# Patient Record
Sex: MALE | Race: WHITE | NOT HISPANIC OR LATINO | Employment: OTHER | URBAN - NONMETROPOLITAN AREA
[De-identification: names, ages, dates, MRNs, and addresses within clinical notes are randomized per-mention and may not be internally consistent; named-entity substitution may affect disease eponyms.]

---

## 2017-04-06 ENCOUNTER — OFFICE VISIT (OUTPATIENT)
Dept: CARDIAC SURGERY | Facility: CLINIC | Age: 67
End: 2017-04-06

## 2017-04-06 DIAGNOSIS — I71.40 ABDOMINAL AORTIC ANEURYSM (AAA) WITHOUT RUPTURE (HCC): Primary | ICD-10-CM

## 2017-04-06 PROCEDURE — 99212 OFFICE O/P EST SF 10 MIN: CPT | Performed by: THORACIC SURGERY (CARDIOTHORACIC VASCULAR SURGERY)

## 2017-04-07 VITALS
WEIGHT: 230 LBS | SYSTOLIC BLOOD PRESSURE: 140 MMHG | DIASTOLIC BLOOD PRESSURE: 80 MMHG | BODY MASS INDEX: 28.6 KG/M2 | HEART RATE: 72 BPM | HEIGHT: 75 IN | OXYGEN SATURATION: 96 %

## 2017-04-07 PROBLEM — I71.40 ABDOMINAL AORTIC ANEURYSM (AAA) WITHOUT RUPTURE (HCC): Status: ACTIVE | Noted: 2017-04-07

## 2017-04-07 RX ORDER — METOPROLOL SUCCINATE 50 MG/1
50 TABLET, EXTENDED RELEASE ORAL DAILY
COMMUNITY
Start: 2017-02-22 | End: 2019-10-31 | Stop reason: HOSPADM

## 2017-04-07 RX ORDER — ACETAMINOPHEN AND CODEINE PHOSPHATE 300; 30 MG/1; MG/1
1 TABLET ORAL EVERY 4 HOURS PRN
COMMUNITY
End: 2018-11-03 | Stop reason: HOSPADM

## 2017-04-07 NOTE — PROGRESS NOTES
04/06/2017  Patient Information  Derek Kelsey                                                                                          [address] [facility] [address]   1950  'PCP/Referring Physician'  Raudel Zheng MD  [phone]  No ref. provider found    Chief Complaint   Patient presents with   • Follow-up     1 YR F/U PER AGR PT HAVING ABDOMINAL PAIN W/ CTA ABDOMEN AND PELVIS AT [facility]       History of Present Illness:  Mr. Kelsey returns today for follow up of his abdominal aortic aneurysm.  Since last visit he has overall been doing reasonably well.  He denies any new pain in his back.  He has chronic back pain for which he sees Dr. Bhatti and gets injections for.  He denies any abdominal pain.    Patient Active Problem List   Diagnosis   • Anxiety   • COPD (chronic obstructive pulmonary disease)   • Abdominal aortic aneurysm   • Depression   • Hypertension   • Cataract, bilateral   • CAD (coronary artery disease)     Past Medical History:   Diagnosis Date   • Abdominal aortic aneurysm 9/29/2016   • Anxiety 9/29/2016   • CAD (coronary artery disease) 9/29/2016   • Cataract, bilateral 9/29/2016   • COPD (chronic obstructive pulmonary disease) 9/29/2016   • Depression 9/29/2016   • Depression    • Hypertension 9/29/2016     Past Surgical History:   Procedure Laterality Date   • HAND SURGERY         Current Outpatient Prescriptions:   •  acetaminophen-codeine (TYLENOL #3) 300-30 MG per tablet, Take 1 tablet by mouth Every 4 (Four) Hours As Needed for Moderate Pain (4-6)., Disp: , Rfl:   •  aclidinium bromide (TUDORZA PRESSAIR) 400 MCG/ACT aerosol powder  powder for inhalation, Inhale 1 puff 2 (Two) Times a Day., Disp: , Rfl:   •  amLODIPine (NORVASC) 5 MG tablet, , Disp: , Rfl:   •  ASPIRIN LOW DOSE 81 MG tablet, , Disp: , Rfl:   •  atorvastatin (LIPITOR) 40 MG tablet, , Disp: , Rfl:   •  hydrALAZINE (APRESOLINE) 50 MG tablet, , Disp: , Rfl:   •  lisinopril (PRINIVIL,ZESTRIL) 40 MG tablet,  , Disp: , Rfl:   •  methocarbamol (ROBAXIN) 500 MG tablet, , Disp: , Rfl:   •  mometasone-formoterol (DULERA 200) 200-5 MCG/ACT inhaler, Inhale 2 puffs 2 (Two) Times a Day., Disp: , Rfl:   •  ondansetron (ZOFRAN) 4 MG tablet, , Disp: , Rfl:   •  oxyCODONE-acetaminophen (PERCOCET)  MG per tablet, , Disp: , Rfl:   •  pantoprazole (PROTONIX) 40 MG EC tablet, , Disp: , Rfl:   •  valsartan (DIOVAN) 160 MG tablet, , Disp: , Rfl:   •  metoprolol succinate XL (TOPROL-XL) 50 MG 24 hr tablet, , Disp: , Rfl:   Allergies   Allergen Reactions   • Penicillins      Social History     Social History   • Marital status:      Spouse name: N/A   • Number of children: 2   • Years of education: N/A     Occupational History   • DISABLED       Social History Main Topics   • Smoking status: Former Smoker     Packs/day: 1.00     Years: 30.00     Quit date: 2013   • Smokeless tobacco: Former User     Quit date: 4/7/2013   • Alcohol use No   • Drug use: No   • Sexual activity: Not on file     Other Topics Concern   • Not on file     Social History Narrative     Family History   Problem Relation Age of Onset   • Stroke Mother    • Heart attack Father      Review of Systems   Constitution: Positive for malaise/fatigue. Negative for chills, fever, night sweats and weight loss.   HENT: Positive for congestion and headaches. Negative for hearing loss, odynophagia and sore throat.    Cardiovascular: Positive for dyspnea on exertion and leg swelling. Negative for chest pain, orthopnea and palpitations.   Respiratory: Positive for shortness of breath. Negative for cough and hemoptysis.    Endocrine: Positive for cold intolerance. Negative for heat intolerance, polydipsia, polyphagia and polyuria.   Hematologic/Lymphatic: Does not bruise/bleed easily.   Skin: Positive for poor wound healing. Negative for itching and rash.   Musculoskeletal: Positive for back pain, joint pain, joint swelling, muscle cramps, muscle weakness and  "neck pain. Negative for myalgias.   Gastrointestinal: Positive for abdominal pain. Negative for constipation, diarrhea, hematemesis, hematochezia, melena, nausea and vomiting.   Genitourinary: Negative for dysuria, frequency and hematuria.   Neurological: Positive for dizziness, light-headedness, loss of balance and numbness. Negative for focal weakness and seizures.   Psychiatric/Behavioral: Positive for depression. Negative for suicidal ideas. The patient is nervous/anxious.    All other systems reviewed and are negative.    Vitals:    04/07/17 1013   BP: 140/80   BP Location: Left arm   Patient Position: Sitting   Pulse: 72   SpO2: 96%   Weight: 230 lb (104 kg)   Height: 75\" (190.5 cm)      Physical Exam  GI:   Abdomen is soft, no tenderness noted.  Bowel sounds normal.    Labs/Imaging:  CT scan of the abdomen revealed his aorta to be 4.3 cm.    Assessment/Plan:  We will see him back in nine months with an ultrasound.    Patient Active Problem List   Diagnosis   • Anxiety   • COPD (chronic obstructive pulmonary disease)   • Abdominal aortic aneurysm   • Depression   • Hypertension   • Cataract, bilateral   • CAD (coronary artery disease)     Signed by: Leopoldo Burleson M.D.    4/6/2017    Claudia Pat transcribing on behalf of Leopoldo Burleson MD dictating    CC:  Raudel Zheng MD   "

## 2018-01-02 ENCOUNTER — TELEPHONE (OUTPATIENT)
Dept: CARDIAC SURGERY | Facility: CLINIC | Age: 68
End: 2018-01-02

## 2018-01-02 DIAGNOSIS — I71.40 ABDOMINAL AORTIC ANEURYSM (AAA) WITHOUT RUPTURE (HCC): Primary | ICD-10-CM

## 2018-01-02 NOTE — TELEPHONE ENCOUNTER
PT CALLING REGARDING F/U LETTER. PT STATES THE OFFICE CALLED AND SAID A LETTER HAD COME BACK TO OFFICE AND THAT WE WOULD SEND ANOTHER LETTER OUT TO PT. AS OF TODAY, HE HASN'T RECEIVED IT. I VERIFIED PT'S ADDRESS. PT IS DUE FOR A YEARLY F/U IN [place].

## 2018-09-06 ENCOUNTER — OFFICE VISIT (OUTPATIENT)
Dept: CARDIAC SURGERY | Facility: CLINIC | Age: 68
End: 2018-09-06

## 2018-09-06 DIAGNOSIS — I71.40 ABDOMINAL AORTIC ANEURYSM (AAA) WITHOUT RUPTURE (HCC): Primary | ICD-10-CM

## 2018-09-06 PROCEDURE — 99212 OFFICE O/P EST SF 10 MIN: CPT | Performed by: THORACIC SURGERY (CARDIOTHORACIC VASCULAR SURGERY)

## 2018-09-07 VITALS
HEIGHT: 74 IN | OXYGEN SATURATION: 94 % | WEIGHT: 216 LBS | DIASTOLIC BLOOD PRESSURE: 73 MMHG | HEART RATE: 94 BPM | SYSTOLIC BLOOD PRESSURE: 131 MMHG | BODY MASS INDEX: 27.72 KG/M2

## 2018-09-07 RX ORDER — CITALOPRAM 20 MG/1
40 TABLET ORAL DAILY
COMMUNITY

## 2018-09-07 RX ORDER — DOXYCYCLINE HYCLATE 100 MG/1
100 CAPSULE ORAL DAILY
COMMUNITY
End: 2018-10-17

## 2018-09-07 RX ORDER — TRIAMCINOLONE ACETONIDE 1 MG/G
1 CREAM TOPICAL AS NEEDED
Status: ON HOLD | COMMUNITY
End: 2019-06-06

## 2018-09-07 NOTE — PROGRESS NOTES
09/06/2018  Patient Information  Derek Kelsey                                                                                          [address]  [place] [address]   1950  'PCP/Referring Physician'  Raudel Zheng MD  [phone]  No ref. provider found    Chief Complaint   Patient presents with   • Follow-up     1 year follow to discuss u/s aorta for an abdominal aortic aneurysm.   • Aortic Aneurysm       History of Present Illness:  Mr. Kelsey returns today for follow up of his abdominal aortic aneurysm.  Since last visit, he has continued to have severe back pain.  Dr. Bhatti has apparently recommended surgical repair of this.  He had a recent MRI done 8/7/18 which revealed a 4.6 cm aneurysm. I have obtained and reviewed those films.    Patient Active Problem List   Diagnosis   • Anxiety   • COPD (chronic obstructive pulmonary disease) (CMS/HCC)   • Abdominal aortic aneurysm (CMS/HCC)   • Depression   • Hypertension   • Cataract, bilateral   • CAD (coronary artery disease)   • Abdominal aortic aneurysm (AAA) without rupture (CMS/HCC)     Past Medical History:   Diagnosis Date   • Abdominal aortic aneurysm (CMS/HCC) 9/29/2016   • Anxiety 9/29/2016   • CAD (coronary artery disease) 9/29/2016   • Cataract, bilateral 9/29/2016   • COPD (chronic obstructive pulmonary disease) (CMS/HCC) 9/29/2016   • Depression 9/29/2016   • Depression    • Hypertension 9/29/2016     Past Surgical History:   Procedure Laterality Date   • HAND SURGERY         Current Outpatient Prescriptions:   •  aclidinium bromide (TUDORZA PRESSAIR) 400 MCG/ACT aerosol powder  powder for inhalation, Inhale 1 puff 2 (Two) Times a Day., Disp: , Rfl:   •  amLODIPine (NORVASC) 5 MG tablet, , Disp: , Rfl:   •  ASPIRIN LOW DOSE 81 MG tablet, , Disp: , Rfl:   •  citalopram (CeleXA) 20 MG tablet, Take 20 mg by mouth Daily., Disp: , Rfl:   •  doxycycline (VIBRAMYCIN) 100 MG capsule, Take 100 mg by mouth Daily., Disp: , Rfl:   •   hydrALAZINE (APRESOLINE) 50 MG tablet, , Disp: , Rfl:   •  lisinopril (PRINIVIL,ZESTRIL) 40 MG tablet, , Disp: , Rfl:   •  metoprolol succinate XL (TOPROL-XL) 50 MG 24 hr tablet, , Disp: , Rfl:   •  mometasone-formoterol (DULERA 200) 200-5 MCG/ACT inhaler, Inhale 2 puffs 2 (Two) Times a Day., Disp: , Rfl:   •  pantoprazole (PROTONIX) 40 MG EC tablet, , Disp: , Rfl:   •  triamcinolone (KENALOG) 0.1 % cream, Apply  topically to the appropriate area as directed 3 (Three) Times a Day., Disp: , Rfl:   •  valsartan (DIOVAN) 160 MG tablet, , Disp: , Rfl:   •  acetaminophen-codeine (TYLENOL #3) 300-30 MG per tablet, Take 1 tablet by mouth Every 4 (Four) Hours As Needed for Moderate Pain (4-6)., Disp: , Rfl:   •  atorvastatin (LIPITOR) 40 MG tablet, , Disp: , Rfl:   •  methocarbamol (ROBAXIN) 500 MG tablet, , Disp: , Rfl:   •  ondansetron (ZOFRAN) 4 MG tablet, , Disp: , Rfl:   •  oxyCODONE-acetaminophen (PERCOCET)  MG per tablet, , Disp: , Rfl:   Allergies   Allergen Reactions   • Penicillins Hives     Social History     Social History   • Marital status:      Spouse name: N/A   • Number of children: 2   • Years of education: N/A     Occupational History   • DISABLED       Back     Social History Main Topics   • Smoking status: Former Smoker     Packs/day: 1.00     Years: 30.00     Types: Cigarettes     Quit date: 2013   • Smokeless tobacco: Former User     Types: Chew     Quit date: 4/7/2013   • Alcohol use No   • Drug use: No   • Sexual activity: Not on file     Other Topics Concern   • Not on file     Social History Narrative    Lives in [place].      Family History   Problem Relation Age of Onset   • Stroke Mother    • Heart attack Father      Review of Systems   Constitution: Positive for diaphoresis and malaise/fatigue. Negative for chills, fever, night sweats and weight loss.   HENT: Positive for hearing loss. Negative for odynophagia and sore throat.    Cardiovascular: Positive for dyspnea on  "exertion. Negative for chest pain, leg swelling, orthopnea and palpitations.   Respiratory: Positive for cough, shortness of breath and wheezing. Negative for hemoptysis.    Endocrine: Negative for cold intolerance, heat intolerance, polydipsia, polyphagia and polyuria.   Hematologic/Lymphatic: Bruises/bleeds easily.   Skin: Negative for itching and rash.   Musculoskeletal: Positive for back pain and muscle cramps. Negative for joint pain, joint swelling and myalgias.   Gastrointestinal: Positive for abdominal pain. Negative for constipation, diarrhea, hematemesis, hematochezia, melena, nausea and vomiting.   Genitourinary: Negative for dysuria, frequency and hematuria.   Neurological: Negative for focal weakness, headaches, numbness and seizures.   Psychiatric/Behavioral: Positive for depression. Negative for suicidal ideas.   All other systems reviewed and are negative.    Vitals:    09/06/18 1023   BP: 131/73   BP Location: Right arm   Patient Position: Sitting   Pulse: 94   SpO2: 94%   Weight: 98 kg (216 lb)   Height: 188 cm (74\")      Physical Exam  LUNGS:  Clear.  CARDIOVASCULAR: Regular rhythm and rate.  GI:   Abdomen is soft.    Assessment/Plan:  Mr. Kelsey has had slight enlargement of his abdominal aneurysm to 4.6 cm.  At this point, I would continue to follow him very closely.  We will see him back in six months with an ultrasound.  He is considering having surgery done on his back by Dr. Bhatti.  I will see him back in six months with an ultrasound.       Patient Active Problem List   Diagnosis   • Anxiety   • COPD (chronic obstructive pulmonary disease) (CMS/HCC)   • Abdominal aortic aneurysm (CMS/HCC)   • Depression   • Hypertension   • Cataract, bilateral   • CAD (coronary artery disease)   • Abdominal aortic aneurysm (AAA) without rupture (CMS/HCC)     CC: MD Claudia Cross transcribing on behalf of Leopoldo Burleson MD dictating.   "

## 2018-09-24 ENCOUNTER — TRANSCRIBE ORDERS (OUTPATIENT)
Dept: ADMINISTRATIVE | Facility: HOSPITAL | Age: 68
End: 2018-09-24

## 2018-09-24 DIAGNOSIS — R94.39 ABNORMAL STRESS TEST: Primary | ICD-10-CM

## 2018-09-28 ENCOUNTER — HOSPITAL ENCOUNTER (OUTPATIENT)
Facility: HOSPITAL | Age: 68
Setting detail: HOSPITAL OUTPATIENT SURGERY
Discharge: HOME | End: 2018-09-28
Attending: INTERNAL MEDICINE | Admitting: INTERNAL MEDICINE

## 2018-09-28 VITALS
BODY MASS INDEX: 27.9 KG/M2 | TEMPERATURE: 97.9 F | RESPIRATION RATE: 18 BRPM | HEIGHT: 74 IN | HEART RATE: 66 BPM | SYSTOLIC BLOOD PRESSURE: 112 MMHG | OXYGEN SATURATION: 93 % | WEIGHT: 217.37 LBS | DIASTOLIC BLOOD PRESSURE: 82 MMHG

## 2018-09-28 DIAGNOSIS — R94.39 ABNORMAL STRESS TEST: ICD-10-CM

## 2018-09-28 LAB
ANION GAP SERPL CALCULATED.3IONS-SCNC: 9 MMOL/L (ref 3–11)
BUN BLD-MCNC: 16 MG/DL (ref 9–23)
BUN/CREAT SERPL: 19.8 (ref 7–25)
CALCIUM SPEC-SCNC: 9.4 MG/DL (ref 8.7–10.4)
CHLORIDE SERPL-SCNC: 102 MMOL/L (ref 99–109)
CO2 SERPL-SCNC: 25 MMOL/L (ref 20–31)
CREAT BLD-MCNC: 0.81 MG/DL (ref 0.6–1.3)
DEPRECATED RDW RBC AUTO: 51.2 FL (ref 37–54)
ERYTHROCYTE [DISTWIDTH] IN BLOOD BY AUTOMATED COUNT: 14.6 % (ref 11.3–14.5)
GFR SERPL CREATININE-BSD FRML MDRD: 96 ML/MIN/1.73
GLUCOSE BLD-MCNC: 113 MG/DL (ref 70–100)
HCT VFR BLD AUTO: 47.3 % (ref 38.9–50.9)
HGB BLD-MCNC: 15.3 G/DL (ref 13.1–17.5)
MCH RBC QN AUTO: 31 PG (ref 27–31)
MCHC RBC AUTO-ENTMCNC: 32.3 G/DL (ref 32–36)
MCV RBC AUTO: 95.7 FL (ref 80–99)
PLATELET # BLD AUTO: 182 10*3/MM3 (ref 150–450)
PMV BLD AUTO: 10.9 FL (ref 6–12)
POTASSIUM BLD-SCNC: 3.9 MMOL/L (ref 3.5–5.5)
RBC # BLD AUTO: 4.94 10*6/MM3 (ref 4.2–5.76)
SODIUM BLD-SCNC: 136 MMOL/L (ref 132–146)
WBC NRBC COR # BLD: 7.14 10*3/MM3 (ref 3.5–10.8)

## 2018-09-28 PROCEDURE — 25010000002 PHENYLEPHRINE PER 1 ML: Performed by: INTERNAL MEDICINE

## 2018-09-28 PROCEDURE — 25010000002 MIDAZOLAM PER 1 MG: Performed by: INTERNAL MEDICINE

## 2018-09-28 PROCEDURE — C1769 GUIDE WIRE: HCPCS | Performed by: INTERNAL MEDICINE

## 2018-09-28 PROCEDURE — 93458 L HRT ARTERY/VENTRICLE ANGIO: CPT | Performed by: INTERNAL MEDICINE

## 2018-09-28 PROCEDURE — 36415 COLL VENOUS BLD VENIPUNCTURE: CPT

## 2018-09-28 PROCEDURE — 25010000002 HEPARIN (PORCINE) PER 1000 UNITS: Performed by: INTERNAL MEDICINE

## 2018-09-28 PROCEDURE — 0 IOPAMIDOL PER 1 ML: Performed by: INTERNAL MEDICINE

## 2018-09-28 PROCEDURE — 80048 BASIC METABOLIC PNL TOTAL CA: CPT | Performed by: INTERNAL MEDICINE

## 2018-09-28 PROCEDURE — 25010000002 FENTANYL CITRATE (PF) 100 MCG/2ML SOLUTION: Performed by: INTERNAL MEDICINE

## 2018-09-28 PROCEDURE — 85027 COMPLETE CBC AUTOMATED: CPT | Performed by: INTERNAL MEDICINE

## 2018-09-28 PROCEDURE — C1894 INTRO/SHEATH, NON-LASER: HCPCS | Performed by: INTERNAL MEDICINE

## 2018-09-28 RX ORDER — ASPIRIN 325 MG
325 TABLET ORAL DAILY
Status: DISCONTINUED | OUTPATIENT
Start: 2018-09-28 | End: 2018-09-28 | Stop reason: HOSPADM

## 2018-09-28 RX ORDER — ALPRAZOLAM 0.25 MG/1
0.25 TABLET ORAL 3 TIMES DAILY PRN
Status: DISCONTINUED | OUTPATIENT
Start: 2018-09-28 | End: 2018-09-28 | Stop reason: HOSPADM

## 2018-09-28 RX ORDER — MORPHINE SULFATE 2 MG/ML
1 INJECTION, SOLUTION INTRAMUSCULAR; INTRAVENOUS EVERY 4 HOURS PRN
Status: DISCONTINUED | OUTPATIENT
Start: 2018-09-28 | End: 2018-09-28 | Stop reason: HOSPADM

## 2018-09-28 RX ORDER — MIDAZOLAM HYDROCHLORIDE 1 MG/ML
INJECTION INTRAMUSCULAR; INTRAVENOUS AS NEEDED
Status: DISCONTINUED | OUTPATIENT
Start: 2018-09-28 | End: 2018-09-28 | Stop reason: HOSPADM

## 2018-09-28 RX ORDER — ACETAMINOPHEN 325 MG/1
650 TABLET ORAL EVERY 4 HOURS PRN
Status: DISCONTINUED | OUTPATIENT
Start: 2018-09-28 | End: 2018-09-28 | Stop reason: HOSPADM

## 2018-09-28 RX ORDER — HYDROCODONE BITARTRATE AND ACETAMINOPHEN 5; 325 MG/1; MG/1
1 TABLET ORAL EVERY 4 HOURS PRN
Status: DISCONTINUED | OUTPATIENT
Start: 2018-09-28 | End: 2018-09-28 | Stop reason: HOSPADM

## 2018-09-28 RX ORDER — LIDOCAINE HYDROCHLORIDE 10 MG/ML
INJECTION, SOLUTION INFILTRATION; PERINEURAL AS NEEDED
Status: DISCONTINUED | OUTPATIENT
Start: 2018-09-28 | End: 2018-09-28 | Stop reason: HOSPADM

## 2018-09-28 RX ORDER — SODIUM CHLORIDE 9 MG/ML
250 INJECTION, SOLUTION INTRAVENOUS CONTINUOUS
Status: ACTIVE | OUTPATIENT
Start: 2018-09-28 | End: 2018-09-28

## 2018-09-28 RX ORDER — ASPIRIN 325 MG
325 TABLET ORAL DAILY
Status: DISCONTINUED | OUTPATIENT
Start: 2018-09-28 | End: 2018-09-28

## 2018-09-28 RX ORDER — NALOXONE HCL 0.4 MG/ML
0.4 VIAL (ML) INJECTION
Status: DISCONTINUED | OUTPATIENT
Start: 2018-09-28 | End: 2018-09-28 | Stop reason: HOSPADM

## 2018-09-28 RX ORDER — TEMAZEPAM 7.5 MG/1
7.5 CAPSULE ORAL NIGHTLY PRN
Status: DISCONTINUED | OUTPATIENT
Start: 2018-09-28 | End: 2018-09-28 | Stop reason: HOSPADM

## 2018-09-28 RX ORDER — SODIUM CHLORIDE 9 MG/ML
INJECTION, SOLUTION INTRAVENOUS CONTINUOUS PRN
Status: COMPLETED | OUTPATIENT
Start: 2018-09-28 | End: 2018-09-28

## 2018-09-28 RX ORDER — FENTANYL CITRATE 50 UG/ML
INJECTION, SOLUTION INTRAMUSCULAR; INTRAVENOUS AS NEEDED
Status: DISCONTINUED | OUTPATIENT
Start: 2018-09-28 | End: 2018-09-28 | Stop reason: HOSPADM

## 2018-09-28 RX ADMIN — ASPIRIN 325 MG ORAL TABLET 325 MG: 325 PILL ORAL at 07:01

## 2018-10-17 ENCOUNTER — OFFICE VISIT (OUTPATIENT)
Dept: PULMONOLOGY | Facility: CLINIC | Age: 68
End: 2018-10-17

## 2018-10-17 VITALS
DIASTOLIC BLOOD PRESSURE: 84 MMHG | RESPIRATION RATE: 18 BRPM | WEIGHT: 225 LBS | HEIGHT: 74 IN | SYSTOLIC BLOOD PRESSURE: 146 MMHG | TEMPERATURE: 97 F | OXYGEN SATURATION: 93 % | HEART RATE: 78 BPM | BODY MASS INDEX: 28.88 KG/M2

## 2018-10-17 DIAGNOSIS — J60 COAL WORKERS PNEUMOCONIOSIS (HCC): ICD-10-CM

## 2018-10-17 DIAGNOSIS — I71.40 ABDOMINAL AORTIC ANEURYSM (AAA) WITHOUT RUPTURE (HCC): ICD-10-CM

## 2018-10-17 DIAGNOSIS — J44.9 COPD MIXED TYPE (HCC): ICD-10-CM

## 2018-10-17 DIAGNOSIS — Z01.818 PREOPERATIVE CLEARANCE: Primary | ICD-10-CM

## 2018-10-17 DIAGNOSIS — Z87.891 FORMER SMOKER: ICD-10-CM

## 2018-10-17 PROCEDURE — 99204 OFFICE O/P NEW MOD 45 MIN: CPT | Performed by: INTERNAL MEDICINE

## 2018-10-17 PROCEDURE — 94726 PLETHYSMOGRAPHY LUNG VOLUMES: CPT | Performed by: INTERNAL MEDICINE

## 2018-10-17 PROCEDURE — 94729 DIFFUSING CAPACITY: CPT | Performed by: INTERNAL MEDICINE

## 2018-10-17 PROCEDURE — 94060 EVALUATION OF WHEEZING: CPT | Performed by: INTERNAL MEDICINE

## 2018-10-17 RX ORDER — LANOLIN ALCOHOL/MO/W.PET/CERES
1000 CREAM (GRAM) TOPICAL DAILY
COMMUNITY
End: 2019-06-05

## 2018-10-17 RX ORDER — ALBUTEROL SULFATE 90 UG/1
4 AEROSOL, METERED RESPIRATORY (INHALATION) ONCE
Status: COMPLETED | OUTPATIENT
Start: 2018-10-17 | End: 2018-10-17

## 2018-10-17 RX ADMIN — ALBUTEROL SULFATE 4 PUFF: 90 AEROSOL, METERED RESPIRATORY (INHALATION) at 09:06

## 2018-10-17 NOTE — PROGRESS NOTES
"  PULMONARY  NOTE    Chief Complaint     COPD, back pain, former smoker, \"black lung\"    History of Present Illness     67-year-old white male referred for preoperative evaluation.  He is being considered for back surgery by Dr. Franklin.    Has a long history tobacco abuse.  This consisted of one or more packs of cigarettes per day.  He smoked up until 2011.    He has a diagnosis of COPD.  He has been treated with Tudorza and Dulera  He feels that these help with his symptoms but despite them he still has significant exercise limitation.    He has dyspnea on exertion, not at rest.  He would get short of breath walking about 100 feet on level surface or up one flight of stairs.    He has a daily cough typically in the morning and produces a small amount of yellow sputum.  He has had no hemoptysis.    He typically has one or 2 exacerbations of COPD a year.  In the last year he's been on about 2 rounds of prednisone in one or 2 rounds of antibiotics.    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    Patient Active Problem List   Diagnosis   • Anxiety   • Abdominal aortic aneurysm (CMS/HCC)   • Depression   • Hypertension   • Cataract, bilateral   • CAD (coronary artery disease)   • Abdominal aortic aneurysm (AAA) without rupture (CMS/HCC)   • Abnormal stress test   • Moderate COPD   • Former smoker   • Coal workers pneumoconiosis, simple     Allergies   Allergen Reactions   • Penicillins Hives       Current Outpatient Prescriptions:   •  acetaminophen-codeine (TYLENOL #3) 300-30 MG per tablet, Take 1 tablet by mouth Every 4 (Four) Hours As Needed for Moderate Pain (4-6)., Disp: , Rfl:   •  aclidinium bromide (TUDORZA PRESSAIR) 400 MCG/ACT aerosol powder  powder for inhalation, Inhale 1 puff 2 (Two) Times a Day., Disp: , Rfl:   •  amLODIPine (NORVASC) 5 MG tablet, 5 mg Daily., Disp: , Rfl:   •  ASPIRIN LOW DOSE 81 MG tablet, 81 mg Daily., Disp: , Rfl:   •  " citalopram (CeleXA) 20 MG tablet, Take 20 mg by mouth Daily., Disp: , Rfl:   •  hydrALAZINE (APRESOLINE) 50 MG tablet, 50 mg 3 (Three) Times a Day., Disp: , Rfl:   •  lisinopril (PRINIVIL,ZESTRIL) 40 MG tablet, 40 mg 2 (Two) Times a Day., Disp: , Rfl:   •  metoprolol succinate XL (TOPROL-XL) 50 MG 24 hr tablet, 50 mg Daily., Disp: , Rfl:   •  mometasone-formoterol (DULERA 200) 200-5 MCG/ACT inhaler, Inhale 2 puffs 2 (Two) Times a Day., Disp: , Rfl:   •  ondansetron (ZOFRAN) 4 MG tablet, , Disp: , Rfl:   •  pantoprazole (PROTONIX) 40 MG EC tablet, Daily., Disp: , Rfl:   •  triamcinolone (KENALOG) 0.1 % cream, Apply  topically to the appropriate area as directed 3 (Three) Times a Day., Disp: , Rfl:   •  valsartan (DIOVAN) 160 MG tablet, Daily., Disp: , Rfl:   •  vitamin B-12 (CYANOCOBALAMIN) 1000 MCG tablet, Take 1,000 mcg by mouth Daily., Disp: , Rfl:   No current facility-administered medications for this visit.   MEDICATION LIST AND ALLERGIES REVIEWED.    Family History   Problem Relation Age of Onset   • Stroke Mother    • Hypertension Mother    • Heart disease Mother    • Heart disease Father    • Hypertension Father    • Diabetes Sister    • Hypertension Sister    • Diabetes Brother    • Hypertension Child      Social History   Substance Use Topics   • Smoking status: Former Smoker     Packs/day: 1.00     Years: 30.00     Types: Cigarettes     Quit date: 2013   • Smokeless tobacco: Former User     Types: Chew     Quit date: 4/7/2013   • Alcohol use No     Social History     Social History Narrative    Lives in [place].    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    Review of Systems  ALSO REFER TO SCANNED ROS SHEET FROM SAME DATE.    /84 (BP Location: Right arm, Patient  "Position: Sitting, Cuff Size: Adult)   Pulse 78   Temp 97 °F (36.1 °C)   Resp 18   Ht 188 cm (74\")   Wt 102 kg (225 lb)   SpO2 93% Comment: RA AT REST  BMI 28.89 kg/m²   Physical Exam   Constitutional: He is oriented to person, place, and time. He appears well-developed. No distress.   HENT:   Head: Normocephalic and atraumatic.   Neck: No thyromegaly present.   Cardiovascular: Normal rate, regular rhythm and normal heart sounds.    No murmur heard.  Pulmonary/Chest: Effort normal. No stridor.   Decreased breath sounds with a few basilar crackles, no wheezes   Abdominal: Soft. Bowel sounds are normal.   Musculoskeletal: Normal range of motion. He exhibits no edema.   Lymphadenopathy:     He has no cervical adenopathy.        Right: No supraclavicular and no epitrochlear adenopathy present.        Left: No supraclavicular and no epitrochlear adenopathy present.   Neurological: He is alert and oriented to person, place, and time.   Skin: Skin is warm and dry. He is not diaphoretic.   Psychiatric: He has a normal mood and affect. His behavior is normal.   Nursing note and vitals reviewed.      Results     PFTs reveal moderate airway obstruction.  Mild improvement in FEV1 after bronchodilator.  No restriction and air trapping is present.  Normal diffusion.    PA and lateral chest x-ray today reveals changes of obstructive airways disease with flattened diaphragms and increased anterior clear space.   Also calcified nodules bilaterally consistent with granulomatous disease.  No evidence of active disease    Problem List       ICD-10-CM ICD-9-CM   1. Preoperative clearance Z01.818 V72.84   2. Moderate COPD J44.9 496   3. Former smoker Z87.891 V15.82   4. Abdominal aortic aneurysm (AAA) without rupture (CMS/[facility]) I71.4 441.4   5. Coal workers pneumoconiosis, simple J60 500       Discussion     We discussed his test results.  He has moderate obstructive airways disease.  This is most likely COPD due to to his past " history tobacco abuse.  We will check an alpha-1 antitrypsin screen, however.    We talked about medical management for COPD.  We talked about various medications.  I'm going to put him on Trelegy in place of Tudorza and Dulera to see if we can reduce one of his co-pays    I've recommended a low-dose screening CT scan of the chest.    We discussed pulmonary rehabilitation.  That might be something he can pursue after his back surgery.    We will check 2 nights of nocturnal pulse oximetry.  I suspect that he probably has nocturnal desaturation.    From a pulmonary standpoint there is no contraindication to him undergoing orthopedic surgery.  He is at an increased risk for pulmonary complications related to surgery however these risks are not prohibitive.    I'll see him back in a couple of months  He would probably benefit from perioperative bronchodilator therapy.    Joo Guerrero MD  Note electronically signed    CC: Raudel Zheng MD

## 2018-10-18 DIAGNOSIS — J84.9 INTERSTITIAL LUNG DISEASE (HCC): ICD-10-CM

## 2018-10-18 DIAGNOSIS — Z87.891 PERSONAL HISTORY OF TOBACCO USE, PRESENTING HAZARDS TO HEALTH: Primary | ICD-10-CM

## 2018-10-31 ENCOUNTER — APPOINTMENT (OUTPATIENT)
Dept: PREADMISSION TESTING | Facility: HOSPITAL | Age: 68
End: 2018-10-31

## 2018-10-31 VITALS — BODY MASS INDEX: 28.92 KG/M2 | WEIGHT: 225.31 LBS | HEIGHT: 74 IN

## 2018-10-31 LAB
ABO GROUP BLD: NORMAL
BLD GP AB SCN SERPL QL: NEGATIVE
DEPRECATED RDW RBC AUTO: 48 FL (ref 37–54)
ERYTHROCYTE [DISTWIDTH] IN BLOOD BY AUTOMATED COUNT: 13.5 % (ref 11.3–14.5)
HBA1C MFR BLD: 6.3 % (ref 4.8–5.6)
HCT VFR BLD AUTO: 47 % (ref 38.9–50.9)
HGB BLD-MCNC: 15.1 G/DL (ref 13.1–17.5)
MCH RBC QN AUTO: 31.1 PG (ref 27–31)
MCHC RBC AUTO-ENTMCNC: 32.1 G/DL (ref 32–36)
MCV RBC AUTO: 96.7 FL (ref 80–99)
PLATELET # BLD AUTO: 209 10*3/MM3 (ref 150–450)
PMV BLD AUTO: 10.9 FL (ref 6–12)
POTASSIUM BLD-SCNC: 4.2 MMOL/L (ref 3.5–5.5)
RBC # BLD AUTO: 4.86 10*6/MM3 (ref 4.2–5.76)
RH BLD: POSITIVE
T&S EXPIRATION DATE: NORMAL
WBC NRBC COR # BLD: 8.73 10*3/MM3 (ref 3.5–10.8)

## 2018-10-31 PROCEDURE — 85027 COMPLETE CBC AUTOMATED: CPT | Performed by: ANESTHESIOLOGY

## 2018-10-31 PROCEDURE — 83036 HEMOGLOBIN GLYCOSYLATED A1C: CPT | Performed by: ORTHOPAEDIC SURGERY

## 2018-10-31 PROCEDURE — 86901 BLOOD TYPING SEROLOGIC RH(D): CPT | Performed by: ORTHOPAEDIC SURGERY

## 2018-10-31 PROCEDURE — 86900 BLOOD TYPING SEROLOGIC ABO: CPT | Performed by: ORTHOPAEDIC SURGERY

## 2018-10-31 PROCEDURE — 86850 RBC ANTIBODY SCREEN: CPT | Performed by: ORTHOPAEDIC SURGERY

## 2018-10-31 PROCEDURE — 84132 ASSAY OF SERUM POTASSIUM: CPT | Performed by: ANESTHESIOLOGY

## 2018-10-31 PROCEDURE — 36415 COLL VENOUS BLD VENIPUNCTURE: CPT

## 2018-11-01 ENCOUNTER — HOSPITAL ENCOUNTER (INPATIENT)
Facility: HOSPITAL | Age: 68
LOS: 2 days | Discharge: HOME HEALTH | End: 2018-11-03
Attending: ORTHOPAEDIC SURGERY | Admitting: ORTHOPAEDIC SURGERY

## 2018-11-01 ENCOUNTER — ANESTHESIA (OUTPATIENT)
Dept: PERIOP | Facility: HOSPITAL | Age: 68
End: 2018-11-01

## 2018-11-01 ENCOUNTER — APPOINTMENT (OUTPATIENT)
Dept: GENERAL RADIOLOGY | Facility: HOSPITAL | Age: 68
End: 2018-11-01

## 2018-11-01 ENCOUNTER — ANESTHESIA EVENT (OUTPATIENT)
Dept: PERIOP | Facility: HOSPITAL | Age: 68
End: 2018-11-01

## 2018-11-01 DIAGNOSIS — Z74.09 IMPAIRED MOBILITY AND ADLS: ICD-10-CM

## 2018-11-01 DIAGNOSIS — Z78.9 IMPAIRED MOBILITY AND ADLS: ICD-10-CM

## 2018-11-01 DIAGNOSIS — Z98.1 S/P LUMBAR FUSION: ICD-10-CM

## 2018-11-01 DIAGNOSIS — Z74.09 IMPAIRED FUNCTIONAL MOBILITY, BALANCE, GAIT, AND ENDURANCE: Primary | ICD-10-CM

## 2018-11-01 PROBLEM — M54.9 BACK PAIN: Status: ACTIVE | Noted: 2018-11-01

## 2018-11-01 PROBLEM — R73.03 PREDIABETES: Status: ACTIVE | Noted: 2018-11-01

## 2018-11-01 LAB
ABO GROUP BLD: NORMAL
GLUCOSE BLDC GLUCOMTR-MCNC: 144 MG/DL (ref 70–130)
GLUCOSE BLDC GLUCOMTR-MCNC: 163 MG/DL (ref 70–130)
RH BLD: POSITIVE

## 2018-11-01 PROCEDURE — 25010000002 FENTANYL CITRATE (PF) 100 MCG/2ML SOLUTION: Performed by: ANESTHESIOLOGY

## 2018-11-01 PROCEDURE — 25010000002 NEOSTIGMINE PER 0.5 MG: Performed by: NURSE ANESTHETIST, CERTIFIED REGISTERED

## 2018-11-01 PROCEDURE — 25010000002 ONDANSETRON PER 1 MG: Performed by: NURSE ANESTHETIST, CERTIFIED REGISTERED

## 2018-11-01 PROCEDURE — 82962 GLUCOSE BLOOD TEST: CPT

## 2018-11-01 PROCEDURE — 25010000002 VANCOMYCIN 10 G RECONSTITUTED SOLUTION: Performed by: ORTHOPAEDIC SURGERY

## 2018-11-01 PROCEDURE — 25010000002 PHENYLEPHRINE PER 1 ML: Performed by: NURSE ANESTHETIST, CERTIFIED REGISTERED

## 2018-11-01 PROCEDURE — C1713 ANCHOR/SCREW BN/BN,TIS/BN: HCPCS | Performed by: ORTHOPAEDIC SURGERY

## 2018-11-01 PROCEDURE — 25010000002 PROPOFOL 10 MG/ML EMULSION: Performed by: ANESTHESIOLOGY

## 2018-11-01 PROCEDURE — 97116 GAIT TRAINING THERAPY: CPT

## 2018-11-01 PROCEDURE — 25010000002 DEXAMETHASONE PER 1 MG: Performed by: ANESTHESIOLOGY

## 2018-11-01 PROCEDURE — 25010000002 HEPARIN (PORCINE) PER 1000 UNITS

## 2018-11-01 PROCEDURE — 76000 FLUOROSCOPY <1 HR PHYS/QHP: CPT

## 2018-11-01 PROCEDURE — 0SG30AJ FUSION OF LUMBOSACRAL JOINT WITH INTERBODY FUSION DEVICE, POSTERIOR APPROACH, ANTERIOR COLUMN, OPEN APPROACH: ICD-10-PCS | Performed by: ORTHOPAEDIC SURGERY

## 2018-11-01 PROCEDURE — 86901 BLOOD TYPING SEROLOGIC RH(D): CPT

## 2018-11-01 PROCEDURE — 0ST40ZZ RESECTION OF LUMBOSACRAL DISC, OPEN APPROACH: ICD-10-PCS | Performed by: ORTHOPAEDIC SURGERY

## 2018-11-01 PROCEDURE — 25810000003 SODIUM CHLORIDE 0.9 % WITH KCL 20 MEQ 20-0.9 MEQ/L-% SOLUTION: Performed by: ORTHOPAEDIC SURGERY

## 2018-11-01 PROCEDURE — 25010000002 NALOXONE PER 1 MG: Performed by: ORTHOPAEDIC SURGERY

## 2018-11-01 PROCEDURE — 76001 HC FLUORO GREATER THAN 1 HOUR: CPT

## 2018-11-01 PROCEDURE — 97161 PT EVAL LOW COMPLEX 20 MIN: CPT

## 2018-11-01 PROCEDURE — 86900 BLOOD TYPING SEROLOGIC ABO: CPT

## 2018-11-01 PROCEDURE — 0SG3071 FUSION OF LUMBOSACRAL JOINT WITH AUTOLOGOUS TISSUE SUBSTITUTE, POSTERIOR APPROACH, POSTERIOR COLUMN, OPEN APPROACH: ICD-10-PCS | Performed by: ORTHOPAEDIC SURGERY

## 2018-11-01 PROCEDURE — 63710000001 INSULIN LISPRO (HUMAN) PER 5 UNITS: Performed by: NURSE PRACTITIONER

## 2018-11-01 PROCEDURE — 0SG00AJ FUSION OF LUMBAR VERTEBRAL JOINT WITH INTERBODY FUSION DEVICE, POSTERIOR APPROACH, ANTERIOR COLUMN, OPEN APPROACH: ICD-10-PCS | Performed by: ORTHOPAEDIC SURGERY

## 2018-11-01 PROCEDURE — 0SG0071 FUSION OF LUMBAR VERTEBRAL JOINT WITH AUTOLOGOUS TISSUE SUBSTITUTE, POSTERIOR APPROACH, POSTERIOR COLUMN, OPEN APPROACH: ICD-10-PCS | Performed by: ORTHOPAEDIC SURGERY

## 2018-11-01 PROCEDURE — 0ST20ZZ RESECTION OF LUMBAR VERTEBRAL DISC, OPEN APPROACH: ICD-10-PCS | Performed by: ORTHOPAEDIC SURGERY

## 2018-11-01 DEVICE — SCRW CORT VIPER PLS5.5 TI FIX 6X45MM: Type: IMPLANTABLE DEVICE | Site: SPINE LUMBAR | Status: FUNCTIONAL

## 2018-11-01 DEVICE — IMPLANTABLE DEVICE: Type: IMPLANTABLE DEVICE | Site: SPINE LUMBAR | Status: FUNCTIONAL

## 2018-11-01 DEVICE — SCRW VIPER INNR ST: Type: IMPLANTABLE DEVICE | Site: SPINE LUMBAR | Status: FUNCTIONAL

## 2018-11-01 DEVICE — ALLOGRFT BONE VIVIGEN CELLUAR MATRX FORMABLE 5CC: Type: IMPLANTABLE DEVICE | Site: SPINE LUMBAR | Status: FUNCTIONAL

## 2018-11-01 DEVICE — SCRW CORT VIPER PLS5.5 TI FIX 7X45MM: Type: IMPLANTABLE DEVICE | Site: SPINE LUMBAR | Status: FUNCTIONAL

## 2018-11-01 DEVICE — ROD PREBNT SPINE EXPEDIUM TI 5.5X55MM: Type: IMPLANTABLE DEVICE | Site: SPINE LUMBAR | Status: FUNCTIONAL

## 2018-11-01 RX ORDER — FAMOTIDINE 20 MG/1
20 TABLET, FILM COATED ORAL ONCE
Status: DISCONTINUED | OUTPATIENT
Start: 2018-11-01 | End: 2018-11-01 | Stop reason: HOSPADM

## 2018-11-01 RX ORDER — ONDANSETRON 2 MG/ML
4 INJECTION INTRAMUSCULAR; INTRAVENOUS EVERY 6 HOURS PRN
Status: DISCONTINUED | OUTPATIENT
Start: 2018-11-01 | End: 2018-11-03 | Stop reason: HOSPADM

## 2018-11-01 RX ORDER — LABETALOL HYDROCHLORIDE 5 MG/ML
10 INJECTION, SOLUTION INTRAVENOUS EVERY 4 HOURS PRN
Status: DISCONTINUED | OUTPATIENT
Start: 2018-11-01 | End: 2018-11-03 | Stop reason: HOSPADM

## 2018-11-01 RX ORDER — ZOLPIDEM TARTRATE 5 MG/1
5 TABLET ORAL NIGHTLY PRN
Status: DISCONTINUED | OUTPATIENT
Start: 2018-11-01 | End: 2018-11-03 | Stop reason: HOSPADM

## 2018-11-01 RX ORDER — BISACODYL 10 MG
10 SUPPOSITORY, RECTAL RECTAL DAILY PRN
Status: DISCONTINUED | OUTPATIENT
Start: 2018-11-01 | End: 2018-11-03 | Stop reason: HOSPADM

## 2018-11-01 RX ORDER — PREGABALIN 75 MG/1
75 CAPSULE ORAL ONCE
Status: COMPLETED | OUTPATIENT
Start: 2018-11-01 | End: 2018-11-01

## 2018-11-01 RX ORDER — PANTOPRAZOLE SODIUM 40 MG/1
40 TABLET, DELAYED RELEASE ORAL
Status: DISCONTINUED | OUTPATIENT
Start: 2018-11-02 | End: 2018-11-03 | Stop reason: HOSPADM

## 2018-11-01 RX ORDER — SODIUM CHLORIDE 0.9 % (FLUSH) 0.9 %
3-10 SYRINGE (ML) INJECTION AS NEEDED
Status: DISCONTINUED | OUTPATIENT
Start: 2018-11-01 | End: 2018-11-01 | Stop reason: HOSPADM

## 2018-11-01 RX ORDER — CITALOPRAM 20 MG/1
20 TABLET ORAL DAILY
Status: DISCONTINUED | OUTPATIENT
Start: 2018-11-02 | End: 2018-11-03 | Stop reason: HOSPADM

## 2018-11-01 RX ORDER — VALSARTAN 160 MG/1
160 TABLET ORAL DAILY
Status: DISCONTINUED | OUTPATIENT
Start: 2018-11-02 | End: 2018-11-03 | Stop reason: HOSPADM

## 2018-11-01 RX ORDER — OXYCODONE AND ACETAMINOPHEN 7.5; 325 MG/1; MG/1
1 TABLET ORAL EVERY 4 HOURS PRN
Status: DISCONTINUED | OUTPATIENT
Start: 2018-11-01 | End: 2018-11-03 | Stop reason: HOSPADM

## 2018-11-01 RX ORDER — SODIUM CHLORIDE 0.9 % (FLUSH) 0.9 %
3 SYRINGE (ML) INJECTION EVERY 12 HOURS SCHEDULED
Status: DISCONTINUED | OUTPATIENT
Start: 2018-11-01 | End: 2018-11-01 | Stop reason: HOSPADM

## 2018-11-01 RX ORDER — HYDRALAZINE HYDROCHLORIDE 50 MG/1
50 TABLET, FILM COATED ORAL EVERY 8 HOURS SCHEDULED
Status: DISCONTINUED | OUTPATIENT
Start: 2018-11-01 | End: 2018-11-03 | Stop reason: HOSPADM

## 2018-11-01 RX ORDER — FAMOTIDINE 10 MG/ML
20 INJECTION, SOLUTION INTRAVENOUS EVERY 12 HOURS SCHEDULED
Status: DISCONTINUED | OUTPATIENT
Start: 2018-11-01 | End: 2018-11-03 | Stop reason: HOSPADM

## 2018-11-01 RX ORDER — NALOXONE HCL 0.4 MG/ML
0.4 VIAL (ML) INJECTION
Status: DISCONTINUED | OUTPATIENT
Start: 2018-11-01 | End: 2018-11-03 | Stop reason: HOSPADM

## 2018-11-01 RX ORDER — SODIUM CHLORIDE, SODIUM LACTATE, POTASSIUM CHLORIDE, CALCIUM CHLORIDE 600; 310; 30; 20 MG/100ML; MG/100ML; MG/100ML; MG/100ML
9 INJECTION, SOLUTION INTRAVENOUS CONTINUOUS PRN
Status: DISCONTINUED | OUTPATIENT
Start: 2018-11-01 | End: 2018-11-01 | Stop reason: HOSPADM

## 2018-11-01 RX ORDER — ACETAMINOPHEN 500 MG
1000 TABLET ORAL ONCE
Status: COMPLETED | OUTPATIENT
Start: 2018-11-01 | End: 2018-11-01

## 2018-11-01 RX ORDER — BUPIVACAINE HYDROCHLORIDE AND EPINEPHRINE 2.5; 5 MG/ML; UG/ML
INJECTION, SOLUTION INFILTRATION; PERINEURAL AS NEEDED
Status: DISCONTINUED | OUTPATIENT
Start: 2018-11-01 | End: 2018-11-01 | Stop reason: HOSPADM

## 2018-11-01 RX ORDER — LIDOCAINE HYDROCHLORIDE 10 MG/ML
INJECTION, SOLUTION EPIDURAL; INFILTRATION; INTRACAUDAL; PERINEURAL AS NEEDED
Status: DISCONTINUED | OUTPATIENT
Start: 2018-11-01 | End: 2018-11-01 | Stop reason: SURG

## 2018-11-01 RX ORDER — GLYCOPYRROLATE 0.2 MG/ML
INJECTION INTRAMUSCULAR; INTRAVENOUS AS NEEDED
Status: DISCONTINUED | OUTPATIENT
Start: 2018-11-01 | End: 2018-11-01 | Stop reason: SURG

## 2018-11-01 RX ORDER — EPHEDRINE SULFATE 50 MG/ML
INJECTION, SOLUTION INTRAVENOUS AS NEEDED
Status: DISCONTINUED | OUTPATIENT
Start: 2018-11-01 | End: 2018-11-01 | Stop reason: SURG

## 2018-11-01 RX ORDER — MORPHINE SULFATE 2 MG/ML
1 INJECTION, SOLUTION INTRAMUSCULAR; INTRAVENOUS EVERY 4 HOURS PRN
Status: DISCONTINUED | OUTPATIENT
Start: 2018-11-01 | End: 2018-11-03 | Stop reason: HOSPADM

## 2018-11-01 RX ORDER — FAMOTIDINE 10 MG/ML
20 INJECTION, SOLUTION INTRAVENOUS ONCE
Status: DISCONTINUED | OUTPATIENT
Start: 2018-11-01 | End: 2018-11-01 | Stop reason: HOSPADM

## 2018-11-01 RX ORDER — LIDOCAINE HYDROCHLORIDE 10 MG/ML
0.5 INJECTION, SOLUTION EPIDURAL; INFILTRATION; INTRACAUDAL; PERINEURAL ONCE AS NEEDED
Status: COMPLETED | OUTPATIENT
Start: 2018-11-01 | End: 2018-11-01

## 2018-11-01 RX ORDER — ONDANSETRON 2 MG/ML
INJECTION INTRAMUSCULAR; INTRAVENOUS AS NEEDED
Status: DISCONTINUED | OUTPATIENT
Start: 2018-11-01 | End: 2018-11-01 | Stop reason: SURG

## 2018-11-01 RX ORDER — VECURONIUM BROMIDE 1 MG/ML
INJECTION, POWDER, LYOPHILIZED, FOR SOLUTION INTRAVENOUS AS NEEDED
Status: DISCONTINUED | OUTPATIENT
Start: 2018-11-01 | End: 2018-11-01 | Stop reason: SURG

## 2018-11-01 RX ORDER — SODIUM CHLORIDE AND POTASSIUM CHLORIDE 150; 900 MG/100ML; MG/100ML
100 INJECTION, SOLUTION INTRAVENOUS CONTINUOUS
Status: DISCONTINUED | OUTPATIENT
Start: 2018-11-01 | End: 2018-11-03 | Stop reason: HOSPADM

## 2018-11-01 RX ORDER — PROPOFOL 10 MG/ML
VIAL (ML) INTRAVENOUS AS NEEDED
Status: DISCONTINUED | OUTPATIENT
Start: 2018-11-01 | End: 2018-11-01 | Stop reason: SURG

## 2018-11-01 RX ORDER — ACETAMINOPHEN 325 MG/1
650 TABLET ORAL EVERY 4 HOURS PRN
Status: DISCONTINUED | OUTPATIENT
Start: 2018-11-01 | End: 2018-11-03 | Stop reason: HOSPADM

## 2018-11-01 RX ORDER — SODIUM CHLORIDE, SODIUM LACTATE, POTASSIUM CHLORIDE, CALCIUM CHLORIDE 600; 310; 30; 20 MG/100ML; MG/100ML; MG/100ML; MG/100ML
9 INJECTION, SOLUTION INTRAVENOUS CONTINUOUS
Status: DISCONTINUED | OUTPATIENT
Start: 2018-11-01 | End: 2018-11-03 | Stop reason: HOSPADM

## 2018-11-01 RX ORDER — DEXTROSE MONOHYDRATE 25 G/50ML
25 INJECTION, SOLUTION INTRAVENOUS
Status: DISCONTINUED | OUTPATIENT
Start: 2018-11-01 | End: 2018-11-03 | Stop reason: HOSPADM

## 2018-11-01 RX ORDER — FAMOTIDINE 20 MG/1
20 TABLET, FILM COATED ORAL EVERY 12 HOURS SCHEDULED
Status: DISCONTINUED | OUTPATIENT
Start: 2018-11-01 | End: 2018-11-03 | Stop reason: HOSPADM

## 2018-11-01 RX ORDER — BUDESONIDE AND FORMOTEROL FUMARATE DIHYDRATE 160; 4.5 UG/1; UG/1
2 AEROSOL RESPIRATORY (INHALATION)
Status: DISCONTINUED | OUTPATIENT
Start: 2018-11-01 | End: 2018-11-01

## 2018-11-01 RX ORDER — FENTANYL CITRATE 50 UG/ML
INJECTION, SOLUTION INTRAMUSCULAR; INTRAVENOUS AS NEEDED
Status: DISCONTINUED | OUTPATIENT
Start: 2018-11-01 | End: 2018-11-01 | Stop reason: SURG

## 2018-11-01 RX ORDER — SODIUM CHLORIDE 0.9 % (FLUSH) 0.9 %
3-10 SYRINGE (ML) INJECTION AS NEEDED
Status: DISCONTINUED | OUTPATIENT
Start: 2018-11-01 | End: 2018-11-03 | Stop reason: HOSPADM

## 2018-11-01 RX ORDER — METOPROLOL SUCCINATE 50 MG/1
50 TABLET, EXTENDED RELEASE ORAL DAILY
Status: DISCONTINUED | OUTPATIENT
Start: 2018-11-02 | End: 2018-11-03 | Stop reason: HOSPADM

## 2018-11-01 RX ORDER — LISINOPRIL 40 MG/1
40 TABLET ORAL 2 TIMES DAILY
Status: DISCONTINUED | OUTPATIENT
Start: 2018-11-01 | End: 2018-11-03 | Stop reason: HOSPADM

## 2018-11-01 RX ORDER — HYDROMORPHONE HYDROCHLORIDE 1 MG/ML
0.5 INJECTION, SOLUTION INTRAMUSCULAR; INTRAVENOUS; SUBCUTANEOUS
Status: DISCONTINUED | OUTPATIENT
Start: 2018-11-01 | End: 2018-11-01 | Stop reason: HOSPADM

## 2018-11-01 RX ORDER — LIDOCAINE HYDROCHLORIDE 10 MG/ML
0.5 INJECTION, SOLUTION EPIDURAL; INFILTRATION; INTRACAUDAL; PERINEURAL ONCE AS NEEDED
Status: DISCONTINUED | OUTPATIENT
Start: 2018-11-01 | End: 2018-11-01 | Stop reason: HOSPADM

## 2018-11-01 RX ORDER — FENTANYL CITRATE 50 UG/ML
50 INJECTION, SOLUTION INTRAMUSCULAR; INTRAVENOUS
Status: DISCONTINUED | OUTPATIENT
Start: 2018-11-01 | End: 2018-11-01 | Stop reason: HOSPADM

## 2018-11-01 RX ORDER — NICOTINE POLACRILEX 4 MG
15 LOZENGE BUCCAL
Status: DISCONTINUED | OUTPATIENT
Start: 2018-11-01 | End: 2018-11-03 | Stop reason: HOSPADM

## 2018-11-01 RX ORDER — AMLODIPINE BESYLATE 5 MG/1
5 TABLET ORAL 2 TIMES DAILY
Status: DISCONTINUED | OUTPATIENT
Start: 2018-11-01 | End: 2018-11-03 | Stop reason: HOSPADM

## 2018-11-01 RX ORDER — BISACODYL 5 MG/1
5 TABLET, DELAYED RELEASE ORAL DAILY PRN
Status: DISCONTINUED | OUTPATIENT
Start: 2018-11-01 | End: 2018-11-03 | Stop reason: HOSPADM

## 2018-11-01 RX ORDER — PHENYLEPHRINE HCL IN 0.9% NACL 0.5 MG/5ML
SYRINGE (ML) INTRAVENOUS AS NEEDED
Status: DISCONTINUED | OUTPATIENT
Start: 2018-11-01 | End: 2018-11-01 | Stop reason: SURG

## 2018-11-01 RX ORDER — OXYCODONE HCL 10 MG/1
10 TABLET, FILM COATED, EXTENDED RELEASE ORAL ONCE
Status: COMPLETED | OUTPATIENT
Start: 2018-11-01 | End: 2018-11-01

## 2018-11-01 RX ORDER — FAMOTIDINE 20 MG/1
20 TABLET, FILM COATED ORAL
Status: DISCONTINUED | OUTPATIENT
Start: 2018-11-01 | End: 2018-11-01 | Stop reason: HOSPADM

## 2018-11-01 RX ORDER — MAGNESIUM HYDROXIDE 1200 MG/15ML
LIQUID ORAL AS NEEDED
Status: DISCONTINUED | OUTPATIENT
Start: 2018-11-01 | End: 2018-11-01 | Stop reason: HOSPADM

## 2018-11-01 RX ORDER — SODIUM CHLORIDE 0.9 % (FLUSH) 0.9 %
3 SYRINGE (ML) INJECTION EVERY 12 HOURS SCHEDULED
Status: DISCONTINUED | OUTPATIENT
Start: 2018-11-01 | End: 2018-11-03 | Stop reason: HOSPADM

## 2018-11-01 RX ORDER — DEXAMETHASONE SODIUM PHOSPHATE 10 MG/ML
INJECTION INTRAMUSCULAR; INTRAVENOUS AS NEEDED
Status: DISCONTINUED | OUTPATIENT
Start: 2018-11-01 | End: 2018-11-01 | Stop reason: SURG

## 2018-11-01 RX ORDER — ROCURONIUM BROMIDE 10 MG/ML
INJECTION, SOLUTION INTRAVENOUS AS NEEDED
Status: DISCONTINUED | OUTPATIENT
Start: 2018-11-01 | End: 2018-11-01 | Stop reason: SURG

## 2018-11-01 RX ADMIN — Medication 3 MG: at 11:57

## 2018-11-01 RX ADMIN — Medication 200 MCG: at 09:18

## 2018-11-01 RX ADMIN — NALOXONE HYDROCHLORIDE 80 MCG: 0.4 INJECTION, SOLUTION INTRAMUSCULAR; INTRAVENOUS; SUBCUTANEOUS at 12:45

## 2018-11-01 RX ADMIN — PHENYLEPHRINE HYDROCHLORIDE 0.1 MCG/KG/MIN: 10 INJECTION INTRAVENOUS at 09:26

## 2018-11-01 RX ADMIN — INSULIN LISPRO 2 UNITS: 100 INJECTION, SOLUTION INTRAVENOUS; SUBCUTANEOUS at 21:07

## 2018-11-01 RX ADMIN — FENTANYL CITRATE 50 MCG: 50 INJECTION, SOLUTION INTRAMUSCULAR; INTRAVENOUS at 11:59

## 2018-11-01 RX ADMIN — TRANEXAMIC ACID 1020 MG: 100 INJECTION, SOLUTION INTRAVENOUS at 08:26

## 2018-11-01 RX ADMIN — FAMOTIDINE 20 MG: 20 TABLET ORAL at 06:49

## 2018-11-01 RX ADMIN — Medication 100 MCG: at 08:54

## 2018-11-01 RX ADMIN — VANCOMYCIN HYDROCHLORIDE 1500 MG: 10 INJECTION, POWDER, LYOPHILIZED, FOR SOLUTION INTRAVENOUS at 17:48

## 2018-11-01 RX ADMIN — VECURONIUM BROMIDE 1 MG: 1 INJECTION, POWDER, LYOPHILIZED, FOR SOLUTION INTRAVENOUS at 11:08

## 2018-11-01 RX ADMIN — GLYCOPYRROLATE 0.4 MG: 0.2 INJECTION, SOLUTION INTRAMUSCULAR; INTRAVENOUS at 11:57

## 2018-11-01 RX ADMIN — LIDOCAINE HYDROCHLORIDE 50 MG: 10 INJECTION, SOLUTION EPIDURAL; INFILTRATION; INTRACAUDAL; PERINEURAL at 08:15

## 2018-11-01 RX ADMIN — SODIUM CHLORIDE, POTASSIUM CHLORIDE, SODIUM LACTATE AND CALCIUM CHLORIDE: 600; 310; 30; 20 INJECTION, SOLUTION INTRAVENOUS at 10:20

## 2018-11-01 RX ADMIN — ACETAMINOPHEN 1000 MG: 500 TABLET, FILM COATED ORAL at 06:49

## 2018-11-01 RX ADMIN — EPHEDRINE SULFATE 10 MG: 50 INJECTION INTRAVENOUS at 08:33

## 2018-11-01 RX ADMIN — PROPOFOL 150 MG: 10 INJECTION, EMULSION INTRAVENOUS at 08:15

## 2018-11-01 RX ADMIN — EPHEDRINE SULFATE 5 MG: 50 INJECTION INTRAVENOUS at 09:18

## 2018-11-01 RX ADMIN — VECURONIUM BROMIDE 2 MG: 1 INJECTION, POWDER, LYOPHILIZED, FOR SOLUTION INTRAVENOUS at 10:18

## 2018-11-01 RX ADMIN — TRANEXAMIC ACID 1 MG/KG/HR: 100 INJECTION, SOLUTION INTRAVENOUS at 08:38

## 2018-11-01 RX ADMIN — DEXAMETHASONE SODIUM PHOSPHATE 8 MG: 10 INJECTION INTRAMUSCULAR; INTRAVENOUS at 08:22

## 2018-11-01 RX ADMIN — OXYCODONE HYDROCHLORIDE 10 MG: 10 TABLET, FILM COATED, EXTENDED RELEASE ORAL at 06:49

## 2018-11-01 RX ADMIN — FENTANYL CITRATE 50 MCG: 50 INJECTION, SOLUTION INTRAMUSCULAR; INTRAVENOUS at 13:06

## 2018-11-01 RX ADMIN — VANCOMYCIN HYDROCHLORIDE 1500 MG: 10 INJECTION, POWDER, LYOPHILIZED, FOR SOLUTION INTRAVENOUS at 07:33

## 2018-11-01 RX ADMIN — FENTANYL CITRATE 100 MCG: 50 INJECTION, SOLUTION INTRAMUSCULAR; INTRAVENOUS at 08:09

## 2018-11-01 RX ADMIN — POTASSIUM CHLORIDE AND SODIUM CHLORIDE 100 ML/HR: 900; 150 INJECTION, SOLUTION INTRAVENOUS at 17:48

## 2018-11-01 RX ADMIN — VECURONIUM BROMIDE 2 MG: 1 INJECTION, POWDER, LYOPHILIZED, FOR SOLUTION INTRAVENOUS at 09:42

## 2018-11-01 RX ADMIN — SODIUM CHLORIDE, POTASSIUM CHLORIDE, SODIUM LACTATE AND CALCIUM CHLORIDE: 600; 310; 30; 20 INJECTION, SOLUTION INTRAVENOUS at 08:08

## 2018-11-01 RX ADMIN — Medication 200 MCG: at 08:33

## 2018-11-01 RX ADMIN — FAMOTIDINE 20 MG: 20 TABLET ORAL at 21:01

## 2018-11-01 RX ADMIN — ROCURONIUM BROMIDE 50 MG: 10 SOLUTION INTRAVENOUS at 08:15

## 2018-11-01 RX ADMIN — LIDOCAINE HYDROCHLORIDE 0.5 ML: 10 INJECTION, SOLUTION EPIDURAL; INFILTRATION; INTRACAUDAL; PERINEURAL at 06:48

## 2018-11-01 RX ADMIN — FENTANYL CITRATE 100 MCG: 50 INJECTION, SOLUTION INTRAMUSCULAR; INTRAVENOUS at 12:21

## 2018-11-01 RX ADMIN — ONDANSETRON 4 MG: 2 INJECTION INTRAMUSCULAR; INTRAVENOUS at 11:46

## 2018-11-01 RX ADMIN — PREGABALIN 75 MG: 75 CAPSULE ORAL at 06:49

## 2018-11-01 RX ADMIN — SODIUM CHLORIDE, POTASSIUM CHLORIDE, SODIUM LACTATE AND CALCIUM CHLORIDE 9 ML/HR: 600; 310; 30; 20 INJECTION, SOLUTION INTRAVENOUS at 06:48

## 2018-11-01 RX ADMIN — EPHEDRINE SULFATE 10 MG: 50 INJECTION INTRAVENOUS at 09:15

## 2018-11-01 RX ADMIN — VECURONIUM BROMIDE 3 MG: 1 INJECTION, POWDER, LYOPHILIZED, FOR SOLUTION INTRAVENOUS at 09:07

## 2018-11-01 RX ADMIN — MUPIROCIN 10 APPLICATION: 20 OINTMENT TOPICAL at 06:49

## 2018-11-01 RX ADMIN — OXYCODONE HYDROCHLORIDE AND ACETAMINOPHEN 1 TABLET: 7.5; 325 TABLET ORAL at 21:00

## 2018-11-01 NOTE — ANESTHESIA PREPROCEDURE EVALUATION
Anesthesia Evaluation     Patient summary reviewed and Nursing notes reviewed   NPO Solid Status: > 8 hours  NPO Liquid Status: > 8 hours           Airway   Mallampati: I  TM distance: >3 FB  Neck ROM: full  No difficulty expected  Dental    (+) edentulous    Pulmonary    (+) wheezes,   Cardiovascular   Exercise tolerance: good (4-7 METS)    Rhythm: regular  Rate: normal        Neuro/Psych  GI/Hepatic/Renal/Endo      Musculoskeletal     Abdominal    Substance History      OB/GYN          Other                      Anesthesia Plan    ASA 3     general     intravenous induction   Anesthetic plan, all risks, benefits, and alternatives have been provided, discussed and informed consent has been obtained with: patient.

## 2018-11-01 NOTE — ANESTHESIA POSTPROCEDURE EVALUATION
Patient: Derek Kelsey    Procedure Summary     Date:  11/01/18 Room / Location:   [facility] [address] [place] [address] [facility]    Anesthesia Start:  0808 Anesthesia Stop:      Procedure:  LUMBAR DISCECTOMY POSTERIOR WITH FUSION INSTRUMENTATION (N/A Spine Lumbar) Diagnosis:      Surgeon:  Joo Franklin MD Provider:  Colt Arreola MD    Anesthesia Type:  general ASA Status:  3          Anesthesia Type: general  Last vitals  BP   110/71 (11/01/18 0642)   Temp   97.7   Pulse   71   Resp   15   SpO2   94%     Post Anesthesia Care and Evaluation    Patient location during evaluation: PACU  Patient participation: complete - patient participated  Level of consciousness: awake and alert  Pain score: 0  Pain management: adequate  Airway patency: patent  Anesthetic complications: No anesthetic complications  PONV Status: none  Cardiovascular status: hemodynamically stable and acceptable  Respiratory status: nonlabored ventilation, acceptable and nasal cannula  Hydration status: acceptable

## 2018-11-01 NOTE — OP NOTE
PREOPERATIVE DIAGNOSES:   1. Lumbar spinal stenosis L4-L5 and L5-S1.  2. L5-S1 grade 2 spondylolisthesis.     POSTOPERATIVE DIAGNOSES:   1. Lumbar spinal stenosis L4-L5 and L5-S1.  2. L5-S1 grade 2 spondylolisthesis.     PROCEDURES PERFORMED:   1. Decompressive laminectomy, medial facetectomy and foraminotomies L4-L5 and L5-S1 to decompress neural elements.   2. Segmental instrumentation L4-L5-S1 with DePuy transpedicular fixation.   3. Transforaminal lumbar interbody fusion L4-L5 and L5-S1 with interbody fusion cage and bone graft.   4. Posterolateral fusion L4-L5 and L5-S1 with bone graft.   5. Bone marrow aspiration through a separate incision from right posterior iliac crest.     SURGEON: Joo Franklin MD    ASSISTANT: Madhavi Cochran PA-C    ANESTHESIA: General.     DESCRIPTION OF PROCEDURE: The patient was given a preoperative dose of intravenous vancomycin. He was given a general anesthetic. He was placed in the prone position on the José Luis table. The back was prepped and draped sterilely. A midline incision was made. The fascia was split. The paraspinal musculature was elevated. I identified L4, L5 and S1. Levels were identified by C-arm. Consistent with preoperative imaging, the posterior elements at L5 were loose and detached.     The 1st thing was placement of transpedicular fixation. Under C-arm guidance bone screws were placed at L4, L5 and S1 bilaterally. Two rods contoured in lordosis were placed on the screws and the appropriate set screws applied. All hardware appeared in good position under C-arm.     Next a decompressive procedure was performed. At L4-L5 a laminectomy was performed. A flavectomy was performed. There was a moderate central stenosis. Bilateral medial facetectomies were done to decompress the lateral recesses. There was a moderate lateral recess stenosis. Foraminotomies were performed at L4-L5. The neural elements at L4-L5 appeared completely decompressed.     Next a decompression  was performed at L5-S1. A laminectomy was performed there. The posterior elements were loose and detached. At the level of the isthmic defects there was hypertrophic bone and granulation tissue compressing the L5 nerve root. This was removed with a Kerrison rongeur. The nerve roots were decompressed. The neural elements appeared decompressed.     Next, interbody fusions were performed. I performed his 1st at L4-L5. An annular window was created on the right side between the traversing nerve root medially and the exiting nerve root above. A complete diskectomy was performed with endplate mahad, pituitaries and curets. The disc space was irrigated. The endplates were lightly decorticated. I used a DePuy-Synthes interbody fusion cage. This was packed with bone graft. This was impacted tangentially across the disc space. It locked into place well. It appeared in good position on C-arm.    Next the L5-S1 level interbody fusion was performed. This was performed to the right side. Again, this was performed to the traversing nerve root medially and the exiting nerve root above. An annular window was created. This disc was much narrower. A complete diskectomy was performed but the disc at this level was very degenerated. Some bone graft was packed in the anterior disc space. I selected a DePuy-Synthes cage. This was packed with bone graft. This was then impacted tangentially across the disc space and locked into place well. It appeared in good position on C-arm. At this point I tightened and compressed all 6 screws. This locked the cages in place. At this point the construct appeared solid.     Prior to final bone grafting I did a final copious irrigation. I then decorticated the transverse processes on the left and right sides at L4, L5 and S1. Bone graft was packed in the posterolateral gutters.     It should be noted that I harvested bone marrow aspirate from the right posterior iliac crest. This was done through a  separate incision.  I made an approximate 3 mm incision. A Jamshidi needle was introduced into the right posterior iliac crest and then bone marrow aspirate was obtained.     The fusion bone used was a combination of the bone marrow aspirate, some Vivigen allograft, and locally-harvested autograft that had been morselized. This was the bone graft used.     A final torque tightening was done of all the screws. Final C-arm images showed all hardware in good position. The exposed dura was covered with thrombin-soaked Gelfoam. I put a deep Hemovac drain in. The wound was closed in layers using Vicryl and skin staples. A sterile dressing was applied. The patient was awakened and transported to recovery room in stable condition.

## 2018-11-01 NOTE — PLAN OF CARE
Problem: Patient Care Overview  Goal: Plan of Care Review  Outcome: Ongoing (interventions implemented as appropriate)   11/01/18 4593   Coping/Psychosocial   Plan of Care Reviewed With patient;spouse;son   OTHER   Outcome Summary Pt ambulated 110 feet with CGAx2 and RW, limited by fatigue. PT will follow to increase strength and progress functional mobility with back precautions. Plan is home with HHPT at discharge       Problem: Laminectomy/Foraminotomy/Discectomy (Adult)  Goal: Signs and Symptoms of Listed Potential Problems Will be Absent, Minimized or Managed (Laminectomy/Foraminotomy/Discectomy)  Outcome: Ongoing (interventions implemented as appropriate)   11/01/18 0898   Goal/Outcome Evaluation   Problems Assessed (Laminectomy/Laminotomy/Discectomy) functional decline/self-care deficit;pain   Problems Present (Laminectomy/otomy) functional decline/self-care deficit;pain

## 2018-11-01 NOTE — H&P
"Patient Name: Derek Kelsey  MRN: [id number]  : 1950  DOS: 2018    Attending: Joo Franklin MD    Primary Care Provider: Raudel Zheng MD      Chief complaint:  Low back pain    Subjective   Patient is a 68 y.o. male presented for lumbar fusion by Dr. Franklin under GA. He tolerated surgery well and is admitted for further medical management. His back has been painful for several years. He denies use of assistive device for ambulation or recent falls. He denies saddle anesthesia. He reports when back pain is severe he had numbness, tingling and pain in BLE.     PROCEDURES PERFORMED:   1. Decompressive laminectomy, medial facetectomy and foraminotomies L4-L5 and L5-S1 to decompress neural elements.   2. Segmental instrumentation L4-L5-S1 with DePuy transpedicular fixation.   3. Transforaminal lumbar interbody fusion L4-L5 and L5-S1 with interbody fusion cage and bone graft.   4. Posterolateral fusion L4-L5 and L5-S1 with bone graft.   5. Bone marrow aspiration through a separate incision from right posterior iliac crest.     He has COPD and \"black lung\". He was seen by Dr. Guerrero, pulmonary, for preop clearance.  He had an abnomal stress test, history of HTN, CAD and AAA. He was seen by CT surgery prior to surgery to evaluate his AAA. He underwent negative heartcath by Dr. Galvez, cardiology, and was given preop cardiac clearance.    When seen postop he is doing well. He reports moderate low back pain. He denies numbness, tingling or pain in BLE. Reports numbness in this right hand. denies nausea, shortness of breath or chest pain. No hx of DVT or PE.    ( Above is noted/ agree . Seen in his room afterwards. Feels ok. Good pain control overall. Ambulated with  ft with CGAx2 and RW. Limited by fatigue. No f/c/n/vom/sob. Voided small amount so far.  )wy      Allergies:  Allergies   Allergen Reactions   • Penicillins Hives       Meds:  Prescriptions Prior to Admission   Medication Sig " Dispense Refill Last Dose   • amLODIPine (NORVASC) 5 MG tablet Take 5 mg by mouth 2 (Two) Times a Day.   11/1/2018 at 0500   • citalopram (CeleXA) 20 MG tablet Take 20 mg by mouth Daily.   11/1/2018 at 0500   • Fluticasone-Umeclidin-Vilant (TRELEGY ELLIPTA) 100-62.5-25 MCG/INH aerosol powder  Inhale 1 each Daily.   11/1/2018 at 0500   • hydrALAZINE (APRESOLINE) 50 MG tablet Take 50 mg by mouth 3 (Three) Times a Day.   11/1/2018 at 0500   • lisinopril (PRINIVIL,ZESTRIL) 40 MG tablet Take 40 mg by mouth 2 (Two) Times a Day.   11/1/2018 at 0500   • metoprolol succinate XL (TOPROL-XL) 50 MG 24 hr tablet Take 50 mg by mouth Daily.   11/1/2018 at 0500   • pantoprazole (PROTONIX) 40 MG EC tablet Take 40 mg by mouth Daily.   11/1/2018 at 0500   • valsartan (DIOVAN) 160 MG tablet Take 160 mg by mouth Daily.   11/1/2018 at 0500   • vitamin B-12 (CYANOCOBALAMIN) 1000 MCG tablet Take 1,000 mcg by mouth Daily.   11/1/2018 at 0500   • acetaminophen-codeine (TYLENOL #3) 300-30 MG per tablet Take 1 tablet by mouth Every 4 (Four) Hours As Needed for Moderate Pain (4-6).   Taking   • ASPIRIN LOW DOSE 81 MG tablet Take 81 mg by mouth Daily. Last dose 10-21-18   10/25/2018   • ondansetron (ZOFRAN) 4 MG tablet    More than a month   • triamcinolone (KENALOG) 0.1 % cream Apply 1 application topically to the appropriate area as directed 3 (Three) Times a Day.   More than a month at Unknown time       History:   Past Medical History:   Diagnosis Date   • Abdominal aortic aneurysm (CMS/HCC) 9/29/2016   • Anxiety 9/29/2016   • Arthritis    • CAD (coronary artery disease) 9/29/2016   • Cataract, bilateral 9/29/2016   • COPD (chronic obstructive pulmonary disease) (CMS/HCC) 9/29/2016   • Depression 9/29/2016   • Depression    • GERD (gastroesophageal reflux disease)    • Hypertension 9/29/2016   • Wears dentures      Past Surgical History:   Procedure Laterality Date   • CARDIAC CATHETERIZATION     • CARDIAC CATHETERIZATION N/A 9/28/2018     "Procedure: Left Heart Cath;  Surgeon: Douglas Galvez MD;  Location:  [facility];  Service: Cardiovascular   • COLONOSCOPY     • HAND SURGERY     • SHOULDER SURGERY Left      Family History   Problem Relation Age of Onset   • Stroke Mother    • Hypertension Mother    • Heart disease Mother    • Heart disease Father    • Hypertension Father    • Diabetes Sister    • Hypertension Sister    • Diabetes Brother    • Hypertension Child      Social History   Substance Use Topics   • Smoking status: Former Smoker     Packs/day: 1.00     Years: 30.00     Types: Cigarettes     Quit date: 1/1/2011   • Smokeless tobacco: Former User     Types: Chew     Quit date: 4/7/2011   • Alcohol use No   He is  with 2 children. He is retired .    Review of Systems  All systems were reviewed and negative except for:  Constitution:  positive for fatigue and malaise  Respiratory: positive for  shortness of air  Gastrointestinal: postitive for  constipation and diarrhea    Vital Signs  Visit Vitals  /58   Pulse 67   Temp 97.1 °F (36.2 °C) (Tympanic)   Resp 18   Ht 188 cm (74.02\")   Wt 102 kg (225 lb 5 oz)   SpO2 97%   BMI 28.92 kg/m²       Physical Exam:    General Appearance:    Alert, cooperative, in no acute distress   Head:    Normocephalic, without obvious abnormality, atraumatic   Eyes:            Lids and lashes normal, conjunctivae and sclerae normal, no   icterus, no pallor, corneas clear    Ears:    Ears appear intact with no abnormalities noted   Back:   Surgical dressing CDI. HV present   Lungs:     Clear to auscultation,respirations regular, even and                   unlabored    Heart:    Regular rhythm and normal rate, normal S1 and S2, no            murmur, no gallop, no rub    Abdomen:     Normal bowel sounds, no masses, no organomegaly, soft        non-tender, non-distended, no guarding, no rebound                 tenderness   Genitalia:    Deferred. Young- clear yellow urine "   Extremities:   Moves all extremities well, no edema, no cyanosis, no              redness   Pulses:   Pulses palpable and equal bilaterally   Skin:   No bleeding, bruising or rash   Neurologic:   Cranial nerves 2 - 12 grossly intact, sensation intact. Flexion and dorsiflexion intact bilateral feet.        I reviewed the patient's new clinical results.         Results from last 7 days  Lab Units 10/31/18  1314   WBC 10*3/mm3 8.73   HEMOGLOBIN g/dL 15.1   HEMATOCRIT % 47.0   PLATELETS 10*3/mm3 209       Results from last 7 days  Lab Units 10/31/18  1314   POTASSIUM mmol/L 4.2     Lab Results   Component Value Date    HGBA1C 6.30 (H) 10/31/2018       Assessment and Plan:     S/P lumbar fusion    Back pain    Hypertension    CAD (coronary artery disease)    Moderate COPD    Prediabetes      Plan  1. PT-ambulate  2. Pain control-prns   3. IS-encourage  4. DVT proph- [facility]  5. Bowel regimen  6. Resume home medications as appropriate  7. Monitor post-op labs  8. DC planning     HTN, CAD  - Continue home norvasc, hydralazine, lisinopril, toprol, and diovan  - Monitor BP   - Holding parameters for BP meds  - Labetalol PRN for SBP>170    PreDM  - hgb A1c on 10/31/18 6.3  - Accuchecks ACHS with low dose SSI    COPD  - Monitor O2 sats  - continue inhaler (may use inhaler from home)      LYNDA Quinones  11/01/18  4:11 PM     I have personally performed the evaluation on this patient. My history is consistent  with HPI obtained. My exam findings are listed above. I have personally reviewed and discussed the above formulated treatment plan with pt, family and .Farhana.

## 2018-11-01 NOTE — ANESTHESIA PROCEDURE NOTES
Airway  Urgency: elective    End Time:11/1/2018 8:16 AM  Airway not difficult    General Information and Staff    Patient location during procedure: OR  Anesthesiologist: ALBERTO CHAPA    Indications and Patient Condition  Indications for airway management: airway protection    Preoxygenated: yes  MILS not maintained throughout  Mask difficulty assessment: 1 - vent by mask    Final Airway Details  Final airway type: endotracheal airway      Successful airway: ETT  Cuffed: yes   Successful intubation technique: direct laryngoscopy  Endotracheal tube insertion site: oral  Blade: Barbara  Blade size: 3  ETT size: 7.5 mm  Cormack-Lehane Classification: grade I - full view of glottis  Placement verified by: chest auscultation and capnometry   Measured from: lips  ETT to lips (cm): 22  Number of attempts at approach: 1    Additional Comments  Negative epigastric sounds, Breath sound equal bilaterally with symmetric chest rise and fall

## 2018-11-01 NOTE — THERAPY EVALUATION
Acute Care - Physical Therapy Initial Evaluation  [facility]     Patient Name: Derek Kelsey  : 1950  MRN: [id number]  Today's Date: 2018   Onset of Illness/Injury or Date of Surgery: 18  Date of Referral to PT: 18  Referring Physician: MD Rigoberto      Admit Date: 2018    Visit Dx:     ICD-10-CM ICD-9-CM   1. Impaired functional mobility, balance, gait, and endurance Z74.09 V49.89     Patient Active Problem List   Diagnosis   • Anxiety   • Abdominal aortic aneurysm (CMS/HCC)   • Depression   • Hypertension   • Cataract, bilateral   • CAD (coronary artery disease)   • Abdominal aortic aneurysm (AAA) without rupture (CMS/HCC)   • Abnormal stress test   • Moderate COPD   • Former smoker   • Coal workers pneumoconiosis, simple   • Back pain   • S/P lumbar fusion   • Prediabetes     Past Medical History:   Diagnosis Date   • Abdominal aortic aneurysm (CMS/HCC) 2016   • Anxiety 2016   • Arthritis    • CAD (coronary artery disease) 2016   • Cataract, bilateral 2016   • COPD (chronic obstructive pulmonary disease) (CMS/HCC) 2016   • Depression 2016   • Depression    • GERD (gastroesophageal reflux disease)    • Hypertension 2016   • Wears dentures      Past Surgical History:   Procedure Laterality Date   • CARDIAC CATHETERIZATION     • CARDIAC CATHETERIZATION N/A 2018    Procedure: Left Heart Cath;  Surgeon: Douglas Galvez MD;  Location: [facility] INVASIVE LOCATION;  Service: Cardiovascular   • COLONOSCOPY     • HAND SURGERY     • SHOULDER SURGERY Left         PT ASSESSMENT (last 12 hours)      Physical Therapy Evaluation     Row Name 18 1556          PT Evaluation Time/Intention    Subjective Information complains of;pain  -MJ     Document Type evaluation  -MJ     Mode of Treatment physical therapy  -MJ     Patient Effort good  -MJ     Symptoms Noted During/After Treatment other (see comments)   pain improved  -MJ     Row Name  11/01/18 1556          General Information    Patient Profile Reviewed? yes  -MJ     Onset of Illness/Injury or Date of Surgery 11/01/18  -     Referring Physician MD Rigoberto  -MJ     Patient Observations alert;cooperative;agree to therapy  -     General Observations of Patient Pt supine in bed, yanci caal IV, SCDs. Family present  -MJ     Prior Level of Function min assist:;all household mobility;community mobility;gait;transfer;bed mobility;cooking;cleaning;driving   Pain increased with activity  -MJ     Equipment Currently Used at Home shower chair  -MJ     Pertinent History of Current Functional Problem Pt presents for surgical management of back pain and dysfunction with symptoms into B LEs that failed to improve with conservative management  -MJ     Existing Precautions/Restrictions fall;spinal;other (see comments)   yanci caal  -SIMRAN     Risks Reviewed patient and family:;LOB;increased discomfort  -MJ     Benefits Reviewed patient and family:;improve function;increase independence;increase strength;increase balance;decrease pain  -MJ     Barriers to Rehab none identified  -     Row Name 11/01/18 1550          Relationship/Environment    Lives With spouse  -MJ     Family Caregiver if Needed spouse  -MJ     Concerns About Impact on Relationships Wife available to assist at discharge  -     Row Name 11/01/18 1557          Resource/Environmental Concerns    Current Living Arrangements home/apartment/condo  -     Row Name 11/01/18 1556          Home Main Entrance    Number of Stairs, Main Entrance seven  -MJ     Stair Railings, Main Entrance none  -     Row Name 11/01/18 1553          Cognitive Assessment/Intervention- PT/OT    Orientation Status (Cognition) oriented x 4  -MJ     Follows Commands (Cognition) WFL  -     Row Name 11/01/18 1557          Safety Issues, Functional Mobility    Impairments Affecting Function (Mobility) pain;strength;balance  -     Row Name 11/01/18 1554           Bed Mobility Assessment/Treatment    Bed Mobility Assessment/Treatment supine-sit;sit-supine  -MJ     Supine-Sit [place] (Bed Mobility) minimum assist (75% patient effort);verbal cues  -MJ     Sit-Supine [place] (Bed Mobility) minimum assist (75% patient effort);verbal cues  -MJ     Bed Mobility, Safety Issues decreased use of legs for bridging/pushing  -     Assistive Device (Bed Mobility) bed rails;head of bed elevated  -     Comment (Bed Mobility) Pt performed log roll into/out of bed, verbal cues for sequencing. Assist with trunk for sidelying to sit and with legs for sit to sidelying  -     Row Name 11/01/18 1556          Transfer Assessment/Treatment    Transfer Assessment/Treatment sit-stand transfer;stand-sit transfer  -     Comment (Transfers) Verbal cues to push up from bed with UEs to stand and to reach back for bed to lower into sitting.   -     Sit-Stand [place] (Transfers) contact guard;2 person assist;verbal cues  -MJ     Stand-Sit [place] (Transfers) contact guard;verbal cues  -     Row Name 11/01/18 1556          Sit-Stand Transfer    Assistive Device (Sit-Stand Transfers) walker, front-wheeled;other (see comments)   elevated surface  -     Row Name 11/01/18 1556          Stand-Sit Transfer    Assistive Device (Stand-Sit Transfers) walker, front-wheeled  -     Row Name 11/01/18 1556          Gait/Stairs Assessment/Training    87302 - Gait Training Minutes  8  -MJ     [place] Level (Gait) contact guard;1 person to manage equipment;verbal cues  -     Assistive Device (Gait) walker, front-wheeled  -     Distance in Feet (Gait) 110  -MJ     Pattern (Gait) step-through  -     Deviations/Abnormal Patterns (Gait) bilateral deviations;antalgic;base of support, narrow;gait speed decreased;stride length decreased  -MJ     Bilateral Gait Deviations heel strike decreased;weight shift ability decreased  -     Comment (Gait/Stairs) Pt demo step through gait pattern  at slow pace. Verbal cues for upright posture and to separate feet to improve YAZ. Cues to  feet during turn, 1 instance of R knee buckling during turn. Gait limited by fatigue  -     Row Name 11/01/18 1556          General ROM    GENERAL ROM COMMENTS No ROM deficits B LE  -MJ     Row Name 11/01/18 1556          MMT (Manual Muscle Testing)    General MMT Comments B LE grossly 4/5  -     Row Name 11/01/18 1556          Sensory Assessment/Intervention    Sensory General Assessment light touch sensation deficits identified   can actively DF both ankles  -     Row Name 11/01/18 1556          Light Touch Sensation Assessment    Comment, Right Upper Extremity: Light Touch Sensation Assessment reports R hand numbness  -     Row Name 11/01/18 1556          Pain Assessment    Additional Documentation Pain Scale: Numbers Pre/Post-Treatment (Group)  -     Row Name 11/01/18 1556          Pain Scale: Numbers Pre/Post-Treatment    Pain Scale: Numbers, Pretreatment 10/10  -MJ     Pain Scale: Numbers, Post-Treatment 7/10  -MJ     Pain Location - Orientation lower  -     Pain Location back  -     Pain Intervention(s) Repositioned;Ambulation/increased activity  -     Row Name             Wound 11/01/18 1029 Other (See comments) back incision    Wound - Properties Group Date first assessed: 11/01/18  -TK Time first assessed: 1029  -TK Side: Other (See comments)  -TK Location: back  -TK Type: incision  -TK    Row Name 11/01/18 1556          Plan of Care Review    Plan of Care Reviewed With patient;spouse;son  -     Row Name 11/01/18 1556          Physical Therapy Clinical Impression    Date of Referral to PT 11/01/18  -     PT Diagnosis (PT Clinical Impression) s/p L4-5, L5-S1 laminectomy with fusion  -     Criteria for Skilled Interventions Met (PT Clinical Impression) yes;treatment indicated  -     Care Plan Review (PT) evaluation/treatment results reviewed;care plan/treatment goals  reviewed;risks/benefits reviewed;patient/other agree to care plan  -MJ     Care Plan Review, Other Participant (PT Clinical Impression) son;spouse  -     Row Name 11/01/18 1556          Physical Therapy Goals    Bed Mobility Goal Selection (PT) bed mobility, PT goal 1  -MJ     Transfer Goal Selection (PT) transfer, PT goal 1  -MJ     Gait Training Goal Selection (PT) gait training, PT goal 1  -MJ     Stairs Goal Selection (PT) stairs, PT goal 1  -MJ     Additional Documentation Stairs Goal Selection (PT) (Row)  -     Row Name 11/01/18 1556          Bed Mobility Goal 1 (PT)    Activity/Assistive Device (Bed Mobility Goal 1, PT) sit to supine/supine to sit   via log roll  -MJ     [place] Level/Cues Needed (Bed Mobility Goal 1, PT) conditional independence  -MJ     Time Frame (Bed Mobility Goal 1, PT) 5 days;long term goal (LTG)  -MJ     Progress/Outcomes (Bed Mobility Goal 1, PT) goal ongoing  -     Row Name 11/01/18 8756          Transfer Goal 1 (PT)    Activity/Assistive Device (Transfer Goal 1, PT) sit-to-stand/stand-to-sit;walker, rolling  -MJ     [place] Level/Cues Needed (Transfer Goal 1, PT) conditional independence  -MJ     Time Frame (Transfer Goal 1, PT) 5 days;long term goal (LTG)  -MJ     Progress/Outcome (Transfer Goal 1, PT) goal ongoing  -     Row Name 11/01/18 7196          Gait Training Goal 1 (PT)    Activity/Assistive Device (Gait Training Goal 1, PT) gait (walking locomotion);walker, rolling  -MJ     [place] Level (Gait Training Goal 1, PT) conditional independence  -MJ     Distance (Gait Goal 1, PT) 500 feet  -MJ     Time Frame (Gait Training Goal 1, PT) 5 days;long term goal (LTG)  -MJ     Progress/Outcome (Gait Training Goal 1, PT) goal ongoing  -     Row Name 11/01/18 1556          Stairs Goal 1 (PT)    Activity/Assistive Device (Stairs Goal 1, PT) ascending stairs;descending stairs;cane, straight  -MJ     [place] Level/Cues Needed (Stairs Goal 1, PT) contact guard  assist  -MJ     Number of Stairs (Stairs Goal 1, PT) 7  -MJ     Time Frame (Stairs Goal 1, PT) 5 days;long term goal (LTG)  -MJ     Progress/Outcome (Stairs Goal 1, PT) goal ongoing  -MJ     Row Name 11/01/18 1556          Positioning and Restraints    Pre-Treatment Position in bed  -MJ     Post Treatment Position bed  -MJ     In Bed notified nsg;supine;call light within reach;encouraged to call for assist;with family/caregiver  -     Row Name 11/01/18 1556          Living Environment    Home Accessibility stairs to enter home   walk-in shower  -       User Key  (r) = Recorded By, (t) = Taken By, (c) = Cosigned By    Initials Name Provider Type    Tish Izquierdo, RN Registered Nurse    Marino Castro, PT Physical Therapist          Physical Therapy Education     Title: PT OT SLP Therapies (Active)     Topic: Physical Therapy (Active)     Point: Mobility training (Done)    Learning Progress Summary     Learner Status Readiness Method Response Comment Documented by    Patient Done Acceptance E,JAREN VU,NR Edu re: spinal precautions, gait mechanics, benefits of mobility, log roll  11/01/18 1556    Family Done Acceptance E,JAREN VU,NR Edu re: spinal precautions, gait mechanics, benefits of mobility, log roll  11/01/18 1556    Significant Other Done Acceptance E,D VU,NR Edu re: spinal precautions, gait mechanics, benefits of mobility, log roll  11/01/18 1556          Point: Body mechanics (Done)    Learning Progress Summary     Learner Status Readiness Method Response Comment Documented by    Patient Done Acceptance E,D VU,NR Edu re: spinal precautions, gait mechanics, benefits of mobility, log roll  11/01/18 1556    Family Done Acceptance E,D VU,NR Edu re: spinal precautions, gait mechanics, benefits of mobility, log roll  11/01/18 1556    Significant Other Done Acceptance E,D VU,NR Edu re: spinal precautions, gait mechanics, benefits of mobility, log roll  11/01/18 1556          Point: Precautions (Done)     Learning Progress Summary     Learner Status Readiness Method Response Comment Documented by    Patient Done Acceptance JAREN COLE,NR Edu re: spinal precautions, gait mechanics, benefits of mobility, log roll  11/01/18 1556    Family Done Acceptance JAREN COLE,NR Edu re: spinal precautions, gait mechanics, benefits of mobility, log roll  11/01/18 1556    Significant Other Done Acceptance JAREN COLE,NR Edu re: spinal precautions, gait mechanics, benefits of mobility, log roll  11/01/18 1556                      User Key     Initials Effective Dates Name Provider Type Discipline     04/03/18 -  Marino Sorto, PT Physical Therapist PT                PT Recommendation and Plan  Anticipated Discharge Disposition (PT): home with assist, home with home health  Planned Therapy Interventions (PT Eval): balance training, bed mobility training, gait training, home exercise program, patient/family education, stair training, strengthening, transfer training  Therapy Frequency (PT Clinical Impression): daily  Outcome Summary/Treatment Plan (PT)  Anticipated Equipment Needs at Discharge (PT): front wheeled walker, bedside commode  Anticipated Discharge Disposition (PT): home with assist, home with home health  Plan of Care Reviewed With: patient, spouse, son  Outcome Summary: Pt ambulated 110 feet with CGAx2 and RW, limited by fatigue. PT will follow to increase strength and progress functional mobility with back precautions. Plan is home with HHPT at discharge          Outcome Measures     Row Name 11/01/18 1556             How much help from another person do you currently need...    Turning from your back to your side while in flat bed without using bedrails? 3  -MJ      Moving from lying on back to sitting on the side of a flat bed without bedrails? 3  -MJ      Moving to and from a bed to a chair (including a wheelchair)? 3  -MJ      Standing up from a chair using your arms (e.g., wheelchair, bedside chair)? 3  -MJ      Climbing  3-5 steps with a railing? 2  -MJ      To walk in hospital room? 3  -MJ      AM-PAC 6 Clicks Score 17  -MJ         Functional Assessment    Outcome Measure Options AM-PAC 6 Clicks Basic Mobility (PT)  -MJ        User Key  (r) = Recorded By, (t) = Taken By, (c) = Cosigned By    Initials Name Provider Type    Marino Castro, PT Physical Therapist           Time Calculation:         PT Charges     Row Name 11/01/18 1556             Time Calculation    Start Time 1556  -MJ      PT Received On 11/01/18  -MJ      PT Goal Re-Cert Due Date 11/11/18  -         Time Calculation- PT    Total Timed Code Minutes- PT 8 minute(s)  -MJ         Timed Charges    77814 - Gait Training Minutes  8  -MJ        User Key  (r) = Recorded By, (t) = Taken By, (c) = Cosigned By    Initials Name Provider Type    Marino Castro, PT Physical Therapist        Therapy Suggested Charges     Code   Minutes Charges    40405 (CPT®) Hc Pt Neuromusc Re Education Ea 15 Min      05502 (CPT®) Hc Pt Ther Proc Ea 15 Min      76163 (CPT®) Hc Gait Training Ea 15 Min 8 1    92159 (CPT®) Hc Pt Therapeutic Act Ea 15 Min      76304 (CPT®) Hc Pt Manual Therapy Ea 15 Min      38227 (CPT®) Hc Pt Iontophoresis Ea 15 Min      11900 (CPT®) Hc Pt Elec Stim Ea-Per 15 Min      06457 (CPT®) Hc Pt Ultrasound Ea 15 Min      63267 (CPT®) Hc Pt Self Care/Mgmt/Train Ea 15 Min      89182 (CPT®) Hc Pt Prosthetic (S) Train Initial Encounter, Each 15 Min      02797 (CPT®) Hc Pt Orthotic(S)/Prosthetic(S) Encounter, Each 15 Min      08764 (CPT®) Hc Orthotic(S) Mgmt/Train Initial Encounter, Each 15min      Total  8 1        Therapy Charges for Today     Code Description Service Date Service Provider Modifiers Qty    49956385656 HC PT EVAL LOW COMPLEXITY 2 11/1/2018 Marino Sorto, PT GP 1    98485655939 HC GAIT TRAINING EA 15 MIN 11/1/2018 Marino Sorto, PT GP 1    76662576587 HC PT THER SUPP EA 15 MIN 11/1/2018 Marino Sorto, PT GP 2          PT G-Codes  Outcome Measure Options: AM-PAC  6 Clicks Basic Mobility (PT)  -[facility] 6 Clicks Score: 17      Marino Sorto, PT  11/1/2018

## 2018-11-01 NOTE — PLAN OF CARE
Problem: Patient Care Overview  Goal: Plan of Care Review  Outcome: Ongoing (interventions implemented as appropriate)   11/01/18 0627   Coping/Psychosocial   Plan of Care Reviewed With patient   Plan of Care Review   Progress improving   OTHER   Outcome Summary pt ambulated with PT well; vss; alert and oriented; pete in place; hemovac drain in place; dressing cdi.      Goal: Individualization and Mutuality  Outcome: Ongoing (interventions implemented as appropriate)    Goal: Discharge Needs Assessment  Outcome: Ongoing (interventions implemented as appropriate)    Goal: Interprofessional Rounds/Family Conf  Outcome: Ongoing (interventions implemented as appropriate)      Problem: Laminectomy/Foraminotomy/Discectomy (Adult)  Goal: Signs and Symptoms of Listed Potential Problems Will be Absent, Minimized or Managed (Laminectomy/Foraminotomy/Discectomy)  Outcome: Ongoing (interventions implemented as appropriate)    Goal: Anesthesia/Sedation Recovery  Outcome: Ongoing (interventions implemented as appropriate)

## 2018-11-01 NOTE — H&P
Pre-Op H&P (See Recent Office Note Attached for Full H&P)    Chief complaint: Low back pain    HPI:    Office note dated 10/3/118.  Patient is a 68 y.o. male who presents with lumbar stenosis. He is a retired . He has complaints of low back pain that radiates to the right lower extremity with numbness x 3 years.  He presents today for lumbar discectomy posterior with fusion instrumentation.    Review of Systems:  General ROS:  no fever, chills, rashes, No change since last office visit  Cardiovascular ROS: no chest pain or dyspnea on exertion  Respiratory ROS: no cough, shortness of breath, or wheezing      Meds:    No current facility-administered medications on file prior to encounter.      Current Outpatient Prescriptions on File Prior to Encounter   Medication Sig Dispense Refill   • amLODIPine (NORVASC) 5 MG tablet Take 5 mg by mouth 2 (Two) Times a Day.     • citalopram (CeleXA) 20 MG tablet Take 20 mg by mouth Daily.     • hydrALAZINE (APRESOLINE) 50 MG tablet Take 50 mg by mouth 3 (Three) Times a Day.     • lisinopril (PRINIVIL,ZESTRIL) 40 MG tablet Take 40 mg by mouth 2 (Two) Times a Day.     • metoprolol succinate XL (TOPROL-XL) 50 MG 24 hr tablet Take 50 mg by mouth Daily.     • pantoprazole (PROTONIX) 40 MG EC tablet Take 40 mg by mouth Daily.     • valsartan (DIOVAN) 160 MG tablet Take 160 mg by mouth Daily.     • vitamin B-12 (CYANOCOBALAMIN) 1000 MCG tablet Take 1,000 mcg by mouth Daily.     • acetaminophen-codeine (TYLENOL #3) 300-30 MG per tablet Take 1 tablet by mouth Every 4 (Four) Hours As Needed for Moderate Pain (4-6).     • ASPIRIN LOW DOSE 81 MG tablet Take 81 mg by mouth Daily. Last dose 10-21-18     • ondansetron (ZOFRAN) 4 MG tablet      • triamcinolone (KENALOG) 0.1 % cream Apply 1 application topically to the appropriate area as directed 3 (Three) Times a Day.     • [DISCONTINUED] aclidinium bromide (TUDORZA PRESSAIR) 400 MCG/ACT aerosol powder  powder for inhalation Inhale 1  puff 2 (Two) Times a Day.     • [DISCONTINUED] mometasone-formoterol (DULERA 200) 200-5 MCG/ACT inhaler Inhale 2 puffs 2 (Two) Times a Day.         Vital Signs:  /71 (BP Location: Right arm, Patient Position: Lying)   Pulse 63   Temp 98.3 °F (36.8 °C) (Temporal Artery )   Resp 18   SpO2 92%     Physical Exam:    CV:  S1S2 regular rate and rhythm, no murmur               Resp:  Decreased breath sounds; respirations regular, even and unlabored    Results Review:    I reviewed the patient's new clinical results.      Assessment:  Grade 2 L5-S1 spondylolithesis    Plan:  Lumbar discectomy, posterior, with fusion instrumentation.    Madhavi Cochran PA-C  11/1/2018   6:58 AM

## 2018-11-02 PROBLEM — G89.18 ACUTE POSTOPERATIVE PAIN: Status: ACTIVE | Noted: 2018-11-02

## 2018-11-02 PROBLEM — E87.1 HYPONATREMIA: Status: ACTIVE | Noted: 2018-11-02

## 2018-11-02 PROBLEM — D62 ACUTE BLOOD LOSS ANEMIA: Status: ACTIVE | Noted: 2018-11-02

## 2018-11-02 LAB
ANION GAP SERPL CALCULATED.3IONS-SCNC: 8 MMOL/L (ref 3–11)
BUN BLD-MCNC: 22 MG/DL (ref 9–23)
BUN/CREAT SERPL: 20.2 (ref 7–25)
CALCIUM SPEC-SCNC: 8.5 MG/DL (ref 8.7–10.4)
CHLORIDE SERPL-SCNC: 99 MMOL/L (ref 99–109)
CO2 SERPL-SCNC: 24 MMOL/L (ref 20–31)
CREAT BLD-MCNC: 1.09 MG/DL (ref 0.6–1.3)
DEPRECATED RDW RBC AUTO: 49.6 FL (ref 37–54)
ERYTHROCYTE [DISTWIDTH] IN BLOOD BY AUTOMATED COUNT: 13.9 % (ref 11.3–14.5)
GFR SERPL CREATININE-BSD FRML MDRD: 68 ML/MIN/1.73
GLUCOSE BLD-MCNC: 114 MG/DL (ref 70–100)
GLUCOSE BLDC GLUCOMTR-MCNC: 132 MG/DL (ref 70–130)
GLUCOSE BLDC GLUCOMTR-MCNC: 136 MG/DL (ref 70–130)
GLUCOSE BLDC GLUCOMTR-MCNC: 139 MG/DL (ref 70–130)
GLUCOSE BLDC GLUCOMTR-MCNC: 143 MG/DL (ref 70–130)
HCT VFR BLD AUTO: 37.8 % (ref 38.9–50.9)
HGB BLD-MCNC: 11.9 G/DL (ref 13.1–17.5)
MCH RBC QN AUTO: 30.7 PG (ref 27–31)
MCHC RBC AUTO-ENTMCNC: 31.5 G/DL (ref 32–36)
MCV RBC AUTO: 97.7 FL (ref 80–99)
PLATELET # BLD AUTO: 151 10*3/MM3 (ref 150–450)
PMV BLD AUTO: 10.9 FL (ref 6–12)
POTASSIUM BLD-SCNC: 4.3 MMOL/L (ref 3.5–5.5)
RBC # BLD AUTO: 3.87 10*6/MM3 (ref 4.2–5.76)
SODIUM BLD-SCNC: 131 MMOL/L (ref 132–146)
WBC NRBC COR # BLD: 10.34 10*3/MM3 (ref 3.5–10.8)

## 2018-11-02 PROCEDURE — 82962 GLUCOSE BLOOD TEST: CPT

## 2018-11-02 PROCEDURE — 80048 BASIC METABOLIC PNL TOTAL CA: CPT | Performed by: NURSE PRACTITIONER

## 2018-11-02 PROCEDURE — 97535 SELF CARE MNGMENT TRAINING: CPT | Performed by: OCCUPATIONAL THERAPIST

## 2018-11-02 PROCEDURE — 97166 OT EVAL MOD COMPLEX 45 MIN: CPT | Performed by: OCCUPATIONAL THERAPIST

## 2018-11-02 PROCEDURE — 85027 COMPLETE CBC AUTOMATED: CPT | Performed by: NURSE PRACTITIONER

## 2018-11-02 PROCEDURE — 97116 GAIT TRAINING THERAPY: CPT

## 2018-11-02 RX ORDER — CALCIUM CARBONATE 200(500)MG
2 TABLET,CHEWABLE ORAL 3 TIMES DAILY PRN
Status: DISCONTINUED | OUTPATIENT
Start: 2018-11-02 | End: 2018-11-03 | Stop reason: HOSPADM

## 2018-11-02 RX ADMIN — CITALOPRAM HYDROBROMIDE 20 MG: 20 TABLET ORAL at 08:20

## 2018-11-02 RX ADMIN — OXYCODONE HYDROCHLORIDE AND ACETAMINOPHEN 1 TABLET: 7.5; 325 TABLET ORAL at 00:36

## 2018-11-02 RX ADMIN — LISINOPRIL 40 MG: 40 TABLET ORAL at 20:28

## 2018-11-02 RX ADMIN — OXYCODONE HYDROCHLORIDE AND ACETAMINOPHEN 1 TABLET: 7.5; 325 TABLET ORAL at 20:28

## 2018-11-02 RX ADMIN — HYDRALAZINE HYDROCHLORIDE 50 MG: 50 TABLET, FILM COATED ORAL at 05:00

## 2018-11-02 RX ADMIN — Medication 3 ML: at 08:22

## 2018-11-02 RX ADMIN — FAMOTIDINE 20 MG: 20 TABLET ORAL at 20:28

## 2018-11-02 RX ADMIN — AMLODIPINE BESYLATE 5 MG: 5 TABLET ORAL at 20:29

## 2018-11-02 RX ADMIN — OXYCODONE HYDROCHLORIDE AND ACETAMINOPHEN 1 TABLET: 7.5; 325 TABLET ORAL at 13:16

## 2018-11-02 RX ADMIN — OXYCODONE HYDROCHLORIDE AND ACETAMINOPHEN 1 TABLET: 7.5; 325 TABLET ORAL at 05:01

## 2018-11-02 RX ADMIN — FAMOTIDINE 20 MG: 20 TABLET ORAL at 08:20

## 2018-11-02 RX ADMIN — BISACODYL 5 MG: 5 TABLET, COATED ORAL at 08:20

## 2018-11-02 RX ADMIN — PANTOPRAZOLE SODIUM 40 MG: 40 TABLET, DELAYED RELEASE ORAL at 05:00

## 2018-11-02 NOTE — DISCHARGE PLACEMENT REQUEST
"Derek Tejeda (68 y.o. Male)   Home Health Referral.  From Zoe Culver RN BSN, Case Management,  [phone]    Date of Birth Social Security Number Address Home Phone MRN    1950  [address] [facility] [address] [phone] [id number]    Adventism Marital Status          None        Admission Date Admission Type Admitting Provider Attending Provider Department, Room/Bed    18 Elective Joo Franklin MD Vaughan, John J, MD [facility] 3G, S351/1    Discharge Date Discharge Disposition Discharge Destination                       Attending Provider:  Joo Franklin MD    Allergies:  Penicillins    Isolation:  None   Infection:  None   Code Status:  CPR    Ht:  188 cm (74.02\")   Wt:  102 kg (225 lb 5 oz)    Admission Cmt:  None   Principal Problem:  S/P lumbar fusion [Z98.1]                 Active Insurance as of 2018     Primary Coverage     Payor Plan Insurance Group Employer/Plan Group    HUMANA MEDICARE REPLACEMENT HUMANA MEDICARE REPL [id number]     Payor Plan Address Payor Plan Phone Number Effective From Effective To    [address] [address] [phone] 2018     [facility]ly [facility] [address]       Subscriber Name Subscriber Birth Date Member ID       DEREK TEJEDA 1950 [id number]                 Emergency Contacts      (Rel.) Home Phone Work Phone Mobile Phone    Mago Tejeda (Spouse) [phone] -- --        [address]  [address] [place] [id number]  Phone:  [phone]  Fax:          Patient:     Derek Tejeda MRN:  [id number]   [address] [facility] [address] :  1950  SSN:    Phone: [phone] Sex:  M      INSURANCE PAYOR PLAN GROUP # SUBSCRIBER ID   Primary:    HUMANA MEDICARE REPLACEMENT [id number]   Admitting Diagnosis: Back pain [M54.9]  Order Date:  2018         Consult to Case Management        (Order ID: [id number])   " "  Diagnosis:         Priority:  Routine Expected Date:   Expiration Date:        Interval:   Count:    Reason for Consult? Home PT, and staples out at home 2 weeks post op.     Specimen Type:   Specimen Source:   Specimen Taken Date:   Specimen Taken Time:                   Authorizing Provider:Joo Franklin MD  Authorizing Provider's NPI: [id number]  Order Entered By: Joo Franklin MD 2018 12:29 PM     Electronically signed by: Joo Franklin MD 2018 12:29 PM            History & Physical      Bert Chadwick MD at 2018  3:08 PM          Patient Name: Derek Kelsey  MRN: [id number]  : 1950  DOS: 2018    Attending: Joo Franklin MD    Primary Care Provider: Raudel Zheng MD      Chief complaint:  Low back pain    Subjective   Patient is a 68 y.o. male presented for lumbar fusion by Dr. Franklin under GA. He tolerated surgery well and is admitted for further medical management. His back has been painful for several years. He denies use of assistive device for ambulation or recent falls. He denies saddle anesthesia. He reports when back pain is severe he had numbness, tingling and pain in BLE.     PROCEDURES PERFORMED:   1. Decompressive laminectomy, medial facetectomy and foraminotomies L4-L5 and L5-S1 to decompress neural elements.   2. Segmental instrumentation L4-L5-S1 with DePuy transpedicular fixation.   3. Transforaminal lumbar interbody fusion L4-L5 and L5-S1 with interbody fusion cage and bone graft.   4. Posterolateral fusion L4-L5 and L5-S1 with bone graft.   5. Bone marrow aspiration through a separate incision from right posterior iliac crest.     He has COPD and \"black lung\". He was seen by Dr. Guerrero, pulmonary, for preop clearance.  He had an abnomal stress test, history of HTN, CAD and AAA. He was seen by CT surgery prior to surgery to evaluate his AAA. He underwent negative heartcath by Dr. Galvez, cardiology, and was given preop cardiac " clearance.    When seen postop he is doing well. He reports moderate low back pain. He denies numbness, tingling or pain in BLE. Reports numbness in this right hand. denies nausea, shortness of breath or chest pain. No hx of DVT or PE.    ( Above is noted/ agree . Seen in his room afterwards. Feels ok. Good pain control overall. Ambulated with  ft with CGAx2 and RW. Limited by fatigue. No f/c/n/vom/sob. Voided small amount so far.  )wy      Allergies:  Allergies   Allergen Reactions   • Penicillins Hives       Meds:  Prescriptions Prior to Admission   Medication Sig Dispense Refill Last Dose   • amLODIPine (NORVASC) 5 MG tablet Take 5 mg by mouth 2 (Two) Times a Day.   11/1/2018 at 0500   • citalopram (CeleXA) 20 MG tablet Take 20 mg by mouth Daily.   11/1/2018 at 0500   • Fluticasone-Umeclidin-Vilant (TRELEGY ELLIPTA) 100-62.5-25 MCG/INH aerosol powder  Inhale 1 each Daily.   11/1/2018 at 0500   • hydrALAZINE (APRESOLINE) 50 MG tablet Take 50 mg by mouth 3 (Three) Times a Day.   11/1/2018 at 0500   • lisinopril (PRINIVIL,ZESTRIL) 40 MG tablet Take 40 mg by mouth 2 (Two) Times a Day.   11/1/2018 at 0500   • metoprolol succinate XL (TOPROL-XL) 50 MG 24 hr tablet Take 50 mg by mouth Daily.   11/1/2018 at 0500   • pantoprazole (PROTONIX) 40 MG EC tablet Take 40 mg by mouth Daily.   11/1/2018 at 0500   • valsartan (DIOVAN) 160 MG tablet Take 160 mg by mouth Daily.   11/1/2018 at 0500   • vitamin B-12 (CYANOCOBALAMIN) 1000 MCG tablet Take 1,000 mcg by mouth Daily.   11/1/2018 at 0500   • acetaminophen-codeine (TYLENOL #3) 300-30 MG per tablet Take 1 tablet by mouth Every 4 (Four) Hours As Needed for Moderate Pain (4-6).   Taking   • ASPIRIN LOW DOSE 81 MG tablet Take 81 mg by mouth Daily. Last dose 10-21-18   10/25/2018   • ondansetron (ZOFRAN) 4 MG tablet    More than a month   • triamcinolone (KENALOG) 0.1 % cream Apply 1 application topically to the appropriate area as directed 3 (Three) Times a Day.   More than  "a month at Unknown time       History:   Past Medical History:   Diagnosis Date   • Abdominal aortic aneurysm (CMS/[facility]) 9/29/2016   • Anxiety 9/29/2016   • Arthritis    • CAD (coronary artery disease) 9/29/2016   • Cataract, bilateral 9/29/2016   • COPD (chronic obstructive pulmonary disease) (CMS/[facility]) 9/29/2016   • Depression 9/29/2016   • Depression    • GERD (gastroesophageal reflux disease)    • Hypertension 9/29/2016   • Wears dentures      Past Surgical History:   Procedure Laterality Date   • CARDIAC CATHETERIZATION     • CARDIAC CATHETERIZATION N/A 9/28/2018    Procedure: Left Heart Cath;  Surgeon: Douglas Galvez MD;  Location:  [facility];  Service: Cardiovascular   • COLONOSCOPY     • HAND SURGERY     • SHOULDER SURGERY Left      Family History   Problem Relation Age of Onset   • Stroke Mother    • Hypertension Mother    • Heart disease Mother    • Heart disease Father    • Hypertension Father    • Diabetes Sister    • Hypertension Sister    • Diabetes Brother    • Hypertension Child      Social History   Substance Use Topics   • Smoking status: Former Smoker     Packs/day: 1.00     Years: 30.00     Types: Cigarettes     Quit date: 1/1/2011   • Smokeless tobacco: Former User     Types: Chew     Quit date: 4/7/2011   • Alcohol use No   He is  with 2 children. He is retired .    Review of Systems  All systems were reviewed and negative except for:  Constitution:  positive for fatigue and malaise  Respiratory: positive for  shortness of air  Gastrointestinal: postitive for  constipation and diarrhea    Vital Signs  Visit Vitals  /58   Pulse 67   Temp 97.1 °F (36.2 °C) (Tympanic)   Resp 18   Ht 188 cm (74.02\")   Wt 102 kg (225 lb 5 oz)   SpO2 97%   BMI 28.92 kg/m²       Physical Exam:    General Appearance:    Alert, cooperative, in no acute distress   Head:    Normocephalic, without obvious abnormality, atraumatic   Eyes:            Lids and lashes normal, " conjunctivae and sclerae normal, no   icterus, no pallor, corneas clear    Ears:    Ears appear intact with no abnormalities noted   Back:   Surgical dressing CDI. HV present   Lungs:     Clear to auscultation,respirations regular, even and                   unlabored    Heart:    Regular rhythm and normal rate, normal S1 and S2, no            murmur, no gallop, no rub    Abdomen:     Normal bowel sounds, no masses, no organomegaly, soft        non-tender, non-distended, no guarding, no rebound                 tenderness   Genitalia:    Deferred. Young- clear yellow urine   Extremities:   Moves all extremities well, no edema, no cyanosis, no              redness   Pulses:   Pulses palpable and equal bilaterally   Skin:   No bleeding, bruising or rash   Neurologic:   Cranial nerves 2 - 12 grossly intact, sensation intact. Flexion and dorsiflexion intact bilateral feet.        I reviewed the patient's new clinical results.         Results from last 7 days  Lab Units 10/31/18  1314   WBC 10*3/mm3 8.73   HEMOGLOBIN g/dL 15.1   HEMATOCRIT % 47.0   PLATELETS 10*3/mm3 209       Results from last 7 days  Lab Units 10/31/18  1314   POTASSIUM mmol/L 4.2     Lab Results   Component Value Date    HGBA1C 6.30 (H) 10/31/2018       Assessment and Plan:     S/P lumbar fusion    Back pain    Hypertension    CAD (coronary artery disease)    Moderate COPD    Prediabetes      Plan  1. PT-ambulate  2. Pain control-prns   3. IS-encourage  [facility]  5. Bowel regimen  6. Resume home medications as appropriate  7. Monitor post-op labs  8. DC planning     HTN, CAD  - Continue home norvasc, hydralazine, lisinopril, toprol, and diovan  - Monitor BP   - Holding parameters for BP meds  - Labetalol PRN for SBP>170    PreDM  - hgb A1c on 10/31/18 6.3  - Accuchecks ACHS with low dose SSI    COPD  - Monitor O2 sats  - continue inhaler (may use inhaler from home)      LYNDA Quinones  11/01/18  4:11 PM     I have personally performed  the evaluation on this patient. My history is consistent  with HPI obtained. My exam findings are listed above. I have personally reviewed and discussed the above formulated treatment plan with pt, family and .Farhana.      Electronically signed by Bert Chadwick MD at 11/1/2018  8:55 PM     Madhavi Cochran PA-C at 11/1/2018  6:57 AM          Pre-Op H&P (See Recent Office Note Attached for Full H&P)    Chief complaint: Low back pain    HPI:    Office note dated 10/3/118.  Patient is a 68 y.o. male who presents with lumbar stenosis. He is a retired . He has complaints of low back pain that radiates to the right lower extremity with numbness x 3 years.  He presents today for lumbar discectomy posterior with fusion instrumentation.    Review of Systems:  General ROS:  no fever, chills, rashes, No change since last office visit  Cardiovascular ROS: no chest pain or dyspnea on exertion  Respiratory ROS: no cough, shortness of breath, or wheezing      Meds:    No current facility-administered medications on file prior to encounter.      Current Outpatient Prescriptions on File Prior to Encounter   Medication Sig Dispense Refill   • amLODIPine (NORVASC) 5 MG tablet Take 5 mg by mouth 2 (Two) Times a Day.     • citalopram (CeleXA) 20 MG tablet Take 20 mg by mouth Daily.     • hydrALAZINE (APRESOLINE) 50 MG tablet Take 50 mg by mouth 3 (Three) Times a Day.     • lisinopril (PRINIVIL,ZESTRIL) 40 MG tablet Take 40 mg by mouth 2 (Two) Times a Day.     • metoprolol succinate XL (TOPROL-XL) 50 MG 24 hr tablet Take 50 mg by mouth Daily.     • pantoprazole (PROTONIX) 40 MG EC tablet Take 40 mg by mouth Daily.     • valsartan (DIOVAN) 160 MG tablet Take 160 mg by mouth Daily.     • vitamin B-12 (CYANOCOBALAMIN) 1000 MCG tablet Take 1,000 mcg by mouth Daily.     • acetaminophen-codeine (TYLENOL #3) 300-30 MG per tablet Take 1 tablet by mouth Every 4 (Four) Hours As Needed for Moderate Pain (4-6).     • ASPIRIN  LOW DOSE 81 MG tablet Take 81 mg by mouth Daily. Last dose 10-21-18     • ondansetron (ZOFRAN) 4 MG tablet      • triamcinolone (KENALOG) 0.1 % cream Apply 1 application topically to the appropriate area as directed 3 (Three) Times a Day.     • [DISCONTINUED] aclidinium bromide (TUDORZA PRESSAIR) 400 MCG/ACT aerosol powder  powder for inhalation Inhale 1 puff 2 (Two) Times a Day.     • [DISCONTINUED] mometasone-formoterol (DULERA 200) 200-5 MCG/ACT inhaler Inhale 2 puffs 2 (Two) Times a Day.         Vital Signs:  /71 (BP Location: Right arm, Patient Position: Lying)   Pulse 63   Temp 98.3 °F (36.8 °C) (Temporal Artery )   Resp 18   SpO2 92%     Physical Exam:    CV:  S1S2 regular rate and rhythm, no murmur               Resp:  Decreased breath sounds; respirations regular, even and unlabored    Results Review:    I reviewed the patient's new clinical results.      Assessment:  Grade 2 L5-S1 spondylolithesis    Plan:  Lumbar discectomy, posterior, with fusion instrumentation.    Madhavi Cochran PA-C  11/1/2018   6:58 AM      Electronically signed by Madhavi Cochran PA-C at 11/1/2018  7:06 AM       Hospital Medications (active)       Dose Frequency Start End    acetaminophen (TYLENOL) tablet 650 mg 650 mg Every 4 Hours PRN 11/1/2018     Sig - Route: Take 2 tablets by mouth Every 4 (Four) Hours As Needed for Mild Pain . - Oral    amLODIPine (NORVASC) tablet 5 mg 5 mg 2 Times Daily 11/1/2018     Sig - Route: Take 1 tablet by mouth 2 (Two) Times a Day. - Oral    bisacodyl (DULCOLAX) EC tablet 5 mg 5 mg Daily PRN 11/1/2018     Sig - Route: Take 1 tablet by mouth Daily As Needed for Constipation. - Oral    bisacodyl (DULCOLAX) suppository 10 mg 10 mg Daily PRN 11/1/2018     Sig - Route: Insert 1 suppository into the rectum Daily As Needed for Constipation. - Rectal    citalopram (CeleXA) tablet 20 mg 20 mg Daily 11/2/2018     Sig - Route: Take 1 tablet by mouth Daily. - Oral    dextrose (D50W) 25 g/  "50mL Intravenous Solution 25 g 25 g Every 15 Minutes PRN 11/1/2018     Sig - Route: Infuse 50 mL into a venous catheter Every 15 (Fifteen) Minutes As Needed for Low Blood Sugar (Blood Sugar Less Than 70). - Intravenous    dextrose (GLUTOSE) oral gel 15 g 15 g Every 15 Minutes PRN 11/1/2018     Sig - Route: Take 15 g by mouth Every 15 (Fifteen) Minutes As Needed for Low Blood Sugar (Blood sugar less than 70). - Oral    famotidine (PEPCID) injection 20 mg 20 mg Every 12 Hours Scheduled 11/1/2018     Sig - Route: Infuse 2 mL into a venous catheter Every 12 (Twelve) Hours. - Intravenous    Linked Group 1:  \"Or\" Linked Group Details        famotidine (PEPCID) tablet 20 mg 20 mg Every 12 Hours Scheduled 11/1/2018     Sig - Route: Take 1 tablet by mouth Every 12 (Twelve) Hours. - Oral    Linked Group 1:  \"Or\" Linked Group Details        Fluticasone-Umeclidin-Vilant 100-62.5-25 MCG/INH aerosol powder   2 Times Daily - RT 11/1/2018     Sig - Route: Inhale 2 (Two) Times a Day. - Inhalation    glucagon (human recombinant) (GLUCAGEN DIAGNOSTIC) injection 1 mg 1 mg As Needed 11/1/2018     Sig - Route: Inject 1 mg under the skin into the appropriate area as directed As Needed (Blood Glucose Less Than 70). - Subcutaneous    hydrALAZINE (APRESOLINE) tablet 50 mg 50 mg Every 8 Hours Scheduled 11/1/2018     Sig - Route: Take 1 tablet by mouth Every 8 (Eight) Hours. - Oral    HYDROmorphone (DILAUDID) injection 2 mg 2 mg Every 4 Hours PRN 11/1/2018 11/11/2018    Sig - Route: Inject 2 mL into the appropriate muscle as directed by prescriber Every 4 (Four) Hours As Needed for Severe Pain . - Intramuscular    Linked Group 2:  \"And\" Linked Group Details        insulin lispro (humaLOG) injection 0-7 Units 0-7 Units 4 Times Daily With Meals & Nightly 11/1/2018     Sig - Route: Inject 0-7 Units under the skin into the appropriate area as directed 4 (Four) Times a Day With Meals & at Bedtime. - Subcutaneous    labetalol (NORMODYNE,TRANDATE) " "injection 10 mg 10 mg Every 4 Hours PRN 11/1/2018     Sig - Route: Infuse 2 mL into a venous catheter Every 4 (Four) Hours As Needed for High Blood Pressure (SBP>170). - Intravenous    lactated ringers infusion 9 mL/hr Continuous 11/1/2018     Sig - Route: Infuse 9 mL/hr into a venous catheter Continuous. - Intravenous    lisinopril (PRINIVIL,ZESTRIL) tablet 40 mg 40 mg 2 Times Daily 11/1/2018     Sig - Route: Take 1 tablet by mouth 2 (Two) Times a Day. - Oral    metoprolol succinate XL (TOPROL-XL) 24 hr tablet 50 mg 50 mg Daily 11/2/2018     Sig - Route: Take 1 tablet by mouth Daily. - Oral    Morphine sulfate (PF) injection 1 mg 1 mg Every 4 Hours PRN 11/1/2018 11/11/2018    Sig - Route: Infuse 0.5 mL into a venous catheter Every 4 (Four) Hours As Needed for Moderate Pain . - Intravenous    Linked Group 3:  \"And\" Linked Group Details        naloxone (NARCAN) injection 0.4 mg 0.4 mg Every 5 Minutes PRN 11/1/2018     Sig - Route: Infuse 1 mL into a venous catheter Every 5 (Five) Minutes As Needed for Respiratory Depression. - Intravenous    Linked Group 3:  \"And\" Linked Group Details        naloxone (NARCAN) injection 0.4 mg 0.4 mg Every 5 Minutes PRN 11/1/2018     Sig - Route: Infuse 1 mL into a venous catheter Every 5 (Five) Minutes As Needed for Respiratory Depression. - Intravenous    Linked Group 2:  \"And\" Linked Group Details        ondansetron (ZOFRAN) injection 4 mg 4 mg Every 6 Hours PRN 11/1/2018     Sig - Route: Infuse 2 mL into a venous catheter Every 6 (Six) Hours As Needed for Nausea or Vomiting. - Intravenous    ondansetron (ZOFRAN) injection 4 mg 4 mg Every 6 Hours PRN 11/1/2018     Sig - Route: Infuse 2 mL into a venous catheter Every 6 (Six) Hours As Needed for Nausea or Vomiting. - Intravenous    oxyCODONE-acetaminophen (PERCOCET) 7.5-325 MG per tablet 1 tablet 1 tablet Every 4 Hours PRN 11/1/2018 11/11/2018    Sig - Route: Take 1 tablet by mouth Every 4 (Four) Hours As Needed for Moderate Pain . " "- Oral    pantoprazole (PROTONIX) EC tablet 40 mg 40 mg Every Early Morning 11/2/2018     Sig - Route: Take 1 tablet by mouth Every Morning. - Oral    sodium chloride 0.9 % bolus 500 mL 500 mL 3 Times Daily PRN 11/1/2018     Sig - Route: Infuse 500 mL into a venous catheter 3 (Three) Times a Day As Needed (for SBP less than 90). - Intravenous    sodium chloride 0.9 % flush 3 mL 3 mL Every 12 Hours Scheduled 11/1/2018     Sig - Route: Infuse 3 mL into a venous catheter Every 12 (Twelve) Hours. - Intravenous    sodium chloride 0.9 % flush 3-10 mL 3-10 mL As Needed 11/1/2018     Sig - Route: Infuse 3-10 mL into a venous catheter As Needed for Line Care. - Intravenous    sodium chloride 0.9 % with KCl 20 mEq/L infusion 100 mL/hr Continuous 11/1/2018     Sig - Route: Infuse 100 mL/hr into a venous catheter Continuous. - Intravenous    tranexamic acid 1000 mg in 100 ml NS Mini-bag plus 1 mg/kg/hr × 102 kg Once 11/1/2018 11/1/2018    Sig - Route: Infuse 102 mg/hr into a venous catheter 1 (One) Time. - Intravenous    Linked Group 4:  \"And\" Linked Group Details        valsartan (DIOVAN) tablet 160 mg 160 mg Daily 11/2/2018     Sig - Route: Take 1 tablet by mouth Daily. - Oral    vancomycin 1500 mg/500 mL 0.9% NS IVPB ([facility]) 15 mg/kg × 102 kg Every 12 Hours 11/1/2018 11/1/2018    Sig - Route: Infuse 500 mL into a venous catheter Every 12 (Twelve) Hours. - Intravenous    zolpidem (AMBIEN) tablet 5 mg 5 mg Nightly PRN 11/1/2018 11/11/2018    Sig - Route: Take 1 tablet by mouth At Night As Needed for Sleep. - Oral    budesonide-formoterol (SYMBICORT) 160-4.5 MCG/ACT inhaler 2 puff (Discontinued) 2 puff 2 Times Daily - RT 11/1/2018 11/1/2018    Sig - Route: Inhale 2 puffs 2 (Two) Times a Day. - Inhalation    bupivacaine-EPINEPHrine (MARCAINE w/EPI) injection (Discontinued)  As Needed 11/1/2018 11/1/2018    Sig: As Needed.    Reason for Discontinue: Patient Discharge    chlorhexidine (IRRISEPT) 0.05 % in sterile water " (Discontinued)  As Needed 11/1/2018 11/1/2018    Sig: As Needed.    Reason for Discontinue: Patient Discharge    dexamethasone (DECADRON) injection (Discontinued)  As Needed 11/1/2018 11/1/2018    Sig: As Needed.    Reason for Discontinue: Anesthesia Stop    ePHEDrine injection (Discontinued)  As Needed 11/1/2018 11/1/2018    Sig: As Needed.    Reason for Discontinue: Anesthesia Stop    famotidine (PEPCID) injection 20 mg (Discontinued) 20 mg Once 11/1/2018 11/1/2018    Sig - Route: Infuse 2 mL into a venous catheter 1 (One) Time. - Intravenous    Reason for Discontinue: Patient Transfer    famotidine (PEPCID) tablet 20 mg (Discontinued) 20 mg 60 Minutes Pre-Op 11/1/2018 11/1/2018    Sig - Route: Take 1 tablet by mouth 60 Minutes Prior to Surgery. - Oral    Reason for Discontinue: Patient Transfer    famotidine (PEPCID) tablet 20 mg (Discontinued) 20 mg Once 11/1/2018 11/1/2018    Sig - Route: Take 1 tablet by mouth 1 (One) Time. - Oral    Reason for Discontinue: Patient Transfer    fentaNYL citrate (PF) (SUBLIMAZE) injection 50 mcg (Discontinued) 50 mcg Every 5 Minutes PRN 11/1/2018 11/1/2018    Sig - Route: Infuse 1 mL into a venous catheter Every 5 (Five) Minutes As Needed for Moderate Pain . - Intravenous    Reason for Discontinue: Patient Transfer    fentaNYL citrate (PF) (SUBLIMAZE) injection (Discontinued)  As Needed 11/1/2018 11/1/2018    Sig: As Needed for Severe Pain .    Reason for Discontinue: Anesthesia Stop    floseal injection (Discontinued)  As Needed 11/1/2018 11/1/2018    Sig: As Needed.    Reason for Discontinue: Patient Discharge    gelatin absorbable (GELFOAM) sponge (Discontinued)  As Needed 11/1/2018 11/1/2018    Sig: As Needed.    Reason for Discontinue: Patient Discharge    glycopyrrolate (ROBINUL) injection (Discontinued)  As Needed 11/1/2018 11/1/2018    Sig - Route: Infuse  into a venous catheter As Needed for excess secretions. - Intravenous    Reason for Discontinue: Anesthesia Stop     HYDROmorphone (DILAUDID) injection 0.5 mg (Discontinued) 0.5 mg Every 5 Minutes PRN 11/1/2018 11/1/2018    Sig - Route: Infuse 0.5 mL into a venous catheter Every 5 (Five) Minutes As Needed for Severe Pain . - Intravenous    Reason for Discontinue: Patient Transfer    lactated ringers bolus 500 mL (Discontinued) 500 mL Once As Needed 11/1/2018 11/1/2018    Sig - Route: Infuse 500 mL into a venous catheter 1 (One) Time As Needed (for hypovolemia, call anesthesiologist before initiating). - Intravenous    Reason for Discontinue: Patient Transfer    lactated ringers infusion (Discontinued) 9 mL/hr Continuous PRN 11/1/2018 11/1/2018    Sig - Route: Infuse 9 mL/hr into a venous catheter Continuous As Needed (Start Prior to Surgery). - Intravenous    Reason for Discontinue: Patient Transfer    lidocaine PF 1% (XYLOCAINE) injection 0.5 mL (Discontinued) 0.5 mL Once As Needed 11/1/2018 11/1/2018    Sig - Route: Inject 0.5 mL as directed 1 (One) Time As Needed (IV Start). - Injection    Reason for Discontinue: Patient Transfer    lidocaine PF 1% (XYLOCAINE) injection (Discontinued)  As Needed 11/1/2018 11/1/2018    Sig: As Needed.    Reason for Discontinue: Anesthesia Stop    neostigmine (PROSTIGMINE) injection (Discontinued)  As Needed 11/1/2018 11/1/2018    Sig - Route: Infuse  into a venous catheter As Needed. - Intravenous    Reason for Discontinue: Anesthesia Stop    ondansetron (ZOFRAN) injection (Discontinued)  As Needed 11/1/2018 11/1/2018    Sig - Route: Infuse  into a venous catheter As Needed for Nausea or Vomiting. - Intravenous    Reason for Discontinue: Anesthesia Stop    phenylephrine (ANGELICA-SYNEPHRINE) 20 mg in sodium chloride 0.9 % 250 mL infusion (Discontinued)  Continuous PRN 11/1/2018 11/1/2018    Sig: Continuous As Needed.    Reason for Discontinue: Anesthesia Stop    Phenylephrine HCl-NaCl syringe (Discontinued)  As Needed 11/1/2018 11/1/2018    Sig: As Needed.    Reason for Discontinue: Anesthesia Stop     Propofol (DIPRIVAN) injection (Discontinued)  As Needed 11/1/2018 11/1/2018    Sig: As Needed.    Reason for Discontinue: Anesthesia Stop    rocuronium (ZEMURON) injection (Discontinued)  As Needed 11/1/2018 11/1/2018    Sig: As Needed.    Reason for Discontinue: Anesthesia Stop    sodium chloride 0.9 % flush 3 mL (Discontinued) 3 mL Every 12 Hours Scheduled 11/1/2018 11/1/2018    Sig - Route: Infuse 3 mL into a venous catheter Every 12 (Twelve) Hours. - Intravenous    Reason for Discontinue: Patient Transfer    sodium chloride 0.9 % flush 3 mL (Discontinued) 3 mL Every 12 Hours Scheduled 11/1/2018 11/1/2018    Sig - Route: Infuse 3 mL into a venous catheter Every 12 (Twelve) Hours. - Intravenous    Reason for Discontinue: Patient Transfer    sodium chloride 0.9 % flush 3-10 mL (Discontinued) 3-10 mL As Needed 11/1/2018 11/1/2018    Sig - Route: Infuse 3-10 mL into a venous catheter As Needed for Line Care. - Intravenous    Reason for Discontinue: Patient Transfer    sodium chloride 0.9 % flush 3-10 mL (Discontinued) 3-10 mL As Needed 11/1/2018 11/1/2018    Sig - Route: Infuse 3-10 mL into a venous catheter As Needed for Line Care. - Intravenous    Reason for Discontinue: Patient Transfer    sodium chloride 1,000 mL with bacitracin 50,000 Units, polymyxin B 500,000 Units irrigation (Discontinued)  As Needed 11/1/2018 11/1/2018    Sig: As Needed.    Reason for Discontinue: Patient Discharge    sterile water irrigation solution (Discontinued)  As Needed 11/1/2018 11/1/2018    Sig: As Needed.    Reason for Discontinue: Patient Discharge    thrombin (THROMBIN-JMI) spray kit (Discontinued)  As Needed 11/1/2018 11/1/2018    Sig: As Needed.    Reason for Discontinue: Patient Discharge    vecuronium (NORCURON) injection (Discontinued)  As Needed 11/1/2018 11/1/2018    Sig: As Needed.    Reason for Discontinue: Anesthesia Stop          Physician Progress Notes (most recent note)     No notes of this type exist for this  encounter.        Consult Notes (most recent note)     No notes of this type exist for this encounter.           Physical Therapy Notes (most recent note)      Marino Sorto, PT at 2018  6:15 PM  Version 1 of 1         Acute Care - Physical Therapy Initial Evaluation  [facility]     Patient Name: Derek Kelsey  : 1950  MRN: [id number]  Today's Date: 2018   Onset of Illness/Injury or Date of Surgery: 18  Date of Referral to PT: 18  Referring Physician: MD Rigoberto      Admit Date: 2018    Visit Dx:     ICD-10-CM ICD-9-CM   1. Impaired functional mobility, balance, gait, and endurance Z74.09 V49.89     Patient Active Problem List   Diagnosis   • Anxiety   • Abdominal aortic aneurysm (CMS/HCC)   • Depression   • Hypertension   • Cataract, bilateral   • CAD (coronary artery disease)   • Abdominal aortic aneurysm (AAA) without rupture (CMS/HCC)   • Abnormal stress test   • Moderate COPD   • Former smoker   • Coal workers pneumoconiosis, simple   • Back pain   • S/P lumbar fusion   • Prediabetes     Past Medical History:   Diagnosis Date   • Abdominal aortic aneurysm (CMS/HCC) 2016   • Anxiety 2016   • Arthritis    • CAD (coronary artery disease) 2016   • Cataract, bilateral 2016   • COPD (chronic obstructive pulmonary disease) (CMS/HCC) 2016   • Depression 2016   • Depression    • GERD (gastroesophageal reflux disease)    • Hypertension 2016   • Wears dentures      Past Surgical History:   Procedure Laterality Date   • CARDIAC CATHETERIZATION     • CARDIAC CATHETERIZATION N/A 2018    Procedure: Left Heart Cath;  Surgeon: Douglas Galvez MD;  Location: [facility] INVASIVE LOCATION;  Service: Cardiovascular   • COLONOSCOPY     • HAND SURGERY     • SHOULDER SURGERY Left         PT ASSESSMENT (last 12 hours)      Physical Therapy Evaluation     Row Name 18 1556          PT Evaluation Time/Intention    Subjective Information complains  of;pain  -MJ     Document Type evaluation  -MJ     Mode of Treatment physical therapy  -MJ     Patient Effort good  -MJ     Symptoms Noted During/After Treatment other (see comments)   pain improved  -MJ     Row Name 11/01/18 1557          General Information    Patient Profile Reviewed? yes  -MJ     Onset of Illness/Injury or Date of Surgery 11/01/18  -MJ     Referring Physician MD Rigoberto  -MJ     Patient Observations alert;cooperative;agree to therapy  -MJ     General Observations of Patient Pt supine in bed, hembonniec, pete, IV, SCDs. Family present  -MJ     Prior Level of Function min assist:;all household mobility;community mobility;gait;transfer;bed mobility;cooking;cleaning;driving   Pain increased with activity  -MJ     Equipment Currently Used at Home shower chair  -MJ     Pertinent History of Current Functional Problem Pt presents for surgical management of back pain and dysfunction with symptoms into B LEs that failed to improve with conservative management  -MJ     Existing Precautions/Restrictions fall;spinal;other (see comments)   hemovac pete  -MJ     Risks Reviewed patient and family:;LOB;increased discomfort  -MJ     Benefits Reviewed patient and family:;improve function;increase independence;increase strength;increase balance;decrease pain  -MJ     Barriers to Rehab none identified  -MJ     Row Name 11/01/18 1554          Relationship/Environment    Lives With spouse  -MJ     Family Caregiver if Needed spouse  -MJ     Concerns About Impact on Relationships Wife available to assist at discharge  -MJ     Row Name 11/01/18 1555          Resource/Environmental Concerns    Current Living Arrangements home/apartment/condo  -MJ     Row Name 11/01/18 1556          Home Main Entrance    Number of Stairs, Main Entrance seven  -MJ     Stair Railings, Main Entrance none  -MJ     Row Name 11/01/18 1559          Cognitive Assessment/Intervention- PT/OT    Orientation Status (Cognition) oriented x 4  -MJ      Follows Commands (Cognition) WFL  -     Row Name 11/01/18 1556          Safety Issues, Functional Mobility    Impairments Affecting Function (Mobility) pain;strength;balance  -     Row Name 11/01/18 1556          Bed Mobility Assessment/Treatment    Bed Mobility Assessment/Treatment supine-sit;sit-supine  -MJ     Supine-Sit [place] (Bed Mobility) minimum assist (75% patient effort);verbal cues  -     Sit-Supine [place] (Bed Mobility) minimum assist (75% patient effort);verbal cues  -MJ     Bed Mobility, Safety Issues decreased use of legs for bridging/pushing  -     Assistive Device (Bed Mobility) bed rails;head of bed elevated  -     Comment (Bed Mobility) Pt performed log roll into/out of bed, verbal cues for sequencing. Assist with trunk for sidelying to sit and with legs for sit to sidelying  -     Row Name 11/01/18 1556          Transfer Assessment/Treatment    Transfer Assessment/Treatment sit-stand transfer;stand-sit transfer  -     Comment (Transfers) Verbal cues to push up from bed with UEs to stand and to reach back for bed to lower into sitting.   -MJ     Sit-Stand [place] (Transfers) contact guard;2 person assist;verbal cues  -MJ     Stand-Sit [place] (Transfers) contact guard;verbal cues  -     Row Name 11/01/18 1556          Sit-Stand Transfer    Assistive Device (Sit-Stand Transfers) walker, front-wheeled;other (see comments)   elevated surface  -     Row Name 11/01/18 1556          Stand-Sit Transfer    Assistive Device (Stand-Sit Transfers) walker, front-wheeled  -     Row Name 11/01/18 1556          Gait/Stairs Assessment/Training    66569 - Gait Training Minutes  8  -MJ     [place] Level (Gait) contact guard;1 person to manage equipment;verbal cues  -MJ     Assistive Device (Gait) walker, front-wheeled  -MJ     Distance in Feet (Gait) 110  -MJ     Pattern (Gait) step-through  -MJ     Deviations/Abnormal Patterns (Gait) bilateral deviations;antalgic;base  of support, narrow;gait speed decreased;stride length decreased  -     Bilateral Gait Deviations heel strike decreased;weight shift ability decreased  -     Comment (Gait/Stairs) Pt demo step through gait pattern at slow pace. Verbal cues for upright posture and to separate feet to improve YAZ. Cues to  feet during turn, 1 instance of R knee buckling during turn. Gait limited by fatigue  -     Row Name 11/01/18 1556          General ROM    GENERAL ROM COMMENTS No ROM deficits B LE  -     Row Name 11/01/18 1556          MMT (Manual Muscle Testing)    General MMT Comments B LE grossly 4/5  -     Row Name 11/01/18 1556          Sensory Assessment/Intervention    Sensory General Assessment light touch sensation deficits identified   can actively DF both ankles  -[facility] Name 11/01/18 1556          Light Touch Sensation Assessment    Comment, Right Upper Extremity: Light Touch Sensation Assessment reports R hand numbness  -[facility] Name 11/01/18 1556          Pain Assessment    Additional Documentation Pain Scale: Numbers Pre/Post-Treatment (Group)  -MJ     Row Name 11/01/18 1556          Pain Scale: Numbers Pre/Post-Treatment    Pain Scale: Numbers, Pretreatment 10/10  -MJ     Pain Scale: Numbers, Post-Treatment 7/10  -MJ     Pain Location - Orientation lower  -     Pain Location back  -     Pain Intervention(s) Repositioned;Ambulation/increased activity  -     Row Name             Wound 11/01/18 1029 Other (See comments) back incision    Wound - Properties Group Date first assessed: 11/01/18  -TK Time first assessed: 1029  -TK Side: Other (See comments)  -TK Location: back  -TK Type: incision  -TK    Row Name 11/01/18 1556          Plan of Care Review    Plan of Care Reviewed With patient;spouse;son  -[facility] Name 11/01/18 1556          Physical Therapy Clinical Impression    Date of Referral to PT 11/01/18  -     PT Diagnosis (PT Clinical Impression) s/p L4-5, L5-S1 laminectomy with  fusion  -     Criteria for Skilled Interventions Met (PT Clinical Impression) yes;treatment indicated  -     Care Plan Review (PT) evaluation/treatment results reviewed;care plan/treatment goals reviewed;risks/benefits reviewed;patient/other agree to care plan  -     Care Plan Review, Other Participant (PT Clinical Impression) son;spouse  -     Row Name 11/01/18 1554          Physical Therapy Goals    Bed Mobility Goal Selection (PT) bed mobility, PT goal 1  -MJ     Transfer Goal Selection (PT) transfer, PT goal 1  -MJ     Gait Training Goal Selection (PT) gait training, PT goal 1  -MJ     Stairs Goal Selection (PT) stairs, PT goal 1  -MJ     Additional Documentation Stairs Goal Selection (PT) (Row)  -     Row Name 11/01/18 9679          Bed Mobility Goal 1 (PT)    Activity/Assistive Device (Bed Mobility Goal 1, PT) sit to supine/supine to sit   via log roll  -MJ     [place] Level/Cues Needed (Bed Mobility Goal 1, PT) conditional independence  -MJ     Time Frame (Bed Mobility Goal 1, PT) 5 days;long term goal (LTG)  -MJ     Progress/Outcomes (Bed Mobility Goal 1, PT) goal ongoing  -     Row Name 11/01/18 1896          Transfer Goal 1 (PT)    Activity/Assistive Device (Transfer Goal 1, PT) sit-to-stand/stand-to-sit;walker, rolling  -MJ     [place] Level/Cues Needed (Transfer Goal 1, PT) conditional independence  -MJ     Time Frame (Transfer Goal 1, PT) 5 days;long term goal (LTG)  -MJ     Progress/Outcome (Transfer Goal 1, PT) goal ongoing  -     Row Name 11/01/18 9736          Gait Training Goal 1 (PT)    Activity/Assistive Device (Gait Training Goal 1, PT) gait (walking locomotion);walker, rolling  -MJ     [place] Level (Gait Training Goal 1, PT) conditional independence  -MJ     Distance (Gait Goal 1, PT) 500 feet  -MJ     Time Frame (Gait Training Goal 1, PT) 5 days;long term goal (LTG)  -MJ     Progress/Outcome (Gait Training Goal 1, PT) goal ongoing  -     Row Name 11/01/18 0404           Stairs Goal 1 (PT)    Activity/Assistive Device (Stairs Goal 1, PT) ascending stairs;descending stairs;cane, straight  -MJ     [place] Level/Cues Needed (Stairs Goal 1, PT) contact guard assist  -MJ     Number of Stairs (Stairs Goal 1, PT) 7  -MJ     Time Frame (Stairs Goal 1, PT) 5 days;long term goal (LTG)  -MJ     Progress/Outcome (Stairs Goal 1, PT) goal ongoing  -MJ     Row Name 11/01/18 1556          Positioning and Restraints    Pre-Treatment Position in bed  -MJ     Post Treatment Position bed  -MJ     In Bed notified nsg;supine;call light within reach;encouraged to call for assist;with family/caregiver  -     Row Name 11/01/18 1556          Living Environment    Home Accessibility stairs to enter home   walk-in shower  -       User Key  (r) = Recorded By, (t) = Taken By, (c) = Cosigned By    Initials Name Provider Type    Tish Izquierdo, RN Registered Nurse    Marino Castro, PT Physical Therapist          Physical Therapy Education     Title: PT OT SLP Therapies (Active)     Topic: Physical Therapy (Active)     Point: Mobility training (Done)    Learning Progress Summary     Learner Status Readiness Method Response Comment Documented by    Patient Done Acceptance E,JAREN VU,NR Edu re: spinal precautions, gait mechanics, benefits of mobility, log roll  11/01/18 1556    Family Done Acceptance EJAREN VU,NR Edu re: spinal precautions, gait mechanics, benefits of mobility, log roll  11/01/18 1556    Significant Other Done Acceptance JAREN COLE VU,NR Edu re: spinal precautions, gait mechanics, benefits of mobility, log roll  11/01/18 1556          Point: Body mechanics (Done)    Learning Progress Summary     Learner Status Readiness Method Response Comment Documented by    Patient Done Acceptance DOUGLAS,JAREN VU,NR Edu re: spinal precautions, gait mechanics, benefits of mobility, log roll  11/01/18 1556    Family Done Acceptance E,JAREN VU,NR Edu re: spinal precautions, gait mechanics, benefits of mobility, log  roll  11/01/18 1556    Significant Other Done Acceptance E,D VU,NR Edu re: spinal precautions, gait mechanics, benefits of mobility, log roll  11/01/18 1556          Point: Precautions (Done)    Learning Progress Summary     Learner Status Readiness Method Response Comment Documented by    Patient Done Acceptance E,JAREN VU,NR Edu re: spinal precautions, gait mechanics, benefits of mobility, log roll  11/01/18 1556    Family Done Acceptance E,D VU,NR Edu re: spinal precautions, gait mechanics, benefits of mobility, log roll  11/01/18 1556    Significant Other Done Acceptance E,D VU,NR Edu re: spinal precautions, gait mechanics, benefits of mobility, log roll  11/01/18 1556                      User Key     Initials Effective Dates Name Provider Type Discipline     04/03/18 -  Marino Sorto, PT Physical Therapist PT                PT Recommendation and Plan  Anticipated Discharge Disposition (PT): home with assist, home with home health  Planned Therapy Interventions (PT Eval): balance training, bed mobility training, gait training, home exercise program, patient/family education, stair training, strengthening, transfer training  Therapy Frequency (PT Clinical Impression): daily  Outcome Summary/Treatment Plan (PT)  Anticipated Equipment Needs at Discharge (PT): front wheeled walker, bedside commode  Anticipated Discharge Disposition (PT): home with assist, home with home health  Plan of Care Reviewed With: patient, spouse, son  Outcome Summary: Pt ambulated 110 feet with CGAx2 and RW, limited by fatigue. PT will follow to increase strength and progress functional mobility with back precautions. Plan is home with HHPT at discharge          Outcome Measures     Row Name 11/01/18 1556             How much help from another person do you currently need...    Turning from your back to your side while in flat bed without using bedrails? 3  -MJ      Moving from lying on back to sitting on the side of a flat bed  without bedrails? 3  -MJ      Moving to and from a bed to a chair (including a wheelchair)? 3  -MJ      Standing up from a chair using your arms (e.g., wheelchair, bedside chair)? 3  -MJ      Climbing 3-5 steps with a railing? 2  -MJ      To walk in hospital room? 3  -MJ      AM-PAC 6 Clicks Score 17  -MJ         Functional Assessment    Outcome Measure Options AM-PAC 6 Clicks Basic Mobility (PT)  -MJ        User Key  (r) = Recorded By, (t) = Taken By, (c) = Cosigned By    Initials Name Provider Type    Marino Castro, PT Physical Therapist           Time Calculation:         PT Charges     Row Name 11/01/18 1556             Time Calculation    Start Time 1556  -MJ      PT Received On 11/01/18  -      PT Goal Re-Cert Due Date 11/11/18  -MJ         Time Calculation- PT    Total Timed Code Minutes- PT 8 minute(s)  -MJ         Timed Charges    32240 - Gait Training Minutes  8  -MJ        User Key  (r) = Recorded By, (t) = Taken By, (c) = Cosigned By    Initials Name Provider Type    Marino Castro, PT Physical Therapist        Therapy Suggested Charges     Code   Minutes Charges    49603 (CPT®) Hc Pt Neuromusc Re Education Ea 15 Min      55369 (CPT®) Hc Pt Ther Proc Ea 15 Min      27925 (CPT®) Hc Gait Training Ea 15 Min 8 1    64101 (CPT®) Hc Pt Therapeutic Act Ea 15 Min      57047 (CPT®) Hc Pt Manual Therapy Ea 15 Min      72664 (CPT®) Hc Pt Iontophoresis Ea 15 Min      40599 (CPT®) Hc Pt Elec Stim Ea-Per 15 Min      81004 (CPT®) Hc Pt Ultrasound Ea 15 Min      13308 (CPT®) Hc Pt Self Care/Mgmt/Train Ea 15 Min      66646 (CPT®) Hc Pt Prosthetic (S) Train Initial Encounter, Each 15 Min      99411 (CPT®) Hc Pt Orthotic(S)/Prosthetic(S) Encounter, Each 15 Min      21989 (CPT®) Hc Orthotic(S) Mgmt/Train Initial Encounter, Each 15min      Total  8 1        Therapy Charges for Today     Code Description Service Date Service Provider Modifiers Qty    48512033186 HC PT EVAL LOW COMPLEXITY 2 11/1/2018 Marino Sorto, PT GP 1     50630945405 HC GAIT TRAINING EA 15 MIN 11/1/2018 Marion Sorto, PT GP 1    52363424605 HC PT THER SUPP EA 15 MIN 11/1/2018 Marino Sorto, PT GP 2          PT G-Codes  Outcome Measure Options: AM-PAC 6 Clicks Basic Mobility (PT)  AM-PAC 6 Clicks Score: 17      Marino Sorto PT  11/1/2018         Electronically signed by Marino Sorto PT at 11/1/2018  6:15 PM

## 2018-11-02 NOTE — PLAN OF CARE
Problem: Patient Care Overview  Goal: Plan of Care Review  Outcome: Ongoing (interventions implemented as appropriate)   11/02/18 1348   Coping/Psychosocial   Plan of Care Reviewed With patient;spouse   Plan of Care Review   Progress improving   OTHER   Outcome Summary Pt increased gait distance to 450 feet with CGA and RW. Pt progressed to stair training x5 w/ 2 HR. Pt anticipates discharge home tomorrow with HHPT, will need to practice stairs w/ cane and/or HHA to mimic home environment       Problem: Laminectomy/Foraminotomy/Discectomy (Adult)  Goal: Signs and Symptoms of Listed Potential Problems Will be Absent, Minimized or Managed (Laminectomy/Foraminotomy/Discectomy)  Outcome: Ongoing (interventions implemented as appropriate)   11/02/18 1348   Goal/Outcome Evaluation   Problems Assessed (Laminectomy/Laminotomy/Discectomy) functional decline/self-care deficit;pain   Problems Present (Laminectomy/otomy) functional decline/self-care deficit;pain

## 2018-11-02 NOTE — THERAPY TREATMENT NOTE
Acute Care - Physical Therapy Treatment Note  [facility]     Patient Name: Derek Kelsey  : 1950  MRN: [id number]  Today's Date: 2018  Onset of Illness/Injury or Date of Surgery: 18  Date of Referral to PT: 18  Referring Physician: Dr. Franklin    Admit Date: 2018    Visit Dx:    ICD-10-CM ICD-9-CM   1. Impaired functional mobility, balance, gait, and endurance Z74.09 V49.89   2. S/P lumbar fusion Z98.1 V45.4   3. Impaired mobility and ADLs Z74.09 799.89     Patient Active Problem List   Diagnosis   • Anxiety   • Abdominal aortic aneurysm (CMS/HCC)   • Depression   • Hypertension   • Cataract, bilateral   • CAD (coronary artery disease)   • Abdominal aortic aneurysm (AAA) without rupture (CMS/HCC)   • Abnormal stress test   • Moderate COPD   • Former smoker   • Coal workers pneumoconiosis, simple   • Back pain   • S/P lumbar fusion   • Prediabetes   • Acute blood loss anemia, mild, asymptomatic   • Acute postoperative pain   • Hyponatremia, mild       Therapy Treatment          Rehabilitation Treatment Summary     Row Name 18 3368             Treatment Time/Intention    Discipline physical therapist  -      Document Type therapy note (daily note)  -      Subjective Information complains of;pain  -      Mode of Treatment physical therapy  -MJ      Patient/Family Observations Pt sitting UIC, hemovac, IV heplocked. Wife present  -      Care Plan Review patient/other agree to care plan  -MJ      Patient Effort excellent  -MJ      Existing Precautions/Restrictions fall;spinal;other (see comments)   hemovac  -MJ      Recorded by [MJ] Marino Sorto, PT 18 7961      Row Name 18 2725             Cognitive Assessment/Intervention- PT/OT    Affect/Mental Status (Cognitive) WNL  -MJ      Orientation Status (Cognition) oriented x 4  -MJ      Follows Commands (Cognition) WNL  -MJ      Recorded by [MJ] Marino Sorto, PT 18 9381      Row Name 18 8089              Safety Issues, Functional Mobility    Impairments Affecting Function (Mobility) pain;strength  -MJ      Recorded by [MJ] Marino Sorto, PT 11/02/18 1451      Row Name 11/02/18 1348             Bed Mobility Assessment/Treatment    Comment (Bed Mobility) NT- pt UIC  -MJ      Recorded by [MJ] Marino Sorto, PT 11/02/18 1451      Row Name 11/02/18 1348             Transfer Assessment/Treatment    Transfer Assessment/Treatment sit-stand transfer;stand-sit transfer  -MJ      Comment (Transfers) Verbal cues for correct hand placement  -MJ      Recorded by [MJ] Marino Sorto, PT 11/02/18 1451      Row Name 11/02/18 1348             Sit-Stand Transfer    Sit-Stand [place] (Transfers) contact guard;verbal cues  -MJ      Assistive Device (Sit-Stand Transfers) walker, front-wheeled  -MJ      Recorded by [MJ] Marino Sorto, PT 11/02/18 1451      Row Name 11/02/18 1348             Stand-Sit Transfer    Stand-Sit [place] (Transfers) contact guard;verbal cues  -MJ      Assistive Device (Stand-Sit Transfers) walker, front-wheeled  -MJ      Recorded by [MJ] Marino Sorto, PT 11/02/18 1451      Row Name 11/02/18 1348             Gait/Stairs Assessment/Training    65945 - Gait Training Minutes  8  -MJ      [place] Level (Gait) contact guard;verbal cues  -MJ      Assistive Device (Gait) walker, front-wheeled  -MJ      Distance in Feet (Gait) 450  -MJ      Pattern (Gait) step-through  -MJ      Deviations/Abnormal Patterns (Gait) bilateral deviations;ana decreased;stride length decreased  -MJ      Bilateral Gait Deviations heel strike decreased;weight shift ability decreased  -MJ      [place] Level (Stairs) contact guard;verbal cues  -MJ      Handrail Location (Stairs) both sides  -MJ      Number of Steps (Stairs) 5  -MJ      Ascending Technique (Stairs) step-over-step  -MJ      Descending Technique (Stairs) step-over-step  -MJ      Stairs, Safety Issues sequencing ability decreased;weight-shifting ability decreased   -MJ      Stairs, Impairments strength decreased;pain  -MJ      Comment (Gait/Stairs) Pt demo step through gait pattern at slow pace. Cues for upright posture, to allow heel to strike at initial contact and to increase LE weight bearing, improvement noted. Pt performed stairs reciprocally. Cues to ascend with stronger leg and to descend with weaker leg  -MJ      Recorded by [MJ] Marino Sorto, PT 11/02/18 1451      Row Name 11/02/18 1348             Motor Skills Assessment/Interventions    Additional Documentation Therapeutic Exercise (Group);Therapeutic Exercise Interventions (Group)  -MJ      Recorded by [MJ] Marino Sorto, PT 11/02/18 1451      Row Name 11/02/18 1348             Therapeutic Exercise    80709 - PT Therapeutic Exercise Minutes 3  -MJ      Recorded by [MJ] Marino Sorto, PT 11/02/18 1451      Row Name 11/02/18 1345             Therapeutic Exercise    Lower Extremity (Therapeutic Exercise) gluteal sets;quad sets, bilateral  -MJ      Lower Extremity Range of Motion (Therapeutic Exercise) hip internal/external rotation, bilateral;ankle dorsiflexion/plantar flexion, bilateral  -MJ      Core Strength (Therapeutic Exercise) abdominal bracing  -MJ      Position (Therapeutic Exercise) supine  -MJ      Sets/Reps (Therapeutic Exercise) 10x each  -MJ      Comment (Therapeutic Exercise) Cues for technqiue  -MJ      Recorded by [MJ] Marino Sorto, PT 11/02/18 1451      Row Name 11/02/18 1349             Positioning and Restraints    Pre-Treatment Position sitting in chair/recliner  -MJ      Post Treatment Position chair  -MJ      In Chair notified nsg;reclined;call light within reach;encouraged to call for assist;exit alarm on;with family/caregiver  -MJ      Recorded by [MJ] Marino Sorto, PT 11/02/18 1451      Row Name 11/02/18 1340             Pain Scale: Numbers Pre/Post-Treatment    Pain Scale: Numbers, Pretreatment 7/10  -MJ      Pain Scale: Numbers, Post-Treatment 4/10  -MJ      Pain Location - Orientation lower   -MJ      Pain Location back  -MJ      Pain Intervention(s) Repositioned;Ambulation/increased activity  -MJ      Recorded by [MJ] Marino Sorto, PT 11/02/18 1451      Row Name                Wound 11/01/18 1029 Other (See comments) back incision    Wound - Properties Group Date first assessed: 11/01/18 [TK] Time first assessed: 1029 [TK] Side: Other (See comments) [TK] Location: back [TK] Type: incision [TK] Recorded by:  [TK] Tish Lincoln RN 11/01/18 1029    Row Name 11/02/18 1348             Plan of Care Review    Plan of Care Reviewed With patient;spouse  -MJ      Recorded by [MJ] Marino Sorto, PT 11/02/18 1451      Row Name 11/02/18 1348             Outcome Summary/Treatment Plan (PT)    Daily Summary of Progress (PT) progress toward functional goals is good  -MJ      Recorded by [SIMRAN] Marino Sorto, PT 11/02/18 1451        User Key  (r) = Recorded By, (t) = Taken By, (c) = Cosigned By    Initials Name Effective Dates Discipline    TK Tish Lincoln, RN 06/16/16 -  Nurse    Marino Castro, PT 04/03/18 -  PT          Wound 11/01/18 1029 Other (See comments) back incision (Active)   Dressing Appearance intact;dry 11/2/2018  8:20 AM   Drainage Amount none 11/2/2018  8:20 AM   Dressing Care, Wound dressing reinforced 11/1/2018  8:00 PM             Physical Therapy Education     Title: PT OT SLP Therapies (Done)     Topic: Physical Therapy (Done)     Point: Mobility training (Done)    Learning Progress Summary     Learner Status Readiness Method Response Comment Documented by    Patient Done Acceptance JAREN COLE,NR Reviewed back precautions, HEP, gait mechanics, stairs sequencing.  11/02/18 1348     Done Acceptance JAREN COLENR Edu re: spinal precautions, gait mechanics, benefits of mobility, log roll  11/01/18 1556    Family Done Acceptance JAREN COLE NR Edu re: spinal precautions, gait mechanics, benefits of mobility, log roll  11/01/18 1556    Significant Other Done Acceptance JAREN COLE NR Reviewed back precautions, HEP,  gait mechanics, stairs sequencing.  11/02/18 1348     Done Acceptance JAREN COLENR Edu re: spinal precautions, gait mechanics, benefits of mobility, log roll  11/01/18 1556          Point: Home exercise program (Done)    Learning Progress Summary     Learner Status Readiness Method Response Comment Documented by    Patient Done Acceptance JAREN COLENR Reviewed back precautions, HEP, gait mechanics, stairs sequencing.  11/02/18 1348    Significant Other Done Acceptance JAREN COLENR Reviewed back precautions, HEP, gait mechanics, stairs sequencing.  11/02/18 1348          Point: Body mechanics (Done)    Learning Progress Summary     Learner Status Readiness Method Response Comment Documented by    Patient Done Acceptance JAREN COLENR Reviewed back precautions, HEP, gait mechanics, stairs sequencing.  11/02/18 1348     Done Acceptance JAREN COLENR Edu re: spinal precautions, gait mechanics, benefits of mobility, log roll  11/01/18 1556    Family Done Acceptance JAREN COLENR Edu re: spinal precautions, gait mechanics, benefits of mobility, log roll  11/01/18 1556    Significant Other Done Acceptance JAREN COLENR Reviewed back precautions, HEP, gait mechanics, stairs sequencing.  11/02/18 1348     Done Acceptance JAREN COLENR Edu re: spinal precautions, gait mechanics, benefits of mobility, log roll  11/01/18 1556          Point: Precautions (Done)    Learning Progress Summary     Learner Status Readiness Method Response Comment Documented by    Patient Done Acceptance JAREN COLENR Reviewed back precautions, HEP, gait mechanics, stairs sequencing.  11/02/18 1348     Done Acceptance JAREN COLENR Edu re: spinal precautions, gait mechanics, benefits of mobility, log roll  11/01/18 1556    Family Done Acceptance EJARENNR Edu re: spinal precautions, gait mechanics, benefits of mobility, log roll  11/01/18 1556    Significant Other Done Acceptance JAREN COLENR Reviewed back precautions, HEP, gait mechanics, stairs sequencing.   11/02/18 1348     Done Acceptance E,D SKYE,NR Edu re: spinal precautions, gait mechanics, benefits of mobility, log roll  11/01/18 1556                      User Key     Initials Effective Dates Name Provider Type Discipline     04/03/18 -  Marino Sorto, PT Physical Therapist PT                    PT Recommendation and Plan  Anticipated Discharge Disposition (PT): home with assist, home with home health  Planned Therapy Interventions (PT Eval): balance training, bed mobility training, gait training, home exercise program, patient/family education, stair training, strengthening, transfer training  Therapy Frequency (PT Clinical Impression): daily  Outcome Summary/Treatment Plan (PT)  Daily Summary of Progress (PT): progress toward functional goals is good  Anticipated Equipment Needs at Discharge (PT): front wheeled walker, bedside commode  Anticipated Discharge Disposition (PT): home with assist, home with home health  Plan of Care Reviewed With: patient, spouse  Progress: improving  Outcome Summary: Pt increased gait distance to 450 feet with CGA and RW. Pt progressed to stair training x5 w/ 2 HR. Pt anticipates discharge home tomorrow with HHPT, will need to practice stairs w/ cane and/or HHA to mimic home environment          Outcome Measures     Row Name 11/02/18 1348 11/02/18 1300 11/01/18 1556       How much help from another person do you currently need...    Turning from your back to your side while in flat bed without using bedrails? 3  -MJ  -- 3  -MJ    Moving from lying on back to sitting on the side of a flat bed without bedrails? 3  -MJ  -- 3  -MJ    Moving to and from a bed to a chair (including a wheelchair)? 3  -MJ  -- 3  -MJ    Standing up from a chair using your arms (e.g., wheelchair, bedside chair)? 3  -MJ  -- 3  -MJ    Climbing 3-5 steps with a railing? 3  -MJ  -- 2  -MJ    To walk in hospital room? 3  -MJ  -- 3  -MJ    AM-PAC 6 Clicks Score 18  -MJ  -- 17  -MJ       How much help from another  is currently needed...    Putting on and taking off regular lower body clothing?  -- 3  -AR  --    Bathing (including washing, rinsing, and drying)  -- 3  -AR  --    Toileting (which includes using toilet bed pan or urinal)  -- 3  -AR  --    Putting on and taking off regular upper body clothing  -- 4  -AR  --    Taking care of personal grooming (such as brushing teeth)  -- 4  -AR  --    Eating meals  -- 4  -AR  --    Score  -- 21  -AR  --       Functional Assessment    Outcome Measure Options AM-PAC 6 Clicks Basic Mobility (PT)  -MJ AM-PAC 6 Clicks Daily Activity (OT)  -AR AM-PAC 6 Clicks Basic Mobility (PT)  -MJ      User Key  (r) = Recorded By, (t) = Taken By, (c) = Cosigned By    Initials Name Provider Type    Jumana Gallagher, OT Occupational Therapist    Marino Castro, PT Physical Therapist           Time Calculation:         PT Charges     Row Name 11/02/18 1348             Time Calculation    Start Time 1348  -MJ      PT Received On 11/02/18  -      PT Goal Re-Cert Due Date 11/11/18  -         Time Calculation- PT    Total Timed Code Minutes- PT 11 minute(s)  -MJ         Timed Charges    61037 - PT Therapeutic Exercise Minutes 3  -MJ      52221 - Gait Training Minutes  8  -MJ        User Key  (r) = Recorded By, (t) = Taken By, (c) = Cosigned By    Initials Name Provider Type    Marino Castro, PT Physical Therapist        Therapy Suggested Charges     Code   Minutes Charges    91834 (CPT®) Hc Pt Neuromusc Re Education Ea 15 Min      93343 (CPT®) Hc Pt Ther Proc Ea 15 Min 3     97223 (CPT®) Hc Gait Training Ea 15 Min 8 1    04963 (CPT®) Hc Pt Therapeutic Act Ea 15 Min      33870 (CPT®) Hc Pt Manual Therapy Ea 15 Min      12948 (CPT®) Hc Pt Iontophoresis Ea 15 Min      34924 (CPT®) Hc Pt Elec Stim Ea-Per 15 Min      59287 (CPT®) Hc Pt Ultrasound Ea 15 Min      33093 (CPT®) Hc Pt Self Care/Mgmt/Train Ea 15 Min      53411 (CPT®) Hc Pt Prosthetic (S) Train Initial Encounter, Each 15 Min      92936  (CPT®) Hc Pt Orthotic(S)/Prosthetic(S) Encounter, Each 15 Min      82012 (CPT®) Hc Orthotic(S) Mgmt/Train Initial Encounter, Each 15min      Total  11 1        Therapy Charges for Today     Code Description Service Date Service Provider Modifiers Qty    32279146144 HC PT EVAL LOW COMPLEXITY 2 11/1/2018 Marino Sorto, PT GP 1    76647040280 HC GAIT TRAINING EA 15 MIN 11/1/2018 Marino Sorto, PT GP 1    29512071735 HC PT THER SUPP EA 15 MIN 11/1/2018 Marino Sorto, PT GP 2    92969599131 HC GAIT TRAINING EA 15 MIN 11/2/2018 Marino Sorto, PT GP 1          PT G-Codes  Outcome Measure Options: AM-PAC 6 Clicks Basic Mobility (PT)  AM-PAC 6 Clicks Score: 18  Score: 21    Marino Sorto PT  11/2/2018

## 2018-11-02 NOTE — THERAPY EVALUATION
Acute Care - Occupational Therapy Initial Evaluation  [facility]     Patient Name: Derek Kelsey  : 1950  MRN: [id number]  Today's Date: 2018  Onset of Illness/Injury or Date of Surgery: 18  Date of Referral to OT: 18  Referring Physician: Dr. Franklin    Admit Date: 2018       ICD-10-CM ICD-9-CM   1. Impaired functional mobility, balance, gait, and endurance Z74.09 V49.89   2. S/P lumbar fusion Z98.1 V45.4   3. Impaired mobility and ADLs Z74.09 799.89     Patient Active Problem List   Diagnosis   • Anxiety   • Abdominal aortic aneurysm (CMS/[facility])   • Depression   • Hypertension   • Cataract, bilateral   • CAD (coronary artery disease)   • Abdominal aortic aneurysm (AAA) without rupture (CMS/[facility])   • Abnormal stress test   • Moderate COPD   • Former smoker   • Coal workers pneumoconiosis, simple   • Back pain   • S/P lumbar fusion   • Prediabetes   • Acute blood loss anemia, mild, asymptomatic   • Acute postoperative pain   • Hyponatremia, mild     Past Medical History:   Diagnosis Date   • Abdominal aortic aneurysm (CMS/HCC) 2016   • Anxiety 2016   • Arthritis    • CAD (coronary artery disease) 2016   • Cataract, bilateral 2016   • COPD (chronic obstructive pulmonary disease) (CMS/[facility]) 2016   • Depression 2016   • Depression    • GERD (gastroesophageal reflux disease)    • Hypertension 2016   • Wears dentures      Past Surgical History:   Procedure Laterality Date   • CARDIAC CATHETERIZATION     • CARDIAC CATHETERIZATION N/A 2018    Procedure: Left Heart Cath;  Surgeon: Douglas Galvez MD;  Location:  [facility] CATH INVASIVE LOCATION;  Service: Cardiovascular   • COLONOSCOPY     • HAND SURGERY     • LUMBAR DISCECTOMY FUSION INSTRUMENTATION N/A 2018    Procedure: LUMBAR DISCECTOMY POSTERIOR WITH FUSION INSTRUMENTATION;  Surgeon: Joo Franklin MD;  Location:  [facility];  Service: Orthopedic Spine   • SHOULDER SURGERY Left           OT  ASSESSMENT FLOWSHEET (last 72 hours)      Occupational Therapy Evaluation     Row Name 11/02/18 1300                   OT Evaluation Time/Intention    Subjective Information no complaints  -AR        Document Type evaluation  -AR        Mode of Treatment occupational therapy  -AR        Patient Effort excellent  -AR        Symptoms Noted During/After Treatment none  -AR           General Information    Patient Profile Reviewed? yes  -AR        Onset of Illness/Injury or Date of Surgery 11/01/18  -AR        Referring Physician Dr. Franklin  -AR        Patient Observations alert;cooperative;agree to therapy  -AR        General Observations of Patient pt up in chair, spouse at bedside  -AR        Prior Level of Function min assist:;all household mobility;community mobility;gait;transfer;ADL's   limited by pain and weakness  -AR        Equipment Currently Used at Home shower chair;raised toilet  -AR        Pertinent History of Current Functional Problem Pt is a 68 yom who presents for surgical management of progressive back pain that failed to improve with conservative measures. Prior to admission, pt reports limited mobility, difficulty with LB ADLs and sleep was impaired by pain.   -AR        Existing Precautions/Restrictions fall;spinal   hemovac  -AR        Equipment Issued to Patient --   foam block, reacher, sock aide  -AR        Risks Reviewed patient and family:;LOB;nausea/vomiting;dizziness;increased discomfort;change in vital signs;increased drainage;lines disloged  -AR        Benefits Reviewed patient and family:;improve function;increase independence;increase strength;increase balance;decrease pain;decrease risk of DVT;increase knowledge  -AR        Barriers to Rehab none identified  -AR           Relationship/Environment    Primary Source of Support/Comfort spouse;child(renato)  -AR        Lives With spouse  -AR        Concerns About Impact on Relationships Wife is available to assist as needed  -AR            Resource/Environmental Concerns    Current Living Arrangements home/apartment/condo  -AR        Resource/Environmental Concerns home accessibility  -AR        Home Accessibility Concerns stairs to enter home   walk-in shower  -AR        Transportation Concerns car, none  -AR           Home Main Entrance    Number of Stairs, Main Entrance seven  -AR        Stair Railings, Main Entrance none  -AR           Cognitive Assessment/Interventions    Additional Documentation Cognitive Assessment/Intervention (Group)  -AR           Cognitive Assessment/Intervention- PT/OT    Affect/Mental Status (Cognitive) WNL  -AR        Orientation Status (Cognition) oriented x 4  -AR        Follows Commands (Cognition) WNL  -AR        Cognitive Function (Cognitive) WNL  -AR        Safety Deficit (Cognitive) mild deficit;safety precautions follow-through/compliance  -AR        Personal Safety Interventions fall prevention program maintained  -AR           Safety Issues, Functional Mobility    Impairments Affecting Function (Mobility) pain;balance;strength   spinal precautions  -AR           Functional Mobility    Functional Mobility- Ind. Level supervision required;verbal cues required  -AR        Functional Mobility- Device rolling walker  -AR        Functional Mobility-Distance (Feet) 15  -AR           Transfer Assessment/Treatment    Transfer Assessment/Treatment sit-stand transfer;stand-sit transfer;toilet transfer  -AR        Comment (Transfers) Cues for placement of walker and chair/commode approach. Educated pt and spouse on safe car transfer technique.   -AR           Sit-Stand Transfer    Sit-Stand [place] (Transfers) verbal cues;supervision  -AR        Assistive Device (Sit-Stand Transfers) walker, front-wheeled  -AR           Stand-Sit Transfer    Stand-Sit [place] (Transfers) verbal cues;supervision  -AR        Assistive Device (Stand-Sit Transfers) walker, front-wheeled  -AR           Toilet Transfer    Type (Toilet  Transfer) lateral  -AR        [place] Level (Toilet Transfer) supervision;verbal cues  -AR           ADL Assessment/Intervention    92040 - OT Self Care/Mgmt Minutes 10  -AR        BADL Assessment/Intervention lower body dressing;bathing;toileting;grooming  -AR           Bathing Assessment/Intervention    Bathing [place] Level lower body;bathing skills  -AR        Comment (Bathing) Educated pt on spinal precautions and incorporation into LBB. Pt would benefit from  sponge, none available to issue at present. Educated pt and spouse on places to obtain and issued  sponge.   -AR           Lower Body Dressing Assessment/Training    Lower Body Dressing [place] Level doff;don;socks;contact guard assist;verbal cues  -AR        Assistive Devices (Lower Body Dressing) reacher;sock-aid  -AR        Lower Body Dressing Position supported sitting  -AR        Comment (Lower Body Dressing) Educated pt and spouse on spinal precautions and incorporation into LB ADLs. Pt unable to don/doff left sock without AE. Issued reacher and sock aide and educated pt on use as well as safe poistioning to complete.  Deferred  shoe horn and elastic laces as pt states he wears slip on shoes.   -AR           Grooming Assessment/Training    [place] Level (Grooming) wash face, hands;verbal cues;supervision  -AR        Grooming Position supported standing  -AR        Comment (Grooming) Cues for safe placement of walker at sinkside and cues to avoid twisting.   -AR           Toileting Assessment/Training    [place] Level (Toileting) supervision;verbal cues  -AR        Assistive Devices (Toileting) urinal  -AR        Toileting Position unsupported standing  -AR           BADL Safety/Performance    Skilled BADL Treatment/Intervention adaptive equipment training;BADL process/adaptation training  -AR        Progress in BADL Status improvement noted  -AR           General ROM    GENERAL ROM COMMENTS WFL BUE  -AR           MMT  (Manual Muscle Testing)    General MMT Comments Deferred formal MMT, intact grossly for ADLs  -AR           Motor Assessment/Interventions    Additional Documentation Balance (Group);Balance Interventions (Group);Fine Motor Testing & Training (Group);Gross Motor Coordination (Group)  -AR           Gross Motor Coordination    Gross Motor Impairments finger to nose  -AR        Gross Motor Skill, Impairments Detail intact bilat  -AR           Balance    Balance static sitting balance;static standing balance  -AR           Static Sitting Balance    Level of [place] (Unsupported Sitting, Static Balance) independent  -AR        Sitting Position (Unsupported Sitting, Static Balance) sitting in chair  -AR           Static Standing Balance    Level of [place] (Supported Standing, Static Balance) supervision  -AR        Time Able to Maintain Position (Supported Standing, Static Balance) 1 to 2 minutes  -AR           Fine Motor Testing & Training    Comment, Fine Motor Coordination B opposition intact  -AR           Sensory Assessment/Intervention    Sensory General Assessment other (see comments)   numbness R hand, FMC/opposition intact  -AR           Light Touch Sensation Assessment    Comment, Right Upper Extremity: Light Touch Sensation Assessment --   issued foam block for right hand numbness  -AR           Positioning and Restraints    Pre-Treatment Position sitting in chair/recliner  -AR        Post Treatment Position chair  -AR        In Chair notified nsg;sitting;call light within reach;encouraged to call for assist;exit alarm on;with family/caregiver  -AR           Pain Assessment    Additional Documentation Pain Scale: Numbers Pre/Post-Treatment (Group)  -AR           Pain Scale: Numbers Pre/Post-Treatment    Pain Scale: Numbers, Pretreatment 7/10  -AR        Pain Scale: Numbers, Post-Treatment 7/10  -AR        Pain Location - Orientation lower  -AR        Pain Location back  -AR        Pain  Intervention(s) Medication (See MAR);Repositioned;Ambulation/increased activity  -AR           Wound 11/01/18 1029 Other (See comments) back incision    Wound - Properties Group Date first assessed: 11/01/18  -TK Time first assessed: 1029  -TK Side: Other (See comments)  -TK Location: back  -TK Type: incision  -TK       Plan of Care Review    Plan of Care Reviewed With patient;spouse  -AR           Clinical Impression (OT)    Date of Referral to OT 11/01/18  -AR        OT Diagnosis decreased independence with ADLS  -AR        Patient/Family Goals Statement (OT Eval) return home, decrease pain  -AR        Criteria for Skilled Therapeutic Interventions Met (OT Eval) yes;treatment indicated  -AR        Rehab Potential (OT Eval) good, to achieve stated therapy goals  -AR        Therapy Frequency (OT Eval) daily  -AR        Care Plan Review (OT) evaluation/treatment results reviewed;care plan/treatment goals reviewed;risks/benefits reviewed;current/potential barriers reviewed;patient/other agree to care plan  -AR        Anticipated Discharge Disposition (OT) home with assist  -AR           OT Goals    Bed Mobility Goal Selection (OT) bed mobility, OT goal 1  -AR        Transfer Goal Selection (OT) transfer, OT goal 1  -AR        Dressing Goal Selection (OT) dressing, OT goal 1  -AR           Bed Mobility Goal 1 (OT)    Activity/Assistive Device (Bed Mobility Goal 1, OT) sit to supine   via logroll technique  -AR        [place] Level/Cues Needed (Bed Mobility Goal 1, OT) verbal cues required;supervision required  -AR        Time Frame (Bed Mobility Goal 1, OT) short term goal (STG);3 days  -AR        Progress/Outcomes (Bed Mobility Goal 1, OT) goal ongoing  -AR           Transfer Goal 1 (OT)    Activity/Assistive Device (Transfer Goal 1, OT) sit-to-stand/stand-to-sit;toilet;walker, rolling;commode, 3-in-1  -AR        [place] Level/Cues Needed (Transfer Goal 1, OT) conditional independence  -AR        Time Frame  (Transfer Goal 1, OT) short term goal (STG);3 days  -AR        Progress/Outcome (Transfer Goal 1, OT) goal ongoing  -AR           Dressing Goal 1 (OT)    Activity/Assistive Device (Dressing Goal 1, OT) lower body dressing;reacher;sock-aid  -AR        [place]/Cues Needed (Dressing Goal 1, OT) conditional independence  -AR        Time Frame (Dressing Goal 1, OT) short term goal (STG);3 days  -AR        Progress/Outcome (Dressing Goal 1, OT) goal ongoing  -AR           Living Environment    Home Accessibility stairs to enter home  -AR          User Key  (r) = Recorded By, (t) = Taken By, (c) = Cosigned By    Initials Name Effective Dates    AR Jumana Vidales OT 06/22/15 -     Tish Izquierdo RN 06/16/16 -            Occupational Therapy Education     Title: PT OT SLP Therapies (Active)     Topic: Occupational Therapy (Done)     Point: ADL training (Done)     Description: Instruct learner(s) on proper safety adaptation and remediation techniques during self care or transfers.   Instruct in proper use of assistive devices.   Learning Progress Summary     Learner Status Readiness Method Response Comment Documented by    Patient Done COLLEEN Waters D,H VU,NR  AR 11/02/18 1300    Family Done COLLEEN Waters,JAREN,H VU,NR  AR 11/02/18 1300          Point: Home exercise program (Done)     Description: Instruct learner(s) on appropriate technique for monitoring, assisting and/or progressing therapeutic exercises/activities.   Learning Progress Summary     Learner Status Readiness Method Response Comment Documented by    Patient Done COLLEEN Waters,JAREN,H VU,NR  AR 11/02/18 1300    Family Done COLLEEN Waters D,H VU,NR  AR 11/02/18 1300          Point: Precautions (Done)     Description: Instruct learner(s) on prescribed precautions during self-care and functional transfers.   Learning Progress Summary     Learner Status Readiness Method Response Comment Documented by    Patient Done COLLEEN Waters,JAREN,H VU,NR  AR 11/02/18 1300    Family Done  COLLEEN Waters DH PAULINA CHEUNG 11/02/18 1300          Point: Body mechanics (Done)     Description: Instruct learner(s) on proper positioning and spine alignment during self-care, functional mobility activities and/or exercises.   Learning Progress Summary     Learner Status Readiness Method Response Comment Documented by    Patient Done COLLEEN Waters D,H PAULINA CHEUNG 11/02/18 1300    Family Done COLLEEN Waters D,PAULINA GAINES 11/02/18 1300                      User Key     Initials Effective Dates Name Provider Type Discipline    AR 06/22/15 -  Jumana Vidaels, OT Occupational Therapist OT                  OT Recommendation and Plan  Outcome Summary/Treatment Plan (OT)  Anticipated Discharge Disposition (OT): home with assist  Therapy Frequency (OT Eval): daily  Plan of Care Review  Plan of Care Reviewed With: patient, spouse  Plan of Care Reviewed With: patient, spouse  Outcome Summary: Edcuated pt and spouse on spinal precautions and incorporation into ADL routine. Issued AE and educated pt on use. Recommend DC home with family assist when medically appropriate.           Outcome Measures     Row Name 11/02/18 1300 11/01/18 1556          How much help from another person do you currently need...    Turning from your back to your side while in flat bed without using bedrails?  -- 3  -MJ     Moving from lying on back to sitting on the side of a flat bed without bedrails?  -- 3  -MJ     Moving to and from a bed to a chair (including a wheelchair)?  -- 3  -MJ     Standing up from a chair using your arms (e.g., wheelchair, bedside chair)?  -- 3  -MJ     Climbing 3-5 steps with a railing?  -- 2  -MJ     To walk in hospital room?  -- 3  -MJ     AM-PAC 6 Clicks Score  -- 17  -MJ        How much help from another is currently needed...    Putting on and taking off regular lower body clothing? 3  -AR  --     Bathing (including washing, rinsing, and drying) 3  -AR  --     Toileting (which includes using toilet bed pan or urinal) 3  -AR   --     Putting on and taking off regular upper body clothing 4  -AR  --     Taking care of personal grooming (such as brushing teeth) 4  -AR  --     Eating meals 4  -AR  --     Score 21  -AR  --        Functional Assessment    Outcome Measure Options AM-PAC 6 Clicks Daily Activity (OT)  -AR AM-PAC 6 Clicks Basic Mobility (PT)  -MJ       User Key  (r) = Recorded By, (t) = Taken By, (c) = Cosigned By    Initials Name Provider Type    Jumana Gallagher OT Occupational Therapist    Marino Castro, PT Physical Therapist          Time Calculation:         Time Calculation- OT     Row Name 11/02/18 1422 11/02/18 1300          Time Calculation- OT    OT Start Time  -- 1300  -AR     Total Timed Code Minutes- OT 10 minute(s)  -AR  --     OT Received On  -- 11/02/18  -AR     OT Goal Re-Cert Due Date  -- 11/12/18  -AR        Timed Charges    08359 - OT Self Care/Mgmt Minutes  -- 10  -AR       User Key  (r) = Recorded By, (t) = Taken By, (c) = Cosigned By    Initials Name Provider Type    Jumana Gallagher OT Occupational Therapist        Therapy Suggested Charges     Code   Minutes Charges    08062 (CPT®) Hc Ot Neuromusc Re Education Ea 15 Min      18620 (CPT®) Hc Ot Ther Proc Ea 15 Min      75573 (CPT®) Hc Ot Therapeutic Act Ea 15 Min      49630 (CPT®) Hc Ot Manual Therapy Ea 15 Min      00688 (CPT®) Hc Ot Iontophoresis Ea 15 Min      98905 (CPT®) Hc Ot Elec Stim Ea-Per 15 Min      21138 (CPT®) Hc Ot Ultrasound Ea 15 Min      23870 (CPT®) Hc Ot Self Care/Mgmt/Train Ea 15 Min 10 1    Total  10 1        Therapy Charges for Today     Code Description Service Date Service Provider Modifiers Qty    29207705690 HC OT SELF CARE/MGMT/TRAIN EA 15 MIN 11/2/2018 Jumana Vidales OT GO 1    94392096945 HC OT EVAL MOD COMPLEXITY 3 11/2/2018 Jumana Vidales OT GO 1               Jumana Vidales OT  11/2/2018

## 2018-11-02 NOTE — PLAN OF CARE
Problem: Patient Care Overview  Goal: Plan of Care Review  Outcome: Ongoing (interventions implemented as appropriate)   11/02/18 2449   Coping/Psychosocial   Plan of Care Reviewed With patient;spouse   Plan of Care Review   Progress improving   OTHER   Outcome Summary pt ambulating well in hallway; vss-bp has been on low side-held bp meds until recovered more to baseline; alert and oriented; numbness improving in right hand; Hemovac drain with 100 ml output today.      Goal: Individualization and Mutuality  Outcome: Ongoing (interventions implemented as appropriate)    Goal: Discharge Needs Assessment  Outcome: Ongoing (interventions implemented as appropriate)    Goal: Interprofessional Rounds/Family Conf  Outcome: Ongoing (interventions implemented as appropriate)      Problem: Laminectomy/Foraminotomy/Discectomy (Adult)  Goal: Signs and Symptoms of Listed Potential Problems Will be Absent, Minimized or Managed (Laminectomy/Foraminotomy/Discectomy)  Outcome: Ongoing (interventions implemented as appropriate)    Goal: Anesthesia/Sedation Recovery  Outcome: Ongoing (interventions implemented as appropriate)      Problem: Fall Risk (Adult)  Goal: Identify Related Risk Factors and Signs and Symptoms  Outcome: Ongoing (interventions implemented as appropriate)    Goal: Absence of Fall  Outcome: Ongoing (interventions implemented as appropriate)

## 2018-11-02 NOTE — PLAN OF CARE
Problem: Patient Care Overview  Goal: Plan of Care Review  Outcome: Ongoing (interventions implemented as appropriate)   11/02/18 4942   Coping/Psychosocial   Plan of Care Reviewed With patient;spouse   Plan of Care Review   Progress improving   OTHER   Outcome Summary rested well this shift with minimal co pain. up with assist in room gait unsteady. vioiding without difficulty. all bp meds held up to this point due to low bp's. denies dizziness or lightheadedness or sob. dressing intact but stained. . right hand cont with numbness and tingling.

## 2018-11-02 NOTE — PROGRESS NOTES
"IM progress note      Derek Kelsey  [id number]  1950     LOS: 1 day     Attending: Joo Franklin MD    Primary Care Provider: Raudel Zheng MD      Chief Complaint/Reason for visit:  Low back pain    Subjective   Doing well. Good pain control. Mild numbness and tingling BLE. Mild SOB, at baseline. Denies f/c/n/v/cp.  ( good progress. Good pain control)wy  Objective     Vital Signs  Visit Vitals  /54   Pulse 69   Temp 97.9 °F (36.6 °C) (Oral)   Resp 16   Ht 188 cm (74.02\")   Wt 102 kg (225 lb 5 oz)   SpO2 97%   BMI 28.92 kg/m²     Temp (24hrs), Av.2 °F (36.2 °C), Min:97 °F (36.1 °C), Max:97.9 °F (36.6 °C)      Nutrition: PO    Respiratory: RA    Physical Therapy: () Pt ambulated 110 feet with CGAx2 and RW, limited by fatigue. PT will follow to increase strength and progress functional mobility with back precautions. Plan is home with HHPT at discharge    Physical Exam:     General Appearance:    Alert, cooperative, in no acute distress   Head:    Normocephalic, without obvious abnormality, atraumatic    Lungs:     Normal effort, symmetric chest rise, no crepitus, clear to      auscultation bilaterally             Heart:    Regular rhythm and normal rate, normal S1 and S2   Abdomen:     Normal bowel sounds, no masses, no organomegaly, soft        non-tender, non-distended, no guarding, no rebound                tenderness   Extremities:   No clubbing, cyanosis or edema.  No deformities.    Pulses:   Pulses palpable and equal bilaterally   Skin:   No bleeding, bruising or rash. Back dressing CDI. HV present   Neurologic:   Moves all extremities with no obvious focal motor deficit.  Cranial nerves 2 - 12 grossly intact. Flexion and dorsiflexion intact bilateral feet.       Results Review:     I reviewed the patient's new clinical results.     Results from last 7 days  Lab Units 18  0624 10/31/18  1314   WBC 10*3/mm3 10.34 8.73   HEMOGLOBIN g/dL 11.9* 15.1   HEMATOCRIT % 37.8* 47.0 "   PLATELETS 10*3/mm3 151 209       Results from last 7 days  Lab Units 11/02/18  0624 10/31/18  1314   SODIUM mmol/L 131*  --    POTASSIUM mmol/L 4.3 4.2   CHLORIDE mmol/L 99  --    CO2 mmol/L 24.0  --    BUN mg/dL 22  --    CREATININE mg/dL 1.09  --    CALCIUM mg/dL 8.5*  --    GLUCOSE mg/dL 114*  --      I reviewed the patient's new imaging including images and reports.    All medications reviewed.     amLODIPine 5 mg Oral BID   citalopram 20 mg Oral Daily   famotidine 20 mg Intravenous Q12H   Or      famotidine 20 mg Oral Q12H   Fluticasone-Umeclidin-Vilant  Inhalation BID - RT   hydrALAZINE 50 mg Oral Q8H   insulin lispro 0-7 Units Subcutaneous 4x Daily With Meals & Nightly   lisinopril 40 mg Oral BID   metoprolol succinate XL 50 mg Oral Daily   pantoprazole 40 mg Oral Q AM   sodium chloride 3 mL Intravenous Q12H   valsartan 160 mg Oral Daily       Assessment/Plan     S/P lumbar fusion    Back pain    Hypertension    CAD (coronary artery disease)    Moderate COPD    Prediabetes    Acute blood loss anemia, mild, asymptomatic    Acute postoperative pain    Hyponatremia, mild      Plan  1. PT- ambulate  2. Pain control-prns   3. IS-encouraged  4. DVT proph- [facility]  5. Bowel regimen  6. Monitor post-op labs  7. DC planning for home, likely tomorrow  DC tomorrow, per Dr. Franklin    HTN, CAD  - Continue home norvasc, hydralazine, lisinopril, toprol, and diovan  - Monitor BP   - Holding parameters for BP meds  - Labetalol PRN for SBP>170     PreDM  - hgb A1c on 10/31/18 6.3  - Accuchecks ACHS with low dose SSI     COPD  - Monitor O2 sats  - continue inhaler (may use inhaler from home)      LYNDA Quinones  11/02/18  11:42 AM     I have personally performed the evaluation on this patient. My history is consistent  with HPI obtained. My exam findings are listed above. I have personally reviewed and discussed the above formulated treatment plan with Cherrie

## 2018-11-02 NOTE — PROGRESS NOTES
"Ortho Spine Progress Note     LOS: 1 day   Patient Care Team:  Raudel Zheng MD as PCP - General  Joo Franklin MD as Consulting Physician (Orthopedic Surgery)  Leopoldo Burleson MD as Surgeon (Cardiothoracic Surgery)  Douglas Galvez MD as Consulting Physician (Cardiology)    Subjective: No leg pain. Usual back pain. Some numb right  hand but better today.    Objective:   Vital Signs:  /54   Pulse 69   Temp 97.9 °F (36.6 °C) (Oral)   Resp 16   Ht 188 cm (74.02\")   Wt 102 kg (225 lb 5 oz)   SpO2 97%   BMI 28.92 kg/m²     Labs:  Lab Results (last 24 hours)     Procedure Component Value Units Date/Time    Basic Metabolic Panel [[id number]]  (Abnormal) Collected:  11/02/18 0624    Specimen:  Blood Updated:  11/02/18 0751     Glucose 114 (H) mg/dL      BUN 22 mg/dL      Creatinine 1.09 mg/dL      Sodium 131 (L) mmol/L      Potassium 4.3 mmol/L      Chloride 99 mmol/L      CO2 24.0 mmol/L      Calcium 8.5 (L) mg/dL      eGFR Non African Amer 68 mL/min/1.73      BUN/Creatinine Ratio 20.2     Anion Gap 8.0 mmol/L     Narrative:       National Kidney Foundation Guidelines    Stage     Description        GFR  1         Normal or High     90+  2         Mild decrease      60-89  3         Moderate decrease  30-59  4         Severe decrease    15-29  5         Kidney failure     <15    The MDRD GFR formula is only valid for adults with stable renal function between ages 18 and 70.    CBC (No Diff) [[id number]]  (Abnormal) Collected:  11/02/18 0624    Specimen:  Blood Updated:  11/02/18 0732     WBC 10.34 10*3/mm3      RBC 3.87 (L) 10*6/mm3      Hemoglobin 11.9 (L) g/dL      Hematocrit 37.8 (L) %      MCV 97.7 fL      MCH 30.7 pg      MCHC 31.5 (L) g/dL      RDW 13.9 %      RDW-SD 49.6 fl      MPV 10.9 fL      Platelets 151 10*3/mm3     POC Glucose Once [[id number]]  (Abnormal) Collected:  11/02/18 0642    Specimen:  Blood Updated:  11/02/18 0646     Glucose 136 (H) mg/dL     Narrative:       Meter: " [id number] : [id number] Dimas Krishna    POC Glucose Once [[id number]]  (Abnormal) Collected:  11/01/18 2104    Specimen:  Blood Updated:  11/01/18 2107     Glucose 163 (H) mg/dL     Narrative:       Meter: [id number] : [id number] Cristiano Martínez    POC Glucose Once [[id number]]  (Abnormal) Collected:  11/01/18 1624    Specimen:  Blood Updated:  11/01/18 1627     Glucose 144 (H) mg/dL     Narrative:       Meter: [id number] : [id number] Clay Lazar          Awake,alert,oriented. Motor intact.    Assessment/Plan: Anticipate home Sat. F/U care and restrictions DW pt. Rx for Percocet7.5, #50, on chart for home. KALI farrell me 3-4 weeks.    Joo Franklin MD  11/02/18  10:04 AM

## 2018-11-02 NOTE — PLAN OF CARE
Problem: Patient Care Overview  Goal: Plan of Care Review  Outcome: Ongoing (interventions implemented as appropriate)   11/02/18 1300   Coping/Psychosocial   Plan of Care Reviewed With patient;spouse   Plan of Care Review   Progress improving   OTHER   Outcome Summary Edcuated pt and spouse on spinal precautions and incorporation into ADL routine. Issued AE and educated pt on use. Recommend DC home with family assist when medically appropriate.        Problem: Laminectomy/Foraminotomy/Discectomy (Adult)  Goal: Signs and Symptoms of Listed Potential Problems Will be Absent, Minimized or Managed (Laminectomy/Foraminotomy/Discectomy)  Outcome: Ongoing (interventions implemented as appropriate)   11/02/18 1300   Goal/Outcome Evaluation   Problems Assessed (Laminectomy/Laminotomy/Discectomy) functional decline/self-care deficit   Problems Present (Laminectomy/otomy) functional decline/self-care deficit

## 2018-11-02 NOTE — PROGRESS NOTES
Discharge Planning Assessment  [facility]     Patient Name: Derek eKlsey  MRN: [id number]  Today's Date: 11/2/2018    Admit Date: 11/1/2018          Discharge Needs Assessment     Row Name 11/02/18 1141       Living Environment    Lives With spouse    Name(s) of Who Lives With Patient Wife:  Mago, ph [phone]    Current Living Arrangements home/apartment/condo    Family Caregiver if Needed spouse    Quality of Family Relationships helpful;involved;supportive    Able to Return to Prior Arrangements yes    Living Arrangement Comments Mr. Kelsey lives with his wife in a one story home, 2 steps to enter, in [facility].  Mrs. Kelsey stated that she is available to assist her  at home as needed.       Resource/Environmental Concerns    Transportation Concerns car, none       Transition Planning    Patient/Family Anticipates Transition to home with family    Transportation Anticipated family or friend will provide       Discharge Needs Assessment    Equipment Currently Used at Home --   Raised toilet and built in shower chair.    Equipment Needed After Discharge walker, rolling    Outpatient/Agency/Support Group Needs homecare agency    Discharge Facility/Level of Care Needs home with home health    Offered/Gave Vendor List yes    Patient's Choice of Community Agency(s) [facility]            Discharge Plan     Row Name 11/02/18 1143       Plan    Plan Home with wife's assistance and [facility]    Patient/Family in Agreement with Plan yes    Plan Comments Met with Mr. Kelsey and his wife, Mago, at the bedside for discharge planning.  Mr. Kelsey is ambulating with a rolling walker.  He requested a rolling walker for home use and did not have preference for provider.  Contacted [facility] and they are delivering the walker to the hospital room today.  Discussed home health and Mr. Kelsey requested [facility].  Referred Mr. Kelsey to Barb  at [facility] and faxed orders for PT and staple removal.  No other needs identified.  Mr. Kelsey will be transported home by his wife at discharge.   CM will continue to follow.    Final Discharge Disposition Code 06 - home with home health care        Destination     No service coordination in this encounter.      Durable Medical Equipment - Selection Complete     Service Request Status Selected Specialties Address Phone Number Fax Number    [facility] Selected DME Services [address] [address] [phone]      Dialysis/Infusion     No service coordination in this encounter.      Home Medical Care - Selection Complete     Service Request Status Selected Specialties Address Phone Number Fax Number    [facility] Selected Home Health Services [address] [facility] [address] [phone]      Social Care     No service coordination in this encounter.        Expected Discharge Date and Time     Expected Discharge Date Expected Discharge Time    Nov 3, 2018               Demographic Summary     Row Name 11/02/18 1138       General Information    Admission Type inpatient    Arrived From home    Reason for Consult discharge planning    General Information Comments PCP:  Raudel Zheng       Contact Information    Permission Granted to Share Info With     Contact Information Comments Wife:  Mago, ph [phone]            Functional Status     Row Name 11/02/18 1139       Functional Status    Usual Activity Tolerance good    Current Activity Tolerance --   See PT notes.       Functional Status, IADL    Medications independent    Meal Preparation independent    Housekeeping independent    Laundry independent    Shopping independent       Mental Status    General Appearance WDL WDL            Psychosocial    No documentation.           Abuse/Neglect    No documentation.           Legal     Row Name  11/02/18 1140       Financial/Legal    Who Manages Finances if Patient Unable No advance directives.    Finance Comments Mr. Kelsey has prescription drug coverage with [employer].  Verified insurance with patient.            Substance Abuse    No documentation.           Patient Forms    No documentation.         Zoe Culver

## 2018-11-03 VITALS
HEIGHT: 74 IN | BODY MASS INDEX: 28.92 KG/M2 | OXYGEN SATURATION: 95 % | HEART RATE: 72 BPM | DIASTOLIC BLOOD PRESSURE: 64 MMHG | WEIGHT: 225.31 LBS | SYSTOLIC BLOOD PRESSURE: 117 MMHG | RESPIRATION RATE: 16 BRPM | TEMPERATURE: 98.3 F

## 2018-11-03 LAB
GLUCOSE BLDC GLUCOMTR-MCNC: 114 MG/DL (ref 70–130)
GLUCOSE BLDC GLUCOMTR-MCNC: 136 MG/DL (ref 70–130)

## 2018-11-03 PROCEDURE — 97116 GAIT TRAINING THERAPY: CPT

## 2018-11-03 PROCEDURE — 97110 THERAPEUTIC EXERCISES: CPT

## 2018-11-03 PROCEDURE — 82962 GLUCOSE BLOOD TEST: CPT

## 2018-11-03 RX ORDER — OXYCODONE AND ACETAMINOPHEN 7.5; 325 MG/1; MG/1
1 TABLET ORAL EVERY 4 HOURS PRN
Start: 2018-11-03 | End: 2018-11-11

## 2018-11-03 RX ADMIN — VALSARTAN 160 MG: 160 TABLET, FILM COATED ORAL at 08:39

## 2018-11-03 RX ADMIN — LISINOPRIL 40 MG: 40 TABLET ORAL at 08:38

## 2018-11-03 RX ADMIN — OXYCODONE HYDROCHLORIDE AND ACETAMINOPHEN 1 TABLET: 7.5; 325 TABLET ORAL at 06:59

## 2018-11-03 RX ADMIN — AMLODIPINE BESYLATE 5 MG: 5 TABLET ORAL at 08:36

## 2018-11-03 RX ADMIN — FAMOTIDINE 20 MG: 20 TABLET ORAL at 08:37

## 2018-11-03 RX ADMIN — METOPROLOL SUCCINATE 50 MG: 50 TABLET, EXTENDED RELEASE ORAL at 08:38

## 2018-11-03 RX ADMIN — BISACODYL 5 MG: 5 TABLET, COATED ORAL at 11:28

## 2018-11-03 RX ADMIN — CITALOPRAM HYDROBROMIDE 20 MG: 20 TABLET ORAL at 08:36

## 2018-11-03 RX ADMIN — Medication 3 ML: at 08:38

## 2018-11-03 RX ADMIN — OXYCODONE HYDROCHLORIDE AND ACETAMINOPHEN 1 TABLET: 7.5; 325 TABLET ORAL at 13:48

## 2018-11-03 RX ADMIN — PANTOPRAZOLE SODIUM 40 MG: 40 TABLET, DELAYED RELEASE ORAL at 05:56

## 2018-11-03 RX ADMIN — OXYCODONE HYDROCHLORIDE AND ACETAMINOPHEN 1 TABLET: 7.5; 325 TABLET ORAL at 02:32

## 2018-11-03 NOTE — DISCHARGE SUMMARY
"Patient Name: Derek Kelsey  MRN: [id number]  : 1950  DOS: 11/3/2018    Attending: Joo Franklin MD    Primary Care Provider: Raudel Zheng MD    Date of Admission:.2018  6:11 AM    Date of Discharge:  11/3/2018    Discharge Diagnosis:   S/P lumbar fusion    Hypertension    CAD (coronary artery disease)    Moderate COPD    Back pain    Prediabetes    Acute blood loss anemia, mild, asymptomatic    Acute postoperative pain    Hyponatremia, mild      Hospital Course  Patient is a 68 y.o. male presented for lumbar fusion by Dr. Franklin under GA. He tolerated surgery well and was admitted for further medical management. His back has been painful for several years. He denies use of assistive device for ambulation or recent falls. He denies saddle anesthesia. He reports when back pain is severe he had numbness, tingling and pain in BLE.     He has COPD and \"black lung\". He was seen by Dr. Guerrero, pulmonary, for preop clearance.  He had an abnomal stress test, history of HTN, CAD and AAA. He was seen by CT surgery prior to surgery to evaluate his AAA. He underwent negative heartcath by Dr. Galvez, cardiology, and was given preop cardiac clearance.     Patient was provided pain medications as needed for pain control.    Adjustments were made to pain medications to optimize postop pain management. Risks and benefits of opiate medications discussed with patient.    He was seen by PT and has progressed well over his stay.  He used an IS for atelectasis prophylaxis and mechanicals for DVT prophylaxis.  Home medications were resumed as appropriate, and labs were monitored and remained fairly stable.     With the progress he has made, he is ready for [place] home today.    Discussed with patient regarding plan and he shows understanding and agreement.        Procedures Performed  PREOPERATIVE DIAGNOSES:   1. Lumbar spinal stenosis L4-L5 and L5-S1.  2. L5-S1 grade 2 spondylolisthesis.      POSTOPERATIVE " "DIAGNOSES:   1. Lumbar spinal stenosis L4-L5 and L5-S1.  2. L5-S1 grade 2 spondylolisthesis.      PROCEDURES PERFORMED:   1. Decompressive laminectomy, medial facetectomy and foraminotomies L4-L5 and L5-S1 to decompress neural elements.   2. Segmental instrumentation L4-L5-S1 with DePuy transpedicular fixation.   3. Transforaminal lumbar interbody fusion L4-L5 and L5-S1 with interbody fusion cage and bone graft.   4. Posterolateral fusion L4-L5 and L5-S1 with bone graft.   5. Bone marrow aspiration through a separate incision from right posterior iliac crest.      SURGEON: Joo Franklin MD       Pertinent Test Results:    I reviewed the patient's new clinical results.     Results from last 7 days  Lab Units 18  0624 10/31/18  1314   WBC 10*3/mm3 10.34 8.73   HEMOGLOBIN g/dL 11.9* 15.1   HEMATOCRIT % 37.8* 47.0   PLATELETS 10*3/mm3 151 209       Results from last 7 days  Lab Units 18  0624 10/31/18  1314   SODIUM mmol/L 131*  --    POTASSIUM mmol/L 4.3 4.2   CHLORIDE mmol/L 99  --    CO2 mmol/L 24.0  --    BUN mg/dL 22  --    CREATININE mg/dL 1.09  --    CALCIUM mg/dL 8.5*  --    GLUCOSE mg/dL 114*  --      Results for JOSE RAFAEL TEJEDA (MRN [id number]) as of 2018 09:00   Ref. Range 2018 16:08 2018 20:27 11/3/2018 07:02 11/3/2018 11:04   Glucose Latest Ref Range: 70 - 130 mg/dL 132 (H) 139 (H) 114 136 (H)     I reviewed the patient's new imaging including images and reports.      Physical therapy: Pt able to ambulate 450 feet using RW with CGA.  Pt demonstrated ability to perform stairs with 1 HHA and pt and spouse educated on stair sequencing and safety with stairs.  Reviewed spinal precautions and written HEP.    Discharge Assessment:    Vital Signs  Visit Vitals  /64 (BP Location: Right arm, Patient Position: Sitting)   Pulse 72   Temp 98.3 °F (36.8 °C) (Oral)   Resp 16   Ht 188 cm (74.02\")   Wt 102 kg (225 lb 5 oz)   SpO2 95%   BMI 28.92 kg/m²     Temp (24hrs), Av.2 °F (36.8 " °C), Min:97.8 °F (36.6 °C), Max:98.8 °F (37.1 °C)      General Appearance:    Alert, cooperative, in no acute distress   Lungs:     Clear to auscultation,respirations regular, even and                   unlabored    Heart:    Regular rhythm and normal rate, normal S1 and S2, no            murmur, no gallop, no rub, no click   Abdomen:     Normal bowel sounds, no masses, no organomegaly, soft        non-tender, non-distended, no guarding, no rebound                 tenderness   Extremities:   Moves all extremities well, no edema, no cyanosis, no              redness   Pulses:   Pulses palpable and equal bilaterally   Skin:   No bleeding, bruising or rash. Back dressing CDI. HV out prior to discharge   Neurologic:   Cranial nerves 2 - 12 grossly intact, sensation intact, DTR        present and equal bilaterally. Flexion and dorsiflexion intact bilateral feet.       Discharge Disposition: Home    Discharge Medications     Discharge Medications      New Medications      Instructions Start Date   oxyCODONE-acetaminophen 7.5-325 MG per tablet  Commonly known as:  PERCOCET   1 tablet, Oral, Every 4 Hours PRN         Continue These Medications      Instructions Start Date   amLODIPine 5 MG tablet  Commonly known as:  NORVASC   5 mg, Oral, 2 Times Daily      ASPIRIN LOW DOSE 81 MG tablet  Generic drug:  aspirin   81 mg, Oral, Daily, Last dose 10-21-18      citalopram 20 MG tablet  Commonly known as:  CeleXA   20 mg, Oral, Daily      hydrALAZINE 50 MG tablet  Commonly known as:  APRESOLINE   50 mg, Oral, 3 Times Daily      lisinopril 40 MG tablet  Commonly known as:  PRINIVIL,ZESTRIL   40 mg, Oral, 2 Times Daily      metoprolol succinate XL 50 MG 24 hr tablet  Commonly known as:  TOPROL-XL   50 mg, Oral, Daily      ondansetron 4 MG tablet  Commonly known as:  ZOFRAN   No dose, route, or frequency recorded.      pantoprazole 40 MG EC tablet  Commonly known as:  PROTONIX   40 mg, Oral, Daily      TRELEGY ELLIPTA 100-62.5-25  MCG/INH aerosol powder   Generic drug:  Fluticasone-Umeclidin-Vilant   1 each, Inhalation, Daily      triamcinolone 0.1 % cream  Commonly known as:  KENALOG   1 application, Topical, 3 Times Daily      valsartan 160 MG tablet  Commonly known as:  DIOVAN   160 mg, Oral, Daily      vitamin B-12 1000 MCG tablet  Commonly known as:  CYANOCOBALAMIN   1,000 mcg, Oral, Daily         Stop These Medications    acetaminophen-codeine 300-30 MG per tablet  Commonly known as:  TYLENOL #3            Discharge Diet:   Diet Instructions     Regular diet                 Activity at Discharge:   Activity Instructions     No bending, lifting, or twisting at the waist.     Use ice as needed for pain and swelling.     Walk frequently.                  Follow-up Appointments  Dr. Franklin per his orders    Discharge took over 30 min     Bert Chadwick MD  11/03/18  1:31 PM

## 2018-11-03 NOTE — PROGRESS NOTES
"Ortho Spine Progress Note     LOS: 2 days   Patient Care Team:  Raudel Zheng MD as PCP - General  Joo Franklin MD as Consulting Physician (Orthopedic Surgery)  Leopoldo Burleson MD as Surgeon (Cardiothoracic Surgery)  Douglas Galvez MD as Consulting Physician (Cardiology)    Subjective   No c/o  Objective     Vital Signs:  /64 (BP Location: Right arm, Patient Position: Sitting)   Pulse 72   Temp 98.3 °F (36.8 °C) (Oral)   Resp 16   Ht 188 cm (74.02\")   Wt 102 kg (225 lb 5 oz)   SpO2 95%   BMI 28.92 kg/m²          Labs:  Lab Results (last 24 hours)     Procedure Component Value Units Date/Time    POC Glucose Once [[id number]]  (Abnormal) Collected:  11/03/18 1104    Specimen:  Blood Updated:  11/03/18 1106     Glucose 136 (H) mg/dL     Narrative:       Confirmed by Repeat Meter: [id number] : [id number] Rearden Iris    POC Glucose Once [[id number]]  (Normal) Collected:  11/03/18 0702    Specimen:  Blood Updated:  11/03/18 0704     Glucose 114 mg/dL     Narrative:       Confirmed by Repeat Meter: [id number] : [id number] Rearden Iris    POC Glucose Once [[id number]]  (Abnormal) Collected:  11/02/18 2027    Specimen:  Blood Updated:  11/02/18 2029     Glucose 139 (H) mg/dL     Narrative:       Meter: [id number] : [id number] Luisa Garcia    POC Glucose Once [[id number]]  (Abnormal) Collected:  11/02/18 1608    Specimen:  Blood Updated:  11/02/18 1609     Glucose 132 (H) mg/dL     Narrative:       Meter: [id number] : [id number] Dimas Krisnha          Physical Exam:  Neuro intact    Assessment/Plan   Doing well  Home today    Ruben Rowland MD  11/03/18  12:37 PM    "

## 2018-11-03 NOTE — DISCHARGE INSTRUCTIONS
[facility] will provide you with physical therapy and will remove your staples. They will contact you in 1-3 days to set up your first appointment.     [facility] has provided you with a rolling walker.

## 2018-11-03 NOTE — PROGRESS NOTES
Case Management Discharge Note    Final Note: Notified [facility] that patient would be discharging today.  Yolanda [facility], RN [phone]    Destination     No service has been selected for the patient.      Durable Medical Equipment - Selection Complete     Service Request Status Selected Specialties Address Phone Number Fax Number    [facility] Selected DME Services [address] [address] [phone]      Dialysis/Infusion     No service has been selected for the patient.      Home Medical Care - Selection Complete     Service Request Status Selected Specialties Address Phone Number Fax Number    [facility] Selected Home Health Services [address] [address] [phone]      Social Care     No service has been selected for the patient.             Final Discharge Disposition Code: 06 - home with home health care

## 2018-11-03 NOTE — PLAN OF CARE
Problem: Patient Care Overview  Goal: Plan of Care Review  Outcome: Ongoing (interventions implemented as appropriate)   11/03/18 8227   Coping/Psychosocial   Plan of Care Reviewed With patient;spouse   Plan of Care Review   Progress improving   OTHER   Outcome Summary Pt able to ambulate 450 feet using RW with CGA. Pt demonstrated ability to perform stairs with 1 HHA and pt and spouse educated on stair sequencing and safety with stairs. Reviewed spinal precautions and written HEP.

## 2018-11-03 NOTE — PLAN OF CARE
Problem: Patient Care Overview  Goal: Plan of Care Review   11/03/18 0414   OTHER   Outcome Summary vital signs stable- Bp tolerated evening BP meds, pain controlled with prn meds, tolerating ambulation with standby assist of wife, voiding spontaneously, numbness improved in right hand, hemovac output 25 ml as of 0430, anticipate d/c home today, continue to monitor       Problem: Laminectomy/Foraminotomy/Discectomy (Adult)  Goal: Signs and Symptoms of Listed Potential Problems Will be Absent, Minimized or Managed (Laminectomy/Foraminotomy/Discectomy)  Outcome: Ongoing (interventions implemented as appropriate)      Problem: Fall Risk (Adult)  Goal: Identify Related Risk Factors and Signs and Symptoms  Outcome: Outcome(s) achieved Date Met: 11/03/18

## 2018-11-04 ENCOUNTER — READMISSION MANAGEMENT (OUTPATIENT)
Dept: CALL CENTER | Facility: HOSPITAL | Age: 68
End: 2018-11-04

## 2018-11-04 NOTE — OUTREACH NOTE
Prep Survey      Responses   Facility patient discharged from?  [place]   Is patient eligible?  Yes   Discharge diagnosis   Back pain S/P lumbar fusion   Does the patient have one of the following disease processes/diagnoses(primary or secondary)?  General Surgery   Does the patient have Home health ordered?  Yes   What is the Home health agency?    [facility]   Is there a DME ordered?  Yes   What DME was ordered?   rolling walker -- [facility]   Prep survey completed?  Yes          Eda Helm RN

## 2018-11-08 ENCOUNTER — READMISSION MANAGEMENT (OUTPATIENT)
Dept: CALL CENTER | Facility: HOSPITAL | Age: 68
End: 2018-11-08

## 2018-11-08 NOTE — OUTREACH NOTE
General Surgery Week 1 Survey      Responses   Facility patient discharged from?  [place]   Does the patient have one of the following disease processes/diagnoses(primary or secondary)?  General Surgery   Is there a successful TCM telephone encounter documented?  No   Week 1 attempt successful?  Yes   Call start time  1329   Call end time  1332   Discharge diagnosis   Back pain S/P lumbar fusion   Meds reviewed with patient/caregiver?  Yes   Is the patient having any side effects they believe may be caused by any medication additions or changes?  No   Does the patient have all medications related to this admission filled (includes all antibiotics, pain medications, etc.)  Yes   Is the patient taking all medications as directed (includes completed medication regime)?  Yes   Does the patient have a follow up appointment scheduled with their surgeon?  Yes   Has the patient kept scheduled appointments due by today?  Yes   What is the Home health agency?    [facility]   Has home health visited the patient within 72 hours of discharge?  Yes   What DME was ordered?   rolling walker -- [facility]   Did the patient receive a copy of their discharge instructions?  Yes   Nursing interventions  Reviewed instructions with patient   What is the patient's perception of their health status since discharge?  Improving   Nursing interventions  Nurse provided patient education   Is the patient/caregiver able to teach back signs and symptoms of incisional infection?  Increased redness, swelling or pain at the incisonal site, Increased drainage or bleeding, Incisional warmth, Pus or odor from incision, Fever   Is the patient/caregiver able to teach back steps to recovery at home?  Set small, achievable goals for return to baseline health, Rest and rebuild strength, gradually increase activity, Eat a well-balance diet, Make a list of questions for surgeon's appointment   Is the patient/caregiver able to teach  back the hierarchy of who to call/visit for symptoms/problems? PCP, Specialist, Home health nurse, Urgent Care, ED, 911  Yes   Week 1 call completed?  Yes          Earnestine Méndez, RN

## 2018-11-17 ENCOUNTER — READMISSION MANAGEMENT (OUTPATIENT)
Dept: CALL CENTER | Facility: HOSPITAL | Age: 68
End: 2018-11-17

## 2018-11-17 NOTE — OUTREACH NOTE
General Surgery Week 2 Survey      Responses   Facility patient discharged from?  [facility]   Does the patient have one of the following disease processes/diagnoses(primary or secondary)?  General Surgery   Week 2 attempt successful?  Yes   Call start time  1605   Call end time  1608   Discharge diagnosis   Back pain S/P lumbar fusion   Meds reviewed with patient/caregiver?  Yes   Is the patient having any side effects they believe may be caused by any medication additions or changes?  No   Does the patient have all medications related to this admission filled (includes all antibiotics, pain medications, etc.)  Yes   Is the patient taking all medications as directed (includes completed medication regime)?  Yes   Medication comments  He does not think the pain medication is strong enough, he is on muscle relaxers.    Does the patient have a follow up appointment scheduled with their surgeon?  Yes   Has the patient kept scheduled appointments due by today?  Yes   Did the patient receive a copy of their discharge instructions?  Yes   Nursing interventions  Reviewed instructions with patient   What is the patient's perception of their health status since discharge?  Improving   Nursing interventions  Nurse provided patient education   Is the patient /caregiver able to teach back basic post-op care?  Practice 'cough and deep breath', Take showers only when approved by MD-sponge bathe until then, Keep incision areas clean,dry and protected, Lifting as instructed by MD in discharge instructions   Is the patient/caregiver able to teach back signs and symptoms of incisional infection?  Increased redness, swelling or pain at the incisonal site, Increased drainage or bleeding, Incisional warmth, Pus or odor from incision, Fever   Is the patient/caregiver able to teach back steps to recovery at home?  Set small, achievable goals for return to baseline health, Rest and rebuild strength, gradually increase activity, Eat a  well-balance diet, Make a list of questions for surgeon's appointment   Is the patient/caregiver able to teach back the hierarchy of who to call/visit for symptoms/problems? PCP, Specialist, Home health nurse, Urgent Care, ED, 911  Yes   Week 2 call completed?  Yes          Vicki Sawyer RN

## 2018-12-18 ENCOUNTER — HOSPITAL ENCOUNTER (OUTPATIENT)
Dept: CT IMAGING | Facility: HOSPITAL | Age: 68
Discharge: HOME | End: 2018-12-18
Attending: INTERNAL MEDICINE | Admitting: INTERNAL MEDICINE

## 2018-12-18 ENCOUNTER — OFFICE VISIT (OUTPATIENT)
Dept: PULMONOLOGY | Facility: CLINIC | Age: 68
End: 2018-12-18

## 2018-12-18 VITALS
WEIGHT: 231 LBS | DIASTOLIC BLOOD PRESSURE: 86 MMHG | HEART RATE: 120 BPM | SYSTOLIC BLOOD PRESSURE: 130 MMHG | BODY MASS INDEX: 29.65 KG/M2 | TEMPERATURE: 98.6 F | HEIGHT: 74 IN | OXYGEN SATURATION: 95 %

## 2018-12-18 DIAGNOSIS — J84.9 INTERSTITIAL LUNG DISEASE (HCC): ICD-10-CM

## 2018-12-18 DIAGNOSIS — Z87.891 FORMER SMOKER: ICD-10-CM

## 2018-12-18 DIAGNOSIS — J44.9 COPD MIXED TYPE (HCC): Primary | ICD-10-CM

## 2018-12-18 DIAGNOSIS — J60 COAL WORKERS PNEUMOCONIOSIS (HCC): ICD-10-CM

## 2018-12-18 PROCEDURE — 71250 CT THORAX DX C-: CPT

## 2018-12-18 PROCEDURE — 99214 OFFICE O/P EST MOD 30 MIN: CPT | Performed by: NURSE PRACTITIONER

## 2018-12-18 RX ORDER — ACETAMINOPHEN 500 MG
500 TABLET ORAL EVERY 6 HOURS PRN
COMMUNITY
End: 2019-07-23

## 2018-12-18 NOTE — PROGRESS NOTES
"[facility] Pulmonary Follow up    CHIEF COMPLAINT    CT follow-up, COPD, coal workers pneumoconiosis    HISTORY OF PRESENT ILLNESS    Derek Kelsey is a 68 y.o.male here today for routine follow-up, and follow-up on his CT scan.    He was initially seen by Dr. Guerrero on October 17, 2018 for preoperative evaluation.  Had a long history of COPD as well as diagnosed with coal workers pneumoconiosis.  His PFTs did reveal moderate airway obstruction and no restriction.  Chronically he is on Trelegy, since his last visit.    He has committed chronic dyspnea with minimal activity.  He is a chronic daily productive cough with gray colored sputum.  He has intermittent wheezing and chest tightness.  He does complain of difficulty sleeping and awakens frequently short of air.  He sleeps elevated at night.  He awakens fatigued but occasionally with headaches.  He does snore and \"gasping\" frequently at night.    He did undergo lumbar fusion by Dr. Franklin on November 1.  He did well postoperatively and was discharged home on November 3.    Patient Active Problem List   Diagnosis   • Anxiety   • Abdominal aortic aneurysm (CMS/HCC)   • Depression   • Hypertension   • Cataract, bilateral   • CAD (coronary artery disease)   • Abdominal aortic aneurysm (AAA) without rupture (CMS/HCC)   • Abnormal stress test   • Moderate COPD   • Former smoker   • Coal workers pneumoconiosis, simple   • Back pain   • S/P lumbar fusion   • Prediabetes   • Acute blood loss anemia, mild, asymptomatic   • Acute postoperative pain   • Hyponatremia, mild       Allergies   Allergen Reactions   • Penicillins Hives       Current Outpatient Medications:   •  acetaminophen (TYLENOL) 500 MG tablet, Take 500 mg by mouth Every 6 (Six) Hours As Needed for Mild Pain ., Disp: , Rfl:   •  amLODIPine (NORVASC) 5 MG tablet, Take 5 mg by mouth 2 (Two) Times a Day., Disp: , Rfl:   •  ASPIRIN LOW DOSE 81 MG tablet, Take 1 tablet by mouth Daily. Last dose 10-21-18, Disp: , " Rfl:   •  citalopram (CeleXA) 20 MG tablet, Take 20 mg by mouth Daily., Disp: , Rfl:   •  Fluticasone-Umeclidin-Vilant (TRELEGY ELLIPTA) 100-62.5-25 MCG/INH aerosol powder , Inhale 1 each Daily., Disp: , Rfl:   •  hydrALAZINE (APRESOLINE) 50 MG tablet, Take 50 mg by mouth 3 (Three) Times a Day., Disp: , Rfl:   •  lisinopril (PRINIVIL,ZESTRIL) 40 MG tablet, Take 40 mg by mouth 2 (Two) Times a Day., Disp: , Rfl:   •  metoprolol succinate XL (TOPROL-XL) 50 MG 24 hr tablet, Take 50 mg by mouth Daily., Disp: , Rfl:   •  ondansetron (ZOFRAN) 4 MG tablet, , Disp: , Rfl:   •  pantoprazole (PROTONIX) 40 MG EC tablet, Take 40 mg by mouth Daily., Disp: , Rfl:   •  triamcinolone (KENALOG) 0.1 % cream, Apply 1 application topically to the appropriate area as directed 3 (Three) Times a Day., Disp: , Rfl:   •  valsartan (DIOVAN) 160 MG tablet, Take 160 mg by mouth Daily., Disp: , Rfl:   •  vitamin B-12 (CYANOCOBALAMIN) 1000 MCG tablet, Take 1,000 mcg by mouth Daily., Disp: , Rfl:   MEDICATION LIST AND ALLERGIES REVIEWED.    Social History     Tobacco Use   • Smoking status: Former Smoker     Packs/day: 1.00     Years: 30.00     Pack years: 30.00     Types: Cigarettes     Last attempt to quit: 2011     Years since quittin.9   • Smokeless tobacco: Former User     Types: Chew     Quit date: 2011   Substance Use Topics   • Alcohol use: No   • Drug use: No       FAMILY AND SOCIAL HISTORY REVIEWED.    Review of Systems   Constitutional: Positive for fatigue. Negative for chills, fever and unexpected weight change.   HENT: Positive for sinus pressure and sneezing. Negative for congestion, nosebleeds, postnasal drip, rhinorrhea and trouble swallowing.    Respiratory: Positive for cough, chest tightness and wheezing. Negative for shortness of breath.    Cardiovascular: Negative for chest pain and leg swelling.   Gastrointestinal: Negative for abdominal pain, constipation, diarrhea, nausea and vomiting.   Genitourinary: Negative  "for dysuria, frequency, hematuria and urgency.   Musculoskeletal: Positive for arthralgias and back pain. Negative for myalgias.   Neurological: Positive for headaches. Negative for dizziness, weakness and numbness.   All other systems reviewed and are negative.  .    /86   Pulse 120   Temp 98.6 °F (37 °C)   Ht 188 cm (74\")   Wt 105 kg (231 lb)   SpO2 95% Comment: room air at rest  BMI 29.66 kg/m²       There is no immunization history on file for this patient.    Physical Exam   Constitutional: He is oriented to person, place, and time. He appears well-developed and well-nourished.   HENT:   Head: Normocephalic and atraumatic.   Eyes: EOM are normal. Pupils are equal, round, and reactive to light.   Neck: Normal range of motion. Neck supple.   Cardiovascular: Normal rate and regular rhythm.   No murmur heard.  Pulmonary/Chest: Effort normal. No respiratory distress. He has no wheezes. He has no rales.   Decreased bilaterally   Abdominal: Soft. Bowel sounds are normal. He exhibits no distension.   Musculoskeletal: Normal range of motion. He exhibits no edema.   Neurological: He is alert and oriented to person, place, and time.   Skin: Skin is warm and dry. No erythema.   Psychiatric: He has a normal mood and affect. His behavior is normal.   Vitals reviewed.        RESULTS    Alpha 1 - MM      EXAMINATION: CT CHEST HI RESOLUTION-      INDICATION: J84.9-Interstitial pulmonary disease, unspecified.      TECHNIQUE: High-resolution imaging of the chest was performed with  patient in the supine and prone positions.     The radiation dose reduction device was turned on for each scan per the  ALARA (As Low as Reasonably Achievable) protocol.     COMPARISON: None.     FINDINGS: There is no axillary lymphadenopathy. There is no significant  mediastinal or hilar adenopathy. There is no pericardial or pleural  effusion. Images displayed at lung window settings demonstrate  postinflammatory apical changes. There is " "no \"groundglass\" or  \"honeycomb\" pattern to suggest interstitial fibrosis. There is no  significant bronchiectasis.     IMPRESSION:  There are chronic changes but no findings of shala  interstitial fibrosis.     D:  12/18/2018  E:  12/18/2018     This report was finalized on 12/18/2018 2:04 PM by Dr. Jose Lux MD.    PROBLEM LIST    Problem List Items Addressed This Visit        Respiratory    Moderate COPD - Primary    Coal workers pneumoconiosis, simple       Other    Former smoker            DISCUSSION    He likely has some underlying obstructive sleep apnea and or nocturnal hypoxemia, like to get an overnight study for 2 nights.  He is agreeable to wear oxygen at night to help with his chronic fatigue and dyspnea.    Continue on his Trelegy for his COPD.  He does have a history of tobacco abuse and quit in 2011.  He will qualify for low-dose a nose screening CT scans next being December 2019.      Follow-up in 6 months with full PFTs    I spent 25 minutes with the patient and family. I spent > 50% percent of this time counseling and discussing diagnosis, diagnostic testing, evaluation, current status, treatment options and management.    Yolie Bates, APRN  12/18/201810:40 AM  Electronically signed     Please note that portions of this note were completed with a voice recognition program. Efforts were made to edit the dictations, but occasionally words are mistranscribed.      CC: Raudel Zheng MD  "

## 2019-03-18 ENCOUNTER — OFFICE VISIT (OUTPATIENT)
Dept: PULMONOLOGY | Facility: CLINIC | Age: 69
End: 2019-03-18

## 2019-03-18 VITALS
BODY MASS INDEX: 31.93 KG/M2 | DIASTOLIC BLOOD PRESSURE: 58 MMHG | OXYGEN SATURATION: 96 % | SYSTOLIC BLOOD PRESSURE: 122 MMHG | WEIGHT: 248.8 LBS | TEMPERATURE: 98.1 F | HEIGHT: 74 IN | HEART RATE: 78 BPM

## 2019-03-18 DIAGNOSIS — F17.211 CIGARETTE NICOTINE DEPENDENCE IN REMISSION: ICD-10-CM

## 2019-03-18 DIAGNOSIS — J96.11 CHRONIC RESPIRATORY FAILURE WITH HYPOXIA AND HYPERCAPNIA (HCC): ICD-10-CM

## 2019-03-18 DIAGNOSIS — F17.200 TOBACCO USE DISORDER: ICD-10-CM

## 2019-03-18 DIAGNOSIS — J60 COAL WORKERS PNEUMOCONIOSIS (HCC): ICD-10-CM

## 2019-03-18 DIAGNOSIS — J96.12 CHRONIC RESPIRATORY FAILURE WITH HYPOXIA AND HYPERCAPNIA (HCC): ICD-10-CM

## 2019-03-18 DIAGNOSIS — J44.9 MODERATE COPD (CHRONIC OBSTRUCTIVE PULMONARY DISEASE) (HCC): Primary | ICD-10-CM

## 2019-03-18 PROCEDURE — G0296 VISIT TO DETERM LDCT ELIG: HCPCS | Performed by: NURSE PRACTITIONER

## 2019-03-18 PROCEDURE — 99214 OFFICE O/P EST MOD 30 MIN: CPT | Performed by: NURSE PRACTITIONER

## 2019-03-18 PROCEDURE — 94729 DIFFUSING CAPACITY: CPT | Performed by: NURSE PRACTITIONER

## 2019-03-18 PROCEDURE — 94060 EVALUATION OF WHEEZING: CPT | Performed by: NURSE PRACTITIONER

## 2019-03-18 PROCEDURE — 94726 PLETHYSMOGRAPHY LUNG VOLUMES: CPT | Performed by: NURSE PRACTITIONER

## 2019-03-18 RX ORDER — ALBUTEROL SULFATE 90 UG/1
4 AEROSOL, METERED RESPIRATORY (INHALATION) ONCE
Status: COMPLETED | OUTPATIENT
Start: 2019-03-18 | End: 2019-03-18

## 2019-03-18 RX ADMIN — ALBUTEROL SULFATE 4 PUFF: 90 AEROSOL, METERED RESPIRATORY (INHALATION) at 08:52

## 2019-03-18 NOTE — PROGRESS NOTES
[facility], [place], operated by [facility] Pulmonary Follow up    CHIEF COMPLAINT    COPD, coworkers pneumoconiosis, nocturnal hypoxemia    Subjective   HISTORY OF PRESENT ILLNESS    Derek Kelsey is a 68 y.o.male here today for routine follow-up.    He was initially seen by Dr. Guerrero on October 17, 2018 for preoperative evaluation.  Had a long history of COPD as well as diagnosed with coal workers pneumoconiosis.  His PFTs did reveal moderate airway obstruction and no restriction.     For his COPD he is chronically on Trelegy,  he tolerates it well.  He said no recent exacerbations, worsening in his overall shortness of air or activity tolerance.    Chronically he does have quite a bit of dyspnea with activity, and some lying down secondary to body habitus.  He is an occasionally productive cough.    He did have an overnight study that showed a significant amount of nocturnal hypoxemia.  We set up some oxygen with [employer].  He now wears his oxygen nightly.  He does feel a bit better throughout the day.    He does have a history of tobacco abuse and quit in 2011.  He will qualify for low-dose a nose screening CT scans next being December 2019.    Since I last saw him he fell and has had some significant back pain.  He follows up with Dr. Bhatti, currently he is undergoing physical therapy.  He does feel like he will probably have to have surgery again.  Overall his activity tolerance has declined secondary to pain.    Patient Active Problem List   Diagnosis   • Anxiety   • Abdominal aortic aneurysm (CMS/HCC)   • Depression   • Hypertension   • Cataract, bilateral   • CAD (coronary artery disease)   • Abdominal aortic aneurysm (AAA) without rupture (CMS/HCC)   • Abnormal stress test   • Moderate COPD   • Former smoker   • Coal workers pneumoconiosis, simple   • Back pain   • S/P lumbar fusion   • Prediabetes   • Acute blood loss anemia, mild, asymptomatic   • Acute postoperative pain   • Hyponatremia, mild   • Chronic respiratory failure  with hypoxia and hypercapnia (CMS/HCC)       Allergies   Allergen Reactions   • Penicillins Hives       Current Outpatient Medications:   •  acetaminophen (TYLENOL) 500 MG tablet, Take 500 mg by mouth Every 6 (Six) Hours As Needed for Mild Pain ., Disp: , Rfl:   •  amLODIPine (NORVASC) 5 MG tablet, Take 5 mg by mouth 2 (Two) Times a Day., Disp: , Rfl:   •  ASPIRIN LOW DOSE 81 MG tablet, Take 1 tablet by mouth Daily. Last dose 10-21-18, Disp: , Rfl:   •  citalopram (CeleXA) 20 MG tablet, Take 20 mg by mouth Daily., Disp: , Rfl:   •  Fluticasone-Umeclidin-Vilant (TRELEGY ELLIPTA) 100-62.5-25 MCG/INH aerosol powder , Inhale 1 each Daily., Disp: , Rfl:   •  hydrALAZINE (APRESOLINE) 50 MG tablet, Take 50 mg by mouth 3 (Three) Times a Day., Disp: , Rfl:   •  metoprolol succinate XL (TOPROL-XL) 50 MG 24 hr tablet, Take 50 mg by mouth Daily., Disp: , Rfl:   •  O2 (OXYGEN), , Disp: , Rfl:   •  pantoprazole (PROTONIX) 40 MG EC tablet, Take 40 mg by mouth Daily., Disp: , Rfl:   •  triamcinolone (KENALOG) 0.1 % cream, Apply 1 application topically to the appropriate area as directed 3 (Three) Times a Day., Disp: , Rfl:   •  vitamin B-12 (CYANOCOBALAMIN) 1000 MCG tablet, Take 1,000 mcg by mouth Daily., Disp: , Rfl:   •  lisinopril (PRINIVIL,ZESTRIL) 40 MG tablet, Take 40 mg by mouth 2 (Two) Times a Day., Disp: , Rfl:   •  ondansetron (ZOFRAN) 4 MG tablet, , Disp: , Rfl:   •  valsartan (DIOVAN) 160 MG tablet, Take 160 mg by mouth Daily., Disp: , Rfl:   No current facility-administered medications for this visit.   MEDICATION LIST AND ALLERGIES REVIEWED.    Social History     Tobacco Use   • Smoking status: Former Smoker     Packs/day: 1.00     Years: 30.00     Pack years: 30.00     Types: Cigarettes     Last attempt to quit: 2011     Years since quittin.2   • Smokeless tobacco: Former User     Types: Chew     Quit date: 2011   Substance Use Topics   • Alcohol use: No   • Drug use: No       FAMILY AND SOCIAL HISTORY  "REVIEWED.    Review of Systems   Constitutional: Positive for activity change and fatigue. Negative for chills, fever and unexpected weight change.   HENT: Positive for congestion and postnasal drip. Negative for nosebleeds, rhinorrhea, sinus pressure and trouble swallowing.    Respiratory: Positive for cough, chest tightness, shortness of breath and wheezing.    Cardiovascular: Negative for chest pain and leg swelling.   Gastrointestinal: Negative for abdominal pain, constipation, diarrhea, nausea and vomiting.   Genitourinary: Negative for dysuria, frequency, hematuria and urgency.   Musculoskeletal: Positive for arthralgias, back pain and gait problem. Negative for myalgias.   Neurological: Negative for dizziness, weakness, numbness and headaches.   Psychiatric/Behavioral: The patient is nervous/anxious.    All other systems reviewed and are negative.  .  Objective   /58 (BP Location: Right arm, Patient Position: Sitting, Cuff Size: Adult)   Pulse 78   Temp 98.1 °F (36.7 °C)   Ht 188 cm (74\")   Wt 113 kg (248 lb 12.8 oz)   SpO2 96% Comment: sitting room air  BMI 31.94 kg/m²       There is no immunization history on file for this patient.    Physical Exam   Constitutional: He is oriented to person, place, and time. He appears well-developed and well-nourished.   HENT:   Head: Normocephalic and atraumatic.   Eyes: EOM are normal. Pupils are equal, round, and reactive to light.   Neck: Normal range of motion. Neck supple.   Cardiovascular: Normal rate and regular rhythm.   No murmur heard.  Pulmonary/Chest: Effort normal. No respiratory distress. He has no wheezes. He has rales.   Decreased with slight expiratory rales bilaterally   Abdominal: Soft. Bowel sounds are normal. He exhibits no distension.   Musculoskeletal: Normal range of motion. He exhibits no edema.   Neurological: He is alert and oriented to person, place, and time.   Skin: Skin is warm and dry. No erythema.   Psychiatric: He has a normal " mood and affect. His behavior is normal.   Vitals reviewed.        RESULTS    PFTS in the office today, read by me:  Moderate obstructive airway disease with evidence of restriction.  Significant air trapping with residual volume of 200%    Assessment/Plan     PROBLEM LIST    Problem List Items Addressed This Visit        Respiratory    Coal workers pneumoconiosis, simple    Chronic respiratory failure with hypoxia and hypercapnia (CMS/HCC)      Other Visit Diagnoses     Moderate COPD (chronic obstructive pulmonary disease) (CMS/HCC)    -  Primary    Relevant Medications    albuterol sulfate HFA (PROVENTIL HFA;VENTOLIN HFA;PROAIR HFA) inhaler 4 puff (Completed)    Other Relevant Orders    Full Pulmonary Function Test With Bronchodilator (Completed)            DISCUSSION    He does have chronic restrictive and obstructive airway disease with a history of coal workers pneumoconiosis as well as 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  He has chronic dyspnea on activity, chronic cough with occasional sputum production which have been unchanged over time.    Continue with the Trelegy for his COPD.    His pulmonary function tests have not changed since last year, no worsening restriction.    Continue on his oxygen at night for his nocturnal hypoxemia    Routine follow-up in 6 months.    I spent 25 minutes with the patient and family. I spent > 50% percent of this time counseling and discussing diagnosis, diagnostic testing, evaluation, current status and management.    Yolie Bates, APRN  03/18/20198:50 AM  Electronically signed     Please note that portions of this note were completed with a voice recognition program. Efforts were made to edit the dictations, but occasionally words are mistranscribed.      CC: Raudel Zheng MD

## 2019-03-27 ENCOUNTER — TELEPHONE (OUTPATIENT)
Dept: CARDIAC SURGERY | Facility: CLINIC | Age: 69
End: 2019-03-27

## 2019-03-27 NOTE — TELEPHONE ENCOUNTER
PT RECEIVED RECALL LETTER FOR 6MO F/U WITH  [facility]. HE HAD AN MRI DONE ON 03/27/2019 HERE AT . VERIFIED DEMO AND INS. PT REQUESTED IAIN IF AVAILABLE.

## 2019-03-28 DIAGNOSIS — I71.40 ABDOMINAL AORTIC ANEURYSM (AAA) WITHOUT RUPTURE (HCC): Primary | ICD-10-CM

## 2019-03-29 ENCOUNTER — TELEPHONE (OUTPATIENT)
Dept: CARDIAC SURGERY | Facility: CLINIC | Age: 69
End: 2019-03-29

## 2019-03-29 NOTE — TELEPHONE ENCOUNTER
PT CALLED REGARDING HIS APPT ON 4/22 WITH [facility]. PT STATES HE HAS BEEN HAVING SOME ISSUES AND WOULD LIKE TO SEE AGR ON THIS DATE. AFTER SPEAKING TO MA (ZORAN) I CHANGED PT OVER TO SEE DR. DUEÑAS FOR 4/22. PT WAS SATISFIED WITH THIS.

## 2019-04-08 DIAGNOSIS — Z00.6 EXAMINATION FOR NORMAL COMPARISON FOR CLINICAL RESEARCH: Primary | ICD-10-CM

## 2019-04-17 DIAGNOSIS — Z87.891 PERSONAL HISTORY OF TOBACCO USE, PRESENTING HAZARDS TO HEALTH: Primary | ICD-10-CM

## 2019-04-22 ENCOUNTER — OFFICE VISIT (OUTPATIENT)
Dept: CARDIAC SURGERY | Facility: CLINIC | Age: 69
End: 2019-04-22

## 2019-04-22 VITALS
OXYGEN SATURATION: 96 % | BODY MASS INDEX: 32.08 KG/M2 | TEMPERATURE: 99.2 F | HEART RATE: 84 BPM | SYSTOLIC BLOOD PRESSURE: 121 MMHG | DIASTOLIC BLOOD PRESSURE: 73 MMHG | WEIGHT: 250 LBS | HEIGHT: 74 IN

## 2019-04-22 DIAGNOSIS — I71.40 ABDOMINAL AORTIC ANEURYSM (AAA) WITHOUT RUPTURE (HCC): Primary | ICD-10-CM

## 2019-04-22 PROCEDURE — 99212 OFFICE O/P EST SF 10 MIN: CPT | Performed by: THORACIC SURGERY (CARDIOTHORACIC VASCULAR SURGERY)

## 2019-04-22 NOTE — PROGRESS NOTES
04/22/2019  Patient Information  Derek Kelsey                                                                                          [address] [facility] [place] [id number]   1950  'PCP/Referring Physician'  Raudel Zheng MD  [phone]  No ref. provider found    Chief Complaint   Patient presents with   • Follow-up     Follow for abdominal aortic aneurysm that has increased in size,complains of little abdominal pain and nausea.   • Aortic Aneurysm       History of Present Illness:   The patient returns today for follow-up of his abdominal aortic aneurysm.  He has been having more abdominal pain and is also having some back pain.  He fell.  I discussed this case with Dr. Franklin and he is very concerned about the abdominal aneurysm as well.      Patient Active Problem List   Diagnosis   • Anxiety   • Abdominal aortic aneurysm (CMS/HCC)   • Depression   • Hypertension   • Cataract, bilateral   • CAD (coronary artery disease)   • Abdominal aortic aneurysm (AAA) without rupture (CMS/HCC)   • Abnormal stress test   • Moderate COPD   • Former smoker   • Coal workers pneumoconiosis, simple   • Back pain   • S/P lumbar fusion   • Prediabetes   • Acute blood loss anemia, mild, asymptomatic   • Acute postoperative pain   • Hyponatremia, mild   • Chronic respiratory failure with hypoxia and hypercapnia (CMS/HCC)     Past Medical History:   Diagnosis Date   • Abdominal aortic aneurysm (CMS/HCC) 9/29/2016   • Anxiety 9/29/2016   • Arthritis    • CAD (coronary artery disease) 9/29/2016   • Cataract, bilateral 9/29/2016   • COPD (chronic obstructive pulmonary disease) (CMS/HCC) 9/29/2016   • Depression 9/29/2016   • Depression    • GERD (gastroesophageal reflux disease)    • Hypertension 9/29/2016   • Wears dentures      Past Surgical History:   Procedure Laterality Date   • CARDIAC CATHETERIZATION     • CARDIAC CATHETERIZATION N/A 9/28/2018    Procedure: Left Heart Cath;  Surgeon: Douglas Galvez MD;   Location:  [facility];  Service: Cardiovascular   • COLONOSCOPY     • HAND SURGERY     • LUMBAR DISCECTOMY FUSION INSTRUMENTATION N/A 11/1/2018    Procedure: LUMBAR DISCECTOMY POSTERIOR WITH FUSION INSTRUMENTATION;  Surgeon: Joo Franklin MD;  Location: [facility] OR;  Service: Orthopedic Spine   • SHOULDER SURGERY Left        Current Outpatient Medications:   •  acetaminophen (TYLENOL) 500 MG tablet, Take 500 mg by mouth Every 6 (Six) Hours As Needed for Mild Pain ., Disp: , Rfl:   •  amLODIPine (NORVASC) 5 MG tablet, Take 5 mg by mouth 2 (Two) Times a Day., Disp: , Rfl:   •  ASPIRIN LOW DOSE 81 MG tablet, Take 1 tablet by mouth Daily. Last dose 10-21-18, Disp: , Rfl:   •  citalopram (CeleXA) 20 MG tablet, Take 20 mg by mouth Daily., Disp: , Rfl:   •  Fluticasone-Umeclidin-Vilant (TRELEGY ELLIPTA) 100-62.5-25 MCG/INH aerosol powder , Inhale 1 each Daily., Disp: , Rfl:   •  metoprolol succinate XL (TOPROL-XL) 50 MG 24 hr tablet, Take 50 mg by mouth Daily., Disp: , Rfl:   •  pantoprazole (PROTONIX) 40 MG EC tablet, Take 40 mg by mouth Daily., Disp: , Rfl:   •  triamcinolone (KENALOG) 0.1 % cream, Apply 1 application topically to the appropriate area as directed 3 (Three) Times a Day., Disp: , Rfl:   •  vitamin B-12 (CYANOCOBALAMIN) 1000 MCG tablet, Take 1,000 mcg by mouth Daily., Disp: , Rfl:   •  hydrALAZINE (APRESOLINE) 50 MG tablet, Take 50 mg by mouth 3 (Three) Times a Day., Disp: , Rfl:   •  lisinopril (PRINIVIL,ZESTRIL) 40 MG tablet, Take 40 mg by mouth 2 (Two) Times a Day., Disp: , Rfl:   •  O2 (OXYGEN), , Disp: , Rfl:   •  ondansetron (ZOFRAN) 4 MG tablet, , Disp: , Rfl:   •  valsartan (DIOVAN) 160 MG tablet, Take 160 mg by mouth Daily., Disp: , Rfl:   Allergies   Allergen Reactions   • Penicillins Hives     Social History     Socioeconomic History   • Marital status:      Spouse name: Not on file   • Number of children: 2   • Years of education: Not on file   • Highest education  level: Not on file   Occupational History   • Occupation: DISABLED      Comment: Back   Tobacco Use   • Smoking status: Former Smoker     Packs/day: 1.00     Years: 30.00     Pack years: 30.00     Types: Cigarettes     Last attempt to quit: 2011     Years since quittin.3   • Smokeless tobacco: Former User     Types: Chew     Quit date: 2011   Substance and Sexual Activity   • Alcohol use: No   • Drug use: No   • Sexual activity: Defer   Social History Narrative    Lives in [place].    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 Negative for chills, fever, night sweats and weight loss.   HENT: Positive for hearing loss. Negative for odynophagia and sore throat.    Eyes: Negative.    Cardiovascular: Positive for chest pain and dyspnea on exertion. Negative for leg swelling, orthopnea and palpitations.   Respiratory: Positive for cough and shortness of breath. Negative for hemoptysis.    Endocrine: Negative.  Negative for cold intolerance, heat intolerance, polydipsia, polyphagia and polyuria.   Hematologic/Lymphatic: Bruises/bleeds easily.   Skin: Negative.  Negative for itching and rash.   Musculoskeletal: Positive for back pain, joint pain and muscle cramps. Negative for joint swelling and myalgias.   Gastrointestinal: Positive for abdominal pain and nausea. Negative for constipation, diarrhea, hematemesis, hematochezia, melena and  "vomiting.   Genitourinary: Positive for frequency and nocturia. Negative for dysuria and hematuria.   Neurological: Positive for dizziness, light-headedness and loss of balance. Negative for focal weakness, headaches, numbness and seizures.   Psychiatric/Behavioral: Positive for depression. Negative for suicidal ideas. The patient is nervous/anxious.    Allergic/Immunologic: Negative.    All other systems reviewed and are negative.    Vitals:    04/22/19 1404   BP: 121/73   BP Location: Right arm   Patient Position: Sitting   Pulse: 84   Temp: 99.2 °F (37.3 °C)   SpO2: 96%   Weight: 113 kg (250 lb)   Height: 188 cm (74\")      Physical Exam   Neck: Normal range of motion. Neck supple. No JVD present. No tracheal deviation present. No thyromegaly present.   Cardiovascular: Normal rate and regular rhythm. Exam reveals no gallop and no friction rub.   No murmur heard.  Pulmonary/Chest: No stridor. No respiratory distress. He has no wheezes. He has no rales. He exhibits no tenderness.   Abdominal: Soft. Bowel sounds are normal. There is no tenderness.   Lymphadenopathy:     He has no cervical adenopathy.     Assessment/Plan:   The patient returns today for follow-up of his abdominal aortic aneurysm.  He has been having more abdominal pain and back pain with leg pain.  I have spoke with Dr. Franklin and reviewed the x-rays.  I concur with his concern about this aneurysm enlarging.  It appears to have enlarged 2 mm since last x-ray.  We will get a CTA to further assess this for consideration of endograft repair.  I discussed this with the patient in detail and answered all of his questions.    Patient Active Problem List   Diagnosis   • Anxiety   • Abdominal aortic aneurysm (CMS/HCC)   • Depression   • Hypertension   • Cataract, bilateral   • CAD (coronary artery disease)   • Abdominal aortic aneurysm (AAA) without rupture (CMS/HCC)   • Abnormal stress test   • Moderate COPD   • Former smoker   • Coal workers " pneumoconiosis, simple   • Back pain   • S/P lumbar fusion   • Prediabetes   • Acute blood loss anemia, mild, asymptomatic   • Acute postoperative pain   • Hyponatremia, mild   • Chronic respiratory failure with hypoxia and hypercapnia (CMS/HCC)     CC: MD Joo Cross MD Debbie Moore, , editing for NAOMIE Holguin, Leopoldo Burleson MD, have read and agree with the editing done by Claudia Pat, .

## 2019-04-23 DIAGNOSIS — I71.40 AAA (ABDOMINAL AORTIC ANEURYSM) WITHOUT RUPTURE (HCC): Primary | ICD-10-CM

## 2019-05-10 DIAGNOSIS — Z00.6 EXAMINATION FOR NORMAL COMPARISON FOR CLINICAL RESEARCH: Primary | ICD-10-CM

## 2019-05-10 DIAGNOSIS — I71.40 AAA (ABDOMINAL AORTIC ANEURYSM) WITHOUT RUPTURE (HCC): ICD-10-CM

## 2019-05-13 ENCOUNTER — OFFICE VISIT (OUTPATIENT)
Dept: CARDIAC SURGERY | Facility: CLINIC | Age: 69
End: 2019-05-13

## 2019-05-13 VITALS
OXYGEN SATURATION: 98 % | SYSTOLIC BLOOD PRESSURE: 129 MMHG | HEIGHT: 74 IN | WEIGHT: 250.4 LBS | DIASTOLIC BLOOD PRESSURE: 46 MMHG | TEMPERATURE: 99.1 F | HEART RATE: 80 BPM | BODY MASS INDEX: 32.14 KG/M2

## 2019-05-13 DIAGNOSIS — I71.40 ABDOMINAL AORTIC ANEURYSM (AAA) WITHOUT RUPTURE (HCC): ICD-10-CM

## 2019-05-13 DIAGNOSIS — I71.40 AAA (ABDOMINAL AORTIC ANEURYSM) WITHOUT RUPTURE (HCC): Primary | ICD-10-CM

## 2019-05-13 PROCEDURE — 99213 OFFICE O/P EST LOW 20 MIN: CPT | Performed by: THORACIC SURGERY (CARDIOTHORACIC VASCULAR SURGERY)

## 2019-05-13 RX ORDER — OXYCODONE HYDROCHLORIDE AND ACETAMINOPHEN 5; 325 MG/1; MG/1
1 TABLET ORAL EVERY 6 HOURS PRN
COMMUNITY
End: 2019-07-28 | Stop reason: HOSPADM

## 2019-05-13 NOTE — PROGRESS NOTES
05/13/2019  Patient Information  Derek Kelsey                                                                                          [address] [facility] [address] [facility] [address]   1950  'PCP/Referring Physician'  Raudel Zheng MD  [phone]  No ref. provider found    Chief Complaint   Patient presents with   • Aortic Aneurysm     Follow-up with results from CT abdomen/pelvis for abdominal aortic aneurysm. Having abdominal pain and leg swelling       History of Present Illness:   The patient, Mr. Kelsey, returns today for follow-up of his abdominal aneurysm.  Dr. Bhatti spoke to me about his need for back surgery.  He, however, has been having abdominal pain.  On his most recent x-ray the aneurysm has been read out as 4.8 cm by the radiologist according to Dr. Bhatti.  He is very concerned about the possibility of having problems from this.  He has been referred to me for consideration of surgical repair prior to his back surgery.      Patient Active Problem List   Diagnosis   • Anxiety   • Abdominal aortic aneurysm (CMS/HCC)   • Depression   • Hypertension   • Cataract, bilateral   • CAD (coronary artery disease)   • AAA (abdominal aortic aneurysm) without rupture (CMS/HCC)   • Abnormal stress test   • Moderate COPD   • Former smoker   • Coal workers pneumoconiosis, simple   • Back pain   • S/P lumbar fusion   • Prediabetes   • Acute blood loss anemia, mild, asymptomatic   • Acute postoperative pain   • Hyponatremia, mild   • Chronic respiratory failure with hypoxia and hypercapnia (CMS/HCC)     Past Medical History:   Diagnosis Date   • Abdominal aortic aneurysm (CMS/HCC) 9/29/2016   • Anxiety 9/29/2016   • Arthritis    • CAD (coronary artery disease) 9/29/2016   • Cataract, bilateral 9/29/2016   • COPD (chronic obstructive pulmonary disease) (CMS/HCC) 9/29/2016   • Depression 9/29/2016   • Depression    • GERD (gastroesophageal reflux disease)    • Hypertension 9/29/2016   • Wears dentures       Past Surgical History:   Procedure Laterality Date   • CARDIAC CATHETERIZATION     • CARDIAC CATHETERIZATION N/A 9/28/2018    Procedure: Left Heart Cath;  Surgeon: Douglas Galvez MD;  Location:  [facility];  Service: Cardiovascular   • COLONOSCOPY     • HAND SURGERY     • LUMBAR DISCECTOMY FUSION INSTRUMENTATION N/A 11/1/2018    Procedure: LUMBAR DISCECTOMY POSTERIOR WITH FUSION INSTRUMENTATION;  Surgeon: Joo Franklin MD;  Location:  [facility];  Service: Orthopedic Spine   • SHOULDER SURGERY Left        Current Outpatient Medications:   •  acetaminophen (TYLENOL) 500 MG tablet, Take 500 mg by mouth Every 6 (Six) Hours As Needed for Mild Pain ., Disp: , Rfl:   •  ASPIRIN LOW DOSE 81 MG tablet, Take 1 tablet by mouth Daily. Last dose 10-21-18, Disp: , Rfl:   •  citalopram (CeleXA) 20 MG tablet, Take 20 mg by mouth Daily., Disp: , Rfl:   •  Fluticasone-Umeclidin-Vilant (TRELEGY ELLIPTA) 100-62.5-25 MCG/INH aerosol powder , Inhale 1 each Daily., Disp: , Rfl:   •  metoprolol succinate XL (TOPROL-XL) 50 MG 24 hr tablet, Take 50 mg by mouth Daily., Disp: , Rfl:   •  O2 (OXYGEN), , Disp: , Rfl:   •  oxyCODONE-acetaminophen (PERCOCET) 5-325 MG per tablet, Take 1 tablet by mouth Every 6 (Six) Hours As Needed., Disp: , Rfl:   •  triamcinolone (KENALOG) 0.1 % cream, Apply 1 application topically to the appropriate area as directed 3 (Three) Times a Day., Disp: , Rfl:   •  vitamin B-12 (CYANOCOBALAMIN) 1000 MCG tablet, Take 1,000 mcg by mouth Daily., Disp: , Rfl:   •  amLODIPine (NORVASC) 5 MG tablet, Take 5 mg by mouth 2 (Two) Times a Day., Disp: , Rfl:   •  hydrALAZINE (APRESOLINE) 50 MG tablet, Take 50 mg by mouth 3 (Three) Times a Day., Disp: , Rfl:   •  lisinopril (PRINIVIL,ZESTRIL) 40 MG tablet, Take 40 mg by mouth 2 (Two) Times a Day., Disp: , Rfl:   •  ondansetron (ZOFRAN) 4 MG tablet, , Disp: , Rfl:   •  pantoprazole (PROTONIX) 40 MG EC tablet, Take 40 mg by mouth Daily., Disp: , Rfl:   •   valsartan (DIOVAN) 160 MG tablet, Take 160 mg by mouth Daily., Disp: , Rfl:   Allergies   Allergen Reactions   • Penicillins Hives     Social History     Socioeconomic History   • Marital status:      Spouse name: Not on file   • Number of children: 2   • Years of education: Not on file   • Highest education level: Not on file   Occupational History   • Occupation: DISABLED      Comment: Back   Tobacco Use   • Smoking status: Former Smoker     Packs/day: 1.00     Years: 30.00     Pack years: 30.00     Types: Cigarettes     Last attempt to quit: 2011     Years since quittin.3   • Smokeless tobacco: Former User     Types: Chew     Quit date: 2011   Substance and Sexual Activity   • Alcohol use: No   • Drug use: No   • Sexual activity: Defer   Social History Narrative    Lives in [place].    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 Negative for chills, fever, night sweats and weight loss.   HENT: Positive for hearing loss. Negative for odynophagia and sore throat.    Eyes: Negative.    Cardiovascular: Positive for chest pain, dyspnea on exertion and leg swelling. Negative for orthopnea and palpitations.   Respiratory: Positive for cough and shortness of breath. Negative for hemoptysis.    Endocrine: Negative.  Negative for cold intolerance, heat intolerance, polydipsia, polyphagia and polyuria.   Hematologic/Lymphatic:  "Bruises/bleeds easily.   Skin: Negative.  Negative for itching and rash.   Musculoskeletal: Positive for back pain, joint pain and muscle cramps. Negative for joint swelling and myalgias.   Gastrointestinal: Positive for abdominal pain and nausea. Negative for constipation, diarrhea, hematemesis, hematochezia, melena and vomiting.   Genitourinary: Positive for frequency and nocturia. Negative for dysuria and hematuria.   Neurological: Positive for dizziness, light-headedness and loss of balance. Negative for focal weakness, headaches, numbness and seizures.   Psychiatric/Behavioral: Positive for depression. Negative for suicidal ideas. The patient is nervous/anxious.    Allergic/Immunologic: Negative.    All other systems reviewed and are negative.    Vitals:    05/13/19 1331   BP: 129/46   BP Location: Right arm   Patient Position: Sitting   Pulse: 80   Temp: 99.1 °F (37.3 °C)   TempSrc: Temporal   SpO2: 98%   Weight: 114 kg (250 lb 6.4 oz)   Height: 188 cm (74\")      Physical Exam   Neck: Normal range of motion. Neck supple. No JVD present. No tracheal deviation present. No thyromegaly present.   Cardiovascular: Normal rate and regular rhythm. Exam reveals no gallop and no friction rub.   No murmur heard.  Pulmonary/Chest: No stridor. No respiratory distress. He has no wheezes. He has no rales. He exhibits no tenderness.   Abdominal: Soft. Bowel sounds are normal. There is no tenderness.   Lymphadenopathy:     He has no cervical adenopathy.     Assessment/Plan:   The patient has abdominal aneurysm.  He has been having abdominal pain that I cannot elicit any other cause.  Dr. Bhatti is very concerned about this pain.  The radiologist read his aneurysm as being 4.8 cm.  I have reviewed the films.  At this point, I think surgical repair is indicated.  I think we can do this by endovascular repair. I discussed with the patient about his CT findings.  I have discussed the operation, risks, and alternatives including " risk to his life, bleeding, infection, organ failure, and other risk factors.  He appears to be fully informed and desires to proceed.    Patient Active Problem List   Diagnosis   • Anxiety   • Abdominal aortic aneurysm (CMS/HCC)   • Depression   • Hypertension   • Cataract, bilateral   • CAD (coronary artery disease)   • AAA (abdominal aortic aneurysm) without rupture (CMS/HCC)   • Abnormal stress test   • Moderate COPD   • Former smoker   • Coal workers pneumoconiosis, simple   • Back pain   • S/P lumbar fusion   • Prediabetes   • Acute blood loss anemia, mild, asymptomatic   • Acute postoperative pain   • Hyponatremia, mild   • Chronic respiratory failure with hypoxia and hypercapnia (CMS/HCC)     CC: MD Claudia Cross, , editing for Leopoldo Burleson M.D.    I, Leopoldo Burleson MD, have read and agree with the editing done by Claudia Pat, .

## 2019-05-15 ENCOUNTER — PREP FOR SURGERY (OUTPATIENT)
Dept: OTHER | Facility: HOSPITAL | Age: 69
End: 2019-05-15

## 2019-05-15 DIAGNOSIS — I71.40 ABDOMINAL AORTIC ANEURYSM (AAA) WITHOUT RUPTURE (HCC): Primary | ICD-10-CM

## 2019-05-16 RX ORDER — CHLORHEXIDINE GLUCONATE 500 MG/1
1 CLOTH TOPICAL EVERY 12 HOURS PRN
Status: CANCELLED | OUTPATIENT
Start: 2019-05-16

## 2019-05-28 ENCOUNTER — APPOINTMENT (OUTPATIENT)
Dept: PREADMISSION TESTING | Facility: HOSPITAL | Age: 69
End: 2019-05-28

## 2019-06-05 ENCOUNTER — APPOINTMENT (OUTPATIENT)
Dept: PREADMISSION TESTING | Facility: HOSPITAL | Age: 69
End: 2019-06-05

## 2019-06-05 ENCOUNTER — HOSPITAL ENCOUNTER (OUTPATIENT)
Dept: GENERAL RADIOLOGY | Facility: HOSPITAL | Age: 69
Discharge: HOME | End: 2019-06-05
Admitting: PHYSICIAN ASSISTANT

## 2019-06-05 VITALS — HEIGHT: 74 IN | BODY MASS INDEX: 31.49 KG/M2 | WEIGHT: 245.38 LBS

## 2019-06-05 DIAGNOSIS — I71.40 ABDOMINAL AORTIC ANEURYSM (AAA) WITHOUT RUPTURE (HCC): ICD-10-CM

## 2019-06-05 LAB
ABO GROUP BLD: NORMAL
ANION GAP SERPL CALCULATED.3IONS-SCNC: 13 MMOL/L
APTT PPP: 42.3 SECONDS (ref 24–37)
BASOPHILS # BLD AUTO: 0.05 10*3/MM3 (ref 0–0.2)
BASOPHILS NFR BLD AUTO: 0.6 % (ref 0–1.5)
BLD GP AB SCN SERPL QL: NEGATIVE
BUN BLD-MCNC: 13 MG/DL (ref 8–23)
BUN/CREAT SERPL: 14.3 (ref 7–25)
CALCIUM SPEC-SCNC: 9.4 MG/DL (ref 8.6–10.5)
CHLORIDE SERPL-SCNC: 99 MMOL/L (ref 98–107)
CO2 SERPL-SCNC: 24 MMOL/L (ref 22–29)
CREAT BLD-MCNC: 0.91 MG/DL (ref 0.76–1.27)
DEPRECATED RDW RBC AUTO: 51.6 FL (ref 37–54)
EOSINOPHIL # BLD AUTO: 0.28 10*3/MM3 (ref 0–0.4)
EOSINOPHIL NFR BLD AUTO: 3.3 % (ref 0.3–6.2)
ERYTHROCYTE [DISTWIDTH] IN BLOOD BY AUTOMATED COUNT: 15.3 % (ref 12.3–15.4)
GFR SERPL CREATININE-BSD FRML MDRD: 83 ML/MIN/1.73
GLUCOSE BLD-MCNC: 180 MG/DL (ref 65–99)
HBA1C MFR BLD: 6.7 % (ref 4.8–5.6)
HCT VFR BLD AUTO: 43.4 % (ref 37.5–51)
HGB BLD-MCNC: 14.1 G/DL (ref 13–17.7)
IMM GRANULOCYTES # BLD AUTO: 0.03 10*3/MM3 (ref 0–0.05)
IMM GRANULOCYTES NFR BLD AUTO: 0.4 % (ref 0–0.5)
INR PPP: 1.3 (ref 0.85–1.16)
LYMPHOCYTES # BLD AUTO: 1.54 10*3/MM3 (ref 0.7–3.1)
LYMPHOCYTES NFR BLD AUTO: 18.4 % (ref 19.6–45.3)
MCH RBC QN AUTO: 29.7 PG (ref 26.6–33)
MCHC RBC AUTO-ENTMCNC: 32.5 G/DL (ref 31.5–35.7)
MCV RBC AUTO: 91.6 FL (ref 79–97)
MONOCYTES # BLD AUTO: 0.97 10*3/MM3 (ref 0.1–0.9)
MONOCYTES NFR BLD AUTO: 11.6 % (ref 5–12)
NEUTROPHILS # BLD AUTO: 5.55 10*3/MM3 (ref 1.7–7)
NEUTROPHILS NFR BLD AUTO: 66.1 % (ref 42.7–76)
PLATELET # BLD AUTO: 166 10*3/MM3 (ref 140–450)
PMV BLD AUTO: 11 FL (ref 6–12)
POTASSIUM BLD-SCNC: 4.2 MMOL/L (ref 3.5–5.2)
PROTHROMBIN TIME: 15.6 SECONDS (ref 11.2–14.3)
RBC # BLD AUTO: 4.74 10*6/MM3 (ref 4.14–5.8)
RH BLD: POSITIVE
SODIUM BLD-SCNC: 136 MMOL/L (ref 136–145)
T&S EXPIRATION DATE: NORMAL
WBC NRBC COR # BLD: 8.39 10*3/MM3 (ref 3.4–10.8)

## 2019-06-05 PROCEDURE — 71046 X-RAY EXAM CHEST 2 VIEWS: CPT

## 2019-06-05 PROCEDURE — 86850 RBC ANTIBODY SCREEN: CPT | Performed by: PHYSICIAN ASSISTANT

## 2019-06-05 PROCEDURE — 85610 PROTHROMBIN TIME: CPT | Performed by: PHYSICIAN ASSISTANT

## 2019-06-05 PROCEDURE — 85730 THROMBOPLASTIN TIME PARTIAL: CPT | Performed by: PHYSICIAN ASSISTANT

## 2019-06-05 PROCEDURE — 93010 ELECTROCARDIOGRAM REPORT: CPT | Performed by: INTERNAL MEDICINE

## 2019-06-05 PROCEDURE — 83036 HEMOGLOBIN GLYCOSYLATED A1C: CPT | Performed by: PHYSICIAN ASSISTANT

## 2019-06-05 PROCEDURE — 85025 COMPLETE CBC W/AUTO DIFF WBC: CPT | Performed by: PHYSICIAN ASSISTANT

## 2019-06-05 PROCEDURE — 80048 BASIC METABOLIC PNL TOTAL CA: CPT | Performed by: PHYSICIAN ASSISTANT

## 2019-06-05 PROCEDURE — 86901 BLOOD TYPING SEROLOGIC RH(D): CPT | Performed by: PHYSICIAN ASSISTANT

## 2019-06-05 PROCEDURE — 36415 COLL VENOUS BLD VENIPUNCTURE: CPT

## 2019-06-05 PROCEDURE — 86900 BLOOD TYPING SEROLOGIC ABO: CPT | Performed by: PHYSICIAN ASSISTANT

## 2019-06-05 PROCEDURE — 93005 ELECTROCARDIOGRAM TRACING: CPT

## 2019-06-05 PROCEDURE — 86923 COMPATIBILITY TEST ELECTRIC: CPT

## 2019-06-05 NOTE — PAT
Patient's o2 sat 92%.    Bactroban placed in nares in PAT.    Patient to apply Chlorhexadine wipes  to surgical area (as instructed) the night before procedure and the AM of procedure. Wipes provided.    Abnormal EKG faxed to Dr. Gonzalez's office.

## 2019-06-06 ENCOUNTER — APPOINTMENT (OUTPATIENT)
Dept: GENERAL RADIOLOGY | Facility: HOSPITAL | Age: 69
End: 2019-06-06

## 2019-06-06 ENCOUNTER — ANESTHESIA EVENT (OUTPATIENT)
Dept: PERIOP | Facility: HOSPITAL | Age: 69
End: 2019-06-06

## 2019-06-06 ENCOUNTER — ANESTHESIA (OUTPATIENT)
Dept: PERIOP | Facility: HOSPITAL | Age: 69
End: 2019-06-06

## 2019-06-06 ENCOUNTER — HOSPITAL ENCOUNTER (INPATIENT)
Facility: HOSPITAL | Age: 69
LOS: 2 days | Discharge: HOME | End: 2019-06-08
Attending: THORACIC SURGERY (CARDIOTHORACIC VASCULAR SURGERY) | Admitting: THORACIC SURGERY (CARDIOTHORACIC VASCULAR SURGERY)

## 2019-06-06 DIAGNOSIS — I71.40 ABDOMINAL AORTIC ANEURYSM (AAA) WITHOUT RUPTURE (HCC): ICD-10-CM

## 2019-06-06 PROCEDURE — 25010000002 VANCOMYCIN 10 G RECONSTITUTED SOLUTION: Performed by: PHYSICIAN ASSISTANT

## 2019-06-06 PROCEDURE — C1894 INTRO/SHEATH, NON-LASER: HCPCS | Performed by: THORACIC SURGERY (CARDIOTHORACIC VASCULAR SURGERY)

## 2019-06-06 PROCEDURE — 34705 EVAC RPR A-BIILIAC NDGFT: CPT | Performed by: THORACIC SURGERY (CARDIOTHORACIC VASCULAR SURGERY)

## 2019-06-06 PROCEDURE — 25010000002 FENTANYL CITRATE (PF) 100 MCG/2ML SOLUTION: Performed by: NURSE ANESTHETIST, CERTIFIED REGISTERED

## 2019-06-06 PROCEDURE — 25010000002 PROPOFOL 10 MG/ML EMULSION: Performed by: NURSE ANESTHETIST, CERTIFIED REGISTERED

## 2019-06-06 PROCEDURE — 25010000002 DEXAMETHASONE PER 1 MG: Performed by: NURSE ANESTHETIST, CERTIFIED REGISTERED

## 2019-06-06 PROCEDURE — 94799 UNLISTED PULMONARY SVC/PX: CPT

## 2019-06-06 PROCEDURE — C1769 GUIDE WIRE: HCPCS | Performed by: THORACIC SURGERY (CARDIOTHORACIC VASCULAR SURGERY)

## 2019-06-06 PROCEDURE — 25010000002 IOPAMIDOL 61 % SOLUTION: Performed by: THORACIC SURGERY (CARDIOTHORACIC VASCULAR SURGERY)

## 2019-06-06 PROCEDURE — C2628 CATHETER, OCCLUSION: HCPCS | Performed by: THORACIC SURGERY (CARDIOTHORACIC VASCULAR SURGERY)

## 2019-06-06 PROCEDURE — 04V03DZ RESTRICTION OF ABDOMINAL AORTA WITH INTRALUMINAL DEVICE, PERCUTANEOUS APPROACH: ICD-10-PCS | Performed by: THORACIC SURGERY (CARDIOTHORACIC VASCULAR SURGERY)

## 2019-06-06 PROCEDURE — 25010000002 HEPARIN (PORCINE) PER 1000 UNITS: Performed by: THORACIC SURGERY (CARDIOTHORACIC VASCULAR SURGERY)

## 2019-06-06 PROCEDURE — C1760 CLOSURE DEV, VASC: HCPCS | Performed by: THORACIC SURGERY (CARDIOTHORACIC VASCULAR SURGERY)

## 2019-06-06 PROCEDURE — 25010000002 ONDANSETRON PER 1 MG: Performed by: NURSE ANESTHETIST, CERTIFIED REGISTERED

## 2019-06-06 PROCEDURE — 25010000002 HEPARIN (PORCINE) PER 1000 UNITS: Performed by: NURSE ANESTHETIST, CERTIFIED REGISTERED

## 2019-06-06 PROCEDURE — 99232 SBSQ HOSP IP/OBS MODERATE 35: CPT | Performed by: INTERNAL MEDICINE

## 2019-06-06 PROCEDURE — 25010000002 PROTAMINE SULFATE PER 10 MG: Performed by: NURSE ANESTHETIST, CERTIFIED REGISTERED

## 2019-06-06 PROCEDURE — 34812 OPN FEM ART EXPOS: CPT | Performed by: THORACIC SURGERY (CARDIOTHORACIC VASCULAR SURGERY)

## 2019-06-06 PROCEDURE — 94640 AIRWAY INHALATION TREATMENT: CPT

## 2019-06-06 PROCEDURE — 34713 PERQ ACCESS & CLSR FEM ART: CPT | Performed by: THORACIC SURGERY (CARDIOTHORACIC VASCULAR SURGERY)

## 2019-06-06 DEVICE — EXCLUDER AAA ENDO TRNK IPSI 26MMX14.5MMX16CM 16FR
Type: IMPLANTABLE DEVICE | Site: AORTA | Status: FUNCTIONAL
Brand: GORE EXCLUDER AAA ENDOPROSTHESIS WITH C3 DELIVERY

## 2019-06-06 DEVICE — GRFT EXCLDR CONTRALAT 14.5MMX14CM: Type: IMPLANTABLE DEVICE | Site: AORTA | Status: FUNCTIONAL

## 2019-06-06 RX ORDER — MAGNESIUM HYDROXIDE 1200 MG/15ML
LIQUID ORAL AS NEEDED
Status: DISCONTINUED | OUTPATIENT
Start: 2019-06-06 | End: 2019-06-06 | Stop reason: HOSPADM

## 2019-06-06 RX ORDER — IPRATROPIUM BROMIDE AND ALBUTEROL SULFATE 2.5; .5 MG/3ML; MG/3ML
3 SOLUTION RESPIRATORY (INHALATION)
Status: DISCONTINUED | OUTPATIENT
Start: 2019-06-06 | End: 2019-06-08 | Stop reason: HOSPADM

## 2019-06-06 RX ORDER — EPHEDRINE SULFATE 50 MG/ML
5 INJECTION, SOLUTION INTRAVENOUS ONCE AS NEEDED
Status: DISCONTINUED | OUTPATIENT
Start: 2019-06-06 | End: 2019-06-06 | Stop reason: HOSPADM

## 2019-06-06 RX ORDER — FENTANYL CITRATE 50 UG/ML
INJECTION, SOLUTION INTRAMUSCULAR; INTRAVENOUS AS NEEDED
Status: DISCONTINUED | OUTPATIENT
Start: 2019-06-06 | End: 2019-06-06 | Stop reason: SURG

## 2019-06-06 RX ORDER — PANTOPRAZOLE SODIUM 40 MG/1
40 TABLET, DELAYED RELEASE ORAL 2 TIMES DAILY
Status: DISCONTINUED | OUTPATIENT
Start: 2019-06-06 | End: 2019-06-08 | Stop reason: HOSPADM

## 2019-06-06 RX ORDER — PROPOFOL 10 MG/ML
VIAL (ML) INTRAVENOUS AS NEEDED
Status: DISCONTINUED | OUTPATIENT
Start: 2019-06-06 | End: 2019-06-06 | Stop reason: SURG

## 2019-06-06 RX ORDER — ATRACURIUM BESYLATE 10 MG/ML
INJECTION, SOLUTION INTRAVENOUS AS NEEDED
Status: DISCONTINUED | OUTPATIENT
Start: 2019-06-06 | End: 2019-06-06 | Stop reason: SURG

## 2019-06-06 RX ORDER — MORPHINE SULFATE 4 MG/ML
4 INJECTION, SOLUTION INTRAMUSCULAR; INTRAVENOUS EVERY 4 HOURS PRN
Status: DISCONTINUED | OUTPATIENT
Start: 2019-06-06 | End: 2019-06-08 | Stop reason: HOSPADM

## 2019-06-06 RX ORDER — SODIUM CHLORIDE, SODIUM LACTATE, POTASSIUM CHLORIDE, CALCIUM CHLORIDE 600; 310; 30; 20 MG/100ML; MG/100ML; MG/100ML; MG/100ML
9 INJECTION, SOLUTION INTRAVENOUS CONTINUOUS
Status: DISCONTINUED | OUTPATIENT
Start: 2019-06-06 | End: 2019-06-07

## 2019-06-06 RX ORDER — ATROPINE SULFATE 1 MG/ML
0.5 INJECTION, SOLUTION INTRAMUSCULAR; INTRAVENOUS; SUBCUTANEOUS ONCE AS NEEDED
Status: DISCONTINUED | OUTPATIENT
Start: 2019-06-06 | End: 2019-06-06 | Stop reason: HOSPADM

## 2019-06-06 RX ORDER — HEPARIN SODIUM 1000 [USP'U]/ML
INJECTION, SOLUTION INTRAVENOUS; SUBCUTANEOUS AS NEEDED
Status: DISCONTINUED | OUTPATIENT
Start: 2019-06-06 | End: 2019-06-06 | Stop reason: SURG

## 2019-06-06 RX ORDER — DEXAMETHASONE SODIUM PHOSPHATE 4 MG/ML
INJECTION, SOLUTION INTRA-ARTICULAR; INTRALESIONAL; INTRAMUSCULAR; INTRAVENOUS; SOFT TISSUE AS NEEDED
Status: DISCONTINUED | OUTPATIENT
Start: 2019-06-06 | End: 2019-06-06 | Stop reason: SURG

## 2019-06-06 RX ORDER — LIDOCAINE HYDROCHLORIDE 10 MG/ML
INJECTION, SOLUTION INFILTRATION; PERINEURAL AS NEEDED
Status: DISCONTINUED | OUTPATIENT
Start: 2019-06-06 | End: 2019-06-06 | Stop reason: SURG

## 2019-06-06 RX ORDER — ONDANSETRON 2 MG/ML
INJECTION INTRAMUSCULAR; INTRAVENOUS AS NEEDED
Status: DISCONTINUED | OUTPATIENT
Start: 2019-06-06 | End: 2019-06-06 | Stop reason: SURG

## 2019-06-06 RX ORDER — ASPIRIN 81 MG/1
81 TABLET, CHEWABLE ORAL DAILY
Status: DISCONTINUED | OUTPATIENT
Start: 2019-06-06 | End: 2019-06-08 | Stop reason: HOSPADM

## 2019-06-06 RX ORDER — NEOSTIGMINE METHYLSULFATE 5 MG/5 ML
SYRINGE (ML) INTRAVENOUS AS NEEDED
Status: DISCONTINUED | OUTPATIENT
Start: 2019-06-06 | End: 2019-06-06 | Stop reason: SURG

## 2019-06-06 RX ORDER — PROMETHAZINE HYDROCHLORIDE 25 MG/1
25 SUPPOSITORY RECTAL ONCE AS NEEDED
Status: DISCONTINUED | OUTPATIENT
Start: 2019-06-06 | End: 2019-06-06 | Stop reason: HOSPADM

## 2019-06-06 RX ORDER — CITALOPRAM 20 MG/1
20 TABLET ORAL DAILY
Status: DISCONTINUED | OUTPATIENT
Start: 2019-06-06 | End: 2019-06-08 | Stop reason: HOSPADM

## 2019-06-06 RX ORDER — FENTANYL CITRATE 50 UG/ML
50 INJECTION, SOLUTION INTRAMUSCULAR; INTRAVENOUS
Status: DISCONTINUED | OUTPATIENT
Start: 2019-06-06 | End: 2019-06-06 | Stop reason: HOSPADM

## 2019-06-06 RX ORDER — SODIUM CHLORIDE 450 MG/100ML
50 INJECTION, SOLUTION INTRAVENOUS CONTINUOUS
Status: DISCONTINUED | OUTPATIENT
Start: 2019-06-06 | End: 2019-06-07

## 2019-06-06 RX ORDER — PROMETHAZINE HYDROCHLORIDE 25 MG/ML
6.25 INJECTION, SOLUTION INTRAMUSCULAR; INTRAVENOUS ONCE AS NEEDED
Status: DISCONTINUED | OUTPATIENT
Start: 2019-06-06 | End: 2019-06-06 | Stop reason: HOSPADM

## 2019-06-06 RX ORDER — AMLODIPINE BESYLATE 5 MG/1
5 TABLET ORAL 2 TIMES DAILY
Status: DISCONTINUED | OUTPATIENT
Start: 2019-06-06 | End: 2019-06-08 | Stop reason: HOSPADM

## 2019-06-06 RX ORDER — LIDOCAINE HYDROCHLORIDE 10 MG/ML
0.2 INJECTION, SOLUTION INFILTRATION; PERINEURAL ONCE
Status: COMPLETED | OUTPATIENT
Start: 2019-06-06 | End: 2019-06-06

## 2019-06-06 RX ORDER — GLYCOPYRROLATE 0.2 MG/ML
INJECTION INTRAMUSCULAR; INTRAVENOUS AS NEEDED
Status: DISCONTINUED | OUTPATIENT
Start: 2019-06-06 | End: 2019-06-06 | Stop reason: SURG

## 2019-06-06 RX ORDER — HYDROCODONE BITARTRATE AND ACETAMINOPHEN 7.5; 325 MG/1; MG/1
1 TABLET ORAL EVERY 6 HOURS PRN
Status: DISCONTINUED | OUTPATIENT
Start: 2019-06-06 | End: 2019-06-08 | Stop reason: HOSPADM

## 2019-06-06 RX ORDER — IPRATROPIUM BROMIDE AND ALBUTEROL SULFATE 2.5; .5 MG/3ML; MG/3ML
3 SOLUTION RESPIRATORY (INHALATION) EVERY 6 HOURS PRN
Status: DISCONTINUED | OUTPATIENT
Start: 2019-06-06 | End: 2019-06-08 | Stop reason: HOSPADM

## 2019-06-06 RX ORDER — PROTAMINE SULFATE 10 MG/ML
INJECTION, SOLUTION INTRAVENOUS AS NEEDED
Status: DISCONTINUED | OUTPATIENT
Start: 2019-06-06 | End: 2019-06-06 | Stop reason: SURG

## 2019-06-06 RX ORDER — ESMOLOL HYDROCHLORIDE 10 MG/ML
INJECTION INTRAVENOUS AS NEEDED
Status: DISCONTINUED | OUTPATIENT
Start: 2019-06-06 | End: 2019-06-06 | Stop reason: SURG

## 2019-06-06 RX ORDER — METOPROLOL SUCCINATE 50 MG/1
50 TABLET, EXTENDED RELEASE ORAL DAILY
Status: DISCONTINUED | OUTPATIENT
Start: 2019-06-06 | End: 2019-06-08 | Stop reason: HOSPADM

## 2019-06-06 RX ORDER — FAMOTIDINE 20 MG/1
20 TABLET, FILM COATED ORAL ONCE
Status: COMPLETED | OUTPATIENT
Start: 2019-06-06 | End: 2019-06-06

## 2019-06-06 RX ORDER — PROMETHAZINE HYDROCHLORIDE 25 MG/1
25 TABLET ORAL ONCE AS NEEDED
Status: DISCONTINUED | OUTPATIENT
Start: 2019-06-06 | End: 2019-06-06 | Stop reason: HOSPADM

## 2019-06-06 RX ADMIN — ASPIRIN 81 MG CHEWABLE TABLET 81 MG: 81 TABLET CHEWABLE at 15:07

## 2019-06-06 RX ADMIN — HYDROCODONE BITARTRATE AND ACETAMINOPHEN 1 TABLET: 7.5; 325 TABLET ORAL at 19:32

## 2019-06-06 RX ADMIN — IPRATROPIUM BROMIDE AND ALBUTEROL SULFATE 3 ML: 2.5; .5 SOLUTION RESPIRATORY (INHALATION) at 20:03

## 2019-06-06 RX ADMIN — ONDANSETRON 4 MG: 2 INJECTION INTRAMUSCULAR; INTRAVENOUS at 11:53

## 2019-06-06 RX ADMIN — VANCOMYCIN HYDROCHLORIDE 1750 MG: 10 INJECTION, POWDER, LYOPHILIZED, FOR SOLUTION INTRAVENOUS at 10:14

## 2019-06-06 RX ADMIN — MUPIROCIN 1 APPLICATION: 20 OINTMENT TOPICAL at 09:21

## 2019-06-06 RX ADMIN — LIDOCAINE HYDROCHLORIDE 0.2 ML: 10 INJECTION, SOLUTION EPIDURAL; INFILTRATION; INTRACAUDAL; PERINEURAL at 09:00

## 2019-06-06 RX ADMIN — Medication 3 MG: at 11:53

## 2019-06-06 RX ADMIN — LIDOCAINE HYDROCHLORIDE 50 MG: 10 INJECTION, SOLUTION INFILTRATION; PERINEURAL at 10:39

## 2019-06-06 RX ADMIN — IPRATROPIUM BROMIDE AND ALBUTEROL SULFATE 3 ML: 2.5; .5 SOLUTION RESPIRATORY (INHALATION) at 16:56

## 2019-06-06 RX ADMIN — METOPROLOL SUCCINATE 50 MG: 50 TABLET, EXTENDED RELEASE ORAL at 15:08

## 2019-06-06 RX ADMIN — PROTAMINE SULFATE 75 MG: 10 INJECTION, SOLUTION INTRAVENOUS at 11:55

## 2019-06-06 RX ADMIN — FAMOTIDINE 20 MG: 20 TABLET, FILM COATED ORAL at 09:28

## 2019-06-06 RX ADMIN — SODIUM CHLORIDE, POTASSIUM CHLORIDE, SODIUM LACTATE AND CALCIUM CHLORIDE 9 ML/HR: 600; 310; 30; 20 INJECTION, SOLUTION INTRAVENOUS at 09:03

## 2019-06-06 RX ADMIN — FENTANYL CITRATE 100 MCG: 50 INJECTION, SOLUTION INTRAMUSCULAR; INTRAVENOUS at 10:39

## 2019-06-06 RX ADMIN — CITALOPRAM HYDROBROMIDE 20 MG: 20 TABLET ORAL at 15:08

## 2019-06-06 RX ADMIN — ATRACURIUM BESYLATE 40 MG: 10 INJECTION, SOLUTION INTRAVENOUS at 10:39

## 2019-06-06 RX ADMIN — FENTANYL CITRATE 50 MCG: 50 INJECTION, SOLUTION INTRAMUSCULAR; INTRAVENOUS at 12:23

## 2019-06-06 RX ADMIN — HEPARIN SODIUM 10000 UNITS: 1000 INJECTION, SOLUTION INTRAVENOUS; SUBCUTANEOUS at 10:59

## 2019-06-06 RX ADMIN — HYDROCODONE BITARTRATE AND ACETAMINOPHEN 1 TABLET: 7.5; 325 TABLET ORAL at 14:02

## 2019-06-06 RX ADMIN — PROPOFOL 150 MG: 10 INJECTION, EMULSION INTRAVENOUS at 10:39

## 2019-06-06 RX ADMIN — GLYCOPYRROLATE 0.4 MG: 0.2 INJECTION, SOLUTION INTRAMUSCULAR; INTRAVENOUS at 11:53

## 2019-06-06 RX ADMIN — FENTANYL CITRATE 50 MCG: 50 INJECTION, SOLUTION INTRAMUSCULAR; INTRAVENOUS at 12:35

## 2019-06-06 RX ADMIN — ESMOLOL HYDROCHLORIDE 50 MG: 10 INJECTION INTRAVENOUS at 10:39

## 2019-06-06 RX ADMIN — SODIUM CHLORIDE, POTASSIUM CHLORIDE, SODIUM LACTATE AND CALCIUM CHLORIDE 9 ML/HR: 600; 310; 30; 20 INJECTION, SOLUTION INTRAVENOUS at 09:00

## 2019-06-06 RX ADMIN — DEXAMETHASONE SODIUM PHOSPHATE 8 MG: 4 INJECTION, SOLUTION INTRAMUSCULAR; INTRAVENOUS at 10:39

## 2019-06-06 RX ADMIN — SODIUM CHLORIDE 50 ML/HR: 4.5 INJECTION, SOLUTION INTRAVENOUS at 14:00

## 2019-06-06 RX ADMIN — PANTOPRAZOLE SODIUM 40 MG: 40 TABLET, DELAYED RELEASE ORAL at 21:36

## 2019-06-06 RX ADMIN — AMLODIPINE BESYLATE 5 MG: 5 TABLET ORAL at 21:36

## 2019-06-06 NOTE — OP NOTE
Operative Report  Derek Kelsey  [id number]  1950    Preop Diagnosis: Aortic abdominal aneurysm        Postop Diagnosis same        Procedure: Repair of infrarenal aortic abdominal aneurysm [place] excluder device with the main body on the left being a 26 x 14 x 16 C3, contralateral limb on the right a 14 x 14        Surgeons: Leopoldo Burleson        Assistant: Demetri Yeh        Operative Findings:         Description: Patient was brought to the operating room placed under general anesthesia.  Patient's abdomen groins were prepped and draped in usual sterile fashion.  At this time using modified Seldinger technique both femoral arteries were stuck.  2 Perclose is were placed on the right and left side in the standard protocol after the patient been given 10,000 units of heparin.  7 [place] sheaths were placed.  A Magic torque wire is navigated the aneurysm and were exchanged for Amplatz wires on both sides.  A 12 [place] sheath on the right and a 16 [place] sheath on the left was placed.  A pigtail catheter was placed up on the right and the main body on the left.  The renals were delineated in the neck of the main body was deployed.  This was confirmed with angiography is being below the renal in good position.  At this time we pullback the pigtail catheter and using a contra catheter would cannulate the contralateral limb.  The 14 x 14 contralateral limb was then deployed.  The remaining portion of the graft was deployed after the left hypogastric had been delineated.  Both sides were hand ballooned in a standard fashion.  Pigtail catheter was placed up on the right and completion angiography revealed no type I or type II leaks.  The sheaths were removed and the 2 Perclose is were tied down with excellent hemostasis.  The patient was reversed with 75 mg of protamine.  There was excellent pulses in the feet noted.  Radiation dose was 2265 mGy, fluoroscopy time 26 minutes 48 seconds, and contrast 70 cc of  Isovue-300        EBL: 200 cc      Please note that portions of this note were completed with a voice recognition program. Efforts were made to edit the dictations, but words may be mistranscribed      Leopoldo Burleson MD  06/06/19 11:56 AM

## 2019-06-06 NOTE — ANESTHESIA POSTPROCEDURE EVALUATION
Patient: Derek Kelsey    Procedure Summary     Date:  06/06/19 Room / Location:   [facility] OR 15 /  [facility]    Anesthesia Start:  1033 Anesthesia Stop:  1209    Procedure:  ABDOMINAL AORTIC ANEURYSM REPAIR WITH ENDOGRAFT (N/A Abdomen) Diagnosis:       AAA (abdominal aortic aneurysm) without rupture (CMS/HCC)      (AAA (abdominal aortic aneurysm) without rupture (CMS/HCC) [I71.4])    Surgeon:  Leopoldo Burleson MD Provider:  Kostas Aggarwal MD    Anesthesia Type:  general ASA Status:  3          Anesthesia Type: general  Last vitals  BP   155/96 (06/06/19 1209)   Temp   97.9 °F (36.6 °C) (06/06/19 1209)   Pulse   78 (06/06/19 1209)   Resp   18 (06/06/19 0918)     SpO2   94 % (06/06/19 1209)     Post Anesthesia Care and Evaluation    Patient location during evaluation: PACU  Patient participation: complete - patient participated  Level of consciousness: awake and alert  Pain score: 0  Pain management: adequate  Airway patency: patent  Anesthetic complications: No anesthetic complications  PONV Status: none  Cardiovascular status: hemodynamically stable and acceptable  Respiratory status: nonlabored ventilation, acceptable and nasal cannula  Hydration status: acceptable

## 2019-06-06 NOTE — PROGRESS NOTES
"Intensive Care Follow-up      LOS: 0 days     Mr. Derek Kelsey, 68 y.o. male is followed for: AAA (abdominal aortic aneurysm) without rupture (CMS/HCC)     Subjective - Interval History   Mr. Kelsey is a 68 year-old male being admitted for a AAA s/p AAA repair with endograft.     He went for an evaluation with Dr. Bhatti for back surgery after a fall for a \"re-do back surgery.\" The patient complained of some mild abdominal pain and this, combined with the knowledge of his AAA, concerned Dr. Bhatti. He was sent back to Dr. Gonzalez's office for a follow-up for the AAA, complaining of abdominal pain. Dr. Burleson could not find any other reason for his abdominal pain and with the AAA measuring at 4.8cm, surgery was indicated.    He arrived to the ICU, post-endograft repair, complaining of minimal pain and nausea. Right groin site is soft and without signs of bleeding.    Has supplemental oxygen at home which he typically uses at 2 L/min via nasal cannula.  He usually just uses it at night    The patient's relevant past medical, surgical and social history were reviewed and updated in Epic as appropriate.     Objective     Infusions:    lactated ringers 9 mL/hr Last Rate: 9 mL/hr (06/06/19 0903)     Medications:    niCARdipine (CARDENE) infusion 3-15 mg/hr Intravenous Once     Intake/Output       06/06/19 0700 - 06/07/19 0659    Intake (ml) 200    Output (ml) --    Net (ml) 200        Vital Sign Min/Max for last 24 hours  Temp  Min: 97.4 °F (36.3 °C)  Max: 98.4 °F (36.9 °C)   BP  Min: 121/68  Max: 155/96   Pulse  Min: 70  Max: 83   Resp  Min: 16  Max: 18   SpO2  Min: 93 %  Max: 97 %   Flow (L/min)  Min: 3  Max: 4        Physical Exam:   GENERAL: Awake, no distress   HEENT: No adenopathy or thyromegaly   LUNGS: No wheezes or rhonchi   HEART: Regular rate and rhythm without murmurs   GI: Soft, nontender   EXTREMITIES: Groin site without hematoma or bleeding   NEURO/PSYCH: Awake and alert.  Follows commands.  No overt " deficit.  At bedrest, however    Results from last 7 days   Lab Units 06/05/19  1500   WBC 10*3/mm3 8.39   HEMOGLOBIN g/dL 14.1   PLATELETS 10*3/mm3 166     Results from last 7 days   Lab Units 06/05/19  1500   SODIUM mmol/L 136   POTASSIUM mmol/L 4.2   CO2 mmol/L 24.0   BUN mg/dL 13   CREATININE mg/dL 0.91   GLUCOSE mg/dL 180*     Estimated Creatinine Clearance: 105.8 mL/min (by C-G formula based on SCr of 0.91 mg/dL).    Results from last 7 days   Lab Units 06/05/19  1500   HEMOGLOBIN A1C % 6.70*           No results found for: LACTATE       Images: Chest X-ray Pa & Lateral    Result Date: 6/5/2019  Chronic-appearing changes of COPD and benign-appearing biapical lung scarring. No acute chest disease is seen.  DICTATED:   06/05/2019 EDITED/ls :   06/05/2019  This report was finalized on 6/5/2019 10:11 PM by DR. Donald Laboy MD.        I reviewed the patient's results and images.     Impression      Active Hospital Problems    Diagnosis   • **AAA (abdominal aortic aneurysm) without rupture (CMS/HCC)   • Abdominal aortic aneurysm (CMS/HCC)      4.2 cm abdominal aneurysm.                Plan      1. Admit to ICU  2. Monitor for s&sxs of bleeding  3. Blood pressure monitoring: Keep systolic <180 per attending  4. Continue home Trelegy inhaler  5. Glucose monitoring  6. GI/DVT prophylaxis  7. Supplemental oxygen as needed   I discussed the patient's findings and my recommendations with patient, family and nursing staff      .     HESHAM Guerrero MD  Pulmonary and Critical Care Medicine

## 2019-06-06 NOTE — PLAN OF CARE
Problem: Patient Care Overview  Goal: Plan of Care Review  Outcome: Ongoing (interventions implemented as appropriate)   06/06/19 1706   Plan of Care Review   Progress improving   Coping/Psychosocial   Plan of Care Reviewed With patient;spouse;son   OTHER   Outcome Summary Patient is a AAA w/endograft of Dr. Burleson; arrived from PACU to  at 1330. Cardene gtt is ordered for SBP >140 but patient has not required gtt. Bilateral groin sites are soft and non tender with no hematoma present.  Pt complained of right leg pain and norco given. Patient has home inhaler, Trelegy and order is in for patient to use once a day. Right art line is in place and reading accurately with NIBP.      Goal: Individualization and Mutuality  Outcome: Ongoing (interventions implemented as appropriate)    Goal: Discharge Needs Assessment  Outcome: Ongoing (interventions implemented as appropriate)    Goal: Interprofessional Rounds/Family Conf  Outcome: Ongoing (interventions implemented as appropriate)      Problem: Skin Injury Risk (Adult)  Goal: Identify Related Risk Factors and Signs and Symptoms  Outcome: Ongoing (interventions implemented as appropriate)    Goal: Skin Health and Integrity  Outcome: Ongoing (interventions implemented as appropriate)      Problem: Fall Risk (Adult)  Goal: Identify Related Risk Factors and Signs and Symptoms  Outcome: Ongoing (interventions implemented as appropriate)    Goal: Absence of Fall  Outcome: Ongoing (interventions implemented as appropriate)

## 2019-06-06 NOTE — INTERVAL H&P NOTE
Pre-Op H&P (See Recent Office Note Attached for Full H&P)    Chief complaint: AAA    Review of Systems:  General ROS:  no fever, chills, rashes, No change since last office visit  Cardiovascular ROS: no chest pain or dyspnea on exertion  Respiratory ROS: +baseline cough, shortness of breath, or wheezing.  +COPD/smoker    Meds:    No current facility-administered medications on file prior to encounter.      Current Outpatient Medications on File Prior to Encounter   Medication Sig Dispense Refill   • amLODIPine (NORVASC) 5 MG tablet Take 5 mg by mouth 2 (Two) Times a Day.     • ASPIRIN LOW DOSE 81 MG tablet Take 1 tablet by mouth Daily. Last dose 10-21-18 (Patient taking differently: Take 81 mg by mouth Daily.)     • citalopram (CeleXA) 20 MG tablet Take 20 mg by mouth Daily.     • Fluticasone-Umeclidin-Vilant (TRELEGY ELLIPTA) 100-62.5-25 MCG/INH aerosol powder  Inhale 1 each Daily.     • metoprolol succinate XL (TOPROL-XL) 50 MG 24 hr tablet Take 50 mg by mouth Daily.     • O2 (OXYGEN)      • pantoprazole (PROTONIX) 40 MG EC tablet Take 40 mg by mouth 2 (Two) Times a Day.     • acetaminophen (TYLENOL) 500 MG tablet Take 500 mg by mouth Every 6 (Six) Hours As Needed for Mild Pain .     • oxyCODONE-acetaminophen (PERCOCET) 5-325 MG per tablet Take 1 tablet by mouth Every 6 (Six) Hours As Needed.     • [DISCONTINUED] triamcinolone (KENALOG) 0.1 % cream Apply 1 application topically to the appropriate area as directed As Needed.         Vital Signs:  /73 (BP Location: Right arm, Patient Position: Sitting)   Pulse 70   Temp 98.4 °F (36.9 °C) (Temporal)   Resp 18   SpO2 94%     Physical Exam:    CV:  S1S2 regular rate and rhythm, no murmur               Resp:  Coarse to auscultation, scant bibasilar rales; respirations regular, even and unlabored    Results Review:     Lab Results   Component Value Date    WBC 8.39 06/05/2019    HGB 14.1 06/05/2019    HCT 43.4 06/05/2019    MCV 91.6 06/05/2019      06/05/2019    NEUTROABS 5.55 06/05/2019    GLUCOSE 180 (H) 06/05/2019    BUN 13 06/05/2019    CREATININE 0.91 06/05/2019    EGFRIFNONA 83 06/05/2019     06/05/2019    K 4.2 06/05/2019    CL 99 06/05/2019    CO2 24.0 06/05/2019    CALCIUM 9.4 06/05/2019        I reviewed the patient's new clinical results.  3/2019 FEV1 44%    Cancer Staging (if applicable)  Cancer Patient: __ yes _x_no __unknown; If yes, clinical stage T:__ N:__M:__, stage group or __N/A    Assessment/Plan:    The patient has abdominal aneurysm.  He has been having abdominal pain that I cannot elicit any other cause.  Dr. Bhatti is very concerned about this pain.  The radiologist read his aneurysm as being 4.8 cm.  I have reviewed the films.  At this point, I think surgical repair is indicated.  I think we can do this by endovascular repair. I discussed with the patient about his CT findings.  I have discussed the operation, risks, and alternatives including risk to his life, bleeding, infection, organ failure, and other risk factors.  He appears to be fully informed and desires to proceed.    Vilma Boyer, APRN  6/6/2019   9:32 AM

## 2019-06-06 NOTE — ANESTHESIA PREPROCEDURE EVALUATION
Anesthesia Evaluation     Patient summary reviewed and Nursing notes reviewed                Airway   Mallampati: II  TM distance: >3 FB  Neck ROM: full  No difficulty expected  Dental - normal exam   (+) edentulous    Pulmonary - normal exam   (+) a smoker Former, COPD (O2 @ night prn) moderate,   Cardiovascular - normal exam    (+) hypertension, CAD, PVD,     ROS comment: [facility] 9/18: minimal CAD    Neuro/Psych- negative ROS  GI/Hepatic/Renal/Endo    (+)  GERD,      Musculoskeletal     Abdominal  - normal exam    Bowel sounds: normal.   Substance History - negative use     OB/GYN negative ob/gyn ROS         Other   (+) arthritis                     Anesthesia Plan    ASA 3     general   (A line)  intravenous induction   Anesthetic plan, all risks, benefits, and alternatives have been provided, discussed and informed consent has been obtained with: patient.    Plan discussed with CRNA.

## 2019-06-06 NOTE — ANESTHESIA PROCEDURE NOTES
Airway  Urgency: elective    Airway not difficult    General Information and Staff    Patient location during procedure: OR  CRNA: Joo Rollins CRNA    Indications and Patient Condition  Indications for airway management: airway protection    Preoxygenated: yes  MILS not maintained throughout  Mask difficulty assessment: 1 - vent by mask    Final Airway Details  Final airway type: endotracheal airway      Successful airway: ETT  Cuffed: yes   Successful intubation technique: direct laryngoscopy  Endotracheal tube insertion site: oral  Blade: Barbara  Blade size: 3  ETT size (mm): 7.0  Cormack-Lehane Classification: grade I - full view of glottis  Placement verified by: chest auscultation and capnometry   Measured from: lips  ETT to lips (cm): 20  Number of attempts at approach: 1    Additional Comments  Negative epigastric sounds, Breath sound equal bilaterally with symmetric chest rise and fall. Atraumatic, no damage to lips or teeth during intubation

## 2019-06-06 NOTE — ANESTHESIA PROCEDURE NOTES
Arterial Line      Patient reassessed immediately prior to procedure    Patient location during procedure: pre-op   Line placed for hemodynamic monitoring.  Performed By   Anesthesiologist: Luis Raya MD  Preanesthetic Checklist  Completed: patient identified, site marked, surgical consent, pre-op evaluation, timeout performed, IV checked, risks and benefits discussed and monitors and equipment checked  Arterial Line Prep   Sterile Tech: cap, gloves and sterile barriers  Prep: ChloraPrep  Patient monitoring: blood pressure monitoring, continuous pulse oximetry and EKG  Arterial Line Procedure   Laterality:right  Location:  radial artery  Catheter size: 20 G   Guidance: ultrasound guided and palpation technique  Number of attempts: 1  Successful placement: yes          Post Assessment   Dressing Type: line sutured, occlusive dressing applied, secured with tape and wrist guard applied.   Complications no  Circ/Move/Sens Assessment: normal and unchanged.   Patient Tolerance: patient tolerated the procedure well with no apparent complications

## 2019-06-07 LAB
ABO + RH BLD: NORMAL
ABO + RH BLD: NORMAL
ANION GAP SERPL CALCULATED.3IONS-SCNC: 16 MMOL/L
BASOPHILS # BLD AUTO: 0.02 10*3/MM3 (ref 0–0.2)
BASOPHILS NFR BLD AUTO: 0.3 % (ref 0–1.5)
BH BB BLOOD EXPIRATION DATE: NORMAL
BH BB BLOOD EXPIRATION DATE: NORMAL
BH BB BLOOD TYPE BARCODE: 5100
BH BB BLOOD TYPE BARCODE: 5100
BH BB DISPENSE STATUS: NORMAL
BH BB DISPENSE STATUS: NORMAL
BH BB PRODUCT CODE: NORMAL
BH BB PRODUCT CODE: NORMAL
BH BB UNIT NUMBER: NORMAL
BH BB UNIT NUMBER: NORMAL
BUN BLD-MCNC: 17 MG/DL (ref 8–23)
BUN/CREAT SERPL: 21.3 (ref 7–25)
CALCIUM SPEC-SCNC: 9 MG/DL (ref 8.6–10.5)
CHLORIDE SERPL-SCNC: 98 MMOL/L (ref 98–107)
CHOLEST SERPL-MCNC: 151 MG/DL (ref 0–200)
CO2 SERPL-SCNC: 20 MMOL/L (ref 22–29)
CREAT BLD-MCNC: 0.8 MG/DL (ref 0.76–1.27)
DEPRECATED RDW RBC AUTO: 50.2 FL (ref 37–54)
EOSINOPHIL # BLD AUTO: 0.14 10*3/MM3 (ref 0–0.4)
EOSINOPHIL NFR BLD AUTO: 1.9 % (ref 0.3–6.2)
ERYTHROCYTE [DISTWIDTH] IN BLOOD BY AUTOMATED COUNT: 14.8 % (ref 12.3–15.4)
GFR SERPL CREATININE-BSD FRML MDRD: 97 ML/MIN/1.73
GLUCOSE BLD-MCNC: 192 MG/DL (ref 65–99)
HCT VFR BLD AUTO: 35 % (ref 37.5–51)
HDLC SERPL-MCNC: 36 MG/DL (ref 40–60)
HGB BLD-MCNC: 11.1 G/DL (ref 13–17.7)
IMM GRANULOCYTES # BLD AUTO: 0.03 10*3/MM3 (ref 0–0.05)
IMM GRANULOCYTES NFR BLD AUTO: 0.4 % (ref 0–0.5)
LDLC SERPL CALC-MCNC: 100 MG/DL (ref 0–100)
LDLC/HDLC SERPL: 2.77 {RATIO}
LYMPHOCYTES # BLD AUTO: 0.96 10*3/MM3 (ref 0.7–3.1)
LYMPHOCYTES NFR BLD AUTO: 13 % (ref 19.6–45.3)
MCH RBC QN AUTO: 29 PG (ref 26.6–33)
MCHC RBC AUTO-ENTMCNC: 31.7 G/DL (ref 31.5–35.7)
MCV RBC AUTO: 91.4 FL (ref 79–97)
MONOCYTES # BLD AUTO: 0.53 10*3/MM3 (ref 0.1–0.9)
MONOCYTES NFR BLD AUTO: 7.2 % (ref 5–12)
NEUTROPHILS # BLD AUTO: 5.69 10*3/MM3 (ref 1.7–7)
NEUTROPHILS NFR BLD AUTO: 77.2 % (ref 42.7–76)
NRBC BLD AUTO-RTO: 0 /100 WBC (ref 0–0.2)
PLATELET # BLD AUTO: 127 10*3/MM3 (ref 140–450)
PMV BLD AUTO: 11.5 FL (ref 6–12)
POTASSIUM BLD-SCNC: 4.6 MMOL/L (ref 3.5–5.2)
RBC # BLD AUTO: 3.83 10*6/MM3 (ref 4.14–5.8)
SODIUM BLD-SCNC: 134 MMOL/L (ref 136–145)
TRIGL SERPL-MCNC: 77 MG/DL (ref 0–150)
UNIT  ABO: NORMAL
UNIT  ABO: NORMAL
UNIT  RH: NORMAL
UNIT  RH: NORMAL
VLDLC SERPL-MCNC: 15.4 MG/DL
WBC NRBC COR # BLD: 7.37 10*3/MM3 (ref 3.4–10.8)

## 2019-06-07 PROCEDURE — 99232 SBSQ HOSP IP/OBS MODERATE 35: CPT | Performed by: INTERNAL MEDICINE

## 2019-06-07 PROCEDURE — 94799 UNLISTED PULMONARY SVC/PX: CPT

## 2019-06-07 PROCEDURE — 99024 POSTOP FOLLOW-UP VISIT: CPT | Performed by: THORACIC SURGERY (CARDIOTHORACIC VASCULAR SURGERY)

## 2019-06-07 PROCEDURE — 80048 BASIC METABOLIC PNL TOTAL CA: CPT | Performed by: PHYSICIAN ASSISTANT

## 2019-06-07 PROCEDURE — 85025 COMPLETE CBC W/AUTO DIFF WBC: CPT | Performed by: PHYSICIAN ASSISTANT

## 2019-06-07 PROCEDURE — 80061 LIPID PANEL: CPT | Performed by: NURSE PRACTITIONER

## 2019-06-07 RX ORDER — SENNA AND DOCUSATE SODIUM 50; 8.6 MG/1; MG/1
2 TABLET, FILM COATED ORAL 2 TIMES DAILY
Status: DISCONTINUED | OUTPATIENT
Start: 2019-06-07 | End: 2019-06-08 | Stop reason: HOSPADM

## 2019-06-07 RX ORDER — DOCUSATE SODIUM 100 MG/1
100 CAPSULE, LIQUID FILLED ORAL 2 TIMES DAILY
Status: DISCONTINUED | OUTPATIENT
Start: 2019-06-07 | End: 2019-06-07

## 2019-06-07 RX ADMIN — HYDROCODONE BITARTRATE AND ACETAMINOPHEN 1 TABLET: 7.5; 325 TABLET ORAL at 06:37

## 2019-06-07 RX ADMIN — PANTOPRAZOLE SODIUM 40 MG: 40 TABLET, DELAYED RELEASE ORAL at 08:14

## 2019-06-07 RX ADMIN — ASPIRIN 81 MG CHEWABLE TABLET 81 MG: 81 TABLET CHEWABLE at 08:13

## 2019-06-07 RX ADMIN — CITALOPRAM HYDROBROMIDE 20 MG: 20 TABLET ORAL at 08:14

## 2019-06-07 RX ADMIN — AMLODIPINE BESYLATE 5 MG: 5 TABLET ORAL at 21:35

## 2019-06-07 RX ADMIN — METOPROLOL SUCCINATE 50 MG: 50 TABLET, EXTENDED RELEASE ORAL at 08:14

## 2019-06-07 RX ADMIN — DOCUSATE SODIUM 100 MG: 100 CAPSULE, LIQUID FILLED ORAL at 09:26

## 2019-06-07 RX ADMIN — IPRATROPIUM BROMIDE AND ALBUTEROL SULFATE 3 ML: 2.5; .5 SOLUTION RESPIRATORY (INHALATION) at 06:38

## 2019-06-07 RX ADMIN — PANTOPRAZOLE SODIUM 40 MG: 40 TABLET, DELAYED RELEASE ORAL at 21:35

## 2019-06-07 RX ADMIN — IPRATROPIUM BROMIDE AND ALBUTEROL SULFATE 3 ML: 2.5; .5 SOLUTION RESPIRATORY (INHALATION) at 12:08

## 2019-06-07 RX ADMIN — IPRATROPIUM BROMIDE AND ALBUTEROL SULFATE 3 ML: 2.5; .5 SOLUTION RESPIRATORY (INHALATION) at 18:55

## 2019-06-07 RX ADMIN — AMLODIPINE BESYLATE 5 MG: 5 TABLET ORAL at 08:14

## 2019-06-07 RX ADMIN — SENNOSIDES AND DOCUSATE SODIUM 2 TABLET: 8.6; 5 TABLET ORAL at 21:36

## 2019-06-07 NOTE — PLAN OF CARE
Problem: Patient Care Overview  Goal: Plan of Care Review  Outcome: Ongoing (interventions implemented as appropriate)   06/07/19 0658   Plan of Care Review   Progress improving   Coping/Psychosocial   Plan of Care Reviewed With patient;spouse   OTHER   Outcome Summary Remains off cardene. UOP adequate. PRN pain controlled with Norco. Wife at bedside. Pt calm and cooperative. Groin sites CDI no signs of bleeding or hematoma. Pulses palpable.        Problem: Skin Injury Risk (Adult)  Goal: Identify Related Risk Factors and Signs and Symptoms  Outcome: Ongoing (interventions implemented as appropriate)      Problem: Fall Risk (Adult)  Goal: Identify Related Risk Factors and Signs and Symptoms  Outcome: Ongoing (interventions implemented as appropriate)      Problem: Aortic Aneurysm/Dissection Repair (Adult)  Goal: Signs and Symptoms of Listed Potential Problems Will be Absent, Minimized or Managed (Aortic Aneurysm/Dissection Repair)  Outcome: Ongoing (interventions implemented as appropriate)

## 2019-06-07 NOTE — PROGRESS NOTES
Clinical Nutrition     Multidisciplinary Rounds      Patient Name: Derek Kelsey  Date of Encounter: 06/07/19 9:31 AM  MRN: [id number]  Admission date: 6/6/2019     JOAQUINA KENNEY to continue to follow per protocol.     Current diet: Diet Regular; Cardiac  No active supplement orders    Intervention:  Follow treatment plan  Care plan reviewed    Follow up:   Per protocol      Vilma Cobb RD  9:31 AM  Time: 20min

## 2019-06-07 NOTE — PROGRESS NOTES
Derek Kelsey  [id number]  1950     LOS: 1 day   Patient Care Team:  Raudel Zheng MD as PCP - General  Joo Franklin MD as Consulting Physician (Orthopedic Surgery)  Leopoldo Burleson MD as Surgeon (Cardiothoracic Surgery)  Douglas Galvez MD as Consulting Physician (Cardiology)    Chief Complaint: Aortic abdominal aneurysm      Subjective: No complaints    Objective:     Vital Sign Min/Max for last 24 hours  Temp  Min: 97.3 °F (36.3 °C)  Max: 98.4 °F (36.9 °C)   BP  Min: 106/68  Max: 159/67   Pulse  Min: 63  Max: 83   Resp  Min: 16  Max: 20   SpO2  Min: 91 %  Max: 99 %   No Data Recorded   No Data Recorded         Physical Exam:    Wound: Satisfactory    Pulses:     Mediastinal and Chest Tube Drainage:       Results Review:   Results from last 7 days   Lab Units 06/07/19  0223   WBC 10*3/mm3 7.37   HEMOGLOBIN g/dL 11.1*   HEMATOCRIT % 35.0*   PLATELETS 10*3/mm3 127*     Results from last 7 days   Lab Units 06/07/19  0223   SODIUM mmol/L 134*   POTASSIUM mmol/L 4.6   CHLORIDE mmol/L 98   CO2 mmol/L 20.0*   BUN mg/dL 17   CREATININE mg/dL 0.80   GLUCOSE mg/dL 192*   CALCIUM mg/dL 9.0             Assessment      AAA (abdominal aortic aneurysm) without rupture (CMS/HCC)    Abdominal aortic aneurysm (CMS/HCC)      Doing satisfactory.  Hopefully discharge tomorrow.  Ambulate        Leopoldo Burleson MD  06/07/19  6:53 AM      Please note that portions of this note were completed with a voice recognition program. Efforts were made to edit the dictations, but words may be mistranscribed

## 2019-06-07 NOTE — PROGRESS NOTES
Discharge Planning Assessment  [facility]     Patient Name: Derek Kelsey  MRN: [id number]  Today's Date: 6/7/2019    Admit Date: 6/6/2019    Discharge Needs Assessment     Row Name 06/07/19 1414       Living Environment    Lives With  spouse    Current Living Arrangements  home/apartment/condo    Primary Care Provided by  self    Provides Primary Care For  no one    Family Caregiver if Needed  spouse    Quality of Family Relationships  supportive    Able to Return to Prior Arrangements  yes       Resource/Environmental Concerns    Resource/Environmental Concerns  none    Transportation Concerns  car, none       Transition Planning    Patient/Family Anticipates Transition to  home with family    Patient/Family Anticipated Services at Transition  none       Discharge Needs Assessment    Readmission Within the Last 30 Days  no previous admission in last 30 days    Concerns to be Addressed  denies needs/concerns at this time    Equipment Currently Used at Home  cane, straight;walker, rolling;oxygen    Anticipated Changes Related to Illness  none    Equipment Needed After Discharge  none        Discharge Plan     Row Name 06/07/19 1414       Plan    Plan  Home    Patient/Family in Agreement with Plan  yes    Plan Comments  Spoke with patient and wife at bedside.  Patient resides in [facility] and lives with his wife.  Prior to admission patient states he was independent with ADL's and mobility using a cane.  Patient has at home a walker and oxygen.  Patient's oxygen is for night only, is provided by [facility] and he does have portable tanks.  Patient and wife aware patient will need portable here for discharge.  Patient has had home health in the [facility] with [facility] but is not current with them.  Patient plans to return home upon discharge and denies any needs.  CM will continue to follow.    Final Discharge Disposition Code  01 - home or self-care        Destination      No service coordination in  this encounter.      Durable Medical Equipment      No service coordination in this encounter.      Dialysis/Infusion      No service coordination in this encounter.      Home Medical Care      No service coordination in this encounter.      Therapy      No service coordination in this encounter.      Community Resources      No service coordination in this encounter.        Expected Discharge Date and Time     Expected Discharge Date Expected Discharge Time    Jun 8, 2019         Demographic Summary     Row Name 06/07/19 1413       General Information    Admission Type  inpatient    Arrived From  home    Referral Source  admission list    Reason for Consult  discharge planning    Preferred Language  English       Contact Information    Permission Granted to Share Info With          Functional Status    No documentation.       Psychosocial    No documentation.       Abuse/Neglect    No documentation.       Legal    No documentation.       Substance Abuse    No documentation.       Patient Forms    No documentation.           Tamra Adams RN

## 2019-06-07 NOTE — PLAN OF CARE
Problem: Patient Care Overview  Goal: Plan of Care Review  Outcome: Ongoing (interventions implemented as appropriate)   06/07/19 3223   Plan of Care Review   Progress improving   Coping/Psychosocial   Plan of Care Reviewed With patient;spouse   OTHER   Outcome Summary Patient OOB and up in chair this shift. Patient ambulated a total of 1000 ft on the unit with SOB toward end of walks on 2 L NC. IV fluids and ART line d/c; stool softeners added to bowel regimen. UOP adequate, bilateral groin sites soft, non tender and no signs of bleeding present. VS stable and hopes patient will be discharged tomorrow.      Goal: Individualization and Mutuality  Outcome: Ongoing (interventions implemented as appropriate)    Goal: Discharge Needs Assessment  Outcome: Ongoing (interventions implemented as appropriate)    Goal: Interprofessional Rounds/Family Conf  Outcome: Ongoing (interventions implemented as appropriate)      Problem: Skin Injury Risk (Adult)  Goal: Identify Related Risk Factors and Signs and Symptoms  Outcome: Ongoing (interventions implemented as appropriate)    Goal: Skin Health and Integrity  Outcome: Ongoing (interventions implemented as appropriate)      Problem: Fall Risk (Adult)  Goal: Identify Related Risk Factors and Signs and Symptoms  Outcome: Ongoing (interventions implemented as appropriate)    Goal: Absence of Fall  Outcome: Ongoing (interventions implemented as appropriate)      Problem: Aortic Aneurysm/Dissection Repair (Adult)  Goal: Signs and Symptoms of Listed Potential Problems Will be Absent, Minimized or Managed (Aortic Aneurysm/Dissection Repair)  Outcome: Ongoing (interventions implemented as appropriate)    Goal: Anesthesia/Sedation Recovery  Outcome: Ongoing (interventions implemented as appropriate)

## 2019-06-07 NOTE — PROGRESS NOTES
Intensive Care Follow-up      LOS: 1 day     Mr. Derek Kelsey, 68 y.o. male is followed for: Glycemic, Electrolyte, Respiratory, and Medical management     Subjective - Interval History     Mr. Kelsey is a 68 year-old male being admitted for a AAA s/p AAA repair with endograft by Dr Burleson on 6/6/2019.  The patient is a former smoker with moderate chronic obstructive pulmonary disease and chronic respiratory failure.  He has supplemental oxygen at home at 2 L/min via nasal cannula which he typically uses at night.     No problems overnight  Oxygenating adequately on 2 L/min via nasal cannula  No bleeding at groin site    The patient's relevant past medical, surgical and social history were reviewed and updated in Epic as appropriate.     Objective     Infusions:    lactated ringers 9 mL/hr Last Rate: 9 mL/hr (06/06/19 0903)   niCARdipine 5-15 mg/hr    sodium chloride 50 mL/hr Last Rate: 50 mL/hr (06/06/19 1400)     Medications:    amLODIPine 5 mg Oral BID   aspirin 81 mg Oral Daily   citalopram 20 mg Oral Daily   ipratropium-albuterol 3 mL Nebulization 4x Daily - RT   metoprolol succinate XL 50 mg Oral Daily   niCARdipine (CARDENE) infusion 3-15 mg/hr Intravenous Once   pantoprazole 40 mg Oral BID   Fluticasone-Umeclidin-Vilant 1 each Inhalation Daily     Intake/Output       06/06/19 0700 - 06/07/19 0659 06/07/19 0700 - 06/08/19 0659    Intake (ml) 1987.4 360    Output (ml) 670 100    Net (ml) 1317.4 260        Vital Sign Min/Max for last 24 hours  Temp  Min: 97.3 °F (36.3 °C)  Max: 98.4 °F (36.9 °C)   BP  Min: 106/68  Max: 159/67   Pulse  Min: 61  Max: 83   Resp  Min: 16  Max: 20   SpO2  Min: 91 %  Max: 99 %   No Data Recorded        Physical Exam:   GENERAL: Awake, no distress   HEENT: No adenopathy or thyromegaly   LUNGS: No wheezes or rhonchi but decreased breath sounds   HEART: Regular rate and rhythm without murmurs   GI: Soft, obese, nontender   EXTREMITIES: Groin site without hematoma or bleeding.   Palpable pulses.  Trace edema   NEURO/PSYCH: Awake, alert.  Appropriate.  No deficit.    Results from last 7 days   Lab Units 06/07/19  0223 06/05/19  1500   WBC 10*3/mm3 7.37 8.39   HEMOGLOBIN g/dL 11.1* 14.1   PLATELETS 10*3/mm3 127* 166     Results from last 7 days   Lab Units 06/07/19  0223 06/05/19  1500   SODIUM mmol/L 134* 136   POTASSIUM mmol/L 4.6 4.2   CO2 mmol/L 20.0* 24.0   BUN mg/dL 17 13   CREATININE mg/dL 0.80 0.91   GLUCOSE mg/dL 192* 180*     Estimated Creatinine Clearance: 120.4 mL/min (by C-G formula based on SCr of 0.8 mg/dL).    Results from last 7 days   Lab Units 06/05/19  1500   HEMOGLOBIN A1C % 6.70*           No results found for: LACTATE       Images: Chest x-ray without consolidation or effusions    I reviewed the patient's results and images.     Impression      Active Hospital Problems    Diagnosis   • **Abdominal aortic aneurysm (S/P Endovascular repair)      4.2 cm abdominal aneurysm.       • Chronic respiratory failure with hypoxia and hypercapnia (CMS/HCC)   • Coal workers pneumoconiosis, simple   • Former smoker   • Moderate COPD         Plan        Continue inhaled bronchodilators  Continue supplemental oxygen  Follow-up on serum bicarbonate level tomorrow  Otherwise, per Dr. Burleson     Plan of care and goals reviewed with Multidisciplinary Team and Antibiotic Stewardship rounds   I discussed the patient's findings and my recommendations with patient, family and nursing staff      .     HESHAM Guerrero MD  Pulmonary and Critical Care Medicine

## 2019-06-08 VITALS
RESPIRATION RATE: 16 BRPM | DIASTOLIC BLOOD PRESSURE: 72 MMHG | HEART RATE: 75 BPM | TEMPERATURE: 97.9 F | SYSTOLIC BLOOD PRESSURE: 109 MMHG | OXYGEN SATURATION: 93 %

## 2019-06-08 LAB
ALBUMIN SERPL-MCNC: 3.1 G/DL (ref 3.5–5.2)
ALBUMIN/GLOB SERPL: 0.9 G/DL
ALP SERPL-CCNC: 86 U/L (ref 39–117)
ALT SERPL W P-5'-P-CCNC: 26 U/L (ref 1–41)
ANION GAP SERPL CALCULATED.3IONS-SCNC: 10 MMOL/L
AST SERPL-CCNC: 42 U/L (ref 1–40)
BILIRUB SERPL-MCNC: 0.7 MG/DL (ref 0.2–1.2)
BUN BLD-MCNC: 15 MG/DL (ref 8–23)
BUN/CREAT SERPL: 19.5 (ref 7–25)
CALCIUM SPEC-SCNC: 8.9 MG/DL (ref 8.6–10.5)
CHLORIDE SERPL-SCNC: 99 MMOL/L (ref 98–107)
CO2 SERPL-SCNC: 26 MMOL/L (ref 22–29)
CREAT BLD-MCNC: 0.77 MG/DL (ref 0.76–1.27)
DEPRECATED RDW RBC AUTO: 49.5 FL (ref 37–54)
ERYTHROCYTE [DISTWIDTH] IN BLOOD BY AUTOMATED COUNT: 14.7 % (ref 12.3–15.4)
GFR SERPL CREATININE-BSD FRML MDRD: 101 ML/MIN/1.73
GLOBULIN UR ELPH-MCNC: 3.6 GM/DL
GLUCOSE BLD-MCNC: 204 MG/DL (ref 65–99)
GLUCOSE BLDC GLUCOMTR-MCNC: 145 MG/DL (ref 70–130)
HCT VFR BLD AUTO: 34.2 % (ref 37.5–51)
HGB BLD-MCNC: 10.8 G/DL (ref 13–17.7)
MCH RBC QN AUTO: 28.9 PG (ref 26.6–33)
MCHC RBC AUTO-ENTMCNC: 31.6 G/DL (ref 31.5–35.7)
MCV RBC AUTO: 91.4 FL (ref 79–97)
PLATELET # BLD AUTO: 121 10*3/MM3 (ref 140–450)
PMV BLD AUTO: 11.1 FL (ref 6–12)
POTASSIUM BLD-SCNC: 4.3 MMOL/L (ref 3.5–5.2)
PROT SERPL-MCNC: 6.7 G/DL (ref 6–8.5)
RBC # BLD AUTO: 3.74 10*6/MM3 (ref 4.14–5.8)
SODIUM BLD-SCNC: 135 MMOL/L (ref 136–145)
WBC NRBC COR # BLD: 7.42 10*3/MM3 (ref 3.4–10.8)

## 2019-06-08 PROCEDURE — 94799 UNLISTED PULMONARY SVC/PX: CPT

## 2019-06-08 PROCEDURE — 82962 GLUCOSE BLOOD TEST: CPT

## 2019-06-08 PROCEDURE — 80053 COMPREHEN METABOLIC PANEL: CPT | Performed by: INTERNAL MEDICINE

## 2019-06-08 PROCEDURE — 85027 COMPLETE CBC AUTOMATED: CPT | Performed by: INTERNAL MEDICINE

## 2019-06-08 RX ORDER — HYDROCODONE BITARTRATE AND ACETAMINOPHEN 7.5; 325 MG/1; MG/1
1 TABLET ORAL EVERY 6 HOURS PRN
Qty: 20 TABLET | Refills: 0 | Status: SHIPPED | OUTPATIENT
Start: 2019-06-08 | End: 2019-06-16

## 2019-06-08 RX ORDER — HYDROCODONE BITARTRATE AND ACETAMINOPHEN 7.5; 325 MG/1; MG/1
1 TABLET ORAL EVERY 6 HOURS PRN
Qty: 20 TABLET | Refills: 0
Start: 2019-06-08 | End: 2019-06-08

## 2019-06-08 RX ADMIN — PANTOPRAZOLE SODIUM 40 MG: 40 TABLET, DELAYED RELEASE ORAL at 08:08

## 2019-06-08 RX ADMIN — AMLODIPINE BESYLATE 5 MG: 5 TABLET ORAL at 08:08

## 2019-06-08 RX ADMIN — IPRATROPIUM BROMIDE AND ALBUTEROL SULFATE 3 ML: 2.5; .5 SOLUTION RESPIRATORY (INHALATION) at 08:44

## 2019-06-08 RX ADMIN — CITALOPRAM HYDROBROMIDE 20 MG: 20 TABLET ORAL at 08:08

## 2019-06-08 RX ADMIN — ASPIRIN 81 MG CHEWABLE TABLET 81 MG: 81 TABLET CHEWABLE at 08:08

## 2019-06-08 RX ADMIN — IPRATROPIUM BROMIDE AND ALBUTEROL SULFATE 3 ML: 2.5; .5 SOLUTION RESPIRATORY (INHALATION) at 12:29

## 2019-06-08 RX ADMIN — SENNOSIDES AND DOCUSATE SODIUM 2 TABLET: 8.6; 5 TABLET ORAL at 08:08

## 2019-06-08 RX ADMIN — METOPROLOL SUCCINATE 50 MG: 50 TABLET, EXTENDED RELEASE ORAL at 08:08

## 2019-06-08 NOTE — PLAN OF CARE
Problem: Patient Care Overview  Goal: Plan of Care Review  Outcome: Outcome(s) achieved Date Met: 06/08/19 06/08/19 1133   Plan of Care Review   Progress improving   Coping/Psychosocial   Plan of Care Reviewed With patient;spouse   OTHER   Outcome Summary Patient has been OOB to chair and VS stable. CT surgery has rounded an placed discharged orders for patient to go home.      Goal: Individualization and Mutuality  Outcome: Outcome(s) achieved Date Met: 06/08/19    Goal: Discharge Needs Assessment  Outcome: Outcome(s) achieved Date Met: 06/08/19    Goal: Interprofessional Rounds/Family Conf  Outcome: Outcome(s) achieved Date Met: 06/08/19      Problem: Skin Injury Risk (Adult)  Goal: Identify Related Risk Factors and Signs and Symptoms  Outcome: Outcome(s) achieved Date Met: 06/08/19    Goal: Skin Health and Integrity  Outcome: Outcome(s) achieved Date Met: 06/08/19      Problem: Fall Risk (Adult)  Goal: Identify Related Risk Factors and Signs and Symptoms  Outcome: Outcome(s) achieved Date Met: 06/08/19    Goal: Absence of Fall  Outcome: Outcome(s) achieved Date Met: 06/08/19      Problem: Aortic Aneurysm/Dissection Repair (Adult)  Goal: Signs and Symptoms of Listed Potential Problems Will be Absent, Minimized or Managed (Aortic Aneurysm/Dissection Repair)  Outcome: Outcome(s) achieved Date Met: 06/08/19    Goal: Anesthesia/Sedation Recovery  Outcome: Outcome(s) achieved Date Met: 06/08/19

## 2019-06-08 NOTE — PROGRESS NOTES
2 Days Post-Op EVAR       LOS: 2 days   Patient Care Team:  Raudel Zheng MD as PCP - General  Joo Franklin MD as Consulting Physician (Orthopedic Surgery)  Leopoldo Burleson MD as Surgeon (Cardiothoracic Surgery)  Douglas Galvez MD as Consulting Physician (Cardiology)    Chief complaint:  Abdominal aortic aneurysm  Subjective   Denies chest pain, denies shortness of breath    Objective    Vital Signs  Temp:  [96.7 °F (35.9 °C)-98.1 °F (36.7 °C)] 98 °F (36.7 °C)  Heart Rate:  [62-83] 71  Resp:  [16-20] 20  BP: (102-151)/(61-99) 151/80    Physical Exam:   General Appearance: alert, appears stated age and cooperative   Lungs: clear bilaterally   Heart: Regular rate and rhythm   Skin:  Incision c/d/i     Results   Results from last 7 days   Lab Units 06/08/19  0338   WBC 10*3/mm3 7.42   HEMOGLOBIN g/dL 10.8*   HEMATOCRIT % 34.2*   PLATELETS 10*3/mm3 121*     Results from last 7 days   Lab Units 06/08/19  0338   SODIUM mmol/L 135*   POTASSIUM mmol/L 4.3   CHLORIDE mmol/L 99   CO2 mmol/L 26.0   BUN mg/dL 15   CREATININE mg/dL 0.77   GLUCOSE mg/dL 204*   CALCIUM mg/dL 8.9               Assessment      Abdominal aortic aneurysm (S/P Endovascular repair)    Moderate COPD    Former smoker    Coal workers pneumoconiosis, simple    Chronic respiratory failure with hypoxia and hypercapnia (CMS/HCC)        Plan   Home soon    Jim Silverman PA-C  06/08/19  9:20 AM

## 2019-06-08 NOTE — PLAN OF CARE
Problem: Patient Care Overview  Goal: Plan of Care Review  Outcome: Ongoing (interventions implemented as appropriate)   06/08/19 0503   Plan of Care Review   Progress improving   Coping/Psychosocial   Plan of Care Reviewed With patient;spouse   OTHER   Outcome Summary UOP adequate. No complaints of pain or SOB. Incisional sites in groin CDI. Family at bedside. Senokot, prune juice given to encourage bowel movement. Remains on 2L NC. Up to bedside commode to encourage bowel movement. IV in medial forearm removed per patient request. Waiting on morning labs.        Problem: Fall Risk (Adult)  Goal: Identify Related Risk Factors and Signs and Symptoms  Outcome: Ongoing (interventions implemented as appropriate)      Problem: Aortic Aneurysm/Dissection Repair (Adult)  Goal: Signs and Symptoms of Listed Potential Problems Will be Absent, Minimized or Managed (Aortic Aneurysm/Dissection Repair)  Outcome: Ongoing (interventions implemented as appropriate)

## 2019-06-09 ENCOUNTER — READMISSION MANAGEMENT (OUTPATIENT)
Dept: CALL CENTER | Facility: HOSPITAL | Age: 69
End: 2019-06-09

## 2019-06-09 NOTE — OUTREACH NOTE
Prep Survey      Responses   Facility patient discharged from?  [place]   Is patient eligible?  Yes   Discharge diagnosis   Repair of infrarenal aortic abdominal aneurysm    Does the patient have one of the following disease processes/diagnoses(primary or secondary)?  Cardiothoracic surgery   Does the patient have Home health ordered?  No   Is there a DME ordered?  No   Prep survey completed?  Yes          Eda Helm RN

## 2019-06-11 ENCOUNTER — READMISSION MANAGEMENT (OUTPATIENT)
Dept: CALL CENTER | Facility: HOSPITAL | Age: 69
End: 2019-06-11

## 2019-06-11 NOTE — OUTREACH NOTE
CT Surgery Week 1 Survey      Responses   Facility patient discharged from?  [place]   Does the patient have one of the following disease processes/diagnoses(primary or secondary)?  Cardiothoracic surgery   Is there a successful TCM telephone encounter documented?  No   Week 1 attempt successful?  No   Unsuccessful attempts  Attempt 1            Ginger Beal RN

## 2019-06-12 ENCOUNTER — READMISSION MANAGEMENT (OUTPATIENT)
Dept: CALL CENTER | Facility: HOSPITAL | Age: 69
End: 2019-06-12

## 2019-06-12 NOTE — OUTREACH NOTE
CT Surgery Week 1 Survey      Responses   Facility patient discharged from?  [place]   Does the patient have one of the following disease processes/diagnoses(primary or secondary)?  Cardiothoracic surgery   Is there a successful TCM telephone encounter documented?  No   Week 1 attempt successful?  Yes   Call start time  1100   Call end time  1105   Discharge diagnosis   Repair of infrarenal aortic abdominal aneurysm    Meds reviewed with patient/caregiver?  Yes   Is the patient having any side effects they believe may be caused by any medication additions or changes?  No   Does the patient have all medications related to this admission filled (includes all antibiotics, pain medications, cardiac medications, etc.)  Yes   Is the patient taking all medications as directed (includes completed medication regime)?  Yes   Does the patient have a primary care provider?   Yes   Does the patient have an appointment scheduled with their C/T surgeon?  Yes   Has the patient kept scheduled appointments due by today?  N/A   Has home health visited the patient within 72 hours of discharge?  N/A   Psychosocial issues?  No   Did the patient receive a copy of their discharge instructions?  Yes   Nursing interventions  Reviewed instructions with patient   What is the patient's perception of their health status since discharge?  Improving   Nursing interventions  Nurse provided patient education   Is the patient/caregiver able to teach back normal signs of recovery?  Pain or discomfort at incisional site   Nursing interventions  Reassured on normal signs of recovery   Is the patient /caregiver able to teach back basic post-op care?  No tub bath, swimming, or hot tub until instructed by MD, Lifting as instructed by MD in discharge instructions, Keep incision areas clean, dry and protected, Drive as instructed by MD in discharge instructions, Take showers only when approved by MD-sponge bathe until then   Is the patient/caregiver able  to teach back signs and symptoms of incisional infection?  Increased redness, swelling or pain at the incisonal site, Incisional warmth, Pus or odor from incision, Fever   Is the patient/caregiver able to teach back steps to recovery at home?  Set small, achievable goals for return to baseline health, Rest and rebuild strength, gradually increase activity   Is the patient/caregiver able to teach back the hierarchy of who to call/visit for symptoms/problems? PCP, Specialist, Home health nurse, Urgent Care, ED, 911  Yes   Week 1 call completed?  Yes            Colt Thomas RN

## 2019-06-21 ENCOUNTER — READMISSION MANAGEMENT (OUTPATIENT)
Dept: CALL CENTER | Facility: HOSPITAL | Age: 69
End: 2019-06-21

## 2019-06-21 NOTE — OUTREACH NOTE
CT Surgery Week 2 Survey      Responses   Facility patient discharged from?  [place]   Does the patient have one of the following disease processes/diagnoses(primary or secondary)?  Cardiothoracic surgery   Week 2 attempt successful?  Yes   Call start time  1601   Call end time  1605   Discharge diagnosis   Repair of infrarenal aortic abdominal aneurysm    Meds reviewed with patient/caregiver?  Yes   Is the patient having any side effects they believe may be caused by any medication additions or changes?  No   Does the patient have all medications related to this admission filled (includes all antibiotics, pain medications, cardiac medications, etc.)  Yes   Is the patient taking all medications as directed (includes completed medication regime)?  Yes   Does the patient have a primary care provider?   Yes   Does the patient have an appointment scheduled with their C/T surgeon?  Yes   Has the patient kept scheduled appointments due by today?  Yes   Has home health visited the patient within 72 hours of discharge?  N/A   Psychosocial issues?  No   Did the patient receive a copy of their discharge instructions?  Yes   Nursing interventions  Reviewed instructions with patient   What is the patient's perception of their health status since discharge?  Improving   Nursing interventions  Nurse provided patient education   Is the patient/caregiver able to teach back normal signs of recovery?  Pain or discomfort at incisional site   Nursing interventions  Reassured on normal signs of recovery   Is the patient /caregiver able to teach back basic post-op care?  No tub bath, swimming, or hot tub until instructed by MD, Lifting as instructed by MD in discharge instructions, Keep incision areas clean, dry and protected, Drive as instructed by MD in discharge instructions, Take showers only when approved by MD-sponge bathe until then   Is the patient/caregiver able to teach back signs and symptoms of incisional infection?   Increased redness, swelling or pain at the incisonal site, Incisional warmth, Pus or odor from incision, Fever   Is the patient/caregiver able to teach back steps to recovery at home?  Set small, achievable goals for return to baseline health, Rest and rebuild strength, gradually increase activity   Is the patient/caregiver able to teach back the hierarchy of who to call/visit for symptoms/problems? PCP, Specialist, Home health nurse, Urgent Care, ED, 911  Yes   Week 2 call completed?  Yes          Colt Thomas RN

## 2019-06-23 NOTE — DISCHARGE SUMMARY
CTS Discharge Summary    Patient Care Team:  Raudel Zheng MD as PCP - General  Joo Franklin MD as Consulting Physician (Orthopedic Surgery)  Leopoldo Burleson MD as Surgeon (Cardiothoracic Surgery)  Douglas Galvez MD as Consulting Physician (Cardiology)      Date of Admission: 6/6/2019  8:36 AM  Date of Discharge: 6/8/2019    Discharge Diagnosis  Past Medical History:   Diagnosis Date   • Abdominal aortic aneurysm (CMS/[facility]) 9/29/2016   • Anxiety 9/29/2016   • Arthritis    • Back pain    • Black lung (CMS/[facility])    • CAD (coronary artery disease) 9/29/2016   • COPD (chronic obstructive pulmonary disease) (CMS/[facility]) 9/29/2016   • Depression 9/29/2016   • Depression    • GERD (gastroesophageal reflux disease)    • Hypertension 9/29/2016   • On supplemental oxygen by nasal cannula     at night   • Vitiligo    • Wears dentures    • Wears reading eyeglasses          Abdominal aortic aneurysm (S/P Endovascular repair)    Anxiety    Hypertension    Moderate COPD    Former smoker    Coal workers pneumoconiosis, simple    Chronic respiratory failure with hypoxia and hypercapnia (CMS/[facility])      History of Present IllnessThe patient, Mr. Kelsey, returns today for follow-up of his abdominal aneurysm.  Dr. Bhatti spoke to me about his need for back surgery.  He, however, has been having abdominal pain.  On his most recent x-ray the aneurysm has been read out as 4.8 cm by the radiologist according to Dr. Bhatti.  He is very concerned about the possibility of having problems from this.  He has been referred to me for consideration of surgical repair prior to his back surgery.               Hospital Course  Patient is a 68 y.o. male underwent repair of infrarenal abdominal aortic aneurysm with [place] excluder device  Postop course he did well is able to start ambulating and eating his first postop day second postop he is ready be discharged home follow-up Dr. Burleson in 3 to 4 weeks  Procedures  Performed  Procedure(s):  ABDOMINAL AORTIC ANEURYSM REPAIR WITH ENDOGRAFT       Consults:   Consults     No orders found from 5/8/2019 to 6/7/2019.            Discharge Medications     Discharge Medications      Changes to Medications      Instructions Start Date   ASPIRIN LOW DOSE 81 MG tablet  Generic drug:  aspirin  What changed:  additional instructions   81 mg, Oral, Daily, Last dose 10-21-18         Continue These Medications      Instructions Start Date   acetaminophen 500 MG tablet  Commonly known as:  TYLENOL   500 mg, Oral, Every 6 Hours PRN      amLODIPine 5 MG tablet  Commonly known as:  NORVASC   5 mg, Oral, 2 Times Daily      citalopram 20 MG tablet  Commonly known as:  CeleXA   20 mg, Oral, Daily      metoprolol succinate XL 50 MG 24 hr tablet  Commonly known as:  TOPROL-XL   50 mg, Oral, Daily      oxyCODONE-acetaminophen 5-325 MG per tablet  Commonly known as:  PERCOCET   1 tablet, Oral, Every 6 Hours PRN      pantoprazole 40 MG EC tablet  Commonly known as:  PROTONIX   40 mg, Oral, 2 Times Daily      TRELEGY ELLIPTA 100-62.5-25 MCG/INH aerosol powder   Generic drug:  Fluticasone-Umeclidin-Vilant   1 each, Inhalation, Daily         ASK your doctor about these medications      Instructions Start Date   HYDROcodone-acetaminophen 7.5-325 MG per tablet  Commonly known as:  NORCO  Ask about: Should I take this medication?   1 tablet, Oral, Every 6 Hours PRN             Discharge Diet: Iliac    Activity at Discharge:   Do not drive while taking narcotics    Follow-up Appointments  Future Appointments   Date Time Provider Department Center   6/27/2019 12:00 PM Wilfrid Crump PA [facility] None   9/18/2019  9:30 AM Yolie Bates APRN [facility] None          Jim Silverman PA-C  06/23/19  10:29 AM

## 2019-06-27 ENCOUNTER — OFFICE VISIT (OUTPATIENT)
Dept: CARDIAC SURGERY | Facility: CLINIC | Age: 69
End: 2019-06-27

## 2019-06-27 VITALS
TEMPERATURE: 99.3 F | BODY MASS INDEX: 31.6 KG/M2 | SYSTOLIC BLOOD PRESSURE: 144 MMHG | OXYGEN SATURATION: 94 % | DIASTOLIC BLOOD PRESSURE: 84 MMHG | WEIGHT: 246.2 LBS | HEIGHT: 74 IN | HEART RATE: 82 BPM

## 2019-06-27 DIAGNOSIS — I71.40 ABDOMINAL AORTIC ANEURYSM (AAA) WITHOUT RUPTURE (HCC): Primary | ICD-10-CM

## 2019-06-27 PROCEDURE — 99024 POSTOP FOLLOW-UP VISIT: CPT | Performed by: PHYSICIAN ASSISTANT

## 2019-06-27 RX ORDER — RANITIDINE HCL 75 MG
75 TABLET ORAL 2 TIMES DAILY
COMMUNITY
End: 2019-07-23

## 2019-06-27 NOTE — PROGRESS NOTES
06/27/2019  Patient Information  Derek Kelsey                                                                                          [address] [place]  [place] [address]   1950  'PCP/Referring Physician'  Raudel Zheng MD  [phone]  No ref. provider found    Chief Complaint   Patient presents with   • Post-op     Hospital follow up s/p  Endo AAA repair on 6/6/19   • Aortic Aneurysm       History of Present Illness:  Patient is a 68-year-old  female with history of abdominal aortic aneurysm status post Endo AAA repair performed 6/6/2019 who presents to office for postop follow-up visit.  The patient denies any abdominal pain, chest pain, back pain, incisional pain or difficulty with ambulation.  She is otherwise doing well and looking forward to resuming normal physical activity.    Patient Active Problem List   Diagnosis   • Anxiety   • Abdominal aortic aneurysm (S/P Endovascular repair)   • Depression   • Hypertension   • Cataract, bilateral   • CAD (coronary artery disease)   • Abnormal stress test   • Moderate COPD   • Former smoker   • Coal workers pneumoconiosis, simple   • Back pain   • S/P lumbar fusion   • Prediabetes   • Acute blood loss anemia, mild, asymptomatic   • Acute postoperative pain   • Hyponatremia, mild   • Chronic respiratory failure with hypoxia and hypercapnia (CMS/HCC)     Past Medical History:   Diagnosis Date   • Abdominal aortic aneurysm (CMS/HCC) 9/29/2016   • Anxiety 9/29/2016   • Arthritis    • Back pain    • Black lung (CMS/HCC)    • CAD (coronary artery disease) 9/29/2016   • COPD (chronic obstructive pulmonary disease) (CMS/HCC) 9/29/2016   • Depression 9/29/2016   • Depression    • GERD (gastroesophageal reflux disease)    • Hypertension 9/29/2016   • On supplemental oxygen by nasal cannula     at night   • Vitiligo    • Wears dentures    • Wears reading eyeglasses      Past Surgical History:   Procedure Laterality Date   • ABDOMINAL AORTIC  ANEURYSM REPAIR WITH ENDOGRAFT N/A 6/6/2019    Procedure: ABDOMINAL AORTIC ANEURYSM REPAIR WITH ENDOGRAFT;  Surgeon: Leopoldo Burleson MD;  Location:  [facility];  Service: Vascular   • BACK SURGERY     • CARDIAC CATHETERIZATION     • CARDIAC CATHETERIZATION N/A 9/28/2018    Procedure: Left Heart Cath;  Surgeon: Douglas Galvez MD;  Location:  [facility];  Service: Cardiovascular   • COLONOSCOPY      2015   • EYE SURGERY      cataracts bilateral   • HAND SURGERY Left    • LUMBAR DISCECTOMY FUSION INSTRUMENTATION N/A 11/1/2018    Procedure: LUMBAR DISCECTOMY POSTERIOR WITH FUSION INSTRUMENTATION;  Surgeon: Joo Franklin MD;  Location:  [facility];  Service: Orthopedic Spine   • ORIF FINGER / THUMB FRACTURE     • SHOULDER SURGERY Left        Current Outpatient Medications:   •  acetaminophen (TYLENOL) 500 MG tablet, Take 500 mg by mouth Every 6 (Six) Hours As Needed for Mild Pain ., Disp: , Rfl:   •  amLODIPine (NORVASC) 5 MG tablet, Take 5 mg by mouth 2 (Two) Times a Day., Disp: , Rfl:   •  ASPIRIN LOW DOSE 81 MG tablet, Take 1 tablet by mouth Daily. Last dose 10-21-18 (Patient taking differently: Take 81 mg by mouth Daily.), Disp: , Rfl:   •  citalopram (CeleXA) 20 MG tablet, Take 20 mg by mouth Daily., Disp: , Rfl:   •  Fluticasone-Umeclidin-Vilant (TRELEGY ELLIPTA) 100-62.5-25 MCG/INH aerosol powder , Inhale 1 each Daily., Disp: , Rfl:   •  metoprolol succinate XL (TOPROL-XL) 50 MG 24 hr tablet, Take 50 mg by mouth Daily., Disp: , Rfl:   •  oxyCODONE-acetaminophen (PERCOCET) 5-325 MG per tablet, Take 1 tablet by mouth Every 6 (Six) Hours As Needed., Disp: , Rfl:   •  pantoprazole (PROTONIX) 40 MG EC tablet, Take 40 mg by mouth 2 (Two) Times a Day., Disp: , Rfl:   •  raNITIdine (ZANTAC) 75 MG tablet, Take 75 mg by mouth 2 (Two) Times a Day., Disp: , Rfl:   Allergies   Allergen Reactions   • Penicillins Hives     Social History     Socioeconomic History   • Marital status:   "    Spouse name: Not on file   • Number of children: 2   • Years of education: Not on file   • Highest education level: Not on file   Occupational History   • Occupation: DISABLED      Comment: Back   Tobacco Use   • Smoking status: Former Smoker     Packs/day: 1.00     Years: 30.00     Pack years: 30.00     Types: Cigarettes     Last attempt to quit: 2011     Years since quittin.4   • Smokeless tobacco: Former User     Types: Chew     Quit date: 2011   Substance and Sexual Activity   • Alcohol use: No   • Drug use: No   • Sexual activity: Defer   Social History Narrative    Lives in [place].    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  Vitals:    19 1122   BP: 144/84   Pulse: 82   Temp: 99.3 °F (37.4 °C)   TempSrc: Temporal   SpO2: 94%   Weight: 112 kg (246 lb 3.2 oz)   Height: 188 cm (74\")      Physical Exam:  Gen - NAD, pleasant, cooperative  CV - Regular rate and rhythm, no murmur gallop or rub  Pulm - Lungs clear to auscultation without wheeze or rhonchi   GI - Soft, normoactive bowel sounds, non-tender  Ext - Without edema, feet warm and viable, no evidence of lower extremity ulceration or cyanosis  Incision - Healing well, no evidence of incisional dehiscence or cellulitis  Neuro - CN II - XII grossly intact, tongue midline, voice normal    Assessment/Plan:  Patient is a 68-year-old  female with history of abdominal aortic aneurysm status post Endo AAA repair performed 2019 " who presents to office for postop follow-up visit.  The patient is having a steady postoperative course.  Her incisions are healing well, her feet are warm and viable, she is ambulate in without difficulty and she is overall doing well.  The patient may drive as long as she is not expensing any visual changes or dizziness.  I believe the patient is okay for surgery with Dr. Bhatti for her back.  I would like for the patient to follow-up in 4 months with CT scan of the abdomen/pelvis.  If she has any questions, concerns or acutely worsening symptoms during the interval she may call our office or present to the nearest emergency department immediately.    Patient Active Problem List   Diagnosis   • Anxiety   • Abdominal aortic aneurysm (S/P Endovascular repair)   • Depression   • Hypertension   • Cataract, bilateral   • CAD (coronary artery disease)   • Abnormal stress test   • Moderate COPD   • Former smoker   • Coal workers pneumoconiosis, simple   • Back pain   • S/P lumbar fusion   • Prediabetes   • Acute blood loss anemia, mild, asymptomatic   • Acute postoperative pain   • Hyponatremia, mild   • Chronic respiratory failure with hypoxia and hypercapnia (CMS/HCC)

## 2019-06-28 ENCOUNTER — READMISSION MANAGEMENT (OUTPATIENT)
Dept: CALL CENTER | Facility: HOSPITAL | Age: 69
End: 2019-06-28

## 2019-06-28 NOTE — OUTREACH NOTE
CT Surgery Week 3 Survey      Responses   Facility patient discharged from?  [facility]   Does the patient have one of the following disease processes/diagnoses(primary or secondary)?  Cardiothoracic surgery   Week 3 attempt successful?  Yes   Call start time  1347   Call end time  1348   Discharge diagnosis   Repair of infrarenal aortic abdominal aneurysm    Meds reviewed with patient/caregiver?  Yes   Is the patient having any side effects they believe may be caused by any medication additions or changes?  No   Does the patient have all medications related to this admission filled (includes all antibiotics, pain medications, cardiac medications, etc.)  Yes   Is the patient taking all medications as directed (includes completed medication regime)?  Yes   Does the patient have a primary care provider?   Yes   Does the patient have an appointment scheduled with their C/T surgeon?  Yes   Has the patient kept scheduled appointments due by today?  Yes   Psychosocial issues?  No   Did the patient receive a copy of their discharge instructions?  Yes   Nursing interventions  Reviewed instructions with patient, Educated on MyChart   What is the patient's perception of their health status since discharge?  Improving   Nursing interventions  Nurse provided patient education   Is the patient/caregiver able to teach back normal signs of recovery?  Nausea and lack of appetite, Constipation, Depression or irritability, Pain or discomfort at incisional site   Nursing interventions  Reassured on normal signs of recovery   Is the patient /caregiver able to teach back basic post-op care?  Practice 'cough and deep breath', Take showers only when approved by MD-sponge bathe until then, No tub bath, swimming, or hot tub until instructed by MD, Keep incision areas clean, dry and protected   Is the patient/caregiver able to teach back signs and symptoms of incisional infection?  Increased redness, swelling or pain at the incisonal site,  Increased drainage or bleeding, Incisional warmth, Pus or odor from incision, Fever   Is the patient/caregiver able to teach back steps to recovery at home?  Set small, achievable goals for return to baseline health, Rest and rebuild strength, gradually increase activity   Is the patient/caregiver able to teach back the hierarchy of who to call/visit for symptoms/problems? PCP, Specialist, Home health nurse, Urgent Care, ED, 911  Yes   Week 3 call completed?  Yes          Raine Jones RN

## 2019-07-23 ENCOUNTER — APPOINTMENT (OUTPATIENT)
Dept: PREADMISSION TESTING | Facility: HOSPITAL | Age: 69
End: 2019-07-23

## 2019-07-23 LAB
DEPRECATED RDW RBC AUTO: 49.7 FL (ref 37–54)
ERYTHROCYTE [DISTWIDTH] IN BLOOD BY AUTOMATED COUNT: 15.1 % (ref 12.3–15.4)
GLUCOSE BLD-MCNC: 146 MG/DL (ref 65–99)
HBA1C MFR BLD: 6.5 % (ref 4.8–5.6)
HCT VFR BLD AUTO: 41 % (ref 37.5–51)
HGB BLD-MCNC: 12.9 G/DL (ref 13–17.7)
MCH RBC QN AUTO: 28.4 PG (ref 26.6–33)
MCHC RBC AUTO-ENTMCNC: 31.5 G/DL (ref 31.5–35.7)
MCV RBC AUTO: 90.1 FL (ref 79–97)
PLATELET # BLD AUTO: 167 10*3/MM3 (ref 140–450)
PMV BLD AUTO: 10.8 FL (ref 6–12)
POTASSIUM BLD-SCNC: 3.7 MMOL/L (ref 3.5–5.2)
RBC # BLD AUTO: 4.55 10*6/MM3 (ref 4.14–5.8)
WBC NRBC COR # BLD: 9.53 10*3/MM3 (ref 3.4–10.8)

## 2019-07-23 PROCEDURE — 83036 HEMOGLOBIN GLYCOSYLATED A1C: CPT | Performed by: ORTHOPAEDIC SURGERY

## 2019-07-23 PROCEDURE — 84132 ASSAY OF SERUM POTASSIUM: CPT | Performed by: ORTHOPAEDIC SURGERY

## 2019-07-23 PROCEDURE — 82947 ASSAY GLUCOSE BLOOD QUANT: CPT | Performed by: ORTHOPAEDIC SURGERY

## 2019-07-23 PROCEDURE — 85027 COMPLETE CBC AUTOMATED: CPT | Performed by: ORTHOPAEDIC SURGERY

## 2019-07-23 PROCEDURE — 36415 COLL VENOUS BLD VENIPUNCTURE: CPT

## 2019-07-23 RX ORDER — HYDROCODONE BITARTRATE AND ACETAMINOPHEN 7.5; 325 MG/1; MG/1
1 TABLET ORAL EVERY 6 HOURS PRN
COMMUNITY
End: 2019-09-04

## 2019-07-23 RX ORDER — MULTIVITAMIN
1 TABLET ORAL DAILY
COMMUNITY

## 2019-07-24 ENCOUNTER — APPOINTMENT (OUTPATIENT)
Dept: GENERAL RADIOLOGY | Facility: HOSPITAL | Age: 69
End: 2019-07-24

## 2019-07-24 ENCOUNTER — ANESTHESIA EVENT (OUTPATIENT)
Dept: PERIOP | Facility: HOSPITAL | Age: 69
End: 2019-07-24

## 2019-07-24 ENCOUNTER — HOSPITAL ENCOUNTER (INPATIENT)
Facility: HOSPITAL | Age: 69
LOS: 4 days | Discharge: HOME | End: 2019-07-28
Attending: ORTHOPAEDIC SURGERY | Admitting: ORTHOPAEDIC SURGERY

## 2019-07-24 ENCOUNTER — ANESTHESIA (OUTPATIENT)
Dept: PERIOP | Facility: HOSPITAL | Age: 69
End: 2019-07-24

## 2019-07-24 DIAGNOSIS — Z98.1 S/P LUMBAR FUSION: ICD-10-CM

## 2019-07-24 DIAGNOSIS — J44.9 COPD MIXED TYPE (HCC): ICD-10-CM

## 2019-07-24 DIAGNOSIS — Z78.9 IMPAIRED MOBILITY AND ADLS: ICD-10-CM

## 2019-07-24 DIAGNOSIS — Z74.09 IMPAIRED FUNCTIONAL MOBILITY, BALANCE, GAIT, AND ENDURANCE: Primary | ICD-10-CM

## 2019-07-24 DIAGNOSIS — Z74.09 IMPAIRED MOBILITY AND ADLS: ICD-10-CM

## 2019-07-24 LAB
GLUCOSE BLDC GLUCOMTR-MCNC: 178 MG/DL (ref 70–130)
GLUCOSE BLDC GLUCOMTR-MCNC: 198 MG/DL (ref 70–130)

## 2019-07-24 PROCEDURE — 25010000002 NEOSTIGMINE 10 MG/10ML SOLUTION: Performed by: NURSE ANESTHETIST, CERTIFIED REGISTERED

## 2019-07-24 PROCEDURE — 0SP004Z REMOVAL OF INTERNAL FIXATION DEVICE FROM LUMBAR VERTEBRAL JOINT, OPEN APPROACH: ICD-10-PCS | Performed by: ORTHOPAEDIC SURGERY

## 2019-07-24 PROCEDURE — 82962 GLUCOSE BLOOD TEST: CPT

## 2019-07-24 PROCEDURE — 25010000002 ONDANSETRON PER 1 MG: Performed by: NURSE ANESTHETIST, CERTIFIED REGISTERED

## 2019-07-24 PROCEDURE — 25010000002 PROPOFOL 10 MG/ML EMULSION: Performed by: NURSE ANESTHETIST, CERTIFIED REGISTERED

## 2019-07-24 PROCEDURE — 97161 PT EVAL LOW COMPLEX 20 MIN: CPT

## 2019-07-24 PROCEDURE — 25810000003 SODIUM CHLORIDE 0.9 % WITH KCL 20 MEQ 20-0.9 MEQ/L-% SOLUTION: Performed by: ORTHOPAEDIC SURGERY

## 2019-07-24 PROCEDURE — 25010000002 HYDROMORPHONE 1 MG/ML SOLUTION: Performed by: ORTHOPAEDIC SURGERY

## 2019-07-24 PROCEDURE — 25010000002 ONDANSETRON PER 1 MG: Performed by: ORTHOPAEDIC SURGERY

## 2019-07-24 PROCEDURE — 25010000002 VANCOMYCIN: Performed by: ORTHOPAEDIC SURGERY

## 2019-07-24 PROCEDURE — 25010000002 VANCOMYCIN 10 G RECONSTITUTED SOLUTION: Performed by: ORTHOPAEDIC SURGERY

## 2019-07-24 PROCEDURE — 76000 FLUOROSCOPY <1 HR PHYS/QHP: CPT

## 2019-07-24 PROCEDURE — C1713 ANCHOR/SCREW BN/BN,TIS/BN: HCPCS | Performed by: ORTHOPAEDIC SURGERY

## 2019-07-24 PROCEDURE — 25010000002 FENTANYL CITRATE (PF) 100 MCG/2ML SOLUTION: Performed by: NURSE ANESTHETIST, CERTIFIED REGISTERED

## 2019-07-24 PROCEDURE — 94799 UNLISTED PULMONARY SVC/PX: CPT

## 2019-07-24 PROCEDURE — 25010000002 DEXAMETHASONE PER 1 MG: Performed by: NURSE ANESTHETIST, CERTIFIED REGISTERED

## 2019-07-24 PROCEDURE — 63710000001 INSULIN LISPRO (HUMAN) PER 5 UNITS: Performed by: NURSE PRACTITIONER

## 2019-07-24 PROCEDURE — 0SG1071 FUSION OF 2 OR MORE LUMBAR VERTEBRAL JOINTS WITH AUTOLOGOUS TISSUE SUBSTITUTE, POSTERIOR APPROACH, POSTERIOR COLUMN, OPEN APPROACH: ICD-10-PCS | Performed by: ORTHOPAEDIC SURGERY

## 2019-07-24 PROCEDURE — 97116 GAIT TRAINING THERAPY: CPT

## 2019-07-24 DEVICE — DBM T42210 2.5CMX5CM 2 EACH GRAFTON MATR
Type: IMPLANTABLE DEVICE | Site: SPINE LUMBAR | Status: FUNCTIONAL
Brand: GRAFTON®AND GRAFTON PLUS®DEMINERALIZED BONE MATRIX (DBM)

## 2019-07-24 DEVICE — IMPLANTABLE DEVICE: Type: IMPLANTABLE DEVICE | Site: SPINE LUMBAR | Status: FUNCTIONAL

## 2019-07-24 DEVICE — CMT BONE CONFIDENCE HI VISC: Type: IMPLANTABLE DEVICE | Site: SPINE LUMBAR | Status: FUNCTIONAL

## 2019-07-24 DEVICE — SCRW CORT VIPER FEN PA TI 6X50MM: Type: IMPLANTABLE DEVICE | Site: SPINE LUMBAR | Status: FUNCTIONAL

## 2019-07-24 DEVICE — SCRW VIPER INNR ST: Type: IMPLANTABLE DEVICE | Site: SPINE LUMBAR | Status: FUNCTIONAL

## 2019-07-24 DEVICE — ALLOGRFT BONE VIVIGEN CELLUAR MATRX FORMABLE 5CC: Type: IMPLANTABLE DEVICE | Site: SPINE LUMBAR | Status: FUNCTIONAL

## 2019-07-24 RX ORDER — SODIUM CHLORIDE, SODIUM LACTATE, POTASSIUM CHLORIDE, CALCIUM CHLORIDE 600; 310; 30; 20 MG/100ML; MG/100ML; MG/100ML; MG/100ML
9 INJECTION, SOLUTION INTRAVENOUS CONTINUOUS PRN
Status: DISCONTINUED | OUTPATIENT
Start: 2019-07-24 | End: 2019-07-24 | Stop reason: HOSPADM

## 2019-07-24 RX ORDER — SODIUM CHLORIDE AND POTASSIUM CHLORIDE 150; 900 MG/100ML; MG/100ML
100 INJECTION, SOLUTION INTRAVENOUS CONTINUOUS
Status: DISCONTINUED | OUTPATIENT
Start: 2019-07-24 | End: 2019-07-25

## 2019-07-24 RX ORDER — HYDROMORPHONE HYDROCHLORIDE 1 MG/ML
0.5 INJECTION, SOLUTION INTRAMUSCULAR; INTRAVENOUS; SUBCUTANEOUS
Status: DISCONTINUED | OUTPATIENT
Start: 2019-07-24 | End: 2019-07-28 | Stop reason: HOSPADM

## 2019-07-24 RX ORDER — MAGNESIUM HYDROXIDE 1200 MG/15ML
LIQUID ORAL AS NEEDED
Status: DISCONTINUED | OUTPATIENT
Start: 2019-07-24 | End: 2019-07-24 | Stop reason: HOSPADM

## 2019-07-24 RX ORDER — NICOTINE POLACRILEX 4 MG
15 LOZENGE BUCCAL
Status: DISCONTINUED | OUTPATIENT
Start: 2019-07-24 | End: 2019-07-28 | Stop reason: HOSPADM

## 2019-07-24 RX ORDER — SODIUM CHLORIDE 0.9 % (FLUSH) 0.9 %
3 SYRINGE (ML) INJECTION EVERY 12 HOURS SCHEDULED
Status: DISCONTINUED | OUTPATIENT
Start: 2019-07-24 | End: 2019-07-28 | Stop reason: HOSPADM

## 2019-07-24 RX ORDER — FAMOTIDINE 20 MG/1
20 TABLET, FILM COATED ORAL
Status: COMPLETED | OUTPATIENT
Start: 2019-07-24 | End: 2019-07-24

## 2019-07-24 RX ORDER — FAMOTIDINE 10 MG/ML
20 INJECTION, SOLUTION INTRAVENOUS EVERY 12 HOURS SCHEDULED
Status: DISCONTINUED | OUTPATIENT
Start: 2019-07-24 | End: 2019-07-28 | Stop reason: HOSPADM

## 2019-07-24 RX ORDER — ACETAMINOPHEN 325 MG/1
650 TABLET ORAL EVERY 4 HOURS PRN
Status: DISCONTINUED | OUTPATIENT
Start: 2019-07-24 | End: 2019-07-28 | Stop reason: HOSPADM

## 2019-07-24 RX ORDER — OXYCODONE HCL 10 MG/1
10 TABLET, FILM COATED, EXTENDED RELEASE ORAL ONCE
Status: COMPLETED | OUTPATIENT
Start: 2019-07-24 | End: 2019-07-24

## 2019-07-24 RX ORDER — SODIUM CHLORIDE 0.9 % (FLUSH) 0.9 %
3 SYRINGE (ML) INJECTION EVERY 12 HOURS SCHEDULED
Status: DISCONTINUED | OUTPATIENT
Start: 2019-07-24 | End: 2019-07-24 | Stop reason: HOSPADM

## 2019-07-24 RX ORDER — ONDANSETRON 2 MG/ML
4 INJECTION INTRAMUSCULAR; INTRAVENOUS EVERY 6 HOURS PRN
Status: DISCONTINUED | OUTPATIENT
Start: 2019-07-24 | End: 2019-07-28 | Stop reason: HOSPADM

## 2019-07-24 RX ORDER — ONDANSETRON 2 MG/ML
4 INJECTION INTRAMUSCULAR; INTRAVENOUS ONCE AS NEEDED
Status: DISCONTINUED | OUTPATIENT
Start: 2019-07-24 | End: 2019-07-24

## 2019-07-24 RX ORDER — SODIUM CHLORIDE 9 MG/ML
INJECTION, SOLUTION INTRAVENOUS CONTINUOUS PRN
Status: DISCONTINUED | OUTPATIENT
Start: 2019-07-24 | End: 2019-07-24 | Stop reason: SURG

## 2019-07-24 RX ORDER — ONDANSETRON 2 MG/ML
INJECTION INTRAMUSCULAR; INTRAVENOUS AS NEEDED
Status: DISCONTINUED | OUTPATIENT
Start: 2019-07-24 | End: 2019-07-24 | Stop reason: SURG

## 2019-07-24 RX ORDER — ROCURONIUM BROMIDE 10 MG/ML
INJECTION, SOLUTION INTRAVENOUS AS NEEDED
Status: DISCONTINUED | OUTPATIENT
Start: 2019-07-24 | End: 2019-07-24 | Stop reason: SURG

## 2019-07-24 RX ORDER — NALOXONE HCL 0.4 MG/ML
0.4 VIAL (ML) INJECTION
Status: DISCONTINUED | OUTPATIENT
Start: 2019-07-24 | End: 2019-07-28 | Stop reason: HOSPADM

## 2019-07-24 RX ORDER — FENTANYL CITRATE 50 UG/ML
50 INJECTION, SOLUTION INTRAMUSCULAR; INTRAVENOUS
Status: DISCONTINUED | OUTPATIENT
Start: 2019-07-24 | End: 2019-07-24

## 2019-07-24 RX ORDER — DEXTROSE MONOHYDRATE 25 G/50ML
25 INJECTION, SOLUTION INTRAVENOUS
Status: DISCONTINUED | OUTPATIENT
Start: 2019-07-24 | End: 2019-07-28 | Stop reason: HOSPADM

## 2019-07-24 RX ORDER — MORPHINE SULFATE 2 MG/ML
1 INJECTION, SOLUTION INTRAMUSCULAR; INTRAVENOUS EVERY 4 HOURS PRN
Status: DISCONTINUED | OUTPATIENT
Start: 2019-07-24 | End: 2019-07-28 | Stop reason: HOSPADM

## 2019-07-24 RX ORDER — DEXAMETHASONE SODIUM PHOSPHATE 4 MG/ML
8 INJECTION, SOLUTION INTRA-ARTICULAR; INTRALESIONAL; INTRAMUSCULAR; INTRAVENOUS; SOFT TISSUE ONCE AS NEEDED
Status: DISCONTINUED | OUTPATIENT
Start: 2019-07-24 | End: 2019-07-28 | Stop reason: HOSPADM

## 2019-07-24 RX ORDER — SODIUM CHLORIDE 0.9 % (FLUSH) 0.9 %
3-10 SYRINGE (ML) INJECTION AS NEEDED
Status: DISCONTINUED | OUTPATIENT
Start: 2019-07-24 | End: 2019-07-24 | Stop reason: HOSPADM

## 2019-07-24 RX ORDER — PROPOFOL 10 MG/ML
VIAL (ML) INTRAVENOUS AS NEEDED
Status: DISCONTINUED | OUTPATIENT
Start: 2019-07-24 | End: 2019-07-24 | Stop reason: SURG

## 2019-07-24 RX ORDER — BUPIVACAINE HYDROCHLORIDE AND EPINEPHRINE 2.5; 5 MG/ML; UG/ML
INJECTION, SOLUTION INFILTRATION; PERINEURAL AS NEEDED
Status: DISCONTINUED | OUTPATIENT
Start: 2019-07-24 | End: 2019-07-24 | Stop reason: HOSPADM

## 2019-07-24 RX ORDER — FAMOTIDINE 20 MG/1
20 TABLET, FILM COATED ORAL EVERY 12 HOURS SCHEDULED
Status: DISCONTINUED | OUTPATIENT
Start: 2019-07-24 | End: 2019-07-28 | Stop reason: HOSPADM

## 2019-07-24 RX ORDER — FENTANYL CITRATE 50 UG/ML
INJECTION, SOLUTION INTRAMUSCULAR; INTRAVENOUS AS NEEDED
Status: DISCONTINUED | OUTPATIENT
Start: 2019-07-24 | End: 2019-07-24 | Stop reason: SURG

## 2019-07-24 RX ORDER — OXYCODONE AND ACETAMINOPHEN 10; 325 MG/1; MG/1
1 TABLET ORAL EVERY 4 HOURS PRN
Status: DISCONTINUED | OUTPATIENT
Start: 2019-07-24 | End: 2019-07-25

## 2019-07-24 RX ORDER — ATRACURIUM BESYLATE 10 MG/ML
INJECTION, SOLUTION INTRAVENOUS AS NEEDED
Status: DISCONTINUED | OUTPATIENT
Start: 2019-07-24 | End: 2019-07-24 | Stop reason: SURG

## 2019-07-24 RX ORDER — GLYCOPYRROLATE 0.2 MG/ML
INJECTION INTRAMUSCULAR; INTRAVENOUS AS NEEDED
Status: DISCONTINUED | OUTPATIENT
Start: 2019-07-24 | End: 2019-07-24 | Stop reason: SURG

## 2019-07-24 RX ORDER — HYDROMORPHONE HYDROCHLORIDE 1 MG/ML
0.5 INJECTION, SOLUTION INTRAMUSCULAR; INTRAVENOUS; SUBCUTANEOUS
Status: DISCONTINUED | OUTPATIENT
Start: 2019-07-24 | End: 2019-07-24

## 2019-07-24 RX ORDER — PANTOPRAZOLE SODIUM 40 MG/1
40 TABLET, DELAYED RELEASE ORAL 2 TIMES DAILY
Status: DISCONTINUED | OUTPATIENT
Start: 2019-07-24 | End: 2019-07-28 | Stop reason: HOSPADM

## 2019-07-24 RX ORDER — PREGABALIN 75 MG/1
75 CAPSULE ORAL ONCE
Status: COMPLETED | OUTPATIENT
Start: 2019-07-24 | End: 2019-07-24

## 2019-07-24 RX ORDER — LIDOCAINE HYDROCHLORIDE 20 MG/ML
INJECTION, SOLUTION INFILTRATION; PERINEURAL AS NEEDED
Status: DISCONTINUED | OUTPATIENT
Start: 2019-07-24 | End: 2019-07-24 | Stop reason: SURG

## 2019-07-24 RX ORDER — BISACODYL 5 MG/1
5 TABLET, DELAYED RELEASE ORAL DAILY PRN
Status: DISCONTINUED | OUTPATIENT
Start: 2019-07-24 | End: 2019-07-28 | Stop reason: HOSPADM

## 2019-07-24 RX ORDER — DEXAMETHASONE SODIUM PHOSPHATE 10 MG/ML
INJECTION INTRAMUSCULAR; INTRAVENOUS AS NEEDED
Status: DISCONTINUED | OUTPATIENT
Start: 2019-07-24 | End: 2019-07-24 | Stop reason: SURG

## 2019-07-24 RX ORDER — METOPROLOL SUCCINATE 50 MG/1
50 TABLET, EXTENDED RELEASE ORAL DAILY
Status: DISCONTINUED | OUTPATIENT
Start: 2019-07-24 | End: 2019-07-28 | Stop reason: HOSPADM

## 2019-07-24 RX ORDER — SODIUM CHLORIDE 0.9 % (FLUSH) 0.9 %
3-10 SYRINGE (ML) INJECTION AS NEEDED
Status: DISCONTINUED | OUTPATIENT
Start: 2019-07-24 | End: 2019-07-28 | Stop reason: HOSPADM

## 2019-07-24 RX ORDER — LABETALOL HYDROCHLORIDE 5 MG/ML
10 INJECTION, SOLUTION INTRAVENOUS EVERY 4 HOURS PRN
Status: DISCONTINUED | OUTPATIENT
Start: 2019-07-24 | End: 2019-07-28 | Stop reason: HOSPADM

## 2019-07-24 RX ORDER — NEOSTIGMINE METHYLSULFATE 1 MG/ML
INJECTION, SOLUTION INTRAVENOUS AS NEEDED
Status: DISCONTINUED | OUTPATIENT
Start: 2019-07-24 | End: 2019-07-24 | Stop reason: SURG

## 2019-07-24 RX ORDER — CITALOPRAM 20 MG/1
20 TABLET ORAL NIGHTLY
Status: DISCONTINUED | OUTPATIENT
Start: 2019-07-24 | End: 2019-07-28 | Stop reason: HOSPADM

## 2019-07-24 RX ORDER — AMLODIPINE BESYLATE 5 MG/1
5 TABLET ORAL 2 TIMES DAILY
Status: DISCONTINUED | OUTPATIENT
Start: 2019-07-24 | End: 2019-07-28 | Stop reason: HOSPADM

## 2019-07-24 RX ORDER — ONDANSETRON 2 MG/ML
4 INJECTION INTRAMUSCULAR; INTRAVENOUS EVERY 6 HOURS PRN
Status: DISCONTINUED | OUTPATIENT
Start: 2019-07-24 | End: 2019-07-24

## 2019-07-24 RX ORDER — BISACODYL 10 MG
10 SUPPOSITORY, RECTAL RECTAL DAILY PRN
Status: DISCONTINUED | OUTPATIENT
Start: 2019-07-24 | End: 2019-07-28 | Stop reason: HOSPADM

## 2019-07-24 RX ORDER — FAMOTIDINE 10 MG/ML
20 INJECTION, SOLUTION INTRAVENOUS
Status: COMPLETED | OUTPATIENT
Start: 2019-07-24 | End: 2019-07-24

## 2019-07-24 RX ORDER — ACETAMINOPHEN 500 MG
1000 TABLET ORAL ONCE
Status: COMPLETED | OUTPATIENT
Start: 2019-07-24 | End: 2019-07-24

## 2019-07-24 RX ORDER — LIDOCAINE HYDROCHLORIDE 10 MG/ML
0.5 INJECTION, SOLUTION EPIDURAL; INFILTRATION; INTRACAUDAL; PERINEURAL ONCE AS NEEDED
Status: COMPLETED | OUTPATIENT
Start: 2019-07-24 | End: 2019-07-24

## 2019-07-24 RX ADMIN — ACETAMINOPHEN 1000 MG: 500 TABLET ORAL at 06:23

## 2019-07-24 RX ADMIN — FENTANYL CITRATE 25 MCG: 50 INJECTION, SOLUTION INTRAMUSCULAR; INTRAVENOUS at 08:44

## 2019-07-24 RX ADMIN — CITALOPRAM HYDROBROMIDE 20 MG: 20 TABLET ORAL at 20:43

## 2019-07-24 RX ADMIN — PANTOPRAZOLE SODIUM 40 MG: 40 TABLET, DELAYED RELEASE ORAL at 20:43

## 2019-07-24 RX ADMIN — PROPOFOL 25 MG: 10 INJECTION, EMULSION INTRAVENOUS at 09:33

## 2019-07-24 RX ADMIN — POTASSIUM CHLORIDE AND SODIUM CHLORIDE 100 ML/HR: 900; 150 INJECTION, SOLUTION INTRAVENOUS at 14:56

## 2019-07-24 RX ADMIN — PROPOFOL 25 MG: 10 INJECTION, EMULSION INTRAVENOUS at 10:57

## 2019-07-24 RX ADMIN — PROPOFOL 25 MG: 10 INJECTION, EMULSION INTRAVENOUS at 09:49

## 2019-07-24 RX ADMIN — METOPROLOL SUCCINATE 50 MG: 50 TABLET, EXTENDED RELEASE ORAL at 16:21

## 2019-07-24 RX ADMIN — MUPIROCIN 1 APPLICATION: 20 OINTMENT TOPICAL at 06:30

## 2019-07-24 RX ADMIN — INSULIN LISPRO 2 UNITS: 100 INJECTION, SOLUTION INTRAVENOUS; SUBCUTANEOUS at 17:32

## 2019-07-24 RX ADMIN — FENTANYL CITRATE 25 MCG: 50 INJECTION, SOLUTION INTRAMUSCULAR; INTRAVENOUS at 09:33

## 2019-07-24 RX ADMIN — ROCURONIUM BROMIDE 50 MG: 10 INJECTION INTRAVENOUS at 07:54

## 2019-07-24 RX ADMIN — OXYCODONE HYDROCHLORIDE 10 MG: 10 TABLET, FILM COATED, EXTENDED RELEASE ORAL at 06:23

## 2019-07-24 RX ADMIN — LIDOCAINE HYDROCHLORIDE 30 MG: 20 INJECTION, SOLUTION INFILTRATION; PERINEURAL at 07:54

## 2019-07-24 RX ADMIN — ONDANSETRON 4 MG: 2 INJECTION INTRAMUSCULAR; INTRAVENOUS at 15:07

## 2019-07-24 RX ADMIN — SODIUM CHLORIDE, POTASSIUM CHLORIDE, SODIUM LACTATE AND CALCIUM CHLORIDE 9 ML/HR: 600; 310; 30; 20 INJECTION, SOLUTION INTRAVENOUS at 06:25

## 2019-07-24 RX ADMIN — AMLODIPINE BESYLATE 5 MG: 5 TABLET ORAL at 20:43

## 2019-07-24 RX ADMIN — VANCOMYCIN HYDROCHLORIDE 1750 MG: 1 INJECTION, POWDER, LYOPHILIZED, FOR SOLUTION INTRAVENOUS at 09:15

## 2019-07-24 RX ADMIN — HYDROMORPHONE HYDROCHLORIDE 1 MG: 1 INJECTION, SOLUTION INTRAMUSCULAR; INTRAVENOUS; SUBCUTANEOUS at 15:07

## 2019-07-24 RX ADMIN — GLYCOPYRROLATE 0.4 MG: 0.2 INJECTION, SOLUTION INTRAMUSCULAR; INTRAVENOUS at 11:52

## 2019-07-24 RX ADMIN — DEXAMETHASONE SODIUM PHOSPHATE 4 MG: 10 INJECTION INTRAMUSCULAR; INTRAVENOUS at 08:15

## 2019-07-24 RX ADMIN — LIDOCAINE HYDROCHLORIDE 0.5 ML: 10 INJECTION, SOLUTION EPIDURAL; INFILTRATION; INTRACAUDAL; PERINEURAL at 06:25

## 2019-07-24 RX ADMIN — VANCOMYCIN HYDROCHLORIDE 1750 MG: 1 INJECTION, POWDER, LYOPHILIZED, FOR SOLUTION INTRAVENOUS at 06:53

## 2019-07-24 RX ADMIN — FAMOTIDINE 20 MG: 20 TABLET ORAL at 20:43

## 2019-07-24 RX ADMIN — PROPOFOL 125 MG: 10 INJECTION, EMULSION INTRAVENOUS at 07:54

## 2019-07-24 RX ADMIN — ATRACURIUM BESYLATE 20 MG: 10 INJECTION, SOLUTION INTRAVENOUS at 08:24

## 2019-07-24 RX ADMIN — FENTANYL CITRATE 25 MCG: 50 INJECTION, SOLUTION INTRAMUSCULAR; INTRAVENOUS at 11:54

## 2019-07-24 RX ADMIN — VANCOMYCIN HYDROCHLORIDE 1750 MG: 10 INJECTION, POWDER, LYOPHILIZED, FOR SOLUTION INTRAVENOUS at 20:43

## 2019-07-24 RX ADMIN — PROPOFOL 25 MG: 10 INJECTION, EMULSION INTRAVENOUS at 08:48

## 2019-07-24 RX ADMIN — PREGABALIN 75 MG: 75 CAPSULE ORAL at 06:23

## 2019-07-24 RX ADMIN — PROPOFOL 25 MG: 10 INJECTION, EMULSION INTRAVENOUS at 11:36

## 2019-07-24 RX ADMIN — ONDANSETRON 4 MG: 2 INJECTION INTRAMUSCULAR; INTRAVENOUS at 09:47

## 2019-07-24 RX ADMIN — FAMOTIDINE 20 MG: 20 TABLET ORAL at 06:23

## 2019-07-24 RX ADMIN — PROPOFOL 50 MG: 10 INJECTION, EMULSION INTRAVENOUS at 08:33

## 2019-07-24 RX ADMIN — SODIUM CHLORIDE: 9 INJECTION, SOLUTION INTRAVENOUS at 11:50

## 2019-07-24 RX ADMIN — NEOSTIGMINE METHYLSULFATE 2.5 MG: 1 INJECTION, SOLUTION INTRAVENOUS at 11:52

## 2019-07-24 RX ADMIN — INSULIN LISPRO 2 UNITS: 100 INJECTION, SOLUTION INTRAVENOUS; SUBCUTANEOUS at 21:48

## 2019-07-24 NOTE — ANESTHESIA PREPROCEDURE EVALUATION
Anesthesia Evaluation     Patient summary reviewed and Nursing notes reviewed   no history of anesthetic complications:  NPO Solid Status: > 8 hours  NPO Liquid Status: > 2 hours           Airway   Mallampati: II  TM distance: >3 FB  Neck ROM: full  Possible difficult intubation  Dental    (+) upper dentures and lower dentures    Pulmonary    (+) a smoker Former Abstained day of surgery, COPD moderate, decreased breath sounds,     ROS comment: HOME O2 AT NIGHT  Cardiovascular   Exercise tolerance: good (4-7 METS)    Rhythm: regular  Rate: normal    (+) hypertension well controlled 2 medications or greater, CAD, PVD,       Neuro/Psych  (+) psychiatric history Depression,     GI/Hepatic/Renal/Endo    (+)  GERD well controlled,      Musculoskeletal     (+) back pain,   Abdominal   (+) obese,     Abdomen: soft.   Substance History      OB/GYN          Other   (+) arthritis                     Anesthesia Plan    ASA 3     general     intravenous induction   Anesthetic plan, all risks, benefits, and alternatives have been provided, discussed and informed consent has been obtained with: patient.    Plan discussed with CRNA.

## 2019-07-24 NOTE — ANESTHESIA POSTPROCEDURE EVALUATION
Patient: eDrek Kelsey    Procedure Summary     Date:  07/24/19 Room / Location:   [facility] [address] [place] [address] [facility]    Anesthesia Start:  0749 Anesthesia Stop:  1220    Procedure:  POSTERIOR LUMBAR SPINAL FUSION REVISION AND INSTRUMENTATION L3,L4,L5,S1 (N/A Spine Lumbar) Diagnosis:      Surgeon:  Joo Franklin MD Provider:  Octaviano Storey MD    Anesthesia Type:  general ASA Status:  3          Anesthesia Type: general  Last vitals  BP   150/85 (07/24/19 0622)   Temp       Pulse   91 (07/24/19 0622)   Resp   16 (07/24/19 0622)     SpO2   94 % (07/24/19 0622)     Post Anesthesia Care and Evaluation    Patient location during evaluation: PACU  Patient participation: complete - patient participated  Level of consciousness: responsive to verbal stimuli  Pain score: 0  Pain management: adequate  Airway patency: patent  Anesthetic complications: No anesthetic complications  PONV Status: none  Cardiovascular status: hemodynamically stable and acceptable  Respiratory status: nonlabored ventilation, acceptable and nasal cannula  Hydration status: acceptable

## 2019-07-24 NOTE — ANESTHESIA PROCEDURE NOTES
Airway  Urgency: elective    Airway not difficult    General Information and Staff    Patient location during procedure: OR    Indications and Patient Condition  Indications for airway management: airway protection  Mask difficulty assessment: 1 - vent by mask    Final Airway Details  Final airway type: endotracheal airway      Successful airway: ETT  Cuffed: yes   Successful intubation technique: direct laryngoscopy  Facilitating devices/methods: intubating stylet  Endotracheal tube insertion site: oral  Blade: Cheung  Blade size: 2  ETT size (mm): 8.0  Cormack-Lehane Classification: grade I - full view of glottis  Placement verified by: chest auscultation and capnometry   Measured from: lips  ETT to lips (cm): 22  Number of attempts at approach: 1

## 2019-07-25 PROBLEM — N28.9 RENAL INSUFFICIENCY: Status: ACTIVE | Noted: 2019-07-25

## 2019-07-25 LAB
ANION GAP SERPL CALCULATED.3IONS-SCNC: 12 MMOL/L (ref 5–15)
BUN BLD-MCNC: 25 MG/DL (ref 8–23)
BUN/CREAT SERPL: 14.9 (ref 7–25)
CALCIUM SPEC-SCNC: 8.7 MG/DL (ref 8.6–10.5)
CHLORIDE SERPL-SCNC: 100 MMOL/L (ref 98–107)
CO2 SERPL-SCNC: 20 MMOL/L (ref 22–29)
CREAT BLD-MCNC: 1.68 MG/DL (ref 0.76–1.27)
DEPRECATED RDW RBC AUTO: 51.3 FL (ref 37–54)
ERYTHROCYTE [DISTWIDTH] IN BLOOD BY AUTOMATED COUNT: 15.3 % (ref 12.3–15.4)
GFR SERPL CREATININE-BSD FRML MDRD: 41 ML/MIN/1.73
GLUCOSE BLD-MCNC: 165 MG/DL (ref 65–99)
GLUCOSE BLDC GLUCOMTR-MCNC: 141 MG/DL (ref 70–130)
GLUCOSE BLDC GLUCOMTR-MCNC: 161 MG/DL (ref 70–130)
GLUCOSE BLDC GLUCOMTR-MCNC: 164 MG/DL (ref 70–130)
GLUCOSE BLDC GLUCOMTR-MCNC: 165 MG/DL (ref 70–130)
HCT VFR BLD AUTO: 28.6 % (ref 37.5–51)
HGB BLD-MCNC: 8.7 G/DL (ref 13–17.7)
MCH RBC QN AUTO: 28 PG (ref 26.6–33)
MCHC RBC AUTO-ENTMCNC: 30.4 G/DL (ref 31.5–35.7)
MCV RBC AUTO: 92 FL (ref 79–97)
PLATELET # BLD AUTO: 129 10*3/MM3 (ref 140–450)
PMV BLD AUTO: 10.9 FL (ref 6–12)
POTASSIUM BLD-SCNC: 4.8 MMOL/L (ref 3.5–5.2)
RBC # BLD AUTO: 3.11 10*6/MM3 (ref 4.14–5.8)
SODIUM BLD-SCNC: 132 MMOL/L (ref 136–145)
WBC NRBC COR # BLD: 10.02 10*3/MM3 (ref 3.4–10.8)

## 2019-07-25 PROCEDURE — 80048 BASIC METABOLIC PNL TOTAL CA: CPT | Performed by: NURSE PRACTITIONER

## 2019-07-25 PROCEDURE — 82962 GLUCOSE BLOOD TEST: CPT

## 2019-07-25 PROCEDURE — 97165 OT EVAL LOW COMPLEX 30 MIN: CPT

## 2019-07-25 PROCEDURE — 85027 COMPLETE CBC AUTOMATED: CPT | Performed by: NURSE PRACTITIONER

## 2019-07-25 PROCEDURE — 97116 GAIT TRAINING THERAPY: CPT

## 2019-07-25 PROCEDURE — 94799 UNLISTED PULMONARY SVC/PX: CPT

## 2019-07-25 RX ORDER — SODIUM CHLORIDE 9 MG/ML
75 INJECTION, SOLUTION INTRAVENOUS CONTINUOUS
Status: DISCONTINUED | OUTPATIENT
Start: 2019-07-25 | End: 2019-07-28 | Stop reason: HOSPADM

## 2019-07-25 RX ORDER — HYDROCODONE BITARTRATE AND ACETAMINOPHEN 7.5; 325 MG/1; MG/1
1 TABLET ORAL EVERY 4 HOURS PRN
Status: DISCONTINUED | OUTPATIENT
Start: 2019-07-25 | End: 2019-07-28 | Stop reason: HOSPADM

## 2019-07-25 RX ADMIN — FAMOTIDINE 20 MG: 20 TABLET ORAL at 08:11

## 2019-07-25 RX ADMIN — OXYCODONE HYDROCHLORIDE AND ACETAMINOPHEN 1 TABLET: 10; 325 TABLET ORAL at 06:03

## 2019-07-25 RX ADMIN — HYDROCODONE BITARTRATE AND ACETAMINOPHEN 1 TABLET: 7.5; 325 TABLET ORAL at 18:13

## 2019-07-25 RX ADMIN — PANTOPRAZOLE SODIUM 40 MG: 40 TABLET, DELAYED RELEASE ORAL at 20:34

## 2019-07-25 RX ADMIN — PANTOPRAZOLE SODIUM 40 MG: 40 TABLET, DELAYED RELEASE ORAL at 08:11

## 2019-07-25 RX ADMIN — FAMOTIDINE 20 MG: 20 TABLET ORAL at 20:34

## 2019-07-25 RX ADMIN — BISACODYL 5 MG: 5 TABLET, COATED ORAL at 08:11

## 2019-07-25 RX ADMIN — INSULIN LISPRO 2 UNITS: 100 INJECTION, SOLUTION INTRAVENOUS; SUBCUTANEOUS at 21:24

## 2019-07-25 RX ADMIN — SODIUM CHLORIDE 100 ML/HR: 9 INJECTION, SOLUTION INTRAVENOUS at 13:18

## 2019-07-25 RX ADMIN — OXYCODONE HYDROCHLORIDE AND ACETAMINOPHEN 1 TABLET: 10; 325 TABLET ORAL at 13:16

## 2019-07-25 RX ADMIN — METOPROLOL SUCCINATE 50 MG: 50 TABLET, EXTENDED RELEASE ORAL at 08:10

## 2019-07-25 RX ADMIN — INSULIN LISPRO 2 UNITS: 100 INJECTION, SOLUTION INTRAVENOUS; SUBCUTANEOUS at 12:00

## 2019-07-25 RX ADMIN — CITALOPRAM HYDROBROMIDE 20 MG: 20 TABLET ORAL at 20:35

## 2019-07-25 RX ADMIN — AMLODIPINE BESYLATE 5 MG: 5 TABLET ORAL at 08:11

## 2019-07-25 RX ADMIN — INSULIN LISPRO 2 UNITS: 100 INJECTION, SOLUTION INTRAVENOUS; SUBCUTANEOUS at 08:10

## 2019-07-25 RX ADMIN — AMLODIPINE BESYLATE 5 MG: 5 TABLET ORAL at 20:35

## 2019-07-26 ENCOUNTER — APPOINTMENT (OUTPATIENT)
Dept: ULTRASOUND IMAGING | Facility: HOSPITAL | Age: 69
End: 2019-07-26

## 2019-07-26 LAB
ANION GAP SERPL CALCULATED.3IONS-SCNC: 13 MMOL/L (ref 5–15)
BUN BLD-MCNC: 39 MG/DL (ref 8–23)
BUN/CREAT SERPL: 18.7 (ref 7–25)
CALCIUM SPEC-SCNC: 9.1 MG/DL (ref 8.6–10.5)
CHLORIDE SERPL-SCNC: 98 MMOL/L (ref 98–107)
CK SERPL-CCNC: 362 U/L (ref 20–200)
CO2 SERPL-SCNC: 21 MMOL/L (ref 22–29)
CREAT BLD-MCNC: 2.09 MG/DL (ref 0.76–1.27)
CREAT UR-MCNC: 94.5 MG/DL
DEPRECATED RDW RBC AUTO: 52.3 FL (ref 37–54)
ERYTHROCYTE [DISTWIDTH] IN BLOOD BY AUTOMATED COUNT: 15.6 % (ref 12.3–15.4)
GFR SERPL CREATININE-BSD FRML MDRD: 32 ML/MIN/1.73
GLUCOSE BLD-MCNC: 132 MG/DL (ref 65–99)
GLUCOSE BLDC GLUCOMTR-MCNC: 134 MG/DL (ref 70–130)
GLUCOSE BLDC GLUCOMTR-MCNC: 139 MG/DL (ref 70–130)
GLUCOSE BLDC GLUCOMTR-MCNC: 141 MG/DL (ref 70–130)
GLUCOSE BLDC GLUCOMTR-MCNC: 150 MG/DL (ref 70–130)
HCT VFR BLD AUTO: 29.2 % (ref 37.5–51)
HGB BLD-MCNC: 9.1 G/DL (ref 13–17.7)
LDH SERPL-CCNC: 311 U/L (ref 135–225)
MCH RBC QN AUTO: 28.8 PG (ref 26.6–33)
MCHC RBC AUTO-ENTMCNC: 31.2 G/DL (ref 31.5–35.7)
MCV RBC AUTO: 92.4 FL (ref 79–97)
OSMOLALITY SERPL: 288 MOSM/KG (ref 275–295)
OSMOLALITY UR: 357 MOSM/KG (ref 300–1100)
PLATELET # BLD AUTO: 146 10*3/MM3 (ref 140–450)
PMV BLD AUTO: 11.3 FL (ref 6–12)
POTASSIUM BLD-SCNC: 4.3 MMOL/L (ref 3.5–5.2)
PROT UR-MCNC: 7.4 MG/DL
RBC # BLD AUTO: 3.16 10*6/MM3 (ref 4.14–5.8)
SODIUM BLD-SCNC: 132 MMOL/L (ref 136–145)
SODIUM UR-SCNC: <20 MMOL/L
TSH SERPL DL<=0.05 MIU/L-ACNC: 4.6 MIU/ML (ref 0.27–4.2)
URATE SERPL-MCNC: 7 MG/DL (ref 3.4–7)
UUN 24H UR-MCNC: 242 MG/DL
WBC NRBC COR # BLD: 12.98 10*3/MM3 (ref 3.4–10.8)

## 2019-07-26 PROCEDURE — 94799 UNLISTED PULMONARY SVC/PX: CPT

## 2019-07-26 PROCEDURE — 83615 LACTATE (LD) (LDH) ENZYME: CPT | Performed by: INTERNAL MEDICINE

## 2019-07-26 PROCEDURE — 84300 ASSAY OF URINE SODIUM: CPT | Performed by: NURSE PRACTITIONER

## 2019-07-26 PROCEDURE — 85027 COMPLETE CBC AUTOMATED: CPT | Performed by: NURSE PRACTITIONER

## 2019-07-26 PROCEDURE — 83930 ASSAY OF BLOOD OSMOLALITY: CPT | Performed by: INTERNAL MEDICINE

## 2019-07-26 PROCEDURE — 83935 ASSAY OF URINE OSMOLALITY: CPT | Performed by: INTERNAL MEDICINE

## 2019-07-26 PROCEDURE — 82570 ASSAY OF URINE CREATININE: CPT | Performed by: NURSE PRACTITIONER

## 2019-07-26 PROCEDURE — 80048 BASIC METABOLIC PNL TOTAL CA: CPT | Performed by: NURSE PRACTITIONER

## 2019-07-26 PROCEDURE — 76775 US EXAM ABDO BACK WALL LIM: CPT

## 2019-07-26 PROCEDURE — 82962 GLUCOSE BLOOD TEST: CPT

## 2019-07-26 PROCEDURE — 84443 ASSAY THYROID STIM HORMONE: CPT | Performed by: NURSE PRACTITIONER

## 2019-07-26 PROCEDURE — 82550 ASSAY OF CK (CPK): CPT | Performed by: INTERNAL MEDICINE

## 2019-07-26 PROCEDURE — 84156 ASSAY OF PROTEIN URINE: CPT | Performed by: INTERNAL MEDICINE

## 2019-07-26 PROCEDURE — 84540 ASSAY OF URINE/UREA-N: CPT | Performed by: NURSE PRACTITIONER

## 2019-07-26 PROCEDURE — 84550 ASSAY OF BLOOD/URIC ACID: CPT | Performed by: INTERNAL MEDICINE

## 2019-07-26 RX ORDER — TAMSULOSIN HYDROCHLORIDE 0.4 MG/1
0.4 CAPSULE ORAL DAILY
Status: DISCONTINUED | OUTPATIENT
Start: 2019-07-26 | End: 2019-07-28 | Stop reason: HOSPADM

## 2019-07-26 RX ADMIN — HYDROCODONE BITARTRATE AND ACETAMINOPHEN 1 TABLET: 7.5; 325 TABLET ORAL at 21:22

## 2019-07-26 RX ADMIN — HYDROCODONE BITARTRATE AND ACETAMINOPHEN 1 TABLET: 7.5; 325 TABLET ORAL at 13:30

## 2019-07-26 RX ADMIN — PANTOPRAZOLE SODIUM 40 MG: 40 TABLET, DELAYED RELEASE ORAL at 09:38

## 2019-07-26 RX ADMIN — TAMSULOSIN HYDROCHLORIDE 0.4 MG: 0.4 CAPSULE ORAL at 18:15

## 2019-07-26 RX ADMIN — FAMOTIDINE 20 MG: 20 TABLET ORAL at 21:22

## 2019-07-26 RX ADMIN — PANTOPRAZOLE SODIUM 40 MG: 40 TABLET, DELAYED RELEASE ORAL at 21:22

## 2019-07-26 RX ADMIN — CITALOPRAM HYDROBROMIDE 20 MG: 20 TABLET ORAL at 21:21

## 2019-07-26 RX ADMIN — HYDROCODONE BITARTRATE AND ACETAMINOPHEN 1 TABLET: 7.5; 325 TABLET ORAL at 09:38

## 2019-07-26 RX ADMIN — METOPROLOL SUCCINATE 50 MG: 50 TABLET, EXTENDED RELEASE ORAL at 09:38

## 2019-07-26 RX ADMIN — AMLODIPINE BESYLATE 5 MG: 5 TABLET ORAL at 09:38

## 2019-07-26 RX ADMIN — FAMOTIDINE 20 MG: 20 TABLET ORAL at 09:38

## 2019-07-26 RX ADMIN — FLUTICASONE FUROATE, UMECLIDINIUM BROMIDE AND VILANTEROL TRIFENATATE 1 EACH: 100; 62.5; 25 POWDER RESPIRATORY (INHALATION) at 10:48

## 2019-07-26 RX ADMIN — SODIUM CHLORIDE, PRESERVATIVE FREE 3 ML: 5 INJECTION INTRAVENOUS at 21:22

## 2019-07-26 RX ADMIN — AMLODIPINE BESYLATE 5 MG: 5 TABLET ORAL at 21:22

## 2019-07-27 LAB
ANION GAP SERPL CALCULATED.3IONS-SCNC: 12 MMOL/L (ref 5–15)
BUN BLD-MCNC: 43 MG/DL (ref 8–23)
BUN/CREAT SERPL: 20.7 (ref 7–25)
CALCIUM SPEC-SCNC: 8.4 MG/DL (ref 8.6–10.5)
CHLORIDE SERPL-SCNC: 102 MMOL/L (ref 98–107)
CO2 SERPL-SCNC: 19 MMOL/L (ref 22–29)
CREAT BLD-MCNC: 2.08 MG/DL (ref 0.76–1.27)
DEPRECATED RDW RBC AUTO: 51.9 FL (ref 37–54)
ERYTHROCYTE [DISTWIDTH] IN BLOOD BY AUTOMATED COUNT: 15.6 % (ref 12.3–15.4)
GFR SERPL CREATININE-BSD FRML MDRD: 32 ML/MIN/1.73
GLUCOSE BLD-MCNC: 130 MG/DL (ref 65–99)
GLUCOSE BLDC GLUCOMTR-MCNC: 123 MG/DL (ref 70–130)
GLUCOSE BLDC GLUCOMTR-MCNC: 128 MG/DL (ref 70–130)
GLUCOSE BLDC GLUCOMTR-MCNC: 131 MG/DL (ref 70–130)
GLUCOSE BLDC GLUCOMTR-MCNC: 143 MG/DL (ref 70–130)
HCT VFR BLD AUTO: 24.7 % (ref 37.5–51)
HGB BLD-MCNC: 7.7 G/DL (ref 13–17.7)
MCH RBC QN AUTO: 28.6 PG (ref 26.6–33)
MCHC RBC AUTO-ENTMCNC: 31.2 G/DL (ref 31.5–35.7)
MCV RBC AUTO: 91.8 FL (ref 79–97)
PLATELET # BLD AUTO: 109 10*3/MM3 (ref 140–450)
PMV BLD AUTO: 10.9 FL (ref 6–12)
POTASSIUM BLD-SCNC: 4.2 MMOL/L (ref 3.5–5.2)
RBC # BLD AUTO: 2.69 10*6/MM3 (ref 4.14–5.8)
SODIUM BLD-SCNC: 133 MMOL/L (ref 136–145)
WBC NRBC COR # BLD: 6.44 10*3/MM3 (ref 3.4–10.8)

## 2019-07-27 PROCEDURE — 80048 BASIC METABOLIC PNL TOTAL CA: CPT | Performed by: NURSE PRACTITIONER

## 2019-07-27 PROCEDURE — 97116 GAIT TRAINING THERAPY: CPT | Performed by: PHYSICAL THERAPIST

## 2019-07-27 PROCEDURE — 85027 COMPLETE CBC AUTOMATED: CPT | Performed by: NURSE PRACTITIONER

## 2019-07-27 PROCEDURE — 97110 THERAPEUTIC EXERCISES: CPT | Performed by: PHYSICAL THERAPIST

## 2019-07-27 PROCEDURE — 82962 GLUCOSE BLOOD TEST: CPT

## 2019-07-27 PROCEDURE — 94799 UNLISTED PULMONARY SVC/PX: CPT

## 2019-07-27 RX ADMIN — PANTOPRAZOLE SODIUM 40 MG: 40 TABLET, DELAYED RELEASE ORAL at 20:16

## 2019-07-27 RX ADMIN — FAMOTIDINE 20 MG: 20 TABLET ORAL at 20:16

## 2019-07-27 RX ADMIN — SODIUM CHLORIDE, PRESERVATIVE FREE 3 ML: 5 INJECTION INTRAVENOUS at 20:16

## 2019-07-27 RX ADMIN — AMLODIPINE BESYLATE 5 MG: 5 TABLET ORAL at 08:02

## 2019-07-27 RX ADMIN — CITALOPRAM HYDROBROMIDE 20 MG: 20 TABLET ORAL at 20:16

## 2019-07-27 RX ADMIN — METOPROLOL SUCCINATE 50 MG: 50 TABLET, EXTENDED RELEASE ORAL at 08:02

## 2019-07-27 RX ADMIN — HYDROCODONE BITARTRATE AND ACETAMINOPHEN 1 TABLET: 7.5; 325 TABLET ORAL at 21:04

## 2019-07-27 RX ADMIN — PANTOPRAZOLE SODIUM 40 MG: 40 TABLET, DELAYED RELEASE ORAL at 08:02

## 2019-07-27 RX ADMIN — TAMSULOSIN HYDROCHLORIDE 0.4 MG: 0.4 CAPSULE ORAL at 08:02

## 2019-07-27 RX ADMIN — AMLODIPINE BESYLATE 5 MG: 5 TABLET ORAL at 20:16

## 2019-07-27 RX ADMIN — FLUTICASONE FUROATE, UMECLIDINIUM BROMIDE AND VILANTEROL TRIFENATATE 1 EACH: 100; 62.5; 25 POWDER RESPIRATORY (INHALATION) at 08:02

## 2019-07-27 RX ADMIN — FAMOTIDINE 20 MG: 20 TABLET ORAL at 08:02

## 2019-07-28 VITALS
WEIGHT: 245 LBS | OXYGEN SATURATION: 94 % | BODY MASS INDEX: 31.44 KG/M2 | HEART RATE: 77 BPM | SYSTOLIC BLOOD PRESSURE: 135 MMHG | RESPIRATION RATE: 16 BRPM | HEIGHT: 74 IN | TEMPERATURE: 98.6 F | DIASTOLIC BLOOD PRESSURE: 70 MMHG

## 2019-07-28 LAB
ANION GAP SERPL CALCULATED.3IONS-SCNC: 12 MMOL/L (ref 5–15)
BUN BLD-MCNC: 41 MG/DL (ref 8–23)
BUN/CREAT SERPL: 21.5 (ref 7–25)
CALCIUM SPEC-SCNC: 8.1 MG/DL (ref 8.6–10.5)
CHLORIDE SERPL-SCNC: 103 MMOL/L (ref 98–107)
CO2 SERPL-SCNC: 20 MMOL/L (ref 22–29)
CREAT BLD-MCNC: 1.91 MG/DL (ref 0.76–1.27)
GFR SERPL CREATININE-BSD FRML MDRD: 35 ML/MIN/1.73
GLUCOSE BLD-MCNC: 110 MG/DL (ref 65–99)
GLUCOSE BLDC GLUCOMTR-MCNC: 112 MG/DL (ref 70–130)
GLUCOSE BLDC GLUCOMTR-MCNC: 149 MG/DL (ref 70–130)
POTASSIUM BLD-SCNC: 3.8 MMOL/L (ref 3.5–5.2)
SODIUM BLD-SCNC: 135 MMOL/L (ref 136–145)

## 2019-07-28 PROCEDURE — 82962 GLUCOSE BLOOD TEST: CPT

## 2019-07-28 PROCEDURE — 80048 BASIC METABOLIC PNL TOTAL CA: CPT | Performed by: INTERNAL MEDICINE

## 2019-07-28 RX ORDER — TAMSULOSIN HYDROCHLORIDE 0.4 MG/1
0.4 CAPSULE ORAL DAILY
Qty: 15 CAPSULE | Refills: 0 | Status: SHIPPED | OUTPATIENT
Start: 2019-07-28

## 2019-07-28 RX ADMIN — TAMSULOSIN HYDROCHLORIDE 0.4 MG: 0.4 CAPSULE ORAL at 09:20

## 2019-07-28 RX ADMIN — FAMOTIDINE 20 MG: 20 TABLET ORAL at 09:21

## 2019-07-28 RX ADMIN — SODIUM CHLORIDE, PRESERVATIVE FREE 3 ML: 5 INJECTION INTRAVENOUS at 09:22

## 2019-07-28 RX ADMIN — PANTOPRAZOLE SODIUM 40 MG: 40 TABLET, DELAYED RELEASE ORAL at 09:20

## 2019-07-28 RX ADMIN — AMLODIPINE BESYLATE 5 MG: 5 TABLET ORAL at 09:21

## 2019-07-28 RX ADMIN — METOPROLOL SUCCINATE 50 MG: 50 TABLET, EXTENDED RELEASE ORAL at 09:21

## 2019-07-28 RX ADMIN — FLUTICASONE FUROATE, UMECLIDINIUM BROMIDE AND VILANTEROL TRIFENATATE 1 EACH: 100; 62.5; 25 POWDER RESPIRATORY (INHALATION) at 09:29

## 2019-07-29 ENCOUNTER — READMISSION MANAGEMENT (OUTPATIENT)
Dept: CALL CENTER | Facility: HOSPITAL | Age: 69
End: 2019-07-29

## 2019-07-29 NOTE — OUTREACH NOTE
Prep Survey      Responses   Facility patient discharged from?  [facility]   Is patient eligible?  Yes   Discharge diagnosis  S/P Lumbar fusion   Does the patient have one of the following disease processes/diagnoses(primary or secondary)?  General Surgery   Does the patient have Home health ordered?  Yes   What is the Home health agency?    [facility]    Is there a DME ordered?  No   Comments regarding appointments  Pt to schedule follow up appointments   Prep survey completed?  Yes          Ginger Self RN

## 2019-07-30 ENCOUNTER — READMISSION MANAGEMENT (OUTPATIENT)
Dept: CALL CENTER | Facility: HOSPITAL | Age: 69
End: 2019-07-30

## 2019-07-30 NOTE — OUTREACH NOTE
General Surgery Week 1 Survey      Responses   Facility patient discharged from?  [facility]   Does the patient have one of the following disease processes/diagnoses(primary or secondary)?  General Surgery   Is there a successful TCM telephone encounter documented?  No   Week 1 attempt successful?  Yes   Call start time  1402   Call end time  1410   Discharge diagnosis  S/P Lumbar fusion   Meds reviewed with patient/caregiver?  Yes   Is the patient having any side effects they believe may be caused by any medication additions or changes?  No   Does the patient have all medications related to this admission filled (includes all antibiotics, pain medications, etc.)  Yes   Is the patient taking all medications as directed (includes completed medication regime)?  Yes   Does the patient have a follow up appointment scheduled with their surgeon?  Yes   Has the patient kept scheduled appointments due by today?  N/A   What is the Home health agency?    [facility]   Has home health visited the patient within 72 hours of discharge?  Yes   Psychosocial issues?  No   Did the patient receive a copy of their discharge instructions?  Yes   Nursing interventions  Reviewed instructions with patient   What is the patient's perception of their health status since discharge?  Improving   Nursing interventions  Nurse provided patient education   Is the patient /caregiver able to teach back basic post-op care?  Practice 'cough and deep breath', Continue use of incentive spirometry at least 1 week post discharge, Do not remove steri-strips, Lifting as instructed by MD in discharge instructions   Is the patient/caregiver able to teach back steps to recovery at home?  Set small, achievable goals for return to baseline health, Rest and rebuild strength, gradually increase activity, Make a list of questions for surgeon's appointment, Eat a well-balance diet   Is the patient/caregiver able to teach back the hierarchy of who to call/visit  for symptoms/problems? PCP, Specialist, Home health nurse, Urgent Care, ED, 911  Yes   Additional teach back comments  She is concerned his testicles are swollen and will contact nephrology about this and acetimenophen.  His back incision is healing.   Week 1 call completed?  Yes          Kate Lucero RN

## 2019-08-06 ENCOUNTER — READMISSION MANAGEMENT (OUTPATIENT)
Dept: CALL CENTER | Facility: HOSPITAL | Age: 69
End: 2019-08-06

## 2019-08-06 NOTE — OUTREACH NOTE
General Surgery Week 2 Survey      Responses   Facility patient discharged from?  [facility]   Does the patient have one of the following disease processes/diagnoses(primary or secondary)?  General Surgery   Week 2 attempt successful?  Yes   Call start time  1531   Call end time  1535   Meds reviewed with patient/caregiver?  Yes   Is the patient taking all medications as directed (includes completed medication regime)?  Yes   Has the patient kept scheduled appointments due by today?  Yes   Comments  will be seeing a hephrology next week   What is the patient's perception of their health status since discharge?  Improving [sutures removed yesterday and has therapy, still having problems with kidney]   Week 2 call completed?  Yes   Wrap up additional comments  still having a few problems          Valentina Garcia RN

## 2019-08-13 ENCOUNTER — READMISSION MANAGEMENT (OUTPATIENT)
Dept: CALL CENTER | Facility: HOSPITAL | Age: 69
End: 2019-08-13

## 2019-08-13 NOTE — OUTREACH NOTE
General Surgery Week 3 Survey      Responses   Facility patient discharged from?  [facility]   Does the patient have one of the following disease processes/diagnoses(primary or secondary)?  General Surgery   Week 3 attempt successful?  No   Unsuccessful attempts  Attempt 1          Madelyn Waggoner RN

## 2019-08-14 ENCOUNTER — READMISSION MANAGEMENT (OUTPATIENT)
Dept: CALL CENTER | Facility: HOSPITAL | Age: 69
End: 2019-08-14

## 2019-08-14 NOTE — OUTREACH NOTE
General Surgery Week 3 Survey      Responses   Facility patient discharged from?  [facility]   Does the patient have one of the following disease processes/diagnoses(primary or secondary)?  General Surgery   Week 3 attempt successful?  No   Unsuccessful attempts  Attempt 2   Rescheduled  Revoked   Revoke  Decline to participate          Hazel Nam RN

## 2019-09-04 ENCOUNTER — APPOINTMENT (OUTPATIENT)
Dept: CT IMAGING | Facility: HOSPITAL | Age: 69
End: 2019-09-04

## 2019-09-04 ENCOUNTER — HOSPITAL ENCOUNTER (INPATIENT)
Facility: HOSPITAL | Age: 69
LOS: 3 days | Discharge: HOME HEALTH | End: 2019-09-07
Attending: EMERGENCY MEDICINE | Admitting: INTERNAL MEDICINE

## 2019-09-04 DIAGNOSIS — R78.81 BACTEREMIA: ICD-10-CM

## 2019-09-04 DIAGNOSIS — N39.0 ACUTE UTI: Primary | ICD-10-CM

## 2019-09-04 PROBLEM — N30.00 ACUTE CYSTITIS WITHOUT HEMATURIA: Status: ACTIVE | Noted: 2019-09-04

## 2019-09-04 LAB
ALBUMIN SERPL-MCNC: 3.5 G/DL (ref 3.5–5.2)
ALBUMIN/GLOB SERPL: 0.8 G/DL
ALP SERPL-CCNC: 107 U/L (ref 39–117)
ALT SERPL W P-5'-P-CCNC: 28 U/L (ref 1–41)
ANION GAP SERPL CALCULATED.3IONS-SCNC: 15 MMOL/L (ref 5–15)
AST SERPL-CCNC: 53 U/L (ref 1–40)
BACTERIA UR QL AUTO: ABNORMAL /HPF
BACTERIA UR QL AUTO: ABNORMAL /HPF
BASOPHILS # BLD AUTO: 0.03 10*3/MM3 (ref 0–0.2)
BASOPHILS NFR BLD AUTO: 0.3 % (ref 0–1.5)
BILIRUB SERPL-MCNC: 0.9 MG/DL (ref 0.2–1.2)
BILIRUB UR QL STRIP: NEGATIVE
BILIRUB UR QL STRIP: NEGATIVE
BUN BLD-MCNC: 20 MG/DL (ref 8–23)
BUN/CREAT SERPL: 18 (ref 7–25)
CALCIUM SPEC-SCNC: 8.8 MG/DL (ref 8.6–10.5)
CHLORIDE SERPL-SCNC: 98 MMOL/L (ref 98–107)
CLARITY UR: CLEAR
CLARITY UR: CLEAR
CO2 SERPL-SCNC: 24 MMOL/L (ref 22–29)
COLOR UR: YELLOW
COLOR UR: YELLOW
CREAT BLD-MCNC: 1.11 MG/DL (ref 0.76–1.27)
D-LACTATE SERPL-SCNC: 1.8 MMOL/L (ref 0.5–2)
DEPRECATED RDW RBC AUTO: 49.4 FL (ref 37–54)
EOSINOPHIL # BLD AUTO: 0.14 10*3/MM3 (ref 0–0.4)
EOSINOPHIL NFR BLD AUTO: 1.4 % (ref 0.3–6.2)
ERYTHROCYTE [DISTWIDTH] IN BLOOD BY AUTOMATED COUNT: 16 % (ref 12.3–15.4)
GFR SERPL CREATININE-BSD FRML MDRD: 66 ML/MIN/1.73
GLOBULIN UR ELPH-MCNC: 4.2 GM/DL
GLUCOSE BLD-MCNC: 106 MG/DL (ref 65–99)
GLUCOSE BLDC GLUCOMTR-MCNC: 121 MG/DL (ref 70–130)
GLUCOSE UR STRIP-MCNC: NEGATIVE MG/DL
GLUCOSE UR STRIP-MCNC: NEGATIVE MG/DL
HBA1C MFR BLD: 5.3 % (ref 4.8–5.6)
HCT VFR BLD AUTO: 25.7 % (ref 37.5–51)
HGB BLD-MCNC: 7.7 G/DL (ref 13–17.7)
HGB UR QL STRIP.AUTO: NEGATIVE
HGB UR QL STRIP.AUTO: NEGATIVE
HYALINE CASTS UR QL AUTO: ABNORMAL /LPF
HYALINE CASTS UR QL AUTO: ABNORMAL /LPF
IMM GRANULOCYTES # BLD AUTO: 0.09 10*3/MM3 (ref 0–0.05)
IMM GRANULOCYTES NFR BLD AUTO: 0.9 % (ref 0–0.5)
KETONES UR QL STRIP: NEGATIVE
KETONES UR QL STRIP: NEGATIVE
LEUKOCYTE ESTERASE UR QL STRIP.AUTO: ABNORMAL
LEUKOCYTE ESTERASE UR QL STRIP.AUTO: ABNORMAL
LYMPHOCYTES # BLD AUTO: 1.24 10*3/MM3 (ref 0.7–3.1)
LYMPHOCYTES NFR BLD AUTO: 12.3 % (ref 19.6–45.3)
MCH RBC QN AUTO: 25.2 PG (ref 26.6–33)
MCHC RBC AUTO-ENTMCNC: 30 G/DL (ref 31.5–35.7)
MCV RBC AUTO: 84 FL (ref 79–97)
MONOCYTES # BLD AUTO: 1.22 10*3/MM3 (ref 0.1–0.9)
MONOCYTES NFR BLD AUTO: 12.1 % (ref 5–12)
NEUTROPHILS # BLD AUTO: 7.36 10*3/MM3 (ref 1.7–7)
NEUTROPHILS NFR BLD AUTO: 73 % (ref 42.7–76)
NITRITE UR QL STRIP: NEGATIVE
NITRITE UR QL STRIP: NEGATIVE
NRBC BLD AUTO-RTO: 0 /100 WBC (ref 0–0.2)
PH UR STRIP.AUTO: 5.5 [PH] (ref 5–8)
PH UR STRIP.AUTO: 6 [PH] (ref 5–8)
PLATELET # BLD AUTO: 164 10*3/MM3 (ref 140–450)
PMV BLD AUTO: 10.7 FL (ref 6–12)
POTASSIUM BLD-SCNC: 3.7 MMOL/L (ref 3.5–5.2)
PROT SERPL-MCNC: 7.7 G/DL (ref 6–8.5)
PROT UR QL STRIP: ABNORMAL
PROT UR QL STRIP: ABNORMAL
RBC # BLD AUTO: 3.06 10*6/MM3 (ref 4.14–5.8)
RBC # UR: ABNORMAL /HPF
RBC # UR: ABNORMAL /HPF
REF LAB TEST METHOD: ABNORMAL
REF LAB TEST METHOD: ABNORMAL
SODIUM BLD-SCNC: 137 MMOL/L (ref 136–145)
SP GR UR STRIP: 1.01 (ref 1–1.03)
SP GR UR STRIP: 1.01 (ref 1–1.03)
SQUAMOUS #/AREA URNS HPF: ABNORMAL /HPF
SQUAMOUS #/AREA URNS HPF: ABNORMAL /HPF
UROBILINOGEN UR QL STRIP: ABNORMAL
UROBILINOGEN UR QL STRIP: ABNORMAL
WBC NRBC COR # BLD: 10.08 10*3/MM3 (ref 3.4–10.8)
WBC UR QL AUTO: ABNORMAL /HPF
WBC UR QL AUTO: ABNORMAL /HPF

## 2019-09-04 PROCEDURE — 87186 SC STD MICRODIL/AGAR DIL: CPT | Performed by: INTERNAL MEDICINE

## 2019-09-04 PROCEDURE — 85025 COMPLETE CBC W/AUTO DIFF WBC: CPT | Performed by: EMERGENCY MEDICINE

## 2019-09-04 PROCEDURE — 81001 URINALYSIS AUTO W/SCOPE: CPT | Performed by: INTERNAL MEDICINE

## 2019-09-04 PROCEDURE — 81001 URINALYSIS AUTO W/SCOPE: CPT | Performed by: EMERGENCY MEDICINE

## 2019-09-04 PROCEDURE — 99223 1ST HOSP IP/OBS HIGH 75: CPT | Performed by: INTERNAL MEDICINE

## 2019-09-04 PROCEDURE — 25010000002 PIPERACILLIN SOD-TAZOBACTAM PER 1 G: Performed by: NURSE PRACTITIONER

## 2019-09-04 PROCEDURE — 82962 GLUCOSE BLOOD TEST: CPT

## 2019-09-04 PROCEDURE — 87077 CULTURE AEROBIC IDENTIFY: CPT | Performed by: INTERNAL MEDICINE

## 2019-09-04 PROCEDURE — 25010000002 CEFTRIAXONE PER 250 MG: Performed by: EMERGENCY MEDICINE

## 2019-09-04 PROCEDURE — 87040 BLOOD CULTURE FOR BACTERIA: CPT | Performed by: EMERGENCY MEDICINE

## 2019-09-04 PROCEDURE — 99285 EMERGENCY DEPT VISIT HI MDM: CPT

## 2019-09-04 PROCEDURE — 36415 COLL VENOUS BLD VENIPUNCTURE: CPT

## 2019-09-04 PROCEDURE — 83036 HEMOGLOBIN GLYCOSYLATED A1C: CPT | Performed by: NURSE PRACTITIONER

## 2019-09-04 PROCEDURE — 87086 URINE CULTURE/COLONY COUNT: CPT | Performed by: INTERNAL MEDICINE

## 2019-09-04 PROCEDURE — 63710000001 INSULIN LISPRO (HUMAN) PER 5 UNITS: Performed by: NURSE PRACTITIONER

## 2019-09-04 PROCEDURE — 83605 ASSAY OF LACTIC ACID: CPT | Performed by: EMERGENCY MEDICINE

## 2019-09-04 PROCEDURE — 74176 CT ABD & PELVIS W/O CONTRAST: CPT

## 2019-09-04 PROCEDURE — 80053 COMPREHEN METABOLIC PANEL: CPT | Performed by: EMERGENCY MEDICINE

## 2019-09-04 RX ORDER — DEXTROSE MONOHYDRATE 25 G/50ML
25 INJECTION, SOLUTION INTRAVENOUS
Status: DISCONTINUED | OUTPATIENT
Start: 2019-09-04 | End: 2019-09-07 | Stop reason: HOSPADM

## 2019-09-04 RX ORDER — MAGNESIUM SULFATE HEPTAHYDRATE 40 MG/ML
2 INJECTION, SOLUTION INTRAVENOUS AS NEEDED
Status: DISCONTINUED | OUTPATIENT
Start: 2019-09-04 | End: 2019-09-07 | Stop reason: HOSPADM

## 2019-09-04 RX ORDER — FUROSEMIDE 20 MG/1
20-40 TABLET ORAL EVERY MORNING
COMMUNITY
End: 2021-03-17 | Stop reason: HOSPADM

## 2019-09-04 RX ORDER — METOPROLOL SUCCINATE 50 MG/1
50 TABLET, EXTENDED RELEASE ORAL DAILY
Status: DISCONTINUED | OUTPATIENT
Start: 2019-09-05 | End: 2019-09-07 | Stop reason: HOSPADM

## 2019-09-04 RX ORDER — MAGNESIUM SULFATE HEPTAHYDRATE 40 MG/ML
4 INJECTION, SOLUTION INTRAVENOUS AS NEEDED
Status: DISCONTINUED | OUTPATIENT
Start: 2019-09-04 | End: 2019-09-07 | Stop reason: HOSPADM

## 2019-09-04 RX ORDER — SODIUM CHLORIDE 0.9 % (FLUSH) 0.9 %
10 SYRINGE (ML) INJECTION EVERY 12 HOURS SCHEDULED
Status: DISCONTINUED | OUTPATIENT
Start: 2019-09-04 | End: 2019-09-07 | Stop reason: HOSPADM

## 2019-09-04 RX ORDER — POTASSIUM CHLORIDE 1.5 G/1.77G
40 POWDER, FOR SOLUTION ORAL AS NEEDED
Status: DISCONTINUED | OUTPATIENT
Start: 2019-09-04 | End: 2019-09-07 | Stop reason: HOSPADM

## 2019-09-04 RX ORDER — POTASSIUM CHLORIDE 750 MG/1
40 CAPSULE, EXTENDED RELEASE ORAL AS NEEDED
Status: DISCONTINUED | OUTPATIENT
Start: 2019-09-04 | End: 2019-09-07 | Stop reason: HOSPADM

## 2019-09-04 RX ORDER — SENNA AND DOCUSATE SODIUM 50; 8.6 MG/1; MG/1
2 TABLET, FILM COATED ORAL 2 TIMES DAILY
Status: DISCONTINUED | OUTPATIENT
Start: 2019-09-04 | End: 2019-09-07 | Stop reason: HOSPADM

## 2019-09-04 RX ORDER — NICOTINE POLACRILEX 4 MG
15 LOZENGE BUCCAL
Status: DISCONTINUED | OUTPATIENT
Start: 2019-09-04 | End: 2019-09-07 | Stop reason: HOSPADM

## 2019-09-04 RX ORDER — SODIUM CHLORIDE 0.9 % (FLUSH) 0.9 %
10 SYRINGE (ML) INJECTION AS NEEDED
Status: DISCONTINUED | OUTPATIENT
Start: 2019-09-04 | End: 2019-09-07 | Stop reason: HOSPADM

## 2019-09-04 RX ORDER — RANITIDINE HCL 75 MG
75 TABLET ORAL DAILY PRN
COMMUNITY
End: 2020-06-18

## 2019-09-04 RX ORDER — ACETAMINOPHEN 325 MG/1
650 TABLET ORAL EVERY 4 HOURS PRN
Status: DISCONTINUED | OUTPATIENT
Start: 2019-09-04 | End: 2019-09-07 | Stop reason: HOSPADM

## 2019-09-04 RX ORDER — TAMSULOSIN HYDROCHLORIDE 0.4 MG/1
0.4 CAPSULE ORAL DAILY
Status: DISCONTINUED | OUTPATIENT
Start: 2019-09-05 | End: 2019-09-07 | Stop reason: HOSPADM

## 2019-09-04 RX ORDER — ACETAMINOPHEN 160 MG/5ML
650 SOLUTION ORAL EVERY 4 HOURS PRN
Status: DISCONTINUED | OUTPATIENT
Start: 2019-09-04 | End: 2019-09-07 | Stop reason: HOSPADM

## 2019-09-04 RX ORDER — ONDANSETRON 2 MG/ML
4 INJECTION INTRAMUSCULAR; INTRAVENOUS EVERY 6 HOURS PRN
Status: DISCONTINUED | OUTPATIENT
Start: 2019-09-04 | End: 2019-09-07 | Stop reason: HOSPADM

## 2019-09-04 RX ORDER — CALCIUM CARBONATE 200(500)MG
2 TABLET,CHEWABLE ORAL 3 TIMES DAILY PRN
Status: DISCONTINUED | OUTPATIENT
Start: 2019-09-04 | End: 2019-09-07 | Stop reason: HOSPADM

## 2019-09-04 RX ORDER — BISACODYL 10 MG
10 SUPPOSITORY, RECTAL RECTAL DAILY PRN
Status: DISCONTINUED | OUTPATIENT
Start: 2019-09-04 | End: 2019-09-07 | Stop reason: HOSPADM

## 2019-09-04 RX ORDER — ACETAMINOPHEN 650 MG/1
650 SUPPOSITORY RECTAL EVERY 4 HOURS PRN
Status: DISCONTINUED | OUTPATIENT
Start: 2019-09-04 | End: 2019-09-07 | Stop reason: HOSPADM

## 2019-09-04 RX ORDER — LEVOFLOXACIN 500 MG/1
500 TABLET, FILM COATED ORAL DAILY
COMMUNITY
Start: 2019-09-03 | End: 2019-09-07 | Stop reason: HOSPADM

## 2019-09-04 RX ORDER — AMLODIPINE BESYLATE 5 MG/1
5 TABLET ORAL 2 TIMES DAILY
Status: DISCONTINUED | OUTPATIENT
Start: 2019-09-04 | End: 2019-09-07 | Stop reason: HOSPADM

## 2019-09-04 RX ORDER — PANTOPRAZOLE SODIUM 40 MG/1
40 TABLET, DELAYED RELEASE ORAL 2 TIMES DAILY
Status: DISCONTINUED | OUTPATIENT
Start: 2019-09-04 | End: 2019-09-07 | Stop reason: HOSPADM

## 2019-09-04 RX ORDER — ASPIRIN 81 MG/1
81 TABLET ORAL DAILY
Status: DISCONTINUED | OUTPATIENT
Start: 2019-09-05 | End: 2019-09-07 | Stop reason: HOSPADM

## 2019-09-04 RX ORDER — ONDANSETRON 4 MG/1
4 TABLET, FILM COATED ORAL EVERY 6 HOURS PRN
Status: DISCONTINUED | OUTPATIENT
Start: 2019-09-04 | End: 2019-09-07 | Stop reason: HOSPADM

## 2019-09-04 RX ORDER — CITALOPRAM 20 MG/1
20 TABLET ORAL DAILY
Status: DISCONTINUED | OUTPATIENT
Start: 2019-09-05 | End: 2019-09-07 | Stop reason: HOSPADM

## 2019-09-04 RX ORDER — POTASSIUM CHLORIDE 7.45 MG/ML
10 INJECTION INTRAVENOUS
Status: DISCONTINUED | OUTPATIENT
Start: 2019-09-04 | End: 2019-09-07 | Stop reason: HOSPADM

## 2019-09-04 RX ADMIN — ACETAMINOPHEN 650 MG: 325 TABLET ORAL at 23:45

## 2019-09-04 RX ADMIN — SODIUM CHLORIDE 1000 ML: 9 INJECTION, SOLUTION INTRAVENOUS at 18:13

## 2019-09-04 RX ADMIN — AMLODIPINE BESYLATE 5 MG: 5 TABLET ORAL at 21:19

## 2019-09-04 RX ADMIN — CEFTRIAXONE 1 G: 1 INJECTION, POWDER, FOR SOLUTION INTRAMUSCULAR; INTRAVENOUS at 18:13

## 2019-09-04 RX ADMIN — TAZOBACTAM SODIUM AND PIPERACILLIN SODIUM 3.38 G: 375; 3 INJECTION, SOLUTION INTRAVENOUS at 20:52

## 2019-09-04 RX ADMIN — SODIUM CHLORIDE, PRESERVATIVE FREE 10 ML: 5 INJECTION INTRAVENOUS at 21:20

## 2019-09-04 RX ADMIN — SENNOSIDES, DOCUSATE SODIUM 2 TABLET: 50; 8.6 TABLET, FILM COATED ORAL at 21:19

## 2019-09-04 RX ADMIN — PANTOPRAZOLE SODIUM 40 MG: 40 TABLET, DELAYED RELEASE ORAL at 21:19

## 2019-09-05 PROBLEM — R78.81 BACTEREMIA: Status: ACTIVE | Noted: 2019-09-05

## 2019-09-05 PROBLEM — A41.9 SEPSIS (HCC): Status: ACTIVE | Noted: 2019-09-05

## 2019-09-05 LAB
ABO GROUP BLD: NORMAL
ANION GAP SERPL CALCULATED.3IONS-SCNC: 12 MMOL/L (ref 5–15)
BASOPHILS # BLD AUTO: 0.02 10*3/MM3 (ref 0–0.2)
BASOPHILS NFR BLD AUTO: 0.2 % (ref 0–1.5)
BLD GP AB SCN SERPL QL: NEGATIVE
BUN BLD-MCNC: 18 MG/DL (ref 8–23)
BUN/CREAT SERPL: 19.1 (ref 7–25)
CALCIUM SPEC-SCNC: 8.4 MG/DL (ref 8.6–10.5)
CHLORIDE SERPL-SCNC: 102 MMOL/L (ref 98–107)
CO2 SERPL-SCNC: 24 MMOL/L (ref 22–29)
CREAT BLD-MCNC: 0.94 MG/DL (ref 0.76–1.27)
DEPRECATED RDW RBC AUTO: 49.4 FL (ref 37–54)
EOSINOPHIL # BLD AUTO: 0.13 10*3/MM3 (ref 0–0.4)
EOSINOPHIL NFR BLD AUTO: 1.5 % (ref 0.3–6.2)
ERYTHROCYTE [DISTWIDTH] IN BLOOD BY AUTOMATED COUNT: 16 % (ref 12.3–15.4)
FERRITIN SERPL-MCNC: 94.23 NG/ML (ref 30–400)
FOLATE SERPL-MCNC: 13.7 NG/ML (ref 4.78–24.2)
GFR SERPL CREATININE-BSD FRML MDRD: 80 ML/MIN/1.73
GLUCOSE BLD-MCNC: 110 MG/DL (ref 65–99)
GLUCOSE BLDC GLUCOMTR-MCNC: 112 MG/DL (ref 70–130)
GLUCOSE BLDC GLUCOMTR-MCNC: 120 MG/DL (ref 70–130)
GLUCOSE BLDC GLUCOMTR-MCNC: 124 MG/DL (ref 70–130)
GLUCOSE BLDC GLUCOMTR-MCNC: 135 MG/DL (ref 70–130)
HCT VFR BLD AUTO: 24.1 % (ref 37.5–51)
HEMOCCULT STL QL: NEGATIVE
HGB BLD-MCNC: 7.2 G/DL (ref 13–17.7)
IMM GRANULOCYTES # BLD AUTO: 0.1 10*3/MM3 (ref 0–0.05)
IMM GRANULOCYTES NFR BLD AUTO: 1.1 % (ref 0–0.5)
IRON 24H UR-MRATE: 21 MCG/DL (ref 59–158)
IRON SATN MFR SERPL: 6 % (ref 20–50)
LYMPHOCYTES # BLD AUTO: 1.18 10*3/MM3 (ref 0.7–3.1)
LYMPHOCYTES NFR BLD AUTO: 13.3 % (ref 19.6–45.3)
MCH RBC QN AUTO: 25.1 PG (ref 26.6–33)
MCHC RBC AUTO-ENTMCNC: 29.9 G/DL (ref 31.5–35.7)
MCV RBC AUTO: 84 FL (ref 79–97)
MONOCYTES # BLD AUTO: 0.94 10*3/MM3 (ref 0.1–0.9)
MONOCYTES NFR BLD AUTO: 10.6 % (ref 5–12)
NEUTROPHILS # BLD AUTO: 6.49 10*3/MM3 (ref 1.7–7)
NEUTROPHILS NFR BLD AUTO: 73.3 % (ref 42.7–76)
NRBC BLD AUTO-RTO: 0 /100 WBC (ref 0–0.2)
PLATELET # BLD AUTO: 156 10*3/MM3 (ref 140–450)
PMV BLD AUTO: 10 FL (ref 6–12)
POTASSIUM BLD-SCNC: 3.7 MMOL/L (ref 3.5–5.2)
RBC # BLD AUTO: 2.87 10*6/MM3 (ref 4.14–5.8)
RETICS # AUTO: 0.07 10*6/MM3 (ref 0.02–0.13)
RETICS/RBC NFR AUTO: 2.48 % (ref 0.7–1.9)
RH BLD: POSITIVE
SODIUM BLD-SCNC: 138 MMOL/L (ref 136–145)
T&S EXPIRATION DATE: NORMAL
TIBC SERPL-MCNC: 337 MCG/DL (ref 298–536)
TRANSFERRIN SERPL-MCNC: 226 MG/DL (ref 200–360)
VIT B12 BLD-MCNC: 1076 PG/ML (ref 211–946)
WBC NRBC COR # BLD: 8.86 10*3/MM3 (ref 3.4–10.8)

## 2019-09-05 PROCEDURE — 80048 BASIC METABOLIC PNL TOTAL CA: CPT | Performed by: NURSE PRACTITIONER

## 2019-09-05 PROCEDURE — 25010000002 PIPERACILLIN SOD-TAZOBACTAM PER 1 G: Performed by: INTERNAL MEDICINE

## 2019-09-05 PROCEDURE — 82746 ASSAY OF FOLIC ACID SERUM: CPT | Performed by: INTERNAL MEDICINE

## 2019-09-05 PROCEDURE — 84466 ASSAY OF TRANSFERRIN: CPT | Performed by: INTERNAL MEDICINE

## 2019-09-05 PROCEDURE — 86901 BLOOD TYPING SEROLOGIC RH(D): CPT | Performed by: INTERNAL MEDICINE

## 2019-09-05 PROCEDURE — 82728 ASSAY OF FERRITIN: CPT | Performed by: INTERNAL MEDICINE

## 2019-09-05 PROCEDURE — 85045 AUTOMATED RETICULOCYTE COUNT: CPT | Performed by: INTERNAL MEDICINE

## 2019-09-05 PROCEDURE — 86850 RBC ANTIBODY SCREEN: CPT | Performed by: INTERNAL MEDICINE

## 2019-09-05 PROCEDURE — 82962 GLUCOSE BLOOD TEST: CPT

## 2019-09-05 PROCEDURE — 82272 OCCULT BLD FECES 1-3 TESTS: CPT | Performed by: INTERNAL MEDICINE

## 2019-09-05 PROCEDURE — 82607 VITAMIN B-12: CPT | Performed by: INTERNAL MEDICINE

## 2019-09-05 PROCEDURE — 25010000002 NA FERRIC GLUC CPLX PER 12.5 MG: Performed by: INTERNAL MEDICINE

## 2019-09-05 PROCEDURE — 25010000002 CEFTRIAXONE PER 250 MG: Performed by: INTERNAL MEDICINE

## 2019-09-05 PROCEDURE — 86900 BLOOD TYPING SEROLOGIC ABO: CPT | Performed by: INTERNAL MEDICINE

## 2019-09-05 PROCEDURE — 83540 ASSAY OF IRON: CPT | Performed by: INTERNAL MEDICINE

## 2019-09-05 PROCEDURE — 99233 SBSQ HOSP IP/OBS HIGH 50: CPT | Performed by: INTERNAL MEDICINE

## 2019-09-05 PROCEDURE — 85025 COMPLETE CBC W/AUTO DIFF WBC: CPT | Performed by: NURSE PRACTITIONER

## 2019-09-05 RX ADMIN — AMLODIPINE BESYLATE 5 MG: 5 TABLET ORAL at 19:38

## 2019-09-05 RX ADMIN — ACETAMINOPHEN 650 MG: 325 TABLET ORAL at 19:37

## 2019-09-05 RX ADMIN — SENNOSIDES, DOCUSATE SODIUM 2 TABLET: 50; 8.6 TABLET, FILM COATED ORAL at 09:41

## 2019-09-05 RX ADMIN — ASPIRIN 81 MG: 81 TABLET, COATED ORAL at 12:23

## 2019-09-05 RX ADMIN — PANTOPRAZOLE SODIUM 40 MG: 40 TABLET, DELAYED RELEASE ORAL at 19:38

## 2019-09-05 RX ADMIN — SODIUM CHLORIDE 125 MG: 9 INJECTION, SOLUTION INTRAVENOUS at 12:23

## 2019-09-05 RX ADMIN — AMLODIPINE BESYLATE 5 MG: 5 TABLET ORAL at 09:40

## 2019-09-05 RX ADMIN — ACETAMINOPHEN 650 MG: 325 TABLET ORAL at 09:39

## 2019-09-05 RX ADMIN — CITALOPRAM HYDROBROMIDE 20 MG: 20 TABLET ORAL at 09:40

## 2019-09-05 RX ADMIN — SODIUM CHLORIDE, PRESERVATIVE FREE 10 ML: 5 INJECTION INTRAVENOUS at 19:42

## 2019-09-05 RX ADMIN — TAZOBACTAM SODIUM AND PIPERACILLIN SODIUM 3.38 G: 375; 3 INJECTION, SOLUTION INTRAVENOUS at 04:29

## 2019-09-05 RX ADMIN — METOPROLOL SUCCINATE 50 MG: 50 TABLET, EXTENDED RELEASE ORAL at 09:41

## 2019-09-05 RX ADMIN — PANTOPRAZOLE SODIUM 40 MG: 40 TABLET, DELAYED RELEASE ORAL at 09:41

## 2019-09-05 RX ADMIN — CEFTRIAXONE SODIUM 2 G: 2 INJECTION, POWDER, FOR SOLUTION INTRAMUSCULAR; INTRAVENOUS at 16:10

## 2019-09-05 RX ADMIN — TAMSULOSIN HYDROCHLORIDE 0.4 MG: 0.4 CAPSULE ORAL at 09:40

## 2019-09-06 LAB
DEPRECATED RDW RBC AUTO: 50.2 FL (ref 37–54)
ERYTHROCYTE [DISTWIDTH] IN BLOOD BY AUTOMATED COUNT: 16.2 % (ref 12.3–15.4)
GLUCOSE BLDC GLUCOMTR-MCNC: 124 MG/DL (ref 70–130)
GLUCOSE BLDC GLUCOMTR-MCNC: 129 MG/DL (ref 70–130)
GLUCOSE BLDC GLUCOMTR-MCNC: 135 MG/DL (ref 70–130)
GLUCOSE BLDC GLUCOMTR-MCNC: 139 MG/DL (ref 70–130)
HCT VFR BLD AUTO: 23.3 % (ref 37.5–51)
HGB BLD-MCNC: 7 G/DL (ref 13–17.7)
MCH RBC QN AUTO: 25.6 PG (ref 26.6–33)
MCHC RBC AUTO-ENTMCNC: 30 G/DL (ref 31.5–35.7)
MCV RBC AUTO: 85.3 FL (ref 79–97)
PLATELET # BLD AUTO: 164 10*3/MM3 (ref 140–450)
PMV BLD AUTO: 10.4 FL (ref 6–12)
RBC # BLD AUTO: 2.73 10*6/MM3 (ref 4.14–5.8)
WBC NRBC COR # BLD: 8.01 10*3/MM3 (ref 3.4–10.8)

## 2019-09-06 PROCEDURE — C1751 CATH, INF, PER/CENT/MIDLINE: HCPCS

## 2019-09-06 PROCEDURE — 85027 COMPLETE CBC AUTOMATED: CPT | Performed by: INTERNAL MEDICINE

## 2019-09-06 PROCEDURE — 82962 GLUCOSE BLOOD TEST: CPT

## 2019-09-06 PROCEDURE — 02HV33Z INSERTION OF INFUSION DEVICE INTO SUPERIOR VENA CAVA, PERCUTANEOUS APPROACH: ICD-10-PCS | Performed by: INTERNAL MEDICINE

## 2019-09-06 PROCEDURE — 25010000002 CEFTRIAXONE PER 250 MG: Performed by: INTERNAL MEDICINE

## 2019-09-06 PROCEDURE — 25010000002 NA FERRIC GLUC CPLX PER 12.5 MG: Performed by: INTERNAL MEDICINE

## 2019-09-06 PROCEDURE — C1894 INTRO/SHEATH, NON-LASER: HCPCS

## 2019-09-06 PROCEDURE — 4A02X4A MEASUREMENT OF CARDIAC ELECTRICAL ACTIVITY, GUIDANCE, EXTERNAL APPROACH: ICD-10-PCS | Performed by: INTERNAL MEDICINE

## 2019-09-06 PROCEDURE — 99232 SBSQ HOSP IP/OBS MODERATE 35: CPT | Performed by: INTERNAL MEDICINE

## 2019-09-06 RX ORDER — SODIUM CHLORIDE 0.9 % (FLUSH) 0.9 %
10 SYRINGE (ML) INJECTION AS NEEDED
Status: DISCONTINUED | OUTPATIENT
Start: 2019-09-06 | End: 2019-09-07 | Stop reason: HOSPADM

## 2019-09-06 RX ORDER — SODIUM CHLORIDE 0.9 % (FLUSH) 0.9 %
10 SYRINGE (ML) INJECTION EVERY 12 HOURS SCHEDULED
Status: DISCONTINUED | OUTPATIENT
Start: 2019-09-06 | End: 2019-09-07 | Stop reason: HOSPADM

## 2019-09-06 RX ADMIN — TAMSULOSIN HYDROCHLORIDE 0.4 MG: 0.4 CAPSULE ORAL at 09:48

## 2019-09-06 RX ADMIN — METOPROLOL SUCCINATE 50 MG: 50 TABLET, EXTENDED RELEASE ORAL at 09:48

## 2019-09-06 RX ADMIN — SENNOSIDES, DOCUSATE SODIUM 2 TABLET: 50; 8.6 TABLET, FILM COATED ORAL at 09:47

## 2019-09-06 RX ADMIN — PANTOPRAZOLE SODIUM 40 MG: 40 TABLET, DELAYED RELEASE ORAL at 20:23

## 2019-09-06 RX ADMIN — ACETAMINOPHEN 650 MG: 325 TABLET ORAL at 09:54

## 2019-09-06 RX ADMIN — SODIUM CHLORIDE 125 MG: 9 INJECTION, SOLUTION INTRAVENOUS at 09:48

## 2019-09-06 RX ADMIN — SENNOSIDES, DOCUSATE SODIUM 2 TABLET: 50; 8.6 TABLET, FILM COATED ORAL at 20:22

## 2019-09-06 RX ADMIN — CITALOPRAM HYDROBROMIDE 20 MG: 20 TABLET ORAL at 09:48

## 2019-09-06 RX ADMIN — CEFTRIAXONE SODIUM 2 G: 2 INJECTION, POWDER, FOR SOLUTION INTRAMUSCULAR; INTRAVENOUS at 15:19

## 2019-09-06 RX ADMIN — ASPIRIN 81 MG: 81 TABLET, COATED ORAL at 09:48

## 2019-09-06 RX ADMIN — PANTOPRAZOLE SODIUM 40 MG: 40 TABLET, DELAYED RELEASE ORAL at 09:48

## 2019-09-06 RX ADMIN — AMLODIPINE BESYLATE 5 MG: 5 TABLET ORAL at 20:22

## 2019-09-06 RX ADMIN — AMLODIPINE BESYLATE 5 MG: 5 TABLET ORAL at 09:48

## 2019-09-06 RX ADMIN — SODIUM CHLORIDE, PRESERVATIVE FREE 10 ML: 5 INJECTION INTRAVENOUS at 09:48

## 2019-09-06 RX ADMIN — ACETAMINOPHEN 650 MG: 325 TABLET ORAL at 20:22

## 2019-09-07 VITALS
TEMPERATURE: 99 F | OXYGEN SATURATION: 95 % | BODY MASS INDEX: 30.24 KG/M2 | DIASTOLIC BLOOD PRESSURE: 73 MMHG | SYSTOLIC BLOOD PRESSURE: 157 MMHG | HEIGHT: 74 IN | HEART RATE: 89 BPM | RESPIRATION RATE: 16 BRPM | WEIGHT: 235.6 LBS

## 2019-09-07 LAB
ANION GAP SERPL CALCULATED.3IONS-SCNC: 12 MMOL/L (ref 5–15)
BACTERIA SPEC AEROBE CULT: ABNORMAL
BUN BLD-MCNC: 13 MG/DL (ref 8–23)
BUN/CREAT SERPL: 14.6 (ref 7–25)
CALCIUM SPEC-SCNC: 8.7 MG/DL (ref 8.6–10.5)
CHLORIDE SERPL-SCNC: 102 MMOL/L (ref 98–107)
CO2 SERPL-SCNC: 24 MMOL/L (ref 22–29)
CREAT BLD-MCNC: 0.89 MG/DL (ref 0.76–1.27)
DEPRECATED RDW RBC AUTO: 49.6 FL (ref 37–54)
ERYTHROCYTE [DISTWIDTH] IN BLOOD BY AUTOMATED COUNT: 16.2 % (ref 12.3–15.4)
GFR SERPL CREATININE-BSD FRML MDRD: 86 ML/MIN/1.73
GLUCOSE BLD-MCNC: 103 MG/DL (ref 65–99)
GLUCOSE BLDC GLUCOMTR-MCNC: 108 MG/DL (ref 70–130)
GLUCOSE BLDC GLUCOMTR-MCNC: 144 MG/DL (ref 70–130)
HCT VFR BLD AUTO: 24.3 % (ref 37.5–51)
HGB BLD-MCNC: 7.2 G/DL (ref 13–17.7)
MCH RBC QN AUTO: 25.2 PG (ref 26.6–33)
MCHC RBC AUTO-ENTMCNC: 29.6 G/DL (ref 31.5–35.7)
MCV RBC AUTO: 85 FL (ref 79–97)
PLATELET # BLD AUTO: 112 10*3/MM3 (ref 140–450)
PMV BLD AUTO: 11.5 FL (ref 6–12)
POTASSIUM BLD-SCNC: 3.4 MMOL/L (ref 3.5–5.2)
RBC # BLD AUTO: 2.86 10*6/MM3 (ref 4.14–5.8)
SODIUM BLD-SCNC: 138 MMOL/L (ref 136–145)
WBC NRBC COR # BLD: 8.11 10*3/MM3 (ref 3.4–10.8)

## 2019-09-07 PROCEDURE — 99239 HOSP IP/OBS DSCHRG MGMT >30: CPT | Performed by: INTERNAL MEDICINE

## 2019-09-07 PROCEDURE — 85027 COMPLETE CBC AUTOMATED: CPT | Performed by: INTERNAL MEDICINE

## 2019-09-07 PROCEDURE — 80048 BASIC METABOLIC PNL TOTAL CA: CPT | Performed by: INTERNAL MEDICINE

## 2019-09-07 PROCEDURE — 25010000002 NA FERRIC GLUC CPLX PER 12.5 MG: Performed by: INTERNAL MEDICINE

## 2019-09-07 PROCEDURE — 82962 GLUCOSE BLOOD TEST: CPT

## 2019-09-07 PROCEDURE — 25010000002 ERTAPENEM PER 500 MG: Performed by: INTERNAL MEDICINE

## 2019-09-07 RX ORDER — POTASSIUM CHLORIDE 750 MG/1
40 CAPSULE, EXTENDED RELEASE ORAL ONCE
Status: COMPLETED | OUTPATIENT
Start: 2019-09-07 | End: 2019-09-07

## 2019-09-07 RX ORDER — DOXYCYCLINE HYCLATE 50 MG/1
324 CAPSULE, GELATIN COATED ORAL
Qty: 30 TABLET | Refills: 0 | Status: SHIPPED | OUTPATIENT
Start: 2019-09-07

## 2019-09-07 RX ORDER — SACCHAROMYCES BOULARDII 250 MG
250 CAPSULE ORAL 2 TIMES DAILY
Qty: 20 CAPSULE | Refills: 0 | Status: SHIPPED | OUTPATIENT
Start: 2019-09-07 | End: 2020-06-18

## 2019-09-07 RX ADMIN — ASPIRIN 81 MG: 81 TABLET, COATED ORAL at 08:44

## 2019-09-07 RX ADMIN — PANTOPRAZOLE SODIUM 40 MG: 40 TABLET, DELAYED RELEASE ORAL at 08:43

## 2019-09-07 RX ADMIN — POTASSIUM CHLORIDE 40 MEQ: 750 CAPSULE, EXTENDED RELEASE ORAL at 13:59

## 2019-09-07 RX ADMIN — AMLODIPINE BESYLATE 5 MG: 5 TABLET ORAL at 08:44

## 2019-09-07 RX ADMIN — ERTAPENEM SODIUM 1 G: 1 INJECTION, POWDER, LYOPHILIZED, FOR SOLUTION INTRAMUSCULAR; INTRAVENOUS at 13:59

## 2019-09-07 RX ADMIN — TAMSULOSIN HYDROCHLORIDE 0.4 MG: 0.4 CAPSULE ORAL at 08:44

## 2019-09-07 RX ADMIN — SODIUM CHLORIDE 125 MG: 9 INJECTION, SOLUTION INTRAVENOUS at 08:43

## 2019-09-07 RX ADMIN — SENNOSIDES, DOCUSATE SODIUM 2 TABLET: 50; 8.6 TABLET, FILM COATED ORAL at 08:44

## 2019-09-07 RX ADMIN — POTASSIUM CHLORIDE 40 MEQ: 750 CAPSULE, EXTENDED RELEASE ORAL at 08:44

## 2019-09-07 RX ADMIN — METOPROLOL SUCCINATE 50 MG: 50 TABLET, EXTENDED RELEASE ORAL at 08:44

## 2019-09-07 RX ADMIN — CITALOPRAM HYDROBROMIDE 20 MG: 20 TABLET ORAL at 08:44

## 2019-09-08 ENCOUNTER — READMISSION MANAGEMENT (OUTPATIENT)
Dept: CALL CENTER | Facility: HOSPITAL | Age: 69
End: 2019-09-08

## 2019-09-08 NOTE — OUTREACH NOTE
Prep Survey      Responses   Facility patient discharged from?  [facility]   Is patient eligible?  Yes   Discharge diagnosis  Acute UTI,  sepsis   Does the patient have one of the following disease processes/diagnoses(primary or secondary)?  Sepsis   Does the patient have Home health ordered?  Yes   What is the Home health agency?   [facility]   Is there a DME ordered?  No   Medication alerts for this patient  [facility]   Prep survey completed?  Yes          Rachel Keating RN

## 2019-09-09 LAB
BACTERIA SPEC AEROBE CULT: NORMAL
BACTERIA SPEC AEROBE CULT: NORMAL

## 2019-09-10 ENCOUNTER — READMISSION MANAGEMENT (OUTPATIENT)
Dept: CALL CENTER | Facility: HOSPITAL | Age: 69
End: 2019-09-10

## 2019-09-18 ENCOUNTER — READMISSION MANAGEMENT (OUTPATIENT)
Dept: CALL CENTER | Facility: HOSPITAL | Age: 69
End: 2019-09-18

## 2019-09-18 NOTE — OUTREACH NOTE
Sepsis Week 2 Survey      Responses   Facility patient discharged from?  [facility]   Does the patient have one of the following disease processes/diagnoses(primary or secondary)?  Sepsis   Week 2 attempt successful?  No   Unsuccessful attempts  Attempt 1          Torie Clifton RN

## 2019-09-19 ENCOUNTER — READMISSION MANAGEMENT (OUTPATIENT)
Dept: CALL CENTER | Facility: HOSPITAL | Age: 69
End: 2019-09-19

## 2019-09-19 NOTE — OUTREACH NOTE
Sepsis Week 2 Survey      Responses   Facility patient discharged from?  [facility]   Does the patient have one of the following disease processes/diagnoses(primary or secondary)?  Sepsis   Week 2 attempt successful?  Yes   Call start time  1004   Call end time  1008   Discharge diagnosis  Acute UTI,  sepsis   Is patient permission given to speak with other caregiver?  Yes   List who call center can speak with  wife   Person spoke with today (if not patient) and relationship  wife   Meds reviewed with patient/caregiver?  Yes   Is the patient taking all medications as directed (includes completed medication regime)?  Yes   Medication comments  Will have iron infusions finished IV antibx. Looking for place that will give Fe infusions--HH does not do this   Has the patient kept scheduled appointments due by today?  Yes   What is the patient's perception of their health status since discharge?  Improving   Additional teach back comments  Pt doing better per wife and is keeping all the f/u appts   Week 2 call completed?  Yes          Lillie Chavez RN

## 2019-09-27 ENCOUNTER — READMISSION MANAGEMENT (OUTPATIENT)
Dept: CALL CENTER | Facility: HOSPITAL | Age: 69
End: 2019-09-27

## 2019-09-27 NOTE — OUTREACH NOTE
Sepsis Week 3 Survey      Responses   Facility patient discharged from?  [place]   Does the patient have one of the following disease processes/diagnoses(primary or secondary)?  Sepsis   Week 3 attempt successful?  Yes   Call start time  1421   Call end time  1424   Discharge diagnosis  Acute UTI,  sepsis   Person spoke with today (if not patient) and relationship  wife   Meds reviewed with patient/caregiver?  Yes   Is the patient taking all medications as directed (includes completed medication regime)?  Yes   Medication comments  No PICC line and abx infusions complete.    Has the patient kept scheduled appointments due by today?  Yes   What is the Home health agency?   HH is no longer in place.    Psychosocial issues?  No   Comments  Pt slowly getting energy back, denies other issues.    What is the patient's perception of their health status since discharge?  Improving   Is patient/caregiver able to teach back steps to recovery at home?  Exercise as tolerated, Learn about sepsis(sepsis.org), Eat a balanced diet   Week 3 call completed?  Yes   Revoked  No further contact(revokes)-requires comment   Graduated/Revoked comments  no further needs          Tamra Durham, RN

## 2019-10-01 ENCOUNTER — OFFICE VISIT (OUTPATIENT)
Dept: PULMONOLOGY | Facility: CLINIC | Age: 69
End: 2019-10-01

## 2019-10-01 VITALS
DIASTOLIC BLOOD PRESSURE: 64 MMHG | HEIGHT: 74 IN | WEIGHT: 232.38 LBS | SYSTOLIC BLOOD PRESSURE: 136 MMHG | TEMPERATURE: 99 F | BODY MASS INDEX: 29.82 KG/M2 | HEART RATE: 92 BPM | OXYGEN SATURATION: 95 % | RESPIRATION RATE: 18 BRPM

## 2019-10-01 DIAGNOSIS — J60 COAL WORKERS PNEUMOCONIOSIS (HCC): ICD-10-CM

## 2019-10-01 DIAGNOSIS — J96.12 CHRONIC RESPIRATORY FAILURE WITH HYPOXIA AND HYPERCAPNIA (HCC): Primary | ICD-10-CM

## 2019-10-01 DIAGNOSIS — J44.9 COPD MIXED TYPE (HCC): ICD-10-CM

## 2019-10-01 DIAGNOSIS — D64.9 ANEMIA, UNSPECIFIED TYPE: ICD-10-CM

## 2019-10-01 DIAGNOSIS — J96.11 CHRONIC RESPIRATORY FAILURE WITH HYPOXIA AND HYPERCAPNIA (HCC): Primary | ICD-10-CM

## 2019-10-01 PROCEDURE — 99214 OFFICE O/P EST MOD 30 MIN: CPT | Performed by: NURSE PRACTITIONER

## 2019-10-01 RX ORDER — EPINEPHRINE 0.15 MG/.3ML
INJECTION INTRAMUSCULAR
COMMUNITY
Start: 2019-09-06 | End: 2019-10-29

## 2019-10-01 NOTE — PROGRESS NOTES
[facility] Follow up    CHIEF COMPLAINT    Coworkers pneumoconiosis, COPD, nocturnal hypoxemia    Subjective   HISTORY OF PRESENT ILLNESS    Derek Kelsey is a 68 y.o.male here today for routine follow up on his COPD and CWP.      I last saw him in March of this year.  Since then he has undergone abdominal aortic aneurysm endovascular repair in June, then in July underwent a lumbar fusion by Dr. Bhatti.  In September he had issues with his BPH that required a Young catheter for 17 days and then a cystoscopy on August 30 by Dr. Ford for hematuria.  He then had a ECOLI UTI with sepsis and was readmitted in September.    He is currently following with nephrology due to anemia.  Per the wife his Hbg is 6.0 and he is awaiting iron transfusion.  He has a follow up with his PCP next week as well.    He has complained of worsening shortness of breath, but no cough or sputum production and no wheezing.  He continues on his Trelegy daily as well as oxygen at night and as needed during the day.      Patient Active Problem List   Diagnosis   • Anxiety   • Abdominal aortic aneurysm (S/P Endovascular repair)   • Depression   • Hypertension   • Cataract, bilateral   • CAD (coronary artery disease)   • Abnormal stress test   • Moderate COPD   • Former smoker   • Coal workers pneumoconiosis, simple   • Back pain   • S/P lumbar fusion   • Prediabetes   • Acute blood loss anemia, mild, asymptomatic   • Acute postoperative pain   • Hyponatremia, mild   • Chronic respiratory failure with hypoxia and hypercapnia (CMS/HCC)   • Renal insufficiency   • Acute cystitis without hematuria   • Acute UTI   • Sepsis (CMS/HCC)   • Bacteremia       Allergies   Allergen Reactions   • Percocet [Oxycodone-Acetaminophen] Confusion   • Penicillins Hives     Hives - has tolerated Zosyn and ceftriaxone 09/2019       Current Outpatient Medications:   •  amLODIPine (NORVASC) 5 MG tablet, Take 5 mg by mouth 2 (Two) Times a Day., Disp: , Rfl:   •   ASPIRIN LOW DOSE 81 MG tablet, Take 1 tablet by mouth Daily. Last dose 10-21-18 (Patient taking differently: Take 81 mg by mouth Daily.), Disp: , Rfl:   •  citalopram (CeleXA) 20 MG tablet, Take 20 mg by mouth Daily., Disp: , Rfl:   •  EPINEPHrine (EPIPEN JR) 0.15 MG/0.3ML solution auto-injector injection, , Disp: , Rfl:   •  ferrous gluconate (FERGON) 324 MG tablet, Take 1 tablet by mouth Daily With Breakfast., Disp: 30 tablet, Rfl: 0  •  Fluticasone-Umeclidin-Vilant (TRELEGY ELLIPTA) 100-62.5-25 MCG/INH aerosol powder , Inhale 1 each Daily., Disp: , Rfl:   •  furosemide (LASIX) 20 MG tablet, Take 20-40 mg by mouth Every Morning., Disp: , Rfl:   •  metoprolol succinate XL (TOPROL-XL) 50 MG 24 hr tablet, Take 50 mg by mouth Daily., Disp: , Rfl:   •  Multiple Vitamin (MULTI-VITAMIN DAILY) tablet, Take 1 tablet by mouth Daily., Disp: , Rfl:   •  pantoprazole (PROTONIX) 40 MG EC tablet, Take 40 mg by mouth 2 (Two) Times a Day., Disp: , Rfl:   •  raNITIdine (ZANTAC) 75 MG tablet, Take 75 mg by mouth Daily As Needed for Indigestion or Heartburn., Disp: , Rfl:   •  saccharomyces boulardii (FLORASTOR) 250 MG capsule, Take 1 capsule by mouth 2 (Two) Times a Day., Disp: 20 capsule, Rfl: 0  •  tamsulosin (FLOMAX) 0.4 MG capsule 24 hr capsule, Take 1 capsule by mouth Daily., Disp: 15 capsule, Rfl: 0  MEDICATION LIST AND ALLERGIES REVIEWED.    Social History     Tobacco Use   • Smoking status: Former Smoker     Packs/day: 1.00     Years: 30.00     Pack years: 30.00     Types: Cigarettes     Last attempt to quit: 2011     Years since quittin.7   • Smokeless tobacco: Former User     Types: Chew     Quit date: 2011   Substance Use Topics   • Alcohol use: No   • Drug use: No       FAMILY AND SOCIAL HISTORY REVIEWED.    Review of Systems   Constitutional: Positive for activity change, appetite change, fatigue and fever. Negative for chills and unexpected weight change.   HENT: Negative for congestion, nosebleeds,  "postnasal drip, rhinorrhea, sinus pressure and trouble swallowing.    Respiratory: Positive for shortness of breath. Negative for cough, chest tightness and wheezing.    Cardiovascular: Negative for chest pain and leg swelling.   Gastrointestinal: Positive for abdominal pain. Negative for constipation, diarrhea, nausea and vomiting.   Genitourinary: Positive for difficulty urinating and dysuria. Negative for frequency, hematuria and urgency.   Musculoskeletal: Negative for myalgias.   Neurological: Negative for dizziness, weakness, numbness and headaches.   Psychiatric/Behavioral: The patient is nervous/anxious.    All other systems reviewed and are negative.  .  Objective   /64 (BP Location: Left arm, Patient Position: Sitting, Cuff Size: Adult)   Pulse 92   Temp 99 °F (37.2 °C)   Resp 18   Ht 188 cm (74\")   Wt 105 kg (232 lb 6 oz)   SpO2 95% Comment: room air at rest  BMI 29.84 kg/m²       There is no immunization history on file for this patient.    Physical Exam   Constitutional: He is oriented to person, place, and time. He appears well-developed and well-nourished.   HENT:   Head: Normocephalic and atraumatic.   Eyes: EOM are normal. Pupils are equal, round, and reactive to light.   Neck: Normal range of motion. Neck supple.   Cardiovascular: Normal rate and regular rhythm.   No murmur heard.  Pulmonary/Chest: Effort normal. No respiratory distress. He has no wheezes. He has rales (miniaml rales bilateral lower lobes).   Abdominal: Soft. Bowel sounds are normal. He exhibits no distension.   Musculoskeletal: Normal range of motion. He exhibits no edema.   Neurological: He is alert and oriented to person, place, and time.   Skin: Skin is warm and dry. No erythema.   Psychiatric: He has a normal mood and affect. His behavior is normal.   Vitals reviewed.        RESULTS          Assessment/Plan     PROBLEM LIST    Problem List Items Addressed This Visit        Respiratory    Moderate COPD    Coal " workers pneumoconiosis, simple    Chronic respiratory failure with hypoxia and hypercapnia (CMS/HCC) - Primary      Other Visit Diagnoses     Anemia, unspecified type                DISCUSSION    He does not appear to have any acute issues with his underlying lung disease.      His worsening dyspnea is likely related to his profound anemia.  I encouraged him to follow up with his PCP next week for a recheck of his H7H and treatment options.  They have been unable to get his iron transfusions set up locally.  We also discussed going to the ED for any chest pain, difficulty breathing, or altered mental status.      Continue on the Trelegy daily  Use his oxygen as needed and nightly.      He will be due his screening low dose CT of the chest in December, follow up with full PFTs after that.      I spent 25 minutes with the patient and family. I spent > 50% percent of this time counseling and discussing evaluation, current status, treatment options and management.    Yolie Bates, APRN  10/01/124033:03 AM  Electronically signed     Please note that portions of this note were completed with a voice recognition program. Efforts were made to edit the dictations, but occasionally words are mistranscribed.      CC: Raudel Zheng MD

## 2019-10-15 ENCOUNTER — OFFICE VISIT (OUTPATIENT)
Dept: GASTROENTEROLOGY | Facility: CLINIC | Age: 69
End: 2019-10-15

## 2019-10-15 ENCOUNTER — PREP FOR SURGERY (OUTPATIENT)
Dept: OTHER | Facility: HOSPITAL | Age: 69
End: 2019-10-15

## 2019-10-15 ENCOUNTER — LAB REQUISITION (OUTPATIENT)
Dept: LAB | Facility: HOSPITAL | Age: 69
End: 2019-10-15

## 2019-10-15 VITALS
WEIGHT: 234 LBS | SYSTOLIC BLOOD PRESSURE: 138 MMHG | HEIGHT: 74 IN | HEART RATE: 75 BPM | DIASTOLIC BLOOD PRESSURE: 74 MMHG | TEMPERATURE: 98.5 F | RESPIRATION RATE: 18 BRPM | OXYGEN SATURATION: 99 % | BODY MASS INDEX: 30.03 KG/M2

## 2019-10-15 DIAGNOSIS — Z11.59 ENCOUNTER FOR SCREENING FOR OTHER VIRAL DISEASES: ICD-10-CM

## 2019-10-15 DIAGNOSIS — K74.60 CIRRHOSIS OF LIVER WITHOUT ASCITES, UNSPECIFIED HEPATIC CIRRHOSIS TYPE (HCC): ICD-10-CM

## 2019-10-15 DIAGNOSIS — Z00.00 ROUTINE GENERAL MEDICAL EXAMINATION AT A HEALTH CARE FACILITY: ICD-10-CM

## 2019-10-15 DIAGNOSIS — D64.9 ANEMIA, UNSPECIFIED TYPE: Primary | ICD-10-CM

## 2019-10-15 DIAGNOSIS — K76.9 LIVER LESION: Primary | ICD-10-CM

## 2019-10-15 DIAGNOSIS — R10.10 PAIN OF UPPER ABDOMEN: ICD-10-CM

## 2019-10-15 DIAGNOSIS — K59.00 CONSTIPATION, UNSPECIFIED CONSTIPATION TYPE: Primary | ICD-10-CM

## 2019-10-15 PROCEDURE — 36415 COLL VENOUS BLD VENIPUNCTURE: CPT | Performed by: INTERNAL MEDICINE

## 2019-10-15 PROCEDURE — 99204 OFFICE O/P NEW MOD 45 MIN: CPT | Performed by: INTERNAL MEDICINE

## 2019-10-15 RX ORDER — LACTULOSE 10 G/15ML
SOLUTION ORAL
Qty: 1892 ML | Refills: 3 | Status: SHIPPED | OUTPATIENT
Start: 2019-10-15 | End: 2020-09-08 | Stop reason: SDUPTHER

## 2019-10-15 NOTE — PROGRESS NOTES
PCP: Raudel Zheng MD    Chief Complaint   Patient presents with   • NEW PATIENT     FOR ABD. PAIN AND GERD   • Abdominal Pain   • Heartburn        History of Present Illness:   Derek Kelsey is a 68 y.o. male who presents to the GI clinic for assessment of abdominal pain. Recent discharge for urosepsis. He has a h/o AAA repair via endovascular approach here in [place], [place]. He reports epigastric sharp pain after eating. This pain does not radiate. If he eats hot foods, he has diarrhea. He may have 3-4 days where he has no stool. Denies hematochezia or melena. Hg 7. History of coal miners lung disease and is on supplemental o2 at night. Reviewed ct.    Past Medical History:   Diagnosis Date   • Abdominal aortic aneurysm (CMS/[facility]) 9/29/2016   • Anxiety 9/29/2016   • Arthritis    • Back pain    • Black lung (CMS/[facility])    • CAD (coronary artery disease) 9/29/2016   • COPD (chronic obstructive pulmonary disease) (CMS/[facility]) 9/29/2016   • Depression 9/29/2016   • Depression    • GERD (gastroesophageal reflux disease)    • Hypertension 9/29/2016   • On supplemental oxygen by nasal cannula     at night   • Vitiligo    • Wears dentures    • Wears reading eyeglasses        Past Surgical History:   Procedure Laterality Date   • ABDOMINAL AORTIC ANEURYSM REPAIR WITH ENDOGRAFT N/A 6/6/2019    Procedure: ABDOMINAL AORTIC ANEURYSM REPAIR WITH ENDOGRAFT;  Surgeon: Leopoldo Burleson MD;  Location: [facility] 234 beds at the [facility] HYBRID OR 15;  Service: Vascular   • BACK SURGERY     • CARDIAC CATHETERIZATION     • CARDIAC CATHETERIZATION N/A 9/28/2018    Procedure: Left Heart Cath;  Surgeon: Douglas Galvez MD;  Location: [facility] include 234 beds at the [facility] CATH INVASIVE LOCATION;  Service: Cardiovascular   • COLONOSCOPY      2015   • EYE SURGERY      cataracts bilateral   • HAND SURGERY Left    • LUMBAR DISCECTOMY FUSION INSTRUMENTATION N/A 11/1/2018    Procedure: LUMBAR DISCECTOMY POSTERIOR WITH FUSION INSTRUMENTATION;  Surgeon: Joo Franklin MD;  Location: [facility] include 234 beds at the [facility]  OR;  Service: Orthopedic Spine   • LUMBAR DISCECTOMY FUSION INSTRUMENTATION N/A 7/24/2019    Procedure: POSTERIOR LUMBAR SPINAL FUSION REVISION AND INSTRUMENTATION L3,L4,L5,S1;  Surgeon: Joo Franklin MD;  Location: [facility] OR;  Service: Orthopedic Spine   • ORIF FINGER / THUMB FRACTURE     • SHOULDER SURGERY Left          Current Outpatient Medications:   •  amLODIPine (NORVASC) 5 MG tablet, Take 5 mg by mouth 2 (Two) Times a Day., Disp: , Rfl:   •  ASPIRIN LOW DOSE 81 MG tablet, Take 1 tablet by mouth Daily. Last dose 10-21-18 (Patient taking differently: Take 81 mg by mouth Daily.), Disp: , Rfl:   •  citalopram (CeleXA) 20 MG tablet, Take 20 mg by mouth Daily., Disp: , Rfl:   •  EPINEPHrine (EPIPEN JR) 0.15 MG/0.3ML solution auto-injector injection, , Disp: , Rfl:   •  ferrous gluconate (FERGON) 324 MG tablet, Take 1 tablet by mouth Daily With Breakfast., Disp: 30 tablet, Rfl: 0  •  Fluticasone-Umeclidin-Vilant (TRELEGY ELLIPTA) 100-62.5-25 MCG/INH aerosol powder , Inhale 1 each Daily., Disp: , Rfl:   •  furosemide (LASIX) 20 MG tablet, Take 20-40 mg by mouth Every Morning., Disp: , Rfl:   •  metoprolol succinate XL (TOPROL-XL) 50 MG 24 hr tablet, Take 50 mg by mouth Daily., Disp: , Rfl:   •  Multiple Vitamin (MULTI-VITAMIN DAILY) tablet, Take 1 tablet by mouth Daily., Disp: , Rfl:   •  pantoprazole (PROTONIX) 40 MG EC tablet, Take 40 mg by mouth 2 (Two) Times a Day., Disp: , Rfl:   •  raNITIdine (ZANTAC) 75 MG tablet, Take 75 mg by mouth Daily As Needed for Indigestion or Heartburn., Disp: , Rfl:   •  saccharomyces boulardii (FLORASTOR) 250 MG capsule, Take 1 capsule by mouth 2 (Two) Times a Day., Disp: 20 capsule, Rfl: 0  •  tamsulosin (FLOMAX) 0.4 MG capsule 24 hr capsule, Take 1 capsule by mouth Daily., Disp: 15 capsule, Rfl: 0    Allergies   Allergen Reactions   • Percocet [Oxycodone-Acetaminophen] Confusion   • Penicillins Hives     Hives - has tolerated Zosyn and ceftriaxone 09/2019       Family History    Problem Relation Age of Onset   • Stroke Mother    • Hypertension Mother    • Heart disease Mother    • Heart disease Father    • Hypertension Father    • Diabetes Sister    • Hypertension Sister    • Diabetes Brother    • Hypertension Child        Social History     Socioeconomic History   • Marital status:      Spouse name: Not on file   • Number of children: 2   • Years of education: Not on file   • Highest education level: Not on file   Occupational History   • Occupation: DISABLED      Comment: Back   Tobacco Use   • Smoking status: Former Smoker     Packs/day: 1.00     Years: 30.00     Pack years: 30.00     Types: Cigarettes     Last attempt to quit: 2011     Years since quittin.7   • Smokeless tobacco: Former User     Types: Chew     Quit date: 2011   Substance and Sexual Activity   • Alcohol use: No   • Drug use: No   • Sexual activity: Defer   Social History Narrative    Lives in [place].    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   Gastrointestinal: Positive for abdominal pain, constipation and nausea. Negative for diarrhea and vomiting.   Genitourinary: Positive for dysuria and frequency. Negative for hematuria.   Musculoskeletal: Positive for arthralgias and myalgias.   Neurological: Negative for headaches.     All other systems reviewed and are negative.    There were no vitals filed for this visit.    Physical Exam  General Appearance:  Vitals as above. no acute distress  Pale  Ambulates with gomez  Large stature  Head/face:  Normocephalic, atraumatic  Eyes:   EOMI, no conjunctivitis or icterus   Nose/Sinuses:  Nares patent bilaterally without discharge or lesions  Mouth/Throat:  Posterior pharynx is pink without  drainage or exudates;  dentition is in good condition and repair  Neck:  trachea is midline, no thyromegaly  Lungs:  Clear to auscultation bilaterally  Heart:  Regular rate and rhythm without murmur, gallop or rub  Abdomen:  Soft, non-tender to palpation, + distension without overt fluid wave  Neurologic:  Alert; no focal deficits; age appropriate behavior and speech  Psychiatric: mood and affect are congruent  Vascular: extremities without edema  Skin: no rash or cyanosis.      Assessment/Plan  1.) Abnormal finding on radiologic exam, 16 mm liver lesion and nodular contour  Cr. 0.8. Recommend CT liver, afp    2.) Suspect Cirrhosis  Criteria: nodular appearing liver, low platelets  Unclear etiology: nondiabetic. Non drinker. Will assess serology  No overt bleeding  Recommend egd    3.) Anemia, hg 7 without overt gib loss  Recommend egd/colonoscopy in hospital    4.) AAA repair, chronic respiratory failure  Cases will have to be done in the hospital    rtc 3 months.    Homar Viveros MD  10/15/2019  Answers for HPI/ROS submitted by the patient on 10/15/2019   Abdominal pain  Chronicity: chronic  Onset: more than 1 year ago  Onset quality: gradual  Frequency: daily  Episode duration: 6 hours  Progression since onset: waxing and waning  Pain location: generalized abdominal region  Pain - numeric: 8/10  Pain quality: cramping, dull, a sensation of fullness  Radiates to: suprapubic region  anorexia: No  belching: Yes  flatus: Yes  hematochezia: No  melena: No  weight loss: No  Aggravated by: nothing  Relieved by: certain positions, liquids, recumbency

## 2019-10-16 PROBLEM — D64.9 ANEMIA: Status: ACTIVE | Noted: 2019-10-16

## 2019-10-22 ENCOUNTER — TELEPHONE (OUTPATIENT)
Dept: GASTROENTEROLOGY | Facility: CLINIC | Age: 69
End: 2019-10-22

## 2019-10-22 NOTE — TELEPHONE ENCOUNTER
I CALLED PATIENT'S WIFE BACK. INFORMED HER TO RESCHEDULE CT SCAN AT [facility]; ITS SCHEDULE FOR THE SAME DAY AS COLONOSCOPY. WIFE VOICED UNDERSTANDING.

## 2019-10-29 ENCOUNTER — ANESTHESIA EVENT (OUTPATIENT)
Dept: GASTROENTEROLOGY | Facility: HOSPITAL | Age: 69
End: 2019-10-29

## 2019-10-29 ENCOUNTER — APPOINTMENT (OUTPATIENT)
Dept: PREADMISSION TESTING | Facility: HOSPITAL | Age: 69
End: 2019-10-29

## 2019-10-29 VITALS — HEIGHT: 74 IN | WEIGHT: 232.37 LBS | BODY MASS INDEX: 29.82 KG/M2

## 2019-10-29 LAB
DEPRECATED RDW RBC AUTO: 67.4 FL (ref 37–54)
ERYTHROCYTE [DISTWIDTH] IN BLOOD BY AUTOMATED COUNT: 20.4 % (ref 12.3–15.4)
HCT VFR BLD AUTO: 39.6 % (ref 37.5–51)
HGB BLD-MCNC: 12 G/DL (ref 13–17.7)
MCH RBC QN AUTO: 27.6 PG (ref 26.6–33)
MCHC RBC AUTO-ENTMCNC: 30.3 G/DL (ref 31.5–35.7)
MCV RBC AUTO: 91 FL (ref 79–97)
PLATELET # BLD AUTO: 141 10*3/MM3 (ref 140–450)
PMV BLD AUTO: 10.3 FL (ref 6–12)
POTASSIUM BLD-SCNC: 3.9 MMOL/L (ref 3.5–5.2)
RBC # BLD AUTO: 4.35 10*6/MM3 (ref 4.14–5.8)
WBC NRBC COR # BLD: 6.23 10*3/MM3 (ref 3.4–10.8)

## 2019-10-29 PROCEDURE — 36415 COLL VENOUS BLD VENIPUNCTURE: CPT

## 2019-10-29 PROCEDURE — 85027 COMPLETE CBC AUTOMATED: CPT | Performed by: ANESTHESIOLOGY

## 2019-10-29 PROCEDURE — 84132 ASSAY OF SERUM POTASSIUM: CPT | Performed by: ANESTHESIOLOGY

## 2019-10-30 ENCOUNTER — ANESTHESIA (OUTPATIENT)
Dept: GASTROENTEROLOGY | Facility: HOSPITAL | Age: 69
End: 2019-10-30

## 2019-10-30 ENCOUNTER — HOSPITAL ENCOUNTER (OUTPATIENT)
Facility: HOSPITAL | Age: 69
Setting detail: HOSPITAL OUTPATIENT SURGERY
Discharge: HOME | End: 2019-10-30
Attending: INTERNAL MEDICINE | Admitting: INTERNAL MEDICINE

## 2019-10-30 VITALS
SYSTOLIC BLOOD PRESSURE: 143 MMHG | DIASTOLIC BLOOD PRESSURE: 68 MMHG | HEART RATE: 60 BPM | OXYGEN SATURATION: 91 % | TEMPERATURE: 97.4 F | RESPIRATION RATE: 18 BRPM

## 2019-10-30 DIAGNOSIS — D64.9 ANEMIA, UNSPECIFIED TYPE: ICD-10-CM

## 2019-10-30 PROCEDURE — 25010000002 PROPOFOL 10 MG/ML EMULSION: Performed by: NURSE ANESTHETIST, CERTIFIED REGISTERED

## 2019-10-30 PROCEDURE — 93010 ELECTROCARDIOGRAM REPORT: CPT | Performed by: INTERNAL MEDICINE

## 2019-10-30 PROCEDURE — 93005 ELECTROCARDIOGRAM TRACING: CPT | Performed by: ANESTHESIOLOGY

## 2019-10-30 PROCEDURE — 88305 TISSUE EXAM BY PATHOLOGIST: CPT | Performed by: INTERNAL MEDICINE

## 2019-10-30 DEVICE — DEV CLIP ENDO RESOLUTION360 CONTRL ROT 235CM: Type: IMPLANTABLE DEVICE | Site: ASCENDING COLON | Status: FUNCTIONAL

## 2019-10-30 RX ORDER — ALBUTEROL SULFATE 2.5 MG/3ML
2.5 SOLUTION RESPIRATORY (INHALATION) ONCE AS NEEDED
Status: DISCONTINUED | OUTPATIENT
Start: 2019-10-30 | End: 2019-10-30 | Stop reason: HOSPADM

## 2019-10-30 RX ORDER — HYDRALAZINE HYDROCHLORIDE 20 MG/ML
5 INJECTION INTRAMUSCULAR; INTRAVENOUS
Status: DISCONTINUED | OUTPATIENT
Start: 2019-10-30 | End: 2019-10-30 | Stop reason: HOSPADM

## 2019-10-30 RX ORDER — SODIUM CHLORIDE 0.9 % (FLUSH) 0.9 %
3 SYRINGE (ML) INJECTION EVERY 12 HOURS SCHEDULED
Status: DISCONTINUED | OUTPATIENT
Start: 2019-10-30 | End: 2019-10-30 | Stop reason: HOSPADM

## 2019-10-30 RX ORDER — LIDOCAINE HYDROCHLORIDE 10 MG/ML
INJECTION, SOLUTION EPIDURAL; INFILTRATION; INTRACAUDAL; PERINEURAL AS NEEDED
Status: DISCONTINUED | OUTPATIENT
Start: 2019-10-30 | End: 2019-10-30 | Stop reason: SURG

## 2019-10-30 RX ORDER — EPHEDRINE SULFATE 50 MG/ML
5 INJECTION, SOLUTION INTRAVENOUS ONCE AS NEEDED
Status: DISCONTINUED | OUTPATIENT
Start: 2019-10-30 | End: 2019-10-30 | Stop reason: HOSPADM

## 2019-10-30 RX ORDER — LABETALOL HYDROCHLORIDE 5 MG/ML
5 INJECTION, SOLUTION INTRAVENOUS
Status: DISCONTINUED | OUTPATIENT
Start: 2019-10-30 | End: 2019-10-30 | Stop reason: HOSPADM

## 2019-10-30 RX ORDER — SODIUM CHLORIDE, SODIUM LACTATE, POTASSIUM CHLORIDE, CALCIUM CHLORIDE 600; 310; 30; 20 MG/100ML; MG/100ML; MG/100ML; MG/100ML
9 INJECTION, SOLUTION INTRAVENOUS CONTINUOUS PRN
Status: DISCONTINUED | OUTPATIENT
Start: 2019-10-30 | End: 2019-10-30 | Stop reason: HOSPADM

## 2019-10-30 RX ORDER — PROPOFOL 10 MG/ML
VIAL (ML) INTRAVENOUS AS NEEDED
Status: DISCONTINUED | OUTPATIENT
Start: 2019-10-30 | End: 2019-10-30 | Stop reason: SURG

## 2019-10-30 RX ORDER — LIDOCAINE HYDROCHLORIDE 10 MG/ML
0.5 INJECTION, SOLUTION EPIDURAL; INFILTRATION; INTRACAUDAL; PERINEURAL ONCE AS NEEDED
Status: DISCONTINUED | OUTPATIENT
Start: 2019-10-30 | End: 2019-10-30 | Stop reason: HOSPADM

## 2019-10-30 RX ORDER — SODIUM CHLORIDE 0.9 % (FLUSH) 0.9 %
3-10 SYRINGE (ML) INJECTION AS NEEDED
Status: DISCONTINUED | OUTPATIENT
Start: 2019-10-30 | End: 2019-10-30 | Stop reason: HOSPADM

## 2019-10-30 RX ORDER — CARVEDILOL 6.25 MG/1
6.25 TABLET ORAL 2 TIMES DAILY WITH MEALS
Qty: 90 TABLET | Refills: 3 | Status: SHIPPED | OUTPATIENT
Start: 2019-10-30 | End: 2020-04-07

## 2019-10-30 RX ORDER — FAMOTIDINE 20 MG/1
20 TABLET, FILM COATED ORAL
Status: DISCONTINUED | OUTPATIENT
Start: 2019-10-30 | End: 2019-10-30 | Stop reason: HOSPADM

## 2019-10-30 RX ORDER — ONDANSETRON 2 MG/ML
4 INJECTION INTRAMUSCULAR; INTRAVENOUS ONCE AS NEEDED
Status: DISCONTINUED | OUTPATIENT
Start: 2019-10-30 | End: 2019-10-30 | Stop reason: HOSPADM

## 2019-10-30 RX ADMIN — PROPOFOL 20 MG: 10 INJECTION, EMULSION INTRAVENOUS at 13:17

## 2019-10-30 RX ADMIN — PROPOFOL 30 MG: 10 INJECTION, EMULSION INTRAVENOUS at 13:22

## 2019-10-30 RX ADMIN — SODIUM CHLORIDE, POTASSIUM CHLORIDE, SODIUM LACTATE AND CALCIUM CHLORIDE 9 ML/HR: 600; 310; 30; 20 INJECTION, SOLUTION INTRAVENOUS at 12:55

## 2019-10-30 RX ADMIN — PROPOFOL 20 MG: 10 INJECTION, EMULSION INTRAVENOUS at 13:36

## 2019-10-30 RX ADMIN — PROPOFOL 20 MG: 10 INJECTION, EMULSION INTRAVENOUS at 13:31

## 2019-10-30 RX ADMIN — PROPOFOL 100 MG: 10 INJECTION, EMULSION INTRAVENOUS at 13:13

## 2019-10-30 RX ADMIN — LIDOCAINE HYDROCHLORIDE 50 MG: 10 INJECTION, SOLUTION EPIDURAL; INFILTRATION; INTRACAUDAL; PERINEURAL at 13:13

## 2019-10-30 RX ADMIN — SODIUM CHLORIDE, POTASSIUM CHLORIDE, SODIUM LACTATE AND CALCIUM CHLORIDE: 600; 310; 30; 20 INJECTION, SOLUTION INTRAVENOUS at 13:10

## 2019-10-30 RX ADMIN — PROPOFOL 20 MG: 10 INJECTION, EMULSION INTRAVENOUS at 13:26

## 2019-10-30 RX ADMIN — PROPOFOL 30 MG: 10 INJECTION, EMULSION INTRAVENOUS at 13:49

## 2019-10-30 NOTE — ANESTHESIA POSTPROCEDURE EVALUATION
Patient: Derek Kelsey    Procedure Summary     Date:  10/30/19 Room / [facility]:   [facility]    Anesthesia Start:  1310 Anesthesia Stop:      Procedures:       COLONOSCOPY (N/A )      ESOPHAGOGASTRODUODENOSCOPY (N/A ) Diagnosis:       Anemia, unspecified type      (Anemia, unspecified type [D64.9])    Surgeon:  Homar Viveros MD Provider:  Barry Mathias MD    Anesthesia Type:  MAC ASA Status:  3          Anesthesia Type: MAC  Last vitals  BP   150/78 (10/30/19 1231) 129/69   Temp 97.5      Pulse   75 (10/30/19 1231) 66   Resp   18 (10/30/19 1231)  17   SpO2   92 % (10/30/19 1231) 92%      Post Anesthesia Care and Evaluation    Patient location during evaluation: PACU  Patient participation: complete - patient participated  Level of consciousness: awake and alert  Pain score: 0  Pain management: adequate  Airway patency: patent  Anesthetic complications: No anesthetic complications  PONV Status: none  Cardiovascular status: hemodynamically stable and acceptable  Respiratory status: nonlabored ventilation and acceptable  Hydration status: acceptable    Comments: Report to PACU RN @ bedside, VS stable

## 2019-10-30 NOTE — ANESTHESIA PREPROCEDURE EVALUATION
Anesthesia Evaluation     Patient summary reviewed and Nursing notes reviewed   NPO Solid Status: > 8 hours  NPO Liquid Status: > 8 hours           Airway   Mallampati: I  TM distance: >3 FB  Neck ROM: full  No difficulty expected  Dental    (+) upper dentures, lower dentures and edentulous    Pulmonary    (+) a smoker Former, COPD (MDI Q day ) moderate, home oxygen (night), shortness of breath (Stable ),   (-) recent URI, lung cancer    ROS comment: pneumoconiosis  Cardiovascular   Exercise tolerance: good (4-7 METS)    ECG reviewed  Patient on routine beta blocker    (+) hypertension 2 medications or greater, PVD (AAA),   (-) past MI, CAD (cath NEG ), dysrhythmias, CABG    ROS comment: CATH  Mild CAD  Falsely positive stress test    Neuro/Psych  (+) psychiatric history Depression,     (-) seizures, CVA    ROS Comment: SP Lumbar fusion   GI/Hepatic/Renal/Endo    (+)  GERD,    (-) liver disease, diabetes, no thyroid disorderRenal disease: creat normal     Musculoskeletal     (+) back pain,   Abdominal    Substance History      OB/GYN          Other   arthritis,                    Anesthesia Plan    ASA 3     MAC   (PFL )  intravenous induction   Anesthetic plan, all risks, benefits, and alternatives have been provided, discussed and informed consent has been obtained with: patient.    Plan discussed with CRNA.

## 2019-11-02 LAB
CYTO UR: NORMAL
LAB AP CASE REPORT: NORMAL
LAB AP CLINICAL INFORMATION: NORMAL
PATH REPORT.FINAL DX SPEC: NORMAL
PATH REPORT.GROSS SPEC: NORMAL

## 2019-11-11 ENCOUNTER — TELEPHONE (OUTPATIENT)
Dept: CARDIAC SURGERY | Facility: CLINIC | Age: 69
End: 2019-11-11

## 2019-11-18 DIAGNOSIS — I71.40 ABDOMINAL AORTIC ANEURYSM (AAA) WITHOUT RUPTURE (HCC): Primary | ICD-10-CM

## 2019-11-22 ENCOUNTER — TELEPHONE (OUTPATIENT)
Dept: GASTROENTEROLOGY | Facility: CLINIC | Age: 69
End: 2019-11-22

## 2019-11-22 NOTE — TELEPHONE ENCOUNTER
CALLED PT TO ADVISE OF OVER DUE LABS THAT NEEDED TO DRAWN. SPOKE TO SPOUSE, SHE STATES THAT PATIENT HAS HAD ONGOING ABDOMINAL PAIN THAT ROTATES FOR SIDE DOWN THE LEFT LEG. STATES ALSO THE PATIENT AT TIMES HAS SHORTNESS OF BREATH. SHE ASKED IF A F/U APPT WAS AVAILABLE WHILE THE PATIENT WAS IN [place] WITH OTHER APPTS ON DEC 17 & 18; SPOKE TO STEPHEN, WE SCHEDULED PATIENT FOR F/U ON DEC. 17  ADVISED IF PAIN WORSENS OR SHORTNESS OF BREATH WORSEN TO SEEK TREATMENT AT THE ER. PT SPOUSE VOICED UNDERSTANDING.

## 2019-12-17 ENCOUNTER — OFFICE VISIT (OUTPATIENT)
Dept: GASTROENTEROLOGY | Facility: CLINIC | Age: 69
End: 2019-12-17

## 2019-12-17 ENCOUNTER — OFFICE VISIT (OUTPATIENT)
Dept: PULMONOLOGY | Facility: CLINIC | Age: 69
End: 2019-12-17

## 2019-12-17 ENCOUNTER — LAB REQUISITION (OUTPATIENT)
Dept: LAB | Facility: HOSPITAL | Age: 69
End: 2019-12-17

## 2019-12-17 ENCOUNTER — HOSPITAL ENCOUNTER (OUTPATIENT)
Dept: CT IMAGING | Facility: HOSPITAL | Age: 69
Discharge: HOME | End: 2019-12-17
Admitting: NURSE PRACTITIONER

## 2019-12-17 VITALS
HEART RATE: 74 BPM | SYSTOLIC BLOOD PRESSURE: 130 MMHG | OXYGEN SATURATION: 98 % | TEMPERATURE: 99.7 F | WEIGHT: 233 LBS | BODY MASS INDEX: 29.92 KG/M2 | DIASTOLIC BLOOD PRESSURE: 82 MMHG

## 2019-12-17 VITALS
BODY MASS INDEX: 30.03 KG/M2 | OXYGEN SATURATION: 96 % | WEIGHT: 234 LBS | HEART RATE: 74 BPM | SYSTOLIC BLOOD PRESSURE: 150 MMHG | DIASTOLIC BLOOD PRESSURE: 60 MMHG | HEIGHT: 74 IN | TEMPERATURE: 98.2 F

## 2019-12-17 DIAGNOSIS — J60 COAL WORKERS PNEUMOCONIOSIS (HCC): ICD-10-CM

## 2019-12-17 DIAGNOSIS — Z87.891 PERSONAL HISTORY OF TOBACCO USE, PRESENTING HAZARDS TO HEALTH: ICD-10-CM

## 2019-12-17 DIAGNOSIS — J44.9 COPD MIXED TYPE (HCC): Primary | ICD-10-CM

## 2019-12-17 DIAGNOSIS — K29.00 ACUTE SUPERFICIAL GASTRITIS WITHOUT HEMORRHAGE: Primary | ICD-10-CM

## 2019-12-17 DIAGNOSIS — J96.11 CHRONIC RESPIRATORY FAILURE WITH HYPOXIA AND HYPERCAPNIA (HCC): ICD-10-CM

## 2019-12-17 DIAGNOSIS — Z00.00 ROUTINE GENERAL MEDICAL EXAMINATION AT A HEALTH CARE FACILITY: ICD-10-CM

## 2019-12-17 DIAGNOSIS — J96.12 CHRONIC RESPIRATORY FAILURE WITH HYPOXIA AND HYPERCAPNIA (HCC): ICD-10-CM

## 2019-12-17 PROCEDURE — 94729 DIFFUSING CAPACITY: CPT | Performed by: NURSE PRACTITIONER

## 2019-12-17 PROCEDURE — 94060 EVALUATION OF WHEEZING: CPT | Performed by: NURSE PRACTITIONER

## 2019-12-17 PROCEDURE — 94726 PLETHYSMOGRAPHY LUNG VOLUMES: CPT | Performed by: NURSE PRACTITIONER

## 2019-12-17 PROCEDURE — 99214 OFFICE O/P EST MOD 30 MIN: CPT | Performed by: INTERNAL MEDICINE

## 2019-12-17 PROCEDURE — 99214 OFFICE O/P EST MOD 30 MIN: CPT | Performed by: NURSE PRACTITIONER

## 2019-12-17 PROCEDURE — G0297 LDCT FOR LUNG CA SCREEN: HCPCS

## 2019-12-17 PROCEDURE — 36415 COLL VENOUS BLD VENIPUNCTURE: CPT | Performed by: INTERNAL MEDICINE

## 2019-12-17 RX ORDER — ALBUTEROL SULFATE 90 UG/1
4 AEROSOL, METERED RESPIRATORY (INHALATION) ONCE
Status: COMPLETED | OUTPATIENT
Start: 2019-12-17 | End: 2019-12-17

## 2019-12-17 RX ORDER — SUCRALFATE 1 G/1
1 TABLET ORAL 4 TIMES DAILY
Qty: 60 TABLET | Refills: 1 | Status: SHIPPED | OUTPATIENT
Start: 2019-12-17 | End: 2020-01-20 | Stop reason: SDUPTHER

## 2019-12-17 RX ADMIN — ALBUTEROL SULFATE 4 PUFF: 90 AEROSOL, METERED RESPIRATORY (INHALATION) at 12:25

## 2019-12-17 NOTE — PROGRESS NOTES
PCP: Raudel Zheng MD    Chief Complaint   Patient presents with   • Follow-up       History of Present Illness:   Derek Kelsey is a 69 y.o. male who presents to GI clinic as a follow up for cirrhosis. To recall, recently diagnosed with cirrhosis due to fatty liver. H/o Aortic aneurysm repair. He reports epigastric intermittent sharp pain improved with eating. Occasionally has dark stool. No vomiting. No hematemesis or confusion or jaundice. Complains of fatigue    Past Medical History:   Diagnosis Date   • Abdominal aortic aneurysm (CMS/HCC) 9/29/2016   • Anxiety 9/29/2016   • Arthritis    • Back pain    • Black lung (CMS/HCC)    • CAD (coronary artery disease) 9/29/2016   • COPD (chronic obstructive pulmonary disease) (CMS/HCC) 9/29/2016   • Depression 9/29/2016   • Depression    • GERD (gastroesophageal reflux disease)    • Hypertension 9/29/2016   • On supplemental oxygen by nasal cannula     at night   • Vitiligo    • Wears dentures    • Wears reading eyeglasses        Past Surgical History:   Procedure Laterality Date   • ABDOMINAL AORTIC ANEURYSM REPAIR WITH ENDOGRAFT N/A 6/6/2019    Procedure: ABDOMINAL AORTIC ANEURYSM REPAIR WITH ENDOGRAFT;  Surgeon: Leopoldo Burleson MD;  Location: [facility] HYBRID OR 15;  Service: Vascular   • BACK SURGERY     • CARDIAC CATHETERIZATION     • CARDIAC CATHETERIZATION N/A 9/28/2018    Procedure: Left Heart Cath;  Surgeon: Douglas Galvez MD;  Location:  [facility];  Service: Cardiovascular   • COLONOSCOPY      2015   • COLONOSCOPY N/A 10/30/2019    Procedure: COLONOSCOPY;  Surgeon: Homar Viveros MD;  Location:  [facility] END[facility]COPY;  Service: Gastroenterology   • ENDOSCOPY N/A 10/30/2019    Procedure: ESOPHAGOGASTRODUODENOSCOPY;  Surgeon: Homar Viveros MD;  Location:  [facility];  Service: Gastroenterology   • EYE SURGERY      cataracts bilateral   • HAND SURGERY Left    • LUMBAR DISCECTOMY FUSION INSTRUMENTATION N/A 11/1/2018     Procedure: LUMBAR DISCECTOMY POSTERIOR WITH FUSION INSTRUMENTATION;  Surgeon: Joo Franklin MD;  Location:  [facility] OR;  Service: Orthopedic Spine   • LUMBAR DISCECTOMY FUSION INSTRUMENTATION N/A 7/24/2019    Procedure: POSTERIOR LUMBAR SPINAL FUSION REVISION AND INSTRUMENTATION L3,L4,L5,S1;  Surgeon: Joo Franklin MD;  Location:  [facility] OR;  Service: Orthopedic Spine   • ORIF FINGER / THUMB FRACTURE     • SHOULDER SURGERY Left          Current Outpatient Medications:   •  amLODIPine (NORVASC) 5 MG tablet, Take 5 mg by mouth 2 (Two) Times a Day., Disp: , Rfl:   •  ASPIRIN LOW DOSE 81 MG tablet, Take 1 tablet by mouth Daily. Last dose 10-21-18 (Patient taking differently: Take 81 mg by mouth Daily. Patient last dose 10-27-19 per patient's choice), Disp: , Rfl:   •  carvedilol (COREG) 6.25 MG tablet, Take 1 tablet by mouth 2 (Two) Times a Day With Meals., Disp: 90 tablet, Rfl: 3  •  citalopram (CeleXA) 20 MG tablet, Take 40 mg by mouth Daily., Disp: , Rfl:   •  ferrous gluconate (FERGON) 324 MG tablet, Take 1 tablet by mouth Daily With Breakfast. (Patient taking differently: Take 324 mg by mouth 2 (Two) Times a Day.), Disp: 30 tablet, Rfl: 0  •  furosemide (LASIX) 20 MG tablet, Take 20-40 mg by mouth Every Morning., Disp: , Rfl:   •  lactulose (CHRONULAC) 10 GM/15ML solution, Take 15 ml q 6 hours as needed for constipation, Disp: 1892 mL, Rfl: 3  •  Multiple Vitamin (MULTI-VITAMIN DAILY) tablet, Take 1 tablet by mouth Daily., Disp: , Rfl:   •  pantoprazole (PROTONIX) 40 MG EC tablet, Take 40 mg by mouth 2 (Two) Times a Day., Disp: , Rfl:   •  raNITIdine (ZANTAC) 75 MG tablet, Take 75 mg by mouth Daily As Needed for Indigestion or Heartburn., Disp: , Rfl:   •  saccharomyces boulardii (FLORASTOR) 250 MG capsule, Take 1 capsule by mouth 2 (Two) Times a Day., Disp: 20 capsule, Rfl: 0  •  tamsulosin (FLOMAX) 0.4 MG capsule 24 hr capsule, Take 1 capsule by mouth Daily., Disp: 15 capsule, Rfl: 0  •  TRELEGY ELLIPTA  100-62.5-25 MCG/INH aerosol powder , INHALE 1 PUFF BY MOUTH ONCE DAILY (SWISH WITH WATER  AND  SPIT  AFTER  EACH  USE), Disp: 60 each, Rfl: 2  No current facility-administered medications for this visit.     Allergies   Allergen Reactions   • Penicillins Hives     Hives - has tolerated Zosyn and ceftriaxone 2019   • Percocet [Oxycodone-Acetaminophen] Confusion       Family History   Problem Relation Age of Onset   • Stroke Mother    • Hypertension Mother    • Heart disease Mother    • Heart disease Father    • Hypertension Father    • Diabetes Sister    • Hypertension Sister    • Diabetes Brother    • Hypertension Child        Social History     Socioeconomic History   • Marital status:      Spouse name: Not on file   • Number of children: 2   • Years of education: Not on file   • Highest education level: Not on file   Occupational History   • Occupation: DISABLED      Comment: Back   Tobacco Use   • Smoking status: Former Smoker     Packs/day: 1.00     Years: 30.00     Pack years: 30.00     Types: Cigarettes     Last attempt to quit: 2011     Years since quittin.9   • Smokeless tobacco: Former User     Types: Chew     Quit date: 2011   Substance and Sexual Activity   • Alcohol use: No   • Drug use: No   • Sexual activity: Defer   Social History Narrative    Lives in [place].    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    HENT: Negative.    Eyes: Negative.    Respiratory: Negative.    Cardiovascular: Negative.    Gastrointestinal: Positive for abdominal pain and nausea.   Endocrine: Negative.    Genitourinary: Negative.    Musculoskeletal: Negative.    Skin: Negative.    Allergic/Immunologic: Negative.    Neurological: Negative.     Hematological: Negative.    Psychiatric/Behavioral: Negative.          There were no vitals filed for this visit.    Physical Exam  General Appearance:  Vitals as above. no acute distress  Head/face:  Normocephalic, atraumatic  Eyes:   EOMI, no conjunctivitis or icterus   Nose/Sinuses:  Nares patent bilaterally without discharge or lesions  Mouth/Throat:  Posterior pharynx is pink without drainage or exudates;  dentition is in good condition and repair  Neck:  trachea is midline, no thyromegaly  Neurologic:  Alert; no focal deficits; age appropriate behavior and speech  Psychiatric: mood and affect are congruent  Skin: no rash or cyanosis.  Abdomen: obese and soft/nt      Assessment/Plan  1.) Abnormal finding on radiologic exam, 16 mm liver lesion and nodular contour  Cr. 0.8. CT liver 10/2019shows a lesion similar in quality from 2014, stable in size. Suspect benign. Did not meet lrads criteria for hcc. Will plan ultrasound 4/2019    2.) Cirrhosis  Criteria: nodular appearing liver, low platelets, g2 ev on egd  Etiology: NAFLD  No overt bleeding, no decompensating events  Plan:  10% wt loss, optimization of dm2  Ultrasound 4/2019  MELD labs  comorbidities may eliminate liver transplant candidacy.    3.) Anemia, hg 7 without overt gib loss  Recent egd/colonoscopy showing gastritis, severe phg and a few polyps.  Will start carafate in addition to pantoprazole.    4.) AAA repair, chronic respiratory failure  Cases will have to be done in the hospital    5.) fatigue  Repeat hemogram.  Pcp is working on optimizing antihypertensives.  Should coreg be felt to contribute, would offer EVL as a substitue.    rtc in 3 months.        Homar Viveros MD  12/17/2019

## 2019-12-17 NOTE — PROGRESS NOTES
[facility] Follow up    CHIEF COMPLAINT    COPD, history of tobacco use, CWP    Subjective   HISTORY OF PRESENT ILLNESS    Derek Kelsey is a 69 y.o.male here today for follow-up on his CT scan the chest.  We follow him here in the office for COPD with some mild coworkers pneumoconiosis.  He does have a history of tobacco abuse and was due his low-dose annual screening CT scan.    I last saw him in October after hospitalization, he been in the hospital several times since the summer after a aortic aneurysm repair with BPH issues and E. coli UTI and sepsis.    He is also continue to follow with nephrology due to significant anemia.  Currently he is on iron.  He has a follow-up with him tomorrow for repeat labs.    He denies any worsening shortness of breath.  Has no cough or sputum production.  He does feel like the Trelegy is working very well.  He continues to use his oxygen at night.    Patient Active Problem List   Diagnosis   • Anxiety   • Abdominal aortic aneurysm (S/P Endovascular repair)   • Depression   • Hypertension   • Cataract, bilateral   • CAD (coronary artery disease)   • Abnormal stress test   • Moderate COPD   • Former smoker   • Coal workers pneumoconiosis, simple   • Back pain   • S/P lumbar fusion   • Prediabetes   • Acute blood loss anemia, mild, asymptomatic   • Acute postoperative pain   • Hyponatremia, mild   • Chronic respiratory failure with hypoxia and hypercapnia (CMS/HCC)   • Renal insufficiency   • Acute cystitis without hematuria   • Acute UTI   • Sepsis (CMS/HCC)   • Bacteremia   • Anemia       Allergies   Allergen Reactions   • Penicillins Hives     Hives - has tolerated Zosyn and ceftriaxone 09/2019   • Percocet [Oxycodone-Acetaminophen] Confusion       Current Outpatient Medications:   •  amLODIPine (NORVASC) 5 MG tablet, Take 5 mg by mouth 2 (Two) Times a Day., Disp: , Rfl:   •  ASPIRIN LOW DOSE 81 MG tablet, Take 1 tablet by mouth Daily. Last dose 10-21-18 (Patient  taking differently: Take 81 mg by mouth Daily. Patient last dose 10-27-19 per patient's choice), Disp: , Rfl:   •  carvedilol (COREG) 6.25 MG tablet, Take 1 tablet by mouth 2 (Two) Times a Day With Meals., Disp: 90 tablet, Rfl: 3  •  citalopram (CeleXA) 20 MG tablet, Take 40 mg by mouth Daily., Disp: , Rfl:   •  ferrous gluconate (FERGON) 324 MG tablet, Take 1 tablet by mouth Daily With Breakfast. (Patient taking differently: Take 324 mg by mouth 2 (Two) Times a Day.), Disp: 30 tablet, Rfl: 0  •  furosemide (LASIX) 20 MG tablet, Take 20-40 mg by mouth Every Morning., Disp: , Rfl:   •  lactulose (CHRONULAC) 10 GM/15ML solution, Take 15 ml q 6 hours as needed for constipation, Disp: 1892 mL, Rfl: 3  •  Multiple Vitamin (MULTI-VITAMIN DAILY) tablet, Take 1 tablet by mouth Daily., Disp: , Rfl:   •  pantoprazole (PROTONIX) 40 MG EC tablet, Take 40 mg by mouth 2 (Two) Times a Day., Disp: , Rfl:   •  raNITIdine (ZANTAC) 75 MG tablet, Take 75 mg by mouth Daily As Needed for Indigestion or Heartburn., Disp: , Rfl:   •  saccharomyces boulardii (FLORASTOR) 250 MG capsule, Take 1 capsule by mouth 2 (Two) Times a Day., Disp: 20 capsule, Rfl: 0  •  tamsulosin (FLOMAX) 0.4 MG capsule 24 hr capsule, Take 1 capsule by mouth Daily., Disp: 15 capsule, Rfl: 0  •  TRELEGY ELLIPTA 100-62.5-25 MCG/INH aerosol powder , INHALE 1 PUFF BY MOUTH ONCE DAILY (SWISH WITH WATER  AND  SPIT  AFTER  EACH  USE), Disp: 60 each, Rfl: 2  No current facility-administered medications for this visit.   MEDICATION LIST AND ALLERGIES REVIEWED.    Social History     Tobacco Use   • Smoking status: Former Smoker     Packs/day: 1.00     Years: 30.00     Pack years: 30.00     Types: Cigarettes     Last attempt to quit: 2011     Years since quittin.9   • Smokeless tobacco: Former User     Types: Chew     Quit date: 2011   Substance Use Topics   • Alcohol use: No   • Drug use: No       FAMILY AND SOCIAL HISTORY REVIEWED.    .Review of Systems  "  Constitutional: Positive for activity change and fatigue. Negative for chills, fever and unexpected weight change.   HENT: Negative for congestion, nosebleeds, postnasal drip, rhinorrhea, sinus pressure and trouble swallowing.    Respiratory: Positive for chest tightness, shortness of breath and wheezing. Negative for cough.    Cardiovascular: Negative for chest pain and leg swelling.   Gastrointestinal: Negative for abdominal pain, constipation, diarrhea, nausea and vomiting.   Endocrine: Positive for cold intolerance.   Genitourinary: Negative for dysuria, frequency, hematuria and urgency.   Musculoskeletal: Positive for arthralgias and back pain. Negative for myalgias.   Neurological: Negative for dizziness, weakness, numbness and headaches.   All other systems reviewed and are negative.    Objective   /60 (BP Location: Left arm, Patient Position: Sitting)   Pulse 74   Temp 98.2 °F (36.8 °C)   Ht 188 cm (74\")   Wt 106 kg (234 lb)   SpO2 96% Comment: resting at room air  BMI 30.04 kg/m²       There is no immunization history on file for this patient.    Physical Exam   Constitutional: He is oriented to person, place, and time. He appears well-developed and well-nourished.   HENT:   Head: Normocephalic and atraumatic.   Eyes: Pupils are equal, round, and reactive to light. EOM are normal.   Neck: Normal range of motion. Neck supple.   Cardiovascular: Normal rate and regular rhythm.   No murmur heard.  Pulmonary/Chest: Effort normal. No respiratory distress. He has no wheezes. He has no rales.   Decreased bilaterally no wheezing or rales.   Abdominal: Soft. Bowel sounds are normal. He exhibits no distension.   Musculoskeletal: Normal range of motion. He exhibits no edema.   Neurological: He is alert and oriented to person, place, and time.   Skin: Skin is warm and dry. No erythema.   Psychiatric: He has a normal mood and affect. His behavior is normal.   Vitals reviewed.        RESULTS    PFTS in the " office today, read by me:  Moderate obstructive airway disease with an FEV1 of 2.36, 61% predicted.  No segment change postbronchodilator  Significant improvement from March with an FEV1 of 1.75, 44% predicted  He is also had improvement in his air trapping with residual volume down to 137%.         Results for orders placed during the hospital encounter of 12/17/19   CT Chest Low Dose Wo    Narrative EXAMINATION: CT CHEST LOW DOSE WO- 12/17/2019     INDICATION: Lung cancer annual screening, asymptomatic, smoker hx w/in  last 15 yrs (min. 30 pack-yrs); Z87.891-Personal history of nicotine  dependence     TECHNIQUE: Low-dose CT scan of the chest was performed at 5 mm intervals  for lung cancer surveillance.     The radiation dose reduction device was turned on for each scan per the  ALARA (As Low as Reasonably Achievable) protocol.     COMPARISON: 12/18/2018     FINDINGS: There is no axillary lymphadenopathy. There is no mediastinal  or hilar adenopathy. There is no pericardial or pleural effusion. There  are postinflammatory changes in the right apical region. There is no  acute inflammatory process, mass or nodule. There is a benign, calcified  granuloma in the posterior sulcus on the right.       Impression Stable chronic pulmonary findings with no suspicious  abnormality. Lung RADS category 1. Annual follow-up examination is  recommended.     D:  12/17/2019  E:  12/17/2019                   Assessment/Plan     PROBLEM LIST    Problem List Items Addressed This Visit        Respiratory    Moderate COPD - Primary    Relevant Medications    albuterol sulfate HFA (PROVENTIL HFA;VENTOLIN HFA;PROAIR HFA) inhaler 4 puff (Completed)    Other Relevant Orders    Pulmonary Function Test (Completed)    Coal workers pneumoconiosis, simple    Chronic respiratory failure with hypoxia and hypercapnia (CMS/HCC)            DISCUSSION    We went over his testing today in the office.  His overall obstruction on the Trelegy and as  needed albuterol has done well.  He is symptomatic shortness of breath and cough have also improved.    Continue on the Trelegy daily with albuterol HFA as needed.    We also reviewed his CT scan as above with no suspicious abnormalities continue with annual follow-up with his tobacco use.    Continue on his oxygen at night.    At this time he is doing fairly well.  We will do routine follow-up in 6 months or as needed.    I spent 25 minutes face to face with the patient and family. I spent > 50% percent of this time counseling and discussing diagnostic testing, evaluation, current status, treatment options and management.    Yolie Bates, APRN  12/17/20191:15 PM  Electronically signed     Please note that portions of this note were completed with a voice recognition program. Efforts were made to edit the dictations, but occasionally words are mistranscribed.      CC: Raudel Zheng MD

## 2019-12-18 DIAGNOSIS — R93.89 ABNORMAL FINDING ON RADIOLOGY EXAM: Primary | ICD-10-CM

## 2019-12-18 LAB — AFP-TM SERPL-MCNC: 9 NG/ML (ref 0–8.3)

## 2019-12-24 LAB
A1AT PHENOTYP SERPL IFE: NORMAL
A1AT SERPL-MCNC: 174 MG/DL (ref 101–187)
ACTIN IGG SERPL-ACNC: 60 UNITS (ref 0–19)
ALBUMIN SERPL-MCNC: 4.2 G/DL (ref 3.6–4.8)
ALBUMIN/GLOB SERPL: 1 {RATIO} (ref 1.2–2.2)
ALP SERPL-CCNC: 118 IU/L (ref 39–117)
ALT SERPL-CCNC: 41 IU/L (ref 0–44)
ANA SER QL: NEGATIVE
AST SERPL-CCNC: 64 IU/L (ref 0–40)
BILIRUB SERPL-MCNC: 0.9 MG/DL (ref 0–1.2)
BUN SERPL-MCNC: 12 MG/DL (ref 8–27)
BUN/CREAT SERPL: 12 (ref 10–24)
CALCIUM SERPL-MCNC: 9.6 MG/DL (ref 8.6–10.2)
CHLORIDE SERPL-SCNC: 98 MMOL/L (ref 96–106)
CO2 SERPL-SCNC: 23 MMOL/L (ref 20–29)
CREAT SERPL-MCNC: 1 MG/DL (ref 0.76–1.27)
ERYTHROCYTE [DISTWIDTH] IN BLOOD BY AUTOMATED COUNT: 16.4 % (ref 12.3–15.4)
FERRITIN SERPL-MCNC: 135 NG/ML (ref 30–400)
GLOBULIN SER CALC-MCNC: 4.3 G/DL (ref 1.5–4.5)
GLUCOSE SERPL-MCNC: 126 MG/DL (ref 65–99)
HAV AB SER QL IA: POSITIVE
HAV IGM SERPL QL IA: NEGATIVE
HBV CORE AB SERPL QL IA: NEGATIVE
HBV CORE IGM SERPL QL IA: NEGATIVE
HBV SURFACE AB SER QL: NON REACTIVE
HBV SURFACE AG SERPL QL IA: NEGATIVE
HCT VFR BLD AUTO: 41.4 % (ref 37.5–51)
HCV AB S/CO SERPL IA: 0.2 S/CO RATIO (ref 0–0.9)
HCV RNA SERPL NAA+PROBE-ACNC: NORMAL IU/ML
HGB BLD-MCNC: 13.9 G/DL (ref 13–17.7)
IGA SERPL-MCNC: 608 MG/DL (ref 61–437)
IGG SERPL-MCNC: 2182 MG/DL (ref 700–1600)
IGM SERPL-MCNC: 263 MG/DL (ref 20–172)
INR PPP: 1.3 (ref 0.8–1.2)
IRON SATN MFR SERPL: 35 % SATURATION
IRON SERPL-MCNC: 140 UG/DL
MCH RBC QN AUTO: 30.3 PG (ref 26.6–33)
MCHC RBC AUTO-ENTMCNC: 33.6 G/DL (ref 31.5–35.7)
MCV RBC AUTO: 90 FL (ref 79–97)
MITOCHONDRIA M2 IGG SER-ACNC: <20 UNITS (ref 0–20)
MORPHOLOGY BLD-IMP: ABNORMAL
PLATELET # BLD AUTO: ABNORMAL X10E3/UL (ref 150–450)
POTASSIUM SERPL-SCNC: 4.2 MMOL/L (ref 3.5–5.2)
PROT SERPL-MCNC: 8.5 G/DL (ref 6–8.5)
PROTHROMBIN TIME: 13.2 SEC (ref 9.1–12)
RBC # BLD AUTO: 4.59 X10E6/UL (ref 4.14–5.8)
SODIUM SERPL-SCNC: 139 MMOL/L (ref 134–144)
TEST INFORMATION: NORMAL
TRANSFERRIN SERPL-MCNC: 284 MG/DL
WBC # BLD AUTO: 7.5 X10E3/UL (ref 3.4–10.8)

## 2020-01-02 ENCOUNTER — TELEPHONE (OUTPATIENT)
Dept: GASTROENTEROLOGY | Facility: CLINIC | Age: 70
End: 2020-01-02

## 2020-01-02 NOTE — TELEPHONE ENCOUNTER
I called patient's wife back. Went over result letter with patient. Dr Viveros did not dx patient with cancer. With mildly elevated tumor at 9; repeat CT scan in 3 months. Wife voiced understanding.

## 2020-01-03 ENCOUNTER — TELEPHONE (OUTPATIENT)
Dept: GASTROENTEROLOGY | Facility: CLINIC | Age: 70
End: 2020-01-03

## 2020-01-03 NOTE — TELEPHONE ENCOUNTER
----- Message from Homar Viveros MD sent at 12/30/2019 10:57 AM EST -----  Reviewed mr. burk's labs which show mildly elevated liver enzymes and levels of IgG and ASMA which is suggestive of a component of autoimmune liver disease.  Due to cirrhosis and the mildly elevated levels of liver enzymes, I would not recommend a course of immunosuppression.  Recommend repeat ultrasound as ordered and follow up in clinic with optimization of metabolic syndrome.

## 2020-01-03 NOTE — TELEPHONE ENCOUNTER
I called patient to give additional lab results. No answer; mailbox is full. I will mail result letter also.

## 2020-01-16 ENCOUNTER — OFFICE VISIT (OUTPATIENT)
Dept: CARDIAC SURGERY | Facility: CLINIC | Age: 70
End: 2020-01-16

## 2020-01-16 DIAGNOSIS — I71.40 ABDOMINAL AORTIC ANEURYSM (AAA) WITHOUT RUPTURE (HCC): Primary | ICD-10-CM

## 2020-01-16 PROCEDURE — 99212 OFFICE O/P EST SF 10 MIN: CPT | Performed by: THORACIC SURGERY (CARDIOTHORACIC VASCULAR SURGERY)

## 2020-01-17 VITALS
BODY MASS INDEX: 30.03 KG/M2 | SYSTOLIC BLOOD PRESSURE: 118 MMHG | HEIGHT: 74 IN | OXYGEN SATURATION: 96 % | WEIGHT: 234 LBS | HEART RATE: 76 BPM | DIASTOLIC BLOOD PRESSURE: 50 MMHG

## 2020-01-17 NOTE — PROGRESS NOTES
01/16/2020  Patient Information  Derek Kelsey                                                                                          [address]  [place] [address]   1950  'PCP/Referring Physician'  Raudel Zheng MD  [phone]  No ref. provider found    Chief Complaint   Patient presents with   • Follow-up     4 month follow up with CT abdomen/pelvis    • Aortic Aneurysm       History of Present Illness:  Mr. Kelsey returns today for follow up of his aortic aneurysm. He has been doing well.  He has had no major problems.  He is seeing Dr. Zheng on a regular basis.    Patient Active Problem List   Diagnosis   • Anxiety   • Abdominal aortic aneurysm (S/P Endovascular repair)   • Depression   • Hypertension   • Cataract, bilateral   • CAD (coronary artery disease)   • Abnormal stress test   • Moderate COPD   • Former smoker   • Coal workers pneumoconiosis, simple   • Back pain   • S/P lumbar fusion   • Prediabetes   • Acute blood loss anemia, mild, asymptomatic   • Acute postoperative pain   • Hyponatremia, mild   • Chronic respiratory failure with hypoxia and hypercapnia (CMS/HCC)   • Renal insufficiency   • Acute cystitis without hematuria   • Acute UTI   • Sepsis (CMS/[facility])   • Bacteremia   • Anemia     Past Medical History:   Diagnosis Date   • Abdominal aortic aneurysm (CMS/HCC) 9/29/2016   • Anxiety 9/29/2016   • Arthritis    • Back pain    • Black lung (CMS/HCC)    • CAD (coronary artery disease) 9/29/2016   • Cirrhosis (CMS/HCC)    • COPD (chronic obstructive pulmonary disease) (CMS/HCC) 9/29/2016   • Depression 9/29/2016   • Depression    • GERD (gastroesophageal reflux disease)    • Hypertension 9/29/2016   • On supplemental oxygen by nasal cannula     at night   • Vitiligo    • Wears dentures    • Wears reading eyeglasses      Past Surgical History:   Procedure Laterality Date   • ABDOMINAL AORTIC ANEURYSM REPAIR WITH ENDOGRAFT N/A 6/6/2019    Procedure: ABDOMINAL AORTIC  ANEURYSM REPAIR WITH ENDOGRAFT;  Surgeon: Leopoldo Burleson MD;  Location:  [facility] HYBRID OR 15;  Service: Vascular   • BACK SURGERY     • CARDIAC CATHETERIZATION     • CARDIAC CATHETERIZATION N/A 9/28/2018    Procedure: Left Heart Cath;  Surgeon: Douglas Galvez MD;  Location:  [facility] CATH INVASIVE LOCATION;  Service: Cardiovascular   • COLONOSCOPY      2015   • COLONOSCOPY N/A 10/30/2019    Procedure: COLONOSCOPY;  Surgeon: Homar Viveros MD;  Location:  [facility] ENDOSCOPY;  Service: Gastroenterology   • ENDOSCOPY N/A 10/30/2019    Procedure: ESOPHAGOGASTRODUODENOSCOPY;  Surgeon: Homar Viveros MD;  Location:  [facility] ENDOSCOPY;  Service: Gastroenterology   • EYE SURGERY      cataracts bilateral   • HAND SURGERY Left    • LUMBAR DISCECTOMY FUSION INSTRUMENTATION N/A 11/1/2018    Procedure: LUMBAR DISCECTOMY POSTERIOR WITH FUSION INSTRUMENTATION;  Surgeon: Joo Franklin MD;  Location:  [facility] OR;  Service: Orthopedic Spine   • LUMBAR DISCECTOMY FUSION INSTRUMENTATION N/A 7/24/2019    Procedure: POSTERIOR LUMBAR SPINAL FUSION REVISION AND INSTRUMENTATION L3,L4,L5,S1;  Surgeon: Joo Franklin MD;  Location:  [facility] OR;  Service: Orthopedic Spine   • ORIF FINGER / THUMB FRACTURE     • SHOULDER SURGERY Left        Current Outpatient Medications:   •  amLODIPine (NORVASC) 5 MG tablet, Take 5 mg by mouth 2 (Two) Times a Day., Disp: , Rfl:   •  ASPIRIN LOW DOSE 81 MG tablet, Take 1 tablet by mouth Daily. Last dose 10-21-18 (Patient taking differently: Take 81 mg by mouth Daily. Patient last dose 10-27-19 per patient's choice), Disp: , Rfl:   •  carvedilol (COREG) 6.25 MG tablet, Take 1 tablet by mouth 2 (Two) Times a Day With Meals., Disp: 90 tablet, Rfl: 3  •  citalopram (CeleXA) 20 MG tablet, Take 40 mg by mouth Daily., Disp: , Rfl:   •  ferrous gluconate (FERGON) 324 MG tablet, Take 1 tablet by mouth Daily With Breakfast. (Patient taking differently: Take 324 mg by mouth 2 (Two) Times a Day.), Disp: 30  tablet, Rfl: 0  •  furosemide (LASIX) 20 MG tablet, Take 20-40 mg by mouth Every Morning., Disp: , Rfl:   •  Multiple Vitamin (MULTI-VITAMIN DAILY) tablet, Take 1 tablet by mouth Daily., Disp: , Rfl:   •  O2 (OXYGEN), Inhale 2 L/min 1 (One) Time., Disp: , Rfl:   •  pantoprazole (PROTONIX) 40 MG EC tablet, Take 40 mg by mouth 2 (Two) Times a Day., Disp: , Rfl:   •  saccharomyces boulardii (FLORASTOR) 250 MG capsule, Take 1 capsule by mouth 2 (Two) Times a Day., Disp: 20 capsule, Rfl: 0  •  sucralfate (CARAFATE) 1 g tablet, Take 1 tablet by mouth 4 (Four) Times a Day., Disp: 60 tablet, Rfl: 1  •  tamsulosin (FLOMAX) 0.4 MG capsule 24 hr capsule, Take 1 capsule by mouth Daily., Disp: 15 capsule, Rfl: 0  •  TRELEGY ELLIPTA 100-62.5-25 MCG/INH aerosol powder , INHALE 1 PUFF BY MOUTH ONCE DAILY (SWISH WITH WATER  AND  SPIT  AFTER  EACH  USE), Disp: 60 each, Rfl: 2  •  lactulose (CHRONULAC) 10 GM/15ML solution, Take 15 ml q 6 hours as needed for constipation, Disp: 1892 mL, Rfl: 3  •  raNITIdine (ZANTAC) 75 MG tablet, Take 75 mg by mouth Daily As Needed for Indigestion or Heartburn., Disp: , Rfl:   Allergies   Allergen Reactions   • Ferrous Gluconate [Ferrous Sulfate] Rash   • Penicillins Hives     Hives - has tolerated Zosyn and ceftriaxone 2019   • Percocet [Oxycodone-Acetaminophen] Confusion     Social History     Socioeconomic History   • Marital status:      Spouse name: Not on file   • Number of children: 2   • Years of education: Not on file   • Highest education level: Not on file   Occupational History   • Occupation: DISABLED      Comment: Back   Tobacco Use   • Smoking status: Former Smoker     Packs/day: 1.00     Years: 30.00     Pack years: 30.00     Types: Cigarettes     Last attempt to quit: 2011     Years since quittin.0   • Smokeless tobacco: Former User     Types: Chew     Quit date: 2011   Substance and Sexual Activity   • Alcohol use: No   • Drug use: No   • Sexual  activity: Defer   Social History Narrative    Lives in [place].    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 Negative for diaphoresis, malaise/fatigue and weight loss.   HENT: Positive for hearing loss. Negative for congestion, hoarse voice and sore throat.    Eyes: Negative for blurred vision and double vision.   Cardiovascular: Positive for dyspnea on exertion. Negative for chest pain, claudication, leg swelling, palpitations and syncope.   Respiratory: Positive for cough and shortness of breath. Negative for hemoptysis and wheezing.    Hematologic/Lymphatic: Bruises/bleeds easily.   Skin: Negative for itching, poor wound healing and rash.   Musculoskeletal: Positive for back pain, joint pain and muscle cramps. Negative for arthritis, falls, joint swelling, muscle weakness and neck pain.   Gastrointestinal: Positive for nausea and vomiting. Negative for abdominal pain, constipation, diarrhea and hematemesis.   Genitourinary: Positive for frequency. Negative for dysuria, hematuria, hesitancy, incomplete emptying and urgency.   Neurological: Positive for loss of balance. Negative for dizziness, headaches, light-headedness, numbness and vertigo.   Psychiatric/Behavioral: Positive for depression. Negative for memory loss and substance abuse. The patient is nervous/anxious.    Allergic/Immunologic: Negative for environmental allergies and HIV exposure.     Vitals:    01/17/20 0855   BP: 118/50  "  Pulse: 76   SpO2: 96%   Weight: 106 kg (234 lb)   Height: 188 cm (74\")      Physical Exam  LUNGS:   Clear.  CARDIOVASCULAR:  Regular rhythm and rate.  GI:    Abdomen is soft and non-tender.    Assessment/Plan:  Mr. Kelsey returns today for follow up of his aortic aneurysm. I have obtained and reviewed his CT scan which appears to be satisfactory at this point.  I will see him back in one year with an abdominal ultrasound.    Patient Active Problem List   Diagnosis   • Anxiety   • Abdominal aortic aneurysm (S/P Endovascular repair)   • Depression   • Hypertension   • Cataract, bilateral   • CAD (coronary artery disease)   • Abnormal stress test   • Moderate COPD   • Former smoker   • Coal workers pneumoconiosis, simple   • Back pain   • S/P lumbar fusion   • Prediabetes   • Acute blood loss anemia, mild, asymptomatic   • Acute postoperative pain   • Hyponatremia, mild   • Chronic respiratory failure with hypoxia and hypercapnia (CMS/HCC)   • Renal insufficiency   • Acute cystitis without hematuria   • Acute UTI   • Sepsis (CMS/HCC)   • Bacteremia   • Anemia     CC: MD Claudia Cross transcribing on behalf of Leopoldo Burleson MD dictating.   "

## 2020-01-20 DIAGNOSIS — K29.00 ACUTE SUPERFICIAL GASTRITIS WITHOUT HEMORRHAGE: ICD-10-CM

## 2020-01-20 DIAGNOSIS — R93.89 ABNORMAL RADIOGRAPHIC EXAMINATION: Primary | ICD-10-CM

## 2020-01-20 RX ORDER — SUCRALFATE 1 G/1
1 TABLET ORAL 4 TIMES DAILY
Qty: 60 TABLET | Refills: 1 | Status: SHIPPED | OUTPATIENT
Start: 2020-01-20 | End: 2020-04-07

## 2020-01-20 NOTE — TELEPHONE ENCOUNTER
Dr Viveros,  Wife called for Carafate refill. Pharmacy gave her 120 tablet on 12/18/2020 with no refills. Thanks

## 2020-01-23 ENCOUNTER — APPOINTMENT (OUTPATIENT)
Dept: CT IMAGING | Facility: HOSPITAL | Age: 70
End: 2020-01-23

## 2020-01-23 ENCOUNTER — HOSPITAL ENCOUNTER (OUTPATIENT)
Dept: CT IMAGING | Facility: HOSPITAL | Age: 70
Discharge: HOME | End: 2020-01-23
Admitting: INTERNAL MEDICINE

## 2020-01-23 DIAGNOSIS — R93.89 ABNORMAL RADIOGRAPHIC EXAMINATION: ICD-10-CM

## 2020-01-23 LAB — CREAT BLDA-MCNC: 0.9 MG/DL (ref 0.6–1.3)

## 2020-01-23 PROCEDURE — 82565 ASSAY OF CREATININE: CPT

## 2020-01-23 PROCEDURE — 0 IOPAMIDOL PER 1 ML: Performed by: INTERNAL MEDICINE

## 2020-01-23 PROCEDURE — 63710000001 BARIUM 2 % SUSPENSION: Performed by: INTERNAL MEDICINE

## 2020-01-23 PROCEDURE — A9270 NON-COVERED ITEM OR SERVICE: HCPCS | Performed by: INTERNAL MEDICINE

## 2020-01-23 PROCEDURE — 74170 CT ABD WO CNTRST FLWD CNTRST: CPT

## 2020-01-23 RX ADMIN — IOPAMIDOL 99 ML: 755 INJECTION, SOLUTION INTRAVENOUS at 16:38

## 2020-01-23 RX ADMIN — BARIUM SULFATE 450 ML: 21 SUSPENSION ORAL at 15:05

## 2020-01-27 ENCOUNTER — TELEPHONE (OUTPATIENT)
Dept: GASTROENTEROLOGY | Facility: CLINIC | Age: 70
End: 2020-01-27

## 2020-01-27 NOTE — TELEPHONE ENCOUNTER
----- Message from Homar Viveros MD sent at 1/25/2020  1:14 PM EST -----  I am happy to report that your liver lesion appears to be a benign liver cyst measuring 1.7 cm.  This is great news.

## 2020-01-27 NOTE — TELEPHONE ENCOUNTER
Called patient and gave result regarding liver lesion. Patient voiced understanding of results and also reminded patient of March appt. Patient voiced understanding

## 2020-02-13 ENCOUNTER — TELEPHONE (OUTPATIENT)
Dept: PULMONOLOGY | Facility: CLINIC | Age: 70
End: 2020-02-13

## 2020-02-13 DIAGNOSIS — J44.9 COPD MIXED TYPE (HCC): Primary | ICD-10-CM

## 2020-02-24 ENCOUNTER — TELEPHONE (OUTPATIENT)
Dept: GASTROENTEROLOGY | Facility: CLINIC | Age: 70
End: 2020-02-24

## 2020-02-24 NOTE — TELEPHONE ENCOUNTER
PT CALLED REGARDING CARAFATE; PT IS OUT AND WANTED TO KNOW IF HE IS TO CONTINUE. IF SO HE WILL NEED A REFILL. ADVISED I WOULD SEND A MESSAGE TO DR. ENCARNACION. PT VOICED UNDERSTANDING WITH NO FURTHER QUESTIONS OR CONCERNS.

## 2020-02-24 NOTE — TELEPHONE ENCOUNTER
Got the message. I recommend to stop taking carafate at this time and to monitor symptoms to see if he will need it in the future.  This medicine coats irritation, so the idea is to eliminate the irritation and not use this any more if possible.

## 2020-03-09 ENCOUNTER — TELEPHONE (OUTPATIENT)
Dept: GASTROENTEROLOGY | Facility: CLINIC | Age: 70
End: 2020-03-09

## 2020-03-09 NOTE — TELEPHONE ENCOUNTER
I spoke with patient with patient this morning. He stated he is having some nausea after he eats, feels like he has knots in his stomach, weakness, fever of 101.0, constipation. He took his Lactulose and finally had bowel movement yesterday. I advised patient to go to the ER. Patient voiced understanding.

## 2020-04-06 DIAGNOSIS — K29.00 ACUTE SUPERFICIAL GASTRITIS WITHOUT HEMORRHAGE: ICD-10-CM

## 2020-04-07 RX ORDER — CARVEDILOL 6.25 MG/1
TABLET ORAL
Qty: 90 TABLET | Refills: 0 | Status: SHIPPED | OUTPATIENT
Start: 2020-04-07 | End: 2020-06-14 | Stop reason: SDUPTHER

## 2020-04-07 RX ORDER — SUCRALFATE 1 G/1
TABLET ORAL
Qty: 120 TABLET | Refills: 0 | Status: SHIPPED | OUTPATIENT
Start: 2020-04-07 | End: 2020-06-05

## 2020-05-13 DIAGNOSIS — J44.9 COPD MIXED TYPE (HCC): ICD-10-CM

## 2020-05-14 ENCOUNTER — TELEPHONE (OUTPATIENT)
Dept: GASTROENTEROLOGY | Facility: CLINIC | Age: 70
End: 2020-05-14

## 2020-05-15 DIAGNOSIS — K21.9 GASTROESOPHAGEAL REFLUX DISEASE, ESOPHAGITIS PRESENCE NOT SPECIFIED: Primary | ICD-10-CM

## 2020-05-15 RX ORDER — FAMOTIDINE 20 MG/1
20 TABLET, FILM COATED ORAL 2 TIMES DAILY
Qty: 90 TABLET | Refills: 3 | Status: SHIPPED | OUTPATIENT
Start: 2020-05-15 | End: 2020-06-18

## 2020-06-05 ENCOUNTER — TELEMEDICINE (OUTPATIENT)
Dept: GASTROENTEROLOGY | Facility: CLINIC | Age: 70
End: 2020-06-05

## 2020-06-05 DIAGNOSIS — K74.60 HEPATIC CIRRHOSIS, UNSPECIFIED HEPATIC CIRRHOSIS TYPE, UNSPECIFIED WHETHER ASCITES PRESENT (HCC): Primary | ICD-10-CM

## 2020-06-05 DIAGNOSIS — K29.00 ACUTE SUPERFICIAL GASTRITIS WITHOUT HEMORRHAGE: ICD-10-CM

## 2020-06-05 PROCEDURE — 99214 OFFICE O/P EST MOD 30 MIN: CPT | Performed by: INTERNAL MEDICINE

## 2020-06-05 RX ORDER — SUCRALFATE 1 G/1
TABLET ORAL
Qty: 240 TABLET | Refills: 3 | Status: SHIPPED | OUTPATIENT
Start: 2020-06-05 | End: 2020-11-04 | Stop reason: SDUPTHER

## 2020-06-05 NOTE — PROGRESS NOTES
"PCP: Raudel Zheng MD    No chief complaint on file.  cc: f/u cirrhosis    You have chosen to receive care through a telehealth visit.  Do you consent to use a video/audio connection for your medical care today? Yes      History of Present Illness:   Derek Kelsey is a 69 y.o. male who presents to GI clinic via telemedicine as a f/u for abdominal pain and cirrhosis.  He reports having a fever in march. He developed acute on chronic abdominal pain and his wife held protonix.  He has done better but still has \"gnawing, achy\" epigastric pain that is intermittent.  He is taking pepcid as needed. He has not gained wt or develped melena. No worsening confusion.    Past Medical History:   Diagnosis Date   • Abdominal aortic aneurysm (CMS/HCC) 9/29/2016   • Anxiety 9/29/2016   • Arthritis    • Back pain    • Black lung (CMS/HCC)    • CAD (coronary artery disease) 9/29/2016   • Cirrhosis (CMS/HCC)    • COPD (chronic obstructive pulmonary disease) (CMS/[facility]) 9/29/2016   • Depression 9/29/2016   • Depression    • GERD (gastroesophageal reflux disease)    • Hypertension 9/29/2016   • On supplemental oxygen by nasal cannula     at night   • Vitiligo    • Wears dentures    • Wears reading eyeglasses        Past Surgical History:   Procedure Laterality Date   • ABDOMINAL AORTIC ANEURYSM REPAIR WITH ENDOGRAFT N/A 6/6/2019    Procedure: ABDOMINAL AORTIC ANEURYSM REPAIR WITH ENDOGRAFT;  Surgeon: Leopoldo Burleson MD;  Location: [facility] HYBRID OR 15;  Service: Vascular   • BACK SURGERY     • CARDIAC CATHETERIZATION     • CARDIAC CATHETERIZATION N/A 9/28/2018    Procedure: Left Heart Cath;  Surgeon: Douglas Galvez MD;  Location:  [facility] CATH INVASIVE LOCATION;  Service: Cardiovascular   • COLONOSCOPY      2015   • COLONOSCOPY N/A 10/30/2019    Procedure: COLONOSCOPY;  Surgeon: Homar Viveros MD;  Location:  [facility] ENDOSCOPY;  Service: Gastroenterology   • ENDOSCOPY N/A 10/30/2019    Procedure: " ESOPHAGOGASTRODUODENOSCOPY;  Surgeon: Homar Viveros MD;  Location:  [facility] ENDOSCOPY;  Service: Gastroenterology   • EYE SURGERY      cataracts bilateral   • HAND SURGERY Left    • LUMBAR DISCECTOMY FUSION INSTRUMENTATION N/A 11/1/2018    Procedure: LUMBAR DISCECTOMY POSTERIOR WITH FUSION INSTRUMENTATION;  Surgeon: Joo Franklin MD;  Location:  [facility] OR;  Service: Orthopedic Spine   • LUMBAR DISCECTOMY FUSION INSTRUMENTATION N/A 7/24/2019    Procedure: POSTERIOR LUMBAR SPINAL FUSION REVISION AND INSTRUMENTATION L3,L4,L5,S1;  Surgeon: Joo Franklin MD;  Location:  [facility] OR;  Service: Orthopedic Spine   • ORIF FINGER / THUMB FRACTURE     • SHOULDER SURGERY Left          Current Outpatient Medications:   •  amLODIPine (NORVASC) 5 MG tablet, Take 5 mg by mouth 2 (Two) Times a Day., Disp: , Rfl:   •  ASPIRIN LOW DOSE 81 MG tablet, Take 1 tablet by mouth Daily. Last dose 10-21-18 (Patient taking differently: Take 81 mg by mouth Daily. Patient last dose 10-27-19 per patient's choice), Disp: , Rfl:   •  carvedilol (COREG) 6.25 MG tablet, TAKE 1 TABLET BY MOUTH TWICE DAILY WITH MEALS, Disp: 90 tablet, Rfl: 0  •  citalopram (CeleXA) 20 MG tablet, Take 40 mg by mouth Daily., Disp: , Rfl:   •  famotidine (Pepcid) 20 MG tablet, Take 1 tablet by mouth 2 (Two) Times a Day. Indications: Gastroesophageal Reflux Disease, Disp: 90 tablet, Rfl: 3  •  ferrous gluconate (FERGON) 324 MG tablet, Take 1 tablet by mouth Daily With Breakfast. (Patient taking differently: Take 324 mg by mouth 2 (Two) Times a Day.), Disp: 30 tablet, Rfl: 0  •  furosemide (LASIX) 20 MG tablet, Take 20-40 mg by mouth Every Morning., Disp: , Rfl:   •  lactulose (CHRONULAC) 10 GM/15ML solution, Take 15 ml q 6 hours as needed for constipation, Disp: 1892 mL, Rfl: 3  •  Multiple Vitamin (MULTI-VITAMIN DAILY) tablet, Take 1 tablet by mouth Daily., Disp: , Rfl:   •  O2 (OXYGEN), Inhale 2 L/min 1 (One) Time., Disp: , Rfl:   •  pantoprazole (PROTONIX) 40 MG  EC tablet, Take 40 mg by mouth 2 (Two) Times a Day., Disp: , Rfl:   •  raNITIdine (ZANTAC) 75 MG tablet, Take 75 mg by mouth Daily As Needed for Indigestion or Heartburn., Disp: , Rfl:   •  saccharomyces boulardii (FLORASTOR) 250 MG capsule, Take 1 capsule by mouth 2 (Two) Times a Day., Disp: 20 capsule, Rfl: 0  •  sucralfate (CARAFATE) 1 g tablet, Take 1 tablet by mouth 4 times daily, Disp: 120 tablet, Rfl: 0  •  tamsulosin (FLOMAX) 0.4 MG capsule 24 hr capsule, Take 1 capsule by mouth Daily., Disp: 15 capsule, Rfl: 0  •  TRELEGY ELLIPTA 100-62.5-25 MCG/INH aerosol powder , INHALE 1 PUFF ONCE DAILY, Disp: 60 each, Rfl: 0    Allergies   Allergen Reactions   • Ferrous Gluconate [Ferrous Sulfate] Rash   • Penicillins Hives     Hives - has tolerated Zosyn and ceftriaxone 2019   • Percocet [Oxycodone-Acetaminophen] Confusion       Family History   Problem Relation Age of Onset   • Stroke Mother    • Hypertension Mother    • Heart disease Mother    • Heart disease Father    • Hypertension Father    • Diabetes Sister    • Hypertension Sister    • Diabetes Brother    • Hypertension Child        Social History     Socioeconomic History   • Marital status:      Spouse name: Not on file   • Number of children: 2   • Years of education: Not on file   • Highest education level: Not on file   Occupational History   • Occupation: DISABLED      Comment: Back   Tobacco Use   • Smoking status: Former Smoker     Packs/day: 1.00     Years: 30.00     Pack years: 30.00     Types: Cigarettes     Last attempt to quit: 2011     Years since quittin.4   • Smokeless tobacco: Former User     Types: Chew     Quit date: 2011   Substance and Sexual Activity   • Alcohol use: No   • Drug use: No   • Sexual activity: Defer   Social History Narrative    Lives in [place].    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  In particular:  No fever  No rash  No increased arthralgias  No worsening edema  No cough  No dyspnea  No chest pain    There were no vitals filed for this visit.    Physical Exam  Unable to perform due to technical failure    Assessment/Plan  1.) Abnormal finding on radiologic exam, 16 mm liver lesion and nodular contour  Cr. 0.8. CT liver 10/2019shows a lesion similar in quality from 2014, stable in size. Suspect benign. Did not meet lrads criteria for hcc. Will plan ultrasound 7/2020    2.) Cirrhosis  Criteria: nodular appearing liver, low platelets, g2 ev on egd  Etiology: NAFLD  No overt bleeding, no decompensating events  Plan:  10% wt loss, optimization of dm2  Ultrasound 4/2019  MELD labs  comorbidities may eliminate liver transplant candidacy.  G2ev: coreg    3.) Anemia, hg 7 without overt gib loss  Recent egd/colonoscopy showing gastritis, severe phg and a few polyps.  Will start continue carafate and restart protonix in the am. PCP to continue supplementing nutrients for production.    4.) AAA repair, chronic respiratory failure  Cases will have to be done in the hospital    5.) fatigue, pale upon standing  I provided education to them to be careful when standing from a lying down position on coreg with anemia.    rtc in 3 months with u/s abdomen and meld labs      Homar Viveros MD  6/5/2020

## 2020-06-10 ENCOUNTER — RESULTS ENCOUNTER (OUTPATIENT)
Dept: GASTROENTEROLOGY | Facility: CLINIC | Age: 70
End: 2020-06-10

## 2020-06-10 DIAGNOSIS — K74.60 HEPATIC CIRRHOSIS, UNSPECIFIED HEPATIC CIRRHOSIS TYPE, UNSPECIFIED WHETHER ASCITES PRESENT (HCC): ICD-10-CM

## 2020-06-15 RX ORDER — CARVEDILOL 6.25 MG/1
6.25 TABLET ORAL 2 TIMES DAILY WITH MEALS
Qty: 90 TABLET | Refills: 0 | Status: SHIPPED | OUTPATIENT
Start: 2020-06-15 | End: 2020-07-01

## 2020-06-18 ENCOUNTER — HOSPITAL ENCOUNTER (INPATIENT)
Facility: HOSPITAL | Age: 70
LOS: 6 days | Discharge: HOME HEALTH | End: 2020-06-24
Attending: EMERGENCY MEDICINE | Admitting: INTERNAL MEDICINE

## 2020-06-18 ENCOUNTER — LAB REQUISITION (OUTPATIENT)
Dept: LAB | Facility: HOSPITAL | Age: 70
End: 2020-06-18

## 2020-06-18 ENCOUNTER — APPOINTMENT (OUTPATIENT)
Dept: CT IMAGING | Facility: HOSPITAL | Age: 70
End: 2020-06-18

## 2020-06-18 ENCOUNTER — LAB (OUTPATIENT)
Dept: LAB | Facility: HOSPITAL | Age: 70
End: 2020-06-18

## 2020-06-18 ENCOUNTER — HOSPITAL ENCOUNTER (OUTPATIENT)
Dept: ULTRASOUND IMAGING | Facility: HOSPITAL | Age: 70
Discharge: HOME | End: 2020-06-18
Admitting: INTERNAL MEDICINE

## 2020-06-18 DIAGNOSIS — R50.9 ACUTE FEBRILE ILLNESS: ICD-10-CM

## 2020-06-18 DIAGNOSIS — R78.81 BACTEREMIA: ICD-10-CM

## 2020-06-18 DIAGNOSIS — K74.60 HEPATIC CIRRHOSIS, UNSPECIFIED HEPATIC CIRRHOSIS TYPE, UNSPECIFIED WHETHER ASCITES PRESENT (HCC): ICD-10-CM

## 2020-06-18 DIAGNOSIS — Z00.00 ROUTINE GENERAL MEDICAL EXAMINATION AT A HEALTH CARE FACILITY: ICD-10-CM

## 2020-06-18 DIAGNOSIS — Z78.9 IMPAIRED MOBILITY AND ADLS: ICD-10-CM

## 2020-06-18 DIAGNOSIS — G89.18 ACUTE POSTOPERATIVE PAIN: ICD-10-CM

## 2020-06-18 DIAGNOSIS — I71.40 ABDOMINAL AORTIC ANEURYSM (AAA) WITHOUT RUPTURE (HCC): ICD-10-CM

## 2020-06-18 DIAGNOSIS — J96.12 CHRONIC RESPIRATORY FAILURE WITH HYPOXIA AND HYPERCAPNIA (HCC): ICD-10-CM

## 2020-06-18 DIAGNOSIS — J44.9 COPD MIXED TYPE (HCC): ICD-10-CM

## 2020-06-18 DIAGNOSIS — R10.30 LOWER ABDOMINAL PAIN: ICD-10-CM

## 2020-06-18 DIAGNOSIS — J60 COAL WORKERS PNEUMOCONIOSIS (HCC): ICD-10-CM

## 2020-06-18 DIAGNOSIS — Z74.09 IMPAIRED MOBILITY AND ADLS: ICD-10-CM

## 2020-06-18 DIAGNOSIS — J96.11 CHRONIC RESPIRATORY FAILURE WITH HYPOXIA AND HYPERCAPNIA (HCC): ICD-10-CM

## 2020-06-18 DIAGNOSIS — I77.6 AORTITIS (HCC): Primary | ICD-10-CM

## 2020-06-18 PROBLEM — R79.82 ELEVATED C-REACTIVE PROTEIN (CRP): Status: ACTIVE | Noted: 2020-06-18

## 2020-06-18 PROBLEM — E87.29 HIGH ANION GAP METABOLIC ACIDOSIS: Status: ACTIVE | Noted: 2020-06-18

## 2020-06-18 LAB
ALBUMIN SERPL-MCNC: 3.9 G/DL (ref 3.5–5.2)
ALBUMIN SERPL-MCNC: 4.1 G/DL (ref 3.5–5.2)
ALBUMIN/GLOB SERPL: 0.7 G/DL
ALBUMIN/GLOB SERPL: 0.9 G/DL
ALP SERPL-CCNC: 135 U/L (ref 39–117)
ALP SERPL-CCNC: 145 U/L (ref 39–117)
ALT SERPL W P-5'-P-CCNC: 35 U/L (ref 1–41)
ALT SERPL W P-5'-P-CCNC: 37 U/L (ref 1–41)
ANION GAP SERPL CALCULATED.3IONS-SCNC: 16 MMOL/L (ref 5–15)
ANION GAP SERPL CALCULATED.3IONS-SCNC: 17 MMOL/L (ref 5–15)
AST SERPL-CCNC: 67 U/L (ref 1–40)
AST SERPL-CCNC: 74 U/L (ref 1–40)
BACTERIA UR QL AUTO: ABNORMAL /HPF
BASOPHILS # BLD AUTO: 0.03 10*3/MM3 (ref 0–0.2)
BASOPHILS # BLD AUTO: 0.03 10*3/MM3 (ref 0–0.2)
BASOPHILS NFR BLD AUTO: 0.4 % (ref 0–1.5)
BASOPHILS NFR BLD AUTO: 0.4 % (ref 0–1.5)
BILIRUB SERPL-MCNC: 1.1 MG/DL (ref 0.2–1.2)
BILIRUB SERPL-MCNC: 1.3 MG/DL (ref 0.2–1.2)
BILIRUB UR QL STRIP: NEGATIVE
BUN BLD-MCNC: 15 MG/DL (ref 8–23)
BUN BLD-MCNC: 15 MG/DL (ref 8–23)
BUN/CREAT SERPL: 15.8 (ref 7–25)
BUN/CREAT SERPL: 16 (ref 7–25)
CALCIUM SPEC-SCNC: 9.1 MG/DL (ref 8.6–10.5)
CALCIUM SPEC-SCNC: 9.3 MG/DL (ref 8.6–10.5)
CHLORIDE SERPL-SCNC: 95 MMOL/L (ref 98–107)
CHLORIDE SERPL-SCNC: 99 MMOL/L (ref 98–107)
CLARITY UR: CLEAR
CO2 SERPL-SCNC: 21 MMOL/L (ref 22–29)
CO2 SERPL-SCNC: 22 MMOL/L (ref 22–29)
COLOR UR: YELLOW
CREAT BLD-MCNC: 0.94 MG/DL (ref 0.76–1.27)
CREAT BLD-MCNC: 0.95 MG/DL (ref 0.76–1.27)
CRP SERPL-MCNC: 9.61 MG/DL (ref 0–0.5)
D-LACTATE SERPL-SCNC: 2.3 MMOL/L (ref 0.5–2)
D-LACTATE SERPL-SCNC: 2.4 MMOL/L (ref 0.5–2)
DEPRECATED RDW RBC AUTO: 65.3 FL (ref 37–54)
DEPRECATED RDW RBC AUTO: 67.5 FL (ref 37–54)
EOSINOPHIL # BLD AUTO: 0.01 10*3/MM3 (ref 0–0.4)
EOSINOPHIL # BLD AUTO: 0.01 10*3/MM3 (ref 0–0.4)
EOSINOPHIL NFR BLD AUTO: 0.1 % (ref 0.3–6.2)
EOSINOPHIL NFR BLD AUTO: 0.1 % (ref 0.3–6.2)
ERYTHROCYTE [DISTWIDTH] IN BLOOD BY AUTOMATED COUNT: 20.2 % (ref 12.3–15.4)
ERYTHROCYTE [DISTWIDTH] IN BLOOD BY AUTOMATED COUNT: 20.3 % (ref 12.3–15.4)
FERRITIN SERPL-MCNC: 249.1 NG/ML (ref 30–400)
GFR SERPL CREATININE-BSD FRML MDRD: 79 ML/MIN/1.73
GFR SERPL CREATININE-BSD FRML MDRD: 80 ML/MIN/1.73
GLOBULIN UR ELPH-MCNC: 4.3 GM/DL
GLOBULIN UR ELPH-MCNC: 5.5 GM/DL
GLUCOSE BLD-MCNC: 113 MG/DL (ref 65–99)
GLUCOSE BLD-MCNC: 122 MG/DL (ref 65–99)
GLUCOSE UR STRIP-MCNC: NEGATIVE MG/DL
HCT VFR BLD AUTO: 39.8 % (ref 37.5–51)
HCT VFR BLD AUTO: 40.6 % (ref 37.5–51)
HGB BLD-MCNC: 12 G/DL (ref 13–17.7)
HGB BLD-MCNC: 12.8 G/DL (ref 13–17.7)
HGB UR QL STRIP.AUTO: ABNORMAL
HOLD SPECIMEN: NORMAL
HOLD SPECIMEN: NORMAL
HYALINE CASTS UR QL AUTO: ABNORMAL /LPF
IMM GRANULOCYTES # BLD AUTO: 0.02 10*3/MM3 (ref 0–0.05)
IMM GRANULOCYTES # BLD AUTO: 0.04 10*3/MM3 (ref 0–0.05)
IMM GRANULOCYTES NFR BLD AUTO: 0.3 % (ref 0–0.5)
IMM GRANULOCYTES NFR BLD AUTO: 0.5 % (ref 0–0.5)
INR PPP: 1.29 (ref 0.85–1.16)
KETONES UR QL STRIP: ABNORMAL
LACTATE HOLD SPECIMEN: NORMAL
LDH SERPL-CCNC: 226 U/L (ref 135–225)
LEUKOCYTE ESTERASE UR QL STRIP.AUTO: NEGATIVE
LIPASE SERPL-CCNC: 33 U/L (ref 13–60)
LYMPHOCYTES # BLD AUTO: 0.99 10*3/MM3 (ref 0.7–3.1)
LYMPHOCYTES # BLD AUTO: 1.29 10*3/MM3 (ref 0.7–3.1)
LYMPHOCYTES NFR BLD AUTO: 13.3 % (ref 19.6–45.3)
LYMPHOCYTES NFR BLD AUTO: 15.5 % (ref 19.6–45.3)
MCH RBC QN AUTO: 27.3 PG (ref 26.6–33)
MCH RBC QN AUTO: 28.2 PG (ref 26.6–33)
MCHC RBC AUTO-ENTMCNC: 30.2 G/DL (ref 31.5–35.7)
MCHC RBC AUTO-ENTMCNC: 31.5 G/DL (ref 31.5–35.7)
MCV RBC AUTO: 89.4 FL (ref 79–97)
MCV RBC AUTO: 90.5 FL (ref 79–97)
MONOCYTES # BLD AUTO: 0.88 10*3/MM3 (ref 0.1–0.9)
MONOCYTES # BLD AUTO: 1 10*3/MM3 (ref 0.1–0.9)
MONOCYTES NFR BLD AUTO: 11.8 % (ref 5–12)
MONOCYTES NFR BLD AUTO: 12 % (ref 5–12)
NEUTROPHILS # BLD AUTO: 5.53 10*3/MM3 (ref 1.7–7)
NEUTROPHILS # BLD AUTO: 5.97 10*3/MM3 (ref 1.7–7)
NEUTROPHILS NFR BLD AUTO: 71.5 % (ref 42.7–76)
NEUTROPHILS NFR BLD AUTO: 74.1 % (ref 42.7–76)
NITRITE UR QL STRIP: NEGATIVE
NRBC BLD AUTO-RTO: 0 /100 WBC (ref 0–0.2)
NRBC BLD AUTO-RTO: 0 /100 WBC (ref 0–0.2)
PH UR STRIP.AUTO: 5.5 [PH] (ref 5–8)
PLAT MORPH BLD: NORMAL
PLATELET # BLD AUTO: 156 10*3/MM3 (ref 140–450)
PLATELET # BLD AUTO: 161 10*3/MM3 (ref 140–450)
PMV BLD AUTO: 11.2 FL (ref 6–12)
PMV BLD AUTO: 9.7 FL (ref 6–12)
POTASSIUM BLD-SCNC: 3.9 MMOL/L (ref 3.5–5.2)
POTASSIUM BLD-SCNC: 4.7 MMOL/L (ref 3.5–5.2)
PROCALCITONIN SERPL-MCNC: 0.21 NG/ML (ref 0.1–0.25)
PROT SERPL-MCNC: 8.2 G/DL (ref 6–8.5)
PROT SERPL-MCNC: 9.6 G/DL (ref 6–8.5)
PROT UR QL STRIP: ABNORMAL
PROTHROMBIN TIME: 15.8 SECONDS (ref 11.5–14)
RBC # BLD AUTO: 4.4 10*6/MM3 (ref 4.14–5.8)
RBC # BLD AUTO: 4.54 10*6/MM3 (ref 4.14–5.8)
RBC # UR: ABNORMAL /HPF
REF LAB TEST METHOD: ABNORMAL
SODIUM BLD-SCNC: 134 MMOL/L (ref 136–145)
SODIUM BLD-SCNC: 136 MMOL/L (ref 136–145)
SP GR UR STRIP: 1.02 (ref 1–1.03)
SPHEROCYTES BLD QL SMEAR: NORMAL
SQUAMOUS #/AREA URNS HPF: ABNORMAL /HPF
UROBILINOGEN UR QL STRIP: ABNORMAL
WBC MORPH BLD: NORMAL
WBC NRBC COR # BLD: 7.46 10*3/MM3 (ref 3.4–10.8)
WBC NRBC COR # BLD: 8.34 10*3/MM3 (ref 3.4–10.8)
WBC UR QL AUTO: ABNORMAL /HPF
WHOLE BLOOD HOLD SPECIMEN: NORMAL
WHOLE BLOOD HOLD SPECIMEN: NORMAL

## 2020-06-18 PROCEDURE — 25010000002 HYDROMORPHONE PER 4 MG: Performed by: NURSE PRACTITIONER

## 2020-06-18 PROCEDURE — 99223 1ST HOSP IP/OBS HIGH 75: CPT | Performed by: INTERNAL MEDICINE

## 2020-06-18 PROCEDURE — 83605 ASSAY OF LACTIC ACID: CPT | Performed by: EMERGENCY MEDICINE

## 2020-06-18 PROCEDURE — 85025 COMPLETE CBC W/AUTO DIFF WBC: CPT | Performed by: EMERGENCY MEDICINE

## 2020-06-18 PROCEDURE — 86140 C-REACTIVE PROTEIN: CPT | Performed by: EMERGENCY MEDICINE

## 2020-06-18 PROCEDURE — 99285 EMERGENCY DEPT VISIT HI MDM: CPT

## 2020-06-18 PROCEDURE — 84145 PROCALCITONIN (PCT): CPT | Performed by: INTERNAL MEDICINE

## 2020-06-18 PROCEDURE — 85025 COMPLETE CBC W/AUTO DIFF WBC: CPT | Performed by: INTERNAL MEDICINE

## 2020-06-18 PROCEDURE — 71250 CT THORAX DX C-: CPT

## 2020-06-18 PROCEDURE — 81001 URINALYSIS AUTO W/SCOPE: CPT | Performed by: EMERGENCY MEDICINE

## 2020-06-18 PROCEDURE — 99284 EMERGENCY DEPT VISIT MOD MDM: CPT

## 2020-06-18 PROCEDURE — 82728 ASSAY OF FERRITIN: CPT | Performed by: EMERGENCY MEDICINE

## 2020-06-18 PROCEDURE — 83690 ASSAY OF LIPASE: CPT | Performed by: EMERGENCY MEDICINE

## 2020-06-18 PROCEDURE — 25010000002 MEROPENEM PER 100 MG: Performed by: EMERGENCY MEDICINE

## 2020-06-18 PROCEDURE — 85610 PROTHROMBIN TIME: CPT | Performed by: INTERNAL MEDICINE

## 2020-06-18 PROCEDURE — 25010000002 HEPARIN (PORCINE) PER 1000 UNITS: Performed by: NURSE PRACTITIONER

## 2020-06-18 PROCEDURE — 80053 COMPREHEN METABOLIC PANEL: CPT | Performed by: EMERGENCY MEDICINE

## 2020-06-18 PROCEDURE — 76700 US EXAM ABDOM COMPLETE: CPT

## 2020-06-18 PROCEDURE — 83615 LACTATE (LD) (LDH) ENZYME: CPT | Performed by: EMERGENCY MEDICINE

## 2020-06-18 PROCEDURE — 85007 BL SMEAR W/DIFF WBC COUNT: CPT | Performed by: EMERGENCY MEDICINE

## 2020-06-18 PROCEDURE — 87040 BLOOD CULTURE FOR BACTERIA: CPT | Performed by: EMERGENCY MEDICINE

## 2020-06-18 PROCEDURE — 74176 CT ABD & PELVIS W/O CONTRAST: CPT

## 2020-06-18 PROCEDURE — 80053 COMPREHEN METABOLIC PANEL: CPT | Performed by: INTERNAL MEDICINE

## 2020-06-18 PROCEDURE — U0002 COVID-19 LAB TEST NON-CDC: HCPCS | Performed by: EMERGENCY MEDICINE

## 2020-06-18 PROCEDURE — 25010000002 VANCOMYCIN 10 G RECONSTITUTED SOLUTION: Performed by: INTERNAL MEDICINE

## 2020-06-18 PROCEDURE — 93005 ELECTROCARDIOGRAM TRACING: CPT | Performed by: EMERGENCY MEDICINE

## 2020-06-18 RX ORDER — OXYCODONE HYDROCHLORIDE AND ACETAMINOPHEN 5; 325 MG/1; MG/1
1 TABLET ORAL EVERY 4 HOURS PRN
Status: DISCONTINUED | OUTPATIENT
Start: 2020-06-18 | End: 2020-06-23

## 2020-06-18 RX ORDER — SACCHAROMYCES BOULARDII 250 MG
250 CAPSULE ORAL 2 TIMES DAILY
Status: DISCONTINUED | OUTPATIENT
Start: 2020-06-18 | End: 2020-06-24 | Stop reason: HOSPADM

## 2020-06-18 RX ORDER — SODIUM CHLORIDE 9 MG/ML
75 INJECTION, SOLUTION INTRAVENOUS CONTINUOUS
Status: DISCONTINUED | OUTPATIENT
Start: 2020-06-18 | End: 2020-06-19

## 2020-06-18 RX ORDER — SODIUM CHLORIDE AND POTASSIUM CHLORIDE 150; 900 MG/100ML; MG/100ML
75 INJECTION, SOLUTION INTRAVENOUS CONTINUOUS
Status: DISCONTINUED | OUTPATIENT
Start: 2020-06-18 | End: 2020-06-18

## 2020-06-18 RX ORDER — CHOLECALCIFEROL (VITAMIN D3) 125 MCG
5 CAPSULE ORAL NIGHTLY PRN
Status: DISCONTINUED | OUTPATIENT
Start: 2020-06-18 | End: 2020-06-24 | Stop reason: HOSPADM

## 2020-06-18 RX ORDER — NALOXONE HCL 0.4 MG/ML
0.4 VIAL (ML) INJECTION
Status: DISCONTINUED | OUTPATIENT
Start: 2020-06-18 | End: 2020-06-21

## 2020-06-18 RX ORDER — SODIUM CHLORIDE 0.9 % (FLUSH) 0.9 %
10 SYRINGE (ML) INJECTION AS NEEDED
Status: DISCONTINUED | OUTPATIENT
Start: 2020-06-18 | End: 2020-06-24 | Stop reason: HOSPADM

## 2020-06-18 RX ORDER — AMLODIPINE BESYLATE 5 MG/1
5 TABLET ORAL 2 TIMES DAILY
Status: DISCONTINUED | OUTPATIENT
Start: 2020-06-18 | End: 2020-06-24 | Stop reason: HOSPADM

## 2020-06-18 RX ORDER — TAMSULOSIN HYDROCHLORIDE 0.4 MG/1
0.4 CAPSULE ORAL DAILY
Status: DISCONTINUED | OUTPATIENT
Start: 2020-06-19 | End: 2020-06-24 | Stop reason: HOSPADM

## 2020-06-18 RX ORDER — ASPIRIN 81 MG/1
81 TABLET, CHEWABLE ORAL DAILY
Status: DISCONTINUED | OUTPATIENT
Start: 2020-06-19 | End: 2020-06-24 | Stop reason: HOSPADM

## 2020-06-18 RX ORDER — CARVEDILOL 6.25 MG/1
6.25 TABLET ORAL 2 TIMES DAILY WITH MEALS
Status: DISCONTINUED | OUTPATIENT
Start: 2020-06-18 | End: 2020-06-24 | Stop reason: HOSPADM

## 2020-06-18 RX ORDER — ONDANSETRON 2 MG/ML
4 INJECTION INTRAMUSCULAR; INTRAVENOUS EVERY 6 HOURS PRN
Status: DISCONTINUED | OUTPATIENT
Start: 2020-06-18 | End: 2020-06-24 | Stop reason: HOSPADM

## 2020-06-18 RX ORDER — TRIAMCINOLONE ACETONIDE 1 MG/G
1 CREAM TOPICAL 2 TIMES DAILY
COMMUNITY
End: 2021-03-17 | Stop reason: HOSPADM

## 2020-06-18 RX ORDER — LACTULOSE 10 G/15ML
10 SOLUTION ORAL 4 TIMES DAILY PRN
Status: DISCONTINUED | OUTPATIENT
Start: 2020-06-18 | End: 2020-06-21

## 2020-06-18 RX ORDER — HYDROMORPHONE HYDROCHLORIDE 1 MG/ML
0.5 INJECTION, SOLUTION INTRAMUSCULAR; INTRAVENOUS; SUBCUTANEOUS
Status: DISCONTINUED | OUTPATIENT
Start: 2020-06-18 | End: 2020-06-21

## 2020-06-18 RX ORDER — PANTOPRAZOLE SODIUM 40 MG/1
40 TABLET, DELAYED RELEASE ORAL 2 TIMES DAILY
Status: DISCONTINUED | OUTPATIENT
Start: 2020-06-18 | End: 2020-06-24 | Stop reason: HOSPADM

## 2020-06-18 RX ORDER — ACETAMINOPHEN 325 MG/1
650 TABLET ORAL EVERY 4 HOURS PRN
Status: DISCONTINUED | OUTPATIENT
Start: 2020-06-18 | End: 2020-06-24 | Stop reason: HOSPADM

## 2020-06-18 RX ORDER — IPRATROPIUM BROMIDE AND ALBUTEROL SULFATE 2.5; .5 MG/3ML; MG/3ML
3 SOLUTION RESPIRATORY (INHALATION) EVERY 6 HOURS PRN
Status: DISCONTINUED | OUTPATIENT
Start: 2020-06-18 | End: 2020-06-24 | Stop reason: HOSPADM

## 2020-06-18 RX ORDER — HEPARIN SODIUM 5000 [USP'U]/ML
5000 INJECTION, SOLUTION INTRAVENOUS; SUBCUTANEOUS EVERY 8 HOURS SCHEDULED
Status: DISCONTINUED | OUTPATIENT
Start: 2020-06-18 | End: 2020-06-24 | Stop reason: HOSPADM

## 2020-06-18 RX ORDER — CITALOPRAM 20 MG/1
40 TABLET ORAL DAILY
Status: DISCONTINUED | OUTPATIENT
Start: 2020-06-19 | End: 2020-06-24 | Stop reason: HOSPADM

## 2020-06-18 RX ORDER — DOCUSATE SODIUM 100 MG/1
100 CAPSULE, LIQUID FILLED ORAL 2 TIMES DAILY
Status: DISCONTINUED | OUTPATIENT
Start: 2020-06-18 | End: 2020-06-24 | Stop reason: HOSPADM

## 2020-06-18 RX ORDER — DIPHENOXYLATE HYDROCHLORIDE AND ATROPINE SULFATE 2.5; .025 MG/1; MG/1
1 TABLET ORAL DAILY
Status: DISCONTINUED | OUTPATIENT
Start: 2020-06-19 | End: 2020-06-24 | Stop reason: HOSPADM

## 2020-06-18 RX ORDER — SODIUM CHLORIDE 0.9 % (FLUSH) 0.9 %
10 SYRINGE (ML) INJECTION EVERY 12 HOURS SCHEDULED
Status: DISCONTINUED | OUTPATIENT
Start: 2020-06-18 | End: 2020-06-24 | Stop reason: HOSPADM

## 2020-06-18 RX ORDER — SUCRALFATE 1 G/1
1 TABLET ORAL 4 TIMES DAILY
Status: DISCONTINUED | OUTPATIENT
Start: 2020-06-18 | End: 2020-06-22

## 2020-06-18 RX ADMIN — PANTOPRAZOLE SODIUM 40 MG: 40 TABLET, DELAYED RELEASE ORAL at 22:54

## 2020-06-18 RX ADMIN — Medication 250 MG: at 22:54

## 2020-06-18 RX ADMIN — HYDROMORPHONE HYDROCHLORIDE 0.5 MG: 1 INJECTION, SOLUTION INTRAMUSCULAR; INTRAVENOUS; SUBCUTANEOUS at 20:37

## 2020-06-18 RX ADMIN — HEPARIN SODIUM 5000 UNITS: 5000 INJECTION, SOLUTION INTRAVENOUS; SUBCUTANEOUS at 22:55

## 2020-06-18 RX ADMIN — CARVEDILOL 6.25 MG: 6.25 TABLET, FILM COATED ORAL at 22:54

## 2020-06-18 RX ADMIN — DOCUSATE SODIUM 100 MG: 100 CAPSULE, LIQUID FILLED ORAL at 22:54

## 2020-06-18 RX ADMIN — MEROPENEM 1 G: 1 INJECTION, POWDER, FOR SOLUTION INTRAVENOUS at 16:15

## 2020-06-18 RX ADMIN — AMLODIPINE BESYLATE 5 MG: 5 TABLET ORAL at 22:54

## 2020-06-18 RX ADMIN — SODIUM CHLORIDE 75 ML/HR: 9 INJECTION, SOLUTION INTRAVENOUS at 22:56

## 2020-06-18 RX ADMIN — SODIUM CHLORIDE, PRESERVATIVE FREE 10 ML: 5 INJECTION INTRAVENOUS at 22:57

## 2020-06-18 RX ADMIN — SUCRALFATE 1 G: 1 TABLET ORAL at 22:54

## 2020-06-18 RX ADMIN — VANCOMYCIN HYDROCHLORIDE 2500 MG: 10 INJECTION, POWDER, LYOPHILIZED, FOR SOLUTION INTRAVENOUS at 20:37

## 2020-06-19 LAB
ANION GAP SERPL CALCULATED.3IONS-SCNC: 11 MMOL/L (ref 5–15)
BASOPHILS # BLD AUTO: 0.02 10*3/MM3 (ref 0–0.2)
BASOPHILS NFR BLD AUTO: 0.3 % (ref 0–1.5)
BUN BLD-MCNC: 12 MG/DL (ref 8–23)
BUN/CREAT SERPL: 19.4 (ref 7–25)
CALCIUM SPEC-SCNC: 8.6 MG/DL (ref 8.6–10.5)
CHLORIDE SERPL-SCNC: 99 MMOL/L (ref 98–107)
CO2 SERPL-SCNC: 23 MMOL/L (ref 22–29)
CREAT BLD-MCNC: 0.62 MG/DL (ref 0.76–1.27)
CRP SERPL-MCNC: 7.76 MG/DL (ref 0–0.5)
D-LACTATE SERPL-SCNC: 1.6 MMOL/L (ref 0.5–2)
DEPRECATED RDW RBC AUTO: 65.5 FL (ref 37–54)
EOSINOPHIL # BLD AUTO: 0.01 10*3/MM3 (ref 0–0.4)
EOSINOPHIL NFR BLD AUTO: 0.2 % (ref 0.3–6.2)
ERYTHROCYTE [DISTWIDTH] IN BLOOD BY AUTOMATED COUNT: 19.9 % (ref 12.3–15.4)
ERYTHROCYTE [SEDIMENTATION RATE] IN BLOOD: 95 MM/HR (ref 0–20)
GFR SERPL CREATININE-BSD FRML MDRD: 129 ML/MIN/1.73
GLUCOSE BLD-MCNC: 122 MG/DL (ref 65–99)
HCT VFR BLD AUTO: 34.3 % (ref 37.5–51)
HGB BLD-MCNC: 10.6 G/DL (ref 13–17.7)
IMM GRANULOCYTES # BLD AUTO: 0.01 10*3/MM3 (ref 0–0.05)
IMM GRANULOCYTES NFR BLD AUTO: 0.2 % (ref 0–0.5)
LYMPHOCYTES # BLD AUTO: 0.7 10*3/MM3 (ref 0.7–3.1)
LYMPHOCYTES NFR BLD AUTO: 11.6 % (ref 19.6–45.3)
MAGNESIUM SERPL-MCNC: 1.9 MG/DL (ref 1.6–2.4)
MCH RBC QN AUTO: 27.9 PG (ref 26.6–33)
MCHC RBC AUTO-ENTMCNC: 30.9 G/DL (ref 31.5–35.7)
MCV RBC AUTO: 90.3 FL (ref 79–97)
MONOCYTES # BLD AUTO: 0.83 10*3/MM3 (ref 0.1–0.9)
MONOCYTES NFR BLD AUTO: 13.8 % (ref 5–12)
NEUTROPHILS # BLD AUTO: 4.44 10*3/MM3 (ref 1.7–7)
NEUTROPHILS NFR BLD AUTO: 73.9 % (ref 42.7–76)
NRBC BLD AUTO-RTO: 0 /100 WBC (ref 0–0.2)
PLATELET # BLD AUTO: 112 10*3/MM3 (ref 140–450)
PMV BLD AUTO: 11 FL (ref 6–12)
POTASSIUM BLD-SCNC: 3.8 MMOL/L (ref 3.5–5.2)
RBC # BLD AUTO: 3.8 10*6/MM3 (ref 4.14–5.8)
REF LAB TEST METHOD: NORMAL
SARS-COV-2 N GENE NPH QL NAA+PROBE: NOT DETECTED
SARS-COV-2 RNA RESP QL NAA+PROBE: NOT DETECTED
SODIUM BLD-SCNC: 133 MMOL/L (ref 136–145)
WBC NRBC COR # BLD: 6.01 10*3/MM3 (ref 3.4–10.8)

## 2020-06-19 PROCEDURE — 83605 ASSAY OF LACTIC ACID: CPT | Performed by: INTERNAL MEDICINE

## 2020-06-19 PROCEDURE — 25010000002 CEFTRIAXONE PER 250 MG: Performed by: INTERNAL MEDICINE

## 2020-06-19 PROCEDURE — 25010000002 HEPARIN (PORCINE) PER 1000 UNITS: Performed by: NURSE PRACTITIONER

## 2020-06-19 PROCEDURE — 99231 SBSQ HOSP IP/OBS SF/LOW 25: CPT | Performed by: THORACIC SURGERY (CARDIOTHORACIC VASCULAR SURGERY)

## 2020-06-19 PROCEDURE — 97165 OT EVAL LOW COMPLEX 30 MIN: CPT

## 2020-06-19 PROCEDURE — 83735 ASSAY OF MAGNESIUM: CPT | Performed by: NURSE PRACTITIONER

## 2020-06-19 PROCEDURE — 25010000003 MAGNESIUM SULFATE 4 GM/100ML SOLUTION: Performed by: INTERNAL MEDICINE

## 2020-06-19 PROCEDURE — 85025 COMPLETE CBC W/AUTO DIFF WBC: CPT | Performed by: NURSE PRACTITIONER

## 2020-06-19 PROCEDURE — 97161 PT EVAL LOW COMPLEX 20 MIN: CPT

## 2020-06-19 PROCEDURE — 85652 RBC SED RATE AUTOMATED: CPT | Performed by: NURSE PRACTITIONER

## 2020-06-19 PROCEDURE — 25010000002 MEROPENEM PER 100 MG: Performed by: NURSE PRACTITIONER

## 2020-06-19 PROCEDURE — 86140 C-REACTIVE PROTEIN: CPT | Performed by: NURSE PRACTITIONER

## 2020-06-19 PROCEDURE — 80048 BASIC METABOLIC PNL TOTAL CA: CPT | Performed by: NURSE PRACTITIONER

## 2020-06-19 PROCEDURE — 87635 SARS-COV-2 COVID-19 AMP PRB: CPT | Performed by: INTERNAL MEDICINE

## 2020-06-19 PROCEDURE — 25010000002 VANCOMYCIN 10 G RECONSTITUTED SOLUTION: Performed by: INTERNAL MEDICINE

## 2020-06-19 PROCEDURE — 99233 SBSQ HOSP IP/OBS HIGH 50: CPT | Performed by: INTERNAL MEDICINE

## 2020-06-19 RX ORDER — FUROSEMIDE 20 MG/1
20 TABLET ORAL DAILY
Status: DISCONTINUED | OUTPATIENT
Start: 2020-06-19 | End: 2020-06-24 | Stop reason: HOSPADM

## 2020-06-19 RX ORDER — BUDESONIDE AND FORMOTEROL FUMARATE DIHYDRATE 160; 4.5 UG/1; UG/1
2 AEROSOL RESPIRATORY (INHALATION)
Status: DISCONTINUED | OUTPATIENT
Start: 2020-06-19 | End: 2020-06-24 | Stop reason: HOSPADM

## 2020-06-19 RX ORDER — MAGNESIUM SULFATE HEPTAHYDRATE 40 MG/ML
2 INJECTION, SOLUTION INTRAVENOUS AS NEEDED
Status: DISCONTINUED | OUTPATIENT
Start: 2020-06-19 | End: 2020-06-24 | Stop reason: HOSPADM

## 2020-06-19 RX ORDER — MAGNESIUM SULFATE HEPTAHYDRATE 40 MG/ML
4 INJECTION, SOLUTION INTRAVENOUS AS NEEDED
Status: DISCONTINUED | OUTPATIENT
Start: 2020-06-19 | End: 2020-06-24 | Stop reason: HOSPADM

## 2020-06-19 RX ADMIN — VANCOMYCIN HYDROCHLORIDE 1250 MG: 10 INJECTION, POWDER, LYOPHILIZED, FOR SOLUTION INTRAVENOUS at 09:16

## 2020-06-19 RX ADMIN — Medication 250 MG: at 09:16

## 2020-06-19 RX ADMIN — CITALOPRAM HYDROBROMIDE 40 MG: 40 TABLET ORAL at 09:15

## 2020-06-19 RX ADMIN — AMLODIPINE BESYLATE 5 MG: 5 TABLET ORAL at 09:16

## 2020-06-19 RX ADMIN — OXYCODONE HYDROCHLORIDE AND ACETAMINOPHEN 1 TABLET: 5; 325 TABLET ORAL at 09:15

## 2020-06-19 RX ADMIN — SUCRALFATE 1 G: 1 TABLET ORAL at 17:09

## 2020-06-19 RX ADMIN — VANCOMYCIN HYDROCHLORIDE 1250 MG: 10 INJECTION, POWDER, LYOPHILIZED, FOR SOLUTION INTRAVENOUS at 20:54

## 2020-06-19 RX ADMIN — CEFTRIAXONE 2 G: 2 INJECTION, POWDER, FOR SOLUTION INTRAMUSCULAR; INTRAVENOUS at 10:48

## 2020-06-19 RX ADMIN — SUCRALFATE 1 G: 1 TABLET ORAL at 13:05

## 2020-06-19 RX ADMIN — FLUTICASONE FUROATE, UMECLIDINIUM BROMIDE AND VILANTEROL TRIFENATATE 1 PUFF: 100; 62.5; 25 POWDER RESPIRATORY (INHALATION) at 09:21

## 2020-06-19 RX ADMIN — ASPIRIN 81 MG 81 MG: 81 TABLET ORAL at 09:16

## 2020-06-19 RX ADMIN — MEROPENEM 1 G: 1 INJECTION, POWDER, FOR SOLUTION INTRAVENOUS at 01:30

## 2020-06-19 RX ADMIN — OXYCODONE HYDROCHLORIDE AND ACETAMINOPHEN 1 TABLET: 5; 325 TABLET ORAL at 03:55

## 2020-06-19 RX ADMIN — SUCRALFATE 1 G: 1 TABLET ORAL at 09:15

## 2020-06-19 RX ADMIN — SODIUM CHLORIDE, PRESERVATIVE FREE 10 ML: 5 INJECTION INTRAVENOUS at 09:19

## 2020-06-19 RX ADMIN — Medication 250 MG: at 20:54

## 2020-06-19 RX ADMIN — CARVEDILOL 6.25 MG: 6.25 TABLET, FILM COATED ORAL at 17:09

## 2020-06-19 RX ADMIN — TAMSULOSIN HYDROCHLORIDE 0.4 MG: 0.4 CAPSULE ORAL at 09:15

## 2020-06-19 RX ADMIN — MEROPENEM 1 G: 1 INJECTION, POWDER, FOR SOLUTION INTRAVENOUS at 07:52

## 2020-06-19 RX ADMIN — PANTOPRAZOLE SODIUM 40 MG: 40 TABLET, DELAYED RELEASE ORAL at 20:54

## 2020-06-19 RX ADMIN — OXYCODONE HYDROCHLORIDE AND ACETAMINOPHEN 1 TABLET: 5; 325 TABLET ORAL at 14:25

## 2020-06-19 RX ADMIN — AMLODIPINE BESYLATE 5 MG: 5 TABLET ORAL at 20:54

## 2020-06-19 RX ADMIN — PANTOPRAZOLE SODIUM 40 MG: 40 TABLET, DELAYED RELEASE ORAL at 09:15

## 2020-06-19 RX ADMIN — DOCUSATE SODIUM 100 MG: 100 CAPSULE, LIQUID FILLED ORAL at 20:54

## 2020-06-19 RX ADMIN — CARVEDILOL 6.25 MG: 6.25 TABLET, FILM COATED ORAL at 09:15

## 2020-06-19 RX ADMIN — HEPARIN SODIUM 5000 UNITS: 5000 INJECTION, SOLUTION INTRAVENOUS; SUBCUTANEOUS at 13:07

## 2020-06-19 RX ADMIN — SUCRALFATE 1 G: 1 TABLET ORAL at 20:54

## 2020-06-19 RX ADMIN — HEPARIN SODIUM 5000 UNITS: 5000 INJECTION, SOLUTION INTRAVENOUS; SUBCUTANEOUS at 05:17

## 2020-06-19 RX ADMIN — MAGNESIUM SULFATE HEPTAHYDRATE 4 G: 40 INJECTION, SOLUTION INTRAVENOUS at 14:26

## 2020-06-19 RX ADMIN — MULTIVITAMIN TABLET 1 TABLET: TABLET at 09:16

## 2020-06-19 RX ADMIN — SODIUM CHLORIDE, PRESERVATIVE FREE 10 ML: 5 INJECTION INTRAVENOUS at 20:55

## 2020-06-19 RX ADMIN — DOCUSATE SODIUM 100 MG: 100 CAPSULE, LIQUID FILLED ORAL at 09:15

## 2020-06-19 RX ADMIN — OXYCODONE HYDROCHLORIDE AND ACETAMINOPHEN 1 TABLET: 5; 325 TABLET ORAL at 23:04

## 2020-06-19 RX ADMIN — FUROSEMIDE 20 MG: 20 TABLET ORAL at 13:11

## 2020-06-19 RX ADMIN — HEPARIN SODIUM 5000 UNITS: 5000 INJECTION, SOLUTION INTRAVENOUS; SUBCUTANEOUS at 23:04

## 2020-06-20 LAB
ALBUMIN SERPL-MCNC: 3.3 G/DL (ref 3.5–5.2)
ALBUMIN/GLOB SERPL: 0.7 G/DL
ALP SERPL-CCNC: 120 U/L (ref 39–117)
ALT SERPL W P-5'-P-CCNC: 28 U/L (ref 1–41)
ANION GAP SERPL CALCULATED.3IONS-SCNC: 14 MMOL/L (ref 5–15)
AST SERPL-CCNC: 50 U/L (ref 1–40)
BILIRUB SERPL-MCNC: 0.7 MG/DL (ref 0.2–1.2)
BUN BLD-MCNC: 14 MG/DL (ref 8–23)
BUN/CREAT SERPL: 20 (ref 7–25)
CALCIUM SPEC-SCNC: 8.9 MG/DL (ref 8.6–10.5)
CHLORIDE SERPL-SCNC: 97 MMOL/L (ref 98–107)
CO2 SERPL-SCNC: 22 MMOL/L (ref 22–29)
CREAT BLD-MCNC: 0.7 MG/DL (ref 0.76–1.27)
DEPRECATED RDW RBC AUTO: 65.1 FL (ref 37–54)
ERYTHROCYTE [DISTWIDTH] IN BLOOD BY AUTOMATED COUNT: 19 % (ref 12.3–15.4)
GFR SERPL CREATININE-BSD FRML MDRD: 112 ML/MIN/1.73
GLOBULIN UR ELPH-MCNC: 4.6 GM/DL
GLUCOSE BLD-MCNC: 103 MG/DL (ref 65–99)
HCT VFR BLD AUTO: 37 % (ref 37.5–51)
HGB BLD-MCNC: 11 G/DL (ref 13–17.7)
MCH RBC QN AUTO: 27.6 PG (ref 26.6–33)
MCHC RBC AUTO-ENTMCNC: 29.7 G/DL (ref 31.5–35.7)
MCV RBC AUTO: 92.7 FL (ref 79–97)
PLATELET # BLD AUTO: 139 10*3/MM3 (ref 140–450)
PMV BLD AUTO: 9.8 FL (ref 6–12)
POTASSIUM BLD-SCNC: 4 MMOL/L (ref 3.5–5.2)
PROT SERPL-MCNC: 7.9 G/DL (ref 6–8.5)
RBC # BLD AUTO: 3.99 10*6/MM3 (ref 4.14–5.8)
SODIUM BLD-SCNC: 133 MMOL/L (ref 136–145)
VANCOMYCIN TROUGH SERPL-MCNC: 8.9 MCG/ML (ref 5–20)
WBC NRBC COR # BLD: 6.4 10*3/MM3 (ref 3.4–10.8)

## 2020-06-20 PROCEDURE — 87040 BLOOD CULTURE FOR BACTERIA: CPT | Performed by: INTERNAL MEDICINE

## 2020-06-20 PROCEDURE — 25010000002 VANCOMYCIN 10 G RECONSTITUTED SOLUTION

## 2020-06-20 PROCEDURE — 25010000002 ERTAPENEM PER 500 MG: Performed by: INTERNAL MEDICINE

## 2020-06-20 PROCEDURE — 80053 COMPREHEN METABOLIC PANEL: CPT | Performed by: INTERNAL MEDICINE

## 2020-06-20 PROCEDURE — 94799 UNLISTED PULMONARY SVC/PX: CPT

## 2020-06-20 PROCEDURE — 25010000002 CEFTRIAXONE PER 250 MG: Performed by: INTERNAL MEDICINE

## 2020-06-20 PROCEDURE — 80202 ASSAY OF VANCOMYCIN: CPT | Performed by: INTERNAL MEDICINE

## 2020-06-20 PROCEDURE — 94640 AIRWAY INHALATION TREATMENT: CPT

## 2020-06-20 PROCEDURE — 99232 SBSQ HOSP IP/OBS MODERATE 35: CPT | Performed by: INTERNAL MEDICINE

## 2020-06-20 PROCEDURE — 25010000002 ONDANSETRON PER 1 MG: Performed by: NURSE PRACTITIONER

## 2020-06-20 PROCEDURE — 25010000002 HEPARIN (PORCINE) PER 1000 UNITS: Performed by: NURSE PRACTITIONER

## 2020-06-20 PROCEDURE — 85027 COMPLETE CBC AUTOMATED: CPT | Performed by: INTERNAL MEDICINE

## 2020-06-20 PROCEDURE — 99024 POSTOP FOLLOW-UP VISIT: CPT | Performed by: PHYSICIAN ASSISTANT

## 2020-06-20 RX ADMIN — SUCRALFATE 1 G: 1 TABLET ORAL at 17:15

## 2020-06-20 RX ADMIN — DOCUSATE SODIUM 100 MG: 100 CAPSULE, LIQUID FILLED ORAL at 21:02

## 2020-06-20 RX ADMIN — TAMSULOSIN HYDROCHLORIDE 0.4 MG: 0.4 CAPSULE ORAL at 09:42

## 2020-06-20 RX ADMIN — PANTOPRAZOLE SODIUM 40 MG: 40 TABLET, DELAYED RELEASE ORAL at 09:42

## 2020-06-20 RX ADMIN — ONDANSETRON 4 MG: 2 INJECTION INTRAMUSCULAR; INTRAVENOUS at 09:47

## 2020-06-20 RX ADMIN — SUCRALFATE 1 G: 1 TABLET ORAL at 21:02

## 2020-06-20 RX ADMIN — SODIUM CHLORIDE, PRESERVATIVE FREE 10 ML: 5 INJECTION INTRAVENOUS at 21:03

## 2020-06-20 RX ADMIN — ERTAPENEM SODIUM 1 G: 1 INJECTION, POWDER, LYOPHILIZED, FOR SOLUTION INTRAMUSCULAR; INTRAVENOUS at 14:49

## 2020-06-20 RX ADMIN — OXYCODONE HYDROCHLORIDE AND ACETAMINOPHEN 1 TABLET: 5; 325 TABLET ORAL at 17:16

## 2020-06-20 RX ADMIN — CARVEDILOL 6.25 MG: 6.25 TABLET, FILM COATED ORAL at 17:16

## 2020-06-20 RX ADMIN — BUDESONIDE AND FORMOTEROL FUMARATE DIHYDRATE 2 PUFF: 160; 4.5 AEROSOL RESPIRATORY (INHALATION) at 21:41

## 2020-06-20 RX ADMIN — MELATONIN TAB 5 MG 5 MG: 5 TAB at 21:02

## 2020-06-20 RX ADMIN — DOCUSATE SODIUM 100 MG: 100 CAPSULE, LIQUID FILLED ORAL at 09:41

## 2020-06-20 RX ADMIN — AMLODIPINE BESYLATE 5 MG: 5 TABLET ORAL at 21:02

## 2020-06-20 RX ADMIN — AMLODIPINE BESYLATE 5 MG: 5 TABLET ORAL at 09:42

## 2020-06-20 RX ADMIN — CARVEDILOL 6.25 MG: 6.25 TABLET, FILM COATED ORAL at 09:43

## 2020-06-20 RX ADMIN — VANCOMYCIN HYDROCHLORIDE 1750 MG: 10 INJECTION, POWDER, LYOPHILIZED, FOR SOLUTION INTRAVENOUS at 12:28

## 2020-06-20 RX ADMIN — MULTIVITAMIN TABLET: TABLET at 09:42

## 2020-06-20 RX ADMIN — HEPARIN SODIUM 5000 UNITS: 5000 INJECTION, SOLUTION INTRAVENOUS; SUBCUTANEOUS at 05:31

## 2020-06-20 RX ADMIN — ASPIRIN 81 MG 81 MG: 81 TABLET ORAL at 09:42

## 2020-06-20 RX ADMIN — Medication 250 MG: at 09:41

## 2020-06-20 RX ADMIN — FUROSEMIDE 20 MG: 20 TABLET ORAL at 09:41

## 2020-06-20 RX ADMIN — SUCRALFATE 1 G: 1 TABLET ORAL at 09:41

## 2020-06-20 RX ADMIN — CEFTRIAXONE 2 G: 2 INJECTION, POWDER, FOR SOLUTION INTRAMUSCULAR; INTRAVENOUS at 09:42

## 2020-06-20 RX ADMIN — PANTOPRAZOLE SODIUM 40 MG: 40 TABLET, DELAYED RELEASE ORAL at 21:02

## 2020-06-20 RX ADMIN — BUDESONIDE AND FORMOTEROL FUMARATE DIHYDRATE 2 PUFF: 160; 4.5 AEROSOL RESPIRATORY (INHALATION) at 07:31

## 2020-06-20 RX ADMIN — OXYCODONE HYDROCHLORIDE AND ACETAMINOPHEN 1 TABLET: 5; 325 TABLET ORAL at 03:56

## 2020-06-20 RX ADMIN — HEPARIN SODIUM 5000 UNITS: 5000 INJECTION, SOLUTION INTRAVENOUS; SUBCUTANEOUS at 21:02

## 2020-06-20 RX ADMIN — OXYCODONE HYDROCHLORIDE AND ACETAMINOPHEN 1 TABLET: 5; 325 TABLET ORAL at 21:02

## 2020-06-20 RX ADMIN — HEPARIN SODIUM 5000 UNITS: 5000 INJECTION, SOLUTION INTRAVENOUS; SUBCUTANEOUS at 14:49

## 2020-06-20 RX ADMIN — SUCRALFATE 1 G: 1 TABLET ORAL at 12:25

## 2020-06-20 RX ADMIN — CITALOPRAM HYDROBROMIDE 40 MG: 40 TABLET ORAL at 09:42

## 2020-06-20 RX ADMIN — Medication 250 MG: at 21:02

## 2020-06-20 RX ADMIN — OXYCODONE HYDROCHLORIDE AND ACETAMINOPHEN 1 TABLET: 5; 325 TABLET ORAL at 09:47

## 2020-06-21 LAB
ANION GAP SERPL CALCULATED.3IONS-SCNC: 8 MMOL/L (ref 5–15)
BACTERIA UR QL AUTO: ABNORMAL /HPF
BILIRUB UR QL STRIP: NEGATIVE
BUN BLD-MCNC: 10 MG/DL (ref 8–23)
BUN/CREAT SERPL: 15.6 (ref 7–25)
CALCIUM SPEC-SCNC: 8.5 MG/DL (ref 8.6–10.5)
CHLORIDE SERPL-SCNC: 99 MMOL/L (ref 98–107)
CLARITY UR: CLEAR
CO2 SERPL-SCNC: 25 MMOL/L (ref 22–29)
COLOR UR: YELLOW
CREAT BLD-MCNC: 0.64 MG/DL (ref 0.76–1.27)
DEPRECATED RDW RBC AUTO: 62.7 FL (ref 37–54)
ERYTHROCYTE [DISTWIDTH] IN BLOOD BY AUTOMATED COUNT: 18.9 % (ref 12.3–15.4)
GFR SERPL CREATININE-BSD FRML MDRD: 125 ML/MIN/1.73
GLUCOSE BLD-MCNC: 112 MG/DL (ref 65–99)
GLUCOSE UR STRIP-MCNC: NEGATIVE MG/DL
HCT VFR BLD AUTO: 31.6 % (ref 37.5–51)
HGB BLD-MCNC: 9.6 G/DL (ref 13–17.7)
HGB UR QL STRIP.AUTO: NEGATIVE
HYALINE CASTS UR QL AUTO: ABNORMAL /LPF
KETONES UR QL STRIP: NEGATIVE
LEUKOCYTE ESTERASE UR QL STRIP.AUTO: NEGATIVE
MCH RBC QN AUTO: 27.7 PG (ref 26.6–33)
MCHC RBC AUTO-ENTMCNC: 30.4 G/DL (ref 31.5–35.7)
MCV RBC AUTO: 91.3 FL (ref 79–97)
NITRITE UR QL STRIP: NEGATIVE
PH UR STRIP.AUTO: 5.5 [PH] (ref 5–8)
PLATELET # BLD AUTO: 136 10*3/MM3 (ref 140–450)
PMV BLD AUTO: 9.6 FL (ref 6–12)
POTASSIUM BLD-SCNC: 4.1 MMOL/L (ref 3.5–5.2)
PROT UR QL STRIP: ABNORMAL
RBC # BLD AUTO: 3.46 10*6/MM3 (ref 4.14–5.8)
RBC # UR: ABNORMAL /HPF
REF LAB TEST METHOD: ABNORMAL
SODIUM BLD-SCNC: 132 MMOL/L (ref 136–145)
SP GR UR STRIP: 1.01 (ref 1–1.03)
SQUAMOUS #/AREA URNS HPF: ABNORMAL /HPF
UROBILINOGEN UR QL STRIP: ABNORMAL
WBC NRBC COR # BLD: 5.58 10*3/MM3 (ref 3.4–10.8)
WBC UR QL AUTO: ABNORMAL /HPF

## 2020-06-21 PROCEDURE — 94799 UNLISTED PULMONARY SVC/PX: CPT

## 2020-06-21 PROCEDURE — 25010000002 VANCOMYCIN 10 G RECONSTITUTED SOLUTION

## 2020-06-21 PROCEDURE — 25010000002 ONDANSETRON PER 1 MG: Performed by: NURSE PRACTITIONER

## 2020-06-21 PROCEDURE — 80048 BASIC METABOLIC PNL TOTAL CA: CPT

## 2020-06-21 PROCEDURE — 25010000002 ERTAPENEM PER 500 MG: Performed by: INTERNAL MEDICINE

## 2020-06-21 PROCEDURE — 99231 SBSQ HOSP IP/OBS SF/LOW 25: CPT | Performed by: PHYSICIAN ASSISTANT

## 2020-06-21 PROCEDURE — 81001 URINALYSIS AUTO W/SCOPE: CPT | Performed by: INTERNAL MEDICINE

## 2020-06-21 PROCEDURE — 25010000002 HEPARIN (PORCINE) PER 1000 UNITS: Performed by: NURSE PRACTITIONER

## 2020-06-21 PROCEDURE — 99232 SBSQ HOSP IP/OBS MODERATE 35: CPT | Performed by: INTERNAL MEDICINE

## 2020-06-21 PROCEDURE — 85027 COMPLETE CBC AUTOMATED: CPT | Performed by: INTERNAL MEDICINE

## 2020-06-21 RX ORDER — LACTULOSE 10 G/15ML
10 SOLUTION ORAL DAILY
Status: DISCONTINUED | OUTPATIENT
Start: 2020-06-21 | End: 2020-06-24 | Stop reason: HOSPADM

## 2020-06-21 RX ORDER — LACTULOSE 10 G/15ML
10 SOLUTION ORAL 3 TIMES DAILY PRN
Status: DISCONTINUED | OUTPATIENT
Start: 2020-06-21 | End: 2020-06-24 | Stop reason: HOSPADM

## 2020-06-21 RX ADMIN — CARVEDILOL 6.25 MG: 6.25 TABLET, FILM COATED ORAL at 08:00

## 2020-06-21 RX ADMIN — OXYCODONE HYDROCHLORIDE AND ACETAMINOPHEN 1 TABLET: 5; 325 TABLET ORAL at 19:25

## 2020-06-21 RX ADMIN — SUCRALFATE 1 G: 1 TABLET ORAL at 20:53

## 2020-06-21 RX ADMIN — BUDESONIDE AND FORMOTEROL FUMARATE DIHYDRATE 2 PUFF: 160; 4.5 AEROSOL RESPIRATORY (INHALATION) at 07:54

## 2020-06-21 RX ADMIN — OXYCODONE HYDROCHLORIDE AND ACETAMINOPHEN 1 TABLET: 5; 325 TABLET ORAL at 01:37

## 2020-06-21 RX ADMIN — DOCUSATE SODIUM 100 MG: 100 CAPSULE, LIQUID FILLED ORAL at 08:00

## 2020-06-21 RX ADMIN — VANCOMYCIN HYDROCHLORIDE 1750 MG: 10 INJECTION, POWDER, LYOPHILIZED, FOR SOLUTION INTRAVENOUS at 12:24

## 2020-06-21 RX ADMIN — MULTIVITAMIN TABLET 1 TABLET: TABLET at 08:01

## 2020-06-21 RX ADMIN — ERTAPENEM SODIUM 1 G: 1 INJECTION, POWDER, LYOPHILIZED, FOR SOLUTION INTRAMUSCULAR; INTRAVENOUS at 13:20

## 2020-06-21 RX ADMIN — CARVEDILOL 6.25 MG: 6.25 TABLET, FILM COATED ORAL at 17:10

## 2020-06-21 RX ADMIN — VANCOMYCIN HYDROCHLORIDE 1750 MG: 10 INJECTION, POWDER, LYOPHILIZED, FOR SOLUTION INTRAVENOUS at 01:31

## 2020-06-21 RX ADMIN — ONDANSETRON 4 MG: 2 INJECTION INTRAMUSCULAR; INTRAVENOUS at 07:59

## 2020-06-21 RX ADMIN — SUCRALFATE 1 G: 1 TABLET ORAL at 08:01

## 2020-06-21 RX ADMIN — Medication 250 MG: at 20:52

## 2020-06-21 RX ADMIN — SUCRALFATE 1 G: 1 TABLET ORAL at 12:23

## 2020-06-21 RX ADMIN — DOCUSATE SODIUM 100 MG: 100 CAPSULE, LIQUID FILLED ORAL at 20:52

## 2020-06-21 RX ADMIN — LACTULOSE 10 G: 20 SOLUTION ORAL at 12:23

## 2020-06-21 RX ADMIN — SUCRALFATE 1 G: 1 TABLET ORAL at 17:10

## 2020-06-21 RX ADMIN — SODIUM CHLORIDE, PRESERVATIVE FREE 10 ML: 5 INJECTION INTRAVENOUS at 12:23

## 2020-06-21 RX ADMIN — HEPARIN SODIUM 5000 UNITS: 5000 INJECTION, SOLUTION INTRAVENOUS; SUBCUTANEOUS at 21:03

## 2020-06-21 RX ADMIN — TAMSULOSIN HYDROCHLORIDE 0.4 MG: 0.4 CAPSULE ORAL at 08:00

## 2020-06-21 RX ADMIN — BUDESONIDE AND FORMOTEROL FUMARATE DIHYDRATE 2 PUFF: 160; 4.5 AEROSOL RESPIRATORY (INHALATION) at 20:41

## 2020-06-21 RX ADMIN — ASPIRIN 81 MG 81 MG: 81 TABLET ORAL at 08:00

## 2020-06-21 RX ADMIN — HEPARIN SODIUM 5000 UNITS: 5000 INJECTION, SOLUTION INTRAVENOUS; SUBCUTANEOUS at 13:20

## 2020-06-21 RX ADMIN — AMLODIPINE BESYLATE 5 MG: 5 TABLET ORAL at 20:52

## 2020-06-21 RX ADMIN — AMLODIPINE BESYLATE 5 MG: 5 TABLET ORAL at 08:01

## 2020-06-21 RX ADMIN — SODIUM CHLORIDE, PRESERVATIVE FREE 10 ML: 5 INJECTION INTRAVENOUS at 20:57

## 2020-06-21 RX ADMIN — CITALOPRAM HYDROBROMIDE 40 MG: 40 TABLET ORAL at 08:00

## 2020-06-21 RX ADMIN — PANTOPRAZOLE SODIUM 40 MG: 40 TABLET, DELAYED RELEASE ORAL at 08:01

## 2020-06-21 RX ADMIN — Medication 250 MG: at 08:00

## 2020-06-21 RX ADMIN — ONDANSETRON 4 MG: 2 INJECTION INTRAMUSCULAR; INTRAVENOUS at 21:03

## 2020-06-21 RX ADMIN — OXYCODONE HYDROCHLORIDE AND ACETAMINOPHEN 1 TABLET: 5; 325 TABLET ORAL at 13:20

## 2020-06-21 RX ADMIN — PANTOPRAZOLE SODIUM 40 MG: 40 TABLET, DELAYED RELEASE ORAL at 20:53

## 2020-06-21 RX ADMIN — FUROSEMIDE 20 MG: 20 TABLET ORAL at 08:00

## 2020-06-22 LAB
ANION GAP SERPL CALCULATED.3IONS-SCNC: 14 MMOL/L (ref 5–15)
BUN BLD-MCNC: 9 MG/DL (ref 8–23)
BUN/CREAT SERPL: 15 (ref 7–25)
CALCIUM SPEC-SCNC: 8.8 MG/DL (ref 8.6–10.5)
CHLORIDE SERPL-SCNC: 96 MMOL/L (ref 98–107)
CO2 SERPL-SCNC: 23 MMOL/L (ref 22–29)
CREAT BLD-MCNC: 0.6 MG/DL (ref 0.76–1.27)
GFR SERPL CREATININE-BSD FRML MDRD: 134 ML/MIN/1.73
GLUCOSE BLD-MCNC: 106 MG/DL (ref 65–99)
POTASSIUM BLD-SCNC: 3.9 MMOL/L (ref 3.5–5.2)
SODIUM BLD-SCNC: 133 MMOL/L (ref 136–145)
VANCOMYCIN TROUGH SERPL-MCNC: 15.8 MCG/ML (ref 5–20)

## 2020-06-22 PROCEDURE — 97116 GAIT TRAINING THERAPY: CPT | Performed by: PHYSICAL THERAPIST

## 2020-06-22 PROCEDURE — C1751 CATH, INF, PER/CENT/MIDLINE: HCPCS

## 2020-06-22 PROCEDURE — 99232 SBSQ HOSP IP/OBS MODERATE 35: CPT | Performed by: INTERNAL MEDICINE

## 2020-06-22 PROCEDURE — 80048 BASIC METABOLIC PNL TOTAL CA: CPT

## 2020-06-22 PROCEDURE — 25010000002 VANCOMYCIN

## 2020-06-22 PROCEDURE — 25010000002 HEPARIN (PORCINE) PER 1000 UNITS: Performed by: NURSE PRACTITIONER

## 2020-06-22 PROCEDURE — 02HV33Z INSERTION OF INFUSION DEVICE INTO SUPERIOR VENA CAVA, PERCUTANEOUS APPROACH: ICD-10-PCS | Performed by: INTERNAL MEDICINE

## 2020-06-22 PROCEDURE — C1894 INTRO/SHEATH, NON-LASER: HCPCS

## 2020-06-22 PROCEDURE — 25010000002 VANCOMYCIN 10 G RECONSTITUTED SOLUTION

## 2020-06-22 PROCEDURE — 94799 UNLISTED PULMONARY SVC/PX: CPT

## 2020-06-22 PROCEDURE — 25010000002 ERTAPENEM PER 500 MG: Performed by: INTERNAL MEDICINE

## 2020-06-22 PROCEDURE — 99231 SBSQ HOSP IP/OBS SF/LOW 25: CPT | Performed by: THORACIC SURGERY (CARDIOTHORACIC VASCULAR SURGERY)

## 2020-06-22 PROCEDURE — 80202 ASSAY OF VANCOMYCIN: CPT

## 2020-06-22 PROCEDURE — 97530 THERAPEUTIC ACTIVITIES: CPT

## 2020-06-22 PROCEDURE — 25010000002 ONDANSETRON PER 1 MG: Performed by: NURSE PRACTITIONER

## 2020-06-22 RX ORDER — DOXYCYCLINE 100 MG/1
100 CAPSULE ORAL EVERY 12 HOURS SCHEDULED
Status: DISCONTINUED | OUTPATIENT
Start: 2020-06-22 | End: 2020-06-24 | Stop reason: HOSPADM

## 2020-06-22 RX ORDER — SODIUM CHLORIDE 0.9 % (FLUSH) 0.9 %
20 SYRINGE (ML) INJECTION AS NEEDED
Status: DISCONTINUED | OUTPATIENT
Start: 2020-06-22 | End: 2020-06-24 | Stop reason: HOSPADM

## 2020-06-22 RX ORDER — SODIUM CHLORIDE 0.9 % (FLUSH) 0.9 %
10 SYRINGE (ML) INJECTION EVERY 12 HOURS SCHEDULED
Status: DISCONTINUED | OUTPATIENT
Start: 2020-06-22 | End: 2020-06-24 | Stop reason: HOSPADM

## 2020-06-22 RX ORDER — SODIUM CHLORIDE 0.9 % (FLUSH) 0.9 %
10 SYRINGE (ML) INJECTION AS NEEDED
Status: DISCONTINUED | OUTPATIENT
Start: 2020-06-22 | End: 2020-06-24 | Stop reason: HOSPADM

## 2020-06-22 RX ADMIN — DOCUSATE SODIUM 100 MG: 100 CAPSULE, LIQUID FILLED ORAL at 08:33

## 2020-06-22 RX ADMIN — VANCOMYCIN HYDROCHLORIDE 1750 MG: 10 INJECTION, POWDER, LYOPHILIZED, FOR SOLUTION INTRAVENOUS at 00:13

## 2020-06-22 RX ADMIN — BUDESONIDE AND FORMOTEROL FUMARATE DIHYDRATE 2 PUFF: 160; 4.5 AEROSOL RESPIRATORY (INHALATION) at 08:31

## 2020-06-22 RX ADMIN — MELATONIN TAB 5 MG 5 MG: 5 TAB at 21:18

## 2020-06-22 RX ADMIN — AMLODIPINE BESYLATE 5 MG: 5 TABLET ORAL at 21:19

## 2020-06-22 RX ADMIN — OXYCODONE HYDROCHLORIDE AND ACETAMINOPHEN 1 TABLET: 5; 325 TABLET ORAL at 01:28

## 2020-06-22 RX ADMIN — HEPARIN SODIUM 5000 UNITS: 5000 INJECTION, SOLUTION INTRAVENOUS; SUBCUTANEOUS at 15:53

## 2020-06-22 RX ADMIN — PANTOPRAZOLE SODIUM 40 MG: 40 TABLET, DELAYED RELEASE ORAL at 21:19

## 2020-06-22 RX ADMIN — HEPARIN SODIUM 5000 UNITS: 5000 INJECTION, SOLUTION INTRAVENOUS; SUBCUTANEOUS at 21:18

## 2020-06-22 RX ADMIN — Medication 250 MG: at 21:18

## 2020-06-22 RX ADMIN — BUDESONIDE AND FORMOTEROL FUMARATE DIHYDRATE 2 PUFF: 160; 4.5 AEROSOL RESPIRATORY (INHALATION) at 20:16

## 2020-06-22 RX ADMIN — LACTULOSE 10 G: 20 SOLUTION ORAL at 08:35

## 2020-06-22 RX ADMIN — CITALOPRAM HYDROBROMIDE 40 MG: 40 TABLET ORAL at 08:33

## 2020-06-22 RX ADMIN — DOXYCYCLINE 100 MG: 100 CAPSULE ORAL at 15:53

## 2020-06-22 RX ADMIN — CARVEDILOL 6.25 MG: 6.25 TABLET, FILM COATED ORAL at 08:33

## 2020-06-22 RX ADMIN — SODIUM CHLORIDE, PRESERVATIVE FREE 10 ML: 5 INJECTION INTRAVENOUS at 08:36

## 2020-06-22 RX ADMIN — FUROSEMIDE 20 MG: 20 TABLET ORAL at 08:33

## 2020-06-22 RX ADMIN — Medication 250 MG: at 08:35

## 2020-06-22 RX ADMIN — TAMSULOSIN HYDROCHLORIDE 0.4 MG: 0.4 CAPSULE ORAL at 08:33

## 2020-06-22 RX ADMIN — MULTIVITAMIN TABLET 1 TABLET: TABLET at 08:35

## 2020-06-22 RX ADMIN — OXYCODONE HYDROCHLORIDE AND ACETAMINOPHEN 1 TABLET: 5; 325 TABLET ORAL at 21:17

## 2020-06-22 RX ADMIN — ERTAPENEM SODIUM 1 G: 1 INJECTION, POWDER, LYOPHILIZED, FOR SOLUTION INTRAMUSCULAR; INTRAVENOUS at 15:53

## 2020-06-22 RX ADMIN — AMLODIPINE BESYLATE 5 MG: 5 TABLET ORAL at 08:33

## 2020-06-22 RX ADMIN — OXYCODONE HYDROCHLORIDE AND ACETAMINOPHEN 1 TABLET: 5; 325 TABLET ORAL at 08:33

## 2020-06-22 RX ADMIN — CARVEDILOL 6.25 MG: 6.25 TABLET, FILM COATED ORAL at 18:52

## 2020-06-22 RX ADMIN — DOCUSATE SODIUM 100 MG: 100 CAPSULE, LIQUID FILLED ORAL at 21:18

## 2020-06-22 RX ADMIN — HEPARIN SODIUM 5000 UNITS: 5000 INJECTION, SOLUTION INTRAVENOUS; SUBCUTANEOUS at 06:35

## 2020-06-22 RX ADMIN — ASPIRIN 81 MG 81 MG: 81 TABLET ORAL at 08:33

## 2020-06-22 RX ADMIN — PANTOPRAZOLE SODIUM 40 MG: 40 TABLET, DELAYED RELEASE ORAL at 08:33

## 2020-06-22 RX ADMIN — SUCRALFATE 1 G: 1 TABLET ORAL at 08:33

## 2020-06-22 RX ADMIN — OXYCODONE HYDROCHLORIDE AND ACETAMINOPHEN 1 TABLET: 5; 325 TABLET ORAL at 16:04

## 2020-06-22 RX ADMIN — VANCOMYCIN HYDROCHLORIDE 1750 MG: 10 INJECTION, POWDER, LYOPHILIZED, FOR SOLUTION INTRAVENOUS at 12:34

## 2020-06-22 RX ADMIN — DOXYCYCLINE 100 MG: 100 CAPSULE ORAL at 21:39

## 2020-06-22 RX ADMIN — ONDANSETRON 4 MG: 2 INJECTION INTRAMUSCULAR; INTRAVENOUS at 08:35

## 2020-06-22 RX ADMIN — SUCRALFATE 1 G: 1 TABLET ORAL at 12:34

## 2020-06-22 RX ADMIN — SODIUM CHLORIDE, PRESERVATIVE FREE 10 ML: 5 INJECTION INTRAVENOUS at 21:19

## 2020-06-23 LAB
ANION GAP SERPL CALCULATED.3IONS-SCNC: 10 MMOL/L (ref 5–15)
BACTERIA SPEC AEROBE CULT: NORMAL
BACTERIA SPEC AEROBE CULT: NORMAL
BUN BLD-MCNC: 10 MG/DL (ref 8–23)
BUN/CREAT SERPL: 10.6 (ref 7–25)
CALCIUM SPEC-SCNC: 9.1 MG/DL (ref 8.6–10.5)
CHLORIDE SERPL-SCNC: 96 MMOL/L (ref 98–107)
CO2 SERPL-SCNC: 28 MMOL/L (ref 22–29)
CREAT BLD-MCNC: 0.94 MG/DL (ref 0.76–1.27)
DEPRECATED RDW RBC AUTO: 62.5 FL (ref 37–54)
ERYTHROCYTE [DISTWIDTH] IN BLOOD BY AUTOMATED COUNT: 18.6 % (ref 12.3–15.4)
GFR SERPL CREATININE-BSD FRML MDRD: 80 ML/MIN/1.73
GLUCOSE BLD-MCNC: 107 MG/DL (ref 65–99)
HCT VFR BLD AUTO: 35.1 % (ref 37.5–51)
HGB BLD-MCNC: 10.6 G/DL (ref 13–17.7)
MCH RBC QN AUTO: 27.8 PG (ref 26.6–33)
MCHC RBC AUTO-ENTMCNC: 30.2 G/DL (ref 31.5–35.7)
MCV RBC AUTO: 92.1 FL (ref 79–97)
PLATELET # BLD AUTO: 185 10*3/MM3 (ref 140–450)
PMV BLD AUTO: 9.3 FL (ref 6–12)
POTASSIUM BLD-SCNC: 4.2 MMOL/L (ref 3.5–5.2)
RBC # BLD AUTO: 3.81 10*6/MM3 (ref 4.14–5.8)
SODIUM BLD-SCNC: 134 MMOL/L (ref 136–145)
WBC NRBC COR # BLD: 7.28 10*3/MM3 (ref 3.4–10.8)

## 2020-06-23 PROCEDURE — 99231 SBSQ HOSP IP/OBS SF/LOW 25: CPT | Performed by: THORACIC SURGERY (CARDIOTHORACIC VASCULAR SURGERY)

## 2020-06-23 PROCEDURE — 80048 BASIC METABOLIC PNL TOTAL CA: CPT | Performed by: INTERNAL MEDICINE

## 2020-06-23 PROCEDURE — 25010000002 HEPARIN (PORCINE) PER 1000 UNITS: Performed by: NURSE PRACTITIONER

## 2020-06-23 PROCEDURE — 85027 COMPLETE CBC AUTOMATED: CPT | Performed by: INTERNAL MEDICINE

## 2020-06-23 PROCEDURE — 94799 UNLISTED PULMONARY SVC/PX: CPT

## 2020-06-23 PROCEDURE — 25010000002 ERTAPENEM 1 GM/100ML SOLUTION: Performed by: INTERNAL MEDICINE

## 2020-06-23 PROCEDURE — 99232 SBSQ HOSP IP/OBS MODERATE 35: CPT | Performed by: NURSE PRACTITIONER

## 2020-06-23 PROCEDURE — 0152U NFCT DS DNA UNTRGT NGNRJ SEQ: CPT | Performed by: INTERNAL MEDICINE

## 2020-06-23 RX ORDER — HYDROCODONE BITARTRATE AND ACETAMINOPHEN 5; 325 MG/1; MG/1
1 TABLET ORAL EVERY 6 HOURS PRN
Status: DISCONTINUED | OUTPATIENT
Start: 2020-06-23 | End: 2020-06-24 | Stop reason: HOSPADM

## 2020-06-23 RX ADMIN — HEPARIN SODIUM 5000 UNITS: 5000 INJECTION, SOLUTION INTRAVENOUS; SUBCUTANEOUS at 05:51

## 2020-06-23 RX ADMIN — OXYCODONE HYDROCHLORIDE AND ACETAMINOPHEN 1 TABLET: 5; 325 TABLET ORAL at 01:48

## 2020-06-23 RX ADMIN — MULTIVITAMIN TABLET 1 TABLET: TABLET at 09:44

## 2020-06-23 RX ADMIN — CARVEDILOL 6.25 MG: 6.25 TABLET, FILM COATED ORAL at 09:44

## 2020-06-23 RX ADMIN — LACTULOSE 10 G: 20 SOLUTION ORAL at 09:45

## 2020-06-23 RX ADMIN — HYDROCODONE BITARTRATE AND ACETAMINOPHEN 1 TABLET: 5; 325 TABLET ORAL at 21:28

## 2020-06-23 RX ADMIN — BUDESONIDE AND FORMOTEROL FUMARATE DIHYDRATE 2 PUFF: 160; 4.5 AEROSOL RESPIRATORY (INHALATION) at 06:46

## 2020-06-23 RX ADMIN — DOCUSATE SODIUM 100 MG: 100 CAPSULE, LIQUID FILLED ORAL at 21:28

## 2020-06-23 RX ADMIN — FUROSEMIDE 20 MG: 20 TABLET ORAL at 09:43

## 2020-06-23 RX ADMIN — DOXYCYCLINE 100 MG: 100 CAPSULE ORAL at 11:25

## 2020-06-23 RX ADMIN — ERTAPENEM SODIUM 1 G: 1 INJECTION, POWDER, LYOPHILIZED, FOR SOLUTION INTRAMUSCULAR; INTRAVENOUS at 14:32

## 2020-06-23 RX ADMIN — PANTOPRAZOLE SODIUM 40 MG: 40 TABLET, DELAYED RELEASE ORAL at 09:44

## 2020-06-23 RX ADMIN — HYDROCODONE BITARTRATE AND ACETAMINOPHEN 1 TABLET: 5; 325 TABLET ORAL at 11:42

## 2020-06-23 RX ADMIN — CITALOPRAM HYDROBROMIDE 40 MG: 40 TABLET ORAL at 09:44

## 2020-06-23 RX ADMIN — AMLODIPINE BESYLATE 5 MG: 5 TABLET ORAL at 09:44

## 2020-06-23 RX ADMIN — Medication 250 MG: at 21:27

## 2020-06-23 RX ADMIN — ASPIRIN 81 MG 81 MG: 81 TABLET ORAL at 09:44

## 2020-06-23 RX ADMIN — DOXYCYCLINE 100 MG: 100 CAPSULE ORAL at 21:28

## 2020-06-23 RX ADMIN — CARVEDILOL 6.25 MG: 6.25 TABLET, FILM COATED ORAL at 19:12

## 2020-06-23 RX ADMIN — SODIUM CHLORIDE, PRESERVATIVE FREE 10 ML: 5 INJECTION INTRAVENOUS at 21:28

## 2020-06-23 RX ADMIN — DOCUSATE SODIUM 100 MG: 100 CAPSULE, LIQUID FILLED ORAL at 09:44

## 2020-06-23 RX ADMIN — AMLODIPINE BESYLATE 5 MG: 5 TABLET ORAL at 21:28

## 2020-06-23 RX ADMIN — SODIUM CHLORIDE, PRESERVATIVE FREE 10 ML: 5 INJECTION INTRAVENOUS at 09:50

## 2020-06-23 RX ADMIN — BUDESONIDE AND FORMOTEROL FUMARATE DIHYDRATE 2 PUFF: 160; 4.5 AEROSOL RESPIRATORY (INHALATION) at 20:10

## 2020-06-23 RX ADMIN — MELATONIN TAB 5 MG 5 MG: 5 TAB at 21:27

## 2020-06-23 RX ADMIN — HEPARIN SODIUM 5000 UNITS: 5000 INJECTION, SOLUTION INTRAVENOUS; SUBCUTANEOUS at 14:30

## 2020-06-23 RX ADMIN — PANTOPRAZOLE SODIUM 40 MG: 40 TABLET, DELAYED RELEASE ORAL at 21:27

## 2020-06-23 RX ADMIN — HEPARIN SODIUM 5000 UNITS: 5000 INJECTION, SOLUTION INTRAVENOUS; SUBCUTANEOUS at 21:27

## 2020-06-23 RX ADMIN — Medication 250 MG: at 09:44

## 2020-06-23 RX ADMIN — TAMSULOSIN HYDROCHLORIDE 0.4 MG: 0.4 CAPSULE ORAL at 09:43

## 2020-06-24 ENCOUNTER — TELEPHONE (OUTPATIENT)
Dept: GASTROENTEROLOGY | Facility: CLINIC | Age: 70
End: 2020-06-24

## 2020-06-24 VITALS
RESPIRATION RATE: 18 BRPM | DIASTOLIC BLOOD PRESSURE: 67 MMHG | OXYGEN SATURATION: 90 % | TEMPERATURE: 98.4 F | HEIGHT: 74 IN | WEIGHT: 225 LBS | BODY MASS INDEX: 28.88 KG/M2 | HEART RATE: 88 BPM | SYSTOLIC BLOOD PRESSURE: 145 MMHG

## 2020-06-24 LAB
BACTERIA UR QL AUTO: ABNORMAL /HPF
BILIRUB UR QL STRIP: NEGATIVE
CLARITY UR: CLEAR
COLOR UR: YELLOW
GLUCOSE UR STRIP-MCNC: NEGATIVE MG/DL
HGB UR QL STRIP.AUTO: ABNORMAL
HYALINE CASTS UR QL AUTO: ABNORMAL /LPF
KETONES UR QL STRIP: NEGATIVE
LEUKOCYTE ESTERASE UR QL STRIP.AUTO: NEGATIVE
MAGNESIUM SERPL-MCNC: 1.7 MG/DL (ref 1.6–2.4)
MAGNESIUM SERPL-MCNC: 2.5 MG/DL (ref 1.6–2.4)
NITRITE UR QL STRIP: NEGATIVE
PH UR STRIP.AUTO: <=5 [PH] (ref 5–8)
PROT UR QL STRIP: ABNORMAL
RBC # UR: ABNORMAL /HPF
REF LAB TEST METHOD: ABNORMAL
SP GR UR STRIP: 1.01 (ref 1–1.03)
SQUAMOUS #/AREA URNS HPF: ABNORMAL /HPF
UROBILINOGEN UR QL STRIP: ABNORMAL
WBC UR QL AUTO: ABNORMAL /HPF

## 2020-06-24 PROCEDURE — 99239 HOSP IP/OBS DSCHRG MGMT >30: CPT | Performed by: NURSE PRACTITIONER

## 2020-06-24 PROCEDURE — 83735 ASSAY OF MAGNESIUM: CPT | Performed by: NURSE PRACTITIONER

## 2020-06-24 PROCEDURE — 25010000003 MAGNESIUM SULFATE 4 GM/100ML SOLUTION: Performed by: INTERNAL MEDICINE

## 2020-06-24 PROCEDURE — 94799 UNLISTED PULMONARY SVC/PX: CPT

## 2020-06-24 PROCEDURE — 81001 URINALYSIS AUTO W/SCOPE: CPT | Performed by: NURSE PRACTITIONER

## 2020-06-24 PROCEDURE — 25010000002 HEPARIN (PORCINE) PER 1000 UNITS: Performed by: NURSE PRACTITIONER

## 2020-06-24 PROCEDURE — 83735 ASSAY OF MAGNESIUM: CPT | Performed by: INTERNAL MEDICINE

## 2020-06-24 PROCEDURE — 97116 GAIT TRAINING THERAPY: CPT

## 2020-06-24 RX ORDER — SACCHAROMYCES BOULARDII 250 MG
250 CAPSULE ORAL 2 TIMES DAILY
Qty: 28 CAPSULE | Refills: 0 | Status: SHIPPED | OUTPATIENT
Start: 2020-06-24 | End: 2020-07-08

## 2020-06-24 RX ORDER — DOXYCYCLINE 100 MG/1
100 CAPSULE ORAL EVERY 12 HOURS SCHEDULED
Qty: 14 CAPSULE | Refills: 0 | Status: SHIPPED | OUTPATIENT
Start: 2020-06-24 | End: 2020-07-01

## 2020-06-24 RX ORDER — HYDROCODONE BITARTRATE AND ACETAMINOPHEN 5; 325 MG/1; MG/1
1 TABLET ORAL EVERY 6 HOURS PRN
Qty: 12 TABLET | Refills: 0 | Status: SHIPPED | OUTPATIENT
Start: 2020-06-24 | End: 2020-09-01

## 2020-06-24 RX ORDER — AMLODIPINE BESYLATE 10 MG/1
5 TABLET ORAL 2 TIMES DAILY
Status: ON HOLD
Start: 2020-06-24 | End: 2021-04-12

## 2020-06-24 RX ADMIN — PANTOPRAZOLE SODIUM 40 MG: 40 TABLET, DELAYED RELEASE ORAL at 08:46

## 2020-06-24 RX ADMIN — HYDROCODONE BITARTRATE AND ACETAMINOPHEN 1 TABLET: 5; 325 TABLET ORAL at 05:59

## 2020-06-24 RX ADMIN — FUROSEMIDE 20 MG: 20 TABLET ORAL at 08:45

## 2020-06-24 RX ADMIN — HEPARIN SODIUM 5000 UNITS: 5000 INJECTION, SOLUTION INTRAVENOUS; SUBCUTANEOUS at 05:58

## 2020-06-24 RX ADMIN — LACTULOSE 10 G: 20 SOLUTION ORAL at 08:46

## 2020-06-24 RX ADMIN — DOXYCYCLINE 100 MG: 100 CAPSULE ORAL at 08:45

## 2020-06-24 RX ADMIN — AMLODIPINE BESYLATE 5 MG: 5 TABLET ORAL at 08:45

## 2020-06-24 RX ADMIN — BUDESONIDE AND FORMOTEROL FUMARATE DIHYDRATE 2 PUFF: 160; 4.5 AEROSOL RESPIRATORY (INHALATION) at 06:59

## 2020-06-24 RX ADMIN — DOCUSATE SODIUM 100 MG: 100 CAPSULE, LIQUID FILLED ORAL at 08:46

## 2020-06-24 RX ADMIN — TAMSULOSIN HYDROCHLORIDE 0.4 MG: 0.4 CAPSULE ORAL at 08:46

## 2020-06-24 RX ADMIN — MAGNESIUM SULFATE HEPTAHYDRATE 4 G: 40 INJECTION, SOLUTION INTRAVENOUS at 01:31

## 2020-06-24 RX ADMIN — ASPIRIN 81 MG 81 MG: 81 TABLET ORAL at 08:45

## 2020-06-24 RX ADMIN — CITALOPRAM HYDROBROMIDE 40 MG: 40 TABLET ORAL at 08:46

## 2020-06-24 RX ADMIN — MULTIVITAMIN TABLET 1 TABLET: TABLET at 08:45

## 2020-06-24 RX ADMIN — CARVEDILOL 6.25 MG: 6.25 TABLET, FILM COATED ORAL at 08:46

## 2020-06-24 RX ADMIN — Medication 250 MG: at 08:45

## 2020-06-24 NOTE — TELEPHONE ENCOUNTER
----- Message from Homar Viveros MD sent at 6/22/2020  1:15 PM EDT -----  Labs show stable liver function which is good news.

## 2020-06-25 ENCOUNTER — READMISSION MANAGEMENT (OUTPATIENT)
Dept: CALL CENTER | Facility: HOSPITAL | Age: 70
End: 2020-06-25

## 2020-06-25 DIAGNOSIS — T82.7XXA: Primary | ICD-10-CM

## 2020-06-25 LAB
BACTERIA SPEC AEROBE CULT: NORMAL
BACTERIA SPEC AEROBE CULT: NORMAL

## 2020-06-25 NOTE — OUTREACH NOTE
Prep Survey      Responses   [facility] facility patient discharged from?  [facility]   Is LACE score < 7 ?  No   Eligibility  Readm Mgmt   Discharge diagnosis  Sepsis due to possible bacterial aortitis and history of aortic aneurysm and previous repair   COVID-19 Test Status  Negative [x2]   Does the patient have one of the following disease processes/diagnoses(primary or secondary)?  Sepsis   Does the patient have Home health ordered?  Yes   What is the Home health agency?   [facility]    Is there a DME ordered?  Yes   What DME was ordered?  OP IV antibiotics [facility] infusion   Prep survey completed?  Yes          Earnestine Méndez RN

## 2020-06-25 NOTE — TELEPHONE ENCOUNTER
Rupal,  Patient wife called you back. She stated that patient has been in the hospital here at [facility]. He was discharge yesterday. Mr Kelsey is on a strong antibiotics for bacteria infection; that is unknown right now. Hospital sent out tests to find out what type of bacteria infection he has. Patient also has some bleeding in his stool. Please call her back. Thanks

## 2020-06-29 ENCOUNTER — READMISSION MANAGEMENT (OUTPATIENT)
Dept: CALL CENTER | Facility: HOSPITAL | Age: 70
End: 2020-06-29

## 2020-06-29 NOTE — OUTREACH NOTE
Sepsis Week 1 Survey      Responses   [facility] [place] patient discharged from?  [place]   COVID-19 Test Status  Negative   Does the patient have one of the following disease processes/diagnoses(primary or secondary)?  Sepsis   Is there a successful TCM telephone encounter documented?  No   Week 1 attempt successful?  No   Unsuccessful attempts  Attempt 1          Teresita Nielsen LPN

## 2020-07-01 ENCOUNTER — READMISSION MANAGEMENT (OUTPATIENT)
Dept: CALL CENTER | Facility: HOSPITAL | Age: 70
End: 2020-07-01

## 2020-07-01 DIAGNOSIS — J44.9 COPD MIXED TYPE (HCC): ICD-10-CM

## 2020-07-01 RX ORDER — CARVEDILOL 6.25 MG/1
TABLET ORAL
Qty: 90 TABLET | Refills: 0 | Status: SHIPPED | OUTPATIENT
Start: 2020-07-01 | End: 2020-08-24

## 2020-07-01 NOTE — OUTREACH NOTE
Sepsis Week 1 Survey      Responses   [facility] patient discharged from?  [place]   COVID-19 Test Status  Negative   Does the patient have one of the following disease processes/diagnoses(primary or secondary)?  Sepsis   Is there a successful TCM telephone encounter documented?  No   Week 1 attempt successful?  Yes   Call start time  1439   Call end time  1453   Discharge diagnosis  Sepsis   Is patient permission given to speak with other caregiver?  Yes   List who call center can speak with  Mago Hernandezer-spouse   Person spoke with today (if not patient) and relationship  Mago-spouse and patient   Medication alerts for this patient  Pt has infusions at [facility] 7 days/week   Meds reviewed with patient/caregiver?  Yes   Is the patient having any side effects they believe may be caused by any medication additions or changes?  No   Does the patient have all medications related to this admission filled (includes all antibiotics, inhalers, nebulizers,steroids,etc.)  Yes   Is the patient taking all medications as directed (includes completed medication regime)?  Yes   Comments regarding appointments  Appt with ID on 6/29/20 and again on 7/8/20,  Appt on 7/20/20 with CT surgery,  Appt with cardiology is 7/23/20   Does the patient have a primary care provider?   Yes   Comments regarding PCP  7/14/20   Does the patient have an appointment with their PCP within 7 days of discharge?  No   What is preventing the patient from scheduling follow up appointments within 7 days of discharge?  -- [Already had his appt scheduled. They wanted to keep it plus he's following up daily in the hospital. ]   Nursing Interventions  Verified appointment date/time/provider   Has the patient kept scheduled appointments due by today?  Yes   What is the Home health agency?   [facility]-not visiting at this time while patient is going to the hospital daily.   Pulse Ox monitoring  None   Psychosocial issues?  No   Did the  "patient receive a copy of their discharge instructions?  Yes   Nursing interventions  Reviewed instructions with patient   What is the patient's perception of their health status since discharge?  Improving [Pt is still \"real week and stomach cramps\". He's also had an elevated temp. His appetite is less than normal. ]   Nursing interventions  Nurse provided patient education   Is the patient/caregiver able to teach back Sepsis?  S - Shivering,fever or very cold, E - Extreme pain or generalized discomfort (worst ever,especially abdomen), P - Pale or discolored skin, S - Sleepy, difficult to arouse,confused, S - Short of breath, I -   I feel like I might die-a feeling of hopelessness   Nursing interventions  Nurse provided patient education, Advised patient to call provider   Is patient/caregiver able to teach back steps to recovery at home?  Set small, achievable goals for return to baseline health, Rest and regain strength, Eat a balanced diet   Is the patient/caregiver able to teach back signs and symptoms of worsening condition:  Fever, Hyperthermia, Shortness of breath/rapid respiratory rate, Altered mental status(confusion/coma), Edema, Rapid heart rate (>90) [No edema or rapid HR present at time of call. He's Temp's have been elevated. Temp was 101.9 was today while at hospital. Dr. Carrero is aware and informed wife to get him to the hopsital if temp goes up and she can't get it down. ]   If the patient is a current smoker, are they able to teach back resources for cessation?  -- [Nonsmoker]   Is the patient/caregiver able to teach back the hierarchy of who to call/visit for symptoms/problems? PCP, Specialist, Home health nurse, Urgent Care, ED, 911  Yes   Week 1 call completed?  Yes          Rachel Keating RN  "

## 2020-07-02 LAB — REF LAB TEST RESULTS: NORMAL

## 2020-07-07 ENCOUNTER — READMISSION MANAGEMENT (OUTPATIENT)
Dept: CALL CENTER | Facility: HOSPITAL | Age: 70
End: 2020-07-07

## 2020-07-07 NOTE — OUTREACH NOTE
Sepsis Week 2 Survey      Responses   [facility] patient discharged from?  [place]   COVID-19 Test Status  Negative   Does the patient have one of the following disease processes/diagnoses(primary or secondary)?  Sepsis   Week 2 attempt successful?  Yes   Call start time  1614   Call end time  1621   Discharge diagnosis  Sepsis   Person spoke with today (if not patient) and relationship  Mago-spouse and patient   Meds reviewed with patient/caregiver?  Yes   Is the patient having any side effects they believe may be caused by any medication additions or changes?  No   Does the patient have all medications related to this admission filled (includes all antibiotics, inhalers, nebulizers,steroids,etc.)  Yes   Is the patient taking all medications as directed (includes completed medication regime)?  Yes   Does the patient have a primary care provider?   Yes   Does the patient have an appointment with their PCP within 7 days of discharge?  Yes   Has the patient kept scheduled appointments due by today?  Yes   Has home health visited the patient within 72 hours of discharge?  N/A   Psychosocial issues?  No   Did the patient receive a copy of their discharge instructions?  Yes   Nursing interventions  Reviewed instructions with patient   What is the patient's perception of their health status since discharge?  Improving   Nursing interventions  Nurse provided patient education   Is the patient/caregiver able to teach back Sepsis?  S - Shivering,fever or very cold, E - Extreme pain or generalized discomfort (worst ever,especially abdomen), P - Pale or discolored skin, S - Sleepy, difficult to arouse,confused, I -   I feel like I might die-a feeling of hopelessness, S - Short of breath   Nursing interventions  Nurse provided reassurance to patient   Is patient/caregiver able to teach back steps to recovery at home?  Set small, achievable goals for return to baseline health, Rest and regain strength, Talk about  feelings with family/friends   Is the patient/caregiver able to teach back signs and symptoms of worsening condition:  Fever, Rapid heart rate (>90), Altered mental status(confusion/coma), Hyperthermia, Shortness of breath/rapid respiratory rate, Edema   Is the patient/caregiver able to teach back the hierarchy of who to call/visit for symptoms/problems? PCP, Specialist, Home health nurse, Urgent Care, ED, 911  Yes   Additional teach back comments  c/o constipation even with stool softeners, will be seeing MD in am, 7/8/20, as well as PET scan to evaluate for infection   Week 2 call completed?  Yes          Denisha Santos RN

## 2020-07-15 ENCOUNTER — HOSPITAL ENCOUNTER (EMERGENCY)
Facility: HOSPITAL | Age: 70
End: 2020-07-15

## 2020-07-16 ENCOUNTER — READMISSION MANAGEMENT (OUTPATIENT)
Dept: CALL CENTER | Facility: HOSPITAL | Age: 70
End: 2020-07-16

## 2020-07-16 NOTE — OUTREACH NOTE
Sepsis Week 3 Survey      Responses   [facility] patient discharged from?  [place]   COVID-19 Test Status  Negative   Does the patient have one of the following disease processes/diagnoses(primary or secondary)?  Sepsis   Week 3 attempt successful?  Yes   Call start time  1450   Call end time  1500   Discharge diagnosis  Sepsis   Person spoke with today (if not patient) and relationship  Mago-spouse    Meds reviewed with patient/caregiver?  Yes   Is the patient having any side effects they believe may be caused by any medication additions or changes?  Yes   Side effects comments   Extreme fatigue   Does the patient have all medications related to this admission filled (includes all antibiotics, inhalers, nebulizers,steroids,etc.)  Yes   Medication comments  Wife is administering new antibiotic twice a day   Comments regarding appointments  Sees ID for dressing changes and labs, also Dr. Burleson but ID wants all other appts cancelled at this point due to vulnerability    Does the patient have a primary care provider?   Yes   Comments regarding PCP  will not see, see above   Has the patient kept scheduled appointments due by today?  Yes   What is the Home health agency?   No HH   What DME was ordered?  .   Psychosocial issues?  No   What is the patient's perception of their health status since discharge?  Same   Additional teach back comments  Not feeling good, has ongoing abdominal pain, wife giving him Boost, Ensure and other fluids as it hurts to eat.   Week 3 call completed?  Yes          Ginger Beal RN

## 2020-07-23 DIAGNOSIS — Z00.6 EXAMINATION FOR NORMAL COMPARISON FOR CLINICAL RESEARCH: Primary | ICD-10-CM

## 2020-07-27 ENCOUNTER — OFFICE VISIT (OUTPATIENT)
Dept: CARDIAC SURGERY | Facility: CLINIC | Age: 70
End: 2020-07-27

## 2020-07-27 VITALS
BODY MASS INDEX: 30.46 KG/M2 | HEIGHT: 75 IN | SYSTOLIC BLOOD PRESSURE: 135 MMHG | HEART RATE: 94 BPM | OXYGEN SATURATION: 97 % | WEIGHT: 245 LBS | TEMPERATURE: 99.1 F | DIASTOLIC BLOOD PRESSURE: 63 MMHG

## 2020-07-27 DIAGNOSIS — Z98.890 S/P AAA REPAIR: ICD-10-CM

## 2020-07-27 DIAGNOSIS — I71.40 ABDOMINAL AORTIC ANEURYSM (AAA) WITHOUT RUPTURE (HCC): Primary | ICD-10-CM

## 2020-07-27 DIAGNOSIS — Z86.79 S/P AAA REPAIR: ICD-10-CM

## 2020-07-27 PROCEDURE — 99212 OFFICE O/P EST SF 10 MIN: CPT | Performed by: THORACIC SURGERY (CARDIOTHORACIC VASCULAR SURGERY)

## 2020-07-27 RX ORDER — LEVOFLOXACIN 500 MG/1
1 TABLET, FILM COATED ORAL DAILY
COMMUNITY
Start: 2020-07-08 | End: 2020-09-01

## 2020-07-27 NOTE — PROGRESS NOTES
07/27/2020  Patient Information  Derek Kelsey                                                                                          [address] [place]  [place] [address]   1950  'PCP/Referring Physician'  Raudel Zheng MD  [phone]  No ref. provider found    Chief Complaint   Patient presents with   • Abdominal Pain     Hospital follow-up with PET scan done at [facility] on 7/13/20 for possible abdominal aortic aneurysm graft infection       History of Present Illness:   The patient returns today for follow-up of his questionable AAA graft infection.  Since last visit, he has still had some lower abdominal pain and maybe some vague back pain.  He apparently has not seen his urologist, Dr. Ford, since discharge.  He apparently did see Dr. Rivera in the hospital and a visit.  He apparently says he is getting a temperature over 100 occasionally.  He is seen Dr. Carrero, doing telemedicine every week or 2 with him.      Patient Active Problem List   Diagnosis   • Anxiety   • Abdominal aortic aneurysm (S/P Endovascular repair)   • Depression   • Hypertension   • Cataract, bilateral   • CAD (coronary artery disease)   • Abnormal stress test   • Moderate COPD   • Former smoker   • Coal workers pneumoconiosis, simple   • Back pain   • S/P lumbar fusion   • Prediabetes   • Acute blood loss anemia, mild, asymptomatic   • Acute postoperative pain   • Hyponatremia, mild   • Chronic respiratory failure with hypoxia and hypercapnia (CMS/HCC)   • Renal insufficiency   • Acute cystitis without hematuria   • Acute UTI   • Sepsis (CMS/HCC)   • Bacteremia   • Anemia   • Aortitis (CMS/HCC)   • Elevated C-reactive protein (CRP)   • Cirrhosis of liver (CMS/HCC)   • High anion gap metabolic acidosis   • S/P AAA repair     Past Medical History:   Diagnosis Date   • Abdominal aortic aneurysm (CMS/HCC) 9/29/2016   • Anxiety 9/29/2016   • Arthritis    • Back pain    • Black lung (CMS/HCC)    • CAD (coronary artery disease)  9/29/2016   • Cirrhosis (CMS/[facility])    • COPD (chronic obstructive pulmonary disease) (CMS/[facility]) 9/29/2016   • Depression 9/29/2016   • Depression    • GERD (gastroesophageal reflux disease)    • Hypertension 9/29/2016   • On supplemental oxygen by nasal cannula     at night   • Vitiligo    • Wears dentures    • Wears reading eyeglasses      Past Surgical History:   Procedure Laterality Date   • ABDOMINAL AORTIC ANEURYSM REPAIR WITH ENDOGRAFT N/A 6/6/2019    Procedure: ABDOMINAL AORTIC ANEURYSM REPAIR WITH ENDOGRAFT;  Surgeon: Leopoldo Burleson MD;  Location:  [facility];  Service: Vascular   • BACK SURGERY     • CARDIAC CATHETERIZATION     • CARDIAC CATHETERIZATION N/A 9/28/2018    Procedure: Left Heart Cath;  Surgeon: Douglas Galvez MD;  Location:  [facility]ATH INVAS[facility]ATION;  Service: Cardiovascular   • CARDIAC SURGERY     • COLONOSCOPY      2015   • COLONOSCOPY N/A 10/30/2019    Procedure: COLONOSCOPY;  Surgeon: Homar Viveros MD;  Location:  [facility] ENDOSCOPY;  Service: Gastroenterology   • ENDOSCOPY N/A 10/30/2019    Procedure: ESOPHAGOGASTRODUODENOSCOPY;  Surgeon: Homar Viveros MD;  Location:  [facility]OS[facility];  Service: Gastroenterology   • EYE SURGERY      cataracts bilateral   • HAND SURGERY Left    • LUMBAR DISCECTOMY FUSION INSTRUMENTATION N/A 11/1/2018    Procedure: LUMBAR DISCECTOMY POSTERIOR WITH FUSION INSTRUMENTATION;  Surgeon: oJo Franklin MD;  Location:  [facility] OR;  Service: Orthopedic Spine   • LUMBAR DISCECTOMY FUSION INSTRUMENTATION N/A 7/24/2019    Procedure: POSTERIOR LUMBAR SPINAL FUSION REVISION AND INSTRUMENTATION L3,L4,L5,S1;  Surgeon: Joo Franklin MD;  Location:  [facility] OR;  Service: Orthopedic Spine   • ORIF FINGER / THUMB FRACTURE     • SHOULDER SURGERY Left        Current Outpatient Medications:   •  amLODIPine (NORVASC) 10 MG tablet, Take 0.5 tablets by mouth 2 (Two) Times a Day., Disp: , Rfl:   •  ASPIRIN LOW DOSE 81 MG tablet, Take 1 tablet by mouth  Daily. Last dose 10-21-18 (Patient taking differently: Take 81 mg by mouth Daily.), Disp: , Rfl:   •  carvedilol (COREG) 6.25 MG tablet, TAKE 1 TABLET BY MOUTH 2  TIMES A DAY WITH MEALS., Disp: 90 tablet, Rfl: 0  •  CEFEPIME 2 G/12.5ML IV PUSH SYRINGE KIT (PAD), , Disp: , Rfl:   •  citalopram (CeleXA) 20 MG tablet, Take 40 mg by mouth Daily., Disp: , Rfl:   •  ferrous gluconate (FERGON) 324 MG tablet, Take 1 tablet by mouth Daily With Breakfast. (Patient taking differently: Take 324 mg by mouth 2 (Two) Times a Day.), Disp: 30 tablet, Rfl: 0  •  Fluticasone-Umeclidin-Vilant (Trelegy Ellipta) 100-62.5-25 MCG/INH aerosol powder , Inhale 1 puff Daily. Pt will need to schedule an apt before any further refills., Disp: 60 each, Rfl: 0  •  furosemide (LASIX) 20 MG tablet, Take 20-40 mg by mouth Every Morning., Disp: , Rfl:   •  HYDROcodone-acetaminophen (NORCO) 5-325 MG per tablet, Take 1 tablet by mouth Every 6 (Six) Hours As Needed for Moderate Pain., Disp: 12 tablet, Rfl: 0  •  lactulose (CHRONULAC) 10 GM/15ML solution, Take 15 ml q 6 hours as needed for constipation, Disp: 1892 mL, Rfl: 3  •  levoFLOXacin (Levaquin) 500 MG tablet, Take 1 tablet by mouth Daily., Disp: , Rfl:   •  Multiple Vitamin (MULTI-VITAMIN DAILY) tablet, Take 1 tablet by mouth Daily., Disp: , Rfl:   •  O2 (OXYGEN), , Disp: , Rfl:   •  pantoprazole (PROTONIX) 40 MG EC tablet, Take 40 mg by mouth 2 (Two) Times a Day., Disp: , Rfl:   •  sucralfate (CARAFATE) 1 g tablet, TAKE 1 TABLET FOUR TIMES DAILY, Disp: 240 tablet, Rfl: 3  •  tamsulosin (FLOMAX) 0.4 MG capsule 24 hr capsule, Take 1 capsule by mouth Daily., Disp: 15 capsule, Rfl: 0  •  traMADol (ULTRAM) 50 MG tablet, Take 1 tablet by mouth 2 (two) times a day as needed for pain., Disp: 14 tablet, Rfl: 0  •  triamcinolone (KENALOG) 0.1 % cream, Apply 1 application topically to the appropriate area as directed 2 (Two) Times a Day., Disp: , Rfl:   Allergies   Allergen Reactions   • Ferrous Gluconate  [Ferrous Sulfate] Rash   • Penicillins Hives     Hives - has tolerated Zosyn and ceftriaxone 2019   • Percocet [Oxycodone-Acetaminophen] Confusion     Social History     Socioeconomic History   • Marital status:      Spouse name: Not on file   • Number of children: 2   • Years of education: Not on file   • Highest education level: Not on file   Occupational History   • Occupation: DISABLED      Comment: Back   Tobacco Use   • Smoking status: Former Smoker     Packs/day: 1.00     Years: 30.00     Pack years: 30.00     Types: Cigarettes     Last attempt to quit: 2011     Years since quittin.5   • Smokeless tobacco: Former User     Types: Chew     Quit date: 2011   Substance and Sexual Activity   • Alcohol use: No   • Drug use: No   • Sexual activity: Defer   Social History Narrative    Lives in [place].    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 Negative for night sweats and weight loss.   HENT: Negative for hearing loss, odynophagia and sore throat.    Cardiovascular: Positive for dyspnea on exertion. Negative for chest pain, leg swelling, orthopnea and palpitations.   Respiratory: Positive for cough. Negative for hemoptysis.    Endocrine: Negative for cold intolerance, heat intolerance, polydipsia, polyphagia and polyuria.   Hematologic/Lymphatic: Does not bruise/bleed easily.   Skin:  "Negative for itching and rash.   Musculoskeletal: Positive for back pain. Negative for joint pain, joint swelling and myalgias.   Gastrointestinal: Positive for abdominal pain, constipation and nausea. Negative for diarrhea, hematemesis, hematochezia, melena and vomiting.   Genitourinary: Positive for frequency. Negative for dysuria and hematuria.   Neurological: Positive for loss of balance. Negative for focal weakness, headaches, numbness and seizures.   Psychiatric/Behavioral: Positive for depression. Negative for suicidal ideas. The patient is nervous/anxious.    All other systems reviewed and are negative.    Vitals:    07/27/20 1123   BP: 135/63   BP Location: Left arm   Patient Position: Sitting   Pulse: 94   Temp: 99.1 °F (37.3 °C)   SpO2: 97%   Weight: 111 kg (245 lb)   Height: 190.5 cm (75\")      Physical Exam  CONSTITUTIONAL:   Oriented x3, well-nourished, well developed, in no acute distress.  EYES:    Eyes anicteric.  EOMI.  PERRLA.   ENT:                Good dentition.  NECK:     Supple without masses or thyromegaly.  LUNGS:           Decreased in the bases; no wheezing, rales or rhonchi.  CARDIOVASCULAR:  Regular rate and rhythm without murmur, rub or gallop.  There are no carotid bruits.  GI:     Abdomen is soft, nontender, nondistended with normal bowel sounds.  No hepatosplenomegaly and no masses.  EXTREMITIES:   No cyanosis, clubbing or edema.  SKIN:   No ulcerations, petechiae or bruising.  MUSCULOSKELETAL:  Full range of motion with no deformity of extremities.  NEURO:  Cranial nerves II-XII, motor and sensory exams are intact.  PSYCH:  Alert and oriented; mood and affect good.    Assessment/Plan:   The patient returns today for follow up of his AAA graft infection.  Overall, the patient is doing satisfactory.  He is to continue to get antibiotics.  I discussed the case with  over the phone.  At this point, Dr. Carrero is treating him with antibiotics.  The patient is absolutely reluctant to " consider surgery at this time.  He knows the seriousness of the problem.  I have also offered to refer him to the [facility] and get another opinion.  In view of the reluctance of him to consider surgery or go to another institution, I think continued conservative measures with antibiotics is indicated.  Dr. Carrero and I both discussed this.  I will tentatively see him back in 3 months.  Dr. Carrero will contact me in the interim if things change.  He and his wife have no further questions.  They do appear to understand the seriousness and the life-threatening possibility of this complication, and they do not want to pursue any other course other than what we are doing at the current time.    Patient Active Problem List   Diagnosis   • Anxiety   • Abdominal aortic aneurysm (S/P Endovascular repair)   • Depression   • Hypertension   • Cataract, bilateral   • CAD (coronary artery disease)   • Abnormal stress test   • Moderate COPD   • Former smoker   • Coal workers pneumoconiosis, simple   • Back pain   • S/P lumbar fusion   • Prediabetes   • Acute blood loss anemia, mild, asymptomatic   • Acute postoperative pain   • Hyponatremia, mild   • Chronic respiratory failure with hypoxia and hypercapnia (CMS/HCC)   • Renal insufficiency   • Acute cystitis without hematuria   • Acute UTI   • Sepsis (CMS/HCC)   • Bacteremia   • Anemia   • Aortitis (CMS/HCC)   • Elevated C-reactive protein (CRP)   • Cirrhosis of liver (CMS/HCC)   • High anion gap metabolic acidosis   • S/P AAA repair     CC: MD Davis Espana MD Debbie Moore, , editing for Leopoldo Burleson M.D.    I, Leopoldo Burleson MD, have read and agree with the editing done by Claudia Pat, .

## 2020-07-28 ENCOUNTER — READMISSION MANAGEMENT (OUTPATIENT)
Dept: CALL CENTER | Facility: HOSPITAL | Age: 70
End: 2020-07-28

## 2020-07-28 PROBLEM — Z86.79 S/P AAA REPAIR: Status: ACTIVE | Noted: 2020-07-28

## 2020-07-28 PROBLEM — Z98.890 S/P AAA REPAIR: Status: ACTIVE | Noted: 2020-07-28

## 2020-07-28 NOTE — OUTREACH NOTE
Sepsis Week 4 Survey      Responses   [facility] patient discharged from?  [place]   COVID-19 Test Status  Negative   Does the patient have one of the following disease processes/diagnoses(primary or secondary)?  Sepsis   Week 4 attempt successful?  Yes   Call start time  1359   Call end time  1402   Discharge diagnosis  Sepsis   Is patient permission given to speak with other caregiver?  Yes   List who call center can speak with  Mago Kelsey-spouse   Meds reviewed with patient/caregiver?  Yes   Is the patient having any side effects they believe may be caused by any medication additions or changes?  No   Is the patient taking all medications as directed (includes completed medication regime)?  Yes   Has the patient kept scheduled appointments due by today?  Yes   Is the patient still receiving Home Health Services?  N/A   Pulse Ox monitoring  None   Psychosocial issues?  No   What is the patient's perception of their health status since discharge?  Improving   Nursing interventions  Nurse provided patient education   Is the patient/caregiver able to teach back Sepsis?  S - Shivering,fever or very cold, E - Extreme pain or generalized discomfort (worst ever,especially abdomen), P - Pale or discolored skin, S - Sleepy, difficult to arouse,confused, I -   I feel like I might die-a feeling of hopelessness, S - Short of breath   Nursing interventions  Nurse provided reassurance to patient   Is patient/caregiver able to teach back steps to recovery at home?  Set small, achievable goals for return to baseline health, Rest and regain strength   Is the patient/caregiver able to teach back signs and symptoms of worsening condition:  Fever, Hyperthermia, Shortness of breath/rapid respiratory rate   Is the patient/caregiver able to teach back the hierarchy of who to call/visit for symptoms/problems? PCP, Specialist, Home health nurse, Urgent Care, ED, 911  Yes   Week 4 call completed?  Yes   Would the patient like  one additional call?  No   Graduated  Yes   Did the patient feel the follow up calls were helpful during their recovery period?  Yes   Was the number of calls appropriate?  Yes          Raine Jones RN

## 2020-07-30 ENCOUNTER — TELEPHONE (OUTPATIENT)
Dept: GASTROENTEROLOGY | Facility: CLINIC | Age: 70
End: 2020-07-30

## 2020-07-30 NOTE — TELEPHONE ENCOUNTER
Dr. Viveros  I informed Mr. Kelsey of results via [facility].  returned a call (voicemail) and stated patient has been very sick since the labs were done. He has been hospitalized here at  with bacteria in the blood, aortitis and very bad abdominal pain. She has requested if you have a chance to speak to her or Derek in regards to all of this that is going on and if this effects the bad abdominal pain he is experiencing. She request to return call to [phone] please.  Thank you  Rupal

## 2020-08-01 LAB
ALBUMIN SERPL-MCNC: 3.9 G/DL (ref 3.5–5.2)
ALBUMIN/GLOB SERPL: 0.9 G/DL
ALP SERPL-CCNC: 135 U/L (ref 39–117)
ALT SERPL-CCNC: 35 U/L (ref 1–41)
AST SERPL-CCNC: 74 U/L (ref 1–40)
BILIRUB SERPL-MCNC: 1.1 MG/DL (ref 0.2–1.2)
BUN SERPL-MCNC: 15 MG/DL (ref 8–23)
BUN/CREAT SERPL: 15.8 (ref 7–25)
CALCIUM SERPL-MCNC: 9.1 MG/DL (ref 8.6–10.5)
CHLORIDE SERPL-SCNC: 99 MMOL/L (ref 98–107)
CO2 SERPL-SCNC: 21 MMOL/L (ref 22–29)
CREAT SERPL-MCNC: 0.95 MG/DL (ref 0.76–1.27)
ERYTHROCYTE [DISTWIDTH] IN BLOOD BY AUTOMATED COUNT: 20.3 % (ref 12.3–15.4)
GLOBULIN SER CALC-MCNC: 4.3 GM/DL
GLUCOSE SERPL-MCNC: 113 MG/DL (ref 65–99)
HCT VFR BLD AUTO: 39.8 % (ref 37.5–51)
HGB BLD-MCNC: 12 G/DL (ref 13–17.7)
INR PPP: 1.29 (ref 0.85–1.16)
MCH RBC QN AUTO: 27.3 PG (ref 26.6–33)
MCHC RBC AUTO-ENTMCNC: 30.2 G/DL (ref 31.5–35.7)
MCV RBC AUTO: 90.5 FL (ref 79–97)
PLATELET # BLD AUTO: 156 10E3/MM3 (ref 140–450)
POTASSIUM SERPL-SCNC: 4.7 MMOL/L (ref 3.5–5.2)
PROT SERPL-MCNC: 8.2 G/DL (ref 6–8.5)
PROTHROMBIN TIME: 15.8 SECONDS (ref 11.5–14)
RBC # BLD AUTO: 4.4 10E6/MM3 (ref 4.14–5.8)
SODIUM SERPL-SCNC: 136 MMOL/L (ref 136–145)
WBC # BLD AUTO: 7.46 10E3/MM3 (ref 3.4–10.8)

## 2020-08-09 DIAGNOSIS — J44.9 COPD MIXED TYPE (HCC): ICD-10-CM

## 2020-08-11 ENCOUNTER — OFFICE VISIT (OUTPATIENT)
Dept: GASTROENTEROLOGY | Facility: CLINIC | Age: 70
End: 2020-08-11

## 2020-08-11 DIAGNOSIS — R10.9 CHRONIC ABDOMINAL PAIN: Primary | ICD-10-CM

## 2020-08-11 DIAGNOSIS — G89.29 CHRONIC ABDOMINAL PAIN: Primary | ICD-10-CM

## 2020-08-11 PROCEDURE — 99443 PR PHYS/QHP TELEPHONE EVALUATION 21-30 MIN: CPT | Performed by: INTERNAL MEDICINE

## 2020-08-11 NOTE — PROGRESS NOTES
PCP: Raudel Zheng MD    No chief complaint on file.  cc: back and leg pain    History of Present Illness:   Derek Kelsey is a 69 y.o. male who presents to GI clinic as a follow up for cirrhosis. 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  He reports persistent epigastric nausea and pain. Pain begins in back. Pain radiates down to legs. He reports profound weakness and fatigue.      Past Medical History:   Diagnosis Date   • Abdominal aortic aneurysm (CMS/HCC) 9/29/2016   • Anxiety 9/29/2016   • Arthritis    • Back pain    • Black lung (CMS/HCC)    • CAD (coronary artery disease) 9/29/2016   • Cirrhosis (CMS/HCC)    • COPD (chronic obstructive pulmonary disease) (CMS/HCC) 9/29/2016   • Depression 9/29/2016   • Depression    • GERD (gastroesophageal reflux disease)    • Hypertension 9/29/2016   • On supplemental oxygen by nasal cannula     at night   • Vitiligo    • Wears dentures    • Wears reading eyeglasses        Past Surgical History:   Procedure Laterality Date   • ABDOMINAL AORTIC ANEURYSM REPAIR WITH ENDOGRAFT N/A 6/6/2019    Procedure: ABDOMINAL AORTIC ANEURYSM REPAIR WITH ENDOGRAFT;  Surgeon: Leopoldo Burleson MD;  Location: [facility] HYBRID OR 15;  Service: Vascular   • BACK SURGERY     • CARDIAC CATHETERIZATION     • CARDIAC CATHETERIZATION N/A 9/28/2018    Procedure: Left Heart Cath;  Surgeon: Douglas Galvez MD;  Location:  [facility]AS[facility];  Service: Cardiovascular   • CARDIAC SURGERY     • COLONOSCOPY      2015   • COLONOSCOPY N/A 10/30/2019    Procedure: COLONOSCOPY;  Surgeon: Homar Viveros MD;  Location:  [facility] ENDOSCOPY;  Service: Gastroenterology   • ENDOSCOPY N/A 10/30/2019    Procedure: ESOPHAGOGASTRODUODENOSCOPY;  Surgeon: Homar Viveros MD;  Location:  [facility] ENDOSCOPY;  Service: Gastroenterology   • EYE SURGERY      cataracts bilateral   • HAND SURGERY Left    • LUMBAR DISCECTOMY  FUSION INSTRUMENTATION N/A 11/1/2018    Procedure: LUMBAR DISCECTOMY POSTERIOR WITH FUSION INSTRUMENTATION;  Surgeon: Joo Franklin MD;  Location:  [facility] OR;  Service: Orthopedic Spine   • LUMBAR DISCECTOMY FUSION INSTRUMENTATION N/A 7/24/2019    Procedure: POSTERIOR LUMBAR SPINAL FUSION REVISION AND INSTRUMENTATION L3,L4,L5,S1;  Surgeon: Joo Franklin MD;  Location:  [facility] OR;  Service: Orthopedic Spine   • ORIF FINGER / THUMB FRACTURE     • SHOULDER SURGERY Left          Current Outpatient Medications:   •  amLODIPine (NORVASC) 10 MG tablet, Take 0.5 tablets by mouth 2 (Two) Times a Day., Disp: , Rfl:   •  ASPIRIN LOW DOSE 81 MG tablet, Take 1 tablet by mouth Daily. Last dose 10-21-18 (Patient taking differently: Take 81 mg by mouth Daily.), Disp: , Rfl:   •  carvedilol (COREG) 6.25 MG tablet, TAKE 1 TABLET BY MOUTH 2  TIMES A DAY WITH MEALS., Disp: 90 tablet, Rfl: 0  •  CEFEPIME 2 G/12.5ML IV PUSH SYRINGE KIT (PAD), , Disp: , Rfl:   •  citalopram (CeleXA) 20 MG tablet, Take 40 mg by mouth Daily., Disp: , Rfl:   •  ferrous gluconate (FERGON) 324 MG tablet, Take 1 tablet by mouth Daily With Breakfast. (Patient taking differently: Take 324 mg by mouth 2 (Two) Times a Day.), Disp: 30 tablet, Rfl: 0  •  furosemide (LASIX) 20 MG tablet, Take 20-40 mg by mouth Every Morning., Disp: , Rfl:   •  HYDROcodone-acetaminophen (NORCO) 5-325 MG per tablet, Take 1 tablet by mouth Every 6 (Six) Hours As Needed for Moderate Pain., Disp: 12 tablet, Rfl: 0  •  lactulose (CHRONULAC) 10 GM/15ML solution, Take 15 ml q 6 hours as needed for constipation, Disp: 1892 mL, Rfl: 3  •  levoFLOXacin (Levaquin) 500 MG tablet, Take 1 tablet by mouth Daily., Disp: , Rfl:   •  Multiple Vitamin (MULTI-VITAMIN DAILY) tablet, Take 1 tablet by mouth Daily., Disp: , Rfl:   •  O2 (OXYGEN), , Disp: , Rfl:   •  pantoprazole (PROTONIX) 40 MG EC tablet, Take 40 mg by mouth 2 (Two) Times a Day., Disp: , Rfl:   •  sucralfate (CARAFATE) 1 g tablet, TAKE 1  TABLET FOUR TIMES DAILY, Disp: 240 tablet, Rfl: 3  •  tamsulosin (FLOMAX) 0.4 MG capsule 24 hr capsule, Take 1 capsule by mouth Daily., Disp: 15 capsule, Rfl: 0  •  traMADol (ULTRAM) 50 MG tablet, Take 1 tablet by mouth 2 (two) times a day as needed for pain., Disp: 14 tablet, Rfl: 0  •  TRELEGY ELLIPTA 100-62.5-25 MCG/INH aerosol powder , INHALE 1 PUFF ONCE DAILY. PT NEEDS APPT BEFORE FURTHER REFILL, Disp: 60 each, Rfl: 0  •  triamcinolone (KENALOG) 0.1 % cream, Apply 1 application topically to the appropriate area as directed 2 (Two) Times a Day., Disp: , Rfl:     Allergies   Allergen Reactions   • Ferrous Gluconate [Ferrous Sulfate] Rash   • Penicillins Hives     Hives - has tolerated Zosyn and ceftriaxone 2019   • Percocet [Oxycodone-Acetaminophen] Confusion       Family History   Problem Relation Age of Onset   • Stroke Mother    • Hypertension Mother    • Heart disease Mother    • Heart disease Father    • Hypertension Father    • Diabetes Sister    • Hypertension Sister    • Diabetes Brother    • Hypertension Child        Social History     Socioeconomic History   • Marital status:      Spouse name: Not on file   • Number of children: 2   • Years of education: Not on file   • Highest education level: Not on file   Occupational History   • Occupation: DISABLED      Comment: Back   Tobacco Use   • Smoking status: Former Smoker     Packs/day: 1.00     Years: 30.00     Pack years: 30.00     Types: Cigarettes     Last attempt to quit: 2011     Years since quittin.6   • Smokeless tobacco: Former User     Types: Chew     Quit date: 2011   Substance and Sexual Activity   • Alcohol use: No   • Drug use: No   • Sexual activity: Defer   Social History Narrative    Lives in [place].    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  In particular:  No fever  No rash  No increased arthralgias  No worsening edema  No cough  No dyspnea  + back pain      There were no vitals filed for this visit.    Physical Exam  Unable to perform: telephone visit    Assessment/Plan  1.) Abdominal pain, back pain, leg pain  2.) ? Aortitis  3.) Cirrhosis  Workup from my end to evaluate stomach: 10/30/2019.  egd showed severe phg, grade 2 esophageal varices, nodular stomach.    Confounding factors:  Back pain, leg pain, ? Aortitis  I did not biopsy the stomach and we could entertain repeating endoscopy however, the yield for biopsies to optimize condition is not optimistic.  I offered second opinion on liver disease contributing to overall picture as I certainly do not think his cirrhosis is helping his systemic illness but do not see it as the main cause. Typical symptoms of cirrhosis with/without decompensation: ascites, confusion, jaundice/pruritis.  Profound back pain and leg pain very atypical. Certainly weakness and fatigue can be attributed somewhat.  After a 25 minute telephone conversation with him, he would like to have formal liver evaluation by a hepatologist at .  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       Homar Viveros MD  8/11/2020

## 2020-08-24 RX ORDER — CARVEDILOL 6.25 MG/1
TABLET ORAL
Qty: 90 TABLET | Refills: 0 | Status: SHIPPED | OUTPATIENT
Start: 2020-08-24 | End: 2020-09-03

## 2020-09-01 ENCOUNTER — OFFICE VISIT (OUTPATIENT)
Dept: PULMONOLOGY | Facility: CLINIC | Age: 70
End: 2020-09-01

## 2020-09-01 VITALS
HEART RATE: 74 BPM | HEIGHT: 75 IN | DIASTOLIC BLOOD PRESSURE: 82 MMHG | SYSTOLIC BLOOD PRESSURE: 142 MMHG | BODY MASS INDEX: 29.34 KG/M2 | OXYGEN SATURATION: 95 % | TEMPERATURE: 98.6 F | WEIGHT: 236 LBS

## 2020-09-01 DIAGNOSIS — J96.11 CHRONIC RESPIRATORY FAILURE WITH HYPOXIA AND HYPERCAPNIA (HCC): ICD-10-CM

## 2020-09-01 DIAGNOSIS — J60 COAL WORKERS PNEUMOCONIOSIS (HCC): ICD-10-CM

## 2020-09-01 DIAGNOSIS — J44.9 COPD MIXED TYPE (HCC): Primary | ICD-10-CM

## 2020-09-01 DIAGNOSIS — J96.12 CHRONIC RESPIRATORY FAILURE WITH HYPOXIA AND HYPERCAPNIA (HCC): ICD-10-CM

## 2020-09-01 PROCEDURE — 94618 PULMONARY STRESS TESTING: CPT | Performed by: NURSE PRACTITIONER

## 2020-09-01 PROCEDURE — 99214 OFFICE O/P EST MOD 30 MIN: CPT | Performed by: NURSE PRACTITIONER

## 2020-09-01 RX ORDER — MEROPENEM 1 G/1
INJECTION, POWDER, FOR SOLUTION INTRAVENOUS
COMMUNITY
Start: 2020-08-18 | End: 2021-03-17 | Stop reason: HOSPADM

## 2020-09-01 NOTE — PROGRESS NOTES
[facility] Pulmonary Follow up    CHIEF COMPLAINT    COPD, chronic respiratory failure    Subjective   HISTORY OF PRESENT ILLNESS    Derek Kelsey is a 69 y.o.male here today for routine follow-up on his COPD with underlying coal workers pneumoconiosis.    Recent hospital stay in June at [facility] for sepsis - follows up with ID - currently on Merrem via PICC line.   He was also found to have cirrhosis of the liver with liver failure.  He follows up with  Hepatology clinic as well.   Labs done last week. H&H stable with a Hct around 10, elevated ALK in 190s    He does have worsening shortness of breath.    Trelegy working good.  No recent exacerbations.  He does have some dry cough in the evenings.   He does notice a big difference in his fatigue and dyspnea when he wears his oxygen at home.  He is wonder if he does not need some oxygen with activity as well.      Patient Active Problem List   Diagnosis   • Anxiety   • Abdominal aortic aneurysm (S/P Endovascular repair)   • Depression   • Hypertension   • Cataract, bilateral   • CAD (coronary artery disease)   • Abnormal stress test   • Moderate COPD   • Former smoker   • Coal workers pneumoconiosis, simple   • Back pain   • S/P lumbar fusion   • Prediabetes   • Acute blood loss anemia, mild, asymptomatic   • Acute postoperative pain   • Hyponatremia, mild   • Chronic respiratory failure with hypoxia and hypercapnia (CMS/HCC)   • Renal insufficiency   • Acute cystitis without hematuria   • Acute UTI   • Sepsis (CMS/HCC)   • Bacteremia   • Anemia   • Aortitis (CMS/HCC)   • Elevated C-reactive protein (CRP)   • Cirrhosis of liver (CMS/HCC)   • High anion gap metabolic acidosis   • S/P AAA repair       Allergies   Allergen Reactions   • Ferrous Gluconate [Ferrous Sulfate] Rash   • Penicillins Hives     Hives - has tolerated Zosyn and ceftriaxone 09/2019   • Percocet [Oxycodone-Acetaminophen] Confusion       Current Outpatient Medications:   •  amLODIPine (NORVASC) 10 MG  tablet, Take 0.5 tablets by mouth 2 (Two) Times a Day., Disp: , Rfl:   •  ASPIRIN LOW DOSE 81 MG tablet, Take 1 tablet by mouth Daily. Last dose 10-21-18 (Patient taking differently: Take 81 mg by mouth Daily.), Disp: , Rfl:   •  carvedilol (COREG) 6.25 MG tablet, TAKE 1 TABLET BY MOUTH 2  TIMES A DAY WITH MEALS., Disp: 90 tablet, Rfl: 0  •  citalopram (CeleXA) 20 MG tablet, Take 40 mg by mouth Daily., Disp: , Rfl:   •  ferrous gluconate (FERGON) 324 MG tablet, Take 1 tablet by mouth Daily With Breakfast. (Patient taking differently: Take 324 mg by mouth 2 (Two) Times a Day.), Disp: 30 tablet, Rfl: 0  •  furosemide (LASIX) 20 MG tablet, Take 20-40 mg by mouth Every Morning., Disp: , Rfl:   •  lactulose (CHRONULAC) 10 GM/15ML solution, Take 15 ml q 6 hours as needed for constipation, Disp: 1892 mL, Rfl: 3  •  meropenem (MERREM) 1 g injection, , Disp: , Rfl:   •  Multiple Vitamin (MULTI-VITAMIN DAILY) tablet, Take 1 tablet by mouth Daily., Disp: , Rfl:   •  O2 (OXYGEN), , Disp: , Rfl:   •  pantoprazole (PROTONIX) 40 MG EC tablet, Take 40 mg by mouth 2 (Two) Times a Day., Disp: , Rfl:   •  sucralfate (CARAFATE) 1 g tablet, TAKE 1 TABLET FOUR TIMES DAILY, Disp: 240 tablet, Rfl: 3  •  tamsulosin (FLOMAX) 0.4 MG capsule 24 hr capsule, Take 1 capsule by mouth Daily., Disp: 15 capsule, Rfl: 0  •  TRELEGY ELLIPTA 100-62.5-25 MCG/INH aerosol powder , INHALE 1 PUFF ONCE DAILY. PT NEEDS APPT BEFORE FURTHER REFILL, Disp: 60 each, Rfl: 0  •  triamcinolone (KENALOG) 0.1 % cream, Apply 1 application topically to the appropriate area as directed 2 (Two) Times a Day., Disp: , Rfl:   MEDICATION LIST AND ALLERGIES REVIEWED.    Social History     Tobacco Use   • Smoking status: Former Smoker     Packs/day: 1.00     Years: 30.00     Pack years: 30.00     Types: Cigarettes     Last attempt to quit: 2011     Years since quittin.6   • Smokeless tobacco: Former User     Types: Chew     Quit date: 2011   Substance Use Topics   •  "Alcohol use: No   • Drug use: No       FAMILY AND SOCIAL HISTORY REVIEWED.    Review of Systems   Constitutional: Positive for fatigue. Negative for chills, fever and unexpected weight change.   HENT: Negative for congestion, nosebleeds, postnasal drip, rhinorrhea, sinus pressure and trouble swallowing.    Respiratory: Positive for shortness of breath. Negative for cough, chest tightness and wheezing.    Cardiovascular: Negative for chest pain and leg swelling.   Gastrointestinal: Negative for abdominal pain, constipation, diarrhea, nausea and vomiting.   Genitourinary: Negative for dysuria, frequency, hematuria and urgency.   Musculoskeletal: Positive for arthralgias and gait problem. Negative for myalgias.   Neurological: Negative for dizziness, weakness, numbness and headaches.   All other systems reviewed and are negative.  .  Objective   /82   Pulse 74   Temp 98.6 °F (37 °C)   Ht 190.5 cm (75\")   Wt 107 kg (236 lb)   SpO2 95% Comment: resting, room air  BMI 29.50 kg/m²       There is no immunization history on file for this patient.    Physical Exam   Constitutional: He is oriented to person, place, and time. He appears well-developed and well-nourished.   HENT:   Head: Normocephalic and atraumatic.   Eyes: Pupils are equal, round, and reactive to light. EOM are normal.   Neck: Normal range of motion. Neck supple.   Cardiovascular: Normal rate and regular rhythm.   No murmur heard.  Pulmonary/Chest: Effort normal. No respiratory distress. He has no wheezes. He has rales.   Decreased bilaterally with some lower lobe rales   Abdominal: Soft. Bowel sounds are normal. He exhibits no distension.   Musculoskeletal: Normal range of motion. He exhibits no edema.   Neurological: He is alert and oriented to person, place, and time.   Skin: Skin is warm and dry. No erythema.   Psychiatric: He has a normal mood and affect. His behavior is normal.   Vitals reviewed.        RESULTS    PFTS in the office today, " read by me:  Unable to complete secondary to the COVID epidemic      6-minute walk test in the office today did show desaturating to 87% after 2 minutes, tolerated 2 L pulse dose at 9 3094%.  Poor exercise tolerance at 152 m, 26% predicted.      Results for orders placed during the hospital encounter of 06/18/20   CT Chest Without Contrast    Narrative EXAMINATION: CT CHEST WO CONTRAST-, CT ABDOMEN PELVIS WO CONTRAST-  06/18/2020     INDICATION: SOA and fever     TECHNIQUE: 5 mm unenhanced images through the chest and abdomen and  pelvis.     The radiation dose reduction device was turned on for each scan per the  ALARA (As Low as Reasonably Achievable) protocol.     COMPARISON: Low dose chest CT scan 12/17/2019. Abdominal CT scan of  01/23/2020.     FINDINGS: Patient history indicates dyspnea, fever.     CHEST CT SCAN WITHOUT CONTRAST: Lungs appear clear except for mild  subpleural interstitial change, presumably scarring in the lung apices,  stable from 2019, and a couple of small calcified granulomas. No  effusion is seen. Mediastinal window images show no evidence of  pericardial effusion or mediastinal adenopathy. Granulomatous  calcifications are noted. A single prominent right paratracheal lymph  node, 16 mm in diameter is stable from the prior exam. Bony structures  appear to be intact.       Impression No evidence of active chest disease.           ABDOMEN AND PELVIS CT SCAN WITHOUT CONTRAST: Previous exam report  indicated liver morphology typical of cirrhosis, and stable left lobe  hepatic cyst.  Today's study again shows enlarged, nodular liver, and  small low-density area in the left lobe, presumably liver cyst. There is  patchy area of low density in the lateral liver, probably within the  enlarged left liver lobe, segment IVb, which is faintly suggested on  prior studies as well, favored to represent focal fatty change. No  well-defined liver mass is seen. Spleen is not enlarged. No  significant  abnormalities are seen of the pancreas, adrenal glands, or kidneys.  Gallbladder is distended, which may be due to fasting state but  otherwise appears normal. No upper abdominal free air, ascites or  adenopathy is seen.      Today's study gives the impression of mild inflammatory fat stranding  around the abdominal aorta at the level of the patient's aortoiliac  graft, not appreciated on the prior exam. Please refer to axial image 45  through image 52. This is also suggested on coronal images 85 through  93. There is subtle fat stranding in the pelvis as well along the  paracolic gutters, and there are shotty small lymph nodes. No extensive  adenopathy is seen. No intrapelvic free fluid is seen. No inflammatory  focus is identified elsewhere. Bowel loops are normal in caliber and  normal in appearance. Incidental note is again made of patient's  previous posterior lumbar fusion and multilevel kyphoplasties.     IMPRESSION:   1. Enlarged liver, with liver morphology consistent with cirrhosis.  Stable left lobe liver cyst.  2. Subtle patchy low density change in the right liver lobe, generally  wedge-shaped, which is thought to represent focal fatty deposition.  3. Mild but subtle changes suggesting periaortic inflammation along the  patient's abdominal aortic aneurysm. Please correlate with inflammatory  markers.  4. No evidence of acute intraabdominal or intrapelvic pathology is  appreciated elsewhere.     D:  06/18/2020  E:  06/18/2020     This report was finalized on 6/18/2020 11:08 PM by Dr. Donald Laboy MD.              Assessment/Plan     PROBLEM LIST         ICD-10-CM ICD-9-CM   1. Moderate COPD J44.9 496   2. Chronic respiratory failure with hypoxia and hypercapnia (CMS/HCC) J96.11 518.83    J96.12 799.02     786.09   3. Coal workers pneumoconiosis, simple J60 500       Problem List Items Addressed This Visit        Respiratory    Moderate COPD - Primary    Relevant Orders    Walking Oximetry  (Completed)    Coal workers pneumoconiosis, simple    Chronic respiratory failure with hypoxia and hypercapnia (CMS/HCC)            DISCUSSION    On his 6-minute walk test today he did show evidence of exertional hypoxemia.  He was 87% quite quickly with his walk.  He did tolerate 2 L pulse dose well and maintain around 93-94%.  We will order pulse dose/conserving concentrator at 2 L through his DME.  He does have oxygen at night.    We did review his testing today in the office.  His anemia likely influences his shortness of breath as well as well as his recent hospitalization, bacteremia, and ongoing treatment.  He has CT scan did not show any acute changes.    Continue on his Trelegy.  He has done well on it.  He does have an albuterol to use as needed.    Routine follow-up in 3 months or sooner if any changes.      I spent 25 minutes face to face with the patient and family. I spent > 50% percent of this time counseling and discussing diagnostic testing, evaluation, current status, treatment options and management.    Yolie Bates, LYNDA  09/01/20201:40 PM  Electronically signed     Please note that portions of this note were completed with a voice recognition program. Efforts were made to edit the dictations, but occasionally words are mistranscribed.      CC: Raudel Zheng MD

## 2020-09-02 DIAGNOSIS — J44.9 COPD MIXED TYPE (HCC): ICD-10-CM

## 2020-09-03 RX ORDER — CARVEDILOL 6.25 MG/1
TABLET ORAL
Qty: 90 TABLET | Refills: 0 | Status: SHIPPED | OUTPATIENT
Start: 2020-09-03 | End: 2020-10-05

## 2020-09-08 DIAGNOSIS — K59.00 CONSTIPATION, UNSPECIFIED CONSTIPATION TYPE: ICD-10-CM

## 2020-09-09 RX ORDER — LACTULOSE 10 G/15ML
SOLUTION ORAL
Qty: 1892 ML | Refills: 3 | Status: ON HOLD | OUTPATIENT
Start: 2020-09-09 | End: 2021-03-17 | Stop reason: SDUPTHER

## 2020-09-10 ENCOUNTER — HOSPITAL ENCOUNTER (INPATIENT)
Facility: HOSPITAL | Age: 70
LOS: 3 days | Discharge: HOME | End: 2020-09-13
Attending: INTERNAL MEDICINE | Admitting: INTERNAL MEDICINE

## 2020-09-10 ENCOUNTER — APPOINTMENT (OUTPATIENT)
Dept: CT IMAGING | Facility: HOSPITAL | Age: 70
End: 2020-09-10

## 2020-09-10 ENCOUNTER — APPOINTMENT (OUTPATIENT)
Dept: GENERAL RADIOLOGY | Facility: HOSPITAL | Age: 70
End: 2020-09-10

## 2020-09-10 DIAGNOSIS — Z74.09 IMPAIRED FUNCTIONAL MOBILITY, BALANCE, GAIT, AND ENDURANCE: Primary | ICD-10-CM

## 2020-09-10 PROBLEM — Z78.9 ON SUPPLEMENTAL OXYGEN BY NASAL CANNULA: Chronic | Status: ACTIVE | Noted: 2020-09-10

## 2020-09-10 PROBLEM — T82.7XXA: Status: ACTIVE | Noted: 2020-09-10

## 2020-09-10 PROBLEM — D69.6 THROMBOCYTOPENIA (HCC): Status: ACTIVE | Noted: 2020-09-10

## 2020-09-10 PROBLEM — R74.01 ELEVATED TRANSAMINASE LEVEL: Status: ACTIVE | Noted: 2020-09-10

## 2020-09-10 PROBLEM — K21.9 GERD (GASTROESOPHAGEAL REFLUX DISEASE): Chronic | Status: ACTIVE | Noted: 2020-09-10

## 2020-09-10 PROBLEM — R50.9 FEVER: Status: ACTIVE | Noted: 2020-09-10

## 2020-09-10 PROBLEM — Z20.822 PERSON UNDER INVESTIGATION FOR COVID-19: Status: ACTIVE | Noted: 2020-09-10

## 2020-09-10 LAB
ALBUMIN SERPL-MCNC: 3.3 G/DL (ref 3.5–5.2)
ALBUMIN/GLOB SERPL: 0.7 G/DL
ALP SERPL-CCNC: 200 U/L (ref 39–117)
ALT SERPL W P-5'-P-CCNC: 67 U/L (ref 1–41)
AMMONIA BLD-SCNC: 54 UMOL/L (ref 16–60)
ANION GAP SERPL CALCULATED.3IONS-SCNC: 10 MMOL/L (ref 5–15)
AST SERPL-CCNC: 151 U/L (ref 1–40)
B PARAPERT DNA SPEC QL NAA+PROBE: NOT DETECTED
B PERT DNA SPEC QL NAA+PROBE: NOT DETECTED
BACTERIA UR QL AUTO: ABNORMAL /HPF
BASOPHILS # BLD AUTO: 0.04 10*3/MM3 (ref 0–0.2)
BASOPHILS NFR BLD AUTO: 1 % (ref 0–1.5)
BILIRUB SERPL-MCNC: 1.5 MG/DL (ref 0–1.2)
BILIRUB UR QL STRIP: NEGATIVE
BUN SERPL-MCNC: 14 MG/DL (ref 8–23)
BUN/CREAT SERPL: 20 (ref 7–25)
C PNEUM DNA NPH QL NAA+NON-PROBE: NOT DETECTED
CALCIUM SPEC-SCNC: 9.2 MG/DL (ref 8.6–10.5)
CHLORIDE SERPL-SCNC: 102 MMOL/L (ref 98–107)
CLARITY UR: CLEAR
CO2 SERPL-SCNC: 26 MMOL/L (ref 22–29)
COLOR UR: YELLOW
CREAT SERPL-MCNC: 0.7 MG/DL (ref 0.76–1.27)
CRP SERPL-MCNC: 2.05 MG/DL (ref 0–0.5)
D DIMER PPP FEU-MCNC: 5.06 MCGFEU/ML (ref 0–0.56)
D-LACTATE SERPL-SCNC: 1.6 MMOL/L (ref 0.5–2)
DEPRECATED RDW RBC AUTO: 59.1 FL (ref 37–54)
EOSINOPHIL # BLD AUTO: 0.19 10*3/MM3 (ref 0–0.4)
EOSINOPHIL NFR BLD AUTO: 4.6 % (ref 0.3–6.2)
ERYTHROCYTE [DISTWIDTH] IN BLOOD BY AUTOMATED COUNT: 17 % (ref 12.3–15.4)
ERYTHROCYTE [SEDIMENTATION RATE] IN BLOOD: 83 MM/HR (ref 0–20)
FERRITIN SERPL-MCNC: 202.1 NG/ML (ref 30–400)
FLUAV H1 2009 PAND RNA NPH QL NAA+PROBE: NOT DETECTED
FLUAV H1 HA GENE NPH QL NAA+PROBE: NOT DETECTED
FLUAV H3 RNA NPH QL NAA+PROBE: NOT DETECTED
FLUAV SUBTYP SPEC NAA+PROBE: NOT DETECTED
FLUBV RNA ISLT QL NAA+PROBE: NOT DETECTED
GFR SERPL CREATININE-BSD FRML MDRD: 112 ML/MIN/1.73
GLOBULIN UR ELPH-MCNC: 4.8 GM/DL
GLUCOSE SERPL-MCNC: 88 MG/DL (ref 65–99)
GLUCOSE UR STRIP-MCNC: NEGATIVE MG/DL
HADV DNA SPEC NAA+PROBE: NOT DETECTED
HCOV 229E RNA SPEC QL NAA+PROBE: NOT DETECTED
HCOV HKU1 RNA SPEC QL NAA+PROBE: NOT DETECTED
HCOV NL63 RNA SPEC QL NAA+PROBE: NOT DETECTED
HCOV OC43 RNA SPEC QL NAA+PROBE: NOT DETECTED
HCT VFR BLD AUTO: 30 % (ref 37.5–51)
HGB BLD-MCNC: 8.9 G/DL (ref 13–17.7)
HGB UR QL STRIP.AUTO: NEGATIVE
HMPV RNA NPH QL NAA+NON-PROBE: NOT DETECTED
HPIV1 RNA SPEC QL NAA+PROBE: NOT DETECTED
HPIV2 RNA SPEC QL NAA+PROBE: NOT DETECTED
HPIV3 RNA NPH QL NAA+PROBE: NOT DETECTED
HPIV4 P GENE NPH QL NAA+PROBE: NOT DETECTED
HYALINE CASTS UR QL AUTO: ABNORMAL /LPF
IMM GRANULOCYTES # BLD AUTO: 0.02 10*3/MM3 (ref 0–0.05)
IMM GRANULOCYTES NFR BLD AUTO: 0.5 % (ref 0–0.5)
INR PPP: 1.58 (ref 0.85–1.16)
IRON 24H UR-MRATE: 41 MCG/DL (ref 59–158)
IRON SATN MFR SERPL: 9 % (ref 20–50)
KETONES UR QL STRIP: NEGATIVE
LDH SERPL-CCNC: 259 U/L (ref 135–225)
LEUKOCYTE ESTERASE UR QL STRIP.AUTO: ABNORMAL
LYMPHOCYTES # BLD AUTO: 1 10*3/MM3 (ref 0.7–3.1)
LYMPHOCYTES NFR BLD AUTO: 24.2 % (ref 19.6–45.3)
M PNEUMO IGG SER IA-ACNC: NOT DETECTED
MAGNESIUM SERPL-MCNC: 1.8 MG/DL (ref 1.6–2.4)
MCH RBC QN AUTO: 28.5 PG (ref 26.6–33)
MCHC RBC AUTO-ENTMCNC: 29.7 G/DL (ref 31.5–35.7)
MCV RBC AUTO: 96.2 FL (ref 79–97)
MONOCYTES # BLD AUTO: 0.65 10*3/MM3 (ref 0.1–0.9)
MONOCYTES NFR BLD AUTO: 15.7 % (ref 5–12)
NEUTROPHILS NFR BLD AUTO: 2.24 10*3/MM3 (ref 1.7–7)
NEUTROPHILS NFR BLD AUTO: 54 % (ref 42.7–76)
NITRITE UR QL STRIP: NEGATIVE
NRBC BLD AUTO-RTO: 0 /100 WBC (ref 0–0.2)
NT-PROBNP SERPL-MCNC: 319 PG/ML (ref 0–900)
PH UR STRIP.AUTO: 6.5 [PH] (ref 5–8)
PHOSPHATE SERPL-MCNC: 3 MG/DL (ref 2.5–4.5)
PLATELET # BLD AUTO: 113 10*3/MM3 (ref 140–450)
PMV BLD AUTO: 10.6 FL (ref 6–12)
POTASSIUM SERPL-SCNC: 3.8 MMOL/L (ref 3.5–5.2)
PROCALCITONIN SERPL-MCNC: 0.13 NG/ML (ref 0–0.25)
PROT SERPL-MCNC: 8.1 G/DL (ref 6–8.5)
PROT UR QL STRIP: ABNORMAL
PROTHROMBIN TIME: 18.5 SECONDS (ref 11.5–14)
RBC # BLD AUTO: 3.12 10*6/MM3 (ref 4.14–5.8)
RBC # UR: ABNORMAL /HPF
REF LAB TEST METHOD: ABNORMAL
RETICS # AUTO: 0.1 10*6/MM3 (ref 0.02–0.13)
RETICS/RBC NFR AUTO: 3.28 % (ref 0.7–1.9)
RHINOVIRUS RNA SPEC NAA+PROBE: NOT DETECTED
RSV RNA NPH QL NAA+NON-PROBE: NOT DETECTED
SARS-COV-2 RNA NPH QL NAA+NON-PROBE: NOT DETECTED
SODIUM SERPL-SCNC: 138 MMOL/L (ref 136–145)
SP GR UR STRIP: 1.01 (ref 1–1.03)
SQUAMOUS #/AREA URNS HPF: ABNORMAL /HPF
TIBC SERPL-MCNC: 465 MCG/DL (ref 298–536)
TRANSFERRIN SERPL-MCNC: 312 MG/DL (ref 200–360)
TROPONIN T SERPL-MCNC: <0.01 NG/ML (ref 0–0.03)
UROBILINOGEN UR QL STRIP: ABNORMAL
WBC # BLD AUTO: 4.14 10*3/MM3 (ref 3.4–10.8)
WBC UR QL AUTO: ABNORMAL /HPF

## 2020-09-10 PROCEDURE — 83615 LACTATE (LD) (LDH) ENZYME: CPT | Performed by: NURSE PRACTITIONER

## 2020-09-10 PROCEDURE — 25010000002 HEPARIN (PORCINE) PER 1000 UNITS: Performed by: NURSE PRACTITIONER

## 2020-09-10 PROCEDURE — 0202U NFCT DS 22 TRGT SARS-COV-2: CPT | Performed by: HOSPITALIST

## 2020-09-10 PROCEDURE — 80053 COMPREHEN METABOLIC PANEL: CPT | Performed by: NURSE PRACTITIONER

## 2020-09-10 PROCEDURE — 82728 ASSAY OF FERRITIN: CPT | Performed by: INTERNAL MEDICINE

## 2020-09-10 PROCEDURE — 74178 CT ABD&PLV WO CNTR FLWD CNTR: CPT

## 2020-09-10 PROCEDURE — 85652 RBC SED RATE AUTOMATED: CPT | Performed by: NURSE PRACTITIONER

## 2020-09-10 PROCEDURE — 85045 AUTOMATED RETICULOCYTE COUNT: CPT | Performed by: INTERNAL MEDICINE

## 2020-09-10 PROCEDURE — 84145 PROCALCITONIN (PCT): CPT | Performed by: NURSE PRACTITIONER

## 2020-09-10 PROCEDURE — 85025 COMPLETE CBC W/AUTO DIFF WBC: CPT | Performed by: INTERNAL MEDICINE

## 2020-09-10 PROCEDURE — 83880 ASSAY OF NATRIURETIC PEPTIDE: CPT | Performed by: NURSE PRACTITIONER

## 2020-09-10 PROCEDURE — 0 IOPAMIDOL PER 1 ML: Performed by: INTERNAL MEDICINE

## 2020-09-10 PROCEDURE — 83735 ASSAY OF MAGNESIUM: CPT | Performed by: NURSE PRACTITIONER

## 2020-09-10 PROCEDURE — 81001 URINALYSIS AUTO W/SCOPE: CPT | Performed by: NURSE PRACTITIONER

## 2020-09-10 PROCEDURE — 83540 ASSAY OF IRON: CPT | Performed by: INTERNAL MEDICINE

## 2020-09-10 PROCEDURE — 71275 CT ANGIOGRAPHY CHEST: CPT

## 2020-09-10 PROCEDURE — 71045 X-RAY EXAM CHEST 1 VIEW: CPT

## 2020-09-10 PROCEDURE — 85610 PROTHROMBIN TIME: CPT | Performed by: INTERNAL MEDICINE

## 2020-09-10 PROCEDURE — 82140 ASSAY OF AMMONIA: CPT | Performed by: NURSE PRACTITIONER

## 2020-09-10 PROCEDURE — 05H533Z INSERTION OF INFUSION DEVICE INTO RIGHT SUBCLAVIAN VEIN, PERCUTANEOUS APPROACH: ICD-10-PCS | Performed by: INTERNAL MEDICINE

## 2020-09-10 PROCEDURE — 84100 ASSAY OF PHOSPHORUS: CPT | Performed by: NURSE PRACTITIONER

## 2020-09-10 PROCEDURE — 87040 BLOOD CULTURE FOR BACTERIA: CPT | Performed by: NURSE PRACTITIONER

## 2020-09-10 PROCEDURE — 99223 1ST HOSP IP/OBS HIGH 75: CPT | Performed by: INTERNAL MEDICINE

## 2020-09-10 PROCEDURE — 83605 ASSAY OF LACTIC ACID: CPT | Performed by: NURSE PRACTITIONER

## 2020-09-10 PROCEDURE — 25010000002 MEROPENEM PER 100 MG: Performed by: NURSE PRACTITIONER

## 2020-09-10 PROCEDURE — 84484 ASSAY OF TROPONIN QUANT: CPT | Performed by: NURSE PRACTITIONER

## 2020-09-10 PROCEDURE — 25010000002 VANCOMYCIN 10 G RECONSTITUTED SOLUTION

## 2020-09-10 PROCEDURE — 85379 FIBRIN DEGRADATION QUANT: CPT | Performed by: NURSE PRACTITIONER

## 2020-09-10 PROCEDURE — 86140 C-REACTIVE PROTEIN: CPT | Performed by: NURSE PRACTITIONER

## 2020-09-10 PROCEDURE — 84466 ASSAY OF TRANSFERRIN: CPT | Performed by: INTERNAL MEDICINE

## 2020-09-10 RX ORDER — SACCHAROMYCES BOULARDII 250 MG
250 CAPSULE ORAL 2 TIMES DAILY
COMMUNITY

## 2020-09-10 RX ORDER — HEPARIN SODIUM 5000 [USP'U]/ML
5000 INJECTION, SOLUTION INTRAVENOUS; SUBCUTANEOUS EVERY 8 HOURS SCHEDULED
Status: DISCONTINUED | OUTPATIENT
Start: 2020-09-10 | End: 2020-09-13 | Stop reason: HOSPADM

## 2020-09-10 RX ORDER — ASPIRIN 81 MG/1
81 TABLET, CHEWABLE ORAL DAILY
Status: DISCONTINUED | OUTPATIENT
Start: 2020-09-11 | End: 2020-09-13 | Stop reason: HOSPADM

## 2020-09-10 RX ORDER — DIPHENOXYLATE HYDROCHLORIDE AND ATROPINE SULFATE 2.5; .025 MG/1; MG/1
1 TABLET ORAL DAILY
Status: DISCONTINUED | OUTPATIENT
Start: 2020-09-11 | End: 2020-09-13 | Stop reason: HOSPADM

## 2020-09-10 RX ORDER — CITALOPRAM 40 MG/1
40 TABLET ORAL DAILY
Status: DISCONTINUED | OUTPATIENT
Start: 2020-09-11 | End: 2020-09-13 | Stop reason: HOSPADM

## 2020-09-10 RX ORDER — CARVEDILOL 6.25 MG/1
6.25 TABLET ORAL EVERY 12 HOURS SCHEDULED
Status: DISCONTINUED | OUTPATIENT
Start: 2020-09-10 | End: 2020-09-13 | Stop reason: HOSPADM

## 2020-09-10 RX ORDER — FUROSEMIDE 20 MG/1
20 TABLET ORAL EVERY MORNING
Status: DISCONTINUED | OUTPATIENT
Start: 2020-09-11 | End: 2020-09-13 | Stop reason: HOSPADM

## 2020-09-10 RX ORDER — MEROPENEM 1 G/1
1 INJECTION, POWDER, FOR SOLUTION INTRAVENOUS EVERY 8 HOURS SCHEDULED
Status: DISCONTINUED | OUTPATIENT
Start: 2020-09-10 | End: 2020-09-10

## 2020-09-10 RX ORDER — SODIUM CHLORIDE 0.9 % (FLUSH) 0.9 %
10 SYRINGE (ML) INJECTION EVERY 12 HOURS SCHEDULED
Status: DISCONTINUED | OUTPATIENT
Start: 2020-09-10 | End: 2020-09-13 | Stop reason: HOSPADM

## 2020-09-10 RX ORDER — SODIUM CHLORIDE 0.9 % (FLUSH) 0.9 %
10 SYRINGE (ML) INJECTION AS NEEDED
Status: DISCONTINUED | OUTPATIENT
Start: 2020-09-10 | End: 2020-09-13 | Stop reason: HOSPADM

## 2020-09-10 RX ORDER — SODIUM CHLORIDE 9 MG/ML
100 INJECTION, SOLUTION INTRAVENOUS CONTINUOUS
Status: DISCONTINUED | OUTPATIENT
Start: 2020-09-10 | End: 2020-09-12

## 2020-09-10 RX ORDER — LACTULOSE 10 G/15ML
10 SOLUTION ORAL 3 TIMES DAILY
Status: DISCONTINUED | OUTPATIENT
Start: 2020-09-10 | End: 2020-09-13 | Stop reason: HOSPADM

## 2020-09-10 RX ORDER — PANTOPRAZOLE SODIUM 40 MG/1
40 TABLET, DELAYED RELEASE ORAL
Status: DISCONTINUED | OUTPATIENT
Start: 2020-09-10 | End: 2020-09-13 | Stop reason: HOSPADM

## 2020-09-10 RX ORDER — TAMSULOSIN HYDROCHLORIDE 0.4 MG/1
0.4 CAPSULE ORAL DAILY
Status: DISCONTINUED | OUTPATIENT
Start: 2020-09-11 | End: 2020-09-13 | Stop reason: HOSPADM

## 2020-09-10 RX ORDER — SENNOSIDES 8.6 MG
650 CAPSULE ORAL EVERY 8 HOURS PRN
COMMUNITY

## 2020-09-10 RX ORDER — AMLODIPINE BESYLATE 5 MG/1
5 TABLET ORAL 2 TIMES DAILY
Status: DISCONTINUED | OUTPATIENT
Start: 2020-09-10 | End: 2020-09-13 | Stop reason: HOSPADM

## 2020-09-10 RX ORDER — SACCHAROMYCES BOULARDII 250 MG
250 CAPSULE ORAL 2 TIMES DAILY
Status: DISCONTINUED | OUTPATIENT
Start: 2020-09-10 | End: 2020-09-13 | Stop reason: HOSPADM

## 2020-09-10 RX ORDER — SUCRALFATE 1 G/1
1 TABLET ORAL 4 TIMES DAILY
Status: DISCONTINUED | OUTPATIENT
Start: 2020-09-10 | End: 2020-09-13 | Stop reason: HOSPADM

## 2020-09-10 RX ADMIN — HEPARIN SODIUM 5000 UNITS: 5000 INJECTION, SOLUTION INTRAVENOUS; SUBCUTANEOUS at 21:26

## 2020-09-10 RX ADMIN — SODIUM CHLORIDE 100 ML/HR: 9 INJECTION, SOLUTION INTRAVENOUS at 21:04

## 2020-09-10 RX ADMIN — SUCRALFATE 1 G: 1 TABLET ORAL at 21:27

## 2020-09-10 RX ADMIN — LACTULOSE 10 G: 20 SOLUTION ORAL at 21:29

## 2020-09-10 RX ADMIN — SODIUM CHLORIDE, PRESERVATIVE FREE 10 ML: 5 INJECTION INTRAVENOUS at 21:30

## 2020-09-10 RX ADMIN — MEROPENEM 1 G: 1 INJECTION, POWDER, FOR SOLUTION INTRAVENOUS at 21:17

## 2020-09-10 RX ADMIN — VANCOMYCIN HYDROCHLORIDE 2000 MG: 10 INJECTION, POWDER, LYOPHILIZED, FOR SOLUTION INTRAVENOUS at 21:34

## 2020-09-10 RX ADMIN — IOPAMIDOL 100 ML: 755 INJECTION, SOLUTION INTRAVENOUS at 23:00

## 2020-09-10 RX ADMIN — PANTOPRAZOLE SODIUM 40 MG: 40 TABLET, DELAYED RELEASE ORAL at 21:27

## 2020-09-10 RX ADMIN — AMLODIPINE BESYLATE 5 MG: 5 TABLET ORAL at 21:27

## 2020-09-10 RX ADMIN — CARVEDILOL 6.25 MG: 6.25 TABLET, FILM COATED ORAL at 21:27

## 2020-09-10 RX ADMIN — Medication 250 MG: at 21:27

## 2020-09-10 NOTE — H&P
"    [facility] Medicine Services  HISTORY AND PHYSICAL    Patient Name: Derek Kelsey  : 1950  MRN: [id number]  Primary Care Physician: Raudel Zheng MD  Date of admission: 9/10/2020      Subjective   Subjective     Chief Complaint:  Weakness, fever, abd pain     HPI:  Derek Kelsey is a 69 y.o. male with past medical history significant for AAA s/p repair 2019, obesity, hard of hearing, anxiety, depression, hypertension, ex-smoker with COPD and 2 LNC oxygen \"most of the time\", prediabetes, CAD, GERD, coal miners pneumoconiosis, cirrhosis on chronic lactulose, chronic lower abdominal pain.  She was recently admitted to [facility] from  to 2020 with abdominal pain worsening and CT findings concerning for bacterial aortitis.  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  CT surgery Dr. Burleson and ID, Dr. Carrero followed and managed.  Patient ultimately discharged home on  with PICC line in place and ongoing outpatient antibiotic infusions.  Recently his wife has been completing Merrem every 8 hours via his PICC line at home.  Patient has been in close contact with his infectious disease provider who ultimately sent him for direct admit today due to increasing weakness, increasing shortness of air, cough, ongoing endograft infection and failing outpatient treatment.  Temp on  was 103.0.  Furthermore patient's son had COVID-19 confirmed at the end of July.  Was only around his father once and that was 1 week ago from .  They report this was outside and that the son had retested negative prior to this time.    Patient be admitted to hospital medicine service and we will consult CTS, and ID.  We will continue home Merrem and lactulose for now, check stat labs.  Will check COVID screen to rule out due to fever loss of taste of smell, weakness, nausea, increased cough and shortness of air. Majority of HPI/ROS obtained from pts " wife in the waiting room.      Review of Systems   Constitutional: Positive for activity change, chills, fatigue and fever.   HENT: Positive for congestion and hearing loss. Negative for sore throat and trouble swallowing.         Loss of taste, smell and appetite    Eyes: Negative.    Respiratory: Positive for cough and shortness of breath. Negative for wheezing.         Chronic, worse that baseline since Sunday     Wears 2 LNC most of the time and at hs   Cardiovascular: Positive for leg swelling. Negative for chest pain and palpitations.   Gastrointestinal: Positive for abdominal distention, abdominal pain, diarrhea, nausea and vomiting.        Lower abd pain x several months    Hx of cirrhosis on lactulose with diarrhea stools induced multiple times a day    Endocrine: Negative.    Genitourinary: Negative.    Musculoskeletal: Positive for arthralgias and gait problem.   Skin: Positive for rash.        PICC to RUE on admit   Allergic/Immunologic: Negative.    Neurological: Positive for weakness. Negative for dizziness, seizures and speech difficulty.   Hematological: Negative.    Psychiatric/Behavioral: Positive for dysphoric mood. The patient is nervous/anxious.         All other systems reviewed and are negative.     Personal History     Past Medical History:   Diagnosis Date   • Abdominal aortic aneurysm (CMS/HCC) 9/29/2016   • Anxiety 9/29/2016   • Arthritis    • Back pain    • Black lung (CMS/[facility])    • CAD (coronary artery disease) 9/29/2016   • Cirrhosis (CMS/[facility])    • COPD (chronic obstructive pulmonary disease) (CMS/[facility]) 9/29/2016   • Depression 9/29/2016   • Depression    • GERD (gastroesophageal reflux disease)    • Hypertension 9/29/2016   • On supplemental oxygen by nasal cannula     at night   • Vitiligo    • Wears dentures    • Wears reading eyeglasses        Past Surgical History:   Procedure Laterality Date   • ABDOMINAL AORTIC ANEURYSM REPAIR WITH ENDOGRAFT N/A 6/6/2019    Procedure: ABDOMINAL  AORTIC ANEURYSM REPAIR WITH ENDOGRAFT;  Surgeon: Leopoldo Burleson MD;  Location: [facility] HYBRID OR 15;  Service: Vascular   • BACK SURGERY     • CARDIAC CATHETERIZATION     • CARDIAC CATHETERIZATION N/A 9/28/2018    Procedure: Left Heart Cath;  Surgeon: Douglas Galvez MD;  Location:  [facility] CATH INVASIVE LOCATION;  Service: Cardiovascular   • CARDIAC SURGERY     • COLONOSCOPY      2015   • COLONOSCOPY N/A 10/30/2019    Procedure: COLONOSCOPY;  Surgeon: Homar Viveros MD;  Location:  [facility] ENDOSCOPY;  Service: Gastroenterology   • ENDOSCOPY N/A 10/30/2019    Procedure: ESOPHAGOGASTRODUODENOSCOPY;  Surgeon: Homar Viveros MD;  Location:  [facility] ENDOSCOPY;  Service: Gastroenterology   • EYE SURGERY      cataracts bilateral   • HAND SURGERY Left    • LUMBAR DISCECTOMY FUSION INSTRUMENTATION N/A 11/1/2018    Procedure: LUMBAR DISCECTOMY POSTERIOR WITH FUSION INSTRUMENTATION;  Surgeon: Joo Franklin MD;  Location:  [facility] OR;  Service: Orthopedic Spine   • LUMBAR DISCECTOMY FUSION INSTRUMENTATION N/A 7/24/2019    Procedure: POSTERIOR LUMBAR SPINAL FUSION REVISION AND INSTRUMENTATION L3,L4,L5,S1;  Surgeon: Joo Franklin MD;  Location:  [facility] OR;  Service: Orthopedic Spine   • ORIF FINGER / THUMB FRACTURE     • SHOULDER SURGERY Left        Family History: family history includes Diabetes in his brother, sister, and sister; Heart disease in his brother, father, mother, sister, and sister; Hypertension in his child, father, mother, sister, and son; No Known Problems in his son; Stroke in his mother. Otherwise pertinent FHx was reviewed and unremarkable.     Social History:  reports that he quit smoking about 9 years ago. His smoking use included cigarettes. He has a 30.00 pack-year smoking history. He quit smokeless tobacco use about 9 years ago.  His smokeless tobacco use included chew. He reports that he does not drink alcohol or use drugs.  Social History     Social History Narrative    Lives in  [place]    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  Medications Prior to Admission   Medication Sig Dispense Refill Last Dose   • acetaminophen (TYLENOL) 650 MG 8 hr tablet Take 650 mg by mouth Every 8 (Eight) Hours As Needed for Mild Pain .      • saccharomyces boulardii (FLORASTOR) 250 MG capsule Take 250 mg by mouth 2 (Two) Times a Day.      • amLODIPine (NORVASC) 10 MG tablet Take 0.5 tablets by mouth 2 (Two) Times a Day.   Taking   • ASPIRIN LOW DOSE 81 MG tablet Take 1 tablet by mouth Daily. Last dose 10-21-18 (Patient taking differently: Take 81 mg by mouth Daily.)   Taking   • carvedilol (COREG) 6.25 MG tablet TAKE 1 TABLET  2  TIMES A DAY WITH MEALS. 90 tablet 0    • citalopram (CeleXA) 20 MG tablet Take 40 mg by mouth Daily.   Taking   • ferrous gluconate (FERGON) 324 MG tablet Take 1 tablet by mouth Daily With Breakfast. (Patient taking differently: Take 324 mg by mouth 2 (Two) Times a Day.) 30 tablet 0 Taking   • furosemide (LASIX) 20 MG tablet Take 20-40 mg by mouth Every Morning.   Taking   • lactulose (CHRONULAC) 10 GM/15ML solution Take 15 ml q 6 hours as needed for constipation 1892 mL 3    • meropenem (MERREM) 1 g injection    Taking   • Multiple Vitamin (MULTI-VITAMIN DAILY) tablet Take 1 tablet by mouth Daily.   Taking   • O2 (OXYGEN)    Taking   • pantoprazole (PROTONIX) 40 MG EC tablet Take 40 mg by mouth 2 (Two) Times a Day.   Taking   • sucralfate (CARAFATE) 1 g tablet TAKE 1 TABLET FOUR TIMES DAILY 240 tablet 3 Taking   • tamsulosin (FLOMAX) 0.4 MG capsule 24 hr capsule Take 1 capsule by mouth Daily. 15 capsule 0 Taking   • TRELEGY ELLIPTA 100-62.5-25 MCG/INH aerosol powder   INHALE 1 PUFF ONCE DAILY 60 each 0    • triamcinolone (KENALOG) 0.1 % cream Apply 1 application topically to the appropriate area as directed 2 (Two) Times a Day.   Taking       Allergies   Allergen Reactions   • Penicillins Hives     Hives - has tolerated Zosyn and ceftriaxone 09/2019   • Percocet [Oxycodone-Acetaminophen] Confusion       Objective   Objective     Vital Signs:   Temp:  [98.8 °F (37.1 °C)] 98.8 °F (37.1 °C)  Heart Rate:  [75-80] 75  Resp:  [18-20] 18  BP: (129-153)/(71-75) 129/75        Physical Exam   Constitutional: Awake, alert  Eyes: PERRLA, sclerae anicteric, no conjunctival injection  HENT: NCAT, mucous membranes moist  Neck: Supple, no thyromegaly, no lymphadenopathy, trachea midline  Respiratory: Dry inspiratory crackles bilateral lung base  Cardiovascular: RRR, no murmurs, rubs, or gallops, palpable pedal pulses bilaterally  Gastrointestinal: Positive bowel sounds, soft, nontender, nondistended  Musculoskeletal: 1+ pitting edema on the right, no edema on the left, no clubbing or cyanosis to extremities  Psychiatric: Appropriate affect, cooperative  Neurologic: Oriented x 3, strength symmetric in all extremities, Cranial Nerves grossly intact to confrontation, speech clear  Skin: No rashes      Results Reviewed:  I have personally reviewed current lab and radiology data.        Results from last 7 days   Lab Units 09/10/20  1851   SODIUM mmol/L 138   POTASSIUM mmol/L 3.8   CHLORIDE mmol/L 102   CO2 mmol/L 26.0   BUN mg/dL 14   CREATININE mg/dL 0.70*   GLUCOSE mg/dL 88   CALCIUM mg/dL 9.2   ALT (SGPT) U/L 67*   AST (SGOT) U/L 151*   TROPONIN T ng/mL <0.010   PROBNP pg/mL 319.0   LACTATE mmol/L 1.6   PROCALCITONIN ng/mL 0.13     Estimated Creatinine Clearance: 116.2 mL/min (A) (by C-G formula based on SCr of 0.7 mg/dL (L)).  Brief Urine Lab Results  (Last result in the past 365 days)      Color   Clarity   Blood   Leuk Est   Nitrite   Protein   CREAT   Urine HCG        09/10/20 1830 Yellow  Clear Negative Trace Negative 100 mg/dL (2+)             Imaging Results (Last 24 Hours)     Procedure Component Value Units Date/Time    XR Chest 1 View [[id number]] Collected:  09/10/20 1759     Updated:  09/10/20 1811    Narrative:       EXAMINATION: XR CHEST SINGLE VIEW - 09/10/2020     INDICATION: Evaluate PICC placement.      COMPARISON: 06/05/2019 chest radiograph. Chest CT scan 06/18/2020.     FINDINGS: Right upper extremity PICC line is seen with its tip in the  most distal portion of the right subclavian vein. Heart shadow is upper  limits of normal size. Pulmonary vasculature appears upper limits of  normal. Right apical pleural scarring is stable. There is a very small  but new right pleural effusion.       Impression:       1. PICC line tip in the distal right subclavian vein.     2. Small right pleural effusion.     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       • Depression [F32.9] Yes   • Hypertension [I10] Yes   • CAD (coronary artery disease) [I25.10] Yes     Non-critical coronary artery disease.         Derek Kelsey is a 69 y.o. male with past medical history significant for AAA s/p repair  "June/2019, obesity, hard of hearing, anxiety, depression, hypertension, ex-smoker with COPD and 2 LNC oxygen \"most of the time\", prediabetes, CAD, GERD, coal miners pneumoconiosis, cirrhosis on chronic lactulose, chronic lower abdominal pain.  Recent admission here with sepsis from endograft infection.  Antibiotics inpatient and home with pick and outpatient antibiotics (Merrem every 8) to current.  Developed fever again on Sunday with worsening weakness cough, shortness of air, fever 103.0 and continued lower abdominal pain (essentially chronic times almost 3 months).  Sent as a direct admit by Dr. Carrero for ongoing care.    Assessment/Plan:    Fever  Hx of AAA s/p endograft repair 6/2019  S/p recent endograft infection and hospitialized here 6/2020  Fever, ongoing endograft infection  --was here 6/18-6/14/2020 followed by CTS and ID  --home with PICC on 6/24 with wife doing infusions  --back today for D.Admit per Dr Carrero due to persistent fever, weakness and infection  --will ck stat admit labs:CBC, CMP, LDH, ESR, CRP, procal, lactate, d-dimer, mg, phos, tropinin, BNP and ammonia. Stat bld cxs x 2, UA and CXR for PICC placement and resp status   --CT abdomen and pelvis  --pt has RUE PICC in place on admit, no signs of infection on exam.  Ck placement  --continue home merrem for now q8 as per home dosing until seen by ID. Add vancomycin with pharm to dose stat   --IVFs  --consult ID Dr Carrero and CTS Dr Burleson in am     Chronic resp failure on 2LNC  Hx coal miners pneumoconiosis  Ex-smoker with COPD  Increased SOA/cough from his baseline  --follows with pulm associates  --continue oxygen (nebs once COVID neg)  --ck COVID 19/resp panel now  --isolation until testing negative  --d dimer elevated, RLE edema.  CTA chest, RLE doppler US    Elevated transaminases  --acute hepatitis panel  --US gallbladder  --denies alcohol use  --hx of cirrhosis  --elevated from prior studies    Chronic lower abd pain  --CT " abdomen/pelvis    Hx HTN/CAD  --home meds as tolerated    Hx of Cirrhosis  --prior episodes of hepatic enceph  --on lactulose , continue  --ck ammonia level    Anxiety/depression  --wife reports depression seems worse recently   --consider meds       DVT prophylaxis:  Sequentials, heparin Subq      CODE STATUS:    Code Status and Medical Interventions:   Ordered at: 09/10/20 1812     Level Of Support Discussed With:    Patient     Code Status:    No CPR     Medical Interventions (Level of Support Prior to Arrest):    Full     Comments:    pt and wife agree       Admission Status:  I believe this patient meets INPATIENT status due to fever failing outpatient antibiotic treatment.  I feel patient’s risk for adverse outcomes and need for care warrant INPATIENT evaluation and I predict the patient’s care encounter to likely last beyond 2 midnights.    Electronically signed by LYNDA Jordan, 09/10/20, 6:13 PM.      Attending   Admission Attestation       I have seen and examined the patient, performing an independent face-to-face diagnostic evaluation with plan of care reviewed and developed with the advanced practice clinician (APC).      Brief Summary Statement:   Derek Kelsey is a 69 y.o. male with a PMH significant for anxiety, depression, HTN, COPD on 2 L home O2, coal miners pneumoconiosis, CAD, AAA repair 2019 with subsequent aortitis currently on meropenem outpatient following with ID who presents as a direct admission due to concerns for failure of outpatient antibiotic treatment.  Patient was reported to have a temperature of 103 on Sunday.  He reports chills, body aches, occasional nausea and vomiting.  Patient reports lower abdominal pain radiating into bilateral flanks.  Denies dysuria, diarrhea.  Patient has a chronic cough and chronic shortness of breath which are both at baseline.  He denies joint pain or swelling.    Remainder of detailed HPI is as noted by APC and has been reviewed  and/or edited by me for completeness.    Attending Physical Exam:  Constitutional: Awake, alert  Eyes: PERRLA, sclerae anicteric, no conjunctival injection  HENT: NCAT, mucous membranes moist  Neck: Supple, no thyromegaly, no lymphadenopathy, trachea midline  Respiratory: Dry inspiratory crackles bilateral lung base  Cardiovascular: RRR, no murmurs, rubs, or gallops, palpable pedal pulses bilaterally  Gastrointestinal: Positive bowel sounds, soft, nontender, nondistended  Musculoskeletal: 1+ pitting edema on the right, no edema on the left, no clubbing or cyanosis to extremities  Psychiatric: Appropriate affect, cooperative  Neurologic: Oriented x 3, strength symmetric in all extremities, Cranial Nerves grossly intact to confrontation, speech clear  Skin: No rashes        Brief Assessment/Plan :  See detailed assessment and plan developed with APC which I have reviewed and/or edited for completeness.    Electronically signed by Belinda Gonzales DO, 09/10/20, 8:14 PM.

## 2020-09-10 NOTE — PROGRESS NOTES
"Pharmacy Consult-Vancomycin Dosing  Derek Kelsey is a  69 y.o. male receiving vancomycin therapy.     Indication: Sepsis  Consulting Provider: Hospitalist  ID Consult: Yes    Goal AUC: 400 - 600 mg/L*hr    Current Antimicrobial Therapy  Anti-Infectives (From admission, onward)      Ordered     Dose/Rate Route Frequency Start Stop    09/10/20 1955  vancomycin 1250 mg/250 mL 0.9% NS IVPB ([facility])     Ordering Provider:  Jordana Portillo PharmD    1,250 mg  over 90 Minutes Intravenous Every 12 Hours 09/11/20 0900 09/17/20 0859    09/10/20 1816  meropenem (MERREM) 1 g/100 mL 0.9% NS VTB ([facility])     Ordering Provider:  Maryanne Reaves APRN    1 g  over 3 Hours Intravenous Every 8 Hours Scheduled 09/10/20 2200 09/12/20 1359    09/10/20 1955  vancomycin 2000 mg/500 mL 0.9% NS IVPB ([facility])     Ordering Provider:  Jordana Portillo PharmD    2,000 mg  over 120 Minutes Intravenous Once 09/10/20 2100      09/10/20 1913  Pharmacy to dose vancomycin     Ordering Provider:  Maryanne Reaves APRN     Does not apply Continuous PRN 09/10/20 1912 09/15/20 1911            Allergies  Allergies as of 09/10/2020 - Reviewed 09/10/2020   Allergen Reaction Noted    Penicillins Hives 09/29/2016    Percocet [oxycodone-acetaminophen] Confusion 09/04/2019       Labs    Results from last 7 days   Lab Units 09/10/20  1851   BUN mg/dL 14   CREATININE mg/dL 0.70*             Evaluation of Dosing     Last Dose Received in the ED/Outside Facility: None  Is Patient on Dialysis or Renal Replacement: No    Ht - 188 cm (74\")  Wt - 106 kg (234 lb)    Estimated Creatinine Clearance: 116.2 mL/min (A) (by C-G formula based on SCr of 0.7 mg/dL (L)).    Intake & Output (last 3 days)         09/08 0701 - 09/09 0700 09/09 0701 - 09/10 0700 09/10 0701 - 09/11 0700    Urine (mL/kg/hr)   400    Total Output   400    Net   -400                   Microbiology and Radiology  Microbiology Results (last 10 days)       Procedure Component Value - Date/Time "    COVID PRE-OP / PRE-PROCEDURE SCREENING ORDER (NO ISOLATION) - Swab, Nasopharynx [[id number]] Collected:  09/10/20 1807    Lab Status:  Final result Specimen:  Swab from Nasopharynx Updated:  09/10/20 1928    Narrative:       The following orders were created for panel order COVID PRE-OP / PRE-PROCEDURE SCREENING ORDER (NO ISOLATION) - Swab, Nasopharynx.  Procedure                               Abnormality         Status                     ---------                               -----------         ------                     Respiratory Panel PCR w/...[[id number]]  Normal              Final result                 Please view results for these tests on the individual orders.    Respiratory Panel PCR w/COVID-19(SARS-CoV-2) [facility], NP Swab in UTM/VTM, 3-4 HR TAT - Swab, Nasopharynx [[id number]]  (Normal) Collected:  09/10/20 1807    Lab Status:  Final result Specimen:  Swab from Nasopharynx Updated:  09/10/20 1928     ADENOVIRUS, PCR Not Detected     Coronavirus 229E Not Detected     Coronavirus HKU1 Not Detected     Coronavirus NL63 Not Detected     Coronavirus OC43 Not Detected     COVID19 Not Detected     Human Metapneumovirus Not Detected     Human Rhinovirus/Enterovirus Not Detected     Influenza A PCR Not Detected     Influenza A H1 Not Detected     Influenza A H1 2009 PCR Not Detected     Influenza A H3 Not Detected     Influenza B PCR Not Detected     Parainfluenza Virus 1 Not Detected     Parainfluenza Virus 2 Not Detected     Parainfluenza Virus 3 Not Detected     Parainfluenza Virus 4 Not Detected     RSV, PCR Not Detected     Bordetella pertussis pcr Not Detected     Bordetella parapertussis PCR Not Detected     Chlamydophila pneumoniae PCR Not Detected     Mycoplasma pneumo by PCR Not Detected    Narrative:       Fact sheet for providers: https://docs.videoNEXT/wp-content/uploads/RQV8387-5252-YL4.1-EUA-Provider-Fact-Sheet-3.pdf    Fact sheet for patients:  https://docs.NavigatorMD/wp-content/uploads/FMB5783-9793-KX2.1-EUA-Patient-Fact-Sheet-1.pdf            Reported Vancomycin Levels                         InsightRX AUC Calculation:    Current AUC:   0 mg/L*hr    Predicted Steady State AUC :   455 mg/L*hr    Assessment/Plan:  1. Pharmacy to dose vancomycin for sepsis. Goal -600 mg/L*hr.  2. Give loading dose of vancomycin 2000mg (~20mg/kg) IV on 9/10 @ 2100. Initiate maintenance dose of vancomycin 1250mg (~12mg/kg) IV Q12hr on 9/11 @ 0900.  3. Assess clearance by trough level on 9/12 @ 0830, prior to 4th dose.  4. Pharmacy will continue to monitor renal function, cultures and sensitivities, and clinical status to adjust regimen as necessary.    Thanks,  Jordana Portillo, PharmD  9/10/2020  19:56

## 2020-09-11 ENCOUNTER — APPOINTMENT (OUTPATIENT)
Dept: ULTRASOUND IMAGING | Facility: HOSPITAL | Age: 70
End: 2020-09-11

## 2020-09-11 ENCOUNTER — APPOINTMENT (OUTPATIENT)
Dept: CARDIOLOGY | Facility: HOSPITAL | Age: 70
End: 2020-09-11

## 2020-09-11 ENCOUNTER — APPOINTMENT (OUTPATIENT)
Dept: GENERAL RADIOLOGY | Facility: HOSPITAL | Age: 70
End: 2020-09-11

## 2020-09-11 PROBLEM — J18.9 PNEUMONIA: Status: ACTIVE | Noted: 2020-09-11

## 2020-09-11 LAB
ABO GROUP BLD: NORMAL
ANION GAP SERPL CALCULATED.3IONS-SCNC: 8 MMOL/L (ref 5–15)
BASOPHILS # BLD AUTO: 0.04 10*3/MM3 (ref 0–0.2)
BASOPHILS NFR BLD AUTO: 1 % (ref 0–1.5)
BLD GP AB SCN SERPL QL: NEGATIVE
BUN SERPL-MCNC: 13 MG/DL (ref 8–23)
BUN/CREAT SERPL: 20.6 (ref 7–25)
CALCIUM SPEC-SCNC: 8.8 MG/DL (ref 8.6–10.5)
CHLORIDE SERPL-SCNC: 100 MMOL/L (ref 98–107)
CO2 SERPL-SCNC: 27 MMOL/L (ref 22–29)
CREAT SERPL-MCNC: 0.63 MG/DL (ref 0.76–1.27)
DEPRECATED RDW RBC AUTO: 58.7 FL (ref 37–54)
EOSINOPHIL # BLD AUTO: 0.16 10*3/MM3 (ref 0–0.4)
EOSINOPHIL NFR BLD AUTO: 4.1 % (ref 0.3–6.2)
ERYTHROCYTE [DISTWIDTH] IN BLOOD BY AUTOMATED COUNT: 16.7 % (ref 12.3–15.4)
FOLATE SERPL-MCNC: 14.2 NG/ML (ref 4.78–24.2)
GFR SERPL CREATININE-BSD FRML MDRD: 127 ML/MIN/1.73
GLUCOSE SERPL-MCNC: 92 MG/DL (ref 65–99)
HAV IGM SERPL QL IA: NORMAL
HBV CORE IGM SERPL QL IA: NORMAL
HBV SURFACE AG SERPL QL IA: NORMAL
HCT VFR BLD AUTO: 27.3 % (ref 37.5–51)
HCV AB SER DONR QL: NORMAL
HGB BLD-MCNC: 8.3 G/DL (ref 13–17.7)
HOLD SPECIMEN: NORMAL
IMM GRANULOCYTES # BLD AUTO: 0.01 10*3/MM3 (ref 0–0.05)
IMM GRANULOCYTES NFR BLD AUTO: 0.3 % (ref 0–0.5)
LYMPHOCYTES # BLD AUTO: 0.76 10*3/MM3 (ref 0.7–3.1)
LYMPHOCYTES NFR BLD AUTO: 19.4 % (ref 19.6–45.3)
MCH RBC QN AUTO: 29.1 PG (ref 26.6–33)
MCHC RBC AUTO-ENTMCNC: 30.4 G/DL (ref 31.5–35.7)
MCV RBC AUTO: 95.8 FL (ref 79–97)
MONOCYTES # BLD AUTO: 0.55 10*3/MM3 (ref 0.1–0.9)
MONOCYTES NFR BLD AUTO: 14.1 % (ref 5–12)
NEUTROPHILS NFR BLD AUTO: 2.39 10*3/MM3 (ref 1.7–7)
NEUTROPHILS NFR BLD AUTO: 61.1 % (ref 42.7–76)
NRBC BLD AUTO-RTO: 0 /100 WBC (ref 0–0.2)
PLATELET # BLD AUTO: 105 10*3/MM3 (ref 140–450)
PMV BLD AUTO: 10.2 FL (ref 6–12)
POTASSIUM SERPL-SCNC: 3.8 MMOL/L (ref 3.5–5.2)
RBC # BLD AUTO: 2.85 10*6/MM3 (ref 4.14–5.8)
RH BLD: POSITIVE
SODIUM SERPL-SCNC: 135 MMOL/L (ref 136–145)
T&S EXPIRATION DATE: NORMAL
VIT B12 BLD-MCNC: 1565 PG/ML (ref 211–946)
WBC # BLD AUTO: 3.91 10*3/MM3 (ref 3.4–10.8)

## 2020-09-11 PROCEDURE — 86900 BLOOD TYPING SEROLOGIC ABO: CPT | Performed by: INTERNAL MEDICINE

## 2020-09-11 PROCEDURE — 82607 VITAMIN B-12: CPT | Performed by: INTERNAL MEDICINE

## 2020-09-11 PROCEDURE — 99233 SBSQ HOSP IP/OBS HIGH 50: CPT | Performed by: INTERNAL MEDICINE

## 2020-09-11 PROCEDURE — 86850 RBC ANTIBODY SCREEN: CPT | Performed by: INTERNAL MEDICINE

## 2020-09-11 PROCEDURE — 86901 BLOOD TYPING SEROLOGIC RH(D): CPT | Performed by: INTERNAL MEDICINE

## 2020-09-11 PROCEDURE — 80074 ACUTE HEPATITIS PANEL: CPT | Performed by: INTERNAL MEDICINE

## 2020-09-11 PROCEDURE — 82746 ASSAY OF FOLIC ACID SERUM: CPT | Performed by: INTERNAL MEDICINE

## 2020-09-11 PROCEDURE — 76705 ECHO EXAM OF ABDOMEN: CPT

## 2020-09-11 PROCEDURE — 99223 1ST HOSP IP/OBS HIGH 75: CPT | Performed by: THORACIC SURGERY (CARDIOTHORACIC VASCULAR SURGERY)

## 2020-09-11 PROCEDURE — 25010000002 VANCOMYCIN 10 G RECONSTITUTED SOLUTION

## 2020-09-11 PROCEDURE — 25010000002 MEROPENEM PER 100 MG: Performed by: NURSE PRACTITIONER

## 2020-09-11 PROCEDURE — 25010000002 ONDANSETRON PER 1 MG: Performed by: PHYSICIAN ASSISTANT

## 2020-09-11 PROCEDURE — 25010000002 HEPARIN (PORCINE) PER 1000 UNITS: Performed by: NURSE PRACTITIONER

## 2020-09-11 PROCEDURE — 85025 COMPLETE CBC W/AUTO DIFF WBC: CPT | Performed by: NURSE PRACTITIONER

## 2020-09-11 PROCEDURE — 74022 RADEX COMPL AQT ABD SERIES: CPT

## 2020-09-11 PROCEDURE — 80048 BASIC METABOLIC PNL TOTAL CA: CPT | Performed by: NURSE PRACTITIONER

## 2020-09-11 PROCEDURE — 93971 EXTREMITY STUDY: CPT

## 2020-09-11 RX ORDER — IPRATROPIUM BROMIDE AND ALBUTEROL SULFATE 2.5; .5 MG/3ML; MG/3ML
3 SOLUTION RESPIRATORY (INHALATION) EVERY 4 HOURS PRN
Status: DISCONTINUED | OUTPATIENT
Start: 2020-09-11 | End: 2020-09-13 | Stop reason: HOSPADM

## 2020-09-11 RX ORDER — CHOLECALCIFEROL (VITAMIN D3) 125 MCG
5 CAPSULE ORAL NIGHTLY PRN
Status: DISCONTINUED | OUTPATIENT
Start: 2020-09-11 | End: 2020-09-13 | Stop reason: HOSPADM

## 2020-09-11 RX ORDER — ONDANSETRON 2 MG/ML
4 INJECTION INTRAMUSCULAR; INTRAVENOUS EVERY 6 HOURS PRN
Status: DISCONTINUED | OUTPATIENT
Start: 2020-09-11 | End: 2020-09-13 | Stop reason: HOSPADM

## 2020-09-11 RX ADMIN — HEPARIN SODIUM 5000 UNITS: 5000 INJECTION, SOLUTION INTRAVENOUS; SUBCUTANEOUS at 20:32

## 2020-09-11 RX ADMIN — FUROSEMIDE 20 MG: 20 TABLET ORAL at 05:47

## 2020-09-11 RX ADMIN — TAMSULOSIN HYDROCHLORIDE 0.4 MG: 0.4 CAPSULE ORAL at 08:01

## 2020-09-11 RX ADMIN — PANTOPRAZOLE SODIUM 40 MG: 40 TABLET, DELAYED RELEASE ORAL at 08:00

## 2020-09-11 RX ADMIN — SUCRALFATE 1 G: 1 TABLET ORAL at 13:57

## 2020-09-11 RX ADMIN — VANCOMYCIN HYDROCHLORIDE 1250 MG: 10 INJECTION, POWDER, LYOPHILIZED, FOR SOLUTION INTRAVENOUS at 20:31

## 2020-09-11 RX ADMIN — AMLODIPINE BESYLATE 5 MG: 5 TABLET ORAL at 08:01

## 2020-09-11 RX ADMIN — CITALOPRAM HYDROBROMIDE 40 MG: 40 TABLET ORAL at 08:01

## 2020-09-11 RX ADMIN — CARVEDILOL 6.25 MG: 6.25 TABLET, FILM COATED ORAL at 08:01

## 2020-09-11 RX ADMIN — HEPARIN SODIUM 5000 UNITS: 5000 INJECTION, SOLUTION INTRAVENOUS; SUBCUTANEOUS at 05:50

## 2020-09-11 RX ADMIN — ONDANSETRON 4 MG: 2 INJECTION INTRAMUSCULAR; INTRAVENOUS at 06:21

## 2020-09-11 RX ADMIN — SODIUM CHLORIDE, PRESERVATIVE FREE 10 ML: 5 INJECTION INTRAVENOUS at 20:33

## 2020-09-11 RX ADMIN — LACTULOSE 10 G: 20 SOLUTION ORAL at 17:08

## 2020-09-11 RX ADMIN — Medication 250 MG: at 20:32

## 2020-09-11 RX ADMIN — MEROPENEM 1 G: 1 INJECTION, POWDER, FOR SOLUTION INTRAVENOUS at 22:18

## 2020-09-11 RX ADMIN — SODIUM CHLORIDE, PRESERVATIVE FREE 10 ML: 5 INJECTION INTRAVENOUS at 08:02

## 2020-09-11 RX ADMIN — SUCRALFATE 1 G: 1 TABLET ORAL at 07:59

## 2020-09-11 RX ADMIN — SUCRALFATE 1 G: 1 TABLET ORAL at 20:32

## 2020-09-11 RX ADMIN — Medication 250 MG: at 08:02

## 2020-09-11 RX ADMIN — MEROPENEM 1 G: 1 INJECTION, POWDER, FOR SOLUTION INTRAVENOUS at 05:50

## 2020-09-11 RX ADMIN — PANTOPRAZOLE SODIUM 40 MG: 40 TABLET, DELAYED RELEASE ORAL at 17:08

## 2020-09-11 RX ADMIN — VANCOMYCIN HYDROCHLORIDE 1250 MG: 10 INJECTION, POWDER, LYOPHILIZED, FOR SOLUTION INTRAVENOUS at 08:02

## 2020-09-11 RX ADMIN — AMLODIPINE BESYLATE 5 MG: 5 TABLET ORAL at 20:32

## 2020-09-11 RX ADMIN — ASPIRIN 81 MG CHEWABLE TABLET 81 MG: 81 TABLET CHEWABLE at 08:01

## 2020-09-11 RX ADMIN — SUCRALFATE 1 G: 1 TABLET ORAL at 17:08

## 2020-09-11 RX ADMIN — HEPARIN SODIUM 5000 UNITS: 5000 INJECTION, SOLUTION INTRAVENOUS; SUBCUTANEOUS at 13:57

## 2020-09-11 RX ADMIN — MULTIVITAMIN TABLET 1 TABLET: TABLET at 08:02

## 2020-09-11 RX ADMIN — CARVEDILOL 6.25 MG: 6.25 TABLET, FILM COATED ORAL at 20:32

## 2020-09-11 RX ADMIN — MELATONIN TAB 5 MG 5 MG: 5 TAB at 22:19

## 2020-09-11 RX ADMIN — LACTULOSE 10 G: 20 SOLUTION ORAL at 08:01

## 2020-09-11 RX ADMIN — MEROPENEM 1 G: 1 INJECTION, POWDER, FOR SOLUTION INTRAVENOUS at 13:57

## 2020-09-11 RX ADMIN — SODIUM CHLORIDE 100 ML/HR: 9 INJECTION, SOLUTION INTRAVENOUS at 08:11

## 2020-09-11 NOTE — CONSULTS
INFECTIOUS DISEASE CONSULT/INITIAL HOSPITAL VISIT    Derek Kelsey  1950  [id number]    Date of Consult: 9/11/2020    Admission Date: 9/10/2020      Requesting Provider: Tammi Lomax MD  Evaluating Physician: Dr. Davis Carrero    Reason for Consultation: AAA graft infection with Pseudomonas species    History of present illness:    Mr. Derek Kelsey is a 69 y.o. 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  This is a patient of Dr. Davis Carrero last seen via telehealth on 9/9/2020.  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  He has had ongoing fever which has been concerning. He was subsequently sent to [facility] as a direct admission for further work-up secondary to continued fevers, increasing generalized weakness and increasing shortness of breath.  It was also noted that the patient's son was diagnosed positive with COVID-19 at the end of July.  He has a past medical history significant for obesity, anxiety, depression, hypertension, COPD on 2 L nasal cannula, prediabetes, CAD, GERD, coalminer's pneumoconiosis, cirrhosis on chronic lactulose and chronic lower abdominal pain.    Since arrival he has been afebrile with a T-max of 99.1 and is currently on nasal cannula at 2 L.  Current labs show he is without leukocytosis with a WBC of 3.91.  Liver enzymes, LDH, d-dimer, CRP and ESR were elevated.  Lactate was normal at 1.6.  Hepatitis panel was nonreactive.  Respiratory panel with COVID-19 was negative on 9/10.  Blood cultures are currently in progress.  Ultrasound of the gallbladder showed cirrhotic hepatic morphology with a simple appearing hepatic cyst.  CT the chest showed no aortic aneurysm or dissection with a small right pleural effusion and associated right lower lobe atelectasis.  There is also some mild patchy infiltrates in the upper lobes likely reflect a mild pneumonia.  CT of the  abdomen pelvis showed cirrhosis and splenomegaly with a new small amount of ascites.  Aortic aneurysm repair is stable in appearance as well as adjacent fat stranding is also stable and possibly inflammatory.    He has a listed allergy to penicillins with hives as the reaction.  He is currently receiving vancomycin and meropenem IV.  ID has been consulted for further evaluation and treatment as well as antimicrobial therapy management.    Past Medical History:   Diagnosis Date   • Abdominal aortic aneurysm (CMS/HCC) 9/29/2016   • Anxiety 9/29/2016   • Arthritis    • Back pain    • Black lung (CMS/HCC)    • CAD (coronary artery disease) 9/29/2016   • Cirrhosis (CMS/HCC)    • COPD (chronic obstructive pulmonary disease) (CMS/HCC) 9/29/2016   • Depression 9/29/2016   • Depression    • GERD (gastroesophageal reflux disease)    • Hypertension 9/29/2016   • On supplemental oxygen by nasal cannula     at night   • Vitiligo    • Wears dentures    • Wears reading eyeglasses        Past Surgical History:   Procedure Laterality Date   • ABDOMINAL AORTIC ANEURYSM REPAIR WITH ENDOGRAFT N/A 6/6/2019    Procedure: ABDOMINAL AORTIC ANEURYSM REPAIR WITH ENDOGRAFT;  Surgeon: Leopoldo Burleson MD;  Location:  [facility];  Service: Vascular   • BACK SURGERY     • CARDIAC CATHETERIZATION     • CARDIAC CATHETERIZATION N/A 9/28/2018    Procedure: Left Heart Cath;  Surgeon: Douglas Galvez MD;  Location:  [facility]ATH INVASIVE LOCATION;  Service: Cardiovascular   • CARDIAC SURGERY     • COLONOSCOPY      2015   • COLONOSCOPY N/A 10/30/2019    Procedure: COLONOSCOPY;  Surgeon: Homar Viveros MD;  Location:  [facility] ENDOSCOPY;  Service: Gastroenterology   • ENDOSCOPY N/A 10/30/2019    Procedure: ESOPHAGOGASTRODUODENOSCOPY;  Surgeon: Homar Viveros MD;  Location:  [facility] ENDOSCOPY;  Service: Gastroenterology   • EYE SURGERY      cataracts bilateral   • HAND SURGERY Left    • LUMBAR DISCECTOMY FUSION INSTRUMENTATION N/A  2018    Procedure: LUMBAR DISCECTOMY POSTERIOR WITH FUSION INSTRUMENTATION;  Surgeon: Joo Franklin MD;  Location:  [facility];  Service: Orthopedic Spine   • LUMBAR DISCECTOMY FUSION INSTRUMENTATION N/A 2019    Procedure: POSTERIOR LUMBAR SPINAL FUSION REVISION AND INSTRUMENTATION L3,L4,L5,S1;  Surgeon: Joo Franklin MD;  Location:  [facility];  Service: Orthopedic Spine   • ORIF FINGER / THUMB FRACTURE     • SHOULDER SURGERY Left        Family History   Problem Relation Age of Onset   • Stroke Mother    • Hypertension Mother    • Heart disease Mother    • Heart disease Father    • Hypertension Father    • Diabetes Sister    • Hypertension Sister    • Heart disease Sister    • Diabetes Brother    • Heart disease Brother    • Hypertension Child    • Hypertension Son    • No Known Problems Son    • Diabetes Sister    • Heart disease Sister        Social History     Socioeconomic History   • Marital status:      Spouse name: Not on file   • Number of children: 2   • Years of education: Not on file   • Highest education level: Not on file   Occupational History   • Occupation: DISABLED      Comment: Back   Tobacco Use   • Smoking status: Former Smoker     Packs/day: 1.00     Years: 30.00     Pack years: 30.00     Types: Cigarettes     Last attempt to quit: 2011     Years since quittin.7   • Smokeless tobacco: Former User     Types: Chew     Quit date: 2011   Substance and Sexual Activity   • Alcohol use: Not Currently   • Drug use: No   • Sexual activity: Defer     Comment:  and lives with wife   Social History Narrative    Lives in [place].    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    Is considered disabled    Has been granted black lung disability benefits    Smoked one pack of cigarettes per day or more his entire adult life up until     Denies alcohol use- quit >8 years ago    Uses walker or cane for ambulation       Allergies   Allergen Reactions    • Penicillins Hives     Hives - has tolerated Zosyn and ceftriaxone 09/2019   • Percocet [Oxycodone-Acetaminophen] Confusion         Medication:    Current Facility-Administered Medications:   •  [START ON 9/12/2020] !Vancomycin trough due 9/12 @ 0830, please hold dose until pharmacy interprets level!, , Does not apply, Once, Jordana Portillo, PharmD  •  amLODIPine (NORVASC) tablet 5 mg, 5 mg, Oral, BID, Maryanne Reaves APRN, 5 mg at 09/11/20 0801  •  aspirin chewable tablet 81 mg, 81 mg, Oral, Daily, Maryanne Reaves APRN, 81 mg at 09/11/20 0801  •  carvedilol (COREG) tablet 6.25 mg, 6.25 mg, Oral, Q12H, Maryanne Reaves, APRN, 6.25 mg at 09/11/20 0801  •  citalopram (CeleXA) tablet 40 mg, 40 mg, Oral, Daily, Maryanne Reaves, APRN, 40 mg at 09/11/20 0801  •  furosemide (LASIX) tablet 20 mg, 20 mg, Oral, QAM, Maryanne Reaves APRN, 20 mg at 09/11/20 0547  •  heparin (porcine) 5000 UNIT/ML injection 5,000 Units, 5,000 Units, Subcutaneous, Q8H, Maryanne Reaves APRN, 5,000 Units at 09/11/20 0550  •  influenza vac split quad (FLUZONE,FLUARIX,AFLURIA) injection 0.5 mL, 0.5 mL, Intramuscular, During Hospitalization, Kostas Hartman MD  •  ipratropium-albuterol (DUO-NEB) nebulizer solution 3 mL, 3 mL, Nebulization, Q4H PRN, Demi Davidson PA-C  •  lactulose (CHRONULAC) 10 GM/15ML solution 10 g, 10 g, Oral, TID, Maryanne Reaves, APRN, 10 g at 09/11/20 0801  •  meropenem (MERREM) 1 g/100 mL 0.9% NS VTB ([facility]p), 1 g, Intravenous, Q8H, Maryanne Reaves APRN, 1 g at 09/11/20 0550  •  multivitamin (THERAGRAN) tablet 1 tablet, 1 tablet, Oral, Daily, Maryanne Reaves APRN, 1 tablet at 09/11/20 0802  •  ondansetron (ZOFRAN) injection 4 mg, 4 mg, Intravenous, Q6H PRN, Demi Davidson PA-C, 4 mg at 09/11/20 0621  •  pantoprazole (PROTONIX) EC tablet 40 mg, 40 mg, Oral, BID AC, Maryanne Reaves APRN, 40 mg at 09/11/20 0800  •   Pharmacy to dose vancomycin, , Does not apply, Continuous PRN, Maryanne Reaves APRN  •  saccharomyces boulardii (FLORASTOR) capsule 250 mg, 250 mg, Oral, BID, Maryanne Reaves APRN, 250 mg at 09/11/20 0802  •  sodium chloride 0.9 % flush 10 mL, 10 mL, Intravenous, Q12H, Maryanne Reaves APRN, 10 mL at 09/11/20 0802  •  sodium chloride 0.9 % flush 10 mL, 10 mL, Intravenous, PRN, Maryanne Reaves APRN  •  sodium chloride 0.9 % infusion, 100 mL/hr, Intravenous, Continuous, Maryanne Reaves APRN, Last Rate: 100 mL/hr at 09/11/20 0811, 100 mL/hr at 09/11/20 0811  •  sucralfate (CARAFATE) tablet 1 g, 1 g, Oral, 4x Daily, Maryanne Reaves APRN, 1 g at 09/11/20 0759  •  tamsulosin (FLOMAX) 24 hr capsule 0.4 mg, 0.4 mg, Oral, Daily, Maryanne Reaves APRN, 0.4 mg at 09/11/20 0801  •  vancomycin 1250 mg/250 mL 0.9% NS IVPB ([facility]), 1,250 mg, Intravenous, Q12H, Jordana Portillo, PharmD, 1,250 mg at 09/11/20 0802    Antibiotics:  Anti-Infectives (From admission, onward)    Ordered     Dose/Rate Route Frequency Start Stop    09/10/20 1955  vancomycin 1250 mg/250 mL 0.9% NS IVPB ([facility])  Review   Ordering Provider:  Jordana Portillo, PharmJAREN    1,250 mg  over 90 Minutes Intravenous Every 12 Hours 09/11/20 0900 09/17/20 0859    09/10/20 1816  meropenem (MERREM) 1 g/100 mL 0.9% NS VTB ([facility])  Review   Ordering Provider:  Maryanne Reaves APRN    1 g  over 3 Hours Intravenous Every 8 Hours Scheduled 09/10/20 2200 09/12/20 1359    09/10/20 1955  vancomycin 2000 mg/500 mL 0.9% NS IVPB ([facility])     Ordering Provider:  Jordana Portillo PharmD    2,000 mg  over 120 Minutes Intravenous Once 09/10/20 2100 09/10/20 2334    09/10/20 1913  Pharmacy to dose vancomycin  Review   Ordering Provider:  Maryanne Reaves APRN     Does not apply Continuous PRN 09/10/20 1912 09/15/20 1911            Review of Systems:  Constitutional--positive for fever and  "chills.  HEENT--positive for congestion and is hard of hearing.  CV-- No chest pain, palpitation or syncope  Resp--positive for chronic cough and shortness of breath that has been worse since this past .  Wears 2 L nasal cannula at home \"most of the time\".  GI-positive for abdominal distention, abdominal pain, diarrhea, nausea and vomiting.  Lower abdominal pain times several months.  History of cirrhosis on lactulose.  -- No dysuria, hematuria, or flank pain.  Denies hesitancy, urgency or flank pain.  Lymph- no swollen lymph nodes in neck/axilla or groin.   Heme- No active bruising or bleeding; no Hx of DVT or PE.  MS--positive for arthralgias.  Neuro--positive for generalized weakness.  History of anxiety  Skin--positive for rash.      Physical Exam:   Vital Signs  Temp (24hrs), Av.5 °F (36.9 °C), Min:97.7 °F (36.5 °C), Max:99.1 °F (37.3 °C)    Temp  Min: 97.7 °F (36.5 °C)  Max: 99.1 °F (37.3 °C)  BP  Min: 114/58  Max: 164/87  Pulse  Min: 67  Max: 80  Resp  Min: 16  Max: 20  SpO2  Min: 90 %  Max: 95 %    GENERAL: Awake and alert, in no acute distress.   HEENT: Normocephalic, atraumatic.  PERRL. EOMI. No conjunctival injection. No icterus. Oropharynx clear without evidence of thrush or exudate. No evidence of peridontal disease.    NECK: Supple without nuchal rigidity. No mass.  LYMPH: No cervical, axillary or inguinal lymphadenopathy.  HEART: RRR; No murmur, rubs, gallops.   LUNGS: Diminished with crackles in bilateral bases. Normal respiratory effort. Nonlabored. No dullness.  ABDOMEN: distended with some tenderness noted. Positive bowel sounds. No rebound or guarding. NO mass or HSM.  EXT:  No cyanosis, clubbing or edema. No cord.  :   Without Young catheter.  MSK: FROM without joint effusions noted arms/legs.    SKIN: Warm and dry without cutaneous eruptions on Inspection/palpation.    NEURO: Oriented to PPT. No focal deficits on motor/sensory exam at arms/legs.  PSYCHIATRIC: Normal insight and " judgement. Cooperative with PE    Laboratory Data    Results from last 7 days   Lab Units 09/11/20  0503 09/10/20  1851   WBC 10*3/mm3 3.91 4.14   HEMOGLOBIN g/dL 8.3* 8.9*   HEMATOCRIT % 27.3* 30.0*   PLATELETS 10*3/mm3 105* 113*     Results from last 7 days   Lab Units 09/11/20  0503   SODIUM mmol/L 135*   POTASSIUM mmol/L 3.8   CHLORIDE mmol/L 100   CO2 mmol/L 27.0   BUN mg/dL 13   CREATININE mg/dL 0.63*   GLUCOSE mg/dL 92   CALCIUM mg/dL 8.8     Results from last 7 days   Lab Units 09/10/20  1851   ALK PHOS U/L 200*   BILIRUBIN mg/dL 1.5*   ALT (SGPT) U/L 67*   AST (SGOT) U/L 151*     Results from last 7 days   Lab Units 09/10/20  1851   SED RATE mm/hr 83*     Results from last 7 days   Lab Units 09/10/20  1851   CRP mg/dL 2.05*     Results from last 7 days   Lab Units 09/10/20  1851   LACTATE mmol/L 1.6             Estimated Creatinine Clearance: 116.2 mL/min (A) (by C-G formula based on SCr of 0.63 mg/dL (L)).      Microbiology:  Microbiology Results (last 10 days)     Procedure Component Value - Date/Time    COVID PRE-OP / PRE-PROCEDURE SCREENING ORDER (NO ISOLATION) - Swab, Nasopharynx [[id number]] Collected:  09/10/20 1807    Lab Status:  Final result Specimen:  Swab from Nasopharynx Updated:  09/10/20 1928    Narrative:       The following orders were created for panel order COVID PRE-OP / PRE-PROCEDURE SCREENING ORDER (NO ISOLATION) - Swab, Nasopharynx.  Procedure                               Abnormality         Status                     ---------                               -----------         ------                     Respiratory Panel PCR w/...[[id number]]  Normal              Final result                 Please view results for these tests on the individual orders.    Respiratory Panel PCR w/COVID-19(SARS-CoV-2) [facility], NP Swab in UTM/VTM, 3-4 HR TAT - Swab, Nasopharynx [[id number]]  (Normal) Collected:  09/10/20 9766    Lab Status:  Final result Specimen:  Swab from  Nasopharynx Updated:  09/10/20 1928     ADENOVIRUS, PCR Not Detected     Coronavirus 229E Not Detected     Coronavirus HKU1 Not Detected     Coronavirus NL63 Not Detected     Coronavirus OC43 Not Detected     COVID19 Not Detected     Human Metapneumovirus Not Detected     Human Rhinovirus/Enterovirus Not Detected     Influenza A PCR Not Detected     Influenza A H1 Not Detected     Influenza A H1 2009 PCR Not Detected     Influenza A H3 Not Detected     Influenza B PCR Not Detected     Parainfluenza Virus 1 Not Detected     Parainfluenza Virus 2 Not Detected     Parainfluenza Virus 3 Not Detected     Parainfluenza Virus 4 Not Detected     RSV, PCR Not Detected     Bordetella pertussis pcr Not Detected     Bordetella parapertussis PCR Not Detected     Chlamydophila pneumoniae PCR Not Detected     Mycoplasma pneumo by PCR Not Detected    Narrative:       Fact sheet for providers: https://docs.[employer]/wp-content/uploads/OOX5420-2583-SK2.1-EUA-Provider-Fact-Sheet-3.pdf    Fact sheet for patients: https://docs.[employer]/wp-content/uploads/XLB2572-8002-FQ7.1-EUA-Patient-Fact-Sheet-1.pdf                Radiology:  Imaging Results (Last 72 Hours)     Procedure Component Value Units Date/Time    XR Abdomen 2+ VW with Chest 1 VW [[id number]] Collected:  09/11/20 1037     Updated:  09/11/20 1052    Narrative:          EXAMINATION: XR ABDOMEN 2+ VIEWS WITH CHEST 1 VW-09/11/2020:      INDICATION: Hx of AAA endograft repair.  Recent infection 6/2020 here,  now with fever, weakness and lower abd pain.      COMPARISON: NONE.     FINDINGS: Frontal view of the chest and two-view abdomen reveals  increased markings seen diffusely throughout the lung fields  bilaterally. Apical pleural thickening identified on the right. There is  a PICC line catheter on the right with tip in the SVC. No definite  pleural effusion.     Flat and upright views of the abdomen reveal hardware identified within  the lower lumbar spine. Stent  identified within the abdominal aorta and  iliac vessels. The bowel gas pattern is nonspecific. No obvious  obstruction or gross free intraperitoneal air. Degenerative changes seen  within the spine.       Impression:       Increased markings seen diffusely throughout the lung fields  bilaterally with PICC line catheter in satisfactory position.  Nonspecific bowel gas pattern with no evidence of obvious obstruction or  gross free intraperitoneal air.     D:  09/11/2020  E:  09/11/2020             US Gallbladder [[id number]] Collected:  09/11/20 1001     Updated:  09/11/20 1014    Narrative:       EXAMINATION: US GALLBLADDER-     INDICATION: Elevated transaminases.      TECHNIQUE: Ultrasound right upper quadrant abdomen and liver.     COMPARISON: None.     FINDINGS: Visualized portions of the pancreas within normal limits.     Liver demonstrates diffusely increased echogenicity throughout the  hepatic parenchyma with coarsened echotexture and nodular contours.  Anechoic area measuring 1.5 cm most consistent with cyst as well as no  complex internal features. Adjacent perihepatic ascites noted.     Gallbladder unremarkable without cholelithiasis or inflammatory wall  thickening.     Common bile duct within normal limits at 5 mm.     Right kidney measures 12 cm in length with minimal renal collecting  system dilatation, however no contour deforming mass or obvious calculi.       Impression:       Cirrhotic hepatic morphology with simple appearing hepatic  cyst, otherwise no focal parenchymal lesion. Perihepatic ascites.           CT Abdomen Pelvis With & Without Contrast [[id number]] Collected:  09/10/20 2331     Updated:  09/10/20 2333    Narrative:       CT Abdomen Pelvis WO W    INDICATION:   Abdominal pain with fever. Sepsis. Weakness for 4 days.    TECHNIQUE:   CT of the abdomen and pelvis with and without IV contrast. Coronal and sagittal reconstructions were obtained in addition to 3-D reformats.  Radiation dose  reduction techniques included automated exposure control or exposure modulation based on body  size. Count of known CT and cardiac nuc med studies performed in previous 12 months: 0.     COMPARISON:   6/18/2020    FINDINGS:  Please see chest CT report dictated separately. Gallbladder is within normal limits. Patient is status post endovascular repair of an abdominal aortic aneurysm. The graft appears patent. Native aneurysm sac measures about 5.4 cm which is similar to prior  allowing for differences in slice selection. Stranding adjacent to the aorta is unchanged and could be inflammatory. Cirrhotic liver is again identified with what appears to be a cyst in the left hepatic lobe. Spleen remains enlarged measuring at least  16.3 cm in length. Solid organs are otherwise normal. No renal or ureteral stones are seen. There is no hydronephrosis. There is a small amount of ascites in the abdomen and pelvis which is new from prior. Urinary bladder is grossly normal. The appendix  is normal. Remainder of the unopacified GI tract is normal as well. No bowel obstruction is identified. There is some soft tissue induration at the umbilicus. This could reflect cellulitis in the appropriate clinical setting. Patient is status post  spinal fusion from L3 through S1. Kyphoplasty cement is noted at L3-L5.      Impression:         1. Cirrhosis and splenomegaly with a new small amount of ascites.  2. Grossly normal GI tract, including the appendix.  3. Stable appearance of an endovascular aortic aneurysm repair. Adjacent fat stranding is also stable and possibly inflammatory. Correlation and follow-up recommended.  4. Induration at the umbilicus could reflect cellulitis. Correlate with physical exam.  5. Additional findings as above.          Signer Name: Ric Hyatt MD   Signed: 9/10/2020 11:31 PM   Workstation Name: [email or web address]    [facility]    CT Angiogram Chest With & Without Contrast [[id number]]  Collected:  09/10/20 2315     Updated:  09/10/20 2318    Narrative:       CT ANGIOGRAM CHEST W WO CONTRAST    INDICATION:   Fever with worsening cough. Shortness of and weakness for 4 days. History of COPD.    TECHNIQUE:   CT angiogram of the chest with IV and without contrast. 3-D reconstructions were obtained and reviewed.   Radiation dose reduction techniques included automated exposure control or exposure modulation based on body size. Count of known CT and cardiac nuc  med studies performed in previous 12 months: 5.     COMPARISON:   6/18/2020    FINDINGS:   There is a new small right pleural effusion. No left-sided pleural effusion is identified. There is no pericardial effusion. No aortic aneurysm or dissection is seen. The exam is motion degraded. A right arm PICC has its tip in the SVC. The exam is not  timed for evaluation of the pulmonary arteries. No central pulmonary embolism is identified however. There is a 1.3 cm right paratracheal lymph node, minimally larger. There is some mild nonspecific fat stranding in the right axilla. No adenopathy is  identified in this location. Please see abdomen and pelvis CT report dictated separately.    Lung windows again show pulmonary emphysema. There is chronic scarring in the left lower lobe. Mild multifocal groundglass infiltrates are noted in the left upper lobe. There is some atelectasis in the right lower lobe adjacent to the pleural fluid.  There are some nonspecific mild groundglass infiltrates in the right upper lobe as well. Findings may reflect a mild pneumonia, the patient does have a negative COVID 19 test today.      Impression:         1. Motion degraded exam.  2. No aortic aneurysm or dissection. No central pulmonary embolism.  3. Small right pleural effusion with associated right lower lobe atelectasis.  4. COPD.  5. Mild patchy infiltrates in the upper lobes likely reflect a mild pneumonia. Patient has a negative COVID 19 test today.    Signer Name:  Ric Hyatt MD   Signed: 9/10/2020 11:15 PM   Workstation Name: [email or web address]    [facility]    XR Chest 1 View [[id number]] Collected:  09/10/20 1759     Updated:  09/10/20 2223    Narrative:       EXAMINATION: XR CHEST SINGLE VIEW - 09/10/2020     INDICATION: Evaluate PICC placement.      COMPARISON: 2019 chest radiograph. Chest CT scan 2020.     FINDINGS: Right upper extremity PICC line is seen with its tip in the  most distal portion of the right subclavian vein. Heart shadow is upper  limits of normal size. Pulmonary vasculature appears upper limits of  normal. Right apical pleural scarring is stable. There is a very small  but new right pleural effusion.       Impression:       1. PICC line tip in the distal right subclavian vein.     2. Small right pleural effusion.     DICTATED:   09/10/2020  EDITED/ls :   09/10/2020          This report was finalized on 9/10/2020 10:20 PM by Dr. Donald Laboy MD.               Impression:   1. AAA graft Pseudomonas infection-status post endograft repair and 2019 followed by endograft infection in 2020  2. Fever  3. Transaminitis-hepatitis panel negative.  Patient with history of cirrhosis on lactulose.  4. Per patient-prostatitis being followed by Dr. Ford.  Will obtain medical records from Dr. Ford's office.  5. Coworkers pneumoconiosis  6. Chronic respiratory failure with hypoxia on 2 L nasal cannula at home  7. Cirrhosis of the liver- on lactulose  8. Diarrhea-likely secondary to lactulose  9. Hypertension  10. Coronary artery disease  11. 11.  Cirrhosis and ascites    PLAN/RECOMMENDATIONS:   Thank you for asking us to see Derek Kelsey, I recommend the followin. Continue IV vancomycin and meropenem pending cultures and sensitivity reports  2. Continue to follow culture sensitivity reports and adjust antibiotics accordingly  3. Follow a.m. labs and trend results    Discussion ongoing with surgeons for possible removal of AAA  endograft.  It was documented that last conversation with Dr. Burleson was there would be a high mortality risk with surgery to remove endograft.  Discussion with [facility] to see if they have more experience with removal and bypass in progress.     Dr. Davis Carrero has obtained the history, performed the physical exam and formulated the above treatment plan.     Have seen and examined patient agree with above  Very complicated case.  I recommend admission to the hospital because of pain.  Patient's confusion and fevers.  After talking with wife patient's confusion may be related to hepatic encephalopathy.    Patient has now been referred to [facility] hepatology    Patient is probably looking at multiple episodes of hepatic encephalopathy in the future.    Unclear significance of fevers.  Patient was using a percutaneous thermometer at home I have asked him to start using a oral thermometer.    CT scan of abdomen does not show worsening infection around the graft    The sed rate had improved to below 80 this week.    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  Patient may have relapsing infection around graft material only complete course of antibiotics.    Unclear if patient can be suppressed with doxycycline and/or ciprofloxacin after we complete course of IV antibiotics.    Discussed with family at length    Continue meropenem  Continue IV. vancomycin per pharmacy dosing  Follow-up blood cultures  Jamal Ortiz, LYNDA  9/11/2020  11:47

## 2020-09-11 NOTE — PROGRESS NOTES
[facility] Medicine Services  PROGRESS NOTE    Patient Name: Derek Kelsey  : 1950  MRN: [id number]    Date of Admission: 9/10/2020  Primary Care Physician: Raudel Zheng MD    Subjective   Subjective     CC:  Follow-up for fever, confusion    HPI:  Patient is very hard of hearing, most of history obtained from wife, she states that he she checks his temperature at home and it was normal before they went to see infectious disease yesterday.  It fluctuates.  She is concerned about his weakness and confusion at times.  Overall patient feels very weak and unable to eat, has decreased appetite and feels nauseous, denies any diarrhea, vomiting, or abdominal pain.    Review of Systems  CV- No chest pain, palpitations  Resp- No cough, dyspnea    Objective   Objective     Vital Signs:   Temp:  [97.7 °F (36.5 °C)-99.1 °F (37.3 °C)] 98.1 °F (36.7 °C)  Heart Rate:  [63-80] 63  Resp:  [16-20] 18  BP: (114-164)/(58-87) 141/62        Physical Exam:  Constitutional: No acute distress, awake, alert  HENT: NCAT, mucous membranes moist  Respiratory: Clear to auscultation bilaterally, respiratory effort normal  Cardiovascular: RRR, no murmurs  Gastrointestinal: Positive bowel sounds, soft, minimal distention, nontender to palpation  Psychiatric: Appropriate affect, cooperative  Neurologic: Oriented person and place, moves all extremities equally, cranial Nerves grossly intact to confrontation, speech clear  Skin: No rashes on exposed skin    Results Reviewed:  Results from last 7 days   Lab Units 20  0503 09/10/20  1851   WBC 10*3/mm3 3.91 4.14   HEMOGLOBIN g/dL 8.3* 8.9*   HEMATOCRIT % 27.3* 30.0*   PLATELETS 10*3/mm3 105* 113*   INR   --  1.58*   PROCALCITONIN ng/mL  --  0.13     Results from last 7 days   Lab Units 20  0503 09/10/20  1851   SODIUM mmol/L 135* 138   POTASSIUM mmol/L 3.8 3.8   CHLORIDE mmol/L 100 102   CO2 mmol/L 27.0 26.0   BUN mg/dL 13 14   CREATININE mg/dL  0.63* 0.70*   GLUCOSE mg/dL 92 88   CALCIUM mg/dL 8.8 9.2   ALT (SGPT) U/L  --  67*   AST (SGOT) U/L  --  151*   TROPONIN T ng/mL  --  <0.010   PROBNP pg/mL  --  319.0     Estimated Creatinine Clearance: 116.2 mL/min (A) (by C-G formula based on SCr of 0.63 mg/dL (L)).    Microbiology Results Abnormal     Procedure Component Value - Date/Time    COVID PRE-OP / PRE-PROCEDURE SCREENING ORDER (NO ISOLATION) - Swab, Nasopharynx [[id number]] Collected:  09/10/20 1807    Lab Status:  Final result Specimen:  Swab from Nasopharynx Updated:  09/10/20 1928    Narrative:       The following orders were created for panel order COVID PRE-OP / PRE-PROCEDURE SCREENING ORDER (NO ISOLATION) - Swab, Nasopharynx.  Procedure                               Abnormality         Status                     ---------                               -----------         ------                     Respiratory Panel PCR w/...[[id number]]  Normal              Final result                 Please view results for these tests on the individual orders.    Respiratory Panel PCR w/COVID-19(SARS-CoV-2) [facility]INT[facility] In-House, NP Swab in UTM/VTM, 3-4 HR TAT - Swab, Nasopharynx [[id number]]  (Normal) Collected:  09/10/20 1807    Lab Status:  Final result Specimen:  Swab from Nasopharynx Updated:  09/10/20 1928     ADENOVIRUS, PCR Not Detected     Coronavirus 229E Not Detected     Coronavirus HKU1 Not Detected     Coronavirus NL63 Not Detected     Coronavirus OC43 Not Detected     COVID19 Not Detected     Human Metapneumovirus Not Detected     Human Rhinovirus/Enterovirus Not Detected     Influenza A PCR Not Detected     Influenza A H1 Not Detected     Influenza A H1 2009 PCR Not Detected     Influenza A H3 Not Detected     Influenza B PCR Not Detected     Parainfluenza Virus 1 Not Detected     Parainfluenza Virus 2 Not Detected     Parainfluenza Virus 3 Not Detected     Parainfluenza Virus 4 Not Detected     RSV, PCR Not Detected     Bordetella  pertussis pcr Not Detected     Bordetella parapertussis PCR Not Detected     Chlamydophila pneumoniae PCR Not Detected     Mycoplasma pneumo by PCR Not Detected    Narrative:       Fact sheet for providers: https://docs.[employer]/wp-content/uploads/WHW3938-4684-BW4.1-EUA-Provider-Fact-Sheet-3.pdf    Fact sheet for patients: https://docs.[employer]/wp-content/uploads/KDQ4769-0659-WA1.1-EUA-Patient-Fact-Sheet-1.pdf          Imaging Results (Last 24 Hours)     Procedure Component Value Units Date/Time    XR Abdomen 2+ VW with Chest 1 VW [[id number]] Collected:  09/11/20 1037     Updated:  09/11/20 1052    Narrative:          EXAMINATION: XR ABDOMEN 2+ VIEWS WITH CHEST 1 VW-09/11/2020:      INDICATION: Hx of AAA endograft repair.  Recent infection 6/2020 here,  now with fever, weakness and lower abd pain.      COMPARISON: NONE.     FINDINGS: Frontal view of the chest and two-view abdomen reveals  increased markings seen diffusely throughout the lung fields  bilaterally. Apical pleural thickening identified on the right. There is  a PICC line catheter on the right with tip in the SVC. No definite  pleural effusion.     Flat and upright views of the abdomen reveal hardware identified within  the lower lumbar spine. Stent identified within the abdominal aorta and  iliac vessels. The bowel gas pattern is nonspecific. No obvious  obstruction or gross free intraperitoneal air. Degenerative changes seen  within the spine.       Impression:       Increased markings seen diffusely throughout the lung fields  bilaterally with PICC line catheter in satisfactory position.  Nonspecific bowel gas pattern with no evidence of obvious obstruction or  gross free intraperitoneal air.     D:  09/11/2020  E:  09/11/2020             US Gallbladder [[id number]] Collected:  09/11/20 1001     Updated:  09/11/20 1014    Narrative:       EXAMINATION: US GALLBLADDER-     INDICATION: Elevated transaminases.      TECHNIQUE: Ultrasound right  upper quadrant abdomen and liver.     COMPARISON: None.     FINDINGS: Visualized portions of the pancreas within normal limits.     Liver demonstrates diffusely increased echogenicity throughout the  hepatic parenchyma with coarsened echotexture and nodular contours.  Anechoic area measuring 1.5 cm most consistent with cyst as well as no  complex internal features. Adjacent perihepatic ascites noted.     Gallbladder unremarkable without cholelithiasis or inflammatory wall  thickening.     Common bile duct within normal limits at 5 mm.     Right kidney measures 12 cm in length with minimal renal collecting  system dilatation, however no contour deforming mass or obvious calculi.       Impression:       Cirrhotic hepatic morphology with simple appearing hepatic  cyst, otherwise no focal parenchymal lesion. Perihepatic ascites.           CT Abdomen Pelvis With & Without Contrast [[id number]] Collected:  09/10/20 2331     Updated:  09/10/20 2333    Narrative:       CT Abdomen Pelvis WO W    INDICATION:   Abdominal pain with fever. Sepsis. Weakness for 4 days.    TECHNIQUE:   CT of the abdomen and pelvis with and without IV contrast. Coronal and sagittal reconstructions were obtained in addition to 3-D reformats.  Radiation dose reduction techniques included automated exposure control or exposure modulation based on body  size. Count of known CT and cardiac nuc med studies performed in previous 12 months: 0.     COMPARISON:   6/18/2020    FINDINGS:  Please see chest CT report dictated separately. Gallbladder is within normal limits. Patient is status post endovascular repair of an abdominal aortic aneurysm. The graft appears patent. Native aneurysm sac measures about 5.4 cm which is similar to prior  allowing for differences in slice selection. Stranding adjacent to the aorta is unchanged and could be inflammatory. Cirrhotic liver is again identified with what appears to be a cyst in the left hepatic lobe. Spleen remains  enlarged measuring at least  16.3 cm in length. Solid organs are otherwise normal. No renal or ureteral stones are seen. There is no hydronephrosis. There is a small amount of ascites in the abdomen and pelvis which is new from prior. Urinary bladder is grossly normal. The appendix  is normal. Remainder of the unopacified GI tract is normal as well. No bowel obstruction is identified. There is some soft tissue induration at the umbilicus. This could reflect cellulitis in the appropriate clinical setting. Patient is status post  spinal fusion from L3 through S1. Kyphoplasty cement is noted at L3-L5.      Impression:         1. Cirrhosis and splenomegaly with a new small amount of ascites.  2. Grossly normal GI tract, including the appendix.  3. Stable appearance of an endovascular aortic aneurysm repair. Adjacent fat stranding is also stable and possibly inflammatory. Correlation and follow-up recommended.  4. Induration at the umbilicus could reflect cellulitis. Correlate with physical exam.  5. Additional findings as above.          Signer Name: Ric Hyatt MD   Signed: 9/10/2020 11:31 PM   Workstation Name: [email or web address]    [facility]    CT Angiogram Chest With & Without Contrast [[id number]] Collected:  09/10/20 2315     Updated:  09/10/20 2318    Narrative:       CT ANGIOGRAM CHEST W WO CONTRAST    INDICATION:   Fever with worsening cough. Shortness of and weakness for 4 days. History of COPD.    TECHNIQUE:   CT angiogram of the chest with IV and without contrast. 3-D reconstructions were obtained and reviewed.   Radiation dose reduction techniques included automated exposure control or exposure modulation based on body size. Count of known CT and cardiac nuc  med studies performed in previous 12 months: 5.     COMPARISON:   6/18/2020    FINDINGS:   There is a new small right pleural effusion. No left-sided pleural effusion is identified. There is no pericardial effusion. No aortic  aneurysm or dissection is seen. The exam is motion degraded. A right arm PICC has its tip in the SVC. The exam is not  timed for evaluation of the pulmonary arteries. No central pulmonary embolism is identified however. There is a 1.3 cm right paratracheal lymph node, minimally larger. There is some mild nonspecific fat stranding in the right axilla. No adenopathy is  identified in this location. Please see abdomen and pelvis CT report dictated separately.    Lung windows again show pulmonary emphysema. There is chronic scarring in the left lower lobe. Mild multifocal groundglass infiltrates are noted in the left upper lobe. There is some atelectasis in the right lower lobe adjacent to the pleural fluid.  There are some nonspecific mild groundglass infiltrates in the right upper lobe as well. Findings may reflect a mild pneumonia, the patient does have a negative COVID 19 test today.      Impression:         1. Motion degraded exam.  2. No aortic aneurysm or dissection. No central pulmonary embolism.  3. Small right pleural effusion with associated right lower lobe atelectasis.  4. COPD.  5. Mild patchy infiltrates in the upper lobes likely reflect a mild pneumonia. Patient has a negative COVID 19 test today.    Signer Name: Ric Hyatt MD   Signed: 9/10/2020 11:15 PM   Workstation Name: [facility]    [facility]    XR Chest 1 View [[id number]] Collected:  09/10/20 1759     Updated:  09/10/20 2223    Narrative:       EXAMINATION: XR CHEST SINGLE VIEW - 09/10/2020     INDICATION: Evaluate PICC placement.      COMPARISON: 06/05/2019 chest radiograph. Chest CT scan 06/18/2020.     FINDINGS: Right upper extremity PICC line is seen with its tip in the  most distal portion of the right subclavian vein. Heart shadow is upper  limits of normal size. Pulmonary vasculature appears upper limits of  normal. Right apical pleural scarring is stable. There is a very small  but new right pleural  effusion.       Impression:       1. PICC line tip in the distal right subclavian vein.     2. Small right pleural effusion.     DICTATED:   09/10/2020  EDITED/ls :   09/10/2020          This report was finalized on 9/10/2020 10:20 PM by Dr. Donald Laboy MD.                  I have reviewed the medications:  Scheduled Meds:  [START ON 9/12/2020] Pharmacy Consult  Does not apply Once   amLODIPine 5 mg Oral BID   aspirin 81 mg Oral Daily   carvedilol 6.25 mg Oral Q12H   citalopram 40 mg Oral Daily   furosemide 20 mg Oral QAM   heparin (porcine) 5,000 Units Subcutaneous Q8H   lactulose 10 g Oral TID   meropenem 1 g Intravenous Q8H   multivitamin 1 tablet Oral Daily   pantoprazole 40 mg Oral BID AC   saccharomyces boulardii 250 mg Oral BID   sodium chloride 10 mL Intravenous Q12H   sucralfate 1 g Oral 4x Daily   tamsulosin 0.4 mg Oral Daily   vancomycin 1,250 mg Intravenous Q12H     Continuous Infusions:  Pharmacy to dose vancomycin     sodium chloride 100 mL/hr Last Rate: 100 mL/hr (09/11/20 0811)     PRN Meds:.influenza vaccine  •  ipratropium-albuterol  •  melatonin  •  ondansetron  •  Pharmacy to dose vancomycin  •  sodium chloride    Assessment/Plan   Assessment & Plan     Active Hospital Problems    Diagnosis  POA   • **Fever [R50.9]  Unknown   • Pneumonia [J18.9]  Unknown   • GERD (gastroesophageal reflux disease) [K21.9]  Unknown   • Person under investigation for COVID-19 [Z20.828]  Yes   • On supplemental oxygen by nasal cannula [Z78.9]  Unknown   • 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]  Unknown   • Elevated transaminase level [R74.0]  Unknown   • Thrombocytopenia (CMS/HCC) [D69.6]  Unknown   • Cirrhosis of liver (CMS/HCC) [K74.60]  Yes   • Anemia [D64.9]  Yes   • Chronic respiratory failure with hypoxia and hypercapnia (CMS/HCC) [J96.11, J96.12]  Yes   • Prediabetes [R73.03]  Yes   • Former smoker [Z87.891]  Not Applicable   • Coal workers pneumoconiosis, 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        Brief Hospital Course to date:  Derek Kelsey is a 69 y.o. 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 Carrero, anxiety, depression, hypertension, cirrhosis, COPD, and chronic respiratory failure with hypoxia on 2 L nasal cannula, CAD, GERD, and prediabetes who was admitted as a direct admit from infectious disease clinic for a fever and concern about confusion.    All problems new to me today    Fever  History of AAA repair status post endograft in June 2019  Status post recent endograft infection with hospitalization at [facility] in June 2020  -Was here June 2020 followed by CT surgery and infectious disease, he went home with a  PICC line for home antibiotic infusions  -ESR level improved from previous and endograft appears stable on CT abdomen and pelvis  -Complex case because he is at high risk for any surgery and if he has continued infection from endograft it will be difficult to keep him on chronic and IV antibiotics  -Currently will continue vancomycin and Merrem per ID recommendations  -Continue IV fluids    Cirrhosis  Evaded AST and ALT  -Acute hepatitis panel negative  -Ammonia level 54  -Continue lactulose  -Ultrasound gallbladder shows cirrhosis, no significant changes with gallbladder no gallstones, minimal ascites    Chronic respiratory failure with hypoxia on 2 L nasal cannula  COPD  -COVID-19 negative, right lower extremity duplex negative for DVT, no PE on CT PE  -Continue oxygen as needed  -Continue duo nebs as needed    GERD-continue pantoprazole  BPH-continue Flomax  CAD-continue aspirin  GERD-continue Protonix  Hypertension-continue  Coreg    DVT Prophylaxis: Heparin subcu      Disposition: I expect the patient to be discharged pending further work-up for fever.    CODE STATUS:   Code Status and Medical Interventions:   Ordered at: 09/10/20 2040     Limited Support to NOT Include:    Cardioversion/Defibrillation    Intubation     Level Of Support Discussed With:    Patient     Code Status:    No CPR     Medical Interventions (Level of Support Prior to Arrest):    Limited     Comments:    pt and wife agree         Electronically signed by Eve Perez MD, 09/11/20, 15:34.

## 2020-09-11 NOTE — CONSULTS
Consult Note  Derek Kelsey  [id number]  1950    Referring Provider:  Reason for Consultation: Question endograft infection    Patient Care Team:  Raudel Zheng MD as PCP - General  Joo Franklin MD as Consulting Physician (Orthopedic Surgery)  Leopoldo Burleson MD as Surgeon (Cardiothoracic Surgery)  Douglas Galvez MD as Consulting Physician (Cardiology)  Davis Ford MD as Consulting Physician (Urology)    Chief complaint: Abdominal pain      History of present illness: Patient continues to have intermittent abdominal pain.  This is not to mention different than before.  He has had a fever apparently 103.  His clot followed up closely with Dr. Carrero.  He states he feels good and is essentially been afebrile since hospitalization.  His white count was 3.9 thousand.    Review of Systems    The following systems were reviewed and negative;  eyes, ENT, respiratory, cardiovascular, genitourinary, integument, breast, hematologic / lymphatic, musculoskeletal, neurological, behavioral/psych, endocrine and allergies / immunologic    History  Past Medical History:   Diagnosis Date   • Abdominal aortic aneurysm (CMS/HCC) 9/29/2016   • Anxiety 9/29/2016   • Arthritis    • Back pain    • Black lung (CMS/[facility])    • CAD (coronary artery disease) 9/29/2016   • Cirrhosis (CMS/[facility])    • COPD (chronic obstructive pulmonary disease) (CMS/[facility]) 9/29/2016   • Depression 9/29/2016   • Depression    • GERD (gastroesophageal reflux disease)    • Hypertension 9/29/2016   • On supplemental oxygen by nasal cannula     at night   • Vitiligo    • Wears dentures    • Wears reading eyeglasses    , Past Surgical History:   Procedure Laterality Date   • ABDOMINAL AORTIC ANEURYSM REPAIR WITH ENDOGRAFT N/A 6/6/2019    Procedure: ABDOMINAL AORTIC ANEURYSM REPAIR WITH ENDOGRAFT;  Surgeon: Leopoldo Burleson MD;  Location: [place] [address];  Service: Vascular   • BACK SURGERY     • CARDIAC CATHETERIZATION     • CARDIAC  CATHETERIZATION N/A 2018    Procedure: Left Heart Cath;  Surgeon: Douglas Galvez MD;  Location:  [facility];  Service: Cardiovascular   • CARDIAC SURGERY     • COLONOSCOPY         • COLONOSCOPY N/A 10/30/2019    Procedure: COLONOSCOPY;  Surgeon: Homar Viveros MD;  Location:  [facility];  Service: Gastroenterology   • ENDOSCOPY N/A 10/30/2019    Procedure: ESOPHAGOGASTRODUODENOSCOPY;  Surgeon: Homar Viveros MD;  Location:  [facility];  Service: Gastroenterology   • EYE SURGERY      cataracts bilateral   • HAND SURGERY Left    • LUMBAR DISCECTOMY FUSION INSTRUMENTATION N/A 2018    Procedure: LUMBAR DISCECTOMY POSTERIOR WITH FUSION INSTRUMENTATION;  Surgeon: Joo Franklin MD;  Location:  [facility];  Service: Orthopedic Spine   • LUMBAR DISCECTOMY FUSION INSTRUMENTATION N/A 2019    Procedure: POSTERIOR LUMBAR SPINAL FUSION REVISION AND INSTRUMENTATION L3,L4,L5,S1;  Surgeon: Joo Franklin MD;  Location:  [facility];  Service: Orthopedic Spine   • ORIF FINGER / THUMB FRACTURE     • SHOULDER SURGERY Left    , Family History   Problem Relation Age of Onset   • Stroke Mother    • Hypertension Mother    • Heart disease Mother    • Heart disease Father    • Hypertension Father    • Diabetes Sister    • Hypertension Sister    • Heart disease Sister    • Diabetes Brother    • Heart disease Brother    • Hypertension Child    • Hypertension Son    • No Known Problems Son    • Diabetes Sister    • Heart disease Sister    , Social History     Tobacco Use   • Smoking status: Former Smoker     Packs/day: 1.00     Years: 30.00     Pack years: 30.00     Types: Cigarettes     Last attempt to quit: 2011     Years since quittin.7   • Smokeless tobacco: Former User     Types: Chew     Quit date: 2011   Substance Use Topics   • Alcohol use: Not Currently   • Drug use: No   , Medications Prior to Admission   Medication Sig Dispense Refill Last Dose   •  acetaminophen (TYLENOL) 650 MG 8 hr tablet Take 650 mg by mouth Every 8 (Eight) Hours As Needed for Mild Pain .      • saccharomyces boulardii (FLORASTOR) 250 MG capsule Take 250 mg by mouth 2 (Two) Times a Day.      • amLODIPine (NORVASC) 10 MG tablet Take 0.5 tablets by mouth 2 (Two) Times a Day.   Taking   • ASPIRIN LOW DOSE 81 MG tablet Take 1 tablet by mouth Daily. Last dose 10-21-18 (Patient taking differently: Take 81 mg by mouth Daily.)   Taking   • carvedilol (COREG) 6.25 MG tablet TAKE 1 TABLET  2  TIMES A DAY WITH MEALS. 90 tablet 0    • citalopram (CeleXA) 20 MG tablet Take 40 mg by mouth Daily.   Taking   • ferrous gluconate (FERGON) 324 MG tablet Take 1 tablet by mouth Daily With Breakfast. (Patient taking differently: Take 324 mg by mouth 2 (Two) Times a Day.) 30 tablet 0 Taking   • furosemide (LASIX) 20 MG tablet Take 20-40 mg by mouth Every Morning.   Taking   • lactulose (CHRONULAC) 10 GM/15ML solution Take 15 ml q 6 hours as needed for constipation 1892 mL 3    • meropenem (MERREM) 1 g injection    Taking   • Multiple Vitamin (MULTI-VITAMIN DAILY) tablet Take 1 tablet by mouth Daily.   Taking   • O2 (OXYGEN)    Taking   • pantoprazole (PROTONIX) 40 MG EC tablet Take 40 mg by mouth 2 (Two) Times a Day.   Taking   • sucralfate (CARAFATE) 1 g tablet TAKE 1 TABLET FOUR TIMES DAILY 240 tablet 3 Taking   • tamsulosin (FLOMAX) 0.4 MG capsule 24 hr capsule Take 1 capsule by mouth Daily. 15 capsule 0 Taking   • TRELEGY ELLIPTA 100-62.5-25 MCG/INH aerosol powder  INHALE 1 PUFF ONCE DAILY 60 each 0    • triamcinolone (KENALOG) 0.1 % cream Apply 1 application topically to the appropriate area as directed 2 (Two) Times a Day.   Taking      Scheduled Meds:    [START ON 9/12/2020] Pharmacy Consult  Does not apply Once   amLODIPine 5 mg Oral BID   aspirin 81 mg Oral Daily   carvedilol 6.25 mg Oral Q12H   citalopram 40 mg Oral Daily   furosemide 20 mg Oral QAM   heparin (porcine) 5,000 Units Subcutaneous Q8H  "  lactulose 10 g Oral TID   meropenem 1 g Intravenous Q8H   multivitamin 1 tablet Oral Daily   pantoprazole 40 mg Oral BID AC   saccharomyces boulardii 250 mg Oral BID   sodium chloride 10 mL Intravenous Q12H   sucralfate 1 g Oral 4x Daily   tamsulosin 0.4 mg Oral Daily   vancomycin 1,250 mg Intravenous Q12H      Continuous Infusions:    Pharmacy to dose vancomycin     sodium chloride 100 mL/hr Last Rate: 100 mL/hr (09/10/20 2104)      PRN Meds:  influenza vaccine  •  ipratropium-albuterol  •  ondansetron  •  Pharmacy to dose vancomycin  •  sodium chloride and Allergies:  Penicillins and Percocet [oxycodone-acetaminophen]    Objective     Vital Sign Min/Max for last 24 hours  Temp  Min: 98.8 °F (37.1 °C)  Max: 99.1 °F (37.3 °C)   BP  Min: 114/58  Max: 164/87   Pulse  Min: 67  Max: 80   Resp  Min: 16  Max: 20   SpO2  Min: 90 %  Max: 95 %   No data recorded   Weight  Min: 106 kg (234 lb)  Max: 106 kg (234 lb)     Flowsheet Rows      First Filed Value   Admission Height  188 cm (74\") Documented at 09/10/2020 1637   Admission Weight  106 kg (234 lb) Documented at 09/10/2020 1637          Physical Exam:     General Appearance:    Alert, cooperative, in no acute distress   Head:    Normocephalic, without obvious abnormality, atraumatic   Eyes:            Lids and lashes normal, conjunctivae and sclerae normal, no   icterus, no pallor, corneas clear, PERRLA   Ears:    Ears appear intact with no abnormalities noted   Throat:   No oral lesions, no thrush, oral mucosa moist   Neck:   No adenopathy, supple, trachea midline, no thyromegaly, no   carotid bruit, no JVD   Back:     No kyphosis present, no scoliosis present, no skin lesions,      erythema or scars, no tenderness to percussion or                   palpation,   range of motion normal   Lungs:     Clear to auscultation,respirations regular, even and                  unlabored    Heart:    Regular rhythm and normal rate, normal S1 and S2, no            murmur, no " gallop, no rub, no click   Chest Wall:    No abnormalities observed   Abdomen:     Normal bowel sounds, no masses, no organomegaly, soft        non-tender, non-distended, no guarding, no rebound                tenderness   Rectal:     Deferred   Extremities:   Moves all extremities well, no edema, no cyanosis, no             redness   Pulses:   Pulses palpable and equal bilaterally   Skin:   No bleeding, bruising or rash   Lymph nodes:   No palpable adenopathy   Neurologic:   Cranial nerves 2 - 12 grossly intact, sensation intact, DTR       present and equal bilaterally             Assessment/Plan       Fever    Anxiety    Abdominal aortic aneurysm (S/P Endovascular repair)    Depression    Hypertension    CAD (coronary artery disease)    Former smoker    Coal workers pneumoconiosis, simple    Prediabetes    Chronic respiratory failure with hypoxia and hypercapnia (CMS/HCC)    Anemia    Cirrhosis of liver (CMS/HCC)    GERD (gastroesophageal reflux disease)    Person under investigation for COVID-19    On supplemental oxygen by nasal cannula    Infected aortic endograft (CMS/HCC)    Elevated transaminase level    Thrombocytopenia (CMS/HCC)    Pneumonia      Patient is a 69-year-old white male who has a long history of multiple medical problems which have been carefully outlined.  He does have cirrhosis and ascites as well as a pleural effusion.  He now presents with a previous temperature.  He does have a leukopenia however.  This could possibly could be a viral illness.  I see no positive blood cultures at this point.  I have discussed with him about the possibility of surgery and he is extremely reluctant.  I think he would be an exorbitant high surgical risk for removal of the graft as I have reviewed previously with Dr. Carrero.  At this point I would continue conservative measures since that is the patient's desire as well as antibiotics.  We will follow with you.      I discussed the patients findings and my  recommendations with patient and nursing staff      Please note that portions of this note were completed with a voice recognition program. Efforts were made to edit the dictations, but words may be mistranscribed    Leopoldo Burleson MD  09/11/20  06:57

## 2020-09-11 NOTE — PROGRESS NOTES
Discharge Planning Assessment  [facility]     Patient Name: Derek Kelsey  MRN: [id number]  Today's Date: 9/11/2020    Admit Date: 9/10/2020    Discharge Needs Assessment     Row Name 09/11/20 1317       Living Environment    Lives With  spouse    Current Living Arrangements  home/apartment/condo    Primary Care Provided by  self    Provides Primary Care For  no one, unable/limited ability to care for self    Family Caregiver if Needed  spouse    Family Caregiver Names  Mago- spouse    Quality of Family Relationships  supportive;involved    Able to Return to Prior Arrangements  yes       Resource/Environmental Concerns    Resource/Environmental Concerns  none    Transportation Concerns  car, none       Transition Planning    Patient/Family Anticipates Transition to  home with family    Patient/Family Anticipated Services at Transition  none    Transportation Anticipated  family or friend will provide       Discharge Needs Assessment    Concerns to be Addressed  denies needs/concerns at this time    Equipment Currently Used at Home  cane, straight;oxygen;walker, rolling    Anticipated Changes Related to Illness  none    Equipment Needed After Discharge  none    Provided Post Acute Provider List?  N/A    N/A Provider List Comment  Denies need        Discharge Plan     Row Name 09/11/20 9964       Plan    Plan  Home    Patient/Family in Agreement with Plan  yes    Plan Comments  Spoke with pt. at bedside. Lives with spouse in [place] Independent PTA. Has a RW and cane. Uses O2 at 2L NC provided by [employer]. Pt's plan is to dc to home with family providing transport.     Final Discharge Disposition Code  01 - home or self-care        Destination      Coordination has not been started for this encounter.      Durable Medical Equipment      Coordination has not been started for this encounter.      Dialysis/Infusion      Coordination has not been started for this encounter.      Home Medical Care       Coordination has not been started for this encounter.      Therapy      Coordination has not been started for this encounter.      Community Resources      Coordination has not been started for this encounter.          Demographic Summary    No documentation.       Functional Status     Row Name 09/11/20 1317       Functional Status    Usual Activity Tolerance  moderate       Functional Status, IADL    Medications  independent    Meal Preparation  assistive person    Housekeeping  assistive person    Laundry  assistive person    Shopping  assistive person        Psychosocial    No documentation.       Abuse/Neglect    No documentation.       Legal    No documentation.       Substance Abuse    No documentation.       Patient Forms    No documentation.           Lata Lopez RN

## 2020-09-11 NOTE — PLAN OF CARE
Problem: Patient Care Overview  Goal: Plan of Care Review  Outcome: Ongoing (interventions implemented as appropriate)  Flowsheets (Taken 9/11/2020 0615)  Progress: no change  Plan of Care Reviewed With: patient  Outcome Summary: VSS on 2L per NC.  NPO since 0000 for Ultra sound this am.  Pt reports nausea this am, N/O for zofran.  Will continue to monitor.

## 2020-09-12 LAB
ALBUMIN SERPL-MCNC: 3.3 G/DL (ref 3.5–5.2)
ALBUMIN/GLOB SERPL: 0.6 G/DL
ALP SERPL-CCNC: 235 U/L (ref 39–117)
ALT SERPL W P-5'-P-CCNC: 75 U/L (ref 1–41)
ANION GAP SERPL CALCULATED.3IONS-SCNC: 12 MMOL/L (ref 5–15)
AST SERPL-CCNC: 173 U/L (ref 1–40)
BASOPHILS # BLD AUTO: 0.05 10*3/MM3 (ref 0–0.2)
BASOPHILS NFR BLD AUTO: 1.3 % (ref 0–1.5)
BILIRUB SERPL-MCNC: 2 MG/DL (ref 0–1.2)
BUN SERPL-MCNC: 11 MG/DL (ref 8–23)
BUN/CREAT SERPL: 17.2 (ref 7–25)
CALCIUM SPEC-SCNC: 9.2 MG/DL (ref 8.6–10.5)
CHLORIDE SERPL-SCNC: 101 MMOL/L (ref 98–107)
CO2 SERPL-SCNC: 22 MMOL/L (ref 22–29)
CREAT SERPL-MCNC: 0.64 MG/DL (ref 0.76–1.27)
DEPRECATED RDW RBC AUTO: 62.3 FL (ref 37–54)
EOSINOPHIL # BLD AUTO: 0.18 10*3/MM3 (ref 0–0.4)
EOSINOPHIL NFR BLD AUTO: 4.5 % (ref 0.3–6.2)
ERYTHROCYTE [DISTWIDTH] IN BLOOD BY AUTOMATED COUNT: 16.9 % (ref 12.3–15.4)
GFR SERPL CREATININE-BSD FRML MDRD: 125 ML/MIN/1.73
GLOBULIN UR ELPH-MCNC: 5.2 GM/DL
GLUCOSE SERPL-MCNC: 100 MG/DL (ref 65–99)
HCT VFR BLD AUTO: 33.7 % (ref 37.5–51)
HGB BLD-MCNC: 9.5 G/DL (ref 13–17.7)
IMM GRANULOCYTES # BLD AUTO: 0.01 10*3/MM3 (ref 0–0.05)
IMM GRANULOCYTES NFR BLD AUTO: 0.3 % (ref 0–0.5)
LYMPHOCYTES # BLD AUTO: 0.7 10*3/MM3 (ref 0.7–3.1)
LYMPHOCYTES NFR BLD AUTO: 17.6 % (ref 19.6–45.3)
MCH RBC QN AUTO: 28.2 PG (ref 26.6–33)
MCHC RBC AUTO-ENTMCNC: 28.2 G/DL (ref 31.5–35.7)
MCV RBC AUTO: 100 FL (ref 79–97)
MONOCYTES # BLD AUTO: 0.44 10*3/MM3 (ref 0.1–0.9)
MONOCYTES NFR BLD AUTO: 11.1 % (ref 5–12)
NEUTROPHILS NFR BLD AUTO: 2.59 10*3/MM3 (ref 1.7–7)
NEUTROPHILS NFR BLD AUTO: 65.2 % (ref 42.7–76)
NRBC BLD AUTO-RTO: 0 /100 WBC (ref 0–0.2)
PLATELET # BLD AUTO: 134 10*3/MM3 (ref 140–450)
PMV BLD AUTO: 10.1 FL (ref 6–12)
POTASSIUM SERPL-SCNC: 3.8 MMOL/L (ref 3.5–5.2)
PROT SERPL-MCNC: 8.5 G/DL (ref 6–8.5)
RBC # BLD AUTO: 3.37 10*6/MM3 (ref 4.14–5.8)
SODIUM SERPL-SCNC: 135 MMOL/L (ref 136–145)
VANCOMYCIN TROUGH SERPL-MCNC: 12 MCG/ML (ref 5–20)
WBC # BLD AUTO: 3.97 10*3/MM3 (ref 3.4–10.8)

## 2020-09-12 PROCEDURE — 80053 COMPREHEN METABOLIC PANEL: CPT | Performed by: INTERNAL MEDICINE

## 2020-09-12 PROCEDURE — 99233 SBSQ HOSP IP/OBS HIGH 50: CPT | Performed by: STUDENT IN AN ORGANIZED HEALTH CARE EDUCATION/TRAINING PROGRAM

## 2020-09-12 PROCEDURE — 25010000002 MEROPENEM: Performed by: NURSE PRACTITIONER

## 2020-09-12 PROCEDURE — 25010000002 VANCOMYCIN 10 G RECONSTITUTED SOLUTION

## 2020-09-12 PROCEDURE — 85025 COMPLETE CBC W/AUTO DIFF WBC: CPT | Performed by: INTERNAL MEDICINE

## 2020-09-12 PROCEDURE — 25010000002 VANCOMYCIN

## 2020-09-12 PROCEDURE — 94640 AIRWAY INHALATION TREATMENT: CPT

## 2020-09-12 PROCEDURE — 25010000002 HEPARIN (PORCINE) PER 1000 UNITS: Performed by: NURSE PRACTITIONER

## 2020-09-12 PROCEDURE — 99232 SBSQ HOSP IP/OBS MODERATE 35: CPT | Performed by: NURSE PRACTITIONER

## 2020-09-12 PROCEDURE — 97161 PT EVAL LOW COMPLEX 20 MIN: CPT

## 2020-09-12 PROCEDURE — 80202 ASSAY OF VANCOMYCIN: CPT

## 2020-09-12 RX ORDER — BUDESONIDE AND FORMOTEROL FUMARATE DIHYDRATE 160; 4.5 UG/1; UG/1
2 AEROSOL RESPIRATORY (INHALATION)
Status: DISCONTINUED | OUTPATIENT
Start: 2020-09-12 | End: 2020-09-13 | Stop reason: HOSPADM

## 2020-09-12 RX ADMIN — HEPARIN SODIUM 5000 UNITS: 5000 INJECTION, SOLUTION INTRAVENOUS; SUBCUTANEOUS at 05:07

## 2020-09-12 RX ADMIN — HEPARIN SODIUM 5000 UNITS: 5000 INJECTION, SOLUTION INTRAVENOUS; SUBCUTANEOUS at 14:54

## 2020-09-12 RX ADMIN — TAMSULOSIN HYDROCHLORIDE 0.4 MG: 0.4 CAPSULE ORAL at 08:17

## 2020-09-12 RX ADMIN — AMLODIPINE BESYLATE 5 MG: 5 TABLET ORAL at 20:40

## 2020-09-12 RX ADMIN — ASPIRIN 81 MG CHEWABLE TABLET 81 MG: 81 TABLET CHEWABLE at 08:17

## 2020-09-12 RX ADMIN — AMLODIPINE BESYLATE 5 MG: 5 TABLET ORAL at 08:17

## 2020-09-12 RX ADMIN — SODIUM CHLORIDE, PRESERVATIVE FREE 10 ML: 5 INJECTION INTRAVENOUS at 21:08

## 2020-09-12 RX ADMIN — SUCRALFATE 1 G: 1 TABLET ORAL at 17:21

## 2020-09-12 RX ADMIN — CARVEDILOL 6.25 MG: 6.25 TABLET, FILM COATED ORAL at 08:18

## 2020-09-12 RX ADMIN — CARVEDILOL 6.25 MG: 6.25 TABLET, FILM COATED ORAL at 20:40

## 2020-09-12 RX ADMIN — SUCRALFATE 1 G: 1 TABLET ORAL at 20:40

## 2020-09-12 RX ADMIN — FUROSEMIDE 20 MG: 20 TABLET ORAL at 05:07

## 2020-09-12 RX ADMIN — VANCOMYCIN HYDROCHLORIDE 1250 MG: 10 INJECTION, POWDER, LYOPHILIZED, FOR SOLUTION INTRAVENOUS at 10:59

## 2020-09-12 RX ADMIN — MEROPENEM 1 G: 1 INJECTION, POWDER, FOR SOLUTION INTRAVENOUS at 05:07

## 2020-09-12 RX ADMIN — SUCRALFATE 1 G: 1 TABLET ORAL at 08:17

## 2020-09-12 RX ADMIN — Medication 250 MG: at 08:17

## 2020-09-12 RX ADMIN — Medication 250 MG: at 20:40

## 2020-09-12 RX ADMIN — HEPARIN SODIUM 5000 UNITS: 5000 INJECTION, SOLUTION INTRAVENOUS; SUBCUTANEOUS at 21:02

## 2020-09-12 RX ADMIN — CITALOPRAM HYDROBROMIDE 40 MG: 40 TABLET ORAL at 08:17

## 2020-09-12 RX ADMIN — PANTOPRAZOLE SODIUM 40 MG: 40 TABLET, DELAYED RELEASE ORAL at 17:21

## 2020-09-12 RX ADMIN — MULTIVITAMIN TABLET 1 TABLET: TABLET at 08:17

## 2020-09-12 RX ADMIN — BUDESONIDE AND FORMOTEROL FUMARATE DIHYDRATE 2 PUFF: 160; 4.5 AEROSOL RESPIRATORY (INHALATION) at 19:48

## 2020-09-12 RX ADMIN — VANCOMYCIN HYDROCHLORIDE 1250 MG: 10 INJECTION, POWDER, LYOPHILIZED, FOR SOLUTION INTRAVENOUS at 21:07

## 2020-09-12 RX ADMIN — PANTOPRAZOLE SODIUM 40 MG: 40 TABLET, DELAYED RELEASE ORAL at 05:07

## 2020-09-12 NOTE — PLAN OF CARE
Goal Outcome Evaluation:  Plan of Care Reviewed With: patient  Progress: no change  Outcome Summary: VSS. Afebrile overnight. ABx continued. 2L NC. SR on tele. Pt rested well with no complaints.  Problem: Patient Care Overview  Goal: Plan of Care Review  Recent Flowsheet Documentation  Taken 9/12/2020 0639 by Yadira Nelson RN  Progress: no change  Plan of Care Reviewed With: patient  Outcome Summary: VSS. Afebrile overnight. ABx continued. 2L NC. SR on tele. Pt rested well with no complaints.  9/12/2020 0614 by Yadira Nelson RN  Outcome: Resolved for upgrade, new template will be applied  Flowsheets (Taken 9/11/2020 2032)  Plan of Care Reviewed With: patient  Goal: Individualization and Mutuality  Outcome: Resolved for upgrade, new template will be applied  Goal: Discharge Needs Assessment  Outcome: Resolved for upgrade, new template will be applied  Goal: Interprofessional Rounds/Family Conf  Outcome: Resolved for upgrade, new template will be applied     Problem: Infection, Risk/Actual (Adult)  Goal: Identify Related Risk Factors and Signs and Symptoms  Outcome: Resolved for upgrade, new template will be applied  Goal: Infection Prevention/Resolution  Outcome: Resolved for upgrade, new template will be applied     Problem: Suicide Risk (Adult)  Goal: Identify Related Risk Factors and Signs and Symptoms  Outcome: Resolved for upgrade, new template will be applied  Goal: Strength-Based Wellness/Recovery  Outcome: Resolved for upgrade, new template will be applied  Goal: Physical Safety  Outcome: Resolved for upgrade, new template will be applied     Problem: Fall Risk (Adult)  Goal: Identify Related Risk Factors and Signs and Symptoms  Outcome: Resolved for upgrade, new template will be applied  Goal: Absence of Fall  Outcome: Resolved for upgrade, new template will be applied     Problem: Pain, Chronic (Adult)  Goal: Identify Related Risk Factors and Signs and Symptoms  Outcome: Resolved for upgrade,  new template will be applied  Goal: Acceptable Pain/Comfort Level and Functional Ability  Outcome: Resolved for upgrade, new template will be applied     Problem: Fall Injury Risk  Goal: Absence of Fall and Fall-Related Injury  Outcome: Resolved for upgrade, new template will be applied

## 2020-09-12 NOTE — THERAPY EVALUATION
Patient Name: Derek Kelsey  : 1950    MRN: [id number]                              Today's Date: 2020       Admit Date: 9/10/2020    Visit Dx:     ICD-10-CM ICD-9-CM   1. Impaired functional mobility, balance, gait, and endurance  Z74.09 V49.89     Patient Active Problem List   Diagnosis   • Anxiety   • Abdominal aortic aneurysm (S/P Endovascular repair)   • Depression   • Hypertension   • Cataract, bilateral   • CAD (coronary artery disease)   • Abnormal stress test   • Moderate COPD   • Former smoker   • Coal workers pneumoconiosis, simple   • Back pain   • S/P lumbar fusion   • Prediabetes   • Acute blood loss anemia, mild, asymptomatic   • Acute postoperative pain   • Hyponatremia, mild   • Chronic respiratory failure with hypoxia and hypercapnia (CMS/HCC)   • Renal insufficiency   • Acute cystitis without hematuria   • Acute UTI   • Sepsis (CMS/HCC)   • Bacteremia   • Anemia   • Aortitis (CMS/HCC)   • Elevated C-reactive protein (CRP)   • Cirrhosis of liver (CMS/HCC)   • High anion gap metabolic acidosis   • S/P AAA repair   • GERD (gastroesophageal reflux disease)   • Person under investigation for COVID-19   • On supplemental oxygen by nasal cannula   • Fever   • Infected aortic endograft (CMS/HCC)   • Elevated transaminase level   • Thrombocytopenia (CMS/HCC)   • Pneumonia     Past Medical History:   Diagnosis Date   • Abdominal aortic aneurysm (CMS/HCC) 2016   • Anxiety 2016   • Arthritis    • Back pain    • Black lung (CMS/HCC)    • CAD (coronary artery disease) 2016   • Cirrhosis (CMS/HCC)    • COPD (chronic obstructive pulmonary disease) (CMS/HCC) 2016   • Depression 2016   • Depression    • GERD (gastroesophageal reflux disease)    • Hypertension 2016   • On supplemental oxygen by nasal cannula     at night   • Vitiligo    • Wears dentures    • Wears reading eyeglasses      Past Surgical History:   Procedure Laterality Date   • ABDOMINAL AORTIC ANEURYSM  REPAIR WITH ENDOGRAFT N/A 6/6/2019    Procedure: ABDOMINAL AORTIC ANEURYSM REPAIR WITH ENDOGRAFT;  Surgeon: Leopoldo Burleson MD;  Location:  [facility];  Service: Vascular   • BACK SURGERY     • CARDIAC CATHETERIZATION     • CARDIAC CATHETERIZATION N/A 9/28/2018    Procedure: Left Heart Cath;  Surgeon: Douglas Galvez MD;  Location:  [facility];  Service: Cardiovascular   • CARDIAC SURGERY     • COLONOSCOPY      2015   • COLONOSCOPY N/A 10/30/2019    Procedure: COLONOSCOPY;  Surgeon: Homar Viveros MD;  Location:  [facility];  Service: Gastroenterology   • ENDOSCOPY N/A 10/30/2019    Procedure: ESOPHAGOGASTRODUODENOSCOPY;  Surgeon: Homar Viveros MD;  Location:  [facility];  Service: Gastroenterology   • EYE SURGERY      cataracts bilateral   • HAND SURGERY Left    • LUMBAR DISCECTOMY FUSION INSTRUMENTATION N/A 11/1/2018    Procedure: LUMBAR DISCECTOMY POSTERIOR WITH FUSION INSTRUMENTATION;  Surgeon: Joo Franklin MD;  Location:  [facility];  Service: Orthopedic Spine   • LUMBAR DISCECTOMY FUSION INSTRUMENTATION N/A 7/24/2019    Procedure: POSTERIOR LUMBAR SPINAL FUSION REVISION AND INSTRUMENTATION L3,L4,L5,S1;  Surgeon: Joo Franklin MD;  Location:  [facility];  Service: Orthopedic Spine   • ORIF FINGER / THUMB FRACTURE     • SHOULDER SURGERY Left      General Information     Row Name 09/12/20 0730          Physical Therapy Time and Intention    Document Type  evaluation  -LS     Mode of Treatment  physical therapy  -LS     Row Name 09/12/20 0730          General Information    Patient Profile Reviewed  yes  -LS     Prior Level of Function  independent:;gait;transfer;bed mobility;min assist:;ADL's uses Hurry Cane most of time and RW if he feels weak. Spouse said pt falls approx 1X/month due to LOB; pt said he feels like RLE gives out on him.  -LS     Existing Precautions/Restrictions  fall  -LS     Barriers to Rehab  medically complex;previous functional  "deficit  -     Row Name 09/12/20 0730          Living Environment    Lives With  spouse  -     Row Name 09/12/20 0730          Home Main Entrance    Number of Stairs, Main Entrance  three three \"round\" steps per spouse  -LS     Stair Railings, Main Entrance  none  -LS     Row Name 09/12/20 0730          Stairs Within Home, Primary    Stairs, Within Home, Primary  one-level home  -LS     Row Name 09/12/20 0730          Safety Issues, Functional Mobility    Impairments Affecting Function (Mobility)  balance;coordination;endurance/activity tolerance;strength  -LS       User Key  (r) = Recorded By, (t) = Taken By, (c) = Cosigned By    Initials Name Provider Type    Inna Khan, PT Physical Therapist        Mobility     Row Name 09/12/20 0730          Bed Mobility    Comment (Bed Mobility)  deferred-up in bathroom when therapist entered the room. Pt said his wife helps him get OOB sometimes.  -LS     Row Name 09/12/20 0730          Sit-Stand Transfer    Sit-Stand [place] (Transfers)  verbal cues;contact guard  -LS     Assistive Device (Sit-Stand Transfers)  cane, straight Hurry Cane  -     Row Name 09/12/20 0730          Gait/Stairs (Locomotion)    [place] Level (Gait)  contact guard  -LS     Assistive Device (Gait)  cane, straight Hurry Cane  -LS     Distance in Feet (Gait)  120  -LS     Deviations/Abnormal Patterns (Gait)  gait speed decreased;stride length decreased  -LS     Left Sided Gait Deviations  weight shift ability decreased  -LS       User Key  (r) = Recorded By, (t) = Taken By, (c) = Cosigned By    Initials Name Provider Type    Inna Khan PT Physical Therapist        Obj/Interventions     Row Name 09/12/20 0730          Range of Motion Comprehensive    Comment, General Range of Motion  BLE AROM WFL  -LS     Row Name 09/12/20 0730          Strength Comprehensive (MMT)    Comment, General Manual Muscle Testing (MMT) Assessment  B hip flexors 4+/5. B knee extensors 4+. " B ankle dorsiflexors 5  -LS     Row Name 09/12/20 0730          Balance    Static Sitting Balance  WFL sitting EOB  -LS       User Key  (r) = Recorded By, (t) = Taken By, (c) = Cosigned By    Initials Name Provider Type    Inna Khan, PT Physical Therapist        Goals/Plan     Row Name 09/12/20 0913          Bed Mobility Goal 1 (PT)    Activity/Assistive Device (Bed Mobility Goal 1, PT)  sit to supine/supine to sit  -LS     [place] Level/Cues Needed (Bed Mobility Goal 1, PT)  independent  -LS     Time Frame (Bed Mobility Goal 1, PT)  10 days  -LS     Progress/Outcomes (Bed Mobility Goal 1, PT)  goal ongoing  -LS     Row Name 09/12/20 0913          Transfer Goal 1 (PT)    Activity/Assistive Device (Transfer Goal 1, PT)  sit-to-stand/stand-to-sit;walker, rolling;cane, straight with appropriate a.d.  -LS     [place] Level/Cues Needed (Transfer Goal 1, PT)  modified independence  -LS     Time Frame (Transfer Goal 1, PT)  10 days  -LS     Progress/Outcome (Transfer Goal 1, PT)  goal ongoing  -LS     Row Name 09/12/20 0913          Gait Training Goal 1 (PT)    Activity/Assistive Device (Gait Training Goal 1, PT)  gait (walking locomotion);assistive device use  -LS     [place] Level (Gait Training Goal 1, PT)  modified independence  -LS     Distance (Gait Training Goal 1, PT)  300  -LS     Time Frame (Gait Training Goal 1, PT)  10 days  -LS     Progress/Outcome (Gait Training Goal 1, PT)  goal ongoing  -LS     Row Name 09/12/20 0913          Stairs Goal 1 (PT)    Activity/Assistive Device (Stairs Goal 1, PT)  ascending stairs;descending stairs;cane, straight  -LS     [place] Level/Cues Needed (Stairs Goal 1, PT)  contact guard assist  -LS     Number of Stairs (Stairs Goal 1, PT)  3  -LS     Time Frame (Stairs Goal 1, PT)  10 days  -LS       User Key  (r) = Recorded By, (t) = Taken By, (c) = Cosigned By    Initials Name Provider Type    Inna Khan, PT Physical Therapist         Clinical Impression     Row Name 09/12/20 0730          Pain    Additional Documentation  Pain Scale: Numbers Pre/Post-Treatment (Group)  -     Row Name 09/12/20 0730          Pain Scale: Numbers Pre/Post-Treatment    Pretreatment Pain Rating  0/10 - no pain  -LS     Posttreatment Pain Rating  0/10 - no pain  -LS     Row Name 09/12/20 0730          Plan of Care Review    Plan of Care Reviewed With  patient;spouse  -LS     Outcome Summary  Pt presents with deficits in functional strength, balance, and mobility. He was up in bathroom when therapist entered but said his wife helps him OOB sometims. CGA sit to stand and amb 120' CGA with Hurry Cane. Spouse reported pt falls about once/month. She said he has LOB; pt said he feels RLE gives out on him. Pt would benefit from skilled PT services to improve functional independence. Recommend outpt PT upon discharge from acute care hospital; pt is agreeable.  -     Row Name 09/12/20 0730          Therapy Assessment/Plan (PT)    Rehab Potential (PT)  good, to achieve stated therapy goals  -     Criteria for Skilled Interventions Met (PT)  yes;skilled treatment is necessary  -     Row Name 09/12/20 0730          Positioning and Restraints    Pre-Treatment Position  bathroom  -LS     Post Treatment Position  chair  -LS     In Chair  notified nsg;sitting;encouraged to call for assist;with family/caregiver;legs elevated  -       User Key  (r) = Recorded By, (t) = Taken By, (c) = Cosigned By    Initials Name Provider Type    LS Inna Crowley, PT Physical Therapist        Outcome Measures     Row Name 09/12/20 0730          How much help from another person do you currently need...    Turning from your back to your side while in flat bed without using bedrails?  3  -LS     Moving from lying on back to sitting on the side of a flat bed without bedrails?  3  -LS     Moving to and from a bed to a chair (including a wheelchair)?  3  -LS     Standing up from a chair  using your arms (e.g., wheelchair, bedside chair)?  3  -LS     Climbing 3-5 steps with a railing?  3  -LS     To walk in hospital room?  3  -LS     AM-PAC 6 Clicks Score (PT)  18  -     Row Name 09/12/20 0730          Functional Assessment    Outcome Measure Options  AM-PAC 6 Clicks Basic Mobility (PT)  -       User Key  (r) = Recorded By, (t) = Taken By, (c) = Cosigned By    Initials Name Provider Type    Inna Khan, PT Physical Therapist        Physical Therapy Education                 Title: PT OT SLP Therapies (In Progress)     Topic: Physical Therapy (In Progress)     Point: Mobility training (Not Started)     Learner Progress:  Not documented in this visit.          Point: Home exercise program (Not Started)     Learner Progress:  Not documented in this visit.          Point: Precautions (Done)     Learning Progress Summary           Patient Acceptance, E, VU,NR by RON at 9/12/2020 0918    Comment: Encouraged pt to use RW if feeling weak                               User Key     Initials Effective Dates Name Provider Type Discipline     07/17/19 -  Inna Crowley, PT Physical Therapist PT              PT Recommendation and Plan  Planned Therapy Interventions (PT): balance training, bed mobility training, gait training, home exercise program, patient/family education, neuromuscular re-education, motor coordination training, stair training, strengthening, transfer training  Plan of Care Reviewed With: patient, spouse  Outcome Summary: Pt presents with deficits in functional strength, balance, and mobility. He was up in bathroom when therapist entered but said his wife helps him OOB sometims. CGA sit to stand and amb 120' CGA with Hurry Cane. Spouse reported pt falls about once/month. She said he has LOB; pt said he feels RLE gives out on him. Pt would benefit from skilled PT services to improve functional independence. Recommend outpt PT upon discharge from acute care hospital; pt is  agreeable.     Time Calculation:   PT Charges     Row Name 09/12/20 0730             Time Calculation    Start Time  0730  -LS      PT Received On  09/12/20  -      PT Goal Re-Cert Due Date  09/22/20  -        User Key  (r) = Recorded By, (t) = Taken By, (c) = Cosigned By    Initials Name Provider Type    Inna Khan, PT Physical Therapist        Therapy Charges for Today     Code Description Service Date Service Provider Modifiers Qty    97383717833 HC PT EVAL LOW COMPLEXITY 4 9/12/2020 Inna Crowley, PT GP 1          PT G-Codes  Outcome Measure Options: AM-PAC 6 Clicks Basic Mobility (PT)  AM-PAC 6 Clicks Score (PT): 18    Inna Crowley, PT  9/12/2020

## 2020-09-12 NOTE — PROGRESS NOTES
CTS Progress Note    Derek Kelsey  [id number]  1950    CC: Fever    Subjective:  Feels much better than yesterday no fevers overnight    Objective:    Vital Signs:  Temp:  [97.4 °F (36.3 °C)-98.5 °F (36.9 °C)] 97.4 °F (36.3 °C)  Heart Rate:  [63-82] 71  Resp:  [18] 18  BP: (137-151)/(62-75) 151/75    Physical Exam:   General Appearance: alert, appears stated age and cooperative   Lungs: clear to auscultation    Heart:: regular rhythm & normal rate, normal S1, S2 with no murmur   Skin:  warm and dry    Abd: soft non tender no mass   Ext: pulses intact DP and PT   Neuro: normal     Results   Results from last 7 days   Lab Units 09/12/20  0757   WBC 10*3/mm3 3.97   HEMOGLOBIN g/dL 9.5*   HEMATOCRIT % 33.7*   PLATELETS 10*3/mm3 134*     Results from last 7 days   Lab Units 09/12/20  0757   SODIUM mmol/L 135*   POTASSIUM mmol/L 3.8   CHLORIDE mmol/L 101   CO2 mmol/L 22.0   BUN mg/dL 11   CREATININE mg/dL 0.64*   GLUCOSE mg/dL 100*   CALCIUM mg/dL 9.2       Imaging Results (Last 24 Hours)     Procedure Component Value Units Date/Time    XR Abdomen 2+ VW with Chest 1 VW [[id number]] Collected: 09/11/20 1037     Updated: 09/11/20 1811    Narrative:         EXAMINATION: XR ABDOMEN 2+ VIEWS WITH CHEST 1 VW-09/11/2020:      INDICATION: Hx of AAA endograft repair.  Recent infection 6/2020 here,  now with fever, weakness and lower abd pain.      COMPARISON: NONE.     FINDINGS: Frontal view of the chest and two-view abdomen reveals  increased markings seen diffusely throughout the lung fields  bilaterally. Apical pleural thickening identified on the right. There is  a PICC line catheter on the right with tip in the SVC. No definite  pleural effusion.     Flat and upright views of the abdomen reveal hardware identified within  the lower lumbar spine. Stent identified within the abdominal aorta and  iliac vessels. The bowel gas pattern is nonspecific. No obvious  obstruction or gross free intraperitoneal air. Degenerative  changes seen  within the spine.       Impression:      Increased markings seen diffusely throughout the lung fields  bilaterally with PICC line catheter in satisfactory position.  Nonspecific bowel gas pattern with no evidence of obvious obstruction or  gross free intraperitoneal air.     D:  09/11/2020  E:  09/11/2020     This report was finalized on 9/11/2020 6:08 PM by Dr. Love Yap MD.             Lab and X-rays (images and results) reviewed and noted by me.    Assessment:    Fever    Anxiety    Abdominal aortic aneurysm (S/P Endovascular repair)    Depression    Hypertension    CAD (coronary artery disease)    Former smoker    Coal workers pneumoconiosis, simple    Prediabetes    Chronic respiratory failure with hypoxia and hypercapnia (CMS/HCC)    Anemia    Cirrhosis of liver (CMS/HCC)    GERD (gastroesophageal reflux disease)    Person under investigation for COVID-19    On supplemental oxygen by nasal cannula    Infected aortic endograft (CMS/HCC)    Elevated transaminase level    Thrombocytopenia (CMS/HCC)    Pneumonia        Plan:  69-year-old male with an endograft.  His fevers have improved and he has negative blood cultures up to this point.  He feels much better, and with his multiple comorbidities including cirrhosis.  Explant of an aortic graft would be an extremely challenging operation given his state and all conservative measures including antibiotics must be considered.  Overall he is doing much better so there are no operative plans at this time      I have reviewed, verified, and confirmed the above history and current status.  I have examined the patient and confirmed the above physical findings.    Chris Inman MD  09/12/20  10:59 EDT

## 2020-09-12 NOTE — THERAPY EVALUATION
Patient Name: Derek Kelsey  : 1950    MRN: [id number]                              Today's Date: 2020       Admit Date: 9/10/2020    Visit Dx:     ICD-10-CM ICD-9-CM   1. Impaired functional mobility, balance, gait, and endurance  Z74.09 V49.89     Patient Active Problem List   Diagnosis   • Anxiety   • Abdominal aortic aneurysm (S/P Endovascular repair)   • Depression   • Hypertension   • Cataract, bilateral   • CAD (coronary artery disease)   • Abnormal stress test   • Moderate COPD   • Former smoker   • Coal workers pneumoconiosis, simple   • Back pain   • S/P lumbar fusion   • Prediabetes   • Acute blood loss anemia, mild, asymptomatic   • Acute postoperative pain   • Hyponatremia, mild   • Chronic respiratory failure with hypoxia and hypercapnia (CMS/HCC)   • Renal insufficiency   • Acute cystitis without hematuria   • Acute UTI   • Sepsis (CMS/HCC)   • Bacteremia   • Anemia   • Aortitis (CMS/HCC)   • Elevated C-reactive protein (CRP)   • Cirrhosis of liver (CMS/HCC)   • High anion gap metabolic acidosis   • S/P AAA repair   • GERD (gastroesophageal reflux disease)   • Person under investigation for COVID-19   • On supplemental oxygen by nasal cannula   • Fever   • Infected aortic endograft (CMS/HCC)   • Elevated transaminase level   • Thrombocytopenia (CMS/HCC)   • Pneumonia     Past Medical History:   Diagnosis Date   • Abdominal aortic aneurysm (CMS/HCC) 2016   • Anxiety 2016   • Arthritis    • Back pain    • Black lung (CMS/HCC)    • CAD (coronary artery disease) 2016   • Cirrhosis (CMS/HCC)    • COPD (chronic obstructive pulmonary disease) (CMS/HCC) 2016   • Depression 2016   • Depression    • GERD (gastroesophageal reflux disease)    • Hypertension 2016   • On supplemental oxygen by nasal cannula     at night   • Vitiligo    • Wears dentures    • Wears reading eyeglasses      Past Surgical History:   Procedure Laterality Date   • ABDOMINAL AORTIC ANEURYSM  REPAIR WITH ENDOGRAFT N/A 6/6/2019    Procedure: ABDOMINAL AORTIC ANEURYSM REPAIR WITH ENDOGRAFT;  Surgeon: Leopoldo Burleson MD;  Location:  [facility];  Service: Vascular   • BACK SURGERY     • CARDIAC CATHETERIZATION     • CARDIAC CATHETERIZATION N/A 9/28/2018    Procedure: Left Heart Cath;  Surgeon: Douglas Galvez MD;  Location:  [facility];  Service: Cardiovascular   • CARDIAC SURGERY     • COLONOSCOPY      2015   • COLONOSCOPY N/A 10/30/2019    Procedure: COLONOSCOPY;  Surgeon: Homar Viveros MD;  Location:  [facility];  Service: Gastroenterology   • ENDOSCOPY N/A 10/30/2019    Procedure: ESOPHAGOGASTRODUODENOSCOPY;  Surgeon: Homar Viveros MD;  Location:  [facility];  Service: Gastroenterology   • EYE SURGERY      cataracts bilateral   • HAND SURGERY Left    • LUMBAR DISCECTOMY FUSION INSTRUMENTATION N/A 11/1/2018    Procedure: LUMBAR DISCECTOMY POSTERIOR WITH FUSION INSTRUMENTATION;  Surgeon: Joo Franklin MD;  Location:  [facility];  Service: Orthopedic Spine   • LUMBAR DISCECTOMY FUSION INSTRUMENTATION N/A 7/24/2019    Procedure: POSTERIOR LUMBAR SPINAL FUSION REVISION AND INSTRUMENTATION L3,L4,L5,S1;  Surgeon: Joo Franklin MD;  Location:  [facility];  Service: Orthopedic Spine   • ORIF FINGER / THUMB FRACTURE     • SHOULDER SURGERY Left      General Information     Row Name 09/12/20 6510          OT Time and Intention    Document Type  evaluation  -KA     Mode of Treatment  occupational therapy  -KA     Row Name 09/12/20 1872          General Information    Patient Profile Reviewed  yes  -KA     Prior Level of Function  independent:;transfer;all household mobility;mod assist:;ADL's pt has AD for dressing but wont use.  -KA     Existing Precautions/Restrictions  fall;oxygen therapy device and L/min R LE weakness  -KA     Barriers to Rehab  medically complex  -KA     Row Name 09/12/20 7170          Living Environment    Lives With  alone  -KA      Row Name 09/12/20 1308          Home Main Entrance    Number of Stairs, Main Entrance  two  -KA     Stair Railings, Main Entrance  none  -KA     Row Name 09/12/20 1308          Stairs Within Home, Primary    Stair Railings, Within Home, Primary  none  -KA     Row Name 09/12/20 1308          Cognition    Orientation Status (Cognition)  oriented x 4  -KA     Row Name 09/12/20 1308          Safety Issues, Functional Mobility    Safety Issues Affecting Function (Mobility)  judgment;problem-solving;safety precaution awareness;insight into deficits/self-awareness  -     Impairments Affecting Function (Mobility)  balance;endurance/activity tolerance;pain;motor control  -       User Key  (r) = Recorded By, (t) = Taken By, (c) = Cosigned By    Initials Name Provider Type    Suyapa Solorzano OT Occupational Therapist        Mobility/ADL's     Row Name 09/12/20 1311          Bed Mobility    Bed Mobility  supine-sit  -     Supine-Sit [place] (Bed Mobility)  supervision  -     Assistive Device (Bed Mobility)  bed rails;head of bed elevated  -     Row Name 09/12/20 1311          Transfers    Transfers  sit-stand transfer  -     Sit-Stand [place] (Transfers)  contact guard  -     Row Name 09/12/20 1311          Sit-Stand Transfer    Assistive Device (Sit-Stand Transfers)  walker, front-wheeled  -     Row Name 09/12/20 1311          Activities of Daily Living    BADL Assessment/Intervention  lower body dressing  -     Row Name 09/12/20 1311          Lower Body Dressing Assessment/Training    [place] Level (Lower Body Dressing)  doff;don;socks  -     Position (Lower Body Dressing)  edge of bed sitting  -       User Key  (r) = Recorded By, (t) = Taken By, (c) = Cosigned By    Initials Name Provider Type    Suyapa Solorzano OT Occupational Therapist        Obj/Interventions     Row Name 09/12/20 1315          Range of Motion Comprehensive    General Range of Motion  no range of motion  deficits identified  -     Row Name 09/12/20 1315          Strength Comprehensive (MMT)    General Manual Muscle Testing (MMT) Assessment  no strength deficits identified  -     Comment, General Manual Muscle Testing (MMT) Assessment  BUE grossly 4+/5  -     Row Name 09/12/20 1315          Balance    Balance Assessment  standing dynamic balance  -     Dynamic Standing Balance  moderate impairment 1 anterior LOB requiring pt to sit to recover.  -       User Key  (r) = Recorded By, (t) = Taken By, (c) = Cosigned By    Initials Name Provider Type    Suyapa Solorzano, SARAY Occupational Therapist        [unfilled]  Clinical Impression     Row Name 09/12/20 1321          Pain Scale: Numbers Pre/Post-Treatment    Pretreatment Pain Rating  0/10 - no pain  -     Posttreatment Pain Rating  0/10 - no pain  -     Row Name 09/12/20 1321          Plan of Care Review    Plan of Care Reviewed With  patient  -     Progress  improving  -[facility] Name 09/12/20 1321          Therapy Assessment/Plan (OT)    Rehab Potential (OT)  good, to achieve stated therapy goals  -     Criteria for Skilled Therapeutic Interventions Met (OT)  yes;skilled treatment is necessary  -     Therapy Frequency (OT)  daily  -     Row Name 09/12/20 1321          Therapy Plan Review/Discharge Plan (OT)    Anticipated Discharge Disposition (OT)  home with 24/7 care;home with home health  -     Row Name 09/12/20 1321          Vital Signs    Pre Systolic BP Rehab  -- VSS; pt cleared for therapy w/nsg.  -     O2 Delivery Pre Treatment  nasal cannula  -     O2 Delivery Intra Treatment  nasal cannula  -     O2 Delivery Post Treatment  nasal cannula  -     Pre Patient Position  Supine  -     Intra Patient Position  Standing  -     Post Patient Position  Sitting  -     Row Name 09/12/20 1321          Positioning and Restraints    Pre-Treatment Position  in bed  -     Post Treatment Position  chair  -     In Chair   reclined;call light within reach;encouraged to call for assist;exit alarm on;with family/caregiver;legs elevated;heels elevated  -       User Key  (r) = Recorded By, (t) = Taken By, (c) = Cosigned By    Initials Name Provider Type    Suyapa Solorzano OT Occupational Therapist        Outcome Measures     Row Name 09/12/20 1326          How much help from another is currently needed...    Putting on and taking off regular lower body clothing?  2  -KA     Bathing (including washing, rinsing, and drying)  2  -KA     Toileting (which includes using toilet bed pan or urinal)  3  -KA     Putting on and taking off regular upper body clothing  3  -KA     Taking care of personal grooming (such as brushing teeth)  3  -KA     Eating meals  4  -KA     AM-PAC 6 Clicks Score (OT)  17  -KA     Row Name 09/12/20 1326          Functional Assessment    Outcome Measure Options  AM-PAC 6 Clicks Daily Activity (OT)  -       User Key  (r) = Recorded By, (t) = Taken By, (c) = Cosigned By    Initials Name Provider Type    Suyapa Solorzano OT Occupational Therapist        Occupational Therapy Education                 Title: PT OT SLP Therapies (In Progress)     Topic: Occupational Therapy (In Progress)     Point: ADL training (Done)     Description:   Instruct learner(s) on proper safety adaptation and remediation techniques during self care or transfers.   Instruct in proper use of assistive devices.              Learning Progress Summary           Patient Acceptance, E,D, VU,DU by ORTIZ at 9/12/2020 1110    Comment: Pt educated on role of OT, safety, fall prevention, ADLs, transfers, and POC.                   Point: Home exercise program (Not Started)     Description:   Instruct learner(s) on appropriate technique for monitoring, assisting and/or progressing therapeutic exercises/activities.              Learner Progress:  Not documented in this visit.          Point: Precautions (Done)     Description:   Instruct learner(s)  on prescribed precautions during self-care and functional transfers.              Learning Progress Summary           Patient Acceptance, E,D, VU,DU by ORTIZ at 9/12/2020 1110    Comment: Pt educated on role of OT, safety, fall prevention, ADLs, transfers, and POC.                   Point: Body mechanics (Done)     Description:   Instruct learner(s) on proper positioning and spine alignment during self-care, functional mobility activities and/or exercises.              Learning Progress Summary           Patient Acceptance, E,D, VU,DU by ORTIZ at 9/12/2020 1110    Comment: Pt educated on role of OT, safety, fall prevention, ADLs, transfers, and POC.                               User Key     Initials Effective Dates Name Provider Type Discipline    ORTIZ 07/17/19 -  Suyapa Benitez OT Occupational Therapist OT              OT Recommendation and Plan  Retired Outcome Summary/Treatment Plan (OT)  Anticipated Discharge Disposition (OT): home with 24/7 care, home with home health  Therapy Frequency (OT): daily  Plan of Care Review  Plan of Care Reviewed With: spouse, patient  Progress: improving  Outcome Summary: A&Ox4.  Doff/don socks: D.  Functional Transfer & In Room Mobility: CGA w/RW; 1 sig LOB requiring pt to sit without AD during dynamic balance test.  Limited by strength, endurance, balance, pain, medical.  Recommend home w/24.7 assist/SUP and HH.  Will cont to observe and address ADL/IADL deficits as needed.  Plan of Care Reviewed With: spouse, patient  Outcome Summary: A&Ox4.  Doff/don socks: D.  Functional Transfer & In Room Mobility: CGA w/RW; 1 sig LOB requiring pt to sit without AD during dynamic balance test.  Limited by strength, endurance, balance, pain, medical.  Recommend home w/24.7 assist/SUP and HH.  Will cont to observe and address ADL/IADL deficits as needed.     Time Calculation:              Suyapa Benitez OT  9/12/2020

## 2020-09-12 NOTE — PLAN OF CARE
Problem: Patient Care Overview  Goal: Plan of Care Review  Recent Flowsheet Documentation  Taken 9/12/2020 1328 by Suyapa Benitez OT  Plan of Care Reviewed With:   spouse   patient  Outcome Summary:  A&Ox4.  Doff/don socks: D.  Functional Transfer & In Room Mobility: CGA w/RW  1 sig LOB requiring pt to sit without AD during dynamic balance test.  Limited by strength, endurance, balance, pain, medical.  Recommend home w/24.7 assist/SUP and HH.  Will cont to observe and address ADL/IADL deficits as needed.  Taken 9/12/2020 1321 by Suyapa Benitez, OT  Progress: improving  Plan of Care Reviewed With: patient

## 2020-09-12 NOTE — PROGRESS NOTES
"Pharmacy Consult-Vancomycin Dosing  Derek Kelsey is a  69 y.o. male receiving vancomycin therapy.     Indication: Sepsis  Consulting Provider: Hospitalist  ID Consult: Yes    Goal AUC: 400 - 600 mg/L*hr    Current Antimicrobial Therapy  Vancomycin day 3  Meropenem 1gm q8h      Allergies  Allergies as of 09/10/2020 - Reviewed 09/10/2020   Allergen Reaction Noted    Penicillins Hives 09/29/2016    Percocet [oxycodone-acetaminophen] Confusion 09/04/2019       Labs    Results from last 7 days   Lab Units 09/12/20 0757 09/11/20  0503 09/10/20  1851   BUN mg/dL 11 13 14   CREATININE mg/dL 0.64* 0.63* 0.70*       Results from last 7 days   Lab Units 09/12/20 0757 09/11/20  0503 09/10/20  1851   WBC 10*3/mm3 3.97 3.91 4.14       Evaluation of Dosing    Is Patient on Dialysis or Renal Replacement: No    Ht - 188 cm (74.02\")  Wt - 106 kg (233 lb 11 oz)    Estimated Creatinine Clearance: 116.2 mL/min (A) (by C-G formula based on SCr of 0.64 mg/dL (L)).    Intake & Output (last 3 days)         09/09 0701 - 09/10 0700 09/10 0701 - 09/11 0700 09/11 0701 - 09/12 0700 09/12 0701 - 09/13 0700    P.O.   0 360    IV Piggyback  100 100     Total Intake(mL/kg)  100 (0.9) 100 (0.9) 360 (3.4)    Urine (mL/kg/hr)  (0.5)     Stool   0     Total Output      Net -200 -700 -1175 +360            Stool Unmeasured Occurrence   1 x             Microbiology and Radiology  Microbiology Results (last 10 days)       Procedure Component Value - Date/Time    Blood Culture - Blood, Hand, Left [[id number]] Collected: 09/10/20 1852    Lab Status: Preliminary result Specimen: Blood from Hand, Left Updated: 09/11/20 1930     Blood Culture No growth at 24 hours    Blood Culture - Blood, Arm, Left [[id number]] Collected: 09/10/20 1845    Lab Status: Preliminary result Specimen: Blood from Arm, Left Updated: 09/11/20 1930     Blood Culture No growth at 24 hours    COVID PRE-OP / PRE-PROCEDURE SCREENING ORDER (NO ISOLATION) - Swab, " Nasopharynx [[id number]]  (Normal) Collected: 09/10/20 1807    Lab Status: Final result Specimen: Swab from Nasopharynx Updated: 09/10/20 1928    Narrative:      The following orders were created for panel order COVID PRE-OP / PRE-PROCEDURE SCREENING ORDER (NO ISOLATION) - Swab, Nasopharynx.  Procedure                               Abnormality         Status                     ---------                               -----------         ------                     Respiratory Panel PCR w/...[[id number]]  Normal              Final result                 Please view results for these tests on the individual orders.    Respiratory Panel PCR w/COVID-19(SARS-CoV-2) [facility], NP Swab in UTM/VTM, 3-4 HR TAT - Swab, Nasopharynx [[id number]]  (Normal) Collected: 09/10/20 1807    Lab Status: Final result Specimen: Swab from Nasopharynx Updated: 09/10/20 1928     ADENOVIRUS, PCR Not Detected     Coronavirus 229E Not Detected     Coronavirus HKU1 Not Detected     Coronavirus NL63 Not Detected     Coronavirus OC43 Not Detected     COVID19 Not Detected     Human Metapneumovirus Not Detected     Human Rhinovirus/Enterovirus Not Detected     Influenza A PCR Not Detected     Influenza A H1 Not Detected     Influenza A H1 2009 PCR Not Detected     Influenza A H3 Not Detected     Influenza B PCR Not Detected     Parainfluenza Virus 1 Not Detected     Parainfluenza Virus 2 Not Detected     Parainfluenza Virus 3 Not Detected     Parainfluenza Virus 4 Not Detected     RSV, PCR Not Detected     Bordetella pertussis pcr Not Detected     Bordetella parapertussis PCR Not Detected     Chlamydophila pneumoniae PCR Not Detected     Mycoplasma pneumo by PCR Not Detected    Narrative:      Fact sheet for providers: https://docs.[employer]/wp-content/uploads/NZA5688-8929-NQ7.1-EUA-Provider-Fact-Sheet-3.pdf    Fact sheet for patients:  https://docs.[employer]/wp-content/uploads/LMD6050-8591-UE8.1-EUA-Patient-Fact-Sheet-1.pdf            Reported Vancomycin Levels          Results from last 7 days   Lab Units 09/12/20  0931   VANCOMYCIN TR mcg/mL 12.00       [facility] AUC Calculation:    Current AUC:   0 mg/L*hr    Predicted Steady State AUC :   455 mg/L*hr    Assessment/Plan:  Pharmacy to dose vancomycin for sepsis.   Goal -600 mg/L*hr.  9/12 SCr - 0.64  9/12 WBC - 3.97  24hr tmax - 98.5  9/12 vancomycin trough - 12.0 mcg/ml @0931  Vancomycin level drawn 11.5 hrs following 3rd dose    With potential for further drug accumulation, will continue vancomycin 1250mg q12h  BMP x3 days  Consider vancomycin trough 3-5 days  Pharmacy will continue to monitor renal function, cultures and sensitivities, and clinical status to adjust regimen as necessary.    Mannie Buckley RPH  9/12/2020  10:33 EDT

## 2020-09-12 NOTE — PROGRESS NOTES
[facility] Medicine Services  PROGRESS NOTE    Patient Name: Derek Kelsey  : 1950  MRN: [id number]    Date of Admission: 9/10/2020  Primary Care Physician: Raudel Zheng MD    Subjective   Subjective     CC:  Follow-up for fever, confusion    HPI:  Patient states he did not rest well last night.  Multiple bowel movements after lactulose lasting until this morning.  States breathing is not quite as good today.  Has been off home trelegy   Currently on room air without complaints of dyspnea, but does complain of wheezing and dry cough    Review of Systems  CV- No chest pain, palpitations  Resp- + cough, dyspnea    Objective   Objective     Vital Signs:   Temp:  [97.4 °F (36.3 °C)-98.5 °F (36.9 °C)] 97.6 °F (36.4 °C)  Heart Rate:  [63-82] 65  Resp:  [18] 18  BP: (137-152)/(62-76) 152/76        Physical Exam:  Constitutional: No acute distress, awake, alert  HENT: NCAT, mucous membranes moist  Respiratory: Few expiratory wheezes posterior upper lobes bilaterally, respiratory effort normal on room air  Cardiovascular: RRR, no murmurs  Gastrointestinal: Positive bowel sounds, soft, minimal distention, nontender to palpation  Psychiatric: Appropriate affect, cooperative  Neurologic: Oriented person and place, moves all extremities equally, cranial Nerves grossly intact to confrontation, speech clear  Skin: No rashes on exposed skin    Results Reviewed:  Results from last 7 days   Lab Units 20  0503 09/10/20  1851   WBC 10*3/mm3 3.97 3.91 4.14   HEMOGLOBIN g/dL 9.5* 8.3* 8.9*   HEMATOCRIT % 33.7* 27.3* 30.0*   PLATELETS 10*3/mm3 134* 105* 113*   INR   --   --  1.58*   PROCALCITONIN ng/mL  --   --  0.13     Results from last 7 days   Lab Units 20  0503 09/10/20  1851   SODIUM mmol/L 135* 135* 138   POTASSIUM mmol/L 3.8 3.8 3.8   CHLORIDE mmol/L 101 100 102   CO2 mmol/L 22.0 27.0 26.0   BUN mg/dL 11 13 14   CREATININE mg/dL 0.64* 0.63*  0.70*   GLUCOSE mg/dL 100* 92 88   CALCIUM mg/dL 9.2 8.8 9.2   ALT (SGPT) U/L 75*  --  67*   AST (SGOT) U/L 173*  --  151*   TROPONIN T ng/mL  --   --  <0.010   PROBNP pg/mL  --   --  319.0     Estimated Creatinine Clearance: 116.2 mL/min (A) (by C-G formula based on SCr of 0.64 mg/dL (L)).    Microbiology Results Abnormal     Procedure Component Value - Date/Time    Blood Culture - Blood, Hand, Left [[id number]] Collected: 09/10/20 1852    Lab Status: Preliminary result Specimen: Blood from Hand, Left Updated: 09/11/20 1930     Blood Culture No growth at 24 hours    Blood Culture - Blood, Arm, Left [[id number]] Collected: 09/10/20 1845    Lab Status: Preliminary result Specimen: Blood from Arm, Left Updated: 09/11/20 1930     Blood Culture No growth at 24 hours    COVID PRE-OP / PRE-PROCEDURE SCREENING ORDER (NO ISOLATION) - Swab, Nasopharynx [[id number]]  (Normal) Collected: 09/10/20 1807    Lab Status: Final result Specimen: Swab from Nasopharynx Updated: 09/10/20 1928    Narrative:      The following orders were created for panel order COVID PRE-OP / PRE-PROCEDURE SCREENING ORDER (NO ISOLATION) - Swab, Nasopharynx.  Procedure                               Abnormality         Status                     ---------                               -----------         ------                     Respiratory Panel PCR w/...[[id number]]  Normal              Final result                 Please view results for these tests on the individual orders.    Respiratory Panel PCR w/COVID-19(SARS-CoV-2) [facility] In-House, NP Swab in UTM/VTM, 3-4 HR TAT - Swab, Nasopharynx [[id number]]  (Normal) Collected: 09/10/20 1807    Lab Status: Final result Specimen: Swab from Nasopharynx Updated: 09/10/20 1928     ADENOVIRUS, PCR Not Detected     Coronavirus 229E Not Detected     Coronavirus HKU1 Not Detected     Coronavirus NL63 Not Detected     Coronavirus OC43 Not Detected     COVID19 Not Detected     Human Metapneumovirus Not  Detected     Human Rhinovirus/Enterovirus Not Detected     Influenza A PCR Not Detected     Influenza A H1 Not Detected     Influenza A H1 2009 PCR Not Detected     Influenza A H3 Not Detected     Influenza B PCR Not Detected     Parainfluenza Virus 1 Not Detected     Parainfluenza Virus 2 Not Detected     Parainfluenza Virus 3 Not Detected     Parainfluenza Virus 4 Not Detected     RSV, PCR Not Detected     Bordetella pertussis pcr Not Detected     Bordetella parapertussis PCR Not Detected     Chlamydophila pneumoniae PCR Not Detected     Mycoplasma pneumo by PCR Not Detected    Narrative:      Fact sheet for providers: https://docs.[employer]/wp-content/uploads/RDQ6999-5911-RP5.1-EUA-Provider-Fact-Sheet-3.pdf    Fact sheet for patients: https://docs.[employer]/wp-content/uploads/OBV8079-9711-MN6.1-EUA-Patient-Fact-Sheet-1.pdf          Imaging Results (Last 24 Hours)     Procedure Component Value Units Date/Time    XR Abdomen 2+ VW with Chest 1 VW [[id number]] Collected: 09/11/20 1037     Updated: 09/11/20 1811    Narrative:         EXAMINATION: XR ABDOMEN 2+ VIEWS WITH CHEST 1 VW-09/11/2020:      INDICATION: Hx of AAA endograft repair.  Recent infection 6/2020 here,  now with fever, weakness and lower abd pain.      COMPARISON: NONE.     FINDINGS: Frontal view of the chest and two-view abdomen reveals  increased markings seen diffusely throughout the lung fields  bilaterally. Apical pleural thickening identified on the right. There is  a PICC line catheter on the right with tip in the SVC. No definite  pleural effusion.     Flat and upright views of the abdomen reveal hardware identified within  the lower lumbar spine. Stent identified within the abdominal aorta and  iliac vessels. The bowel gas pattern is nonspecific. No obvious  obstruction or gross free intraperitoneal air. Degenerative changes seen  within the spine.       Impression:      Increased markings seen diffusely throughout the lung  fields  bilaterally with PICC line catheter in satisfactory position.  Nonspecific bowel gas pattern with no evidence of obvious obstruction or  gross free intraperitoneal air.     D:  09/11/2020  E:  09/11/2020     This report was finalized on 9/11/2020 6:08 PM by Dr. Love Yap MD.                  I have reviewed the medications:  Scheduled Meds:Pharmacy Consult, , Does not apply, Once  amLODIPine, 5 mg, Oral, BID  aspirin, 81 mg, Oral, Daily  carvedilol, 6.25 mg, Oral, Q12H  citalopram, 40 mg, Oral, Daily  furosemide, 20 mg, Oral, QAM  heparin (porcine), 5,000 Units, Subcutaneous, Q8H  lactulose, 10 g, Oral, TID  multivitamin, 1 tablet, Oral, Daily  pantoprazole, 40 mg, Oral, BID AC  saccharomyces boulardii, 250 mg, Oral, BID  sodium chloride, 10 mL, Intravenous, Q12H  sucralfate, 1 g, Oral, 4x Daily  tamsulosin, 0.4 mg, Oral, Daily  vancomycin, 1,250 mg, Intravenous, Q12H      Continuous Infusions:Pharmacy to dose vancomycin,   sodium chloride, 100 mL/hr, Last Rate: 100 mL/hr (09/11/20 0811)      PRN Meds:.influenza vaccine  •  ipratropium-albuterol  •  melatonin  •  ondansetron  •  Pharmacy to dose vancomycin  •  sodium chloride    Assessment/Plan   Assessment & Plan     Active Hospital Problems    Diagnosis  POA   • **Fever [R50.9]  Unknown   • Pneumonia [J18.9]  Unknown   • GERD (gastroesophageal reflux disease) [K21.9]  Unknown   • Person under investigation for COVID-19 [Z20.828]  Yes   • On supplemental oxygen by nasal cannula [Z78.9]  Unknown   • 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]  Unknown   • Elevated transaminase level [R74.0]  Unknown   • Thrombocytopenia (CMS/HCC) [D69.6]  Unknown   • Cirrhosis of liver (CMS/HCC) [K74.60]  Yes   • Anemia [D64.9]  Yes   • Chronic respiratory failure with hypoxia and hypercapnia (CMS/HCC) [J96.11, J96.12]  Yes   • Prediabetes [R73.03]  Yes   • Former smoker [Z87.891]  Not Applicable   • Coal workers pneumoconiosis, 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        Brief Hospital Course to date:  Derek Kelsey is a 69 y.o. 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 Carrero, anxiety, depression, hypertension, cirrhosis, COPD, and chronic respiratory failure with hypoxia on 2 L nasal cannula, CAD, GERD, and prediabetes who was admitted as a direct admit from infectious disease clinic for a fever and concern about confusion.    This patient's problems and plans were partially entered by my partner and updated as appropriate by me 09/12/20.    Fever  History of AAA repair status post endograft in June 2019  Status post recent endograft infection with hospitalization at [facility] in June 2020  -Was here June 2020 followed by CT surgery and infectious disease, he went home with a  PICC line for home antibiotic infusions  -ESR level improved from previous and endograft appears stable on CT abdomen and pelvis  -Complex case because he is at high risk for any surgery and if he has continued infection from endograft it will be difficult to keep him on chronic and IV antibiotics.  No intervention per CT surgery at this time -Currently will continue vancomycin and Merrem per ID recommendations  -Encourage oral intake, stop IV fluids and monitor    Cirrhosis  Evaded AST and ALT  -Acute hepatitis panel negative  -Ammonia level 54  -Continue lactulose  -Ultrasound gallbladder shows cirrhosis, no significant changes with gallbladder no gallstones, minimal ascites    Chronic respiratory failure with hypoxia on 2 L nasal cannula  COPD  -COVID-19 negative, right lower extremity duplex negative for DVT, no PE on CT  PE  -Continue oxygen as needed  -Continue duo nebs as needed  - Symbicort    GERD-continue pantoprazole  BPH-continue Flomax  CAD-continue aspirin  GERD-continue Protonix  Hypertension-continue Coreg    DVT Prophylaxis: Heparin subcu      Disposition: I expect the patient to be discharged pending L IDC recommendations  CODE STATUS:   Code Status and Medical Interventions:   Ordered at: 09/10/20 2040     Limited Support to NOT Include:    Cardioversion/Defibrillation    Intubation     Level Of Support Discussed With:    Patient     Code Status:    No CPR     Medical Interventions (Level of Support Prior to Arrest):    Limited     Comments:    pt and wife agree         Electronically signed by Nivia Rubio, LYNDA, 09/12/20, 13:10 EDT.

## 2020-09-12 NOTE — PLAN OF CARE
Problem: Patient Care Overview  Goal: Plan of Care Review  Recent Flowsheet Documentation  Taken 9/12/2020 2262 by Inna Crowley, PT  Plan of Care Reviewed With:   patient   spouse  Outcome Summary:   Pt presents with deficits in functional strength, balance, and mobility. He was up in bathroom when therapist entered but said his wife helps him OOB sometims. CGA sit to stand and amb 120' CGA with Hurry Cane. Spouse reported pt falls about once/month. She said he has LOB   pt said he feels RLE gives out on him. Pt would benefit from skilled PT services to improve functional independence. Recommend outpt PT upon discharge from acute care hospital   pt is agreeable.

## 2020-09-12 NOTE — PROGRESS NOTES
INFECTIOUS DISEASE CONSULT/INITIAL HOSPITAL VISIT    Derek Kelsey  1950  [id number]    Date of Consult: 9/12/2020    Admission Date: 9/10/2020      Requesting Provider: Tammi Lomax MD  Evaluating Physician: Dr. Davis Carrero    Reason for Consultation: AAA graft infection with Pseudomonas species    History of present illness:    Mr. Derek Kelsey is a 69 y.o. 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  This is a patient of Dr. Davis Carrero last seen via telehealth on 9/9/2020.  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  He has had ongoing fever which has been concerning. He was subsequently sent to [facility] as a direct admission for further work-up secondary to continued fevers, increasing generalized weakness and increasing shortness of breath.  It was also noted that the patient's son was diagnosed positive with COVID-19 at the end of July.  He has a past medical history significant for obesity, anxiety, depression, hypertension, COPD on 2 L nasal cannula, prediabetes, CAD, GERD, coalminer's pneumoconiosis, cirrhosis on chronic lactulose and chronic lower abdominal pain.    Since arrival he has been afebrile with a T-max of 99.1 and is currently on nasal cannula at 2 L.  Current labs show he is without leukocytosis with a WBC of 3.91.  Liver enzymes, LDH, d-dimer, CRP and ESR were elevated.  Lactate was normal at 1.6.  Hepatitis panel was nonreactive.  Respiratory panel with COVID-19 was negative on 9/10.  Blood cultures are currently in progress.  Ultrasound of the gallbladder showed cirrhotic hepatic morphology with a simple appearing hepatic cyst.  CT the chest showed no aortic aneurysm or dissection with a small right pleural effusion and associated right lower lobe atelectasis.  There is also some mild patchy infiltrates in the upper lobes likely reflect a mild pneumonia.  CT of the  abdomen pelvis showed cirrhosis and splenomegaly with a new small amount of ascites.  Aortic aneurysm repair is stable in appearance as well as adjacent fat stranding is also stable and possibly inflammatory.    He has a listed allergy to penicillins with hives as the reaction.  He is currently receiving vancomycin and meropenem IV.  ID has been consulted for further evaluation and treatment as well as antimicrobial therapy management.    9/12/2020 patient is doing well has no complaints denies fevers rash sore throat diarrhea    Past Medical History:   Diagnosis Date   • Abdominal aortic aneurysm (CMS/HCC) 9/29/2016   • Anxiety 9/29/2016   • Arthritis    • Back pain    • Black lung (CMS/HCC)    • CAD (coronary artery disease) 9/29/2016   • Cirrhosis (CMS/HCC)    • COPD (chronic obstructive pulmonary disease) (CMS/HCC) 9/29/2016   • Depression 9/29/2016   • Depression    • GERD (gastroesophageal reflux disease)    • Hypertension 9/29/2016   • On supplemental oxygen by nasal cannula     at night   • Vitiligo    • Wears dentures    • Wears reading eyeglasses        Past Surgical History:   Procedure Laterality Date   • ABDOMINAL AORTIC ANEURYSM REPAIR WITH ENDOGRAFT N/A 6/6/2019    Procedure: ABDOMINAL AORTIC ANEURYSM REPAIR WITH ENDOGRAFT;  Surgeon: Leopoldo Burleson MD;  Location: [facility] HYBRID OR 15;  Service: Vascular   • BACK SURGERY     • CARDIAC CATHETERIZATION     • CARDIAC CATHETERIZATION N/A 9/28/2018    Procedure: Left Heart Cath;  Surgeon: Douglas Galvez MD;  Location:  [facility] CATH INVASIVE LOCATION;  Service: Cardiovascular   • CARDIAC SURGERY     • COLONOSCOPY      2015   • COLONOSCOPY N/A 10/30/2019    Procedure: COLONOSCOPY;  Surgeon: Homar Viveros MD;  Location:  [facility] ENDOSCOPY;  Service: Gastroenterology   • ENDOSCOPY N/A 10/30/2019    Procedure: ESOPHAGOGASTRODUODENOSCOPY;  Surgeon: Homar Viveros MD;  Location:  [facility] ENDOSCOPY;  Service: Gastroenterology   • EYE SURGERY       cataracts bilateral   • HAND SURGERY Left    • LUMBAR DISCECTOMY FUSION INSTRUMENTATION N/A 2018    Procedure: LUMBAR DISCECTOMY POSTERIOR WITH FUSION INSTRUMENTATION;  Surgeon: Joo Franklin MD;  Location:  [facility] OR;  Service: Orthopedic Spine   • LUMBAR DISCECTOMY FUSION INSTRUMENTATION N/A 2019    Procedure: POSTERIOR LUMBAR SPINAL FUSION REVISION AND INSTRUMENTATION L3,L4,L5,S1;  Surgeon: Joo Franklin MD;  Location:  [facility] OR;  Service: Orthopedic Spine   • ORIF FINGER / THUMB FRACTURE     • SHOULDER SURGERY Left        Family History   Problem Relation Age of Onset   • Stroke Mother    • Hypertension Mother    • Heart disease Mother    • Heart disease Father    • Hypertension Father    • Diabetes Sister    • Hypertension Sister    • Heart disease Sister    • Diabetes Brother    • Heart disease Brother    • Hypertension Child    • Hypertension Son    • No Known Problems Son    • Diabetes Sister    • Heart disease Sister        Social History     Socioeconomic History   • Marital status:      Spouse name: Not on file   • Number of children: 2   • Years of education: Not on file   • Highest education level: Not on file   Occupational History   • Occupation: DISABLED      Comment: Back   Tobacco Use   • Smoking status: Former Smoker     Packs/day: 1.00     Years: 30.00     Pack years: 30.00     Types: Cigarettes     Quit date: 2011     Years since quittin.7   • Smokeless tobacco: Former User     Types: Chew     Quit date: 2011   Substance and Sexual Activity   • Alcohol use: Not Currently   • Drug use: No   • Sexual activity: Defer     Comment:  and lives with wife   Social History Narrative    Lives in [place].    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    Is considered disabled    Has been granted black lung disability benefits    Smoked one pack of cigarettes per day or more his entire adult life up until     Denies alcohol use- quit >8 years  ago    Uses walker or cane for ambulation       Allergies   Allergen Reactions   • Penicillins Hives     Hives - has tolerated Zosyn and ceftriaxone 09/2019   • Percocet [Oxycodone-Acetaminophen] Confusion         Medication:    Current Facility-Administered Medications:   •  !Vancomycin trough due 9/12 @ 0830, please hold dose until pharmacy interprets level!, , Does not apply, Once, Jordana Portillo, PharmD  •  amLODIPine (NORVASC) tablet 5 mg, 5 mg, Oral, BID, Maryanne Reaves APRN, 5 mg at 09/12/20 0817  •  aspirin chewable tablet 81 mg, 81 mg, Oral, Daily, Maryanne Reaves APRN, 81 mg at 09/12/20 0817  •  budesonide-formoterol (SYMBICORT) 160-4.5 MCG/ACT inhaler 2 puff, 2 puff, Inhalation, BID - RT, Nivia Rubio APRN  •  carvedilol (COREG) tablet 6.25 mg, 6.25 mg, Oral, Q12H, Maryanne Reaves APRN, 6.25 mg at 09/12/20 0818  •  citalopram (CeleXA) tablet 40 mg, 40 mg, Oral, Daily, Maryanne Reaves APRN, 40 mg at 09/12/20 0817  •  furosemide (LASIX) tablet 20 mg, 20 mg, Oral, QAM, Maryanne Reaves APRN, 20 mg at 09/12/20 0507  •  heparin (porcine) 5000 UNIT/ML injection 5,000 Units, 5,000 Units, Subcutaneous, Q8H, Maryanne Reaves APRN, 5,000 Units at 09/12/20 1454  •  influenza vac split quad (FLUZONE,FLUARIX,AFLURIA) injection 0.5 mL, 0.5 mL, Intramuscular, During Hospitalization, Kostas Hartman MD  •  ipratropium-albuterol (DUO-NEB) nebulizer solution 3 mL, 3 mL, Nebulization, Q4H PRN, Demi Davidson PA-C  •  lactulose (CHRONULAC) 10 GM/15ML solution 10 g, 10 g, Oral, TID, Maryanne Reaves, APRN, 10 g at 09/11/20 1708  •  melatonin tablet 5 mg, 5 mg, Oral, Nightly PRN, Eve Perez MD, 5 mg at 09/11/20 2219  •  multivitamin (THERAGRAN) tablet 1 tablet, 1 tablet, Oral, Daily, Maryanne Reaves, LYNDA, 1 tablet at 09/12/20 0817  •  ondansetron (ZOFRAN) injection 4 mg, 4 mg, Intravenous, Q6H PRN, Demi Davidson PA-C, 4 mg  at 20 0621  •  pantoprazole (PROTONIX) EC tablet 40 mg, 40 mg, Oral, BID AC, Maryanne Reaves APRN, 40 mg at 20 1721  •  Pharmacy to dose vancomycin, , Does not apply, Continuous PRN, Maryanne Reaves APRN  •  saccharomyces boulardii (FLORASTOR) capsule 250 mg, 250 mg, Oral, BID, Maryanne Reaves APRN, 250 mg at 20 0817  •  sodium chloride 0.9 % flush 10 mL, 10 mL, Intravenous, Q12H, Maryanne Reaves APRN, 10 mL at 20 2033  •  sodium chloride 0.9 % flush 10 mL, 10 mL, Intravenous, PRN, Maryanne Reaves APRN  •  sucralfate (CARAFATE) tablet 1 g, 1 g, Oral, 4x Daily, Maryanne Reaves APRN, 1 g at 20 1721  •  tamsulosin (FLOMAX) 24 hr capsule 0.4 mg, 0.4 mg, Oral, Daily, Maryanne Reaves APRN, 0.4 mg at 20 0817  •  vancomycin 1250 mg/250 mL 0.9% NS IVPB ([facility]), 1,250 mg, Intravenous, Q12H, Jordana Portillo, PharmD, 1,250 mg at 20 1059    Antibiotics:  Anti-Infectives (From admission, onward)    Ordered     Dose/Rate Route Frequency Start Stop    09/10/20 195  vancomycin 1250 mg/250 mL 0.9% NS IVPB ([facility])     Ordering Provider: Jordana Portillo, PharmD    1,250 mg  over 90 Minutes Intravenous Every 12 Hours 20 0900 20 0859    09/10/20 1816  meropenem (MERREM) 1 g/100 mL 0.9% NS VTB ([facility])     Ordering Provider: Maryanne Reaves APRN    1 g  over 3 Hours Intravenous Every 8 Hours Scheduled 09/10/20 2200 20 0807    09/10/20 1955  vancomycin 2000 mg/500 mL 0.9% NS IVPB ([facility])     Ordering Provider: Jordana Portillo, PharmD    2,000 mg  over 120 Minutes Intravenous Once 09/10/20 2100 09/10/20 2334    09/10/20 1913  Pharmacy to dose vancomycin     Ordering Provider: Maryanne Reaves, LYNDA     Does not apply Continuous PRN 09/10/20 1912 09/15/20 1911            Review of Systems:  See HPI    Physical Exam:   Vital Signs  Temp (24hrs), Av.9 °F (36.6 °C), Min:97.4 °F (36.3 °C), Max:98.5  °F (36.9 °C)    Temp  Min: 97.4 °F (36.3 °C)  Max: 98.5 °F (36.9 °C)  BP  Min: 119/59  Max: 152/76  Pulse  Min: 59  Max: 82  Resp  Min: 18  Max: 18  SpO2  Min: 90 %  Max: 96 %    GENERAL: Awake and alert, in no acute distress.   HEENT: Normocephalic, atraumatic.  PERRL. EOMI. No conjunctival injection. No icterus. Oropharynx clear without evidence of thrush or exudate. No evidence of peridontal disease.      HEART: RRR; No murmur, rubs, gallops.   LUNGS: Diminished with crackles in bilateral bases.   ABDOMEN: distended with some tenderness noted. Positive bowel sounds. No rebound or guarding. NO mass or HSM.  EXT:  No cyanosis, clubbing or edema. No cord.  :   Without Young catheter.  MSK: FROM without joint effusions noted arms/legs.    SKIN: Warm and dry without cutaneous eruptions on Inspection/palpation.    NEURO: Oriented to PPT. No focal deficits on motor/sensory exam at arms/legs.  PSYCHIATRIC: Normal insight and judgement. Cooperative with PE    Laboratory Data    Results from last 7 days   Lab Units 09/12/20  0757 09/11/20  0503 09/10/20  1851   WBC 10*3/mm3 3.97 3.91 4.14   HEMOGLOBIN g/dL 9.5* 8.3* 8.9*   HEMATOCRIT % 33.7* 27.3* 30.0*   PLATELETS 10*3/mm3 134* 105* 113*     Results from last 7 days   Lab Units 09/12/20  0757   SODIUM mmol/L 135*   POTASSIUM mmol/L 3.8   CHLORIDE mmol/L 101   CO2 mmol/L 22.0   BUN mg/dL 11   CREATININE mg/dL 0.64*   GLUCOSE mg/dL 100*   CALCIUM mg/dL 9.2     Results from last 7 days   Lab Units 09/12/20  0757   ALK PHOS U/L 235*   BILIRUBIN mg/dL 2.0*   ALT (SGPT) U/L 75*   AST (SGOT) U/L 173*     Results from last 7 days   Lab Units 09/10/20  1851   SED RATE mm/hr 83*     Results from last 7 days   Lab Units 09/10/20  1851   CRP mg/dL 2.05*     Results from last 7 days   Lab Units 09/10/20  1851   LACTATE mmol/L 1.6         Results from last 7 days   Lab Units 09/12/20  0931   VANCOMYCIN TR mcg/mL 12.00     Estimated Creatinine Clearance: 116.2 mL/min (A) (by C-G  formula based on SCr of 0.64 mg/dL (L)).      Microbiology:  Microbiology Results (last 10 days)     Procedure Component Value - Date/Time    Blood Culture - Blood, Hand, Left [[id number]] Collected: 09/10/20 1852    Lab Status: Preliminary result Specimen: Blood from Hand, Left Updated: 09/11/20 1930     Blood Culture No growth at 24 hours    Blood Culture - Blood, Arm, Left [[id number]] Collected: 09/10/20 1845    Lab Status: Preliminary result Specimen: Blood from Arm, Left Updated: 09/11/20 1930     Blood Culture No growth at 24 hours    COVID PRE-OP / PRE-PROCEDURE SCREENING ORDER (NO ISOLATION) - Swab, Nasopharynx [[id number]]  (Normal) Collected: 09/10/20 1807    Lab Status: Final result Specimen: Swab from Nasopharynx Updated: 09/10/20 1928    Narrative:      The following orders were created for panel order COVID PRE-OP / PRE-PROCEDURE SCREENING ORDER (NO ISOLATION) - Swab, Nasopharynx.  Procedure                               Abnormality         Status                     ---------                               -----------         ------                     Respiratory Panel PCR w/...[[id number]]  Normal              Final result                 Please view results for these tests on the individual orders.    Respiratory Panel PCR w/COVID-19(SARS-CoV-2) [facility] In-House, NP Swab in UTM/VTM, 3-4 HR TAT - Swab, Nasopharynx [[id number]]  (Normal) Collected: 09/10/20 1807    Lab Status: Final result Specimen: Swab from Nasopharynx Updated: 09/10/20 1928     ADENOVIRUS, PCR Not Detected     Coronavirus 229E Not Detected     Coronavirus HKU1 Not Detected     Coronavirus NL63 Not Detected     Coronavirus OC43 Not Detected     COVID19 Not Detected     Human Metapneumovirus Not Detected     Human Rhinovirus/Enterovirus Not Detected     Influenza A PCR Not Detected     Influenza A H1 Not Detected     Influenza A H1 2009 PCR Not Detected     Influenza A H3 Not Detected     Influenza B PCR Not Detected      Parainfluenza Virus 1 Not Detected     Parainfluenza Virus 2 Not Detected     Parainfluenza Virus 3 Not Detected     Parainfluenza Virus 4 Not Detected     RSV, PCR Not Detected     Bordetella pertussis pcr Not Detected     Bordetella parapertussis PCR Not Detected     Chlamydophila pneumoniae PCR Not Detected     Mycoplasma pneumo by PCR Not Detected    Narrative:      Fact sheet for providers: https://docs.[employer])/wp-content/uploads/HAI2473-6874-EH9.1-EUA-Provider-Fact-Sheet-3.pdf    Fact sheet for patients: https://docs.[employer])/wp-content/uploads/ZQV9287-0923-OD2.1-EUA-Patient-Fact-Sheet-1.pdf                Radiology:  Imaging Results (Last 72 Hours)     Procedure Component Value Units Date/Time    US Gallbladder [[id number]] Collected: 09/11/20 1001     Updated: 09/12/20 1604    Narrative:      EXAMINATION: US GALLBLADDER-     INDICATION: Elevated transaminases.      TECHNIQUE: Ultrasound right upper quadrant abdomen and liver.     COMPARISON: None.     FINDINGS: Visualized portions of the pancreas within normal limits.     Liver demonstrates diffusely increased echogenicity throughout the  hepatic parenchyma with coarsened echotexture and nodular contours.  Anechoic area measuring 1.5 cm most consistent with cyst as well as no  complex internal features. Adjacent perihepatic ascites noted.     Gallbladder unremarkable without cholelithiasis or inflammatory wall  thickening.     Common bile duct within normal limits at 5 mm.     Right kidney measures 12 cm in length with minimal renal collecting  system dilatation, however no contour deforming mass or obvious calculi.       Impression:      Cirrhotic hepatic morphology with simple appearing hepatic  cyst, otherwise no focal parenchymal lesion. Perihepatic ascites.         This report was finalized on 9/12/2020 4:01 PM by Dr. Damien Ceballos.       XR Abdomen 2+ VW with Chest 1 VW [[id number]] Collected: 09/11/20 1037     Updated: 09/11/20 1811     Narrative:         EXAMINATION: XR ABDOMEN 2+ VIEWS WITH CHEST 1 VW-09/11/2020:      INDICATION: Hx of AAA endograft repair.  Recent infection 6/2020 here,  now with fever, weakness and lower abd pain.      COMPARISON: NONE.     FINDINGS: Frontal view of the chest and two-view abdomen reveals  increased markings seen diffusely throughout the lung fields  bilaterally. Apical pleural thickening identified on the right. There is  a PICC line catheter on the right with tip in the SVC. No definite  pleural effusion.     Flat and upright views of the abdomen reveal hardware identified within  the lower lumbar spine. Stent identified within the abdominal aorta and  iliac vessels. The bowel gas pattern is nonspecific. No obvious  obstruction or gross free intraperitoneal air. Degenerative changes seen  within the spine.       Impression:      Increased markings seen diffusely throughout the lung fields  bilaterally with PICC line catheter in satisfactory position.  Nonspecific bowel gas pattern with no evidence of obvious obstruction or  gross free intraperitoneal air.     D:  09/11/2020  E:  09/11/2020     This report was finalized on 9/11/2020 6:08 PM by Dr. Love Yap MD.       CT Abdomen Pelvis With & Without Contrast [[id number]] Collected: 09/10/20 2331     Updated: 09/10/20 2333    Narrative:      CT Abdomen Pelvis WO W    INDICATION:   Abdominal pain with fever. Sepsis. Weakness for 4 days.    TECHNIQUE:   CT of the abdomen and pelvis with and without IV contrast. Coronal and sagittal reconstructions were obtained in addition to 3-D reformats.  Radiation dose reduction techniques included automated exposure control or exposure modulation based on body  size. Count of known CT and cardiac nuc med studies performed in previous 12 months: 0.     COMPARISON:   6/18/2020    FINDINGS:  Please see chest CT report dictated separately. Gallbladder is within normal limits. Patient is status post endovascular repair of  an abdominal aortic aneurysm. The graft appears patent. Native aneurysm sac measures about 5.4 cm which is similar to prior  allowing for differences in slice selection. Stranding adjacent to the aorta is unchanged and could be inflammatory. Cirrhotic liver is again identified with what appears to be a cyst in the left hepatic lobe. Spleen remains enlarged measuring at least  16.3 cm in length. Solid organs are otherwise normal. No renal or ureteral stones are seen. There is no hydronephrosis. There is a small amount of ascites in the abdomen and pelvis which is new from prior. Urinary bladder is grossly normal. The appendix  is normal. Remainder of the unopacified GI tract is normal as well. No bowel obstruction is identified. There is some soft tissue induration at the umbilicus. This could reflect cellulitis in the appropriate clinical setting. Patient is status post  spinal fusion from L3 through S1. Kyphoplasty cement is noted at L3-L5.      Impression:        1. Cirrhosis and splenomegaly with a new small amount of ascites.  2. Grossly normal GI tract, including the appendix.  3. Stable appearance of an endovascular aortic aneurysm repair. Adjacent fat stranding is also stable and possibly inflammatory. Correlation and follow-up recommended.  4. Induration at the umbilicus could reflect cellulitis. Correlate with physical exam.  5. Additional findings as above.          Signer Name: Ric Hyatt MD   Signed: 9/10/2020 11:31 PM   Workstation Name: [email or web address]    [facility]    CT Angiogram Chest With & Without Contrast [[id number]] Collected: 09/10/20 2315     Updated: 09/10/20 2318    Narrative:      CT ANGIOGRAM CHEST W WO CONTRAST    INDICATION:   Fever with worsening cough. Shortness of and weakness for 4 days. History of COPD.    TECHNIQUE:   CT angiogram of the chest with IV and without contrast. 3-D reconstructions were obtained and reviewed.   Radiation dose reduction techniques  included automated exposure control or exposure modulation based on body size. Count of known CT and cardiac nuc  med studies performed in previous 12 months: 5.     COMPARISON:   6/18/2020    FINDINGS:   There is a new small right pleural effusion. No left-sided pleural effusion is identified. There is no pericardial effusion. No aortic aneurysm or dissection is seen. The exam is motion degraded. A right arm PICC has its tip in the SVC. The exam is not  timed for evaluation of the pulmonary arteries. No central pulmonary embolism is identified however. There is a 1.3 cm right paratracheal lymph node, minimally larger. There is some mild nonspecific fat stranding in the right axilla. No adenopathy is  identified in this location. Please see abdomen and pelvis CT report dictated separately.    Lung windows again show pulmonary emphysema. There is chronic scarring in the left lower lobe. Mild multifocal groundglass infiltrates are noted in the left upper lobe. There is some atelectasis in the right lower lobe adjacent to the pleural fluid.  There are some nonspecific mild groundglass infiltrates in the right upper lobe as well. Findings may reflect a mild pneumonia, the patient does have a negative COVID 19 test today.      Impression:        1. Motion degraded exam.  2. No aortic aneurysm or dissection. No central pulmonary embolism.  3. Small right pleural effusion with associated right lower lobe atelectasis.  4. COPD.  5. Mild patchy infiltrates in the upper lobes likely reflect a mild pneumonia. Patient has a negative COVID 19 test today.    Signer Name: Ric Hyatt MD   Signed: 9/10/2020 11:15 PM   Workstation Name: [email or web address]    [facility]    XR Chest 1 View [[id number]] Collected: 09/10/20 1759     Updated: 09/10/20 2223    Narrative:      EXAMINATION: XR CHEST SINGLE VIEW - 09/10/2020     INDICATION: Evaluate PICC placement.      COMPARISON: 06/05/2019 chest radiograph. Chest CT  scan 2020.     FINDINGS: Right upper extremity PICC line is seen with its tip in the  most distal portion of the right subclavian vein. Heart shadow is upper  limits of normal size. Pulmonary vasculature appears upper limits of  normal. Right apical pleural scarring is stable. There is a very small  but new right pleural effusion.       Impression:      1. PICC line tip in the distal right subclavian vein.     2. Small right pleural effusion.     DICTATED:   09/10/2020  EDITED/ls :   09/10/2020          This report was finalized on 9/10/2020 10:20 PM by Dr. Donald Laboy MD.               Impression:   1. AAA graft Pseudomonas infection-status post endograft repair and 2019 followed by endograft infection in 2020  2. Fever  3. Transaminitis-hepatitis panel negative.  Patient with history of cirrhosis on lactulose.  4. Per patient-prostatitis being followed by Dr. Ford.  Will obtain medical records from Dr. Ford's office.  5. Coworkers pneumoconiosis  6. Chronic respiratory failure with hypoxia on 2 L nasal cannula at home  7. Cirrhosis of the liver- on lactulose  8. Diarrhea-likely secondary to lactulose  9. Hypertension  10. Coronary artery disease  11.  Cirrhosis and ascites    PLAN/RECOMMENDATIONS:   Thank you for asking us to see Derek Kelsey, I recommend the followin. Continue IV vancomycin and meropenem pending cultures and sensitivity reports  2. Continue to follow culture sensitivity reports and adjust antibiotics accordingly  3. Follow a.m. labs and trend results        Sed rate 83  Blood cultures no growth to date  Patient feels better on meropenem and vancomycin      Patient wants to go home  Can resume meropenem 1 g every 8 hours x2 weeks  Add vancomycin 2 g IV daily to outpatient regimen upon discharge  After finishing a 6-8-week course of IV antibiotics we will consider changing to oral ciprofloxacin and doxycycline    I have placed orders to case management;   Can go home tomorrow once IV  antibiotics are arranged; I believe patient is receiving antibiotics through [facility] home infusion    I discussed case with family    Davis Carrero MD  9/12/2020  18:43 EDT

## 2020-09-13 VITALS
RESPIRATION RATE: 18 BRPM | BODY MASS INDEX: 29.99 KG/M2 | WEIGHT: 233.69 LBS | HEIGHT: 74 IN | DIASTOLIC BLOOD PRESSURE: 60 MMHG | SYSTOLIC BLOOD PRESSURE: 140 MMHG | HEART RATE: 74 BPM | OXYGEN SATURATION: 93 % | TEMPERATURE: 97.4 F

## 2020-09-13 PROBLEM — R50.9 FEVER: Status: RESOLVED | Noted: 2020-09-10 | Resolved: 2020-09-13

## 2020-09-13 LAB
ANION GAP SERPL CALCULATED.3IONS-SCNC: 12 MMOL/L (ref 5–15)
BUN SERPL-MCNC: 12 MG/DL (ref 8–23)
BUN/CREAT SERPL: 17.6 (ref 7–25)
CALCIUM SPEC-SCNC: 9.3 MG/DL (ref 8.6–10.5)
CHLORIDE SERPL-SCNC: 98 MMOL/L (ref 98–107)
CO2 SERPL-SCNC: 25 MMOL/L (ref 22–29)
CREAT SERPL-MCNC: 0.68 MG/DL (ref 0.76–1.27)
GFR SERPL CREATININE-BSD FRML MDRD: 116 ML/MIN/1.73
GLUCOSE SERPL-MCNC: 101 MG/DL (ref 65–99)
POTASSIUM SERPL-SCNC: 3.7 MMOL/L (ref 3.5–5.2)
SODIUM SERPL-SCNC: 135 MMOL/L (ref 136–145)

## 2020-09-13 PROCEDURE — 25010000002 VANCOMYCIN 10 G RECONSTITUTED SOLUTION

## 2020-09-13 PROCEDURE — 99239 HOSP IP/OBS DSCHRG MGMT >30: CPT | Performed by: NURSE PRACTITIONER

## 2020-09-13 PROCEDURE — G0009 ADMIN PNEUMOCOCCAL VACCINE: HCPCS | Performed by: INTERNAL MEDICINE

## 2020-09-13 PROCEDURE — 25010000002 PNEUMOCOCCAL VAC POLYVALENT PER 0.5 ML: Performed by: INTERNAL MEDICINE

## 2020-09-13 PROCEDURE — 94799 UNLISTED PULMONARY SVC/PX: CPT

## 2020-09-13 PROCEDURE — 25010000002 HEPARIN (PORCINE) PER 1000 UNITS: Performed by: NURSE PRACTITIONER

## 2020-09-13 PROCEDURE — 80048 BASIC METABOLIC PNL TOTAL CA: CPT

## 2020-09-13 PROCEDURE — 99233 SBSQ HOSP IP/OBS HIGH 50: CPT | Performed by: STUDENT IN AN ORGANIZED HEALTH CARE EDUCATION/TRAINING PROGRAM

## 2020-09-13 PROCEDURE — 90732 PPSV23 VACC 2 YRS+ SUBQ/IM: CPT | Performed by: INTERNAL MEDICINE

## 2020-09-13 RX ADMIN — ASPIRIN 81 MG CHEWABLE TABLET 81 MG: 81 TABLET CHEWABLE at 08:56

## 2020-09-13 RX ADMIN — BUDESONIDE AND FORMOTEROL FUMARATE DIHYDRATE 2 PUFF: 160; 4.5 AEROSOL RESPIRATORY (INHALATION) at 09:33

## 2020-09-13 RX ADMIN — PNEUMOCOCCAL VACCINE POLYVALENT 0.5 ML
25; 25; 25; 25; 25; 25; 25; 25; 25; 25; 25; 25; 25; 25; 25; 25; 25; 25; 25; 25; 25; 25; 25 INJECTION, SOLUTION INTRAMUSCULAR; SUBCUTANEOUS at 12:52

## 2020-09-13 RX ADMIN — Medication 250 MG: at 08:57

## 2020-09-13 RX ADMIN — PANTOPRAZOLE SODIUM 40 MG: 40 TABLET, DELAYED RELEASE ORAL at 08:57

## 2020-09-13 RX ADMIN — HEPARIN SODIUM 5000 UNITS: 5000 INJECTION, SOLUTION INTRAVENOUS; SUBCUTANEOUS at 06:12

## 2020-09-13 RX ADMIN — TAMSULOSIN HYDROCHLORIDE 0.4 MG: 0.4 CAPSULE ORAL at 08:56

## 2020-09-13 RX ADMIN — MULTIVITAMIN TABLET 1 TABLET: TABLET at 08:56

## 2020-09-13 RX ADMIN — SUCRALFATE 1 G: 1 TABLET ORAL at 08:56

## 2020-09-13 RX ADMIN — CITALOPRAM HYDROBROMIDE 40 MG: 40 TABLET ORAL at 08:57

## 2020-09-13 RX ADMIN — CARVEDILOL 6.25 MG: 6.25 TABLET, FILM COATED ORAL at 08:56

## 2020-09-13 RX ADMIN — AMLODIPINE BESYLATE 5 MG: 5 TABLET ORAL at 08:56

## 2020-09-13 RX ADMIN — FUROSEMIDE 20 MG: 20 TABLET ORAL at 06:12

## 2020-09-13 RX ADMIN — VANCOMYCIN HYDROCHLORIDE 1250 MG: 10 INJECTION, POWDER, LYOPHILIZED, FOR SOLUTION INTRAVENOUS at 08:57

## 2020-09-13 RX ADMIN — SUCRALFATE 1 G: 1 TABLET ORAL at 12:30

## 2020-09-13 NOTE — PROGRESS NOTES
INFECTIOUS DISEASE CONSULT/INITIAL HOSPITAL VISIT    Derek Kelsey  1950  [id number]    Date of Consult: 9/13/2020    Admission Date: 9/10/2020      Requesting Provider: Tammi Lomax MD  Evaluating Physician: Dr. Davis Carrero    Reason for Consultation: AAA graft infection with Pseudomonas species    History of present illness:    Mr. Derek Kelsey is a 69 y.o. 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  This is a patient of Dr. Davis Carrero last seen via telehealth on 9/9/2020.  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  He has had ongoing fever which has been concerning. He was subsequently sent to [facility] as a direct admission for further work-up secondary to continued fevers, increasing generalized weakness and increasing shortness of breath.  It was also noted that the patient's son was diagnosed positive with COVID-19 at the end of July.  He has a past medical history significant for obesity, anxiety, depression, hypertension, COPD on 2 L nasal cannula, prediabetes, CAD, GERD, coalminer's pneumoconiosis, cirrhosis on chronic lactulose and chronic lower abdominal pain.    Since arrival he has been afebrile with a T-max of 99.1 and is currently on nasal cannula at 2 L.  Current labs show he is without leukocytosis with a WBC of 3.91.  Liver enzymes, LDH, d-dimer, CRP and ESR were elevated.  Lactate was normal at 1.6.  Hepatitis panel was nonreactive.  Respiratory panel with COVID-19 was negative on 9/10.  Blood cultures are currently in progress.  Ultrasound of the gallbladder showed cirrhotic hepatic morphology with a simple appearing hepatic cyst.  CT the chest showed no aortic aneurysm or dissection with a small right pleural effusion and associated right lower lobe atelectasis.  There is also some mild patchy infiltrates in the upper lobes likely reflect a mild pneumonia.  CT of the  abdomen pelvis showed cirrhosis and splenomegaly with a new small amount of ascites.  Aortic aneurysm repair is stable in appearance as well as adjacent fat stranding is also stable and possibly inflammatory.    He has a listed allergy to penicillins with hives as the reaction.  He is currently receiving vancomycin and meropenem IV.  ID has been consulted for further evaluation and treatment as well as antimicrobial therapy management.    9/12/2020 patient is doing well has no complaints denies fevers rash sore throat diarrhea    9/13/20; doing well; afebrile, no complaints, no diarrhea, rash, sore throat, wants to go home.    Past Medical History:   Diagnosis Date   • Abdominal aortic aneurysm (CMS/HCC) 9/29/2016   • Anxiety 9/29/2016   • Arthritis    • Back pain    • Black lung (CMS/HCC)    • CAD (coronary artery disease) 9/29/2016   • Cirrhosis (CMS/HCC)    • COPD (chronic obstructive pulmonary disease) (CMS/[facility]) 9/29/2016   • Depression 9/29/2016   • Depression    • GERD (gastroesophageal reflux disease)    • Hypertension 9/29/2016   • On supplemental oxygen by nasal cannula     at night   • Vitiligo    • Wears dentures    • Wears reading eyeglasses        Past Surgical History:   Procedure Laterality Date   • ABDOMINAL AORTIC ANEURYSM REPAIR WITH ENDOGRAFT N/A 6/6/2019    Procedure: ABDOMINAL AORTIC ANEURYSM REPAIR WITH ENDOGRAFT;  Surgeon: Leopoldo Burleson MD;  Location: Pending sale to [facility] HYBRID OR 15;  Service: Vascular   • BACK SURGERY     • CARDIAC CATHETERIZATION     • CARDIAC CATHETERIZATION N/A 9/28/2018    Procedure: Left Heart Cath;  Surgeon: Douglas Galvez MD;  Location: Pending sale to [facility] CATH INVASIVE LOCATION;  Service: Cardiovascular   • CARDIAC SURGERY     • COLONOSCOPY      2015   • COLONOSCOPY N/A 10/30/2019    Procedure: COLONOSCOPY;  Surgeon: Homar Viveros MD;  Location: Pending sale to [facility] ENDOSCOPY;  Service: Gastroenterology   • ENDOSCOPY N/A 10/30/2019    Procedure: ESOPHAGOGASTRODUODENOSCOPY;  Surgeon:  Homar Viveros MD;  Location:  [facility] ENDOSCOPY;  Service: Gastroenterology   • EYE SURGERY      cataracts bilateral   • HAND SURGERY Left    • LUMBAR DISCECTOMY FUSION INSTRUMENTATION N/A 2018    Procedure: LUMBAR DISCECTOMY POSTERIOR WITH FUSION INSTRUMENTATION;  Surgeon: Joo Franklin MD;  Location:  [facility] OR;  Service: Orthopedic Spine   • LUMBAR DISCECTOMY FUSION INSTRUMENTATION N/A 2019    Procedure: POSTERIOR LUMBAR SPINAL FUSION REVISION AND INSTRUMENTATION L3,L4,L5,S1;  Surgeon: Joo Franklin MD;  Location:  [facility] OR;  Service: Orthopedic Spine   • ORIF FINGER / THUMB FRACTURE     • SHOULDER SURGERY Left        Family History   Problem Relation Age of Onset   • Stroke Mother    • Hypertension Mother    • Heart disease Mother    • Heart disease Father    • Hypertension Father    • Diabetes Sister    • Hypertension Sister    • Heart disease Sister    • Diabetes Brother    • Heart disease Brother    • Hypertension Child    • Hypertension Son    • No Known Problems Son    • Diabetes Sister    • Heart disease Sister        Social History     Socioeconomic History   • Marital status:      Spouse name: Not on file   • Number of children: 2   • Years of education: Not on file   • Highest education level: Not on file   Occupational History   • Occupation: DISABLED COAL MINER     Comment: Back   Tobacco Use   • Smoking status: Former Smoker     Packs/day: 1.00     Years: 30.00     Pack years: 30.00     Types: Cigarettes     Quit date: 2011     Years since quittin.7   • Smokeless tobacco: Former User     Types: Chew     Quit date: 2011   Substance and Sexual Activity   • Alcohol use: Not Currently   • Drug use: No   • Sexual activity: Defer     Comment:  and lives with wife   Social History Narrative    Lives in [place].    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    Is considered disabled    Has been granted black lung disability benefits    Smoked one pack  of cigarettes per day or more his entire adult life up until 2011    Denies alcohol use- quit >8 years ago    Uses walker or cane for ambulation       Allergies   Allergen Reactions   • Penicillins Hives     Hives - has tolerated Zosyn and ceftriaxone 09/2019   • Percocet [Oxycodone-Acetaminophen] Confusion         Medication:    Current Facility-Administered Medications:   •  !Vancomycin trough due 9/12 @ 0830, please hold dose until pharmacy interprets level!, , Does not apply, Once, Jordana Portillo, PharmD  •  amLODIPine (NORVASC) tablet 5 mg, 5 mg, Oral, BID, Maryanne Reaves APRN, 5 mg at 09/13/20 0856  •  aspirin chewable tablet 81 mg, 81 mg, Oral, Daily, Maryanne Reaves APRN, 81 mg at 09/13/20 0856  •  budesonide-formoterol (SYMBICORT) 160-4.5 MCG/ACT inhaler 2 puff, 2 puff, Inhalation, BID - RT, Nivia Rubio APRN, 2 puff at 09/13/20 0933  •  carvedilol (COREG) tablet 6.25 mg, 6.25 mg, Oral, Q12H, Maryanne Reaves APRN, 6.25 mg at 09/13/20 0856  •  citalopram (CeleXA) tablet 40 mg, 40 mg, Oral, Daily, Maryanne Reaves APRN, 40 mg at 09/13/20 0857  •  furosemide (LASIX) tablet 20 mg, 20 mg, Oral, QAM, Maryanne Reaves APRN, 20 mg at 09/13/20 0612  •  heparin (porcine) 5000 UNIT/ML injection 5,000 Units, 5,000 Units, Subcutaneous, Q8H, Maryanne Reaves APRN, 5,000 Units at 09/13/20 0612  •  influenza vac split quad (FLUZONE,FLUARIX,AFLURIA) injection 0.5 mL, 0.5 mL, Intramuscular, During Hospitalization, Kostas Hartman MD  •  ipratropium-albuterol (DUO-NEB) nebulizer solution 3 mL, 3 mL, Nebulization, Q4H PRN, Demi Davidson PA-C  •  lactulose (CHRONULAC) 10 GM/15ML solution 10 g, 10 g, Oral, TID, Maryanne Reaves, APRN, 10 g at 09/11/20 1708  •  melatonin tablet 5 mg, 5 mg, Oral, Nightly PRN, Eve Perez MD, 5 mg at 09/11/20 2219  •  multivitamin (THERAGRAN) tablet 1 tablet, 1 tablet, Oral, Daily, Maryanne Reaves,  APRN, 1 tablet at 09/13/20 0856  •  ondansetron (ZOFRAN) injection 4 mg, 4 mg, Intravenous, Q6H PRN, Demi Davidson PA-C, 4 mg at 09/11/20 0621  •  pantoprazole (PROTONIX) EC tablet 40 mg, 40 mg, Oral, BID AC, Maryanne Reaves APRN, 40 mg at 09/12/20 1721  •  Pharmacy to dose vancomycin, , Does not apply, Continuous PRN, Maryanne Reaves APRYAZAN  •  saccharomyces boulardii (FLORASTOR) capsule 250 mg, 250 mg, Oral, BID, Maryanne Reaves APRN, 250 mg at 09/13/20 0857  •  sodium chloride 0.9 % flush 10 mL, 10 mL, Intravenous, Q12H, Maryanne Reaves APRN, 10 mL at 09/12/20 2108  •  sodium chloride 0.9 % flush 10 mL, 10 mL, Intravenous, PRN, Maryanne Reaves APRN  •  sucralfate (CARAFATE) tablet 1 g, 1 g, Oral, 4x Daily, Maryanne Reaves APRN, 1 g at 09/13/20 0856  •  tamsulosin (FLOMAX) 24 hr capsule 0.4 mg, 0.4 mg, Oral, Daily, Maryanne Reaves APRN, 0.4 mg at 09/13/20 0856  •  vancomycin 1250 mg/250 mL 0.9% NS IVPB ([facility]), 1,250 mg, Intravenous, Q12H, Jordana Portillo, Susan, 1,250 mg at 09/13/20 0857    Antibiotics:  Anti-Infectives (From admission, onward)    Ordered     Dose/Rate Route Frequency Start Stop    09/13/20 0922  vancomycin 1250 mg/250 mL 0.9% NS IVPB ([facility])     Ordering Provider: Nivia Rubio APRN    1,250 mg Intravenous Every 12 Hours 09/13/20 0000 09/17/20 2359    09/10/20 1955  vancomycin 1250 mg/250 mL 0.9% NS IVPB ([facility])     Ordering Provider: Jordana Portillo, PharmD    1,250 mg  over 90 Minutes Intravenous Every 12 Hours 09/11/20 0900 09/17/20 0859    09/10/20 1816  meropenem (MERREM) 1 g/100 mL 0.9% NS VTB ([facility])     Ordering Provider: Maryanne Reaves APRN    1 g  over 3 Hours Intravenous Every 8 Hours Scheduled 09/10/20 2200 09/12/20 0807    09/10/20 1955  vancomycin 2000 mg/500 mL 0.9% NS IVPB ([facility])     Ordering Provider: Jordana Portillo, NemoD    2,000 mg  over 120 Minutes Intravenous Once 09/10/20 2100 09/10/20  2334    09/10/20 1913  Pharmacy to dose vancomycin     Ordering Provider: Maryanne Reaves APRN     Does not apply Continuous PRN 09/10/20 1912 09/15/20 1911            Review of Systems:  See HPI    Physical Exam:   Vital Signs  Temp (24hrs), Av.6 °F (36.4 °C), Min:97.4 °F (36.3 °C), Max:97.7 °F (36.5 °C)    Temp  Min: 97.4 °F (36.3 °C)  Max: 97.7 °F (36.5 °C)  BP  Min: 119/59  Max: 152/76  Pulse  Min: 59  Max: 72  Resp  Min: 18  Max: 18  SpO2  Min: 90 %  Max: 95 %    GENERAL: Awake and alert, in no acute distress.  pleasant  HEENT: Normocephalic, atraumatic.  PERRL. EOMI. No conjunctival injection. No icterus. No ext oral lesions    HEART: RRR; No murmur,   LUNGS: clear haris.  ABDOMEN: distended with some tenderness noted.   EXT:  No cyanosis, clubbing or edema. No cord.  :   Without Young catheter.  MSK: FROM without joint effusions noted arms/legs.    SKIN: Warm and dry without cutaneous eruptions on Inspection/palpation.    NEURO: Oriented to PPT. No focal deficits on motor/sensory exam at arms/legs.  PSYCHIATRIC: Normal insight and judgement. Cooperative with PE    Laboratory Data    Results from last 7 days   Lab Units 20  0757 20  0503 09/10/20  1851   WBC 10*3/mm3 3.97 3.91 4.14   HEMOGLOBIN g/dL 9.5* 8.3* 8.9*   HEMATOCRIT % 33.7* 27.3* 30.0*   PLATELETS 10*3/mm3 134* 105* 113*     Results from last 7 days   Lab Units 20  0843   SODIUM mmol/L 135*   POTASSIUM mmol/L 3.7   CHLORIDE mmol/L 98   CO2 mmol/L 25.0   BUN mg/dL 12   CREATININE mg/dL 0.68*   GLUCOSE mg/dL 101*   CALCIUM mg/dL 9.3     Results from last 7 days   Lab Units 20  0757   ALK PHOS U/L 235*   BILIRUBIN mg/dL 2.0*   ALT (SGPT) U/L 75*   AST (SGOT) U/L 173*     Results from last 7 days   Lab Units 09/10/20  1851   SED RATE mm/hr 83*     Results from last 7 days   Lab Units 09/10/20  1851   CRP mg/dL 2.05*     Results from last 7 days   Lab Units 09/10/20  1851   LACTATE mmol/L 1.6         Results from  last 7 days   Lab Units 09/12/20  0931   VANCOMYCIN TR mcg/mL 12.00     Estimated Creatinine Clearance: 116.2 mL/min (A) (by C-G formula based on SCr of 0.68 mg/dL (L)).      Microbiology:  Microbiology Results (last 10 days)     Procedure Component Value - Date/Time    Blood Culture - Blood, Hand, Left [[id number]] Collected: 09/10/20 1852    Lab Status: Preliminary result Specimen: Blood from Hand, Left Updated: 09/12/20 1930     Blood Culture No growth at 2 days    Blood Culture - Blood, Arm, Left [[id number]] Collected: 09/10/20 1845    Lab Status: Preliminary result Specimen: Blood from Arm, Left Updated: 09/12/20 1931     Blood Culture No growth at 2 days    COVID PRE-OP / PRE-PROCEDURE SCREENING ORDER (NO ISOLATION) - Swab, Nasopharynx [[id number]]  (Normal) Collected: 09/10/20 1807    Lab Status: Final result Specimen: Swab from Nasopharynx Updated: 09/10/20 1928    Narrative:      The following orders were created for panel order COVID PRE-OP / PRE-PROCEDURE SCREENING ORDER (NO ISOLATION) - Swab, Nasopharynx.  Procedure                               Abnormality         Status                     ---------                               -----------         ------                     Respiratory Panel PCR w/...[[id number]]  Normal              Final result                 Please view results for these tests on the individual orders.    Respiratory Panel PCR w/COVID-19(SARS-CoV-2) [facility] In-House, NP Swab in [facility], 3-4 HR TAT - Swab, Nasopharynx [[id number]]  (Normal) Collected: 09/10/20 1807    Lab Status: Final result Specimen: Swab from Nasopharynx Updated: 09/10/20 1928     ADENOVIRUS, PCR Not Detected     Coronavirus 229E Not Detected     Coronavirus HKU1 Not Detected     Coronavirus NL63 Not Detected     Coronavirus OC43 Not Detected     COVID19 Not Detected     Human Metapneumovirus Not Detected     Human Rhinovirus/Enterovirus Not Detected     Influenza A PCR Not Detected     Influenza A  H1 Not Detected     Influenza A H1 2009 PCR Not Detected     Influenza A H3 Not Detected     Influenza B PCR Not Detected     Parainfluenza Virus 1 Not Detected     Parainfluenza Virus 2 Not Detected     Parainfluenza Virus 3 Not Detected     Parainfluenza Virus 4 Not Detected     RSV, PCR Not Detected     Bordetella pertussis pcr Not Detected     Bordetella parapertussis PCR Not Detected     Chlamydophila pneumoniae PCR Not Detected     Mycoplasma pneumo by PCR Not Detected    Narrative:      Fact sheet for providers: https://docs.[employer]./wp-content/uploads/SLS9966-3471-WS3.1-EUA-Provider-Fact-Sheet-3.pdf    Fact sheet for patients: https://docs.[employer]./wp-content/uploads/VOY1411-5550-YI7.1-EUA-Patient-Fact-Sheet-1.pdf                Radiology:  Imaging Results (Last 72 Hours)     Procedure Component Value Units Date/Time    US Gallbladder [[id number]] Collected: 09/11/20 1001     Updated: 09/12/20 1604    Narrative:      EXAMINATION: US GALLBLADDER-     INDICATION: Elevated transaminases.      TECHNIQUE: Ultrasound right upper quadrant abdomen and liver.     COMPARISON: None.     FINDINGS: Visualized portions of the pancreas within normal limits.     Liver demonstrates diffusely increased echogenicity throughout the  hepatic parenchyma with coarsened echotexture and nodular contours.  Anechoic area measuring 1.5 cm most consistent with cyst as well as no  complex internal features. Adjacent perihepatic ascites noted.     Gallbladder unremarkable without cholelithiasis or inflammatory wall  thickening.     Common bile duct within normal limits at 5 mm.     Right kidney measures 12 cm in length with minimal renal collecting  system dilatation, however no contour deforming mass or obvious calculi.       Impression:      Cirrhotic hepatic morphology with simple appearing hepatic  cyst, otherwise no focal parenchymal lesion. Perihepatic ascites.         This report was finalized on 9/12/2020 4:01 PM by   Damien Ceballos.       XR Abdomen 2+ VW with Chest 1 VW [[id number]] Collected: 09/11/20 1037     Updated: 09/11/20 1811    Narrative:         EXAMINATION: XR ABDOMEN 2+ VIEWS WITH CHEST 1 VW-09/11/2020:      INDICATION: Hx of AAA endograft repair.  Recent infection 6/2020 here,  now with fever, weakness and lower abd pain.      COMPARISON: NONE.     FINDINGS: Frontal view of the chest and two-view abdomen reveals  increased markings seen diffusely throughout the lung fields  bilaterally. Apical pleural thickening identified on the right. There is  a PICC line catheter on the right with tip in the SVC. No definite  pleural effusion.     Flat and upright views of the abdomen reveal hardware identified within  the lower lumbar spine. Stent identified within the abdominal aorta and  iliac vessels. The bowel gas pattern is nonspecific. No obvious  obstruction or gross free intraperitoneal air. Degenerative changes seen  within the spine.       Impression:      Increased markings seen diffusely throughout the lung fields  bilaterally with PICC line catheter in satisfactory position.  Nonspecific bowel gas pattern with no evidence of obvious obstruction or  gross free intraperitoneal air.     D:  09/11/2020  E:  09/11/2020     This report was finalized on 9/11/2020 6:08 PM by Dr. Love Yap MD.       CT Abdomen Pelvis With & Without Contrast [[id number]] Collected: 09/10/20 2331     Updated: 09/10/20 2333    Narrative:      CT Abdomen Pelvis WO W    INDICATION:   Abdominal pain with fever. Sepsis. Weakness for 4 days.    TECHNIQUE:   CT of the abdomen and pelvis with and without IV contrast. Coronal and sagittal reconstructions were obtained in addition to 3-D reformats.  Radiation dose reduction techniques included automated exposure control or exposure modulation based on body  size. Count of known CT and cardiac nuc med studies performed in previous 12 months: 0.     COMPARISON:   6/18/2020    FINDINGS:  Please see  chest CT report dictated separately. Gallbladder is within normal limits. Patient is status post endovascular repair of an abdominal aortic aneurysm. The graft appears patent. Native aneurysm sac measures about 5.4 cm which is similar to prior  allowing for differences in slice selection. Stranding adjacent to the aorta is unchanged and could be inflammatory. Cirrhotic liver is again identified with what appears to be a cyst in the left hepatic lobe. Spleen remains enlarged measuring at least  16.3 cm in length. Solid organs are otherwise normal. No renal or ureteral stones are seen. There is no hydronephrosis. There is a small amount of ascites in the abdomen and pelvis which is new from prior. Urinary bladder is grossly normal. The appendix  is normal. Remainder of the unopacified GI tract is normal as well. No bowel obstruction is identified. There is some soft tissue induration at the umbilicus. This could reflect cellulitis in the appropriate clinical setting. Patient is status post  spinal fusion from L3 through S1. Kyphoplasty cement is noted at L3-L5.      Impression:        1. Cirrhosis and splenomegaly with a new small amount of ascites.  2. Grossly normal GI tract, including the appendix.  3. Stable appearance of an endovascular aortic aneurysm repair. Adjacent fat stranding is also stable and possibly inflammatory. Correlation and follow-up recommended.  4. Induration at the umbilicus could reflect cellulitis. Correlate with physical exam.  5. Additional findings as above.          Signer Name: Ric Hyatt MD   Signed: 9/10/2020 11:31 PM   Workstation Name: [email or web address]    [facility]    CT Angiogram Chest With & Without Contrast [[id number]] Collected: 09/10/20 2315     Updated: 09/10/20 2318    Narrative:      CT ANGIOGRAM CHEST W WO CONTRAST    INDICATION:   Fever with worsening cough. Shortness of and weakness for 4 days. History of COPD.    TECHNIQUE:   CT angiogram of the  chest with IV and without contrast. 3-D reconstructions were obtained and reviewed.   Radiation dose reduction techniques included automated exposure control or exposure modulation based on body size. Count of known CT and cardiac nuc  med studies performed in previous 12 months: 5.     COMPARISON:   6/18/2020    FINDINGS:   There is a new small right pleural effusion. No left-sided pleural effusion is identified. There is no pericardial effusion. No aortic aneurysm or dissection is seen. The exam is motion degraded. A right arm PICC has its tip in the SVC. The exam is not  timed for evaluation of the pulmonary arteries. No central pulmonary embolism is identified however. There is a 1.3 cm right paratracheal lymph node, minimally larger. There is some mild nonspecific fat stranding in the right axilla. No adenopathy is  identified in this location. Please see abdomen and pelvis CT report dictated separately.    Lung windows again show pulmonary emphysema. There is chronic scarring in the left lower lobe. Mild multifocal groundglass infiltrates are noted in the left upper lobe. There is some atelectasis in the right lower lobe adjacent to the pleural fluid.  There are some nonspecific mild groundglass infiltrates in the right upper lobe as well. Findings may reflect a mild pneumonia, the patient does have a negative COVID 19 test today.      Impression:        1. Motion degraded exam.  2. No aortic aneurysm or dissection. No central pulmonary embolism.  3. Small right pleural effusion with associated right lower lobe atelectasis.  4. COPD.  5. Mild patchy infiltrates in the upper lobes likely reflect a mild pneumonia. Patient has a negative COVID 19 test today.    Signer Name: Ric Hyatt MD   Signed: 9/10/2020 11:15 PM   Workstation Name: [email or web address]    [facility]    XR Chest 1 View [[id number]] Collected: 09/10/20 1759     Updated: 09/10/20 2224    Narrative:      EXAMINATION: XR CHEST  SINGLE VIEW - 09/10/2020     INDICATION: Evaluate PICC placement.      COMPARISON: 2019 chest radiograph. Chest CT scan 2020.     FINDINGS: Right upper extremity PICC line is seen with its tip in the  most distal portion of the right subclavian vein. Heart shadow is upper  limits of normal size. Pulmonary vasculature appears upper limits of  normal. Right apical pleural scarring is stable. There is a very small  but new right pleural effusion.       Impression:      1. PICC line tip in the distal right subclavian vein.     2. Small right pleural effusion.     DICTATED:   09/10/2020  EDITED/ls :   09/10/2020          This report was finalized on 9/10/2020 10:20 PM by Dr. Donald Laboy MD.               Impression:   1. AAA graft Pseudomonas infection-status post endograft repair and 2019 followed by endograft infection in 2020  2. Fever  3. Transaminitis-hepatitis panel negative.  Patient with history of cirrhosis on lactulose.  4. Per patient-prostatitis being followed by Dr. Ford.  Will obtain medical records from Dr. Ford's office.  5. Coworkers pneumoconiosis  6. Chronic respiratory failure with hypoxia on 2 L nasal cannula at home  7. Cirrhosis of the liver- on lactulose  8. Diarrhea-likely secondary to lactulose  9. Hypertension  10. Coronary artery disease  11.  Cirrhosis and ascites    PLAN/RECOMMENDATIONS:   Thank you for asking us to see Derek Kelsey, I recommend the followin. Continue IV vancomycin and meropenem pending cultures and sensitivity reports  2. Continue to follow culture sensitivity reports and adjust antibiotics accordingly  3. Follow a.m. labs and trend results        Sed rate 83  Blood cultures no growth to date  Patient feels better on meropenem and vancomycin      Patient wants to go home  Can resume meropenem 1 g every 8 hours x2 weeks  Add vancomycin 2 g IV daily to outpatient regimen upon discharge  After finishing a 6-8-week course of IV antibiotics we will consider  changing to oral ciprofloxacin and doxycycline    I have placed orders to case management;   Can go home today once IV antibiotics are arranged; I believe patient is receiving antibiotics through [facility] home infusion    D/w family, [facility] office, hospitalist,   Dp/cm porfirio 30 minutes  F/u this week can be changed to telemedicine given I have just seen patient in hospital 3 days in a row  Davis Carrero MD  9/13/2020  10:02 EDT

## 2020-09-13 NOTE — PROGRESS NOTES
CTS Progress Note    Derek Kelsey  [id number]  1950    CC: Fever    Subjective:  Remains without fevers and would like to go home today    Objective:    Vital Signs:  Temp:  [97.4 °F (36.3 °C)-97.7 °F (36.5 °C)] 97.4 °F (36.3 °C)  Heart Rate:  [59-72] 71  Resp:  [18] 18  BP: (119-152)/(59-76) 140/60    Physical Exam:   General Appearance: alert, appears stated age and cooperative   Lungs: clear to auscultation    Heart:: regular rhythm & normal rate, normal S1, S2 with no murmur   Skin:  warm and dry    Abd: soft non tender no mass   Ext: pulses intact DP and PT   Neuro: normal     Results     Results from last 7 days   Lab Units 09/12/20  0757   WBC 10*3/mm3 3.97   HEMOGLOBIN g/dL 9.5*   HEMATOCRIT % 33.7*   PLATELETS 10*3/mm3 134*     Results from last 7 days   Lab Units 09/13/20  0843   SODIUM mmol/L 135*   POTASSIUM mmol/L 3.7   CHLORIDE mmol/L 98   CO2 mmol/L 25.0   BUN mg/dL 12   CREATININE mg/dL 0.68*   GLUCOSE mg/dL 101*   CALCIUM mg/dL 9.3       Imaging Results (Last 24 Hours)     Procedure Component Value Units Date/Time    US Gallbladder [[id number]] Collected: 09/11/20 1001     Updated: 09/12/20 1604    Narrative:      EXAMINATION: US GALLBLADDER-     INDICATION: Elevated transaminases.      TECHNIQUE: Ultrasound right upper quadrant abdomen and liver.     COMPARISON: None.     FINDINGS: Visualized portions of the pancreas within normal limits.     Liver demonstrates diffusely increased echogenicity throughout the  hepatic parenchyma with coarsened echotexture and nodular contours.  Anechoic area measuring 1.5 cm most consistent with cyst as well as no  complex internal features. Adjacent perihepatic ascites noted.     Gallbladder unremarkable without cholelithiasis or inflammatory wall  thickening.     Common bile duct within normal limits at 5 mm.     Right kidney measures 12 cm in length with minimal renal collecting  system dilatation, however no contour deforming mass or obvious calculi.        Impression:      Cirrhotic hepatic morphology with simple appearing hepatic  cyst, otherwise no focal parenchymal lesion. Perihepatic ascites.         This report was finalized on 9/12/2020 4:01 PM by Dr. Damien Ceballos.             Lab and X-rays (images and results) reviewed and noted by me.    Assessment:    Anxiety    Abdominal aortic aneurysm (S/P Endovascular repair)    Depression    Hypertension    CAD (coronary artery disease)    Former smoker    Coal workers pneumoconiosis, simple    Prediabetes    Chronic respiratory failure with hypoxia and hypercapnia (CMS/HCC)    Anemia    Cirrhosis of liver (CMS/HCC)    GERD (gastroesophageal reflux disease)    Person under investigation for COVID-19    On supplemental oxygen by nasal cannula    Infected aortic endograft (CMS/HCC)    Elevated transaminase level    Thrombocytopenia (CMS/HCC)    Pneumonia        Plan:  69-year-old male with an endograft.  His fevers have improved and he has negative blood cultures still.  He feels back to his baseline.  Unknown if this was a viral illness or something treated by antibiotics, however given his comorbidities would continue the antibiotics for a full course.  Okay to discharge from our perspective and follow-up with Dr. Burleson as scheduled in October      I have reviewed, verified, and confirmed the above history and current status.  I have examined the patient and confirmed the above physical findings.    Chris Inman MD  09/13/20  09:53 EDT

## 2020-09-13 NOTE — PROGRESS NOTES
Case Management Discharge Note      Final Note: Plan is for pt. to dc home with spouse. Wife administers IV abx. at home and pt. goes every Monday to the [facility] center to have PICC dressing changed and have labs drawn. [facility] supplies abx. Have faxed new orders to [facility]. Wife states she will provide transport.    Provided Post Acute Provider List?: N/A  N/A Provider List Comment: Denies need    Selected Continued Care - Admitted Since 9/10/2020     Destination    No services have been selected for the patient.              Durable Medical Equipment    No services have been selected for the patient.              Dialysis/Infusion Coordination complete    Service Provider Selected Services Address Phone Fax    [facility]  Infusion and IV Therapy [address], [place] [id number] [phone]          Home Medical Care    No services have been selected for the patient.              Therapy    No services have been selected for the patient.              Community Resources    No services have been selected for the patient.                       Final Discharge Disposition Code: 01 - home or self-care

## 2020-09-13 NOTE — PLAN OF CARE
Problem: Fall Injury Risk  Goal: Absence of Fall and Fall-Related Injury  Outcome: Met     Problem: Adult Inpatient Plan of Care  Goal: Plan of Care Review  Outcome: Met  Goal: Patient-Specific Goal (Individualized)  Outcome: Met  Goal: Absence of Hospital-Acquired Illness or Injury  Outcome: Met  Goal: Optimal Comfort and Wellbeing  Outcome: Met  Goal: Readiness for Transition of Care  Outcome: Met     Problem: Infection  Goal: Infection Symptom Resolution  Outcome: Met

## 2020-09-13 NOTE — DISCHARGE PLACEMENT REQUEST
"Derek Tejeda (69 y.o. Male)     Will need to continue PICC line dressing changes until end of IV abx.         Date of Birth Social Security Number Address Home Phone MRN    1950  [address] [facility] [address] [phone] [id number]    Sikh Marital Status          Catholic        Admission Date Admission Type Admitting Provider Attending Provider Department, Room/Bed    9/10/20 Urgent Eve Perez MD Anciro, Audree, MD [address], [address]1    Discharge Date Discharge Disposition Discharge Destination                       Attending Provider: Eve Perez MD    Allergies: Penicillins, Percocet [Oxycodone-acetaminophen]    Isolation: None   Infection: None   Code Status: No CPR    Ht: 188 cm (74.02\")   Wt: 106 kg (233 lb 11 oz)    Admission Cmt: None   Principal Problem: Fever [R50.9]                 Active Insurance as of 9/10/2020     Primary Coverage     Payor Plan Insurance Group Employer/Plan Group    HUMANA MEDICARE REPLACEMENT HUMANA MEDICARE REPLACEMENT [id number]     Payor Plan Address Payor Plan Phone Number Payor Plan Fax Number Effective Dates    [address] [address] [phone]  2018 - None Entered    [facility] [address]       Subscriber Name Subscriber Birth Date Member ID       DEREK TEJEDA 1950 [id number]                 Emergency Contacts      (Rel.) Home Phone Work Phone Mobile Phone    Mago Tejeda (Spouse) [phone] -- [phone]        [address] [address]  Phone:  [phone]  Fax:          Patient:     Derek Tejeda MRN:  [id number]   [address] [place] [address] :  1950  SSN:    Phone: [phone] Sex:  M      INSURANCE PAYOR PLAN GROUP # SUBSCRIBER ID   Primary:    HUMANA MEDICARE REPLACEMENT [id number]   Admitting Diagnosis: Person under investigation for COVID-19 [Z20.828]  Order Date:  Sep " 12, 2020         Inpatient Case Management  Consult       (Order ID: [id number])     Diagnosis:         Priority:  Routine Expected Date:   Expiration Date:        Interval:  Once Count:    Comments: 1)meropenem 1 g iv every 8 hours x 2 weeks  1.5) vancomycin 2 g iv daily  2)weekly picc care  3)check cbc, cmp, esr,  vanco trough q Monday  4)fax ID note to [facility] [id number]  5)schedule f/u with me this Tuesday by telemedicine  Reason for Consult? please arrange opat infusions     Specimen Type:   Specimen Source:   Specimen Taken Date:   Specimen Taken Time:                   Authorizing Provider:Davis Carrero MD  Authorizing Provider's NPI: [id number]  Order Entered By: Davis Carrero MD 9/12/2020  6:48 PM     Electronically signed by: Davis Carrero MD 9/12/2020  6:48 PM                 Physician Progress Notes (most recent note)      Davis Carrero MD at 09/12/20 1843          INFECTIOUS DISEASE CONSULT/INITIAL HOSPITAL VISIT    Derek Kelsey  1950  [id number]    Date of Consult: 9/12/2020    Admission Date: 9/10/2020      Requesting Provider: Tammi Lomax MD  Evaluating Physician: Dr. Davis Carrero    Reason for Consultation: AAA graft infection with Pseudomonas species    History of present illness:    Mr. Derek Kelsey is a 69 y.o. 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  This is a patient of Dr. Davis Carrero last seen via telehealth on 9/9/2020.  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  He has had ongoing fever which has been concerning. He was subsequently sent to [facility] as a direct admission for further work-up secondary to continued fevers, increasing generalized weakness and increasing shortness of breath.  It was also noted that the patient's son was diagnosed positive with COVID-19 at the end of July.  He has a past medical history  significant for obesity, anxiety, depression, hypertension, COPD on 2 L nasal cannula, prediabetes, CAD, GERD, coalminer's pneumoconiosis, cirrhosis on chronic lactulose and chronic lower abdominal pain.    Since arrival he has been afebrile with a T-max of 99.1 and is currently on nasal cannula at 2 L.  Current labs show he is without leukocytosis with a WBC of 3.91.  Liver enzymes, LDH, d-dimer, CRP and ESR were elevated.  Lactate was normal at 1.6.  Hepatitis panel was nonreactive.  Respiratory panel with COVID-19 was negative on 9/10.  Blood cultures are currently in progress.  Ultrasound of the gallbladder showed cirrhotic hepatic morphology with a simple appearing hepatic cyst.  CT the chest showed no aortic aneurysm or dissection with a small right pleural effusion and associated right lower lobe atelectasis.  There is also some mild patchy infiltrates in the upper lobes likely reflect a mild pneumonia.  CT of the abdomen pelvis showed cirrhosis and splenomegaly with a new small amount of ascites.  Aortic aneurysm repair is stable in appearance as well as adjacent fat stranding is also stable and possibly inflammatory.    He has a listed allergy to penicillins with hives as the reaction.  He is currently receiving vancomycin and meropenem IV.  ID has been consulted for further evaluation and treatment as well as antimicrobial therapy management.    9/12/2020 patient is doing well has no complaints denies fevers rash sore throat diarrhea    Past Medical History:   Diagnosis Date   • Abdominal aortic aneurysm (CMS/HCC) 9/29/2016   • Anxiety 9/29/2016   • Arthritis    • Back pain    • Black lung (CMS/HCC)    • CAD (coronary artery disease) 9/29/2016   • Cirrhosis (CMS/HCC)    • COPD (chronic obstructive pulmonary disease) (CMS/HCC) 9/29/2016   • Depression 9/29/2016   • Depression    • GERD (gastroesophageal reflux disease)    • Hypertension 9/29/2016   • On supplemental oxygen by nasal cannula     at night   •  Vitiligo    • Wears dentures    • Wears reading eyeglasses        Past Surgical History:   Procedure Laterality Date   • ABDOMINAL AORTIC ANEURYSM REPAIR WITH ENDOGRAFT N/A 6/6/2019    Procedure: ABDOMINAL AORTIC ANEURYSM REPAIR WITH ENDOGRAFT;  Surgeon: Leopoldo Burleson MD;  Location:  [facility];  Service: Vascular   • BACK SURGERY     • CARDIAC CATHETERIZATION     • CARDIAC CATHETERIZATION N/A 9/28/2018    Procedure: Left Heart Cath;  Surgeon: Douglas Galvez MD;  Location:  [facility];  Service: Cardiovascular   • CARDIAC SURGERY     • COLONOSCOPY      2015   • COLONOSCOPY N/A 10/30/2019    Procedure: COLONOSCOPY;  Surgeon: Homar Viveros MD;  Location:  [facility];  Service: Gastroenterology   • ENDOSCOPY N/A 10/30/2019    Procedure: ESOPHAGOGASTRODUODENOSCOPY;  Surgeon: Homar Vivreos MD;  Location:  [facility];  Service: Gastroenterology   • EYE SURGERY      cataracts bilateral   • HAND SURGERY Left    • LUMBAR DISCECTOMY FUSION INSTRUMENTATION N/A 11/1/2018    Procedure: LUMBAR DISCECTOMY POSTERIOR WITH FUSION INSTRUMENTATION;  Surgeon: Joo Franklin MD;  Location:  [facility] OR;  Service: Orthopedic Spine   • LUMBAR DISCECTOMY FUSION INSTRUMENTATION N/A 7/24/2019    Procedure: POSTERIOR LUMBAR SPINAL FUSION REVISION AND INSTRUMENTATION L3,L4,L5,S1;  Surgeon: Joo Franklin MD;  Location:  [facility] OR;  Service: Orthopedic Spine   • ORIF FINGER / THUMB FRACTURE     • SHOULDER SURGERY Left        Family History   Problem Relation Age of Onset   • Stroke Mother    • Hypertension Mother    • Heart disease Mother    • Heart disease Father    • Hypertension Father    • Diabetes Sister    • Hypertension Sister    • Heart disease Sister    • Diabetes Brother    • Heart disease Brother    • Hypertension Child    • Hypertension Son    • No Known Problems Son    • Diabetes Sister    • Heart disease Sister        Social History     Socioeconomic History   • Marital  status:      Spouse name: Not on file   • Number of children: 2   • Years of education: Not on file   • Highest education level: Not on file   Occupational History   • Occupation: DISABLED      Comment: Back   Tobacco Use   • Smoking status: Former Smoker     Packs/day: 1.00     Years: 30.00     Pack years: 30.00     Types: Cigarettes     Quit date: 2011     Years since quittin.7   • Smokeless tobacco: Former User     Types: Chew     Quit date: 2011   Substance and Sexual Activity   • Alcohol use: Not Currently   • Drug use: No   • Sexual activity: Defer     Comment:  and lives with wife   Social History Narrative    Lives in [place].    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    Is considered disabled    Has been granted black lung disability benefits    Smoked one pack of cigarettes per day or more his entire adult life up until     Denies alcohol use- quit >8 years ago    Uses walker or cane for ambulation       Allergies   Allergen Reactions   • Penicillins Hives     Hives - has tolerated Zosyn and ceftriaxone 2019   • Percocet [Oxycodone-Acetaminophen] Confusion         Medication:    Current Facility-Administered Medications:   •  !Vancomycin trough due  @ 0830, please hold dose until pharmacy interprets level!, , Does not apply, Once, Jordana Portillo, PharmD  •  amLODIPine (NORVASC) tablet 5 mg, 5 mg, Oral, BID, Maryanne Reaves APRN, 5 mg at 20 08  •  aspirin chewable tablet 81 mg, 81 mg, Oral, Daily, Maryanne Reaves APRN, 81 mg at 20 0817  •  budesonide-formoterol (SYMBICORT) 160-4.5 MCG/ACT inhaler 2 puff, 2 puff, Inhalation, BID - RT, Nivia Rubio APRN  •  carvedilol (COREG) tablet 6.25 mg, 6.25 mg, Oral, Q12H, Maryanne Reaves APRN, 6.25 mg at 20 0818  •  citalopram (CeleXA) tablet 40 mg, 40 mg, Oral, Daily, Maryanne Reaves APRN, 40 mg at 20 08  •  furosemide (LASIX)  tablet 20 mg, 20 mg, Oral, QAM, Maryanne Reaves APRN, 20 mg at 09/12/20 0507  •  heparin (porcine) 5000 UNIT/ML injection 5,000 Units, 5,000 Units, Subcutaneous, Q8H, Maryanne Reaves APRN, 5,000 Units at 09/12/20 1454  •  influenza vac split quad (FLUZONE,FLUARIX,AFLURIA) injection 0.5 mL, 0.5 mL, Intramuscular, During Hospitalization, Kostas Hartman MD  •  ipratropium-albuterol (DUO-NEB) nebulizer solution 3 mL, 3 mL, Nebulization, Q4H PRN, Demi Davidson PA-C  •  lactulose (CHRONULAC) 10 GM/15ML solution 10 g, 10 g, Oral, TID, Maryanne Reaves APRN, 10 g at 09/11/20 1708  •  melatonin tablet 5 mg, 5 mg, Oral, Nightly PRN, Eve Perez MD, 5 mg at 09/11/20 2219  •  multivitamin (THERAGRAN) tablet 1 tablet, 1 tablet, Oral, Daily, Maryanne Reaves APRN, 1 tablet at 09/12/20 0817  •  ondansetron (ZOFRAN) injection 4 mg, 4 mg, Intravenous, Q6H PRN, Demi Davidson PA-C, 4 mg at 09/11/20 0621  •  pantoprazole (PROTONIX) EC tablet 40 mg, 40 mg, Oral, BID AC, Maryanne Reaves APRN, 40 mg at 09/12/20 1721  •  Pharmacy to dose vancomycin, , Does not apply, Continuous PRN, Maryanne Reaves APRN  •  saccharomyces boulardii (FLORASTOR) capsule 250 mg, 250 mg, Oral, BID, Maryanne Reaves APRN, 250 mg at 09/12/20 0817  •  sodium chloride 0.9 % flush 10 mL, 10 mL, Intravenous, Q12H, Maryanne Reaves APRN, 10 mL at 09/11/20 2033  •  sodium chloride 0.9 % flush 10 mL, 10 mL, Intravenous, PRN, Maryanne Reaves APRN  •  sucralfate (CARAFATE) tablet 1 g, 1 g, Oral, 4x Daily, Maryanne Reaves APRN, 1 g at 09/12/20 1721  •  tamsulosin (FLOMAX) 24 hr capsule 0.4 mg, 0.4 mg, Oral, Daily, Maryanne Reaves APRN, 0.4 mg at 09/12/20 0817  •  vancomycin 1250 mg/250 mL 0.9% NS IVPB ([facility]), 1,250 mg, Intravenous, Q12H, Jordana Portillo, PharmD, 1,250 mg at 09/12/20 1059    Antibiotics:  Anti-Infectives (From admission, onward)     Ordered     Dose/Rate Route Frequency Start Stop    09/10/20 1955  vancomycin 1250 mg/250 mL 0.9% NS IVPB ([facility])     Ordering Provider: Jordana Portillo PharmD    1,250 mg  over 90 Minutes Intravenous Every 12 Hours 20 0900 20 0859    09/10/20 181  meropenem (MERREM) 1 g/100 mL 0.9% NS VTB ([facility])     Ordering Provider: Maryanne Reaves APRN    1 g  over 3 Hours Intravenous Every 8 Hours Scheduled 09/10/20 2200 20 0807    09/10/20 1955  vancomycin 2000 mg/500 mL 0.9% NS IVPB ([facility])     Ordering Provider: Jordana Portillo PharmD    2,000 mg  over 120 Minutes Intravenous Once 09/10/20 2100 09/10/20 2334    09/10/20 1913  Pharmacy to dose vancomycin     Ordering Provider: Maryanne Reaves APRN     Does not apply Continuous PRN 09/10/20 1912 09/15/20 1911            Review of Systems:  See HPI    Physical Exam:   Vital Signs  Temp (24hrs), Av.9 °F (36.6 °C), Min:97.4 °F (36.3 °C), Max:98.5 °F (36.9 °C)    Temp  Min: 97.4 °F (36.3 °C)  Max: 98.5 °F (36.9 °C)  BP  Min: 119/59  Max: 152/76  Pulse  Min: 59  Max: 82  Resp  Min: 18  Max: 18  SpO2  Min: 90 %  Max: 96 %    GENERAL: Awake and alert, in no acute distress.   HEENT: Normocephalic, atraumatic.  PERRL. EOMI. No conjunctival injection. No icterus. Oropharynx clear without evidence of thrush or exudate. No evidence of peridontal disease.      HEART: RRR; No murmur, rubs, gallops.   LUNGS: Diminished with crackles in bilateral bases.   ABDOMEN: distended with some tenderness noted. Positive bowel sounds. No rebound or guarding. NO mass or HSM.  EXT:  No cyanosis, clubbing or edema. No cord.  :   Without Young catheter.  MSK: FROM without joint effusions noted arms/legs.    SKIN: Warm and dry without cutaneous eruptions on Inspection/palpation.    NEURO: Oriented to PPT. No focal deficits on motor/sensory exam at arms/legs.  PSYCHIATRIC: Normal insight and judgement. Cooperative with PE    Laboratory Data    Results from last 7  days   Lab Units 09/12/20  0757 09/11/20  0503 09/10/20  1851   WBC 10*3/mm3 3.97 3.91 4.14   HEMOGLOBIN g/dL 9.5* 8.3* 8.9*   HEMATOCRIT % 33.7* 27.3* 30.0*   PLATELETS 10*3/mm3 134* 105* 113*     Results from last 7 days   Lab Units 09/12/20  0757   SODIUM mmol/L 135*   POTASSIUM mmol/L 3.8   CHLORIDE mmol/L 101   CO2 mmol/L 22.0   BUN mg/dL 11   CREATININE mg/dL 0.64*   GLUCOSE mg/dL 100*   CALCIUM mg/dL 9.2     Results from last 7 days   Lab Units 09/12/20  0757   ALK PHOS U/L 235*   BILIRUBIN mg/dL 2.0*   ALT (SGPT) U/L 75*   AST (SGOT) U/L 173*     Results from last 7 days   Lab Units 09/10/20  1851   SED RATE mm/hr 83*     Results from last 7 days   Lab Units 09/10/20  1851   CRP mg/dL 2.05*     Results from last 7 days   Lab Units 09/10/20  1851   LACTATE mmol/L 1.6         Results from last 7 days   Lab Units 09/12/20  0931   VANCOMYCIN TR mcg/mL 12.00     Estimated Creatinine Clearance: 116.2 mL/min (A) (by C-G formula based on SCr of 0.64 mg/dL (L)).      Microbiology:  Microbiology Results (last 10 days)     Procedure Component Value - Date/Time    Blood Culture - Blood, Hand, Left [[id number]] Collected: 09/10/20 1852    Lab Status: Preliminary result Specimen: Blood from Hand, Left Updated: 09/11/20 1930     Blood Culture No growth at 24 hours    Blood Culture - Blood, Arm, Left [[id number]] Collected: 09/10/20 1845    Lab Status: Preliminary result Specimen: Blood from Arm, Left Updated: 09/11/20 1930     Blood Culture No growth at 24 hours    COVID PRE-OP / PRE-PROCEDURE SCREENING ORDER (NO ISOLATION) - Swab, Nasopharynx [[id number]]  (Normal) Collected: 09/10/20 1807    Lab Status: Final result Specimen: Swab from Nasopharynx Updated: 09/10/20 1928    Narrative:      The following orders were created for panel order COVID PRE-OP / PRE-PROCEDURE SCREENING ORDER (NO ISOLATION) - Swab, Nasopharynx.  Procedure                               Abnormality         Status                     ---------                                -----------         ------                     Respiratory Panel PCR w/...[[id number]]  Normal              Final result                 Please view results for these tests on the individual orders.    Respiratory Panel PCR w/COVID-19(SARS-CoV-2) [facility]-House, NP Swab in UTM/VTM, 3-4 HR TAT - Swab, Nasopharynx [[id number]]  (Normal) Collected: 09/10/20 1807    Lab Status: Final result Specimen: Swab from Nasopharynx Updated: 09/10/20 1928     ADENOVIRUS, PCR Not Detected     Coronavirus 229E Not Detected     Coronavirus HKU1 Not Detected     Coronavirus NL63 Not Detected     Coronavirus OC43 Not Detected     COVID19 Not Detected     Human Metapneumovirus Not Detected     Human Rhinovirus/Enterovirus Not Detected     Influenza A PCR Not Detected     Influenza A H1 Not Detected     Influenza A H1 2009 PCR Not Detected     Influenza A H3 Not Detected     Influenza B PCR Not Detected     Parainfluenza Virus 1 Not Detected     Parainfluenza Virus 2 Not Detected     Parainfluenza Virus 3 Not Detected     Parainfluenza Virus 4 Not Detected     RSV, PCR Not Detected     Bordetella pertussis pcr Not Detected     Bordetella parapertussis PCR Not Detected     Chlamydophila pneumoniae PCR Not Detected     Mycoplasma pneumo by PCR Not Detected    Narrative:      Fact sheet for providers: https://docs.[employer]/wp-content/uploads/AOF4282-6825-PK3.1-EUA-Provider-Fact-Sheet-3.pdf    Fact sheet for patients: https://docs.[employer]/wp-content/uploads/EQG2095-6210-LE1.1-EUA-Patient-Fact-Sheet-1.pdf                Radiology:  Imaging Results (Last 72 Hours)     Procedure Component Value Units Date/Time    US Gallbladder [[id number]] Collected: 09/11/20 1001     Updated: 09/12/20 1604    Narrative:      EXAMINATION: US GALLBLADDER-     INDICATION: Elevated transaminases.      TECHNIQUE: Ultrasound right upper quadrant abdomen and liver.     COMPARISON: None.     FINDINGS: Visualized portions  of the pancreas within normal limits.     Liver demonstrates diffusely increased echogenicity throughout the  hepatic parenchyma with coarsened echotexture and nodular contours.  Anechoic area measuring 1.5 cm most consistent with cyst as well as no  complex internal features. Adjacent perihepatic ascites noted.     Gallbladder unremarkable without cholelithiasis or inflammatory wall  thickening.     Common bile duct within normal limits at 5 mm.     Right kidney measures 12 cm in length with minimal renal collecting  system dilatation, however no contour deforming mass or obvious calculi.       Impression:      Cirrhotic hepatic morphology with simple appearing hepatic  cyst, otherwise no focal parenchymal lesion. Perihepatic ascites.         This report was finalized on 9/12/2020 4:01 PM by Dr. Damien Ceballos.       XR Abdomen 2+ VW with Chest 1 VW [[id number]] Collected: 09/11/20 1037     Updated: 09/11/20 1811    Narrative:         EXAMINATION: XR ABDOMEN 2+ VIEWS WITH CHEST 1 VW-09/11/2020:      INDICATION: Hx of AAA endograft repair.  Recent infection 6/2020 here,  now with fever, weakness and lower abd pain.      COMPARISON: NONE.     FINDINGS: Frontal view of the chest and two-view abdomen reveals  increased markings seen diffusely throughout the lung fields  bilaterally. Apical pleural thickening identified on the right. There is  a PICC line catheter on the right with tip in the SVC. No definite  pleural effusion.     Flat and upright views of the abdomen reveal hardware identified within  the lower lumbar spine. Stent identified within the abdominal aorta and  iliac vessels. The bowel gas pattern is nonspecific. No obvious  obstruction or gross free intraperitoneal air. Degenerative changes seen  within the spine.       Impression:      Increased markings seen diffusely throughout the lung fields  bilaterally with PICC line catheter in satisfactory position.  Nonspecific bowel gas pattern with no evidence of  obvious obstruction or  gross free intraperitoneal air.     D:  09/11/2020  E:  09/11/2020     This report was finalized on 9/11/2020 6:08 PM by Dr. Love Yap MD.       CT Abdomen Pelvis With & Without Contrast [[id number]] Collected: 09/10/20 2331     Updated: 09/10/20 2333    Narrative:      CT Abdomen Pelvis WO W    INDICATION:   Abdominal pain with fever. Sepsis. Weakness for 4 days.    TECHNIQUE:   CT of the abdomen and pelvis with and without IV contrast. Coronal and sagittal reconstructions were obtained in addition to 3-D reformats.  Radiation dose reduction techniques included automated exposure control or exposure modulation based on body  size. Count of known CT and cardiac nuc med studies performed in previous 12 months: 0.     COMPARISON:   6/18/2020    FINDINGS:  Please see chest CT report dictated separately. Gallbladder is within normal limits. Patient is status post endovascular repair of an abdominal aortic aneurysm. The graft appears patent. Native aneurysm sac measures about 5.4 cm which is similar to prior  allowing for differences in slice selection. Stranding adjacent to the aorta is unchanged and could be inflammatory. Cirrhotic liver is again identified with what appears to be a cyst in the left hepatic lobe. Spleen remains enlarged measuring at least  16.3 cm in length. Solid organs are otherwise normal. No renal or ureteral stones are seen. There is no hydronephrosis. There is a small amount of ascites in the abdomen and pelvis which is new from prior. Urinary bladder is grossly normal. The appendix  is normal. Remainder of the unopacified GI tract is normal as well. No bowel obstruction is identified. There is some soft tissue induration at the umbilicus. This could reflect cellulitis in the appropriate clinical setting. Patient is status post  spinal fusion from L3 through S1. Kyphoplasty cement is noted at L3-L5.      Impression:        1. Cirrhosis and splenomegaly with a new  small amount of ascites.  2. Grossly normal GI tract, including the appendix.  3. Stable appearance of an endovascular aortic aneurysm repair. Adjacent fat stranding is also stable and possibly inflammatory. Correlation and follow-up recommended.  4. Induration at the umbilicus could reflect cellulitis. Correlate with physical exam.  5. Additional findings as above.          Signer Name: Ric Hyatt MD   Signed: 9/10/2020 11:31 PM   Workstation Name: [facility]    [facility]    CT Angiogram Chest With & Without Contrast [[id number]] Collected: 09/10/20 2315     Updated: 09/10/20 2318    Narrative:      CT ANGIOGRAM CHEST W WO CONTRAST    INDICATION:   Fever with worsening cough. Shortness of and weakness for 4 days. History of COPD.    TECHNIQUE:   CT angiogram of the chest with IV and without contrast. 3-D reconstructions were obtained and reviewed.   Radiation dose reduction techniques included automated exposure control or exposure modulation based on body size. Count of known CT and cardiac nuc  med studies performed in previous 12 months: 5.     COMPARISON:   6/18/2020    FINDINGS:   There is a new small right pleural effusion. No left-sided pleural effusion is identified. There is no pericardial effusion. No aortic aneurysm or dissection is seen. The exam is motion degraded. A right arm PICC has its tip in the SVC. The exam is not  timed for evaluation of the pulmonary arteries. No central pulmonary embolism is identified however. There is a 1.3 cm right paratracheal lymph node, minimally larger. There is some mild nonspecific fat stranding in the right axilla. No adenopathy is  identified in this location. Please see abdomen and pelvis CT report dictated separately.    Lung windows again show pulmonary emphysema. There is chronic scarring in the left lower lobe. Mild multifocal groundglass infiltrates are noted in the left upper lobe. There is some atelectasis in the right lower  lobe adjacent to the pleural fluid.  There are some nonspecific mild groundglass infiltrates in the right upper lobe as well. Findings may reflect a mild pneumonia, the patient does have a negative COVID 19 test today.      Impression:        1. Motion degraded exam.  2. No aortic aneurysm or dissection. No central pulmonary embolism.  3. Small right pleural effusion with associated right lower lobe atelectasis.  4. COPD.  5. Mild patchy infiltrates in the upper lobes likely reflect a mild pneumonia. Patient has a negative COVID 19 test today.    Signer Name: Ric Hyatt MD   Signed: 9/10/2020 11:15 PM   Workstation Name: [facility]    [facility]    XR Chest 1 View [[id number]] Collected: 09/10/20 1759     Updated: 09/10/20 2223    Narrative:      EXAMINATION: XR CHEST SINGLE VIEW - 09/10/2020     INDICATION: Evaluate PICC placement.      COMPARISON: 06/05/2019 chest radiograph. Chest CT scan 06/18/2020.     FINDINGS: Right upper extremity PICC line is seen with its tip in the  most distal portion of the right subclavian vein. Heart shadow is upper  limits of normal size. Pulmonary vasculature appears upper limits of  normal. Right apical pleural scarring is stable. There is a very small  but new right pleural effusion.       Impression:      1. PICC line tip in the distal right subclavian vein.     2. Small right pleural effusion.     DICTATED:   09/10/2020  EDITED/ls :   09/10/2020          This report was finalized on 9/10/2020 10:20 PM by Dr. Donald Laboy MD.               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 Fever  3. Transaminitis-hepatitis panel negative.  Patient with history of cirrhosis on lactulose.  4. Per patient-prostatitis being followed by Dr. Ford.  Will obtain medical records from Dr. Ford's office.  5. Coworkers pneumoconiosis  6. Chronic respiratory failure with hypoxia on 2 L  nasal cannula at home  7. Cirrhosis of the liver- on lactulose  8. Diarrhea-likely secondary to lactulose  9. Hypertension  10. Coronary artery disease  11.  Cirrhosis and ascites    PLAN/RECOMMENDATIONS:   Thank you for asking us to see Derek Kelsey, I recommend the followin. Continue IV vancomycin and meropenem pending cultures and sensitivity reports  2. Continue to follow culture sensitivity reports and adjust antibiotics accordingly  3. Follow a.m. labs and trend results        Sed rate 83  Blood cultures no growth to date  Patient feels better on meropenem and vancomycin      Patient wants to go home  Can resume meropenem 1 g every 8 hours x2 weeks  Add vancomycin 2 g IV daily to outpatient regimen upon discharge  After finishing a 6-8-week course of IV antibiotics we will consider changing to oral ciprofloxacin and doxycycline    I have placed orders to case management;   Can go home tomorrow once IV antibiotics are arranged; I believe patient is receiving antibiotics through [facility] home infusion    I discussed case with family    Davis Carrero MD  2020  18:43 EDT                    Electronically signed by Davis Carrero MD at 20 [id number]

## 2020-09-13 NOTE — DISCHARGE SUMMARY
[facility] Medicine Services  DISCHARGE SUMMARY    Patient Name: Derek Kelsey  : 1950  MRN: [id number]    Date of Admission: 9/10/2020  4:20 PM  Date of Discharge: 2020  Primary Care Physician: Raudel Zheng MD    Consults     Date and Time Order Name Status Description    2020 Inpatient Infectious Diseases Consult Completed     2020 Inpatient Cardiothoracic Surgery Consult Completed           Hospital Course     Presenting Problem:   Person under investigation for COVID-19 [Z20.828]    Active Hospital Problems    Diagnosis  POA   • Pneumonia [J18.9]  Unknown   • GERD (gastroesophageal reflux disease) [K21.9]  Unknown   • Person under investigation for COVID-19 [Z20.828]  Yes   • On supplemental oxygen by nasal cannula [Z78.9]  Unknown   • 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]  Unknown   • Elevated transaminase level [R74.0]  Unknown   • Thrombocytopenia (CMS/HCC) [D69.6]  Unknown   • Cirrhosis of liver (CMS/HCC) [K74.60]  Yes   • Anemia [D64.9]  Yes   • Chronic respiratory failure with hypoxia and hypercapnia (CMS/HCC) [J96.11, J96.12]  Yes   • Prediabetes [R73.03]  Yes   • Former smoker [Z87.891]  Not Applicable   • Coal workers pneumoconiosis, simple [J60]  Yes   • Anxiety [F41.9]  Yes   • Abdominal aortic aneurysm (S/P Endovascular repair) [I71.4]  Yes   • Depression [F32.9]  Yes   • Hypertension [I10]  Yes   • CAD (coronary artery disease) [I25.10]  Yes      Resolved Hospital Problems    Diagnosis Date Resolved POA   • **Fever [R50.9] 2020 Unknown          Hospital Course:  Derek Kelsey is a 69 y.o. male with a past medical history of AAA repair in 2019 complicated by sepsis with endograft infection in 2020 on chronic IV antibiotics per Dr. Carrero, anxiety, depression, hypertension, cirrhosis, COPD, and chronic respiratory failure with hypoxia on 2 L nasal cannula, CAD, GERD, and prediabetes who was admitted as a  direct admit from infectious disease clinic for a fever and concern about confusion.  He was continued on Merrem with additional vancomycin IV this admission.  CT surgery was consulted for evaluation of endograft.    CT abdomen pelvis completed which showed stable appearance of endograft and ESR levels improved from previous.  ID and surgery felt this is a complex case due to ongoing long-term antibiotics which are difficult to keep him on however removal of endograft would be very high risk given multiple comorbidities including cirrhosis.  No intervention to be considered at this time.  He will plan ongoing IV antibiotics over the next 6 to 8 weeks with Merrem and vancomycin home infusion.     Patient does have ongoing elevated LFTs with underlying diagnosis of cirrhosis.  Acute hepatitis panel negative.  Ammonia level 54 on arrival.  Patient was continued on lactulose with improvement of confusion and cessation of fever.  Ultrasound gallbladder completed revealing only cirrhosis and no significant abnormalities with gallbladder.  Minimal ascites noted.  Patient to continue home medicines upon discharge.    Patient with chronic respiratory failure on 2 to 3 L nasal cannula at home with underlying COPD and coalminer's pneumoconiosis.  Patient to resume trelegy at home as well as home O2.  COVID-19 testing negative inpatient this admission.    Patient to be discharged home today with [facility] home infusion and follow-up to [facility] and PCP as scheduled.    Discharge Follow Up Recommendations for outpatient labs/diagnostics:  Follow-up with primary care in 1 week  Follow-up with [facility] as scheduled  Continue IV infusion at home, Merrem and vancomycin through [facility] home infusion with weekly outpatient PIC dressing changes on Monday.    Day of Discharge     HPI:   Patient feeling better.  No further fevers.  Breathing improving.  Denies pain, nausea and tolerating diet.  Regular bowel movements per report.   Ambulating without assist.  Asking to go home.    Review of Systems  Gen- No fevers, chills  CV- No chest pain, palpitations  Resp- No cough, dyspnea  GI- No N/V/D, abd pain    Vital Signs:   Temp:  [97.4 °F (36.3 °C)-97.7 °F (36.5 °C)] 97.4 °F (36.3 °C)  Heart Rate:  [59-72] 71  Resp:  [18] 18  BP: (119-152)/(59-76) 140/60     Physical Exam:  Constitutional: No acute distress, awake, alert, resting in bed  HENT: NCAT, mucous membranes moist  Respiratory: Clear to auscultation bilaterally, respiratory effort normal   Cardiovascular: RRR, no murmurs, rubs, or gallops, palpable pedal pulses bilaterally  Gastrointestinal: Positive bowel sounds, soft, nontender, mildly distended, obese abdomen  Musculoskeletal: No bilateral ankle edema  Psychiatric: Appropriate affect, cooperative  Neurologic: Oriented x 3, strength symmetric in all extremities, Cranial Nerves grossly intact to confrontation, speech clear  Skin: No rashes      Pertinent  and/or Most Recent Results     Results from last 7 days   Lab Units 09/12/20  0757 09/11/20  0503 09/10/20  1851   WBC 10*3/mm3 3.97 3.91 4.14   HEMOGLOBIN g/dL 9.5* 8.3* 8.9*   HEMATOCRIT % 33.7* 27.3* 30.0*   PLATELETS 10*3/mm3 134* 105* 113*   SODIUM mmol/L 135* 135* 138   POTASSIUM mmol/L 3.8 3.8 3.8   CHLORIDE mmol/L 101 100 102   CO2 mmol/L 22.0 27.0 26.0   BUN mg/dL 11 13 14   CREATININE mg/dL 0.64* 0.63* 0.70*   GLUCOSE mg/dL 100* 92 88   CALCIUM mg/dL 9.2 8.8 9.2     Results from last 7 days   Lab Units 09/12/20  0757 09/10/20  1851   BILIRUBIN mg/dL 2.0* 1.5*   ALK PHOS U/L 235* 200*   ALT (SGPT) U/L 75* 67*   AST (SGOT) U/L 173* 151*   PROTIME Seconds  --  18.5*   INR   --  1.58*           Invalid input(s): TG, LDLCALC, LDLREALC  Results from last 7 days   Lab Units 09/10/20  1851   PROBNP pg/mL 319.0   TROPONIN T ng/mL <0.010   PROCALCITONIN ng/mL 0.13   LACTATE mmol/L 1.6       Brief Urine Lab Results  (Last result in the past 365 days)      Color   Clarity   Blood   Leuk  Est   Nitrite   Protein   CREAT   Urine HCG        09/10/20 1830 Yellow Clear Negative Trace Negative 100 mg/dL (2+)               Microbiology Results Abnormal     Procedure Component Value - Date/Time    Blood Culture - Blood, Arm, Left [[id number]] Collected: 09/10/20 1845    Lab Status: Preliminary result Specimen: Blood from Arm, Left Updated: 09/12/20 1931     Blood Culture No growth at 2 days    Blood Culture - Blood, Hand, Left [[id number]] Collected: 09/10/20 1852    Lab Status: Preliminary result Specimen: Blood from Hand, Left Updated: 09/12/20 1930     Blood Culture No growth at 2 days    COVID PRE-OP / PRE-PROCEDURE SCREENING ORDER (NO ISOLATION) - Swab, Nasopharynx [[id number]]  (Normal) Collected: 09/10/20 1807    Lab Status: Final result Specimen: Swab from Nasopharynx Updated: 09/10/20 1928    Narrative:      The following orders were created for panel order COVID PRE-OP / PRE-PROCEDURE SCREENING ORDER (NO ISOLATION) - Swab, Nasopharynx.  Procedure                               Abnormality         Status                     ---------                               -----------         ------                     Respiratory Panel PCR w/...[[id number]]  Normal              Final result                 Please view results for these tests on the individual orders.    Respiratory Panel PCR w/COVID-19(SARS-CoV-2) [facility] In-House, NP Swab in UTM/VTM, 3-4 HR TAT - Swab, Nasopharynx [[id number]]  (Normal) Collected: 09/10/20 1807    Lab Status: Final result Specimen: Swab from Nasopharynx Updated: 09/10/20 1928     ADENOVIRUS, PCR Not Detected     Coronavirus 229E Not Detected     Coronavirus HKU1 Not Detected     Coronavirus NL63 Not Detected     Coronavirus OC43 Not Detected     COVID19 Not Detected     Human Metapneumovirus Not Detected     Human Rhinovirus/Enterovirus Not Detected     Influenza A PCR Not Detected     Influenza A H1 Not Detected     Influenza A H1 2009 PCR Not Detected      Influenza A H3 Not Detected     Influenza B PCR Not Detected     Parainfluenza Virus 1 Not Detected     Parainfluenza Virus 2 Not Detected     Parainfluenza Virus 3 Not Detected     Parainfluenza Virus 4 Not Detected     RSV, PCR Not Detected     Bordetella pertussis pcr Not Detected     Bordetella parapertussis PCR Not Detected     Chlamydophila pneumoniae PCR Not Detected     Mycoplasma pneumo by PCR Not Detected    Narrative:      Fact sheet for providers: https://docs.Last Size/wp-content/uploads/NIM4225-6253-CU2.1-EUA-Provider-Fact-Sheet-3.pdf    Fact sheet for patients: https://docs.[employer]/wp-content/uploads/NTQ2978-1554-IM7.1-EUA-Patient-Fact-Sheet-1.pdf          Imaging Results (All)     Procedure Component Value Units Date/Time    US Gallbladder [[id number]] Collected: 09/11/20 1001     Updated: 09/12/20 1604    Narrative:      EXAMINATION: US GALLBLADDER-     INDICATION: Elevated transaminases.      TECHNIQUE: Ultrasound right upper quadrant abdomen and liver.     COMPARISON: None.     FINDINGS: Visualized portions of the pancreas within normal limits.     Liver demonstrates diffusely increased echogenicity throughout the  hepatic parenchyma with coarsened echotexture and nodular contours.  Anechoic area measuring 1.5 cm most consistent with cyst as well as no  complex internal features. Adjacent perihepatic ascites noted.     Gallbladder unremarkable without cholelithiasis or inflammatory wall  thickening.     Common bile duct within normal limits at 5 mm.     Right kidney measures 12 cm in length with minimal renal collecting  system dilatation, however no contour deforming mass or obvious calculi.       Impression:      Cirrhotic hepatic morphology with simple appearing hepatic  cyst, otherwise no focal parenchymal lesion. Perihepatic ascites.         This report was finalized on 9/12/2020 4:01 PM by Dr. Damien Ceballos.       XR Abdomen 2+ VW with Chest 1 VW [[id number]] Collected: 09/11/20 1037       Updated: 09/11/20 1811    Narrative:         EXAMINATION: XR ABDOMEN 2+ VIEWS WITH CHEST 1 VW-09/11/2020:      INDICATION: Hx of AAA endograft repair.  Recent infection 6/2020 here,  now with fever, weakness and lower abd pain.      COMPARISON: NONE.     FINDINGS: Frontal view of the chest and two-view abdomen reveals  increased markings seen diffusely throughout the lung fields  bilaterally. Apical pleural thickening identified on the right. There is  a PICC line catheter on the right with tip in the SVC. No definite  pleural effusion.     Flat and upright views of the abdomen reveal hardware identified within  the lower lumbar spine. Stent identified within the abdominal aorta and  iliac vessels. The bowel gas pattern is nonspecific. No obvious  obstruction or gross free intraperitoneal air. Degenerative changes seen  within the spine.       Impression:      Increased markings seen diffusely throughout the lung fields  bilaterally with PICC line catheter in satisfactory position.  Nonspecific bowel gas pattern with no evidence of obvious obstruction or  gross free intraperitoneal air.     D:  09/11/2020  E:  09/11/2020     This report was finalized on 9/11/2020 6:08 PM by Dr. Love Yap MD.       CT Abdomen Pelvis With & Without Contrast [[id number]] Collected: 09/10/20 2331     Updated: 09/10/20 2333    Narrative:      CT Abdomen Pelvis WO W    INDICATION:   Abdominal pain with fever. Sepsis. Weakness for 4 days.    TECHNIQUE:   CT of the abdomen and pelvis with and without IV contrast. Coronal and sagittal reconstructions were obtained in addition to 3-D reformats.  Radiation dose reduction techniques included automated exposure control or exposure modulation based on body  size. Count of known CT and cardiac nuc med studies performed in previous 12 months: 0.     COMPARISON:   6/18/2020    FINDINGS:  Please see chest CT report dictated separately. Gallbladder is within normal limits. Patient is  status post endovascular repair of an abdominal aortic aneurysm. The graft appears patent. Native aneurysm sac measures about 5.4 cm which is similar to prior  allowing for differences in slice selection. Stranding adjacent to the aorta is unchanged and could be inflammatory. Cirrhotic liver is again identified with what appears to be a cyst in the left hepatic lobe. Spleen remains enlarged measuring at least  16.3 cm in length. Solid organs are otherwise normal. No renal or ureteral stones are seen. There is no hydronephrosis. There is a small amount of ascites in the abdomen and pelvis which is new from prior. Urinary bladder is grossly normal. The appendix  is normal. Remainder of the unopacified GI tract is normal as well. No bowel obstruction is identified. There is some soft tissue induration at the umbilicus. This could reflect cellulitis in the appropriate clinical setting. Patient is status post  spinal fusion from L3 through S1. Kyphoplasty cement is noted at L3-L5.      Impression:        1. Cirrhosis and splenomegaly with a new small amount of ascites.  2. Grossly normal GI tract, including the appendix.  3. Stable appearance of an endovascular aortic aneurysm repair. Adjacent fat stranding is also stable and possibly inflammatory. Correlation and follow-up recommended.  4. Induration at the umbilicus could reflect cellulitis. Correlate with physical exam.  5. Additional findings as above.          Signer Name: Ric Hyatt MD   Signed: 9/10/2020 11:31 PM   Workstation Name: [email or web address]    [facility]    CT Angiogram Chest With & Without Contrast [[id number]] Collected: 09/10/20 2315     Updated: 09/10/20 2318    Narrative:      CT ANGIOGRAM CHEST W WO CONTRAST    INDICATION:   Fever with worsening cough. Shortness of and weakness for 4 days. History of COPD.    TECHNIQUE:   CT angiogram of the chest with IV and without contrast. 3-D reconstructions were obtained and reviewed.    Radiation dose reduction techniques included automated exposure control or exposure modulation based on body size. Count of known CT and cardiac nuc  med studies performed in previous 12 months: 5.     COMPARISON:   6/18/2020    FINDINGS:   There is a new small right pleural effusion. No left-sided pleural effusion is identified. There is no pericardial effusion. No aortic aneurysm or dissection is seen. The exam is motion degraded. A right arm PICC has its tip in the SVC. The exam is not  timed for evaluation of the pulmonary arteries. No central pulmonary embolism is identified however. There is a 1.3 cm right paratracheal lymph node, minimally larger. There is some mild nonspecific fat stranding in the right axilla. No adenopathy is  identified in this location. Please see abdomen and pelvis CT report dictated separately.    Lung windows again show pulmonary emphysema. There is chronic scarring in the left lower lobe. Mild multifocal groundglass infiltrates are noted in the left upper lobe. There is some atelectasis in the right lower lobe adjacent to the pleural fluid.  There are some nonspecific mild groundglass infiltrates in the right upper lobe as well. Findings may reflect a mild pneumonia, the patient does have a negative COVID 19 test today.      Impression:        1. Motion degraded exam.  2. No aortic aneurysm or dissection. No central pulmonary embolism.  3. Small right pleural effusion with associated right lower lobe atelectasis.  4. COPD.  5. Mild patchy infiltrates in the upper lobes likely reflect a mild pneumonia. Patient has a negative COVID 19 test today.    Signer Name: Ric Hyatt MD   Signed: 9/10/2020 11:15 PM   Workstation Name: [email or web address]    [facility]    XR Chest 1 View [[id number]] Collected: 09/10/20 1759     Updated: 09/10/20 2223    Narrative:      EXAMINATION: XR CHEST SINGLE VIEW - 09/10/2020     INDICATION: Evaluate PICC placement.      COMPARISON:  06/05/2019 chest radiograph. Chest CT scan 06/18/2020.     FINDINGS: Right upper extremity PICC line is seen with its tip in the  most distal portion of the right subclavian vein. Heart shadow is upper  limits of normal size. Pulmonary vasculature appears upper limits of  normal. Right apical pleural scarring is stable. There is a very small  but new right pleural effusion.       Impression:      1. PICC line tip in the distal right subclavian vein.     2. Small right pleural effusion.     DICTATED:   09/10/2020  EDITED/ls :   09/10/2020          This report was finalized on 9/10/2020 10:20 PM by Dr. Donald Laboy MD.                            Plan for Follow-up of Pending Labs/Results:   Pending Labs     Order Current Status    Basic Metabolic Panel In process    Blood Culture - Blood, Arm, Left Preliminary result    Blood Culture - Blood, Hand, Left Preliminary result        Discharge Details        Discharge Medications      New Medications      Instructions Start Date   vancomycin   1,250 mg, Intravenous, Every 12 Hours         Changes to Medications      Instructions Start Date   Aspirin Low Dose 81 MG tablet  Generic drug: aspirin  What changed: additional instructions   81 mg, Oral, Daily, Last dose 10-21-18      ferrous gluconate 324 MG tablet  Commonly known as: FERGON  What changed: when to take this   324 mg, Oral, Daily With Breakfast         Continue These Medications      Instructions Start Date   acetaminophen 650 MG 8 hr tablet  Commonly known as: TYLENOL   650 mg, Oral, Every 8 Hours PRN      amLODIPine 10 MG tablet  Commonly known as: NORVASC   5 mg, Oral, 2 Times Daily      carvedilol 6.25 MG tablet  Commonly known as: COREG   TAKE 1 TABLET  2  TIMES A DAY WITH MEALS.      citalopram 20 MG tablet  Commonly known as: CeleXA   40 mg, Oral, Daily      furosemide 20 MG tablet  Commonly known as: LASIX   20-40 mg, Oral, Every Morning      lactulose 10 GM/15ML solution  Commonly known as: CHRONULAC   Take  15 ml q 6 hours as needed for constipation      meropenem 1 g injection  Commonly known as: MERREM   No dose, route, or frequency recorded.      Multi-Vitamin Daily tablet   1 tablet, Oral, Daily      O2  Commonly known as: OXYGEN   No dose, route, or frequency recorded.      pantoprazole 40 MG EC tablet  Commonly known as: PROTONIX   40 mg, Oral, 2 Times Daily      saccharomyces boulardii 250 MG capsule  Commonly known as: FLORASTOR   250 mg, Oral, 2 Times Daily      sucralfate 1 g tablet  Commonly known as: CARAFATE   TAKE 1 TABLET FOUR TIMES DAILY      tamsulosin 0.4 MG capsule 24 hr capsule  Commonly known as: FLOMAX   0.4 mg, Oral, Daily      Trelegy Ellipta 100-62.5-25 MCG/INH aerosol powder   Generic drug: Fluticasone-Umeclidin-Vilant   INHALE 1 PUFF ONCE DAILY      triamcinolone 0.1 % cream  Commonly known as: KENALOG   1 application, Topical, 2 Times Daily             Allergies   Allergen Reactions   • Penicillins Hives     Hives - has tolerated Zosyn and ceftriaxone 09/2019   • Percocet [Oxycodone-Acetaminophen] Confusion         Discharge Disposition:  Home or Self Care    Diet:  Hospital:  Diet Order   Procedures   • Diet Regular; Cardiac       Activity:  Activity Instructions     Activity as Tolerated            Restrictions or Other Recommendations:         CODE STATUS:    Code Status and Medical Interventions:   Ordered at: 09/10/20 2040     Limited Support to NOT Include:    Cardioversion/Defibrillation    Intubation     Level Of Support Discussed With:    Patient     Code Status:    No CPR     Medical Interventions (Level of Support Prior to Arrest):    Limited     Comments:    pt and wife agree       Future Appointments   Date Time Provider Department Center   10/15/2020  7:30 AM Leopoldo Burleson MD [facility] [place] None   12/2/2020 11:00 AM Yolie Bates APRN [facility] [place] None       Additional Instructions for the Follow-ups that You Need to Schedule     Discharge Follow-up with PCP   As  directed       Currently Documented PCP:    Raudel Zheng MD    PCP Phone Number:    [phone]     Follow Up Details: 1 week         Discharge Follow-up with Specified Provider: Dr. Carrero as scheduled   As directed      To: Dr. Carrero as scheduled                     Electronically signed by LYNDA Morrell, 09/13/20, 9:23 AM EDT.      Time Spent on Discharge:  I spent  38 minutes on this discharge activity which included: face-to-face encounter with the patient, reviewing the data in the system, coordination of the care with the nursing staff as well as consultants, documentation, and entering orders.

## 2020-09-14 ENCOUNTER — READMISSION MANAGEMENT (OUTPATIENT)
Dept: CALL CENTER | Facility: HOSPITAL | Age: 70
End: 2020-09-14

## 2020-09-14 LAB
BH CV LOWER VASCULAR LEFT COMMON FEMORAL AUGMENT: NORMAL
BH CV LOWER VASCULAR LEFT COMMON FEMORAL COMPRESS: NORMAL
BH CV LOWER VASCULAR LEFT COMMON FEMORAL PHASIC: NORMAL
BH CV LOWER VASCULAR LEFT COMMON FEMORAL SPONT: NORMAL
BH CV LOWER VASCULAR RIGHT COMMON FEMORAL AUGMENT: NORMAL
BH CV LOWER VASCULAR RIGHT COMMON FEMORAL COMPRESS: NORMAL
BH CV LOWER VASCULAR RIGHT COMMON FEMORAL PHASIC: NORMAL
BH CV LOWER VASCULAR RIGHT COMMON FEMORAL SPONT: NORMAL
BH CV LOWER VASCULAR RIGHT DISTAL FEMORAL AUGMENT: NORMAL
BH CV LOWER VASCULAR RIGHT DISTAL FEMORAL COMPRESS: NORMAL
BH CV LOWER VASCULAR RIGHT DISTAL FEMORAL PHASIC: NORMAL
BH CV LOWER VASCULAR RIGHT DISTAL FEMORAL SPONT: NORMAL
BH CV LOWER VASCULAR RIGHT GASTRONEMIUS COMPRESS: NORMAL
BH CV LOWER VASCULAR RIGHT GREATER SAPH AK COMPRESS: NORMAL
BH CV LOWER VASCULAR RIGHT GREATER SAPH BK COMPRESS: NORMAL
BH CV LOWER VASCULAR RIGHT LESSER SAPH COMPRESS: NORMAL
BH CV LOWER VASCULAR RIGHT MID FEMORAL AUGMENT: NORMAL
BH CV LOWER VASCULAR RIGHT MID FEMORAL COMPRESS: NORMAL
BH CV LOWER VASCULAR RIGHT MID FEMORAL PHASIC: NORMAL
BH CV LOWER VASCULAR RIGHT MID FEMORAL SPONT: NORMAL
BH CV LOWER VASCULAR RIGHT PERONEAL COMPRESS: NORMAL
BH CV LOWER VASCULAR RIGHT POPLITEAL AUGMENT: NORMAL
BH CV LOWER VASCULAR RIGHT POPLITEAL COMPRESS: NORMAL
BH CV LOWER VASCULAR RIGHT POPLITEAL PHASIC: NORMAL
BH CV LOWER VASCULAR RIGHT POPLITEAL SPONT: NORMAL
BH CV LOWER VASCULAR RIGHT POSTERIOR TIBIAL COMPRESS: NORMAL
BH CV LOWER VASCULAR RIGHT PROFUNDA FEMORAL AUGMENT: NORMAL
BH CV LOWER VASCULAR RIGHT PROFUNDA FEMORAL COMPRESS: NORMAL
BH CV LOWER VASCULAR RIGHT PROFUNDA FEMORAL PHASIC: NORMAL
BH CV LOWER VASCULAR RIGHT PROFUNDA FEMORAL SPONT: NORMAL
BH CV LOWER VASCULAR RIGHT PROXIMAL FEMORAL AUGMENT: NORMAL
BH CV LOWER VASCULAR RIGHT PROXIMAL FEMORAL COMPRESS: NORMAL
BH CV LOWER VASCULAR RIGHT PROXIMAL FEMORAL PHASIC: NORMAL
BH CV LOWER VASCULAR RIGHT PROXIMAL FEMORAL SPONT: NORMAL
BH CV LOWER VASCULAR RIGHT SAPHENOFEMORAL JUNCTION AUGMENT: NORMAL
BH CV LOWER VASCULAR RIGHT SAPHENOFEMORAL JUNCTION COMPRESS: NORMAL
BH CV LOWER VASCULAR RIGHT SAPHENOFEMORAL JUNCTION PHASIC: NORMAL
BH CV LOWER VASCULAR RIGHT SAPHENOFEMORAL JUNCTION SPONT: NORMAL

## 2020-09-14 NOTE — OUTREACH NOTE
Prep Survey      Responses   [facility] facility patient discharged from?  [place]   Is LACE score < 7 ?  No   Eligibility  Readm Mgmt   Discharge diagnosis  **Fever    Does the patient have one of the following disease processes/diagnoses(primary or secondary)?  Sepsis   Does the patient have Home health ordered?  Yes   Is there a DME ordered?  No   General alerts for this patient  Outpt Infusion    Prep survey completed?  Yes          Patt Corona RN

## 2020-09-15 LAB
BACTERIA SPEC AEROBE CULT: NORMAL
BACTERIA SPEC AEROBE CULT: NORMAL

## 2020-09-17 ENCOUNTER — READMISSION MANAGEMENT (OUTPATIENT)
Dept: CALL CENTER | Facility: HOSPITAL | Age: 70
End: 2020-09-17

## 2020-09-17 NOTE — OUTREACH NOTE
Sepsis Week 1 Survey      Responses   [facility] patient discharged from?  [place]   COVID-19 Test Status  Negative   Does the patient have one of the following disease processes/diagnoses(primary or secondary)?  Sepsis   Is there a successful TCM telephone encounter documented?  No   Week 1 attempt successful?  Yes   Call start time  1253   Call end time  1304   Discharge diagnosis  **Fever    Person spoke with today (if not patient) and relationship  Mago, spouse   Meds reviewed with patient/caregiver?  Yes   Is the patient having any side effects they believe may be caused by any medication additions or changes?  No   Does the patient have all medications related to this admission filled (includes all antibiotics, inhalers, nebulizers,steroids,etc.)  Yes   Is the patient taking all medications as directed (includes completed medication regime)?  Yes   Does the patient have a primary care provider?   Yes   What is the Home health agency?   none   Has home health visited the patient within 72 hours of discharge?  N/A   Pulse Ox monitoring  None   Psychosocial issues?  No   Comments  wife states infuses Vancomycin through PICC line at home, goes to infusion clinic weekly for PICC line dsg changes   Did the patient receive a copy of their discharge instructions?  Yes   Nursing interventions  Reviewed instructions with patient   What is the patient's perception of their health status since discharge?  Improving   Nursing interventions  Nurse provided patient education   Is the patient/caregiver able to teach back Sepsis?  S - Shivering,fever or very cold, E - Extreme pain or generalized discomfort (worst ever,especially abdomen), P - Pale or discolored skin, S - Sleepy, difficult to arouse,confused, I -   I feel like I might die-a feeling of hopelessness, S - Short of breath   Nursing interventions  Nurse provided reassurance to patient   Is patient/caregiver able to teach back steps to recovery at home?   Set small, achievable goals for return to baseline health, Rest and regain strength   Is the patient/caregiver able to teach back signs and symptoms of worsening condition:  Fever, Hyperthermia, Rapid heart rate (>90), Shortness of breath/rapid respiratory rate, Altered mental status(confusion/coma), Edema   Is the patient/caregiver able to teach back the hierarchy of who to call/visit for symptoms/problems? PCP, Specialist, Home health nurse, Urgent Care, ED, 911  Yes   Additional teach back comments  wife states pt still has some edema in LE and has been giving extra Lasix as ordered, pt will be seeing cardiologist soon to discuss   Week 1 call completed?  Yes          Denisha Santos, RN

## 2020-10-05 RX ORDER — CARVEDILOL 6.25 MG/1
TABLET ORAL
Qty: 90 TABLET | Refills: 0 | Status: SHIPPED | OUTPATIENT
Start: 2020-10-05 | End: 2020-12-16

## 2020-10-16 DIAGNOSIS — J44.9 COPD MIXED TYPE (HCC): ICD-10-CM

## 2020-10-25 DIAGNOSIS — J44.9 COPD MIXED TYPE (HCC): ICD-10-CM

## 2020-11-04 DIAGNOSIS — K29.00 ACUTE SUPERFICIAL GASTRITIS WITHOUT HEMORRHAGE: ICD-10-CM

## 2020-11-04 RX ORDER — SUCRALFATE 1 G/1
TABLET ORAL
Qty: 360 TABLET | Refills: 3 | Status: SHIPPED | OUTPATIENT
Start: 2020-11-04 | End: 2021-03-17 | Stop reason: HOSPADM

## 2020-12-16 RX ORDER — CARVEDILOL 6.25 MG/1
TABLET ORAL
Qty: 90 TABLET | Refills: 0 | Status: SHIPPED | OUTPATIENT
Start: 2020-12-16 | End: 2021-01-27

## 2021-01-27 RX ORDER — CARVEDILOL 6.25 MG/1
TABLET ORAL
Qty: 90 TABLET | Refills: 0 | Status: ON HOLD | OUTPATIENT
Start: 2021-01-27 | End: 2021-03-11

## 2021-02-22 ENCOUNTER — OFFICE VISIT (OUTPATIENT)
Dept: CARDIAC SURGERY | Facility: CLINIC | Age: 71
End: 2021-02-22

## 2021-02-22 VITALS
TEMPERATURE: 100.4 F | OXYGEN SATURATION: 98 % | HEIGHT: 74 IN | SYSTOLIC BLOOD PRESSURE: 160 MMHG | WEIGHT: 245 LBS | HEART RATE: 102 BPM | BODY MASS INDEX: 31.44 KG/M2 | DIASTOLIC BLOOD PRESSURE: 60 MMHG

## 2021-02-22 DIAGNOSIS — I71.40 ABDOMINAL AORTIC ANEURYSM (AAA) WITHOUT RUPTURE (HCC): Primary | ICD-10-CM

## 2021-02-22 PROCEDURE — 99212 OFFICE O/P EST SF 10 MIN: CPT | Performed by: THORACIC SURGERY (CARDIOTHORACIC VASCULAR SURGERY)

## 2021-02-22 RX ORDER — RIFAXIMIN 550 MG/1
TABLET ORAL 2 TIMES DAILY
COMMUNITY
Start: 2020-12-30

## 2021-02-22 RX ORDER — CHOLECALCIFEROL (VITAMIN D3) 125 MCG
5 CAPSULE ORAL NIGHTLY
COMMUNITY

## 2021-02-22 RX ORDER — CIPROFLOXACIN 500 MG/1
TABLET, FILM COATED ORAL 2 TIMES DAILY
COMMUNITY
Start: 2021-02-21 | End: 2021-03-17 | Stop reason: HOSPADM

## 2021-02-22 RX ORDER — FUROSEMIDE 20 MG/1
40 TABLET ORAL 2 TIMES DAILY
COMMUNITY
Start: 2020-11-30

## 2021-02-22 RX ORDER — DOXYCYCLINE 100 MG/1
CAPSULE ORAL 2 TIMES DAILY
COMMUNITY
Start: 2021-02-21 | End: 2021-03-17 | Stop reason: HOSPADM

## 2021-02-22 NOTE — PROGRESS NOTES
02/22/2021  Patient Information  Derek Kelsey                                                                                          [address] [place] [facility] [address]   1950  'PCP/Referring Physician'  Raudel Zheng MD  [phone]  No ref. provider found    Chief Complaint   Patient presents with   • Follow-up     3 MO FU to Eval Endograft Infection-AAA       History of Present Illness:   The patient is a 70-year-old male who returns today for follow-up of an endograft infection. Since last visit he is overall about the same.  He does appear to be retaining some fluid, more so than usual, according to his wife.  He sees Dr. Galvez.  He does not describe having many fevers or anything.  Although, his temperature is a little elevated today.  He saw Dr. Carrero and apparently, he is continuing the current antibiotics.       Patient Active Problem List   Diagnosis   • Anxiety   • Abdominal aortic aneurysm (AAA) without rupture (CMS/HCC)   • Depression   • Hypertension   • Cataract, bilateral   • CAD (coronary artery disease)   • Abnormal stress test   • Moderate COPD   • Former smoker   • Coal workers pneumoconiosis, simple   • Back pain   • S/P lumbar fusion   • Prediabetes   • Acute blood loss anemia, mild, asymptomatic   • Acute postoperative pain   • Hyponatremia, mild   • Chronic respiratory failure with hypoxia and hypercapnia (CMS/HCC)   • Renal insufficiency   • Acute cystitis without hematuria   • Acute UTI   • Sepsis (CMS/HCC)   • Bacteremia   • Anemia   • Aortitis (CMS/HCC)   • Elevated C-reactive protein (CRP)   • Cirrhosis of liver (CMS/HCC)   • High anion gap metabolic acidosis   • S/P AAA repair   • GERD (gastroesophageal reflux disease)   • Person under investigation for COVID-19   • On supplemental oxygen by nasal cannula   • Infected aortic endograft (CMS/HCC)   • Elevated transaminase level   • Thrombocytopenia (CMS/HCC)   • Pneumonia     Past Medical History:   Diagnosis  Date   • Abdominal aortic aneurysm (CMS/[facility]) 9/29/2016   • Anxiety 9/29/2016   • Arthritis    • Back pain    • Black lung (CMS/[facility])    • CAD (coronary artery disease) 9/29/2016   • Cirrhosis (CMS/[facility])    • COPD (chronic obstructive pulmonary disease) (CMS/[facility]) 9/29/2016   • Depression 9/29/2016   • Depression    • GERD (gastroesophageal reflux disease)    • Hypertension 9/29/2016   • On supplemental oxygen by nasal cannula     at night   • Vitiligo    • Wears dentures    • Wears reading eyeglasses      Past Surgical History:   Procedure Laterality Date   • ABDOMINAL AORTIC ANEURYSM REPAIR WITH ENDOGRAFT N/A 6/6/2019    Procedure: ABDOMINAL AORTIC ANEURYSM REPAIR WITH ENDOGRAFT;  Surgeon: Leopoldo Burleson MD;  Location:  [facility]BR[facility];  Service: Vascular   • BACK SURGERY     • CARDIAC CATHETERIZATION     • CARDIAC CATHETERIZATION N/A 9/28/2018    Procedure: Left Heart Cath;  Surgeon: Douglas Galvez MD;  Location:  [facility]ATH [facility]VAS[facility]ATION;  Service: Cardiovascular   • CARDIAC SURGERY     • COLONOSCOPY      2015   • COLONOSCOPY N/A 10/30/2019    Procedure: COLONOSCOPY;  Surgeon: Homar Viveros MD;  Location: [facility] ENDOSCOPY;  Service: Gastroenterology   • ENDOSCOPY N/A 10/30/2019    Procedure: ESOPHAGOGASTRODUODENOSCOPY;  Surgeon: Homar Viveros MD;  Location: [facility] ENDOSCOPY;  Service: Gastroenterology   • EYE SURGERY      cataracts bilateral   • HAND SURGERY Left    • LUMBAR DISCECTOMY FUSION INSTRUMENTATION N/A 11/1/2018    Procedure: LUMBAR DISCECTOMY POSTERIOR WITH FUSION INSTRUMENTATION;  Surgeon: Joo Franklin MD;  Location:  [facility] OR;  Service: Orthopedic Spine   • LUMBAR DISCECTOMY FUSION INSTRUMENTATION N/A 7/24/2019    Procedure: POSTERIOR LUMBAR SPINAL FUSION REVISION AND INSTRUMENTATION L3,L4,L5,S1;  Surgeon: Joo Franklin MD;  Location:  [facility] OR;  Service: Orthopedic Spine   • ORIF FINGER / THUMB FRACTURE     • SHOULDER SURGERY Left        Current Outpatient  Medications:   •  acetaminophen (TYLENOL) 650 MG 8 hr tablet, Take 650 mg by mouth Every 8 (Eight) Hours As Needed for Mild Pain ., Disp: , Rfl:   •  amLODIPine (NORVASC) 10 MG tablet, Take 0.5 tablets by mouth 2 (Two) Times a Day., Disp: , Rfl:   •  ASPIRIN LOW DOSE 81 MG tablet, Take 1 tablet by mouth Daily. Last dose 10-21-18 (Patient taking differently: Take 81 mg by mouth Daily.), Disp: , Rfl:   •  carvedilol (COREG) 6.25 MG tablet, TAKE 1 TABLET TWICE DAILY WITH MEALS, Disp: 90 tablet, Rfl: 0  •  ciprofloxacin (CIPRO) 500 MG tablet, 2 (two) times a day., Disp: , Rfl:   •  citalopram (CeleXA) 20 MG tablet, Take 40 mg by mouth Daily., Disp: , Rfl:   •  doxycycline (MONODOX) 100 MG capsule, 2 (two) times a day., Disp: , Rfl:   •  furosemide (LASIX) 20 MG tablet, 2 (two) times a day., Disp: , Rfl:   •  lactulose (CHRONULAC) 10 GM/15ML solution, Take 15 ml q 6 hours as needed for constipation, Disp: 1892 mL, Rfl: 3  •  melatonin 5 MG tablet tablet, Take 5 mg by mouth Every Night., Disp: , Rfl:   •  Multiple Vitamin (MULTI-VITAMIN DAILY) tablet, Take 1 tablet by mouth Daily., Disp: , Rfl:   •  O2 (OXYGEN), , Disp: , Rfl:   •  Ondansetron 4 MG film, 3 (Three) Times a Day., Disp: , Rfl:   •  pantoprazole (PROTONIX) 40 MG EC tablet, Take 40 mg by mouth 2 (Two) Times a Day., Disp: , Rfl:   •  POTASSIUM PO, Take  by mouth Daily. 595 mg, Disp: , Rfl:   •  saccharomyces boulardii (FLORASTOR) 250 MG capsule, Take 250 mg by mouth 2 (Two) Times a Day., Disp: , Rfl:   •  sucralfate (CARAFATE) 1 g tablet, TAKE 1 TABLET FOUR TIMES DAILY, Disp: 360 tablet, Rfl: 3  •  tamsulosin (FLOMAX) 0.4 MG capsule 24 hr capsule, Take 1 capsule by mouth Daily., Disp: 15 capsule, Rfl: 0  •  Trelegy Ellipta 100-62.5-25 MCG/INH aerosol powder , INHALE 1 PUFF ONCE DAILY, Disp: 60 each, Rfl: 0  •  Xifaxan 550 MG tablet, 2 (Two) Times a Day., Disp: , Rfl:   •  ferrous gluconate (FERGON) 324 MG tablet, Take 1 tablet by mouth Daily With Breakfast.  (Patient taking differently: Take 324 mg by mouth 2 (Two) Times a Day.), Disp: 30 tablet, Rfl: 0  •  furosemide (LASIX) 20 MG tablet, Take 20-40 mg by mouth Every Morning., Disp: , Rfl:   •  meropenem (MERREM) 1 g injection, , Disp: , Rfl:   •  triamcinolone (KENALOG) 0.1 % cream, Apply 1 application topically to the appropriate area as directed 2 (Two) Times a Day., Disp: , Rfl:   Allergies   Allergen Reactions   • Penicillins Hives     Hives - has tolerated Zosyn and ceftriaxone 09/2019   • Percocet [Oxycodone-Acetaminophen] Confusion     Social History     Socioeconomic History   • Marital status:      Spouse name: Not on file   • Number of children: 2   • Years of education: Not on file   • Highest education level: Not on file   Occupational History   • Occupation: DISABLED      Comment: Back   Tobacco Use   • Smoking status: Former Smoker     Packs/day: 1.00     Years: 30.00     Pack years: 30.00     Types: Cigarettes     Quit date: 1/1/2011     Years since quitting: 10.1   • Smokeless tobacco: Former User     Types: Chew     Quit date: 4/7/2011   Substance and Sexual Activity   • Alcohol use: Not Currently   • Drug use: No   • Sexual activity: Defer     Comment:  and lives with wife   Social History Narrative    Lives in [place].    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    Is considered disabled    Has been granted black lung disability benefits    Smoked one pack of cigarettes per day or more his entire adult life up until 2011    Denies alcohol use- quit >8 years ago    Uses walker or cane for ambulation     Family History   Problem Relation Age of Onset   • Stroke Mother    • Hypertension Mother    • Heart disease Mother    • Heart disease Father    • Hypertension Father    • Diabetes Sister    • Hypertension Sister    • Heart disease Sister    • Diabetes Brother    • Heart disease Brother    • Hypertension Child    • Hypertension Son    • No Known Problems Son    •  "Diabetes Sister    • Heart disease Sister      Review of Systems   Constitution: Positive for malaise/fatigue and night sweats. Negative for fever and weight loss.   HENT: Negative for hearing loss, odynophagia and sore throat.    Cardiovascular: Positive for claudication, dyspnea on exertion and leg swelling. Negative for chest pain, orthopnea and palpitations.   Respiratory: Positive for cough, shortness of breath and wheezing. Negative for hemoptysis.    Endocrine: Negative for cold intolerance, heat intolerance, polydipsia, polyphagia and polyuria.   Hematologic/Lymphatic: Bruises/bleeds easily.   Skin: Positive for rash (left arm). Negative for itching.   Musculoskeletal: Positive for back pain. Negative for joint pain, joint swelling and myalgias.   Gastrointestinal: Positive for bloating, abdominal pain, nausea and vomiting. Negative for constipation, diarrhea, hematemesis, hematochezia and melena.   Genitourinary: Negative for dysuria, frequency and hematuria.   Neurological: Positive for dizziness, light-headedness and weakness. Negative for focal weakness, headaches, numbness and seizures.   Psychiatric/Behavioral: Positive for depression. Negative for suicidal ideas. The patient is nervous/anxious.    All other systems reviewed and are negative.    Vitals:    02/22/21 1043   BP: 160/60   BP Location: Left arm   Patient Position: Sitting   Pulse: 102   Temp: 100.4 °F (38 °C)   SpO2: 98%   Weight: 111 kg (245 lb)   Height: 188 cm (74\")      Physical Exam  Vitals signs and nursing note reviewed.   Neck:      Musculoskeletal: Normal range of motion and neck supple.   Cardiovascular:      Rate and Rhythm: Normal rate and regular rhythm.      Pulses: Normal pulses.      Heart sounds: Normal heart sounds.   Pulmonary:      Effort: Pulmonary effort is normal.      Breath sounds: Normal breath sounds.   Abdominal:      General: Abdomen is flat. Bowel sounds are normal.      Palpations: Abdomen is soft.         The " ROS, past medical history, surgical history, family history, social history and vitals were reviewed by myself and corrected as needed.      Assessment/Plan:   The patient is a 70-year-old male who returns today for a follow-up of an endograft infection. The patient is well-known to me.  There is a question of a graft infection.  He has been on antibiotics and saw Dr. Carrero today. I personally discussed the case in detail with Dr. Carrero last week.  We both feel he is an ultra high risk for surgical management of this.  At this point, I would like to get another CT scan to assess this.  The patient also says he absolutely does not want surgery unless it is absolutely a last resort.  His wife reiterated that.  At this point, will get the CT scan and will go from there and will discuss with Dr. Carrero.    Patient Active Problem List   Diagnosis   • Anxiety   • Abdominal aortic aneurysm (AAA) without rupture (CMS/HCC)   • Depression   • Hypertension   • Cataract, bilateral   • CAD (coronary artery disease)   • Abnormal stress test   • Moderate COPD   • Former smoker   • Coal workers pneumoconiosis, simple   • Back pain   • S/P lumbar fusion   • Prediabetes   • Acute blood loss anemia, mild, asymptomatic   • Acute postoperative pain   • Hyponatremia, mild   • Chronic respiratory failure with hypoxia and hypercapnia (CMS/HCC)   • Renal insufficiency   • Acute cystitis without hematuria   • Acute UTI   • Sepsis (CMS/HCC)   • Bacteremia   • Anemia   • Aortitis (CMS/HCC)   • Elevated C-reactive protein (CRP)   • Cirrhosis of liver (CMS/HCC)   • High anion gap metabolic acidosis   • S/P AAA repair   • GERD (gastroesophageal reflux disease)   • Person under investigation for COVID-19   • On supplemental oxygen by nasal cannula   • Infected aortic endograft (CMS/HCC)   • Elevated transaminase level   • Thrombocytopenia (CMS/HCC)   • Pneumonia       CC: MD Rupal Espana editing for Leopoldo Burleson MD    ILeopoldo  MD Benjy, have read and agree with the editing done by Rupal Hi, .

## 2021-02-24 ENCOUNTER — HOSPITAL ENCOUNTER (OUTPATIENT)
Dept: CT IMAGING | Facility: HOSPITAL | Age: 71
Discharge: HOME | End: 2021-02-24
Admitting: PHYSICIAN ASSISTANT

## 2021-02-24 ENCOUNTER — OFFICE VISIT (OUTPATIENT)
Dept: PULMONOLOGY | Facility: CLINIC | Age: 71
End: 2021-02-24

## 2021-02-24 VITALS
WEIGHT: 243 LBS | HEIGHT: 74 IN | OXYGEN SATURATION: 91 % | BODY MASS INDEX: 31.18 KG/M2 | HEART RATE: 81 BPM | SYSTOLIC BLOOD PRESSURE: 138 MMHG | TEMPERATURE: 97.1 F | DIASTOLIC BLOOD PRESSURE: 58 MMHG

## 2021-02-24 DIAGNOSIS — J96.11 CHRONIC RESPIRATORY FAILURE WITH HYPOXIA AND HYPERCAPNIA (HCC): Primary | ICD-10-CM

## 2021-02-24 DIAGNOSIS — I71.40 ABDOMINAL AORTIC ANEURYSM (AAA) WITHOUT RUPTURE (HCC): ICD-10-CM

## 2021-02-24 DIAGNOSIS — J96.12 CHRONIC RESPIRATORY FAILURE WITH HYPOXIA AND HYPERCAPNIA (HCC): Primary | ICD-10-CM

## 2021-02-24 DIAGNOSIS — J60 COAL WORKERS PNEUMOCONIOSIS (HCC): ICD-10-CM

## 2021-02-24 DIAGNOSIS — J44.9 COPD MIXED TYPE (HCC): ICD-10-CM

## 2021-02-24 PROCEDURE — 99213 OFFICE O/P EST LOW 20 MIN: CPT | Performed by: NURSE PRACTITIONER

## 2021-02-24 PROCEDURE — 25010000002 IOPAMIDOL 61 % SOLUTION: Performed by: PHYSICIAN ASSISTANT

## 2021-02-24 PROCEDURE — 74178 CT ABD&PLV WO CNTR FLWD CNTR: CPT

## 2021-02-24 PROCEDURE — 82565 ASSAY OF CREATININE: CPT

## 2021-02-24 RX ADMIN — IOPAMIDOL 95 ML: 612 INJECTION, SOLUTION INTRAVENOUS at 14:37

## 2021-02-24 NOTE — PROGRESS NOTES
"[facility] Pulmonary Follow up      Chief Complaint  COPD    Subjective          Derek Kelsey presents to [facility] for routine follow-up.  We follow him here in the office for COPD with coworkers pneumoconiosis and chronic respiratory failure.    He does use a trilogy nightly for his chronic respiratory failure.  He wears it several hours a night.  He does have oxygen to use during the day with activity, he does not use it routinely he feels like it dries his nose out too much.  He has used K-Y jelly as well as humidity to try to help with the dryness without much benefit.    He does complain of some worsening shortness of breath with activity.  He is also having some lower extremity issues with pain and weakness with ambulation.  He is following up with his CT surgeon and has an CT scheduled today.  He does have a history of endograft infection and continues to follow with Dr. Burleson as well as Dr. Carrero with infectious disease.  Currently he is on chronic oral antibiotics.    He denies a worsening cough or sputum production.  He continues to use his Trelegy daily.  He does occasionally uses albuterol nebulizers at home.  He has had no recent acute exacerbation or respiratory illness.        Objective     Vital Signs:   /58   Pulse 81   Temp 97.1 °F (36.2 °C)   Ht 188 cm (74\")   Wt 110 kg (243 lb)   SpO2 91% Comment: resting at room air  BMI 31.20 kg/m²       Physical Exam  Vitals signs reviewed.   Constitutional:       Appearance: He is well-developed.   HENT:      Head: Normocephalic and atraumatic.   Eyes:      Pupils: Pupils are equal, round, and reactive to light.   Neck:      Musculoskeletal: Normal range of motion and neck supple.   Cardiovascular:      Rate and Rhythm: Normal rate and regular rhythm.      Heart sounds: No murmur.   Pulmonary:      Effort: Pulmonary effort is normal. No respiratory distress.      Breath sounds: No wheezing " or rales.      Comments: Decreased bilaterally but no wheezing or rales  Abdominal:      General: Bowel sounds are normal. There is no distension.      Palpations: Abdomen is soft.   Musculoskeletal: Normal range of motion.   Skin:     General: Skin is warm and dry.      Findings: No erythema.   Neurological:      Mental Status: He is alert and oriented to person, place, and time.   Psychiatric:         Behavior: Behavior normal.          Result Review :                           Assessment and Plan    Problem List Items Addressed This Visit        Pulmonary and Pneumonias    Moderate COPD    Coal workers pneumoconiosis, simple    Chronic respiratory failure with hypoxia and hypercapnia (CMS/HCC) - Primary          We did discuss the importance of wearing his oxygen with activity.  It may be one of the reasons why he feels a bit weaker and more short of breath with activity.  He likely has some lower extremity vascular issues as well.  He will continue to follow-up with CT surgery, and has a CT follow-up later this afternoon.    His COPD seems rather stable he has had no recent acute exacerbation or worsening cough or congestion.  He will continue on his Trelegy daily and his albuterol nebulizers as needed.    Continue on his trilogy at night for his chronic respiratory failure.    I would like to follow-up on full PFTs and a chest x-ray at his next visit his last pulmonary function studies were in 2019 with an FEV1 of 2.36, 61% predicted    Follow-up in 3 months with testing.      Follow Up     No follow-ups on file.  Patient was given instructions and counseling regarding his condition or for health maintenance advice. Please see specific information pulled into the AVS if appropriate.     LYNDA Rizvi, ACNP  [facility]  Electronically signed

## 2021-02-25 LAB — CREAT BLDA-MCNC: 0.9 MG/DL (ref 0.6–1.3)

## 2021-03-05 ENCOUNTER — TELEPHONE (OUTPATIENT)
Dept: GASTROENTEROLOGY | Facility: CLINIC | Age: 71
End: 2021-03-05

## 2021-03-05 NOTE — TELEPHONE ENCOUNTER
Make sure he is stooling regular on lactulose with at least 2 bms a day.    Lactulose 15-30 ml q 6 hours as needed for 2 soft bms daily    Advise pcp appointment and appointment with me within 6 weeks
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
## 2021-03-05 NOTE — TELEPHONE ENCOUNTER
I called Ms Kelsey back. Gave Mrs Kelsey Dr Viveros's recommendation. She stated that Derek had a follow up with his PCP last week. I will have Guillermina TANNER to call her back to schedule 6 week follow with Dr Viveros.

## 2021-03-09 ENCOUNTER — TELEPHONE (OUTPATIENT)
Dept: GASTROENTEROLOGY | Facility: CLINIC | Age: 71
End: 2021-03-09

## 2021-03-09 NOTE — TELEPHONE ENCOUNTER
PATIENTS WIFE LEFT VOICEMAIL STATING PATIENT IS HAVING ABD PAIN AND A LOT OF NAUSEA. I TRIED TO RETURN HER CALL, NO ANSWER, UNABLE TO LVM.

## 2021-03-10 ENCOUNTER — APPOINTMENT (OUTPATIENT)
Dept: GENERAL RADIOLOGY | Facility: HOSPITAL | Age: 71
End: 2021-03-10

## 2021-03-10 ENCOUNTER — HOSPITAL ENCOUNTER (INPATIENT)
Facility: HOSPITAL | Age: 71
LOS: 7 days | Discharge: HOME | End: 2021-03-17
Attending: EMERGENCY MEDICINE | Admitting: INTERNAL MEDICINE

## 2021-03-10 ENCOUNTER — APPOINTMENT (OUTPATIENT)
Dept: CT IMAGING | Facility: HOSPITAL | Age: 71
End: 2021-03-10

## 2021-03-10 DIAGNOSIS — J96.11 CHRONIC RESPIRATORY FAILURE WITH HYPOXIA AND HYPERCAPNIA (HCC): ICD-10-CM

## 2021-03-10 DIAGNOSIS — T82.7XXD INFECTION OF AORTIC ENDOGRAFT, SUBSEQUENT ENCOUNTER: ICD-10-CM

## 2021-03-10 DIAGNOSIS — J96.12 CHRONIC RESPIRATORY FAILURE WITH HYPOXIA AND HYPERCAPNIA (HCC): ICD-10-CM

## 2021-03-10 DIAGNOSIS — U07.1 PNEUMONIA DUE TO COVID-19 VIRUS: ICD-10-CM

## 2021-03-10 DIAGNOSIS — A41.9 SEPSIS, DUE TO UNSPECIFIED ORGANISM, UNSPECIFIED WHETHER ACUTE ORGAN DYSFUNCTION PRESENT (HCC): Primary | ICD-10-CM

## 2021-03-10 DIAGNOSIS — J12.82 PNEUMONIA DUE TO COVID-19 VIRUS: ICD-10-CM

## 2021-03-10 DIAGNOSIS — E87.20 LACTIC ACIDOSIS: ICD-10-CM

## 2021-03-10 DIAGNOSIS — K59.00 CONSTIPATION, UNSPECIFIED CONSTIPATION TYPE: ICD-10-CM

## 2021-03-10 DIAGNOSIS — R78.81 GRAM-NEGATIVE BACTEREMIA: ICD-10-CM

## 2021-03-10 LAB
ALBUMIN SERPL-MCNC: 3.4 G/DL (ref 3.5–5.2)
ALBUMIN/GLOB SERPL: 0.6 G/DL
ALP SERPL-CCNC: 143 U/L (ref 39–117)
ALT SERPL W P-5'-P-CCNC: 39 U/L (ref 1–41)
ANION GAP SERPL CALCULATED.3IONS-SCNC: 13 MMOL/L (ref 5–15)
AST SERPL-CCNC: 91 U/L (ref 1–40)
BACTERIA UR QL AUTO: ABNORMAL /HPF
BASOPHILS # BLD MANUAL: 0 10*3/MM3 (ref 0–0.2)
BASOPHILS NFR BLD AUTO: 0 % (ref 0–1.5)
BILIRUB SERPL-MCNC: 1.3 MG/DL (ref 0–1.2)
BILIRUB UR QL STRIP: NEGATIVE
BUN SERPL-MCNC: 13 MG/DL (ref 8–23)
BUN/CREAT SERPL: 11 (ref 7–25)
CALCIUM SPEC-SCNC: 9.1 MG/DL (ref 8.6–10.5)
CHLORIDE SERPL-SCNC: 97 MMOL/L (ref 98–107)
CLARITY UR: CLEAR
CO2 SERPL-SCNC: 21 MMOL/L (ref 22–29)
COLOR UR: ABNORMAL
CREAT SERPL-MCNC: 1.18 MG/DL (ref 0.76–1.27)
D-LACTATE SERPL-SCNC: 6.1 MMOL/L (ref 0.5–2)
DEPRECATED RDW RBC AUTO: 54.4 FL (ref 37–54)
EOSINOPHIL # BLD MANUAL: 0 10*3/MM3 (ref 0–0.4)
EOSINOPHIL NFR BLD MANUAL: 0 % (ref 0.3–6.2)
ERYTHROCYTE [DISTWIDTH] IN BLOOD BY AUTOMATED COUNT: 15.3 % (ref 12.3–15.4)
FLUAV RNA RESP QL NAA+PROBE: NOT DETECTED
FLUBV RNA RESP QL NAA+PROBE: NOT DETECTED
GFR SERPL CREATININE-BSD FRML MDRD: 61 ML/MIN/1.73
GLOBULIN UR ELPH-MCNC: 5.3 GM/DL
GLUCOSE SERPL-MCNC: 113 MG/DL (ref 65–99)
GLUCOSE UR STRIP-MCNC: NEGATIVE MG/DL
HCT VFR BLD AUTO: 30.8 % (ref 37.5–51)
HGB BLD-MCNC: 9.5 G/DL (ref 13–17.7)
HGB UR QL STRIP.AUTO: NEGATIVE
HOLD SPECIMEN: NORMAL
HYALINE CASTS UR QL AUTO: ABNORMAL /LPF
KETONES UR QL STRIP: NEGATIVE
LEUKOCYTE ESTERASE UR QL STRIP.AUTO: ABNORMAL
LYMPHOCYTES # BLD MANUAL: 0.17 10*3/MM3 (ref 0.7–3.1)
LYMPHOCYTES NFR BLD MANUAL: 1 % (ref 19.6–45.3)
LYMPHOCYTES NFR BLD MANUAL: 1 % (ref 5–12)
MAGNESIUM SERPL-MCNC: 1.5 MG/DL (ref 1.6–2.4)
MCH RBC QN AUTO: 30.2 PG (ref 26.6–33)
MCHC RBC AUTO-ENTMCNC: 30.8 G/DL (ref 31.5–35.7)
MCV RBC AUTO: 97.8 FL (ref 79–97)
METAMYELOCYTES NFR BLD MANUAL: 1 % (ref 0–0)
MONOCYTES # BLD AUTO: 0.17 10*3/MM3 (ref 0.1–0.9)
NEUTROPHILS # BLD AUTO: 16.88 10*3/MM3 (ref 1.7–7)
NEUTROPHILS NFR BLD MANUAL: 62 % (ref 42.7–76)
NEUTS BAND NFR BLD MANUAL: 35 % (ref 0–5)
NITRITE UR QL STRIP: NEGATIVE
NT-PROBNP SERPL-MCNC: 485 PG/ML (ref 0–900)
PH UR STRIP.AUTO: <=5 [PH] (ref 5–8)
PLAT MORPH BLD: NORMAL
PLATELET # BLD AUTO: 111 10*3/MM3 (ref 140–450)
PMV BLD AUTO: 9.7 FL (ref 6–12)
POTASSIUM SERPL-SCNC: 3.8 MMOL/L (ref 3.5–5.2)
PROCALCITONIN SERPL-MCNC: 7.37 NG/ML (ref 0–0.25)
PROT SERPL-MCNC: 8.7 G/DL (ref 6–8.5)
PROT UR QL STRIP: ABNORMAL
QT INTERVAL: 356 MS
QTC INTERVAL: 511 MS
RBC # BLD AUTO: 3.15 10*6/MM3 (ref 4.14–5.8)
RBC # UR: ABNORMAL /HPF
RBC MORPH BLD: NORMAL
REF LAB TEST METHOD: ABNORMAL
SARS-COV-2 RNA RESP QL NAA+PROBE: DETECTED
SODIUM SERPL-SCNC: 131 MMOL/L (ref 136–145)
SP GR UR STRIP: 1.02 (ref 1–1.03)
SQUAMOUS #/AREA URNS HPF: ABNORMAL /HPF
TROPONIN T SERPL-MCNC: <0.01 NG/ML (ref 0–0.03)
UROBILINOGEN UR QL STRIP: ABNORMAL
WBC # BLD AUTO: 17.4 10*3/MM3 (ref 3.4–10.8)
WBC MORPH BLD: NORMAL
WBC UR QL AUTO: ABNORMAL /HPF
WHOLE BLOOD HOLD SPECIMEN: NORMAL
WHOLE BLOOD HOLD SPECIMEN: NORMAL

## 2021-03-10 PROCEDURE — 84484 ASSAY OF TROPONIN QUANT: CPT

## 2021-03-10 PROCEDURE — 93005 ELECTROCARDIOGRAM TRACING: CPT | Performed by: EMERGENCY MEDICINE

## 2021-03-10 PROCEDURE — 83735 ASSAY OF MAGNESIUM: CPT

## 2021-03-10 PROCEDURE — 99223 1ST HOSP IP/OBS HIGH 75: CPT | Performed by: INTERNAL MEDICINE

## 2021-03-10 PROCEDURE — 87150 DNA/RNA AMPLIFIED PROBE: CPT | Performed by: NURSE PRACTITIONER

## 2021-03-10 PROCEDURE — 70450 CT HEAD/BRAIN W/O DYE: CPT

## 2021-03-10 PROCEDURE — 81001 URINALYSIS AUTO W/SCOPE: CPT | Performed by: EMERGENCY MEDICINE

## 2021-03-10 PROCEDURE — 87040 BLOOD CULTURE FOR BACTERIA: CPT | Performed by: NURSE PRACTITIONER

## 2021-03-10 PROCEDURE — 25010000002 VANCOMYCIN 10 G RECONSTITUTED SOLUTION: Performed by: NURSE PRACTITIONER

## 2021-03-10 PROCEDURE — 99285 EMERGENCY DEPT VISIT HI MDM: CPT

## 2021-03-10 PROCEDURE — 0 IOPAMIDOL PER 1 ML: Performed by: EMERGENCY MEDICINE

## 2021-03-10 PROCEDURE — 74177 CT ABD & PELVIS W/CONTRAST: CPT

## 2021-03-10 PROCEDURE — 80053 COMPREHEN METABOLIC PANEL: CPT

## 2021-03-10 PROCEDURE — 25010000002 MEROPENEM PER 100 MG: Performed by: NURSE PRACTITIONER

## 2021-03-10 PROCEDURE — 84145 PROCALCITONIN (PCT): CPT | Performed by: NURSE PRACTITIONER

## 2021-03-10 PROCEDURE — 85007 BL SMEAR W/DIFF WBC COUNT: CPT | Performed by: EMERGENCY MEDICINE

## 2021-03-10 PROCEDURE — 87186 SC STD MICRODIL/AGAR DIL: CPT | Performed by: NURSE PRACTITIONER

## 2021-03-10 PROCEDURE — 25010000002 HEPARIN (PORCINE) PER 1000 UNITS: Performed by: INTERNAL MEDICINE

## 2021-03-10 PROCEDURE — 83880 ASSAY OF NATRIURETIC PEPTIDE: CPT | Performed by: NURSE PRACTITIONER

## 2021-03-10 PROCEDURE — 87077 CULTURE AEROBIC IDENTIFY: CPT | Performed by: NURSE PRACTITIONER

## 2021-03-10 PROCEDURE — 93005 ELECTROCARDIOGRAM TRACING: CPT

## 2021-03-10 PROCEDURE — 71045 X-RAY EXAM CHEST 1 VIEW: CPT

## 2021-03-10 PROCEDURE — 87636 SARSCOV2 & INF A&B AMP PRB: CPT | Performed by: NURSE PRACTITIONER

## 2021-03-10 PROCEDURE — 71275 CT ANGIOGRAPHY CHEST: CPT

## 2021-03-10 PROCEDURE — 87086 URINE CULTURE/COLONY COUNT: CPT | Performed by: NURSE PRACTITIONER

## 2021-03-10 PROCEDURE — 83605 ASSAY OF LACTIC ACID: CPT | Performed by: NURSE PRACTITIONER

## 2021-03-10 PROCEDURE — 85025 COMPLETE CBC W/AUTO DIFF WBC: CPT | Performed by: EMERGENCY MEDICINE

## 2021-03-10 RX ORDER — SUCRALFATE 1 G/1
1 TABLET ORAL 4 TIMES DAILY
Status: DISCONTINUED | OUTPATIENT
Start: 2021-03-10 | End: 2021-03-16

## 2021-03-10 RX ORDER — VANCOMYCIN HYDROCHLORIDE 1 G/200ML
1000 INJECTION, SOLUTION INTRAVENOUS EVERY 12 HOURS
Status: DISCONTINUED | OUTPATIENT
Start: 2021-03-11 | End: 2021-03-12

## 2021-03-10 RX ORDER — ASPIRIN 81 MG/1
81 TABLET ORAL DAILY
Status: DISCONTINUED | OUTPATIENT
Start: 2021-03-11 | End: 2021-03-17 | Stop reason: HOSPADM

## 2021-03-10 RX ORDER — SODIUM CHLORIDE 0.9 % (FLUSH) 0.9 %
10 SYRINGE (ML) INJECTION AS NEEDED
Status: DISCONTINUED | OUTPATIENT
Start: 2021-03-10 | End: 2021-03-17 | Stop reason: HOSPADM

## 2021-03-10 RX ORDER — CITALOPRAM 40 MG/1
40 TABLET ORAL DAILY
Status: DISCONTINUED | OUTPATIENT
Start: 2021-03-10 | End: 2021-03-17 | Stop reason: HOSPADM

## 2021-03-10 RX ORDER — HEPARIN SODIUM 5000 [USP'U]/ML
5000 INJECTION, SOLUTION INTRAVENOUS; SUBCUTANEOUS EVERY 8 HOURS SCHEDULED
Status: DISCONTINUED | OUTPATIENT
Start: 2021-03-10 | End: 2021-03-11

## 2021-03-10 RX ORDER — PANTOPRAZOLE SODIUM 40 MG/1
40 TABLET, DELAYED RELEASE ORAL 2 TIMES DAILY
Status: DISCONTINUED | OUTPATIENT
Start: 2021-03-10 | End: 2021-03-17 | Stop reason: HOSPADM

## 2021-03-10 RX ORDER — CHOLECALCIFEROL (VITAMIN D3) 125 MCG
5 CAPSULE ORAL NIGHTLY
Status: DISCONTINUED | OUTPATIENT
Start: 2021-03-10 | End: 2021-03-17 | Stop reason: HOSPADM

## 2021-03-10 RX ORDER — LACTULOSE 10 G/15ML
10 SOLUTION ORAL 3 TIMES DAILY PRN
Status: DISCONTINUED | OUTPATIENT
Start: 2021-03-10 | End: 2021-03-17 | Stop reason: HOSPADM

## 2021-03-10 RX ORDER — DEXTROSE AND SODIUM CHLORIDE 5; .9 G/100ML; G/100ML
100 INJECTION, SOLUTION INTRAVENOUS CONTINUOUS
Status: DISCONTINUED | OUTPATIENT
Start: 2021-03-10 | End: 2021-03-11

## 2021-03-10 RX ORDER — SACCHAROMYCES BOULARDII 250 MG
250 CAPSULE ORAL 2 TIMES DAILY
Status: DISCONTINUED | OUTPATIENT
Start: 2021-03-10 | End: 2021-03-17 | Stop reason: HOSPADM

## 2021-03-10 RX ORDER — TAMSULOSIN HYDROCHLORIDE 0.4 MG/1
0.4 CAPSULE ORAL DAILY
Status: DISCONTINUED | OUTPATIENT
Start: 2021-03-10 | End: 2021-03-17 | Stop reason: HOSPADM

## 2021-03-10 RX ORDER — SODIUM CHLORIDE 0.9 % (FLUSH) 0.9 %
10 SYRINGE (ML) INJECTION EVERY 12 HOURS SCHEDULED
Status: DISCONTINUED | OUTPATIENT
Start: 2021-03-10 | End: 2021-03-17 | Stop reason: HOSPADM

## 2021-03-10 RX ADMIN — SODIUM CHLORIDE 1000 ML: 9 INJECTION, SOLUTION INTRAVENOUS at 15:48

## 2021-03-10 RX ADMIN — HEPARIN SODIUM 5000 UNITS: 5000 INJECTION INTRAVENOUS; SUBCUTANEOUS at 23:21

## 2021-03-10 RX ADMIN — IOPAMIDOL 75 ML: 755 INJECTION, SOLUTION INTRAVENOUS at 16:22

## 2021-03-10 RX ADMIN — MEROPENEM 1 G: 1 INJECTION, POWDER, FOR SOLUTION INTRAVENOUS at 15:38

## 2021-03-10 RX ADMIN — IOPAMIDOL 95 ML: 755 INJECTION, SOLUTION INTRAVENOUS at 16:18

## 2021-03-10 RX ADMIN — SODIUM CHLORIDE 1000 ML: 9 INJECTION, SOLUTION INTRAVENOUS at 15:30

## 2021-03-10 RX ADMIN — DEXTROSE AND SODIUM CHLORIDE 100 ML/HR: 5; 900 INJECTION, SOLUTION INTRAVENOUS at 21:11

## 2021-03-10 RX ADMIN — VANCOMYCIN HYDROCHLORIDE 2250 MG: 10 INJECTION, POWDER, LYOPHILIZED, FOR SOLUTION INTRAVENOUS at 16:53

## 2021-03-10 RX ADMIN — SODIUM CHLORIDE 1000 ML: 9 INJECTION, SOLUTION INTRAVENOUS at 18:58

## 2021-03-10 NOTE — TELEPHONE ENCOUNTER
Dr Viveros,  I tried to call Ms Kelsey back this morning. No answer; mailbox is full.  Ms Kelsey left a message yesterday stating that she was taking Mr Kelsey to the ER. He is very weak, vomiting, and stomach is swelling really bad.  Ms Kelsey also called this morning.

## 2021-03-10 NOTE — ED PROVIDER NOTES
Subjective   Patient presents emergency department with fever weakness and generalized feeling bad.  He does have a history of urosepsis as well as pneumonia.  He has a history of black lung as well as an abdominal aortic aneurysm repair.  He denies any chest pain.  He does have me he does have some difficulty breathing.  His EKG does show A. fib he denies any history of A. fib.  He denies any anticoagulation.  There is a report that he has been confused however he has been appropriate during my evaluation.  He has some mild diffuse abdominal tenderness.  He tells me it was recommended by his primary care doctor Dr. Viveros?  That he may be septic and to go to the hospital for further evaluation.      Abdominal Pain  Pain location:  Generalized  Pain quality: aching and cramping    Pain severity:  Mild  Onset quality:  Gradual  Duration:  3 days  Timing:  Constant  Progression:  Waxing and waning  Chronicity:  New  Relieved by:  Nothing  Worsened by:  Movement  Associated symptoms: fever, nausea and shortness of breath    Associated symptoms: no chest pain, no chills, no constipation, no cough, no diarrhea, no dysuria, no hematuria, no sore throat and no vomiting    Risk factors: being elderly        Review of Systems   Constitutional: Positive for fever. Negative for chills and diaphoresis.   HENT: Negative for congestion and sore throat.    Respiratory: Positive for shortness of breath. Negative for cough, choking, chest tightness and wheezing.    Cardiovascular: Negative for chest pain and leg swelling.   Gastrointestinal: Positive for abdominal pain and nausea. Negative for abdominal distention, anal bleeding, blood in stool, constipation, diarrhea and vomiting.   Genitourinary: Negative for difficulty urinating, dysuria, flank pain, frequency, hematuria and urgency.   All other systems reviewed and are negative.      Past Medical History:   Diagnosis Date   • Abdominal aortic aneurysm (CMS/[facility]) 9/29/2016   •  Anxiety 9/29/2016   • Arthritis    • Back pain    • Black lung (CMS/[facility])    • CAD (coronary artery disease) 9/29/2016   • Cirrhosis (CMS/[facility])    • COPD (chronic obstructive pulmonary disease) (CMS/[facility]) 9/29/2016   • Depression 9/29/2016   • Depression    • GERD (gastroesophageal reflux disease)    • Hypertension 9/29/2016   • On supplemental oxygen by nasal cannula     at night   • Vitiligo    • Wears dentures    • Wears reading eyeglasses        Allergies   Allergen Reactions   • Penicillins Hives     Hives - has tolerated Zosyn and ceftriaxone 09/2019   • Percocet [Oxycodone-Acetaminophen] Confusion       Past Surgical History:   Procedure Laterality Date   • ABDOMINAL AORTIC ANEURYSM REPAIR WITH ENDOGRAFT N/A 6/6/2019    Procedure: ABDOMINAL AORTIC ANEURYSM REPAIR WITH ENDOGRAFT;  Surgeon: Leopoldo Burleson MD;  Location:  [facility];  Service: Vascular   • BACK SURGERY     • CARDIAC CATHETERIZATION     • CARDIAC CATHETERIZATION N/A 9/28/2018    Procedure: Left Heart Cath;  Surgeon: Douglas Galvez MD;  Location:  [facility];  Service: Cardiovascular   • CARDIAC SURGERY     • COLONOSCOPY      2015   • COLONOSCOPY N/A 10/30/2019    Procedure: COLONOSCOPY;  Surgeon: Homar Viveros MD;  Location:  [facility];  Service: Gastroenterology   • ENDOSCOPY N/A 10/30/2019    Procedure: ESOPHAGOGASTRODUODENOSCOPY;  Surgeon: Homar Viveros MD;  Location:  [facility];  Service: Gastroenterology   • EYE SURGERY      cataracts bilateral   • HAND SURGERY Left    • LUMBAR DISCECTOMY FUSION INSTRUMENTATION N/A 11/1/2018    Procedure: LUMBAR DISCECTOMY POSTERIOR WITH FUSION INSTRUMENTATION;  Surgeon: Joo Franklin MD;  Location:  [facility] OR;  Service: Orthopedic Spine   • LUMBAR DISCECTOMY FUSION INSTRUMENTATION N/A 7/24/2019    Procedure: POSTERIOR LUMBAR SPINAL FUSION REVISION AND INSTRUMENTATION L3,L4,L5,S1;  Surgeon: Joo Franklin MD;  Location:  [facility] OR;  Service:  Orthopedic Spine   • ORIF FINGER / THUMB FRACTURE     • SHOULDER SURGERY Left        Family History   Problem Relation Age of Onset   • Stroke Mother    • Hypertension Mother    • Heart disease Mother    • Heart disease Father    • Hypertension Father    • Diabetes Sister    • Hypertension Sister    • Heart disease Sister    • Diabetes Brother    • Heart disease Brother    • Hypertension Child    • Hypertension Son    • No Known Problems Son    • Diabetes Sister    • Heart disease Sister        Social History     Socioeconomic History   • Marital status:      Spouse name: Not on file   • Number of children: 2   • Years of education: Not on file   • Highest education level: Not on file   Tobacco Use   • Smoking status: Former Smoker     Packs/day: 1.00     Years: 30.00     Pack years: 30.00     Types: Cigarettes     Quit date: 1/1/2011     Years since quitting: 10.1   • Smokeless tobacco: Former User     Types: Chew     Quit date: 4/7/2011   Substance and Sexual Activity   • Alcohol use: Not Currently   • Drug use: No   • Sexual activity: Defer     Comment:  and lives with wife           Objective   Physical Exam  Constitutional:       Appearance: He is well-developed. He is ill-appearing.   HENT:      Head: Normocephalic and atraumatic.      Right Ear: External ear normal.      Left Ear: External ear normal.      Nose: Nose normal.   Eyes:      Conjunctiva/sclera: Conjunctivae normal.      Pupils: Pupils are equal, round, and reactive to light.   Cardiovascular:      Rate and Rhythm: Normal rate and regular rhythm.      Heart sounds: Normal heart sounds.   Pulmonary:      Effort: Pulmonary effort is normal.      Breath sounds: Normal breath sounds.   Abdominal:      General: Bowel sounds are normal.      Palpations: Abdomen is soft.      Tenderness: There is abdominal tenderness.   Musculoskeletal:         General: Normal range of motion.      Cervical back: Normal range of motion and neck supple.    Skin:     General: Skin is warm and dry.   Neurological:      Mental Status: He is alert and oriented to person, place, and time.   Psychiatric:         Behavior: Behavior normal.         Judgment: Judgment normal.         Critical Care  Performed by: Joo Cheung APRN  Authorized by: Harshil Byrne MD     Critical care provider statement:     Critical care time (minutes):  35    Critical care time was exclusive of:  Separately billable procedures and treating other patients    Critical care was necessary to treat or prevent imminent or life-threatening deterioration of the following conditions:  Sepsis    Critical care was time spent personally by me on the following activities:  Ordering and performing treatments and interventions, ordering and review of laboratory studies, ordering and review of radiographic studies, pulse oximetry, re-evaluation of patient's condition, review of old charts, obtaining history from patient or surrogate, examination of patient, evaluation of patient's response to treatment, discussions with consultants and development of treatment plan with patient or surrogate               ED Course  ED Course as of Mar 10 1854   Wed Mar 10, 2021   1531 Broad differential in this patient with a history of sepsis.  I have already ordered fluids and antibiotics as well as cultures etc.  I am getting some imaging of his chest and belly as he appears to be in A. fib with no history of A. fib that I can find.    [JM]   1622 Poc with Dr. Byrne    [JM]   1718 Awaiting CT results    [JM]   1741 Spoke to CAT scan who will check on the impressions for his chest and abdomen pelvis.    [JM]   1815 Hospitalist paged.    [JM]   1830 Patient currently on his third liter of fluid for sepsis.  Pressure has trended down to 90 systolic to now 87 systolic.  I did speak to  who recommends ICU admission.  I have paged the intensivist.    [JM]   1853 I spoke with Dr. Medeors who will admit.    [JM]       ED Course User Index  [JM] Cheung LYNDA Ge           CT Head Without Contrast   Preliminary Result   No acute intracranial abnormality.       DICTATED:   03/10/2021   EDITED/ls :   03/10/2021              CT Angiogram Chest   Preliminary Result   1. No PE is clearly identified as no central filling defect is   identified on suboptimal evaluation due to technique despite repeat   sequencing.       2. No acute intrathoracic process.       3. Cirrhotic hepatic morphology again noted.       4. Stable appearance of the endovascular aortic aneurysm repair with   aortobiiliac stent graft and adjacent distal infrarenal stranding and   minimal adenopathy as seen on prior comparison of  02/24/2021 without   evidence for distinct progression or definitive hemorrhage/leak clearly   evident in a similar configuration to prior comparison 02/24/2021.       DICTATED:   03/10/2021   EDITED/ls :   03/10/2021              CT Abdomen Pelvis With Contrast   Preliminary Result   1. No PE is clearly identified as no central filling defect is   identified on suboptimal evaluation due to technique despite repeat   sequencing.       2. No acute intrathoracic process.       3. Cirrhotic hepatic morphology again noted.       4. Stable appearance of the endovascular aortic aneurysm repair with   aortobiiliac stent graft and adjacent distal infrarenal stranding and   minimal adenopathy as seen on prior comparison of  02/24/2021 without   evidence for distinct progression or definitive hemorrhage/leak clearly   evident in a similar configuration to prior comparison 02/24/2021.       DICTATED:   03/10/2021   EDITED/ls :   03/10/2021              XR Chest 1 View   Final Result   Mild bibasilar atelectasis. No acute disease in the chest   otherwise.       This report was finalized on 3/10/2021 1:42 PM by Douglas Vieira.                                            [facility]    Final diagnoses:   Sepsis, due to unspecified organism, unspecified  whether acute organ dysfunction present (CMS/HCC)   Pneumonia due to COVID-19 virus   Lactic acidosis            Joo Cheung, LYNDA  03/10/21 [id number]

## 2021-03-11 ENCOUNTER — APPOINTMENT (OUTPATIENT)
Dept: MRI IMAGING | Facility: HOSPITAL | Age: 71
End: 2021-03-11

## 2021-03-11 ENCOUNTER — APPOINTMENT (OUTPATIENT)
Dept: CARDIOLOGY | Facility: HOSPITAL | Age: 71
End: 2021-03-11

## 2021-03-11 PROBLEM — R78.81 GRAM-NEGATIVE BACTEREMIA: Status: ACTIVE | Noted: 2021-03-11

## 2021-03-11 LAB
ALBUMIN SERPL-MCNC: 2.9 G/DL (ref 3.5–5.2)
ALBUMIN/GLOB SERPL: 0.5 G/DL
ALP SERPL-CCNC: 133 U/L (ref 39–117)
ALT SERPL W P-5'-P-CCNC: 33 U/L (ref 1–41)
AMMONIA BLD-SCNC: 104 UMOL/L (ref 16–60)
ANION GAP SERPL CALCULATED.3IONS-SCNC: 9 MMOL/L (ref 5–15)
AST SERPL-CCNC: 71 U/L (ref 1–40)
BACTERIA BLD CULT: ABNORMAL
BACTERIA SPEC AEROBE CULT: NO GROWTH
BH CV ECHO MEAS - AO MAX PG (FULL): 21 MMHG
BH CV ECHO MEAS - AO MAX PG: 27.2 MMHG
BH CV ECHO MEAS - AO MEAN PG (FULL): 11.4 MMHG
BH CV ECHO MEAS - AO MEAN PG: 14.9 MMHG
BH CV ECHO MEAS - AO ROOT AREA (BSA CORRECTED): 1.3
BH CV ECHO MEAS - AO ROOT AREA: 7.4 CM^2
BH CV ECHO MEAS - AO ROOT DIAM: 3.1 CM
BH CV ECHO MEAS - AO V2 MAX: 260.8 CM/SEC
BH CV ECHO MEAS - AO V2 MEAN: 183.7 CM/SEC
BH CV ECHO MEAS - AO V2 VTI: 51.7 CM
BH CV ECHO MEAS - AVA(I,A): 1.7 CM^2
BH CV ECHO MEAS - AVA(I,D): 1.7 CM^2
BH CV ECHO MEAS - AVA(V,A): 1.5 CM^2
BH CV ECHO MEAS - AVA(V,D): 1.5 CM^2
BH CV ECHO MEAS - BSA(HAYCOCK): 2.4 M^2
BH CV ECHO MEAS - BSA: 2.4 M^2
BH CV ECHO MEAS - BZI_BMI: 30.6 KILOGRAMS/M^2
BH CV ECHO MEAS - BZI_METRIC_HEIGHT: 190.5 CM
BH CV ECHO MEAS - BZI_METRIC_WEIGHT: 111.1 KG
BH CV ECHO MEAS - EDV(CUBED): 108.3 ML
BH CV ECHO MEAS - EDV(MOD-SP2): 314 ML
BH CV ECHO MEAS - EDV(MOD-SP4): 224 ML
BH CV ECHO MEAS - EDV(TEICH): 105.8 ML
BH CV ECHO MEAS - EF(CUBED): 66.4 %
BH CV ECHO MEAS - EF(MOD-BP): 66 %
BH CV ECHO MEAS - EF(MOD-SP2): 67.2 %
BH CV ECHO MEAS - EF(MOD-SP4): 69.2 %
BH CV ECHO MEAS - EF(TEICH): 57.9 %
BH CV ECHO MEAS - ESV(CUBED): 36.4 ML
BH CV ECHO MEAS - ESV(MOD-SP2): 103 ML
BH CV ECHO MEAS - ESV(MOD-SP4): 69 ML
BH CV ECHO MEAS - ESV(TEICH): 44.6 ML
BH CV ECHO MEAS - FS: 30.5 %
BH CV ECHO MEAS - IVS/LVPW: 1
BH CV ECHO MEAS - IVSD: 1.1 CM
BH CV ECHO MEAS - LA DIMENSION: 4.3 CM
BH CV ECHO MEAS - LA/AO: 1.4
BH CV ECHO MEAS - LAD MAJOR: 6 CM
BH CV ECHO MEAS - LAT PEAK E' VEL: 10 CM/SEC
BH CV ECHO MEAS - LATERAL E/E' RATIO: 11.7
BH CV ECHO MEAS - LV DIASTOLIC VOL/BSA (35-75): 93.6 ML/M^2
BH CV ECHO MEAS - LV MASS(C)D: 184.5 GRAMS
BH CV ECHO MEAS - LV MASS(C)DI: 77.1 GRAMS/M^2
BH CV ECHO MEAS - LV MAX PG: 6.2 MMHG
BH CV ECHO MEAS - LV MEAN PG: 3.5 MMHG
BH CV ECHO MEAS - LV SYSTOLIC VOL/BSA (12-30): 28.8 ML/M^2
BH CV ECHO MEAS - LV V1 MAX: 124.4 CM/SEC
BH CV ECHO MEAS - LV V1 MEAN: 84 CM/SEC
BH CV ECHO MEAS - LV V1 VTI: 28.1 CM
BH CV ECHO MEAS - LVIDD: 4.8 CM
BH CV ECHO MEAS - LVIDS: 3.3 CM
BH CV ECHO MEAS - LVLD AP2: 10.2 CM
BH CV ECHO MEAS - LVLD AP4: 10.2 CM
BH CV ECHO MEAS - LVLS AP2: 8.4 CM
BH CV ECHO MEAS - LVLS AP4: 7.6 CM
BH CV ECHO MEAS - LVOT AREA (M): 3.1 CM^2
BH CV ECHO MEAS - LVOT AREA: 3.1 CM^2
BH CV ECHO MEAS - LVOT DIAM: 2 CM
BH CV ECHO MEAS - LVPWD: 1.1 CM
BH CV ECHO MEAS - MED PEAK E' VEL: 9.5 CM/SEC
BH CV ECHO MEAS - MEDIAL E/E' RATIO: 12.2
BH CV ECHO MEAS - MV A MAX VEL: 92.8 CM/SEC
BH CV ECHO MEAS - MV DEC TIME: 0.25 SEC
BH CV ECHO MEAS - MV E MAX VEL: 119 CM/SEC
BH CV ECHO MEAS - MV E/A: 1.3
BH CV ECHO MEAS - MV MAX PG: 9.7 MMHG
BH CV ECHO MEAS - MV MEAN PG: 5.7 MMHG
BH CV ECHO MEAS - MV V2 MAX: 156.1 CM/SEC
BH CV ECHO MEAS - MV V2 MEAN: 113.8 CM/SEC
BH CV ECHO MEAS - MV V2 VTI: 46.9 CM
BH CV ECHO MEAS - MVA(VTI): 1.9 CM^2
BH CV ECHO MEAS - PA ACC SLOPE: 1212 CM/SEC^2
BH CV ECHO MEAS - PA ACC TIME: 0.11 SEC
BH CV ECHO MEAS - PA MAX PG: 14.2 MMHG
BH CV ECHO MEAS - PA PR(ACCEL): 29.9 MMHG
BH CV ECHO MEAS - PA V2 MAX: 188.7 CM/SEC
BH CV ECHO MEAS - RAP SYSTOLE: 3 MMHG
BH CV ECHO MEAS - RVSP: 20 MMHG
BH CV ECHO MEAS - SI(AO): 160.3 ML/M^2
BH CV ECHO MEAS - SI(CUBED): 30.1 ML/M^2
BH CV ECHO MEAS - SI(LVOT): 36.7 ML/M^2
BH CV ECHO MEAS - SI(MOD-SP2): 88.2 ML/M^2
BH CV ECHO MEAS - SI(MOD-SP4): 64.8 ML/M^2
BH CV ECHO MEAS - SI(TEICH): 25.6 ML/M^2
BH CV ECHO MEAS - SV(AO): 383.5 ML
BH CV ECHO MEAS - SV(CUBED): 72 ML
BH CV ECHO MEAS - SV(LVOT): 87.8 ML
BH CV ECHO MEAS - SV(MOD-SP2): 211 ML
BH CV ECHO MEAS - SV(MOD-SP4): 155 ML
BH CV ECHO MEAS - SV(TEICH): 61.2 ML
BH CV ECHO MEAS - TAPSE (>1.6): 2.9 CM
BH CV ECHO MEAS - TR MAX PG: 17 MMHG
BH CV ECHO MEAS - TR MAX VEL: 202.2 CM/SEC
BH CV ECHO MEASUREMENTS AVERAGE E/E' RATIO: 12.21
BH CV XLRA - RV BASE: 4.9 CM
BH CV XLRA - RV LENGTH: 7.8 CM
BH CV XLRA - RV MID: 3.5 CM
BH CV XLRA - TDI S': 18.2 CM/SEC
BILIRUB SERPL-MCNC: 1.3 MG/DL (ref 0–1.2)
BUN SERPL-MCNC: 14 MG/DL (ref 8–23)
BUN/CREAT SERPL: 15.6 (ref 7–25)
CALCIUM SPEC-SCNC: 8.4 MG/DL (ref 8.6–10.5)
CHLORIDE SERPL-SCNC: 103 MMOL/L (ref 98–107)
CO2 SERPL-SCNC: 22 MMOL/L (ref 22–29)
CREAT SERPL-MCNC: 0.9 MG/DL (ref 0.76–1.27)
CRP SERPL-MCNC: 7.47 MG/DL (ref 0–0.5)
D DIMER PPP FEU-MCNC: 3.66 MCGFEU/ML (ref 0–0.56)
D-LACTATE SERPL-SCNC: 2.6 MMOL/L (ref 0.5–2)
D-LACTATE SERPL-SCNC: 2.6 MMOL/L (ref 0.5–2)
DEPRECATED RDW RBC AUTO: 54.4 FL (ref 37–54)
ERYTHROCYTE [DISTWIDTH] IN BLOOD BY AUTOMATED COUNT: 15.5 % (ref 12.3–15.4)
FERRITIN SERPL-MCNC: 155.4 NG/ML (ref 30–400)
GFR SERPL CREATININE-BSD FRML MDRD: 84 ML/MIN/1.73
GLOBULIN UR ELPH-MCNC: 5.3 GM/DL
GLUCOSE SERPL-MCNC: 105 MG/DL (ref 65–99)
HBA1C MFR BLD: 5.1 % (ref 4.8–5.6)
HCT VFR BLD AUTO: 27.3 % (ref 37.5–51)
HGB BLD-MCNC: 8.7 G/DL (ref 13–17.7)
INR PPP: 1.96 (ref 0.85–1.16)
LEFT ATRIUM VOLUME INDEX: 30.5 ML/M^2
LEFT ATRIUM VOLUME: 73 ML
LV EF 2D ECHO EST: 65 %
MAGNESIUM SERPL-MCNC: 1.8 MG/DL (ref 1.6–2.4)
MAXIMAL PREDICTED HEART RATE: 152 BPM
MCH RBC QN AUTO: 30.4 PG (ref 26.6–33)
MCHC RBC AUTO-ENTMCNC: 31.9 G/DL (ref 31.5–35.7)
MCV RBC AUTO: 95.5 FL (ref 79–97)
PHOSPHATE SERPL-MCNC: 2.6 MG/DL (ref 2.5–4.5)
PLATELET # BLD AUTO: 91 10*3/MM3 (ref 140–450)
PMV BLD AUTO: 9.7 FL (ref 6–12)
POTASSIUM SERPL-SCNC: 3.9 MMOL/L (ref 3.5–5.2)
PROT SERPL-MCNC: 8.2 G/DL (ref 6–8.5)
PROTHROMBIN TIME: 22 SECONDS (ref 11.5–14)
RBC # BLD AUTO: 2.86 10*6/MM3 (ref 4.14–5.8)
SODIUM SERPL-SCNC: 134 MMOL/L (ref 136–145)
STRESS TARGET HR: 129 BPM
WBC # BLD AUTO: 12.28 10*3/MM3 (ref 3.4–10.8)

## 2021-03-11 PROCEDURE — 83605 ASSAY OF LACTIC ACID: CPT | Performed by: INTERNAL MEDICINE

## 2021-03-11 PROCEDURE — 25010000002 HEPARIN (PORCINE) PER 1000 UNITS: Performed by: INTERNAL MEDICINE

## 2021-03-11 PROCEDURE — 93306 TTE W/DOPPLER COMPLETE: CPT

## 2021-03-11 PROCEDURE — 84100 ASSAY OF PHOSPHORUS: CPT | Performed by: INTERNAL MEDICINE

## 2021-03-11 PROCEDURE — 86140 C-REACTIVE PROTEIN: CPT | Performed by: INTERNAL MEDICINE

## 2021-03-11 PROCEDURE — 25010000002 ENOXAPARIN PER 10 MG: Performed by: INTERNAL MEDICINE

## 2021-03-11 PROCEDURE — 82140 ASSAY OF AMMONIA: CPT | Performed by: INTERNAL MEDICINE

## 2021-03-11 PROCEDURE — 25010000002 VANCOMYCIN PER 500 MG: Performed by: INTERNAL MEDICINE

## 2021-03-11 PROCEDURE — 99233 SBSQ HOSP IP/OBS HIGH 50: CPT | Performed by: INTERNAL MEDICINE

## 2021-03-11 PROCEDURE — 25010000002 MAGNESIUM SULFATE 2 GM/50ML SOLUTION: Performed by: INTERNAL MEDICINE

## 2021-03-11 PROCEDURE — 25010000002 MEROPENEM PER 100 MG: Performed by: INTERNAL MEDICINE

## 2021-03-11 PROCEDURE — 83036 HEMOGLOBIN GLYCOSYLATED A1C: CPT | Performed by: INTERNAL MEDICINE

## 2021-03-11 PROCEDURE — 82728 ASSAY OF FERRITIN: CPT | Performed by: INTERNAL MEDICINE

## 2021-03-11 PROCEDURE — 85027 COMPLETE CBC AUTOMATED: CPT | Performed by: INTERNAL MEDICINE

## 2021-03-11 PROCEDURE — 74181 MRI ABDOMEN W/O CONTRAST: CPT

## 2021-03-11 PROCEDURE — 85610 PROTHROMBIN TIME: CPT | Performed by: INTERNAL MEDICINE

## 2021-03-11 PROCEDURE — 80053 COMPREHEN METABOLIC PANEL: CPT | Performed by: INTERNAL MEDICINE

## 2021-03-11 PROCEDURE — 83735 ASSAY OF MAGNESIUM: CPT | Performed by: INTERNAL MEDICINE

## 2021-03-11 PROCEDURE — 85379 FIBRIN DEGRADATION QUANT: CPT | Performed by: INTERNAL MEDICINE

## 2021-03-11 RX ORDER — CARVEDILOL 6.25 MG/1
TABLET ORAL
Qty: 90 TABLET | Refills: 0 | OUTPATIENT
Start: 2021-03-11 | End: 2021-03-17 | Stop reason: HOSPADM

## 2021-03-11 RX ORDER — MAGNESIUM SULFATE HEPTAHYDRATE 40 MG/ML
2 INJECTION, SOLUTION INTRAVENOUS AS NEEDED
Status: DISCONTINUED | OUTPATIENT
Start: 2021-03-11 | End: 2021-03-15

## 2021-03-11 RX ORDER — MAGNESIUM SULFATE 1 G/100ML
1 INJECTION INTRAVENOUS AS NEEDED
Status: DISCONTINUED | OUTPATIENT
Start: 2021-03-11 | End: 2021-03-15

## 2021-03-11 RX ORDER — SODIUM CHLORIDE 9 MG/ML
50 INJECTION, SOLUTION INTRAVENOUS CONTINUOUS
Status: DISCONTINUED | OUTPATIENT
Start: 2021-03-11 | End: 2021-03-11

## 2021-03-11 RX ORDER — HEPARIN SODIUM 5000 [USP'U]/ML
5000 INJECTION, SOLUTION INTRAVENOUS; SUBCUTANEOUS EVERY 12 HOURS SCHEDULED
Status: DISCONTINUED | OUTPATIENT
Start: 2021-03-11 | End: 2021-03-15

## 2021-03-11 RX ORDER — ONDANSETRON 2 MG/ML
4 INJECTION INTRAMUSCULAR; INTRAVENOUS EVERY 6 HOURS PRN
Status: DISCONTINUED | OUTPATIENT
Start: 2021-03-11 | End: 2021-03-17 | Stop reason: HOSPADM

## 2021-03-11 RX ORDER — MAGNESIUM SULFATE HEPTAHYDRATE 40 MG/ML
4 INJECTION, SOLUTION INTRAVENOUS AS NEEDED
Status: DISCONTINUED | OUTPATIENT
Start: 2021-03-11 | End: 2021-03-15

## 2021-03-11 RX ORDER — LACTULOSE 10 G/15ML
20 SOLUTION ORAL 3 TIMES DAILY
Status: DISCONTINUED | OUTPATIENT
Start: 2021-03-11 | End: 2021-03-14

## 2021-03-11 RX ADMIN — SUCRALFATE 1 G: 1 TABLET ORAL at 08:44

## 2021-03-11 RX ADMIN — PANTOPRAZOLE SODIUM 40 MG: 40 TABLET, DELAYED RELEASE ORAL at 08:44

## 2021-03-11 RX ADMIN — Medication 5 MG: at 21:37

## 2021-03-11 RX ADMIN — CITALOPRAM 40 MG: 40 TABLET ORAL at 08:44

## 2021-03-11 RX ADMIN — SODIUM CHLORIDE 50 ML/HR: 9 INJECTION, SOLUTION INTRAVENOUS at 10:55

## 2021-03-11 RX ADMIN — Medication 250 MG: at 08:44

## 2021-03-11 RX ADMIN — MEROPENEM 1 G: 1 INJECTION, POWDER, FOR SOLUTION INTRAVENOUS at 15:20

## 2021-03-11 RX ADMIN — SODIUM CHLORIDE, PRESERVATIVE FREE 10 ML: 5 INJECTION INTRAVENOUS at 08:45

## 2021-03-11 RX ADMIN — MEROPENEM 1 G: 1 INJECTION, POWDER, FOR SOLUTION INTRAVENOUS at 08:44

## 2021-03-11 RX ADMIN — MAGNESIUM SULFATE HEPTAHYDRATE 2 G: 2 INJECTION, SOLUTION INTRAVENOUS at 10:54

## 2021-03-11 RX ADMIN — HEPARIN SODIUM 5000 UNITS: 5000 INJECTION INTRAVENOUS; SUBCUTANEOUS at 05:47

## 2021-03-11 RX ADMIN — TAMSULOSIN HYDROCHLORIDE 0.4 MG: 0.4 CAPSULE ORAL at 08:44

## 2021-03-11 RX ADMIN — SUCRALFATE 1 G: 1 TABLET ORAL at 21:36

## 2021-03-11 RX ADMIN — SODIUM CHLORIDE, PRESERVATIVE FREE 10 ML: 5 INJECTION INTRAVENOUS at 21:36

## 2021-03-11 RX ADMIN — HEPARIN SODIUM 5000 UNITS: 5000 INJECTION INTRAVENOUS; SUBCUTANEOUS at 21:36

## 2021-03-11 RX ADMIN — SUCRALFATE 1 G: 1 TABLET ORAL at 11:04

## 2021-03-11 RX ADMIN — LACTULOSE 20 G: 20 SOLUTION ORAL at 17:07

## 2021-03-11 RX ADMIN — VANCOMYCIN HYDROCHLORIDE 1000 MG: 1 INJECTION, SOLUTION INTRAVENOUS at 05:47

## 2021-03-11 RX ADMIN — LACTULOSE 20 G: 20 SOLUTION ORAL at 21:37

## 2021-03-11 RX ADMIN — Medication 250 MG: at 21:36

## 2021-03-11 RX ADMIN — VANCOMYCIN HYDROCHLORIDE 1000 MG: 1 INJECTION, SOLUTION INTRAVENOUS at 17:07

## 2021-03-11 RX ADMIN — SUCRALFATE 1 G: 1 TABLET ORAL at 17:07

## 2021-03-11 RX ADMIN — PANTOPRAZOLE SODIUM 40 MG: 40 TABLET, DELAYED RELEASE ORAL at 21:36

## 2021-03-11 RX ADMIN — RIFAXIMIN 550 MG: 550 TABLET ORAL at 21:36

## 2021-03-11 RX ADMIN — ASPIRIN 81 MG: 81 TABLET, COATED ORAL at 08:44

## 2021-03-11 RX ADMIN — MEROPENEM 1 G: 1 INJECTION, POWDER, FOR SOLUTION INTRAVENOUS at 01:16

## 2021-03-11 RX ADMIN — ENOXAPARIN SODIUM 40 MG: 40 INJECTION SUBCUTANEOUS at 15:19

## 2021-03-11 RX ADMIN — RIFAXIMIN 550 MG: 550 TABLET ORAL at 08:44

## 2021-03-11 NOTE — PROGRESS NOTES
Discharge Planning Assessment  [facility]     Patient Name: Derek Kelsey  MRN: [id number]  Today's Date: 3/11/2021    Admit Date: 3/10/2021    Discharge Needs Assessment     Row Name 03/11/21 1209       Living Environment    Lives With  spouse    Current Living Arrangements  home/apartment/condo    Primary Care Provided by  self    Provides Primary Care For  no one    Family Caregiver if Needed  spouse    Quality of Family Relationships  helpful;involved    Able to Return to Prior Arrangements  yes       Resource/Environmental Concerns    Resource/Environmental Concerns  none    Transportation Concerns  car, none       Transition Planning    Patient/Family Anticipates Transition to  home with family;home    Patient/Family Anticipated Services at Transition  none    Transportation Anticipated  family or friend will provide       Discharge Needs Assessment    Readmission Within the Last 30 Days  no previous admission in last 30 days    Equipment Currently Used at Home  cane, quad;walker, rolling;oxygen    Concerns to be Addressed  discharge planning    Anticipated Changes Related to Illness  none    Equipment Needed After Discharge  none        Discharge Plan     Row Name 03/11/21 0036       Plan    Plan  Initial CM eval    Patient/Family in Agreement with Plan  yes    Plan Comments  Talked with patient over the phone due to covid - He confirmed he lives with his spouse in [facility]. States he uses a cane most of the time and has a walker he will use as needed. Home 02 for [facility] provided by [facility]. Patients goal is to return home with spouse upon discharge - CM will continue to follow for discharge planning- kelly [phone]    Final Discharge Disposition Code  01 - home or self-care        Continued Care and Services - Admitted Since 3/10/2021    Coordination has not been started for this encounter.         Demographic Summary     Row Name 03/11/21 6516       General Information    Admission Type   inpatient    Arrived From  home    Referral Source  admission list    Reason for Consult  discharge planning    Preferred Language  English     Used During This Interaction  no       Contact Information    Permission Granted to Share Info With          Functional Status     Row Name 03/11/21 1207       Functional Status    Usual Activity Tolerance  moderate    Current Activity Tolerance  moderate       Functional Status, IADL    Medications  assistive equipment    Meal Preparation  assistive equipment    Housekeeping  assistive equipment    Laundry  assistive equipment    Shopping  assistive equipment    IADL Comments  uses cane most of the time and a walker as needed        Psychosocial    No documentation.       Abuse/Neglect    No documentation.       Legal    No documentation.       Substance Abuse    No documentation.       Patient Forms    No documentation.           Jennie Farrar RN

## 2021-03-11 NOTE — PROGRESS NOTES
[facility] Medicine Services  PROGRESS NOTE    Patient Name: Derek Kelsey  : 1950  MRN: [id number]    Date of Admission: 3/10/2021  Primary Care Physician: Raudel Zheng MD    Subjective   Subjective     CC:  Sepsis, hx endograft infection, covid, cirrhosis    HPI:  abd pain, n/v improved. No dyspnea. More clear headed    ROS:  No headache, no focal weakness, no vision changes  Balance of ros negative except as above  Gen- chills prior to admission (resolved)  CV- No chest pain, palpitations  Resp- No cough, dyspnea  GI- transient n/v, abd crampy pain now resolved        Objective   Objective     Vital Signs:   Temp:  [97.8 °F (36.6 °C)-98 °F (36.7 °C)] 97.8 °F (36.6 °C)  Heart Rate:  [] 91  Resp:  [18-20] 18  BP: ()/(47-83) 163/71        Physical Exam:  Constitutional:Alert, oriented x 3, nontoxic appearing  Psych:Normal/appropriate affect  HEENT:Ncat, oroph clear  Neck: neck supple, full range of motion  Neuro: Face symmetric, speech clear, equal , moves all extremities  Cardiac: Rrr; No pretibial pitting edema  Resp: Ctab, normal effort  GI: abd soft, nontender, +bs  Skin: No extremity rash  Musculoskeletal/extremities: no cyanosis extremities; no significant ankle edema      Results Reviewed:  Results from last 7 days   Lab Units 21  0547 03/10/21  1302   WBC 10*3/mm3 12.28* 17.40*   HEMOGLOBIN g/dL 8.7* 9.5*   HEMATOCRIT % 27.3* 30.8*   PLATELETS 10*3/mm3 91* 111*   PROCALCITONIN ng/mL  --  7.37*     Results from last 7 days   Lab Units 21  0547 03/10/21  1302   SODIUM mmol/L 134* 131*   POTASSIUM mmol/L 3.9 3.8   CHLORIDE mmol/L 103 97*   CO2 mmol/L 22.0 21.0*   BUN mg/dL 14 13   CREATININE mg/dL 0.90 1.18   GLUCOSE mg/dL 105* 113*   CALCIUM mg/dL 8.4* 9.1   ALT (SGPT) U/L 33 39   AST (SGOT) U/L 71* 91*   TROPONIN T ng/mL  --  <0.010   PROBNP pg/mL  --  485.0     Estimated Creatinine Clearance: 105.7 mL/min (by C-G formula based on SCr  of 0.9 mg/dL).    Microbiology Results Abnormal     Procedure Component Value - Date/Time    COVID PRE-OP / PRE-PROCEDURE SCREENING ORDER (NO ISOLATION) - Swab, Nasopharynx [[id number]]  (Abnormal) Collected: 03/10/21 1522    Lab Status: Final result Specimen: Swab from Nasopharynx Updated: 03/10/21 1638    Narrative:      The following orders were created for panel order COVID PRE-OP / PRE-PROCEDURE SCREENING ORDER (NO ISOLATION) - Swab, Nasopharynx.  Procedure                               Abnormality         Status                     ---------                               -----------         ------                     COVID-19 and FLU A/B PCR...[[id number]]  Abnormal            Final result                 Please view results for these tests on the individual orders.    COVID-19 and FLU A/B PCR - Swab, Nasopharynx [[id number]]  (Abnormal) Collected: 03/10/21 1522    Lab Status: Final result Specimen: Swab from Nasopharynx Updated: 03/10/21 1638     COVID19 Detected     Influenza A PCR Not Detected     Influenza B PCR Not Detected    Narrative:      Fact sheet for providers: https://www.fda.gov/media/150266/download    Fact sheet for patients: https://www.fda.gov/media/224137/download    Test performed by PCR.  Influenza A and Influenza B negative results should be considered presumptive in samples that have a positive SARS-CoV-2 result.    Competitive inhibition studies showed that SARS-CoV-2 virus, when present at concentrations above 3.6E+04 copies/mL, can inhibit the detection and amplification of influenza A and influenza B virus RNA if present at or below 1.8E+02 copies/mL or 4.9E+02 copies/mL, respectively, and may lead to false negative influenza virus results. If co-infection with influenza A or influenza B virus is suspected in samples with a positive SARS-CoV-2 result, the sample should be re-tested with another FDA cleared, approved, or authorized influenza test, if influenza virus detection would  change clinical management.          Imaging Results (Last 24 Hours)     Procedure Component Value Units Date/Time    CT Angiogram Chest [[id number]] Collected: 03/10/21 1659     Updated: 03/10/21 1744    Narrative:      EXAMINATION: CT ANGIOGRAM CHEST, CT ABDOMEN/PELVIS W CONTRAST -  03/10/2021      INDICATION: Shortness of air. Altered mental status. Abdominal pain.  Evaluate for pulmonary embolus.      TECHNIQUE: CT angiogram chest with intravenous contrast administration  following PE protocol. 2D reconstructions performed, CT abdomen and  pelvis with intravenous contrast administration.     The radiation dose reduction device was turned on for each scan per the  ALARA (As Low as Reasonably Achievable) protocol.     COMPARISON: CT abdomen and pelvis 02/24/2021.     FINDINGS:      CTA CHEST: Thyroid is homogeneous in attenuation. No bulky mediastinal  adenopathy. Central airways are patent. Esophagus in normal course and  caliber to the distal segment where there is a small hiatal hernia.  Patent nonaneurysmal minimally atherosclerotic thoracic aorta with  patent great vessel origins. No central pulmonary arterial filling  defect to suggest pulmonary embolus with equivocal optimal  opacification. Cardiac size borderline enlarged without pericardial  effusion. Extended lung windows demonstrate biapical pleural-parenchymal  scarring with paraseptal emphysema and biapical pleural-parenchymal  scarring, however, no dominant nodule or mass. No pleural effusion.  Degenerative disease of the thoracic spine without aggressive osseous  lesion. No soft tissue body wall findings of acuity involving the chest.     ABDOMEN AND PELVIS: Liver demonstrates nodular contours concerning for  parenchymal disease process such as potential cirrhotic hepatic  morphology with a low-attenuation focus likely a  cyst in the left  hemiliver. Gallbladder distended without obvious calculus associated. No  biliary dilatation. Pancreas and  spleen unremarkable. Adrenals without  distinct nodule. Kidneys without hydronephrosis or hydroureter. No bulky  retroperitoneal adenopathy. Atherosclerotic aneurysmal abdominal aorta  with aortobiiliac endovascular stent in place demonstrating gross  patency with adjacent stranding to the distal aorta as seen on prior  comparison grossly similar appearance with mildly enlarged  retroperitoneal lymph nodes adjacent to the periaortic region. Portal  veins and IVC grossly patent. GI tract evaluation without focal  thickening or disproportionate dilatation. No free fluid or  intra-abdominal free air. Pelvic viscera unremarkable without bulky  pelvic adenopathy.       Impression:      1. No PE is clearly identified as no central filling defect is  identified on suboptimal evaluation due to technique despite repeat  sequencing.     2. No acute intrathoracic process.     3. Cirrhotic hepatic morphology again noted.     4. Stable appearance of the endovascular aortic aneurysm repair with  aortobiiliac stent graft and adjacent distal infrarenal stranding and  minimal adenopathy as seen on prior comparison of  02/24/2021 without  evidence for distinct progression or definitive hemorrhage/leak clearly  evident in a similar configuration to prior comparison 02/24/2021.     DICTATED:   03/10/2021  EDITED/ls :   03/10/2021          CT Abdomen Pelvis With Contrast [[id number]] Collected: 03/10/21 1659     Updated: 03/10/21 1744    Narrative:      EXAMINATION: CT ANGIOGRAM CHEST, CT ABDOMEN/PELVIS W CONTRAST -  03/10/2021      INDICATION: Shortness of air. Altered mental status. Abdominal pain.  Evaluate for pulmonary embolus.      TECHNIQUE: CT angiogram chest with intravenous contrast administration  following PE protocol. 2D reconstructions performed, CT abdomen and  pelvis with intravenous contrast administration.     The radiation dose reduction device was turned on for each scan per the  ALARA (As Low as Reasonably  Achievable) protocol.     COMPARISON: CT abdomen and pelvis 02/24/2021.     FINDINGS:      CTA CHEST: Thyroid is homogeneous in attenuation. No bulky mediastinal  adenopathy. Central airways are patent. Esophagus in normal course and  caliber to the distal segment where there is a small hiatal hernia.  Patent nonaneurysmal minimally atherosclerotic thoracic aorta with  patent great vessel origins. No central pulmonary arterial filling  defect to suggest pulmonary embolus with equivocal optimal  opacification. Cardiac size borderline enlarged without pericardial  effusion. Extended lung windows demonstrate biapical pleural-parenchymal  scarring with paraseptal emphysema and biapical pleural-parenchymal  scarring, however, no dominant nodule or mass. No pleural effusion.  Degenerative disease of the thoracic spine without aggressive osseous  lesion. No soft tissue body wall findings of acuity involving the chest.     ABDOMEN AND PELVIS: Liver demonstrates nodular contours concerning for  parenchymal disease process such as potential cirrhotic hepatic  morphology with a low-attenuation focus likely a  cyst in the left  hemiliver. Gallbladder distended without obvious calculus associated. No  biliary dilatation. Pancreas and spleen unremarkable. Adrenals without  distinct nodule. Kidneys without hydronephrosis or hydroureter. No bulky  retroperitoneal adenopathy. Atherosclerotic aneurysmal abdominal aorta  with aortobiiliac endovascular stent in place demonstrating gross  patency with adjacent stranding to the distal aorta as seen on prior  comparison grossly similar appearance with mildly enlarged  retroperitoneal lymph nodes adjacent to the periaortic region. Portal  veins and IVC grossly patent. GI tract evaluation without focal  thickening or disproportionate dilatation. No free fluid or  intra-abdominal free air. Pelvic viscera unremarkable without bulky  pelvic adenopathy.       Impression:      1. No PE is  clearly identified as no central filling defect is  identified on suboptimal evaluation due to technique despite repeat  sequencing.     2. No acute intrathoracic process.     3. Cirrhotic hepatic morphology again noted.     4. Stable appearance of the endovascular aortic aneurysm repair with  aortobiiliac stent graft and adjacent distal infrarenal stranding and  minimal adenopathy as seen on prior comparison of  02/24/2021 without  evidence for distinct progression or definitive hemorrhage/leak clearly  evident in a similar configuration to prior comparison 02/24/2021.     DICTATED:   03/10/2021  EDITED/ls :   03/10/2021          CT Head Without Contrast [[id number]] Collected: 03/10/21 1648     Updated: 03/10/21 1655    Narrative:      EXAMINATION: CT HEAD WO CONTRAST- - 03/10/2021     INDICATION: Altered mental status.     TECHNIQUE: CT head without intravenous contrast     The radiation dose reduction device was turned on for each scan per the  ALARA (As Low as Reasonably Achievable) protocol.     COMPARISON: None.     FINDINGS: Midline structures are symmetric without evidence of mass,  mass effect, or midline shift. Ventricles and sulci within normal  limits. No intra-axial hemorrhage or extra-axial fluid collection.  Globes and orbits unremarkable. Paranasal sinuses and mastoid air cells  are grossly clear and well-pneumatized. Calvarium intact.       Impression:      No acute intracranial abnormality.     DICTATED:   03/10/2021  EDITED/ls :   03/10/2021          XR Chest 1 View [[id number]] Collected: 03/10/21 1341     Updated: 03/10/21 1345    Narrative:      EXAMINATION: XR CHEST 1 VW-      INDICATION: Weak/Dizzy/AMS triage protocol      COMPARISON: 9/10/2020     FINDINGS: Mild bibasilar atelectasis is present. No focal airspace  disease otherwise. No significant effusion or distinct pneumothorax.  Unchanged heart and mediastinal contours.       Impression:      Mild bibasilar atelectasis. No acute disease  in the chest  otherwise.     This report was finalized on 3/10/2021 1:42 PM by Douglas Vieira.                 I have reviewed the medications:  Scheduled Meds:[START ON 3/12/2021] Pharmacy Consult, , Does not apply, Once  aspirin, 81 mg, Oral, Daily  citalopram, 40 mg, Oral, Daily  heparin (porcine), 5,000 Units, Subcutaneous, Q8H  melatonin, 5 mg, Oral, Nightly  meropenem, 1 g, Intravenous, Q8H  pantoprazole, 40 mg, Oral, BID  Pharmacy Consult - Pharmacy to dose, , Does not apply, Once  riFAXIMin, 550 mg, Oral, Q12H  saccharomyces boulardii, 250 mg, Oral, BID  sodium chloride, 10 mL, Intravenous, Q12H  sucralfate, 1 g, Oral, 4x Daily  tamsulosin, 0.4 mg, Oral, Daily  vancomycin, 1,000 mg, Intravenous, Q12H      Continuous Infusions:dextrose 5 % and sodium chloride 0.9 %, 100 mL/hr, Last Rate: 100 mL/hr (03/11/21 0404)      PRN Meds:.lactulose  •  magnesium sulfate **OR** magnesium sulfate in D5W 1g/100mL (PREMIX) **OR** magnesium sulfate  •  sodium chloride  •  sodium chloride    Assessment/Plan   Assessment & Plan     Active Hospital Problems    Diagnosis  POA   • 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        Brief Hospital Course to date:  Derek Kelsey is a 70 y.o. 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 Reportedly had 1st covid 19 vaccine shot approximately 2 weeks prior to this presentation. In ED patient became hypotensive requiring aggressive iv fluids, had a + covid 19 pcr swab and was admitted initially to ICU. Ct head negative. Lactate was 6.1, procalcitonin 7.37. wbc  count 17,000 CT angio chest/abd/pelvis negative for pe, no acute lung dz noted, cirrhosis noted (no free fluid/ascites noted), stable appearance of endovascular aortic aneurysm repair w/ aortobiiliac stent graft and distal infrarenal stranding & minimal adenopathy (similar to 2/24/21 scan) without evidence for distinct progression or definitive leak. 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 Carrero of ID on chronic suppressive oral cipro & doxy  *Transient n/v & abdominal cramping, resolved  *Metabolic encephalopathy, likely due to infection +/- hepatic encephalopathy (improved)   -ammonia >100, adding lactulose to rifaxamin   -ct head negative  *Moderate COPD/chronic lung dz   *chronic hypoxic resp failure (2-3 L chronic use)  *Cirrhosis   -on lasix, coreg, rifaxamin at home (lasix currently on hold, restart prn)  *Thrombocytopenia   -due to cirrhosis. Monitor 91,000 on 3/11/21  *Gerd  *HTN  *pre-dm  *Previous lumbar fusion surgery  *Hx non-obstructive CAD 9/2018 heart cath   -on asa    Plan:  -wbc improved (17,000 to 12,000), lactate improved from 6-->2; ID following. Continue empiric merrem & vanc, follow blood cx ID & susc. No steroids or remdesivir as no respiratory symptoms (and ct chest no lung dz)    -get mrcp to rule out biliary dilation/cholangitis (intial ct scan no biliary dilation but mildly distended gallbladder without thickening). Could also consider ruq ultrasound if any further concerns for cholecystitis. Plan to d/w  Dr. Carrero tomorrow    -BP normalized w/ normal renal function, stop iv fluids. Holding scheduled lasix and start back tomorrow as bp, renal function allow    -follow up echo (ordered at admission)  -check inr    -add lactulose to rifaxamin (ammonia >100), although admittedly no asterixis currently and seems mentally clear. Add zofran    -updated wife Mago on 3/11/21, she appreciated the call    Am labs: covid progression labs, ammonia, follow cultures      DVT Prophylaxis:  Sq heparin tid    Disposition: I expect the patient to be discharged once clinically improved, date tbd    CODE STATUS:   Code Status and Medical Interventions:   Ordered at: 03/10/21 1906     Limited Support to NOT Include:    Cardioversion/Defibrillation    Intubation     Code Status:    No CPR     Medical Interventions (Level of Support Prior to Arrest):    Limited       Rinku Mcrae MD  03/11/21

## 2021-03-11 NOTE — H&P
INTENSIVIST   INITIAL HOSPITAL VISIT NOTE     Hospital:  LOS: 0 days     Mr. Derek Kelsey, 70 y.o. male is seen for a Chief Complaint of:  Chief Complaint   Patient presents with   • Altered Mental Status       Hypertension    Moderate COPD    Sepsis (CMS/HCC)    Cirrhosis of liver (CMS/HCC)    On supplemental oxygen by nasal cannula    Infected aortic endograft (CMS/HCC)      Subjective   S     Mr. 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 He had his first COVID vaccine approximately 2 weeks ago. He has some mild abdominal pain which is chronic. In the ED, he was swabbed for COVID and found to be positive. He does not have any prior history of COVID infection. He then became hypotensive and required IV fluid infusion.        PMH: He  has a past medical history of Abdominal aortic aneurysm (CMS/HCC) (9/29/2016), Anxiety (9/29/2016), Arthritis, Back pain, Black lung (CMS/HCC), CAD (coronary artery disease) (9/29/2016), Cirrhosis (CMS/HCC), COPD (chronic obstructive pulmonary disease) (CMS/HCC) (9/29/2016), Depression (9/29/2016), Depression, GERD (gastroesophageal reflux disease), Hypertension (9/29/2016), On supplemental oxygen by nasal cannula, Vitiligo, Wears dentures, and Wears reading eyeglasses.   PSxH: He  has a past surgical history that includes Hand surgery (Left); Shoulder surgery (Left); Cardiac catheterization; Cardiac catheterization (N/A, 9/28/2018); lumbar discectomy fusion instrumentation (N/A, 11/1/2018); Eye surgery; Back surgery; Colonoscopy; ORIF finger / thumb fracture; AAA repair, endovascular (N/A, 6/6/2019); lumbar discectomy fusion instrumentation (N/A, 7/24/2019); Colonoscopy (N/A, 10/30/2019); Esophagogastroduodenoscopy (N/A, 10/30/2019); and Cardiac surgery.      Medications:  No current facility-administered medications on file prior to encounter.      Current Outpatient Medications on File Prior to Encounter   Medication Sig   • acetaminophen (TYLENOL) 650 MG 8 hr tablet Take 650 mg by mouth Every 8 (Eight) Hours As Needed for Mild Pain .   • amLODIPine (NORVASC) 10 MG tablet Take 0.5 tablets by mouth 2 (Two) Times a Day.   • ASPIRIN LOW DOSE 81 MG tablet Take 1 tablet by mouth Daily. Last dose 10-21-18 (Patient taking differently: Take 81 mg by mouth Daily.)   • carvedilol (COREG) 6.25 MG tablet TAKE 1 TABLET TWICE DAILY WITH MEALS   • ciprofloxacin (CIPRO) 500 MG tablet 2 (two) times a day.   • citalopram (CeleXA) 20 MG tablet Take 40 mg by mouth Daily.   • doxycycline (MONODOX) 100 MG capsule 2 (two) times a day.   • ferrous gluconate (FERGON) 324 MG tablet Take 1 tablet by mouth Daily With Breakfast. (Patient taking differently: Take 324 mg by mouth 2 (Two) Times a Day.)   • furosemide (LASIX) 20 MG tablet Take 20-40 mg by mouth Every Morning.   • furosemide (LASIX) 20 MG tablet 2 (two) times a day.   • lactulose (CHRONULAC) 10 GM/15ML solution Take 15 ml q 6 hours as needed for constipation   • melatonin 5 MG tablet tablet Take 5 mg by mouth Every Night.   • meropenem (MERREM) 1 g injection    • Multiple Vitamin (MULTI-VITAMIN DAILY) tablet Take 1 tablet by mouth Daily.   • O2 (OXYGEN)    • Ondansetron 4 MG film 3 (Three) Times a Day.   • pantoprazole (PROTONIX) 40 MG EC tablet Take 40 mg by mouth 2 (Two) Times a Day.   • POTASSIUM PO Take  by mouth Daily. 595 mg   • saccharomyces boulardii (FLORASTOR) 250 MG capsule Take 250 mg by mouth 2 (Two) Times a Day.   • sucralfate (CARAFATE) 1 g tablet TAKE 1 TABLET FOUR TIMES DAILY   • tamsulosin (FLOMAX) 0.4 MG capsule 24 hr capsule Take 1 capsule by mouth Daily.   • Trelegy Ellipta 100-62.5-25 MCG/INH aerosol powder  INHALE 1 PUFF ONCE DAILY   • triamcinolone (KENALOG) 0.1 % cream Apply 1 application topically to the appropriate area as directed 2 (Two) Times a Day.   • Xifaxan 550 MG tablet 2 (Two) Times a  Day.       Allergies: He is allergic to penicillins and percocet [oxycodone-acetaminophen].   FH: His family history includes Diabetes in his brother, sister, and sister; Heart disease in his brother, father, mother, sister, and sister; Hypertension in his child, father, mother, sister, and son; No Known Problems in his son; Stroke in his mother.   SH: He  reports that he quit smoking about 10 years ago. His smoking use included cigarettes. He has a 30.00 pack-year smoking history. He quit smokeless tobacco use about 9 years ago.  His smokeless tobacco use included chew. He reports previous alcohol use. He reports that he does not use drugs.     The patient's relevant past medical, surgical and social history were reviewed and updated in Epic as appropriate.     ROS (14):   Review of Systems   Constitutional: Positive for fatigue and fever.   HENT: Negative.    Eyes: Negative.    Respiratory: Positive for shortness of breath.    Cardiovascular: Negative.    Gastrointestinal: Positive for abdominal pain and nausea.   Endocrine: Negative.    Genitourinary: Negative.    Musculoskeletal: Negative.    Skin: Negative.    Allergic/Immunologic: Negative.    Neurological: Negative.    Hematological: Negative.    Psychiatric/Behavioral: Negative.        Objective   O     Vitals    Temp  Min: 98 °F (36.7 °C)  Max: 98 °F (36.7 °C)  BP  Min: 137/74  Max: 138/83  Pulse  Min: 95  Max: 113  Resp  Min: 19  Max: 20  SpO2  Min: 93 %  Max: 97 % No data recorded     I/O 24 hrs (7:00AM - 6:59 AM)  Intake/Output       03/10/21 0700 - 03/11/21 0659    Intake (ml) 2100    Output (ml) --    Net (ml) 2100    Last Weight  111 kg (245 lb)          Medications (drips):  dextrose 5 % and sodium chloride 0.9 %        Physical Examination    Telemetry: Normal sinus rhythm.    Constitutional:  No acute distress.  Conversant. Resting in bed on room air.    HEENT: Normocephalic and atraumatic.   Neck:  Normal range of motion. Neck supple.   No JVD  present.   No thyromegaly.    Cardiovascular: Regular rate and rhythm.  No murmurs, rub or gallop.  No peripheral edema.   Respiratory: Normal respiratory effort.  Diminished bilaterally. No wheezing.    Abdominal:  Soft. No masses.   Non-tender. No distension.   No hepatosplenomegaly.   MSK: Normal muscle strength and tone.   Extremities: No digital cyanosis or clubbing.   Skin: Skin is warm and dry.  No rashes, lesions or ulcers noted.    Neurological:   Alert and Oriented to person, place, and time.   Moves all extremities.   Psychiatric:  Normal affect.   Normal judgment.            Results from last 7 days   Lab Units 03/10/21  1302   WBC 10*3/mm3 17.40*   HEMOGLOBIN g/dL 9.5*   MCV fL 97.8*   PLATELETS 10*3/mm3 111*     Results from last 7 days   Lab Units 03/10/21  1302   SODIUM mmol/L 131*   POTASSIUM mmol/L 3.8   CO2 mmol/L 21.0*   CREATININE mg/dL 1.18   MAGNESIUM mg/dL 1.5*     Estimated Creatinine Clearance: 80.6 mL/min (by C-G formula based on SCr of 1.18 mg/dL).  Results from last 7 days   Lab Units 03/10/21  1302   ALK PHOS U/L 143*   BILIRUBIN mg/dL 1.3*   ALT (SGPT) U/L 39   AST (SGOT) U/L 91*             Images:    Imaging Results (Last 24 Hours)     Procedure Component Value Units Date/Time    CT Angiogram Chest [[id number]] Collected: 03/10/21 1659     Updated: 03/10/21 1744    Narrative:      EXAMINATION: CT ANGIOGRAM CHEST, CT ABDOMEN/PELVIS W CONTRAST -  03/10/2021      INDICATION: Shortness of air. Altered mental status. Abdominal pain.  Evaluate for pulmonary embolus.      TECHNIQUE: CT angiogram chest with intravenous contrast administration  following PE protocol. 2D reconstructions performed, CT abdomen and  pelvis with intravenous contrast administration.     The radiation dose reduction device was turned on for each scan per the  ALARA (As Low as Reasonably Achievable) protocol.     COMPARISON: CT abdomen and pelvis 02/24/2021.     FINDINGS:      CTA CHEST: Thyroid is homogeneous in  attenuation. No bulky mediastinal  adenopathy. Central airways are patent. Esophagus in normal course and  caliber to the distal segment where there is a small hiatal hernia.  Patent nonaneurysmal minimally atherosclerotic thoracic aorta with  patent great vessel origins. No central pulmonary arterial filling  defect to suggest pulmonary embolus with equivocal optimal  opacification. Cardiac size borderline enlarged without pericardial  effusion. Extended lung windows demonstrate biapical pleural-parenchymal  scarring with paraseptal emphysema and biapical pleural-parenchymal  scarring, however, no dominant nodule or mass. No pleural effusion.  Degenerative disease of the thoracic spine without aggressive osseous  lesion. No soft tissue body wall findings of acuity involving the chest.     ABDOMEN AND PELVIS: Liver demonstrates nodular contours concerning for  parenchymal disease process such as potential cirrhotic hepatic  morphology with a low-attenuation focus likely a  cyst in the left  hemiliver. Gallbladder distended without obvious calculus associated. No  biliary dilatation. Pancreas and spleen unremarkable. Adrenals without  distinct nodule. Kidneys without hydronephrosis or hydroureter. No bulky  retroperitoneal adenopathy. Atherosclerotic aneurysmal abdominal aorta  with aortobiiliac endovascular stent in place demonstrating gross  patency with adjacent stranding to the distal aorta as seen on prior  comparison grossly similar appearance with mildly enlarged  retroperitoneal lymph nodes adjacent to the periaortic region. Portal  veins and IVC grossly patent. GI tract evaluation without focal  thickening or disproportionate dilatation. No free fluid or  intra-abdominal free air. Pelvic viscera unremarkable without bulky  pelvic adenopathy.       Impression:      1. No PE is clearly identified as no central filling defect is  identified on suboptimal evaluation due to technique despite repeat  sequencing.      2. No acute intrathoracic process.     3. Cirrhotic hepatic morphology again noted.     4. Stable appearance of the endovascular aortic aneurysm repair with  aortobiiliac stent graft and adjacent distal infrarenal stranding and  minimal adenopathy as seen on prior comparison of  02/24/2021 without  evidence for distinct progression or definitive hemorrhage/leak clearly  evident in a similar configuration to prior comparison 02/24/2021.     DICTATED:   03/10/2021  EDITED/ls :   03/10/2021          CT Abdomen Pelvis With Contrast [[id number]] Collected: 03/10/21 1659     Updated: 03/10/21 1744    Narrative:      EXAMINATION: CT ANGIOGRAM CHEST, CT ABDOMEN/PELVIS W CONTRAST -  03/10/2021      INDICATION: Shortness of air. Altered mental status. Abdominal pain.  Evaluate for pulmonary embolus.      TECHNIQUE: CT angiogram chest with intravenous contrast administration  following PE protocol. 2D reconstructions performed, CT abdomen and  pelvis with intravenous contrast administration.     The radiation dose reduction device was turned on for each scan per the  ALARA (As Low as Reasonably Achievable) protocol.     COMPARISON: CT abdomen and pelvis 02/24/2021.     FINDINGS:      CTA CHEST: Thyroid is homogeneous in attenuation. No bulky mediastinal  adenopathy. Central airways are patent. Esophagus in normal course and  caliber to the distal segment where there is a small hiatal hernia.  Patent nonaneurysmal minimally atherosclerotic thoracic aorta with  patent great vessel origins. No central pulmonary arterial filling  defect to suggest pulmonary embolus with equivocal optimal  opacification. Cardiac size borderline enlarged without pericardial  effusion. Extended lung windows demonstrate biapical pleural-parenchymal  scarring with paraseptal emphysema and biapical pleural-parenchymal  scarring, however, no dominant nodule or mass. No pleural effusion.  Degenerative disease of the thoracic spine without aggressive  osseous  lesion. No soft tissue body wall findings of acuity involving the chest.     ABDOMEN AND PELVIS: Liver demonstrates nodular contours concerning for  parenchymal disease process such as potential cirrhotic hepatic  morphology with a low-attenuation focus likely a  cyst in the left  hemiliver. Gallbladder distended without obvious calculus associated. No  biliary dilatation. Pancreas and spleen unremarkable. Adrenals without  distinct nodule. Kidneys without hydronephrosis or hydroureter. No bulky  retroperitoneal adenopathy. Atherosclerotic aneurysmal abdominal aorta  with aortobiiliac endovascular stent in place demonstrating gross  patency with adjacent stranding to the distal aorta as seen on prior  comparison grossly similar appearance with mildly enlarged  retroperitoneal lymph nodes adjacent to the periaortic region. Portal  veins and IVC grossly patent. GI tract evaluation without focal  thickening or disproportionate dilatation. No free fluid or  intra-abdominal free air. Pelvic viscera unremarkable without bulky  pelvic adenopathy.       Impression:      1. No PE is clearly identified as no central filling defect is  identified on suboptimal evaluation due to technique despite repeat  sequencing.     2. No acute intrathoracic process.     3. Cirrhotic hepatic morphology again noted.     4. Stable appearance of the endovascular aortic aneurysm repair with  aortobiiliac stent graft and adjacent distal infrarenal stranding and  minimal adenopathy as seen on prior comparison of  02/24/2021 without  evidence for distinct progression or definitive hemorrhage/leak clearly  evident in a similar configuration to prior comparison 02/24/2021.     DICTATED:   03/10/2021  EDITED/ls :   03/10/2021          CT Head Without Contrast [[id number]] Collected: 03/10/21 1648     Updated: 03/10/21 1655    Narrative:      EXAMINATION: CT HEAD WO CONTRAST- - 03/10/2021     INDICATION: Altered mental status.     TECHNIQUE:  CT head without intravenous contrast     The radiation dose reduction device was turned on for each scan per the  ALARA (As Low as Reasonably Achievable) protocol.     COMPARISON: None.     FINDINGS: Midline structures are symmetric without evidence of mass,  mass effect, or midline shift. Ventricles and sulci within normal  limits. No intra-axial hemorrhage or extra-axial fluid collection.  Globes and orbits unremarkable. Paranasal sinuses and mastoid air cells  are grossly clear and well-pneumatized. Calvarium intact.       Impression:      No acute intracranial abnormality.     DICTATED:   03/10/2021  EDITED/ls :   03/10/2021          XR Chest 1 View [[id number]] Collected: 03/10/21 1341     Updated: 03/10/21 1345    Narrative:      EXAMINATION: XR CHEST 1 VW-      INDICATION: Weak/Dizzy/AMS triage protocol      COMPARISON: 9/10/2020     FINDINGS: Mild bibasilar atelectasis is present. No focal airspace  disease otherwise. No significant effusion or distinct pneumothorax.  Unchanged heart and mediastinal contours.       Impression:      Mild bibasilar atelectasis. No acute disease in the chest  otherwise.     This report was finalized on 3/10/2021 1:42 PM by Douglas Vieira.           ECG/EMG Results (last 24 hours)     Procedure Component Value Units Date/Time    ECG 12 Lead [[id number]] Collected: 03/10/21 1304     Updated: 03/10/21 1513          I reviewed the patient's new laboratory and imaging results.  I independently reviewed the patient's new images.    Assessment/Plan   A / P     Mr. Kelsey is a 69yo M with a history of infected AAA endograft, COPD, and cirrhosis who presented to the ED with fever and weakness and was found to be positive for COVID. He developed hypotension and ICU admission was requested. He had a CT scan of the chest which did not show any airspace disease consistent with COVID and he is on room air.     Nutrition:   NPO Diet  Advance Directives:   Code Status and Medical  Interventions:   Ordered at: 03/10/21 1906     Limited Support to NOT Include:    Cardioversion/Defibrillation    Intubation     Code Status:    No CPR     Medical Interventions (Level of Support Prior to Arrest):    Limited       Active Hospital Problems    Diagnosis    • Infected aortic endograft (CMS/HCC)    • On supplemental oxygen by nasal cannula      at night     • Cirrhosis of liver (CMS/HCC)    • Sepsis (CMS/HCC)    • Moderate COPD    • Hypertension        Assessment / Plan:    1. Admit to ICU  2. Continue fluid resucitation. Vasopressors if he becomes unresponsive to IVF.   3. Hold home diuretics and antihypertensives.   4. Vancomycin and meropenem for history of infected endograft  5. We will have ID see him in the AM since he follows with them as an outpatient.   6. Check an echocardiogram.   7. Home medications reviewed.  8. AM labs    Plan of care and goals reviewed during interdisciplinary rounds.  I discussed the patient's findings and my recommendations with patient    Ludmila COLIN Case, DO  Pulmonary and Critical Care Medicine

## 2021-03-11 NOTE — CONSULTS
INFECTIOUS DISEASE CONSULT/INITIAL HOSPITAL VISIT    Derek Kelsey  1950  [id number]    Date of Consult: 3/11/2021    Admission Date: 3/10/2021      Requesting Provider: No ref. provider found  Evaluating Physician: Davis Carrero MD    Reason for Consultation: Positive Covid PCR    History of present illness:    Patient is a 70 y.o. 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  Patient has come into the emergency room and tested positive for Covid is not hypoxic patient did have hypotension which responded to IV fluids patient is currently not requiring oxygen and feels better has been started on empiric vancomycin and meropenem.    Patient's wife is being tested for Covid results are pending.    I was previously suppressing patient with doxycycline and ciprofloxacin.      Patient is not a surgical candidate for endograft removal because of cirrhosis and high surgical mortality          Past Medical History:   Diagnosis Date   • Abdominal aortic aneurysm (CMS/HCC) 9/29/2016   • Anxiety 9/29/2016   • Arthritis    • Back pain    • Black lung (CMS/HCC)    • CAD (coronary artery disease) 9/29/2016   • Cirrhosis (CMS/HCC)    • COPD (chronic obstructive pulmonary disease) (CMS/[facility]) 9/29/2016   • Depression 9/29/2016   • Depression    • GERD (gastroesophageal reflux disease)    • Hypertension 9/29/2016   • On supplemental oxygen by nasal cannula     at night   • Vitiligo    • Wears dentures    • Wears reading eyeglasses        Past Surgical History:   Procedure Laterality Date   • ABDOMINAL AORTIC ANEURYSM REPAIR WITH ENDOGRAFT N/A 6/6/2019    Procedure: ABDOMINAL AORTIC ANEURYSM REPAIR WITH ENDOGRAFT;  Surgeon: Leopoldo Burleson MD;  Location: [place] [address];   Service: Vascular   • BACK SURGERY     • CARDIAC CATHETERIZATION     • CARDIAC CATHETERIZATION N/A 9/28/2018    Procedure: Left Heart Cath;  Surgeon: Douglas Galvez MD;  Location: [facility] CATH INVASIVE LOCATION;  Service: Cardiovascular   • CARDIAC SURGERY     • COLONOSCOPY      2015   • COLONOSCOPY N/A 10/30/2019    Procedure: COLONOSCOPY;  Surgeon: Homar Viveros MD;  Location:  [facility];  Service: Gastroenterology   • ENDOSCOPY N/A 10/30/2019    Procedure: ESOPHAGOGASTRODUODENOSCOPY;  Surgeon: Homar Viveros MD;  Location:  [facility];  Service: Gastroenterology   • EYE SURGERY      cataracts bilateral   • HAND SURGERY Left    • LUMBAR DISCECTOMY FUSION INSTRUMENTATION N/A 11/1/2018    Procedure: LUMBAR DISCECTOMY POSTERIOR WITH FUSION INSTRUMENTATION;  Surgeon: Joo Franklin MD;  Location:  [facility];  Service: Orthopedic Spine   • LUMBAR DISCECTOMY FUSION INSTRUMENTATION N/A 7/24/2019    Procedure: POSTERIOR LUMBAR SPINAL FUSION REVISION AND INSTRUMENTATION L3,L4,L5,S1;  Surgeon: Joo Franklin MD;  Location:  [facility];  Service: Orthopedic Spine   • ORIF FINGER / THUMB FRACTURE     • SHOULDER SURGERY Left        Family History   Problem Relation Age of Onset   • Stroke Mother    • Hypertension Mother    • Heart disease Mother    • Heart disease Father    • Hypertension Father    • Diabetes Sister    • Hypertension Sister    • Heart disease Sister    • Diabetes Brother    • Heart disease Brother    • Hypertension Child    • Hypertension Son    • No Known Problems Son    • Diabetes Sister    • Heart disease Sister        Social History     Socioeconomic History   • Marital status:      Spouse name: Not on file   • Number of children: 2   • Years of education: Not on file   • Highest education level: Not on file   Tobacco Use   • Smoking status: Former Smoker     Packs/day: 1.00     Years: 30.00     Pack years: 30.00     Types: Cigarettes     Quit date: 1/1/2011     Years  since quitting: 10.1   • Smokeless tobacco: Former User     Types: Chew     Quit date: 4/7/2011   Substance and Sexual Activity   • Alcohol use: Not Currently   • Drug use: No   • Sexual activity: Defer     Comment:  and lives with wife       Allergies   Allergen Reactions   • Penicillins Hives     Hives - has tolerated Zosyn and ceftriaxone 09/2019   • Percocet [Oxycodone-Acetaminophen] Confusion         Medication:    Current Facility-Administered Medications:   •  [START ON 3/12/2021] ! Vanc trough due 05:30 am 3/12 - hold 6am vanc dose until trough reviewed by Pharmacist, , Does not apply, Once, Case, Ludmila V., DO  •  aspirin EC tablet 81 mg, 81 mg, Oral, Daily, Case, Ludmila V., DO, 81 mg at 03/11/21 0844  •  citalopram (CeleXA) tablet 40 mg, 40 mg, Oral, Daily, Case, Ludmila V., DO, 40 mg at 03/11/21 0844  •  enoxaparin (LOVENOX) syringe 40 mg, 40 mg, Subcutaneous, Q24H, Rinku Mcrae MD  •  lactulose (CHRONULAC) 10 GM/15ML solution 10 g, 10 g, Oral, TID PRN, Case, Ludmila V., DO  •  magnesium sulfate 4 gram infusion - Mg less than or equal to 1mg/dL, 4 g, Intravenous, PRN **OR** magnesium sulfate 3 gram infusion (1gm x 3) - Mg 1.1 - 1.5 mg/dL, 1 g, Intravenous, PRN **OR** Magnesium Sulfate 2 gram infusion- Mg 1.6 - 1.9 mg/dL, 2 g, Intravenous, PRN, Rinku Mcrae MD, Last Rate: 25 mL/hr at 03/11/21 1054, 2 g at 03/11/21 1054  •  melatonin tablet 5 mg, 5 mg, Oral, Nightly, Case, Ludmila V., DO  •  meropenem (MERREM) 1 g/100 mL 0.9% NS VTB (mbp), 1 g, Intravenous, Q8H, Case, Ludmila V., DO, Last Rate: 0 mL/hr at 03/11/21 0405, 1 g at 03/11/21 0844  •  pantoprazole (PROTONIX) EC tablet 40 mg, 40 mg, Oral, BID, Case, Ludmila RYAN DO, 40 mg at 03/11/21 0844  •  Pharmacy Consult - Pharmacy to dose Vacomycin and Meropenem, , Does not apply, Once, Case, Ludmila RYAN DO  •  riFAXIMin (XIFAXAN) tablet 550 mg, 550 mg, Oral, Q12H, Case, Ludmila RYAN DO, 550 mg at 03/11/21 0844  •  saccharomyces  boulardii (FLORASTOR) capsule 250 mg, 250 mg, Oral, BID, Case, Ludmila V., DO, 250 mg at 03/11/21 0844  •  sodium chloride 0.9 % flush 10 mL, 10 mL, Intravenous, PRN, Case, Ludmila V., DO  •  sodium chloride 0.9 % flush 10 mL, 10 mL, Intravenous, Q12H, Case, Ludmila V., DO, 10 mL at 03/11/21 0845  •  sodium chloride 0.9 % flush 10 mL, 10 mL, Intravenous, PRN, Case, Ludmila V., DO  •  sodium chloride 0.9 % infusion, 50 mL/hr, Intravenous, Continuous, Rinku Mcrae MD, Last Rate: 50 mL/hr at 03/11/21 1055, 50 mL/hr at 03/11/21 1055  •  sucralfate (CARAFATE) tablet 1 g, 1 g, Oral, 4x Daily, Case, Ludmila V., DO, 1 g at 03/11/21 1104  •  tamsulosin (FLOMAX) 24 hr capsule 0.4 mg, 0.4 mg, Oral, Daily, Case, Ludmila V., DO, 0.4 mg at 03/11/21 0844  •  vancomycin (VANCOCIN) in iso-osmotic dextrose IVPB 1 g (premix) 200 mL, 1,000 mg, Intravenous, Q12H, Case, Ludmila V., DO, 1,000 mg at 03/11/21 0547    Antibiotics:  Anti-Infectives (From admission, onward)    Ordered     Dose/Rate Route Frequency Start Stop    03/10/21 1914  vancomycin (VANCOCIN) in iso-osmotic dextrose IVPB 1 g (premix) 200 mL     Ordering Provider: Case, Ludmila V., DO    1,000 mg  over 60 Minutes Intravenous Every 12 Hours 03/11/21 0600 03/16/21 0559    03/10/21 1920  meropenem (MERREM) 1 g/100 mL 0.9% NS VTB (mbp)     Ordering Provider: Case, Ludmila V., DO    1 g  over 3 Hours Intravenous Every 8 Hours 03/11/21 0000 03/15/21 2359    03/10/21 2042  riFAXIMin (XIFAXAN) tablet 550 mg     Ordering Provider: Case, Ludmila V., DO    550 mg Oral Every 12 Hours Scheduled 03/10/21 2100 03/25/21 2059    03/10/21 1548  vancomycin 2250 mg/500 mL 0.9% NS IVPB ([facility])     Ordering Provider: Joo Cheung APRN    20 mg/kg × 111 kg Intravenous Once 03/10/21 1550 03/10/21 2116    03/10/21 1518  meropenem (MERREM) 1 g/100 mL 0.9% NS VTB (mbp)     Ordering Provider: Joo Cheung APRN    1 g  over 30 Minutes Intravenous Once 03/10/21 1520 03/10/21 7402             Review of Systems:  Remarkable for fatigue, fever, shortness of breath, abdominal pain, nausea    Rest of review of systems were reviewed and were unremarkable      Physical Exam:   Vital Signs  Temp (24hrs), Av.9 °F (36.6 °C), Min:97.8 °F (36.6 °C), Max:97.9 °F (36.6 °C)    Temp  Min: 97.8 °F (36.6 °C)  Max: 97.9 °F (36.6 °C)  BP  Min: 94/48  Max: 166/79  Pulse  Min: 74  Max: 115  Resp  Min: 18  Max: 20  SpO2  Min: 85 %  Max: 98 %    GENERAL: Awake and alert, in no acute distress.  Age-appropriate  HEENT: Normocephalic, atraumatic.  PERRL. EOMI. No conjunctival injection. No icterus.  No external oral lesions    HEART: RRR; No murmur,  LUNGS: Clear to auscultation bilaterally  ABDOMEN: Soft, nontender, nondistended.   EXT: 1+ edema  :  Without Young catheter.  MSK: No joint deformity  SKIN: Warm and dry without cutaneous eruptions on Inspection/palpation.    NEURO: Oriented to PPT.  PSYCHIATRIC: Normal insight and judgement. Cooperative with PE    Laboratory Data    Results from last 7 days   Lab Units 21  0547 03/10/21  1302   WBC 10*3/mm3 12.28* 17.40*   HEMOGLOBIN g/dL 8.7* 9.5*   HEMATOCRIT % 27.3* 30.8*   PLATELETS 10*3/mm3 91* 111*     Results from last 7 days   Lab Units 21  0547   SODIUM mmol/L 134*   POTASSIUM mmol/L 3.9   CHLORIDE mmol/L 103   CO2 mmol/L 22.0   BUN mg/dL 14   CREATININE mg/dL 0.90   GLUCOSE mg/dL 105*   CALCIUM mg/dL 8.4*     Results from last 7 days   Lab Units 21  0547   ALK PHOS U/L 133*   BILIRUBIN mg/dL 1.3*   ALT (SGPT) U/L 33   AST (SGOT) U/L 71*         Results from last 7 days   Lab Units 21  0547   CRP mg/dL 7.47*     Results from last 7 days   Lab Units 21  0547   LACTATE mmol/L 2.6*  2.6*             Estimated Creatinine Clearance: 105.7 mL/min (by C-G formula based on SCr of 0.9 mg/dL).      Microbiology:  No results found for: ACANTHNAEG, AFBCX, BPERTUSSISCX, BLOODCX  No results found for: BCIDPCR, CXREFLEX, CSFCX, CULTURETIS  No  results found for: CULTURES, HSVCX, URCX  No results found for: EYECULTURE, GCCX, HSVCULTURE, LABHSV  No results found for: LEGIONELLA, MRSACX, MUMPSCX, MYCOPLASCX  No results found for: NOCARDIACX, STOOLCX  Urine Culture   Date Value Ref Range Status   03/10/2021 No growth  Preliminary     No results found for: VIRALCULTU, WOUNDCX        Radiology:  Imaging Results (Last 72 Hours)     Procedure Component Value Units Date/Time    CT Angiogram Chest [[id number]] Collected: 03/10/21 1659     Updated: 03/10/21 1744    Narrative:      EXAMINATION: CT ANGIOGRAM CHEST, CT ABDOMEN/PELVIS W CONTRAST -  03/10/2021      INDICATION: Shortness of air. Altered mental status. Abdominal pain.  Evaluate for pulmonary embolus.      TECHNIQUE: CT angiogram chest with intravenous contrast administration  following PE protocol. 2D reconstructions performed, CT abdomen and  pelvis with intravenous contrast administration.     The radiation dose reduction device was turned on for each scan per the  ALARA (As Low as Reasonably Achievable) protocol.     COMPARISON: CT abdomen and pelvis 02/24/2021.     FINDINGS:      CTA CHEST: Thyroid is homogeneous in attenuation. No bulky mediastinal  adenopathy. Central airways are patent. Esophagus in normal course and  caliber to the distal segment where there is a small hiatal hernia.  Patent nonaneurysmal minimally atherosclerotic thoracic aorta with  patent great vessel origins. No central pulmonary arterial filling  defect to suggest pulmonary embolus with equivocal optimal  opacification. Cardiac size borderline enlarged without pericardial  effusion. Extended lung windows demonstrate biapical pleural-parenchymal  scarring with paraseptal emphysema and biapical pleural-parenchymal  scarring, however, no dominant nodule or mass. No pleural effusion.  Degenerative disease of the thoracic spine without aggressive osseous  lesion. No soft tissue body wall findings of acuity involving the chest.      ABDOMEN AND PELVIS: Liver demonstrates nodular contours concerning for  parenchymal disease process such as potential cirrhotic hepatic  morphology with a low-attenuation focus likely a  cyst in the left  hemiliver. Gallbladder distended without obvious calculus associated. No  biliary dilatation. Pancreas and spleen unremarkable. Adrenals without  distinct nodule. Kidneys without hydronephrosis or hydroureter. No bulky  retroperitoneal adenopathy. Atherosclerotic aneurysmal abdominal aorta  with aortobiiliac endovascular stent in place demonstrating gross  patency with adjacent stranding to the distal aorta as seen on prior  comparison grossly similar appearance with mildly enlarged  retroperitoneal lymph nodes adjacent to the periaortic region. Portal  veins and IVC grossly patent. GI tract evaluation without focal  thickening or disproportionate dilatation. No free fluid or  intra-abdominal free air. Pelvic viscera unremarkable without bulky  pelvic adenopathy.       Impression:      1. No PE is clearly identified as no central filling defect is  identified on suboptimal evaluation due to technique despite repeat  sequencing.     2. No acute intrathoracic process.     3. Cirrhotic hepatic morphology again noted.     4. Stable appearance of the endovascular aortic aneurysm repair with  aortobiiliac stent graft and adjacent distal infrarenal stranding and  minimal adenopathy as seen on prior comparison of  02/24/2021 without  evidence for distinct progression or definitive hemorrhage/leak clearly  evident in a similar configuration to prior comparison 02/24/2021.     DICTATED:   03/10/2021  EDITED/ls :   03/10/2021          CT Abdomen Pelvis With Contrast [[id number]] Collected: 03/10/21 1659     Updated: 03/10/21 1744    Narrative:      EXAMINATION: CT ANGIOGRAM CHEST, CT ABDOMEN/PELVIS W CONTRAST -  03/10/2021      INDICATION: Shortness of air. Altered mental status. Abdominal pain.  Evaluate for pulmonary  embolus.      TECHNIQUE: CT angiogram chest with intravenous contrast administration  following PE protocol. 2D reconstructions performed, CT abdomen and  pelvis with intravenous contrast administration.     The radiation dose reduction device was turned on for each scan per the  ALARA (As Low as Reasonably Achievable) protocol.     COMPARISON: CT abdomen and pelvis 02/24/2021.     FINDINGS:      CTA CHEST: Thyroid is homogeneous in attenuation. No bulky mediastinal  adenopathy. Central airways are patent. Esophagus in normal course and  caliber to the distal segment where there is a small hiatal hernia.  Patent nonaneurysmal minimally atherosclerotic thoracic aorta with  patent great vessel origins. No central pulmonary arterial filling  defect to suggest pulmonary embolus with equivocal optimal  opacification. Cardiac size borderline enlarged without pericardial  effusion. Extended lung windows demonstrate biapical pleural-parenchymal  scarring with paraseptal emphysema and biapical pleural-parenchymal  scarring, however, no dominant nodule or mass. No pleural effusion.  Degenerative disease of the thoracic spine without aggressive osseous  lesion. No soft tissue body wall findings of acuity involving the chest.     ABDOMEN AND PELVIS: Liver demonstrates nodular contours concerning for  parenchymal disease process such as potential cirrhotic hepatic  morphology with a low-attenuation focus likely a  cyst in the left  hemiliver. Gallbladder distended without obvious calculus associated. No  biliary dilatation. Pancreas and spleen unremarkable. Adrenals without  distinct nodule. Kidneys without hydronephrosis or hydroureter. No bulky  retroperitoneal adenopathy. Atherosclerotic aneurysmal abdominal aorta  with aortobiiliac endovascular stent in place demonstrating gross  patency with adjacent stranding to the distal aorta as seen on prior  comparison grossly similar appearance with mildly enlarged  retroperitoneal  lymph nodes adjacent to the periaortic region. Portal  veins and IVC grossly patent. GI tract evaluation without focal  thickening or disproportionate dilatation. No free fluid or  intra-abdominal free air. Pelvic viscera unremarkable without bulky  pelvic adenopathy.       Impression:      1. No PE is clearly identified as no central filling defect is  identified on suboptimal evaluation due to technique despite repeat  sequencing.     2. No acute intrathoracic process.     3. Cirrhotic hepatic morphology again noted.     4. Stable appearance of the endovascular aortic aneurysm repair with  aortobiiliac stent graft and adjacent distal infrarenal stranding and  minimal adenopathy as seen on prior comparison of  02/24/2021 without  evidence for distinct progression or definitive hemorrhage/leak clearly  evident in a similar configuration to prior comparison 02/24/2021.     DICTATED:   03/10/2021  EDITED/ls :   03/10/2021          CT Head Without Contrast [[id number]] Collected: 03/10/21 1648     Updated: 03/10/21 1655    Narrative:      EXAMINATION: CT HEAD WO CONTRAST- - 03/10/2021     INDICATION: Altered mental status.     TECHNIQUE: CT head without intravenous contrast     The radiation dose reduction device was turned on for each scan per the  ALARA (As Low as Reasonably Achievable) protocol.     COMPARISON: None.     FINDINGS: Midline structures are symmetric without evidence of mass,  mass effect, or midline shift. Ventricles and sulci within normal  limits. No intra-axial hemorrhage or extra-axial fluid collection.  Globes and orbits unremarkable. Paranasal sinuses and mastoid air cells  are grossly clear and well-pneumatized. Calvarium intact.       Impression:      No acute intracranial abnormality.     DICTATED:   03/10/2021  EDITED/ls :   03/10/2021          XR Chest 1 View [[id number]] Collected: 03/10/21 1341     Updated: 03/10/21 1345    Narrative:      EXAMINATION: XR CHEST 1 VW-      INDICATION:  Weak/Dizzy/AMS triage protocol      COMPARISON: 9/10/2020     FINDINGS: Mild bibasilar atelectasis is present. No focal airspace  disease otherwise. No significant effusion or distinct pneumothorax.  Unchanged heart and mediastinal contours.       Impression:      Mild bibasilar atelectasis. No acute disease in the chest  otherwise.     This report was finalized on 3/10/2021 1:42 PM by Douglas Vieira.               Impression:   COVID-19 positivity without hypoxia  History of abdominal aortic aneurysm with endograft repair and presumed secondary infection on suppressive Cipro and doxycycline  Cirrhosis  COPD  Recent weakness and nausea and vomiting and low-grade fevers while at home  Lactic acidosis  Ammonia 104    PLAN/RECOMMENDATIONS:   Thank you for asking us to see Derek Kelsey, I recommend the following:    Patient does not appear to have pulmonary inflammation on CT angio.    For this reason we can watch patient closely and hold off on antivirals and steroids    Covid still could explain patient's weakness and nausea    Follow-up blood cultures    Continue vancomycin per pharmacy dosing    Continue meropenem 1 g every 8 hours    I do not expect patient will require long-term IV antibiotics upon discharge    Davis Carrero MD  3/11/2021  15:13 EST

## 2021-03-11 NOTE — PLAN OF CARE
Goal Outcome Evaluation:  Plan of Care Reviewed With: patient     Outcome Summary: Pt a/o, VSS, RA, SR. No c/o pain/ discomfort at this time. Continue monitoring.

## 2021-03-12 LAB
ALBUMIN SERPL-MCNC: 2.8 G/DL (ref 3.5–5.2)
ALBUMIN/GLOB SERPL: 0.6 G/DL
ALP SERPL-CCNC: 143 U/L (ref 39–117)
ALT SERPL W P-5'-P-CCNC: 40 U/L (ref 1–41)
AMMONIA BLD-SCNC: 65 UMOL/L (ref 16–60)
ANION GAP SERPL CALCULATED.3IONS-SCNC: 9 MMOL/L (ref 5–15)
AST SERPL-CCNC: 105 U/L (ref 1–40)
BASOPHILS # BLD AUTO: 0.04 10*3/MM3 (ref 0–0.2)
BASOPHILS NFR BLD AUTO: 0.5 % (ref 0–1.5)
BILIRUB SERPL-MCNC: 1.2 MG/DL (ref 0–1.2)
BUN SERPL-MCNC: 12 MG/DL (ref 8–23)
BUN/CREAT SERPL: 15.8 (ref 7–25)
CALCIUM SPEC-SCNC: 9.1 MG/DL (ref 8.6–10.5)
CHLORIDE SERPL-SCNC: 103 MMOL/L (ref 98–107)
CO2 SERPL-SCNC: 22 MMOL/L (ref 22–29)
CREAT SERPL-MCNC: 0.76 MG/DL (ref 0.76–1.27)
CRP SERPL-MCNC: 6.25 MG/DL (ref 0–0.5)
D DIMER PPP FEU-MCNC: 2.75 MCGFEU/ML (ref 0–0.56)
DEPRECATED RDW RBC AUTO: 55 FL (ref 37–54)
EOSINOPHIL # BLD AUTO: 0.13 10*3/MM3 (ref 0–0.4)
EOSINOPHIL NFR BLD AUTO: 1.7 % (ref 0.3–6.2)
ERYTHROCYTE [DISTWIDTH] IN BLOOD BY AUTOMATED COUNT: 15.7 % (ref 12.3–15.4)
FERRITIN SERPL-MCNC: 161.5 NG/ML (ref 30–400)
GFR SERPL CREATININE-BSD FRML MDRD: 102 ML/MIN/1.73
GLOBULIN UR ELPH-MCNC: 5 GM/DL
GLUCOSE SERPL-MCNC: 102 MG/DL (ref 65–99)
HCT VFR BLD AUTO: 27.9 % (ref 37.5–51)
HEMOCCULT STL QL: NEGATIVE
HGB BLD-MCNC: 8.9 G/DL (ref 13–17.7)
IMM GRANULOCYTES # BLD AUTO: 0.04 10*3/MM3 (ref 0–0.05)
IMM GRANULOCYTES NFR BLD AUTO: 0.5 % (ref 0–0.5)
INR PPP: 1.8 (ref 0.85–1.16)
IRON 24H UR-MRATE: 25 MCG/DL (ref 59–158)
IRON SATN MFR SERPL: 8 % (ref 20–50)
LYMPHOCYTES # BLD AUTO: 0.61 10*3/MM3 (ref 0.7–3.1)
LYMPHOCYTES NFR BLD AUTO: 7.8 % (ref 19.6–45.3)
MAGNESIUM SERPL-MCNC: 1.8 MG/DL (ref 1.6–2.4)
MCH RBC QN AUTO: 30.6 PG (ref 26.6–33)
MCHC RBC AUTO-ENTMCNC: 31.9 G/DL (ref 31.5–35.7)
MCV RBC AUTO: 95.9 FL (ref 79–97)
MONOCYTES # BLD AUTO: 0.68 10*3/MM3 (ref 0.1–0.9)
MONOCYTES NFR BLD AUTO: 8.7 % (ref 5–12)
NEUTROPHILS NFR BLD AUTO: 6.34 10*3/MM3 (ref 1.7–7)
NEUTROPHILS NFR BLD AUTO: 80.8 % (ref 42.7–76)
NRBC BLD AUTO-RTO: 0 /100 WBC (ref 0–0.2)
PLATELET # BLD AUTO: 99 10*3/MM3 (ref 140–450)
PMV BLD AUTO: 9.9 FL (ref 6–12)
POTASSIUM SERPL-SCNC: 3.7 MMOL/L (ref 3.5–5.2)
PROT SERPL-MCNC: 7.8 G/DL (ref 6–8.5)
PROTHROMBIN TIME: 20.6 SECONDS (ref 11.5–14)
QT INTERVAL: 370 MS
QTC INTERVAL: 498 MS
RBC # BLD AUTO: 2.91 10*6/MM3 (ref 4.14–5.8)
SODIUM SERPL-SCNC: 134 MMOL/L (ref 136–145)
TIBC SERPL-MCNC: 311 MCG/DL (ref 298–536)
TRANSFERRIN SERPL-MCNC: 209 MG/DL (ref 200–360)
TSH SERPL DL<=0.05 MIU/L-ACNC: 3.98 UIU/ML (ref 0.27–4.2)
VANCOMYCIN TROUGH SERPL-MCNC: 12.4 MCG/ML (ref 5–20)
WBC # BLD AUTO: 7.84 10*3/MM3 (ref 3.4–10.8)

## 2021-03-12 PROCEDURE — 93010 ELECTROCARDIOGRAM REPORT: CPT | Performed by: INTERNAL MEDICINE

## 2021-03-12 PROCEDURE — 97161 PT EVAL LOW COMPLEX 20 MIN: CPT

## 2021-03-12 PROCEDURE — 85379 FIBRIN DEGRADATION QUANT: CPT | Performed by: INTERNAL MEDICINE

## 2021-03-12 PROCEDURE — 84466 ASSAY OF TRANSFERRIN: CPT | Performed by: INTERNAL MEDICINE

## 2021-03-12 PROCEDURE — 25010000002 MEROPENEM PER 100 MG: Performed by: INTERNAL MEDICINE

## 2021-03-12 PROCEDURE — 85610 PROTHROMBIN TIME: CPT | Performed by: INTERNAL MEDICINE

## 2021-03-12 PROCEDURE — 80053 COMPREHEN METABOLIC PANEL: CPT | Performed by: INTERNAL MEDICINE

## 2021-03-12 PROCEDURE — 25010000002 VANCOMYCIN PER 500 MG: Performed by: INTERNAL MEDICINE

## 2021-03-12 PROCEDURE — 82728 ASSAY OF FERRITIN: CPT | Performed by: INTERNAL MEDICINE

## 2021-03-12 PROCEDURE — 82272 OCCULT BLD FECES 1-3 TESTS: CPT | Performed by: INTERNAL MEDICINE

## 2021-03-12 PROCEDURE — 80202 ASSAY OF VANCOMYCIN: CPT | Performed by: INTERNAL MEDICINE

## 2021-03-12 PROCEDURE — 84443 ASSAY THYROID STIM HORMONE: CPT | Performed by: INTERNAL MEDICINE

## 2021-03-12 PROCEDURE — 25010000002 MAGNESIUM SULFATE 2 GM/50ML SOLUTION: Performed by: INTERNAL MEDICINE

## 2021-03-12 PROCEDURE — 25010000002 NA FERRIC GLUC CPLX PER 12.5 MG: Performed by: INTERNAL MEDICINE

## 2021-03-12 PROCEDURE — 83735 ASSAY OF MAGNESIUM: CPT | Performed by: INTERNAL MEDICINE

## 2021-03-12 PROCEDURE — 97166 OT EVAL MOD COMPLEX 45 MIN: CPT

## 2021-03-12 PROCEDURE — 25010000002 HEPARIN (PORCINE) PER 1000 UNITS: Performed by: INTERNAL MEDICINE

## 2021-03-12 PROCEDURE — 93005 ELECTROCARDIOGRAM TRACING: CPT | Performed by: INTERNAL MEDICINE

## 2021-03-12 PROCEDURE — 83540 ASSAY OF IRON: CPT | Performed by: INTERNAL MEDICINE

## 2021-03-12 PROCEDURE — 25010000002 ONDANSETRON PER 1 MG: Performed by: INTERNAL MEDICINE

## 2021-03-12 PROCEDURE — 86140 C-REACTIVE PROTEIN: CPT | Performed by: INTERNAL MEDICINE

## 2021-03-12 PROCEDURE — 82140 ASSAY OF AMMONIA: CPT | Performed by: INTERNAL MEDICINE

## 2021-03-12 PROCEDURE — 99233 SBSQ HOSP IP/OBS HIGH 50: CPT | Performed by: INTERNAL MEDICINE

## 2021-03-12 PROCEDURE — 85025 COMPLETE CBC W/AUTO DIFF WBC: CPT | Performed by: INTERNAL MEDICINE

## 2021-03-12 RX ORDER — CARVEDILOL 12.5 MG/1
12.5 TABLET ORAL 2 TIMES DAILY WITH MEALS
Status: DISCONTINUED | OUTPATIENT
Start: 2021-03-12 | End: 2021-03-17 | Stop reason: HOSPADM

## 2021-03-12 RX ORDER — CARVEDILOL 6.25 MG/1
6.25 TABLET ORAL 2 TIMES DAILY WITH MEALS
Status: DISCONTINUED | OUTPATIENT
Start: 2021-03-12 | End: 2021-03-12

## 2021-03-12 RX ORDER — METOPROLOL TARTRATE 5 MG/5ML
5 INJECTION INTRAVENOUS ONCE
Status: COMPLETED | OUTPATIENT
Start: 2021-03-12 | End: 2021-03-12

## 2021-03-12 RX ORDER — SODIUM CHLORIDE 9 MG/ML
75 INJECTION, SOLUTION INTRAVENOUS CONTINUOUS
Status: DISCONTINUED | OUTPATIENT
Start: 2021-03-12 | End: 2021-03-13

## 2021-03-12 RX ORDER — CARVEDILOL 6.25 MG/1
6.25 TABLET ORAL ONCE
Status: COMPLETED | OUTPATIENT
Start: 2021-03-12 | End: 2021-03-12

## 2021-03-12 RX ADMIN — CARVEDILOL 12.5 MG: 12.5 TABLET, FILM COATED ORAL at 18:30

## 2021-03-12 RX ADMIN — SODIUM CHLORIDE 500 ML: 9 INJECTION, SOLUTION INTRAVENOUS at 08:14

## 2021-03-12 RX ADMIN — SODIUM CHLORIDE 125 MG: 9 INJECTION, SOLUTION INTRAVENOUS at 16:23

## 2021-03-12 RX ADMIN — MEROPENEM 1 G: 1 INJECTION, POWDER, FOR SOLUTION INTRAVENOUS at 23:54

## 2021-03-12 RX ADMIN — Medication 5 MG: at 20:32

## 2021-03-12 RX ADMIN — VANCOMYCIN HYDROCHLORIDE 1000 MG: 1 INJECTION, SOLUTION INTRAVENOUS at 08:08

## 2021-03-12 RX ADMIN — SODIUM CHLORIDE 75 ML/HR: 9 INJECTION, SOLUTION INTRAVENOUS at 08:15

## 2021-03-12 RX ADMIN — CITALOPRAM 40 MG: 40 TABLET ORAL at 08:10

## 2021-03-12 RX ADMIN — METOPROLOL TARTRATE 5 MG: 5 INJECTION INTRAVENOUS at 08:14

## 2021-03-12 RX ADMIN — Medication 250 MG: at 08:12

## 2021-03-12 RX ADMIN — RIFAXIMIN 550 MG: 550 TABLET ORAL at 08:10

## 2021-03-12 RX ADMIN — HEPARIN SODIUM 5000 UNITS: 5000 INJECTION INTRAVENOUS; SUBCUTANEOUS at 08:12

## 2021-03-12 RX ADMIN — MAGNESIUM SULFATE HEPTAHYDRATE 2 G: 2 INJECTION, SOLUTION INTRAVENOUS at 09:41

## 2021-03-12 RX ADMIN — SUCRALFATE 1 G: 1 TABLET ORAL at 08:10

## 2021-03-12 RX ADMIN — RIFAXIMIN 550 MG: 550 TABLET ORAL at 20:32

## 2021-03-12 RX ADMIN — SUCRALFATE 1 G: 1 TABLET ORAL at 18:30

## 2021-03-12 RX ADMIN — ONDANSETRON 4 MG: 2 INJECTION INTRAMUSCULAR; INTRAVENOUS at 09:39

## 2021-03-12 RX ADMIN — CARVEDILOL 6.25 MG: 6.25 TABLET, FILM COATED ORAL at 08:14

## 2021-03-12 RX ADMIN — LACTULOSE 20 G: 20 SOLUTION ORAL at 16:23

## 2021-03-12 RX ADMIN — MEROPENEM 1 G: 1 INJECTION, POWDER, FOR SOLUTION INTRAVENOUS at 16:23

## 2021-03-12 RX ADMIN — CARVEDILOL 6.25 MG: 6.25 TABLET, FILM COATED ORAL at 11:59

## 2021-03-12 RX ADMIN — HEPARIN SODIUM 5000 UNITS: 5000 INJECTION INTRAVENOUS; SUBCUTANEOUS at 20:32

## 2021-03-12 RX ADMIN — PANTOPRAZOLE SODIUM 40 MG: 40 TABLET, DELAYED RELEASE ORAL at 20:32

## 2021-03-12 RX ADMIN — SUCRALFATE 1 G: 1 TABLET ORAL at 20:32

## 2021-03-12 RX ADMIN — LACTULOSE 20 G: 20 SOLUTION ORAL at 20:33

## 2021-03-12 RX ADMIN — PANTOPRAZOLE SODIUM 40 MG: 40 TABLET, DELAYED RELEASE ORAL at 08:10

## 2021-03-12 RX ADMIN — LACTULOSE 20 G: 20 SOLUTION ORAL at 08:12

## 2021-03-12 RX ADMIN — TAMSULOSIN HYDROCHLORIDE 0.4 MG: 0.4 CAPSULE ORAL at 08:12

## 2021-03-12 RX ADMIN — ASPIRIN 81 MG: 81 TABLET, COATED ORAL at 08:10

## 2021-03-12 RX ADMIN — SUCRALFATE 1 G: 1 TABLET ORAL at 11:59

## 2021-03-12 RX ADMIN — MEROPENEM 1 G: 1 INJECTION, POWDER, FOR SOLUTION INTRAVENOUS at 00:56

## 2021-03-12 RX ADMIN — MEROPENEM 1 G: 1 INJECTION, POWDER, FOR SOLUTION INTRAVENOUS at 08:10

## 2021-03-12 RX ADMIN — Medication 250 MG: at 20:32

## 2021-03-12 RX ADMIN — SODIUM CHLORIDE, PRESERVATIVE FREE 10 ML: 5 INJECTION INTRAVENOUS at 08:13

## 2021-03-12 RX ADMIN — SODIUM CHLORIDE 75 ML/HR: 9 INJECTION, SOLUTION INTRAVENOUS at 16:26

## 2021-03-12 NOTE — PLAN OF CARE
Goal Outcome Evaluation:  Plan of Care Reviewed With: patient     Outcome Summary: OT eval completed. Pt presents with decreased activity tolerance and strength compared to baseline. Pt received UIC, STS with SUP, ambulated with CGA/quad cane to/from bathroom, completed grooming and UB bathing standing sink side with SIMS/SUP. Pt noticeably fatigued following activity. OT to follow to progress self-care. Recommend discharge home with assist when medically appropriate.

## 2021-03-12 NOTE — THERAPY EVALUATION
Patient Name: Derek Kelsey  : 1950    MRN: [id number]                              Today's Date: 3/12/2021       Admit Date: 3/10/2021    Visit Dx:     ICD-10-CM ICD-9-CM   1. Sepsis, due to unspecified organism, unspecified whether acute organ dysfunction present (CMS/Formerly [facility])  A41.9 038.9     995.91   2. Pneumonia due to COVID-19 virus  U07.1 480.8    J12.82 079.89   3. Lactic acidosis  E87.2 276.2     Patient Active Problem List   Diagnosis   • Anxiety   • Abdominal aortic aneurysm (AAA) without rupture (CMS/Formerly [facility])   • Depression   • Hypertension   • Cataract, bilateral   • CAD (coronary artery disease)   • Abnormal stress test   • Moderate COPD   • Former smoker   • Coal workers pneumoconiosis, simple   • Back pain   • S/P lumbar fusion   • Prediabetes   • Acute blood loss anemia, mild, asymptomatic   • Acute postoperative pain   • Hyponatremia, mild   • Chronic respiratory failure with hypoxia and hypercapnia (CMS/Formerly [facility])   • Renal insufficiency   • Acute cystitis without hematuria   • Acute UTI   • Sepsis (CMS/[facility]ly [facility])   • Bacteremia   • Anemia   • Aortitis (CMS/[facility]ly [facility])   • Elevated C-reactive protein (CRP)   • Cirrhosis of liver (CMS/Formerly [facility])   • High anion gap metabolic acidosis   • S/P AAA repair   • GERD (gastroesophageal reflux disease)   • Person under investigation for COVID-19   • On supplemental oxygen by nasal cannula   • Infected aortic endograft (CMS/Formerly [facility])   • Elevated transaminase level   • Thrombocytopenia (CMS/[facility])   • Pneumonia   • Gram-negative bacteremia     Past Medical History:   Diagnosis Date   • Abdominal aortic aneurysm (CMS/HCC) 2016   • Anxiety 2016   • Arthritis    • Back pain    • Black lung (CMS/HCC)    • CAD (coronary artery disease) 2016   • Cirrhosis (CMS/HCC)    • COPD (chronic obstructive pulmonary disease) (CMS/[facility]) 2016   • Depression 2016   • Depression    • GERD (gastroesophageal reflux disease)    • Hypertension 2016   • On supplemental oxygen by  nasal cannula     at night   • Vitiligo    • Wears dentures    • Wears reading eyeglasses      Past Surgical History:   Procedure Laterality Date   • ABDOMINAL AORTIC ANEURYSM REPAIR WITH ENDOGRAFT N/A 6/6/2019    Procedure: ABDOMINAL AORTIC ANEURYSM REPAIR WITH ENDOGRAFT;  Surgeon: Leopoldo Burleson MD;  Location: [facility] HYBRID OR 15;  Service: Vascular   • BACK SURGERY     • CARDIAC CATHETERIZATION     • CARDIAC CATHETERIZATION N/A 9/28/2018    Procedure: Left Heart Cath;  Surgeon: Douglas Galvez MD;  Location:  [facility]ATH [facility]VASIVE [facility];  Service: Cardiovascular   • CARDIAC SURGERY     • COLONOSCOPY      2015   • COLONOSCOPY N/A 10/30/2019    Procedure: COLONOSCOPY;  Surgeon: Homar Viveros MD;  Location:  [facility] ENDOSCOPY;  Service: Gastroenterology   • ENDOSCOPY N/A 10/30/2019    Procedure: ESOPHAGOGASTRODUODENOSCOPY;  Surgeon: Homar Viveros MD;  Location:  [facility] ENDOSCOPY;  Service: Gastroenterology   • EYE SURGERY      cataracts bilateral   • HAND SURGERY Left    • LUMBAR DISCECTOMY FUSION INSTRUMENTATION N/A 11/1/2018    Procedure: LUMBAR DISCECTOMY POSTERIOR WITH FUSION INSTRUMENTATION;  Surgeon: Joo Franklin MD;  Location:  [facility] OR;  Service: Orthopedic Spine   • LUMBAR DISCECTOMY FUSION INSTRUMENTATION N/A 7/24/2019    Procedure: POSTERIOR LUMBAR SPINAL FUSION REVISION AND INSTRUMENTATION L3,L4,L5,S1;  Surgeon: Joo Franklin MD;  Location:  [facility] OR;  Service: Orthopedic Spine   • ORIF FINGER / THUMB FRACTURE     • SHOULDER SURGERY Left      General Information     Row Name 03/12/21 1055          Physical Therapy Time and Intention    Document Type  evaluation  -NS     Mode of Treatment  individual therapy;physical therapy  -NS     Row Name 03/12/21 0068          General Information    Patient Profile Reviewed  yes  -NS     Prior Level of Function  independent:;all household mobility;community mobility;gait;transfer;bed mobility;ADL's Pt uses SPC at baseline  -NS      Existing Precautions/Restrictions  fall  -NS     Barriers to Rehab  none identified  -NS     Row Name 03/12/21 1055          Living Environment    Lives With  spouse  -NS     [facility] Name 03/12/21 1055          Home Main Entrance    Number of Stairs, Main Entrance  two  -NS     Stair Railings, Main Entrance  none  -NS     [facility] Name 03/12/21 1055          Stairs Within Home, Primary    Number of Stairs, Within Home, Primary  none  -NS     [facility] Name 03/12/21 1055          Cognition    Orientation Status (Cognition)  oriented x 4  -NS     [facility] Name 03/12/21 1055          Safety Issues, Functional Mobility    Safety Issues Affecting Function (Mobility)  insight into deficits/self-awareness;safety precaution awareness;safety precautions follow-through/compliance;sequencing abilities  -NS     Impairments Affecting Function (Mobility)  balance;endurance/activity tolerance;strength;coordination  -NS       User Key  (r) = Recorded By, (t) = Taken By, (c) = Cosigned By    Initials Name Provider Type    Sarah Layne, PT Physical Therapist        Mobility     [facility] Name 03/12/21 1055          Bed Mobility    Comment (Bed Mobility)  Not assessed; pt UIC  -NS     [facility] Name 03/12/21 1055          Transfers    Comment (Transfers)  VCs for hand placement.  -NS     [facility] Name 03/12/21 1055          Sit-Stand Transfer    Sit-Stand [place] (Transfers)  standby assist  -NS     Assistive Device (Sit-Stand Transfers)  cane, quad  -NS     [facility] Name 03/12/21 1055          Gait/Stairs (Locomotion)    [place] Level (Gait)  contact guard  -NS     Assistive Device (Gait)  cane, quad  -NS     Distance in Feet (Gait)  82  -NS     Deviations/Abnormal Patterns (Gait)  ana decreased;gait speed decreased;stride length decreased  -NS     Bilateral Gait Deviations  forward flexed posture;heel strike decreased  -NS     Comment (Gait/Stairs)  Patient ambulated around room with QC, demonstrating slow gait speed. Pt completed marching and side  "stepping without difficulty, minor LOB with backwards walking. No SOA with activity.  -NS       User Key  (r) = Recorded By, (t) = Taken By, (c) = Cosigned By    Initials Name Provider Type    Sarah Layne PT Physical Therapist        Obj/Interventions     Row Name 03/12/21 1055          Range of Motion Comprehensive    General Range of Motion  bilateral lower extremity ROM WFL  -NS     Row Name 03/12/21 1055          Strength Comprehensive (MMT)    General Manual Muscle Testing (MMT) Assessment  lower extremity strength deficits identified  -NS     Comment, General Manual Muscle Testing (MMT) Assessment  BLEs: grossly 4/5, Pt notes RLE occasionally \"gives out on him\"  -NS     Row Name 03/12/21 1055          Balance    Balance Assessment  sitting static balance;sitting dynamic balance;standing static balance;standing dynamic balance  -NS     Static Sitting Balance  WFL;unsupported;sitting in chair  -NS     Dynamic Sitting Balance  WFL;unsupported;sitting in chair  -NS     Static Standing Balance  WFL;supported;standing  -NS     Dynamic Standing Balance  mild impairment;supported;standing  -NS     Row Name 03/12/21 1055          Sensory Assessment (Somatosensory)    Sensory Assessment (Somatosensory)  LE sensation intact  -NS       User Key  (r) = Recorded By, (t) = Taken By, (c) = Cosigned By    Initials Name Provider Type    Sarah Layne PT Physical Therapist        Goals/Plan     Row Name 03/12/21 1055          Bed Mobility Goal 1 (PT)    Activity/Assistive Device (Bed Mobility Goal 1, PT)  sit to supine/supine to sit  -NS     [place] Level/Cues Needed (Bed Mobility Goal 1, PT)  independent  -NS     Time Frame (Bed Mobility Goal 1, PT)  long term goal (LTG);10 days  -NS     Row Name 03/12/21 1055          Transfer Goal 1 (PT)    Activity/Assistive Device (Transfer Goal 1, PT)  sit-to-stand/stand-to-sit;crutches, axillary  -NS     [place] Level/Cues Needed (Transfer Goal 1, PT)  modified " independence  -NS     Time Frame (Transfer Goal 1, PT)  long term goal (LTG);10 days  -NS     Row Name 03/12/21 1055          Gait Training Goal 1 (PT)    Activity/Assistive Device (Gait Training Goal 1, PT)  gait (walking locomotion);assistive device use  -NS     [place] Level (Gait Training Goal 1, PT)  modified independence  -NS     Distance (Gait Training Goal 1, PT)  200  -NS     Time Frame (Gait Training Goal 1, PT)  long term goal (LTG);10 days  -NS       User Key  (r) = Recorded By, (t) = Taken By, (c) = Cosigned By    Initials Name Provider Type    Sarah Layne, PT Physical Therapist        Clinical Impression     Row Name 03/12/21 1055          Pain    Additional Documentation  Pain Scale: Numbers Pre/Post-Treatment (Group)  -NS     Row Name 03/12/21 1055          Pain Scale: Numbers Pre/Post-Treatment    Pretreatment Pain Rating  0/10 - no pain  -NS     Posttreatment Pain Rating  0/10 - no pain  -NS     Row Name 03/12/21 1050          Plan of Care Review    Plan of Care Reviewed With  patient  -NS     Progress  no change PT eval  -NS     Outcome Summary  Patient presents with mild deficits in strength, balance, and activity tolerance affecting functional mobility. He transferred STS with SBA and ambulated 82ft with quad cane and CGA, demonstrating slow gait speed and mild LOB with backwards walking. VSS on RA. Skilled IP PT warranted. Recommend home with assist and OP PT at discharge for continued LE strengthening.  -NS     Row Name 03/12/21 1056          Therapy Assessment/Plan (PT)    Patient/Family Therapy Goals Statement (PT)  To go home  -NS     Rehab Potential (PT)  good, to achieve stated therapy goals  -NS     Criteria for Skilled Interventions Met (PT)  yes;meets criteria;skilled treatment is necessary  -NS     Predicted Duration of Therapy Intervention (PT)  10 days  -NS     Row Name 03/12/21 1055          Vital Signs    Pre Systolic BP Rehab  122  -NS     Pre Treatment Diastolic BP  73   -NS     Post Systolic BP Rehab  129  -NS     Post Treatment Diastolic BP  73  -NS     Pretreatment Heart Rate (beats/min)  87  -NS     Posttreatment Heart Rate (beats/min)  89  -NS     Pre SpO2 (%)  92  -NS     O2 Delivery Pre Treatment  room air  -NS     Intra SpO2 (%)  95  -NS     O2 Delivery Intra Treatment  room air  -NS     Post SpO2 (%)  94  -NS     O2 Delivery Post Treatment  room air  -NS     Pre Patient Position  Sitting  -NS     Intra Patient Position  Standing  -NS     Row Name 03/12/21 1055          Positioning and Restraints    Pre-Treatment Position  sitting in chair/recliner  -NS     Post Treatment Position  chair  -NS     In Chair  notified nsg;reclined;call light within reach;encouraged to call for assist RN deferred chair alarm  -NS       User Key  (r) = Recorded By, (t) = Taken By, (c) = Cosigned By    Initials Name Provider Type    Sarah Layne PT Physical Therapist        Outcome Measures     Row Name 03/12/21 1055          How much help from another person do you currently need...    Turning from your back to your side while in flat bed without using bedrails?  4  -NS     Moving from lying on back to sitting on the side of a flat bed without bedrails?  3  -NS     Moving to and from a bed to a chair (including a wheelchair)?  3  -NS     Standing up from a chair using your arms (e.g., wheelchair, bedside chair)?  3  -NS     Climbing 3-5 steps with a railing?  3  -NS     To walk in hospital room?  3  -NS     AM-PAC 6 Clicks Score (PT)  19  -NS     Row Name 03/12/21 1055          Functional Assessment    Outcome Measure Options  AM-PAC 6 Clicks Basic Mobility (PT)  -NS       User Key  (r) = Recorded By, (t) = Taken By, (c) = Cosigned By    Initials Name Provider Type    Sarah Layne PT Physical Therapist        Physical Therapy Education                 Title: PT OT SLP Therapies (Not Started)     Topic: Physical Therapy (In Progress)     Point: Mobility training (Done)     Learning  Progress Summary           Patient Acceptance, E, VU by NS at 3/12/2021 1150    Comment: Patient was educated about PT POC, safety with mobility, and benefits of OP PT for improving strength.                   Point: Home exercise program (Not Started)     Learner Progress:  Not documented in this visit.          Point: Body mechanics (Done)     Learning Progress Summary           Patient Acceptance, E, VU by NS at 3/12/2021 1150    Comment: Patient was educated about PT POC, safety with mobility, and benefits of OP PT for improving strength.                   Point: Precautions (Done)     Learning Progress Summary           Patient Acceptance, E, VU by NS at 3/12/2021 1150    Comment: Patient was educated about PT POC, safety with mobility, and benefits of OP PT for improving strength.                               User Key     Initials Effective Dates Name Provider Type Discipline    NS 09/10/19 -  Sarah Roy, [place] Physical Therapist PT              PT Recommendation and Plan  Planned Therapy Interventions (PT): balance training, bed mobility training, gait training, home exercise program, neuromuscular re-education, strengthening, transfer training, patient/family education  Plan of Care Reviewed With: patient  Progress: no change (PT eval)  Outcome Summary: Patient presents with mild deficits in strength, balance, and activity tolerance affecting functional mobility. He transferred STS with SBA and ambulated 82ft with quad cane and CGA, demonstrating slow gait speed and mild LOB with backwards walking. VSS on RA. Skilled IP PT warranted. Recommend home with assist and OP PT at discharge for continued LE strengthening.     Time Calculation:   PT Charges     Row Name 03/12/21 1055             Time Calculation    Start Time  1055  -NS      PT Received On  03/12/21  -NS      PT Goal Re-Cert Due Date  03/22/21  -NS        User Key  (r) = Recorded By, (t) = Taken By, (c) = Cosigned By    Initials Name Provider Type     NS Sarah Roy, PT Physical Therapist        Therapy Charges for Today     Code Description Service Date Service Provider Modifiers Qty    [id number] HC PT EVAL LOW COMPLEXITY 4 3/12/2021 Sarah Roy, PT GP 1          PT G-Codes  Outcome Measure Options: AM-PAC 6 Clicks Daily Activity (OT)  AM-PAC 6 Clicks Score (PT): 19  AM-PAC 6 Clicks Score (OT): 21    Sarah Roy PT  3/12/2021

## 2021-03-12 NOTE — PROGRESS NOTES
[facility] Medicine Services  PROGRESS NOTE    Patient Name: Derek Kelsey  : 1950  MRN: [id number]    Date of Admission: 3/10/2021  Primary Care Physician: Raudel Zheng MD    Subjective   Subjective     CC:  Sepsis, hx endograft infection, covid, cirrhosis    HPI:  Very mild abdominal fullness, nausea. No fever today. No dyspnea    ROS:  No headache, no focal weakness, no vision changes  Balance of ros negative except as above  Gen- chills prior to admission (resolved)  CV- No chest pain, palpitations  Resp- No cough, dyspnea        Objective   Objective     Vital Signs:   Temp:  [97.7 °F (36.5 °C)-98.2 °F (36.8 °C)] 97.7 °F (36.5 °C)  Heart Rate:  [] 85  Resp:  [18-20] 18  BP: (126-140)/(71-89) 126/73        Physical Exam:  Constitutional:Alert, oriented x 3, nontoxic appearing  Psych:Normal/appropriate affect  HEENT:Ncat, oroph clear  Neck: neck supple, full range of motion  Neuro: Face symmetric, speech clear, equal , moves all extremities  Cardiac: irr irr, regular rate currently; No pretibial pitting edema  Resp: Ctab, normal effort  GI: abd soft, nontender, +bs  Skin: No extremity rash  Musculoskeletal/extremities: no cyanosis extremities; no significant ankle edema      Results Reviewed:  Results from last 7 days   Lab Units 21  0521  0854 21  0547 03/10/21  1302   WBC 10*3/mm3 7.84  --  12.28* 17.40*   HEMOGLOBIN g/dL 8.9*  --  8.7* 9.5*   HEMATOCRIT % 27.9*  --  27.3* 30.8*   PLATELETS 10*3/mm3 99*  --  91* 111*   INR  1.80* 1.96*  --   --    PROCALCITONIN ng/mL  --   --   --  7.37*     Results from last 7 days   Lab Units 21  0521  0547 03/10/21  1302   SODIUM mmol/L 134* 134* 131*   POTASSIUM mmol/L 3.7 3.9 3.8   CHLORIDE mmol/L 103 103 97*   CO2 mmol/L 22.0 22.0 21.0*   BUN mg/dL 12 14 13   CREATININE mg/dL 0.76 0.90 1.18   GLUCOSE mg/dL 102* 105* 113*   CALCIUM mg/dL 9.1 8.4* 9.1   ALT (SGPT) U/L 40 33 39   AST  (SGOT) U/L 105* 71* 91*   TROPONIN T ng/mL  --   --  <0.010   PROBNP pg/mL  --   --  485.0     Estimated Creatinine Clearance: 117.4 mL/min (by C-G formula based on SCr of 0.76 mg/dL).    Microbiology Results Abnormal     Procedure Component Value - Date/Time    Blood Culture - Blood, Arm, Right [[id number]]  (Abnormal) Collected: 03/10/21 1510    Lab Status: Preliminary result Specimen: Blood from Arm, Right Updated: 03/12/21 1045     Blood Culture Pseudomonas species     Isolated from Aerobic Bottle     Gram Stain Aerobic Bottle Gram negative bacilli    Blood Culture - Blood, Arm, Left [[id number]]  (Abnormal) Collected: 03/10/21 1530    Lab Status: Preliminary result Specimen: Blood from Arm, Left Updated: 03/12/21 0515     Blood Culture Abnormal Stain     Gram Stain Aerobic Bottle Gram negative bacilli    Blood Culture ID, PCR - Blood, Arm, Right [[id number]]  (Abnormal) Collected: 03/10/21 1510    Lab Status: Final result Specimen: Blood from Arm, Right Updated: 03/11/21 1741     BCID, PCR Pseudomonas aeruginosa. Identification by BCID PCR.    Urine Culture - Urine, Urine, Clean Catch [[id number]]  (Normal) Collected: 03/10/21 1459    Lab Status: Final result Specimen: Urine, Clean Catch Updated: 03/11/21 1630     Urine Culture No growth    COVID PRE-OP / PRE-PROCEDURE SCREENING ORDER (NO ISOLATION) - Swab, Nasopharynx [[id number]]  (Abnormal) Collected: 03/10/21 1522    Lab Status: Final result Specimen: Swab from Nasopharynx Updated: 03/10/21 1638    Narrative:      The following orders were created for panel order COVID PRE-OP / PRE-PROCEDURE SCREENING ORDER (NO ISOLATION) - Swab, Nasopharynx.  Procedure                               Abnormality         Status                     ---------                               -----------         ------                     COVID-19 and FLU A/B PCR...[[id number]]  Abnormal            Final result                 Please view results for these tests on the individual orders.     COVID-19 and FLU A/B PCR - Swab, Nasopharynx [[id number]]  (Abnormal) Collected: 03/10/21 1522    Lab Status: Final result Specimen: Swab from Nasopharynx Updated: 03/10/21 1638     COVID19 Detected     Influenza A PCR Not Detected     Influenza B PCR Not Detected    Narrative:      Fact sheet for providers: https://www.fda.gov/media/678061/download    Fact sheet for patients: https://www.fda.gov/media/357932/download    Test performed by PCR.  Influenza A and Influenza B negative results should be considered presumptive in samples that have a positive SARS-CoV-2 result.    Competitive inhibition studies showed that SARS-CoV-2 virus, when present at concentrations above 3.6E+04 copies/mL, can inhibit the detection and amplification of influenza A and influenza B virus RNA if present at or below 1.8E+02 copies/mL or 4.9E+02 copies/mL, respectively, and may lead to false negative influenza virus results. If co-infection with influenza A or influenza B virus is suspected in samples with a positive SARS-CoV-2 result, the sample should be re-tested with another FDA cleared, approved, or authorized influenza test, if influenza virus detection would change clinical management.          Imaging Results (Last 24 Hours)     Procedure Component Value Units Date/Time    MRI abdomen wo contrast mrcp [[id number]] Collected: 03/12/21 0818     Updated: 03/12/21 0843    Narrative:      EXAMINATION: MRI ABDOMEN WO CONTRAST MRCP-      INDICATION: Rule out cholecystitis, cholangitis (gram negative  bacteremia, transient nausea and vomiting, with mild elevated bilirubin,  history cirrhosis and known chronic AAA endograft infection);  A41.9-Sepsis, unspecified organism; U07.1-COVID-19; J12.82-Pneumonia due  to Coronavirus disease 2019; E87.2-Acidosis.     TECHNIQUE: Multiplanar MRI of the abdomen without intravenous contrast  along with MRCP sequences performed including T2 coronal space  reconstruction sequence.        COMPARISON: CT  abdomen and pelvis dated 03/10/2021.     FINDINGS: Lung bases are grossly clear. Liver has heterogeneous signal  throughout with nodular contours consistent with known cirrhotic hepatic  morphology. No focal lesion is evident other than a T2 hyperintense  focus in the left hemiliver most consistent with cysts although limited  evaluation of the liver parenchyma without intravenous contrast  administration. Gallbladder appears partially contracted and  unremarkable without distinct filling defect. Motion degradation on MRP  sequences, however no gross intrahepatic or extrahepatic biliary  dilatation with common bile duct measuring up to 9 mm in diameter at the  level of the alex hepatis greater than expected for caliber of mild  dilatation without pancreas divisum anatomy. No distinct filling defect  within the common bile duct to suggest choledocholithiasis with adjacent  intermediate T2 signal concerning for edema versus potential minimal  inflammation. Pancreas is somewhat limited due to motion, however no  gross abnormality. Spleen is unremarkable. Adrenals without distinct  nodule. Kidneys without hydronephrosis or hydroureter. No bulky  retroperitoneal adenopathy, however, noted redemonstration of soft  tissue and stranding surrounding the aortobiiliac endovascular stent  graft seen better on CT recently performed.       Impression:      1. Cirrhotic hepatic morphology again noted with cyst in the left liver  stable from prior CT comparison without intravenous contrast evaluate  further for a subtle parenchymal lesion.  2. Gallbladder is partially contracted without definitive cholelithiasis  or choledocholithiasis of filling defect, however, common bile duct is  prominent and greater than expected at 9 mm in diameter at the level of  the alex hepatis. Questionable minimal adjacent edema or intermediate  fluid signal may represent components of inflammation or fluid overall  indeterminate without gross  intrahepatic biliary dilatation with some  motion degradation.     D:  03/12/2021  E:  03/12/2021                Results for orders placed during the hospital encounter of 03/10/21    Adult Transthoracic Echo Complete W/ Cont if Necessary Per Protocol    Interpretation Summary  · Estimated right ventricular systolic pressure from tricuspid regurgitation is normal (<35 mmHg).  · Estimated left ventricular EF = 65% Left ventricular systolic function is normal.      I have reviewed the medications:  Scheduled Meds:aspirin, 81 mg, Oral, Daily  carvedilol, 12.5 mg, Oral, BID With Meals  citalopram, 40 mg, Oral, Daily  ferric gluconate, 125 mg, Intravenous, Daily  heparin (porcine), 5,000 Units, Subcutaneous, Q12H  lactulose, 20 g, Oral, TID  melatonin, 5 mg, Oral, Nightly  meropenem, 1 g, Intravenous, Q8H  pantoprazole, 40 mg, Oral, BID  riFAXIMin, 550 mg, Oral, Q12H  saccharomyces boulardii, 250 mg, Oral, BID  sodium chloride, 10 mL, Intravenous, Q12H  sucralfate, 1 g, Oral, 4x Daily  tamsulosin, 0.4 mg, Oral, Daily      Continuous Infusions:sodium chloride, 75 mL/hr, Last Rate: 75 mL/hr (03/12/21 0815)      PRN Meds:.lactulose  •  magnesium sulfate **OR** magnesium sulfate in D5W 1g/100mL (PREMIX) **OR** magnesium sulfate  •  ondansetron  •  sodium chloride  •  sodium chloride    Assessment/Plan   Assessment & Plan     Active Hospital Problems    Diagnosis  POA   • **Sepsis (CMS/HCC) [A41.9]  Yes   • Gram-negative bacteremia [R78.81]  Unknown     Priority: High   • 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        Brief Hospital Course to date:  Derek Kelsey is a 70 y.o. male w/ hx of infected AAA endograft (followed by Infectious disease), COPD, HTN, cirrhosis who presented to [facility]  ED w/ 2-3 day history of fevers and malaise, confusion, and mild chronic abdominal pain. Reportedly had 1st covid 19 vaccine shot approximately 2 weeks prior to this presentation. In ED patient became hypotensive requiring aggressive iv fluids, had a + covid 19 pcr swab and was admitted initially to ICU. Ct head negative. Lactate was 6.1, procalcitonin 7.37. wbc count 17,000 CT angio chest/abd/pelvis negative for pe, no acute lung dz noted, cirrhosis noted (no free fluid/ascites noted), stable appearance of endovascular aortic aneurysm repair w/ aortobiiliac stent graft and distal infrarenal stranding & minimal adenopathy (similar to 2/24/21 scan) without evidence for distinct progression or definitive leak. acute intrathoracid process noted Overnight patient responded to iv resuscitation and was ultimately transferred to hospitalist service on day #2    *Covid 19 pcr +   -currently on home level supplemental oxygen (1-3 liters)   -holding on steroids & remdesivir currently, deferring to ID  *Severe sepsis due to pseudomonas high grade bacteremia   -? Due to graft infection; ID following   -hypotension, lactic acidosis   -mrcp 3/12/21 no overt filling defects, gb ok, cbd 9mm w/ minimal edema vs artifact area alex hepatis  *Hx AAA endovascular repair (June 2019) 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 Carrero of ID on chronic suppressive oral cipro & doxy  *Transient n/v & abdominal cramping  *New Afib   -echo ok, tsh ok   -due to acute illness/sepsis   -increased coreg to 12.5mg bid; titrate prn  *Metabolic encephalopathy, likely due to infection +/- hepatic encephalopathy, resolved   -ammonia >100-->80's, continue lactulose & continue rifaxamin -ct head negative  *Moderate COPD/chronic lung dz   *chronic hypoxic resp failure (2-3 L chronic use)  *Cirrhosis   -on lasix, coreg, rifaxamin at home (lasix currently on hold, restart prn)  *Thrombocytopenia   -due to cirrhosis. Monitor 91,000 on  3/11/21  *Gerd  *HTN  *pre-dm  *Previous lumbar fusion surgery  *Hx non-obstructive CAD 9/2018 heart cath   -on asa    Plan:  -covid appears asymptomatic (so no remdesivir or decadron)  -normal saline 75cc/hr  -continue merrem per ID (Dr. Carrero) following  -coreg 12.5 mg bid, titrate prn; afib likely due to sepsis (echo ok, tsh ok); defer anticoaguation for now (as inr 1.9 w/ thrombocytopenia). If becomes refractory then would get Dr. Black to see but keep current rx for now  -continue asa  -titrate insulins prn  -overall improved  -updated wife Mago on 3/12/21 to update, she appreciated the call    Am labs: covid progression labs, ammonia, follow cultures      DVT Prophylaxis:  Sq heparin tid    Disposition: I expect the patient to be discharged once clinically improved, date tbd    CODE STATUS:   Code Status and Medical Interventions:   Ordered at: 03/10/21 1906     Limited Support to NOT Include:    Cardioversion/Defibrillation    Intubation     Code Status:    No CPR     Medical Interventions (Level of Support Prior to Arrest):    Limited       Rinku Mcrae MD  03/12/21

## 2021-03-12 NOTE — PLAN OF CARE
Goal Outcome Evaluation:  Plan of Care Reviewed With: patient    Patient currently on room air.  VSS with sinus tachycardia on tele.  Patient has made no complaints of pain or discomfort.  Continuing to monitor.

## 2021-03-12 NOTE — PLAN OF CARE
Goal Outcome Evaluation:  Plan of Care Reviewed With: patient  Progress: no change (PT eval)  Outcome Summary: Patient presents with mild deficits in strength, balance, and activity tolerance affecting functional mobility. He transferred STS with SBA and ambulated 82ft with quad cane and CGA, demonstrating slow gait speed and mild LOB with backwards walking. VSS on RA. Skilled IP PT warranted. Recommend home with assist and OP PT at discharge for continued LE strengthening.

## 2021-03-12 NOTE — PROGRESS NOTES
INFECTIOUS DISEASE CONSULT/INITIAL HOSPITAL VISIT    Derek Kelsey  1950  [id number]    Date of Consult: 3/12/2021    Admission Date: 3/10/2021      Requesting Provider: No ref. provider found  Evaluating Physician: Davis Carrero MD    Reason for Consultation: Positive Covid PCR    History of present illness:    Patient is a 70 y.o. 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  Patient has come into the emergency room and tested positive for Covid is not hypoxic patient did have hypotension which responded to IV fluids patient is currently not requiring oxygen and feels better has been started on empiric vancomycin and meropenem.    Patient's wife is being tested for Covid results are pending.    I was previously suppressing patient with doxycycline and ciprofloxacin.      Patient is not a surgical candidate for endograft removal because of cirrhosis and high surgical mortality      3/12/21; doing well; no fever, rash, sore throat; on room air, no hypoxia overnight; blood cultures growing gnr from admission        Past Medical History:   Diagnosis Date   • Abdominal aortic aneurysm (CMS/HCC) 9/29/2016   • Anxiety 9/29/2016   • Arthritis    • Back pain    • Black lung (CMS/[facility])    • CAD (coronary artery disease) 9/29/2016   • Cirrhosis (CMS/[facility])    • COPD (chronic obstructive pulmonary disease) (CMS/[facility]) 9/29/2016   • Depression 9/29/2016   • Depression    • GERD (gastroesophageal reflux disease)    • Hypertension 9/29/2016   • On supplemental oxygen by nasal cannula     at night   • Vitiligo    • Wears dentures    • Wears reading eyeglasses        Past Surgical History:   Procedure Laterality Date   • ABDOMINAL AORTIC ANEURYSM REPAIR WITH ENDOGRAFT N/A 6/6/2019     Procedure: ABDOMINAL AORTIC ANEURYSM REPAIR WITH ENDOGRAFT;  Surgeon: Leopoldo Burleson MD;  Location:  [facility];  Service: Vascular   • BACK SURGERY     • CARDIAC CATHETERIZATION     • CARDIAC CATHETERIZATION N/A 9/28/2018    Procedure: Left Heart Cath;  Surgeon: Douglas Galvez MD;  Location:  [facility];  Service: Cardiovascular   • CARDIAC SURGERY     • COLONOSCOPY      2015   • COLONOSCOPY N/A 10/30/2019    Procedure: COLONOSCOPY;  Surgeon: Homar Viveros MD;  Location:  [facility];  Service: Gastroenterology   • ENDOSCOPY N/A 10/30/2019    Procedure: ESOPHAGOGASTRODUODENOSCOPY;  Surgeon: Homar Viveros MD;  Location:  [facility];  Service: Gastroenterology   • EYE SURGERY      cataracts bilateral   • HAND SURGERY Left    • LUMBAR DISCECTOMY FUSION INSTRUMENTATION N/A 11/1/2018    Procedure: LUMBAR DISCECTOMY POSTERIOR WITH FUSION INSTRUMENTATION;  Surgeon: Joo Franklin MD;  Location:  [facility];  Service: Orthopedic Spine   • LUMBAR DISCECTOMY FUSION INSTRUMENTATION N/A 7/24/2019    Procedure: POSTERIOR LUMBAR SPINAL FUSION REVISION AND INSTRUMENTATION L3,L4,L5,S1;  Surgeon: Joo Franklin MD;  Location:  [facility];  Service: Orthopedic Spine   • ORIF FINGER / THUMB FRACTURE     • SHOULDER SURGERY Left        Family History   Problem Relation Age of Onset   • Stroke Mother    • Hypertension Mother    • Heart disease Mother    • Heart disease Father    • Hypertension Father    • Diabetes Sister    • Hypertension Sister    • Heart disease Sister    • Diabetes Brother    • Heart disease Brother    • Hypertension Child    • Hypertension Son    • No Known Problems Son    • Diabetes Sister    • Heart disease Sister        Social History     Socioeconomic History   • Marital status:      Spouse name: Not on file   • Number of children: 2   • Years of education: Not on file   • Highest education level: Not on file   Tobacco Use   • Smoking status:  Former Smoker     Packs/day: 1.00     Years: 30.00     Pack years: 30.00     Types: Cigarettes     Quit date: 1/1/2011     Years since quitting: 10.2   • Smokeless tobacco: Former User     Types: Chew     Quit date: 4/7/2011   Substance and Sexual Activity   • Alcohol use: Not Currently   • Drug use: No   • Sexual activity: Defer     Comment:  and lives with wife       Allergies   Allergen Reactions   • Penicillins Hives     Hives - has tolerated Zosyn and ceftriaxone 09/2019   • Percocet [Oxycodone-Acetaminophen] Confusion         Medication:    Current Facility-Administered Medications:   •  aspirin EC tablet 81 mg, 81 mg, Oral, Daily, Case, Ludmila V., DO, 81 mg at 03/12/21 0810  •  carvedilol (COREG) tablet 12.5 mg, 12.5 mg, Oral, BID With Meals, Rinku Mcrae MD  •  carvedilol (COREG) tablet 6.25 mg, 6.25 mg, Oral, Once, Rinku Mcrae MD  •  citalopram (CeleXA) tablet 40 mg, 40 mg, Oral, Daily, Case, Ludmila V., DO, 40 mg at 03/12/21 0810  •  heparin (porcine) 5000 UNIT/ML injection 5,000 Units, 5,000 Units, Subcutaneous, Q12H, Rinku Mcrae MD, 5,000 Units at 03/12/21 0812  •  lactulose (CHRONULAC) 10 GM/15ML solution 10 g, 10 g, Oral, TID PRN, Case, Ludmila V., DO  •  lactulose (CHRONULAC) 10 GM/15ML solution 20 g, 20 g, Oral, TID, Rinku Mcrae MD, 20 g at 03/12/21 0812  •  magnesium sulfate 4 gram infusion - Mg less than or equal to 1mg/dL, 4 g, Intravenous, PRN **OR** magnesium sulfate 3 gram infusion (1gm x 3) - Mg 1.1 - 1.5 mg/dL, 1 g, Intravenous, PRN **OR** Magnesium Sulfate 2 gram infusion- Mg 1.6 - 1.9 mg/dL, 2 g, Intravenous, PRN, Rinku Mcrae MD, Last Rate: 25 mL/hr at 03/12/21 0941, 2 g at 03/12/21 0941  •  melatonin tablet 5 mg, 5 mg, Oral, Nightly, Case, Ludmila V., DO, 5 mg at 03/11/21 2137  •  meropenem (MERREM) 1 g/100 mL 0.9% NS VTB (mbp), 1 g, Intravenous, Q8H, Case, Ludmila V., DO, Last Rate: 0 mL/hr at 03/11/21 0405, 1 g at 03/12/21 0810  •   ondansetron (ZOFRAN) injection 4 mg, 4 mg, Intravenous, Q6H PRN, Rinku Mcrae MD, 4 mg at 03/12/21 0939  •  pantoprazole (PROTONIX) EC tablet 40 mg, 40 mg, Oral, BID, Case, Ludmila V., DO, 40 mg at 03/12/21 0810  •  Pharmacy Consult - Pharmacy to dose Vacomycin and Meropenem, , Does not apply, Once, Case, Ludmila V., DO  •  riFAXIMin (XIFAXAN) tablet 550 mg, 550 mg, Oral, Q12H, Case, Ludmila V., DO, 550 mg at 03/12/21 0810  •  saccharomyces boulardii (FLORASTOR) capsule 250 mg, 250 mg, Oral, BID, Case, Ludmila V., DO, 250 mg at 03/12/21 0812  •  sodium chloride 0.9 % flush 10 mL, 10 mL, Intravenous, PRN, Case, Ludmila V., DO  •  sodium chloride 0.9 % flush 10 mL, 10 mL, Intravenous, Q12H, Case, Ludmila V., DO, 10 mL at 03/12/21 0813  •  sodium chloride 0.9 % flush 10 mL, 10 mL, Intravenous, PRN, Case, Ludmila V., DO  •  sodium chloride 0.9 % infusion, 75 mL/hr, Intravenous, Continuous, Rinku Mcrae MD, Last Rate: 75 mL/hr at 03/12/21 0815, 75 mL/hr at 03/12/21 0815  •  sucralfate (CARAFATE) tablet 1 g, 1 g, Oral, 4x Daily, Case, Ludmila V., DO, 1 g at 03/12/21 0810  •  tamsulosin (FLOMAX) 24 hr capsule 0.4 mg, 0.4 mg, Oral, Daily, Case, Ludmila V., DO, 0.4 mg at 03/12/21 0812  •  vancomycin (VANCOCIN) in iso-osmotic dextrose IVPB 1 g (premix) 200 mL, 1,000 mg, Intravenous, Q12H, Case, Ludmila V., DO, 1,000 mg at 03/12/21 0808    Antibiotics:  Anti-Infectives (From admission, onward)    Ordered     Dose/Rate Route Frequency Start Stop    03/10/21 1914  vancomycin (VANCOCIN) in iso-osmotic dextrose IVPB 1 g (premix) 200 mL     Ordering Provider: Case, Ludmila V., DO    1,000 mg  over 60 Minutes Intravenous Every 12 Hours 03/11/21 0600 03/16/21 0559    03/10/21 1920  meropenem (MERREM) 1 g/100 mL 0.9% NS VTB (mbp)     Ordering Provider: Case, Ludmila V., DO    1 g  over 3 Hours Intravenous Every 8 Hours 03/11/21 0000 03/15/21 2359    03/10/21 2042  riFAXIMin (XIFAXAN) tablet 550 mg     Ordering  Provider: Ludmila Mederos., DO    550 mg Oral Every 12 Hours Scheduled 03/10/21 2100 21 2059    03/10/21 1548  vancomycin 2250 mg/500 mL 0.9% NS IVPB ([facility])     Ordering Provider: Joo Cheung APRN    20 mg/kg × 111 kg Intravenous Once 03/10/21 1550 03/10/21 2116    03/10/21 1518  meropenem (MERREM) 1 g/100 mL 0.9% NS VTB (mbp)     Ordering Provider: Joo Cheung APRN    1 g  over 30 Minutes Intravenous Once 03/10/21 1520 03/10/21 1850            Review of Systems:  See hpi      Physical Exam:   Vital Signs  Temp (24hrs), Av.9 °F (36.6 °C), Min:97.7 °F (36.5 °C), Max:98.2 °F (36.8 °C)    Temp  Min: 97.7 °F (36.5 °C)  Max: 98.2 °F (36.8 °C)  BP  Min: 128/78  Max: 166/79  Pulse  Min: 105  Max: 135  Resp  Min: 18  Max: 20  SpO2  Min: 92 %  Max: 95 %    GENERAL: Awake and alert, in no acute distress.  Age-appropriate  HEENT: Normocephalic, atraumatic.  PERRL. EOMI. No conjunctival injection. No icterus.  No external oral lesions    HEART:sinus rhythm on tele  LUNGS: symmetrical inspiration bilaterally  ABDOMEN: Soft, nontender, nondistended.   EXT: 1+ edema  :  Without Young catheter.  MSK: No joint deformity  SKIN: Warm and dry without cutaneous eruptions on Inspection/palpation.    NEURO: Oriented to PPT.  PSYCHIATRIC: Normal insight and judgement. Cooperative with PE    Laboratory Data    Results from last 7 days   Lab Units 21  0517 21  0547 03/10/21  1302   WBC 10*3/mm3 7.84 12.28* 17.40*   HEMOGLOBIN g/dL 8.9* 8.7* 9.5*   HEMATOCRIT % 27.9* 27.3* 30.8*   PLATELETS 10*3/mm3 99* 91* 111*     Results from last 7 days   Lab Units 21  0517   SODIUM mmol/L 134*   POTASSIUM mmol/L 3.7   CHLORIDE mmol/L 103   CO2 mmol/L 22.0   BUN mg/dL 12   CREATININE mg/dL 0.76   GLUCOSE mg/dL 102*   CALCIUM mg/dL 9.1     Results from last 7 days   Lab Units 21  0517   ALK PHOS U/L 143*   BILIRUBIN mg/dL 1.2   ALT (SGPT) U/L 40   AST (SGOT) U/L 105*         Results from last 7 days   Lab Units  03/12/21  0517   CRP mg/dL 6.25*     Results from last 7 days   Lab Units 03/11/21  0547   LACTATE mmol/L 2.6*  2.6*         Results from last 7 days   Lab Units 03/12/21  0517   VANCOMYCIN TR mcg/mL 12.40     Estimated Creatinine Clearance: 117.4 mL/min (by C-G formula based on SCr of 0.76 mg/dL).      Microbiology:  No results found for: ACANTHNAEG, AFBCX, BPERTUSSISCX, BLOODCX  No results found for: BCIDPCR, CXREFLEX, CSFCX, CULTURETIS  No results found for: CULTURES, HSVCX, URCX  No results found for: EYECULTURE, GCCX, HSVCULTURE, LABHSV  No results found for: LEGIONELLA, MRSACX, MUMPSCX, MYCOPLASCX  No results found for: NOCARDIACX, STOOLCX  Urine Culture   Date Value Ref Range Status   03/10/2021 No growth  Preliminary     No results found for: VIRALCULTU, WOUNDCX        Radiology:  Imaging Results (Last 72 Hours)     Procedure Component Value Units Date/Time    MRI abdomen wo contrast mrcp [[id number]] Collected: 03/12/21 0818     Updated: 03/12/21 0843    Narrative:      EXAMINATION: MRI ABDOMEN WO CONTRAST MRCP-      INDICATION: Rule out cholecystitis, cholangitis (gram negative  bacteremia, transient nausea and vomiting, with mild elevated bilirubin,  history cirrhosis and known chronic AAA endograft infection);  A41.9-Sepsis, unspecified organism; U07.1-COVID-19; J12.82-Pneumonia due  to Coronavirus disease 2019; E87.2-Acidosis.     TECHNIQUE: Multiplanar MRI of the abdomen without intravenous contrast  along with MRCP sequences performed including T2 coronal space  reconstruction sequence.        COMPARISON: CT abdomen and pelvis dated 03/10/2021.     FINDINGS: Lung bases are grossly clear. Liver has heterogeneous signal  throughout with nodular contours consistent with known cirrhotic hepatic  morphology. No focal lesion is evident other than a T2 hyperintense  focus in the left hemiliver most consistent with cysts although limited  evaluation of the liver parenchyma without intravenous  contrast  administration. Gallbladder appears partially contracted and  unremarkable without distinct filling defect. Motion degradation on MRP  sequences, however no gross intrahepatic or extrahepatic biliary  dilatation with common bile duct measuring up to 9 mm in diameter at the  level of the alex hepatis greater than expected for caliber of mild  dilatation without pancreas divisum anatomy. No distinct filling defect  within the common bile duct to suggest choledocholithiasis with adjacent  intermediate T2 signal concerning for edema versus potential minimal  inflammation. Pancreas is somewhat limited due to motion, however no  gross abnormality. Spleen is unremarkable. Adrenals without distinct  nodule. Kidneys without hydronephrosis or hydroureter. No bulky  retroperitoneal adenopathy, however, noted redemonstration of soft  tissue and stranding surrounding the aortobiiliac endovascular stent  graft seen better on CT recently performed.       Impression:      1. Cirrhotic hepatic morphology again noted with cyst in the left liver  stable from prior CT comparison without intravenous contrast evaluate  further for a subtle parenchymal lesion.  2. Gallbladder is partially contracted without definitive cholelithiasis  or choledocholithiasis of filling defect, however, common bile duct is  prominent and greater than expected at 9 mm in diameter at the level of  the alex hepatis. Questionable minimal adjacent edema or intermediate  fluid signal may represent components of inflammation or fluid overall  indeterminate without gross intrahepatic biliary dilatation with some  motion degradation.     D:  03/12/2021  E:  03/12/2021          CT Head Without Contrast [[id number]] Collected: 03/10/21 1648     Updated: 03/11/21 1858    Narrative:      EXAMINATION: CT HEAD WO CONTRAST- - 03/10/2021     INDICATION: Altered mental status.     TECHNIQUE: CT head without intravenous contrast     The radiation dose reduction  device was turned on for each scan per the  ALARA (As Low as Reasonably Achievable) protocol.     COMPARISON: None.     FINDINGS: Midline structures are symmetric without evidence of mass,  mass effect, or midline shift. Ventricles and sulci within normal  limits. No intra-axial hemorrhage or extra-axial fluid collection.  Globes and orbits unremarkable. Paranasal sinuses and mastoid air cells  are grossly clear and well-pneumatized. Calvarium intact.       Impression:      No acute intracranial abnormality.     DICTATED:   03/10/2021  EDITED/ls :   03/10/2021        This report was finalized on 3/11/2021 6:55 PM by Dr. Damien Ceballos.       CT Angiogram Chest [[id number]] Collected: 03/10/21 1659     Updated: 03/11/21 1858    Narrative:      EXAMINATION: CT ANGIOGRAM CHEST, CT ABDOMEN/PELVIS W CONTRAST -  03/10/2021      INDICATION: Shortness of air. Altered mental status. Abdominal pain.  Evaluate for pulmonary embolus.      TECHNIQUE: CT angiogram chest with intravenous contrast administration  following PE protocol. 2D reconstructions performed, CT abdomen and  pelvis with intravenous contrast administration.     The radiation dose reduction device was turned on for each scan per the  ALARA (As Low as Reasonably Achievable) protocol.     COMPARISON: CT abdomen and pelvis 02/24/2021.     FINDINGS:      CTA CHEST: Thyroid is homogeneous in attenuation. No bulky mediastinal  adenopathy. Central airways are patent. Esophagus in normal course and  caliber to the distal segment where there is a small hiatal hernia.  Patent nonaneurysmal minimally atherosclerotic thoracic aorta with  patent great vessel origins. No central pulmonary arterial filling  defect to suggest pulmonary embolus with equivocal optimal  opacification. Cardiac size borderline enlarged without pericardial  effusion. Extended lung windows demonstrate biapical pleural-parenchymal  scarring with paraseptal emphysema and biapical  pleural-parenchymal  scarring, however, no dominant nodule or mass. No pleural effusion.  Degenerative disease of the thoracic spine without aggressive osseous  lesion. No soft tissue body wall findings of acuity involving the chest.     ABDOMEN AND PELVIS: Liver demonstrates nodular contours concerning for  parenchymal disease process such as potential cirrhotic hepatic  morphology with a low-attenuation focus likely a  cyst in the left  hemiliver. Gallbladder distended without obvious calculus associated. No  biliary dilatation. Pancreas and spleen unremarkable. Adrenals without  distinct nodule. Kidneys without hydronephrosis or hydroureter. No bulky  retroperitoneal adenopathy. Atherosclerotic aneurysmal abdominal aorta  with aortobiiliac endovascular stent in place demonstrating gross  patency with adjacent stranding to the distal aorta as seen on prior  comparison grossly similar appearance with mildly enlarged  retroperitoneal lymph nodes adjacent to the periaortic region. Portal  veins and IVC grossly patent. GI tract evaluation without focal  thickening or disproportionate dilatation. No free fluid or  intra-abdominal free air. Pelvic viscera unremarkable without bulky  pelvic adenopathy.       Impression:      1. No PE is clearly identified as no central filling defect is  identified on suboptimal evaluation due to technique despite repeat  sequencing.     2. No acute intrathoracic process.     3. Cirrhotic hepatic morphology again noted.     4. Stable appearance of the endovascular aortic aneurysm repair with  aortobiiliac stent graft and adjacent distal infrarenal stranding and  minimal adenopathy as seen on prior comparison of  02/24/2021 without  evidence for distinct progression or definitive hemorrhage/leak clearly  evident in a similar configuration to prior comparison 02/24/2021.     DICTATED:   03/10/2021  EDITED/ls :   03/10/2021        This report was finalized on 3/11/2021 6:55 PM by   Damien Ceballos.       CT Abdomen Pelvis With Contrast [[id number]] Collected: 03/10/21 1659     Updated: 03/11/21 4167    Narrative:      EXAMINATION: CT ANGIOGRAM CHEST, CT ABDOMEN/PELVIS W CONTRAST -  03/10/2021      INDICATION: Shortness of air. Altered mental status. Abdominal pain.  Evaluate for pulmonary embolus.      TECHNIQUE: CT angiogram chest with intravenous contrast administration  following PE protocol. 2D reconstructions performed, CT abdomen and  pelvis with intravenous contrast administration.     The radiation dose reduction device was turned on for each scan per the  ALARA (As Low as Reasonably Achievable) protocol.     COMPARISON: CT abdomen and pelvis 02/24/2021.     FINDINGS:      CTA CHEST: Thyroid is homogeneous in attenuation. No bulky mediastinal  adenopathy. Central airways are patent. Esophagus in normal course and  caliber to the distal segment where there is a small hiatal hernia.  Patent nonaneurysmal minimally atherosclerotic thoracic aorta with  patent great vessel origins. No central pulmonary arterial filling  defect to suggest pulmonary embolus with equivocal optimal  opacification. Cardiac size borderline enlarged without pericardial  effusion. Extended lung windows demonstrate biapical pleural-parenchymal  scarring with paraseptal emphysema and biapical pleural-parenchymal  scarring, however, no dominant nodule or mass. No pleural effusion.  Degenerative disease of the thoracic spine without aggressive osseous  lesion. No soft tissue body wall findings of acuity involving the chest.     ABDOMEN AND PELVIS: Liver demonstrates nodular contours concerning for  parenchymal disease process such as potential cirrhotic hepatic  morphology with a low-attenuation focus likely a  cyst in the left  hemiliver. Gallbladder distended without obvious calculus associated. No  biliary dilatation. Pancreas and spleen unremarkable. Adrenals without  distinct nodule. Kidneys without hydronephrosis or  hydroureter. No bulky  retroperitoneal adenopathy. Atherosclerotic aneurysmal abdominal aorta  with aortobiiliac endovascular stent in place demonstrating gross  patency with adjacent stranding to the distal aorta as seen on prior  comparison grossly similar appearance with mildly enlarged  retroperitoneal lymph nodes adjacent to the periaortic region. Portal  veins and IVC grossly patent. GI tract evaluation without focal  thickening or disproportionate dilatation. No free fluid or  intra-abdominal free air. Pelvic viscera unremarkable without bulky  pelvic adenopathy.       Impression:      1. No PE is clearly identified as no central filling defect is  identified on suboptimal evaluation due to technique despite repeat  sequencing.     2. No acute intrathoracic process.     3. Cirrhotic hepatic morphology again noted.     4. Stable appearance of the endovascular aortic aneurysm repair with  aortobiiliac stent graft and adjacent distal infrarenal stranding and  minimal adenopathy as seen on prior comparison of  02/24/2021 without  evidence for distinct progression or definitive hemorrhage/leak clearly  evident in a similar configuration to prior comparison 02/24/2021.     DICTATED:   03/10/2021  EDITED/ls :   03/10/2021        This report was finalized on 3/11/2021 6:55 PM by Dr. Damien Ceballos.       XR Chest 1 View [[id number]] Collected: 03/10/21 1341     Updated: 03/10/21 1345    Narrative:      EXAMINATION: XR CHEST 1 VW-      INDICATION: Weak/Dizzy/AMS triage protocol      COMPARISON: 9/10/2020     FINDINGS: Mild bibasilar atelectasis is present. No focal airspace  disease otherwise. No significant effusion or distinct pneumothorax.  Unchanged heart and mediastinal contours.       Impression:      Mild bibasilar atelectasis. No acute disease in the chest  otherwise.     This report was finalized on 3/10/2021 1:42 PM by Douglas Vieira.               Impression:   COVID-19 positivity without hypoxia  History of  abdominal aortic aneurysm with endograft repair and presumed secondary infection on suppressive Cipro and doxycycline  Cirrhosis  COPD  Recent weakness and nausea and vomiting and low-grade fevers while at home  Lactic acidosis  Ammonia 104  Pseudomonas bacteremia    PLAN/RECOMMENDATIONS:   Thank you for asking us to see Derek Kelsey, I recommend the following:    Complicated case;     Patient treated empirically last year for pseudomonas although there were not positive blood cultures.  (given cefepime, meropenem for 8-12 weeks) and then changed to oral doxy, cipro.    Postive bl cx for pseudmonas very interesting and suggest there is either recurrenct infection on graft or possibly prostatitis as source.    F/u bl cultures  Cont meropenem    D/c vancomycin    D/w Dr. Mcrae    Monitor oxygenation  Davis Carrero MD  3/12/2021  11:21 EST

## 2021-03-12 NOTE — THERAPY EVALUATION
Patient Name: Derek Kelsey  : 1950    MRN: [id number]                              Today's Date: 3/12/2021       Admit Date: 3/10/2021    Visit Dx:     ICD-10-CM ICD-9-CM   1. Sepsis, due to unspecified organism, unspecified whether acute organ dysfunction present (CMS/[facility])  A41.9 038.9     995.91   2. Pneumonia due to COVID-19 virus  U07.1 480.8    J12.82 079.89   3. Lactic acidosis  E87.2 276.2     Patient Active Problem List   Diagnosis   • Anxiety   • Abdominal aortic aneurysm (AAA) without rupture (CMS/[facility])   • Depression   • Hypertension   • Cataract, bilateral   • CAD (coronary artery disease)   • Abnormal stress test   • Moderate COPD   • Former smoker   • Coal workers pneumoconiosis, simple   • Back pain   • S/P lumbar fusion   • Prediabetes   • Acute blood loss anemia, mild, asymptomatic   • Acute postoperative pain   • Hyponatremia, mild   • Chronic respiratory failure with hypoxia and hypercapnia (CMS/[facility])   • Renal insufficiency   • Acute cystitis without hematuria   • Acute UTI   • Sepsis (CMS/[facility])   • Bacteremia   • Anemia   • Aortitis (CMS/[facility])   • Elevated C-reactive protein (CRP)   • Cirrhosis of liver (CMS/[facility])   • High anion gap metabolic acidosis   • S/P AAA repair   • GERD (gastroesophageal reflux disease)   • Person under investigation for COVID-19   • On supplemental oxygen by nasal cannula   • Infected aortic endograft (CMS/[facility])   • Elevated transaminase level   • Thrombocytopenia (CMS/[facility])   • Pneumonia   • Gram-negative bacteremia     Past Medical History:   Diagnosis Date   • Abdominal aortic aneurysm (CMS/HCC) 2016   • Anxiety 2016   • Arthritis    • Back pain    • Black lung (CMS/HCC)    • CAD (coronary artery disease) 2016   • Cirrhosis (CMS/HCC)    • COPD (chronic obstructive pulmonary disease) (CMS/[facility]) 2016   • Depression 2016   • Depression    • GERD (gastroesophageal reflux disease)    • Hypertension 2016   • On supplemental oxygen by  nasal cannula     at night   • Vitiligo    • Wears dentures    • Wears reading eyeglasses      Past Surgical History:   Procedure Laterality Date   • ABDOMINAL AORTIC ANEURYSM REPAIR WITH ENDOGRAFT N/A 6/6/2019    Procedure: ABDOMINAL AORTIC ANEURYSM REPAIR WITH ENDOGRAFT;  Surgeon: Leopoldo Burleson MD;  Location:  [facility];  Service: Vascular   • BACK SURGERY     • CARDIAC CATHETERIZATION     • CARDIAC CATHETERIZATION N/A 9/28/2018    Procedure: Left Heart Cath;  Surgeon: Douglas Galvez MD;  Location:  [facility];  Service: Cardiovascular   • CARDIAC SURGERY     • COLONOSCOPY      2015   • COLONOSCOPY N/A 10/30/2019    Procedure: COLONOSCOPY;  Surgeon: Homar Viveros MD;  Location:  [facility];  Service: Gastroenterology   • ENDOSCOPY N/A 10/30/2019    Procedure: ESOPHAGOGASTRODUODENOSCOPY;  Surgeon: Homar Viveros MD;  Location:  [facility];  Service: Gastroenterology   • EYE SURGERY      cataracts bilateral   • HAND SURGERY Left    • LUMBAR DISCECTOMY FUSION INSTRUMENTATION N/A 11/1/2018    Procedure: LUMBAR DISCECTOMY POSTERIOR WITH FUSION INSTRUMENTATION;  Surgeon: Joo Franklin MD;  Location:  [facility];  Service: Orthopedic Spine   • LUMBAR DISCECTOMY FUSION INSTRUMENTATION N/A 7/24/2019    Procedure: POSTERIOR LUMBAR SPINAL FUSION REVISION AND INSTRUMENTATION L3,L4,L5,S1;  Surgeon: Joo Franklin MD;  Location:  [facility];  Service: Orthopedic Spine   • ORIF FINGER / THUMB FRACTURE     • SHOULDER SURGERY Left      General Information     Row Name 03/12/21 1059          OT Time and Intention    Document Type  evaluation  -YENIFER     Mode of Treatment  occupational therapy  -YENIFER     Row Name 03/12/21 6933          General Information    Patient Profile Reviewed  yes  -YENIFER     Prior Level of Function  independent:;ADL's;bed mobility;transfer;all household mobility;community mobility;home management;yard work;driving  -YENIFER     Existing  Precautions/Restrictions  fall;other (see comments) Contact and airborne  -YENIFER     Barriers to Rehab  medically complex  -YENIFER     Row Name 03/12/21 1059          Occupational Profile    Environmental Supports and Barriers (Occupational Profile)  Pt has walk-in shower with shower chair, 3-point cane, O2 at home.  -YENIFER     Row Name 03/12/21 1059          Living Environment    Lives With  spouse  -YENIFER     Row Name 03/12/21 1059          Home Main Entrance    Number of Stairs, Main Entrance  four  -YENIFER     Row Name 03/12/21 1059          Stairs Within Home, Primary    Number of Stairs, Within Home, Primary  none  -YENIFER     Row Name 03/12/21 1059          Cognition    Orientation Status (Cognition)  oriented x 4  -YENIFER     Row Name 03/12/21 1059          Safety Issues, Functional Mobility    Safety Issues Affecting Function (Mobility)  insight into deficits/self-awareness;safety precaution awareness  -YENIFER     Impairments Affecting Function (Mobility)  balance;endurance/activity tolerance;strength  -YENIFER       User Key  (r) = Recorded By, (t) = Taken By, (c) = Cosigned By    Initials Name Provider Type    YENIFER Diana Peters OT Occupational Therapist          Mobility/ADL's     Row Name 03/12/21 1104          Bed Mobility    Comment (Bed Mobility)  Received [facility]  -     Row Name 03/12/21 1104          Transfers    Transfers  sit-stand transfer  -     Sit-Stand [place] (Transfers)  supervision  -     Row Name 03/12/21 1104          Sit-Stand Transfer    Assistive Device (Sit-Stand Transfers)  cane, quad  -YENIFER     Row Name 03/12/21 1104          Functional Mobility    Functional Mobility- Ind. Level  contact guard assist  -     Functional Mobility- Device  quad cane  -YENIFER     Functional Mobility-Distance (Feet)  35  -YENIFER     Functional Mobility- Comment  Pt ambulated to/from bathroom with CGA/quad cane.  -YENIFER     Row Name 03/12/21 1104          Activities of Daily Living    BADL Assessment/Intervention  lower body  dressing;grooming;bathing  -     Row Name 03/12/21 1104          Lower Body Dressing Assessment/Training    [place] Level (Lower Body Dressing)  doff;don;socks;supervision  -     Position (Lower Body Dressing)  unsupported sitting  -     Row Name 03/12/21 1104          Grooming Assessment/Training    [place] Level (Grooming)  hair care, combing/brushing;oral care regimen;wash face, hands;supervision  -     Position (Grooming)  sink side;supported standing  -[facility] Name 03/12/21 1104          Bathing Assessment/Intervention    [place] Level (Bathing)  upper body;upper extremities;chest/trunk;proximal lower extremities;perineal area;supervision  -YENIFER     Position (Bathing)  sink side;supported standing  -       User Key  (r) = Recorded By, (t) = Taken By, (c) = Cosigned By    Initials Name Provider Type    Diana Chong OT Occupational Therapist        Obj/Interventions     [facility] Name 03/12/21 1107          Sensory Assessment (Somatosensory)    Sensory Assessment (Somatosensory)  UE sensation intact  -YENIFER     Row Name 03/12/21 1107          Vision Assessment/Intervention    Visual Impairment/Limitations  WFL  -[facility] Name 03/12/21 1107          Range of Motion Comprehensive    General Range of Motion  bilateral upper extremity ROM WFL  -[facility] Name 03/12/21 1107          Strength Comprehensive (MMT)    Comment, General Manual Muscle Testing (MMT) Assessment  BUE's grossly 4-/5  -[facility] Name 03/12/21 1107          Balance    Balance Assessment  sitting static balance;sitting dynamic balance;standing static balance;standing dynamic balance  -     Static Sitting Balance  WFL;unsupported;sitting in chair  -YENIFER     Dynamic Sitting Balance  WFL;unsupported;sitting in chair  -YENIFER     Static Standing Balance  WFL;supported;standing  -YENIFER     Dynamic Standing Balance  mild impairment;unsupported;standing  -YENIFER     Balance Interventions  sitting;dynamic;occupation based/functional task   -YENIFER     Comment, Balance  Pt doffed/donned socks in unsupported sitting with SUP.  -YENIFER       User Key  (r) = Recorded By, (t) = Taken By, (c) = Cosigned By    Initials Name Provider Type    Diana Chong OT Occupational Therapist        Goals/Plan     Row Name 03/12/21 1118          Transfer Goal 1 (OT)    Activity/Assistive Device (Transfer Goal 1, OT)  sit-to-stand/stand-to-sit;bed-to-chair/chair-to-bed;toilet;cane, quad  -YENIFER     [place] Level/Cues Needed (Transfer Goal 1, OT)  modified independence  -YENIFER     Time Frame (Transfer Goal 1, OT)  long term goal (LTG);by discharge  -YENIFER     Progress/Outcome (Transfer Goal 1, OT)  goal ongoing  -YENIFER     Row Name 03/12/21 1118          Toileting Goal 1 (OT)    Activity/Device (Toileting Goal 1, OT)  adjust/manage clothing;perform perineal hygiene;commode;grab bar/safety frame  -YENIFER     [place] Level/Cues Needed (Toileting Goal 1, OT)  modified independence  -YENIFER     Time Frame (Toileting Goal 1, OT)  long term goal (LTG);by discharge  -YENIFER     Progress/Outcome (Toileting Goal 1, OT)  goal ongoing  -YENIFER     Row Name 03/12/21 1118          Grooming Goal 1 (OT)    Activity/Device (Grooming Goal 1, OT)  hair care;oral care;wash face, hands  -YENIFER     [place] (Grooming Goal 1, OT)  modified independence  -YENIFER     Time Frame (Grooming Goal 1, OT)  long term goal (LTG);by discharge  -YENIFER     Progress/Outcome (Grooming Goal 1, OT)  goal ongoing  -YENIFER     Row Name 03/12/21 1118          Therapy Assessment/Plan (OT)    Planned Therapy Interventions (OT)  activity tolerance training;BADL retraining;functional balance retraining;occupation/activity based interventions;ROM/therapeutic exercise;strengthening exercise;transfer/mobility retraining  -YENIFER       User Key  (r) = Recorded By, (t) = Taken By, (c) = Cosigned By    Initials Name Provider Type    Diana Chong OT Occupational Therapist        Clinical Impression     Row Name 03/12/21 1109          Pain Assessment     Additional Documentation  Pain Scale: Numbers Pre/Post-Treatment (Group)  -     Row Name 03/12/21 1109          Pain Scale: Numbers Pre/Post-Treatment    Pretreatment Pain Rating  0/10 - no pain  -     Posttreatment Pain Rating  0/10 - no pain  -     Row Name 03/12/21 1109          Plan of Care Review    Plan of Care Reviewed With  patient  -YENIFER     Outcome Summary  OT eval completed. Pt presents with decreased activity tolerance and strength compared to baseline. Pt received UIC, STS with SUP, ambulated with CGA/quad cane to/from bathroom, completed grooming and UB bathing standing sink side with SIMS/SUP. Pt noticeably fatigued following activity. OT to follow to progress self-care. Recommend discharge home with assist when medically appropriate.  -     Row Name 03/12/21 1109          Therapy Assessment/Plan (OT)    Patient/Family Therapy Goal Statement (OT)  To go home.  -     Rehab Potential (OT)  good, to achieve stated therapy goals  -     Criteria for Skilled Therapeutic Interventions Met (OT)  yes;meets criteria;skilled treatment is necessary  -     Therapy Frequency (OT)  daily  -     Row Name 03/12/21 1109          Therapy Plan Review/Discharge Plan (OT)    Anticipated Discharge Disposition (OT)  home with assist  -     Row Name 03/12/21 1109          Vital Signs    Pre Systolic BP Rehab  132  -YENIFER     Pre Treatment Diastolic BP  89  -YENIFER     Post Systolic BP Rehab  122  -YENIFER     Post Treatment Diastolic BP  73  -YENIFER     Pretreatment Heart Rate (beats/min)  84  -YENIFER     Intratreatment Heart Rate (beats/min)  115  -YENIFER     Posttreatment Heart Rate (beats/min)  100  -YENIFER     Pre SpO2 (%)  97  -YENIFER     O2 Delivery Pre Treatment  room air  -YENIFER     Intra SpO2 (%)  91  -YENIFER     O2 Delivery Intra Treatment  room air  -YENIFER     Post SpO2 (%)  95  -YENIFER     O2 Delivery Post Treatment  room air  -YENIFER     Pre Patient Position  Sitting  -YENIFER     Intra Patient Position  Standing  -YENIFER     Post Patient Position  Sitting   -YENIFER     Row Name 03/12/21 1109          Positioning and Restraints    Pre-Treatment Position  sitting in chair/recliner  -YENIFER     Post Treatment Position  chair  -YENIFER     In Chair  sitting;call light within reach;encouraged to call for assist  -YENIFER       User Key  (r) = Recorded By, (t) = Taken By, (c) = Cosigned By    Initials Name Provider Type    Diana Chong OT Occupational Therapist        Outcome Measures     Row Name 03/12/21 1124          How much help from another is currently needed...    Putting on and taking off regular lower body clothing?  3  -YENIFER     Bathing (including washing, rinsing, and drying)  3  -YENIFER     Toileting (which includes using toilet bed pan or urinal)  3  -YENIFER     Putting on and taking off regular upper body clothing  4  -YENIFER     Taking care of personal grooming (such as brushing teeth)  4  -YENIFER     Eating meals  4  -YENIFER     AM-PAC 6 Clicks Score (OT)  21  -YENIFER     Row Name 03/12/21 1124          Functional Assessment    Outcome Measure Options  AM-PAC 6 Clicks Daily Activity (OT)  -YENIFER       User Key  (r) = Recorded By, (t) = Taken By, (c) = Cosigned By    Initials Name Provider Type    Diana Chong OT Occupational Therapist        Occupational Therapy Education                 Title: PT OT SLP Therapies (Not Started)     Topic: Occupational Therapy (In Progress)     Point: ADL training (Done)     Description:   Instruct learner(s) on proper safety adaptation and remediation techniques during self care or transfers.   Instruct in proper use of assistive devices.              Learning Progress Summary           Patient Acceptance, E, VU by YENIFER at 3/12/2021 1125    Comment: Role of OT                   Point: Home exercise program (Not Started)     Description:   Instruct learner(s) on appropriate technique for monitoring, assisting and/or progressing therapeutic exercises/activities.              Learner Progress:  Not documented in this visit.          Point: Precautions (Done)      Description:   Instruct learner(s) on prescribed precautions during self-care and functional transfers.              Learning Progress Summary           Patient Acceptance, E, VU by  at 3/12/2021 1125    Comment: Role of OT                   Point: Body mechanics (Done)     Description:   Instruct learner(s) on proper positioning and spine alignment during self-care, functional mobility activities and/or exercises.              Learning Progress Summary           Patient Acceptance, E, VU by  at 3/12/2021 1125    Comment: Role of OT                               User Key     Initials Effective Dates Name Provider Type Discipline     02/14/20 -  Diana Peters OT Occupational Therapist OT              OT Recommendation and Plan  Planned Therapy Interventions (OT): activity tolerance training, BADL retraining, functional balance retraining, occupation/activity based interventions, ROM/therapeutic exercise, strengthening exercise, transfer/mobility retraining  Therapy Frequency (OT): daily  Plan of Care Review  Plan of Care Reviewed With: patient  Outcome Summary: OT eval completed. Pt presents with decreased activity tolerance and strength compared to baseline. Pt received UIC, STS with SUP, ambulated with CGA/quad cane to/from bathroom, completed grooming and UB bathing standing sink side with SIMS/SUP. Pt noticeably fatigued following activity. OT to follow to progress self-care. Recommend discharge home with assist when medically appropriate.     Time Calculation:   Time Calculation- OT     Row Name 03/12/21 1015             Time Calculation- OT    OT Start Time  1015  -      OT Received On  03/12/21  -YENIFER      OT Goal Re-Cert Due Date  03/22/21  -        User Key  (r) = Recorded By, (t) = Taken By, (c) = Cosigned By    Initials Name Provider Type    Diana Chong OT Occupational Therapist        Therapy Charges for Today     Code Description Service Date Service Provider Modifiers Qty    [id number]   OT EVAL MOD COMPLEXITY 4 3/12/2021 Diana Peters OT GO 1               Diana Peters OT  3/12/2021

## 2021-03-12 NOTE — PROGRESS NOTES
Continued Stay Note  [facility]     Patient Name: Derek Kelsey  MRN: [id number]  Today's Date: 3/12/2021    Admit Date: 3/10/2021    Discharge Plan     Row Name 03/12/21 1139       Plan    Plan  Home at discharge    Patient/Family in Agreement with Plan  yes    Plan Comments  Patient's goal remains to return home with his spouse upon discharge. Uses a cane and walker at baseline along with home 02 at night provided by [facility]. He has no new needs regarding discharge at this time and family will provide transport -  will continue to follow -kelly [phone]    Final Discharge Disposition Code  01 - home or self-care        Discharge Codes    No documentation.             Kelly Farrar RN

## 2021-03-13 ENCOUNTER — APPOINTMENT (OUTPATIENT)
Dept: GENERAL RADIOLOGY | Facility: HOSPITAL | Age: 71
End: 2021-03-13

## 2021-03-13 LAB
ALBUMIN SERPL-MCNC: 2.7 G/DL (ref 3.5–5.2)
ALBUMIN/GLOB SERPL: 0.5 G/DL
ALP SERPL-CCNC: 161 U/L (ref 39–117)
ALT SERPL W P-5'-P-CCNC: 49 U/L (ref 1–41)
ANION GAP SERPL CALCULATED.3IONS-SCNC: 8 MMOL/L (ref 5–15)
AST SERPL-CCNC: 136 U/L (ref 1–40)
BACTERIA SPEC AEROBE CULT: ABNORMAL
BACTERIA SPEC AEROBE CULT: ABNORMAL
BASOPHILS # BLD AUTO: 0.03 10*3/MM3 (ref 0–0.2)
BASOPHILS NFR BLD AUTO: 0.8 % (ref 0–1.5)
BILIRUB SERPL-MCNC: 1 MG/DL (ref 0–1.2)
BUN SERPL-MCNC: 11 MG/DL (ref 8–23)
BUN/CREAT SERPL: 15.7 (ref 7–25)
CALCIUM SPEC-SCNC: 8.9 MG/DL (ref 8.6–10.5)
CHLORIDE SERPL-SCNC: 106 MMOL/L (ref 98–107)
CO2 SERPL-SCNC: 19 MMOL/L (ref 22–29)
CREAT SERPL-MCNC: 0.7 MG/DL (ref 0.76–1.27)
CRP SERPL-MCNC: 4.11 MG/DL (ref 0–0.5)
DEPRECATED RDW RBC AUTO: 55.7 FL (ref 37–54)
EOSINOPHIL # BLD AUTO: 0.11 10*3/MM3 (ref 0–0.4)
EOSINOPHIL NFR BLD AUTO: 2.9 % (ref 0.3–6.2)
ERYTHROCYTE [DISTWIDTH] IN BLOOD BY AUTOMATED COUNT: 15.4 % (ref 12.3–15.4)
FERRITIN SERPL-MCNC: 215.7 NG/ML (ref 30–400)
GFR SERPL CREATININE-BSD FRML MDRD: 112 ML/MIN/1.73
GLOBULIN UR ELPH-MCNC: 5.3 GM/DL
GLUCOSE SERPL-MCNC: 101 MG/DL (ref 65–99)
GRAM STN SPEC: ABNORMAL
GRAM STN SPEC: ABNORMAL
HCT VFR BLD AUTO: 29.1 % (ref 37.5–51)
HGB BLD-MCNC: 8.9 G/DL (ref 13–17.7)
IMM GRANULOCYTES # BLD AUTO: 0.02 10*3/MM3 (ref 0–0.05)
IMM GRANULOCYTES NFR BLD AUTO: 0.5 % (ref 0–0.5)
ISOLATED FROM: ABNORMAL
ISOLATED FROM: ABNORMAL
LYMPHOCYTES # BLD AUTO: 0.48 10*3/MM3 (ref 0.7–3.1)
LYMPHOCYTES NFR BLD AUTO: 12.5 % (ref 19.6–45.3)
MAGNESIUM SERPL-MCNC: 1.8 MG/DL (ref 1.6–2.4)
MCH RBC QN AUTO: 29.9 PG (ref 26.6–33)
MCHC RBC AUTO-ENTMCNC: 30.6 G/DL (ref 31.5–35.7)
MCV RBC AUTO: 97.7 FL (ref 79–97)
MONOCYTES # BLD AUTO: 0.37 10*3/MM3 (ref 0.1–0.9)
MONOCYTES NFR BLD AUTO: 9.6 % (ref 5–12)
NEUTROPHILS NFR BLD AUTO: 2.83 10*3/MM3 (ref 1.7–7)
NEUTROPHILS NFR BLD AUTO: 73.7 % (ref 42.7–76)
NRBC BLD AUTO-RTO: 0 /100 WBC (ref 0–0.2)
NT-PROBNP SERPL-MCNC: 1388 PG/ML (ref 0–900)
PLATELET # BLD AUTO: 103 10*3/MM3 (ref 140–450)
PMV BLD AUTO: 9.9 FL (ref 6–12)
POTASSIUM SERPL-SCNC: 4 MMOL/L (ref 3.5–5.2)
PROT SERPL-MCNC: 8 G/DL (ref 6–8.5)
PSA SERPL-MCNC: 0.19 NG/ML (ref 0–4)
RBC # BLD AUTO: 2.98 10*6/MM3 (ref 4.14–5.8)
SODIUM SERPL-SCNC: 133 MMOL/L (ref 136–145)
WBC # BLD AUTO: 3.84 10*3/MM3 (ref 3.4–10.8)

## 2021-03-13 PROCEDURE — 84153 ASSAY OF PSA TOTAL: CPT | Performed by: INTERNAL MEDICINE

## 2021-03-13 PROCEDURE — 25010000002 MAGNESIUM SULFATE 2 GM/50ML SOLUTION: Performed by: INTERNAL MEDICINE

## 2021-03-13 PROCEDURE — 25010000002 HEPARIN (PORCINE) PER 1000 UNITS: Performed by: INTERNAL MEDICINE

## 2021-03-13 PROCEDURE — 25010000002 ONDANSETRON PER 1 MG: Performed by: INTERNAL MEDICINE

## 2021-03-13 PROCEDURE — 83735 ASSAY OF MAGNESIUM: CPT | Performed by: INTERNAL MEDICINE

## 2021-03-13 PROCEDURE — 25010000002 MEROPENEM PER 100 MG: Performed by: INTERNAL MEDICINE

## 2021-03-13 PROCEDURE — 86140 C-REACTIVE PROTEIN: CPT | Performed by: INTERNAL MEDICINE

## 2021-03-13 PROCEDURE — 85025 COMPLETE CBC W/AUTO DIFF WBC: CPT | Performed by: INTERNAL MEDICINE

## 2021-03-13 PROCEDURE — 25010000002 NA FERRIC GLUC CPLX PER 12.5 MG: Performed by: INTERNAL MEDICINE

## 2021-03-13 PROCEDURE — 25010000002 FUROSEMIDE PER 20 MG: Performed by: INTERNAL MEDICINE

## 2021-03-13 PROCEDURE — 83880 ASSAY OF NATRIURETIC PEPTIDE: CPT | Performed by: INTERNAL MEDICINE

## 2021-03-13 PROCEDURE — 80053 COMPREHEN METABOLIC PANEL: CPT | Performed by: INTERNAL MEDICINE

## 2021-03-13 PROCEDURE — 71045 X-RAY EXAM CHEST 1 VIEW: CPT

## 2021-03-13 PROCEDURE — 99233 SBSQ HOSP IP/OBS HIGH 50: CPT | Performed by: INTERNAL MEDICINE

## 2021-03-13 PROCEDURE — 82728 ASSAY OF FERRITIN: CPT | Performed by: INTERNAL MEDICINE

## 2021-03-13 PROCEDURE — 25010000002 CEFTAZIDIME PER 500 MG: Performed by: INTERNAL MEDICINE

## 2021-03-13 RX ORDER — FUROSEMIDE 10 MG/ML
20 INJECTION INTRAMUSCULAR; INTRAVENOUS ONCE
Status: COMPLETED | OUTPATIENT
Start: 2021-03-13 | End: 2021-03-13

## 2021-03-13 RX ORDER — FUROSEMIDE 10 MG/ML
40 INJECTION INTRAMUSCULAR; INTRAVENOUS ONCE
Status: DISCONTINUED | OUTPATIENT
Start: 2021-03-13 | End: 2021-03-13

## 2021-03-13 RX ADMIN — RIFAXIMIN 550 MG: 550 TABLET ORAL at 09:37

## 2021-03-13 RX ADMIN — MAGNESIUM SULFATE HEPTAHYDRATE 2 G: 2 INJECTION, SOLUTION INTRAVENOUS at 17:55

## 2021-03-13 RX ADMIN — LACTULOSE 20 G: 20 SOLUTION ORAL at 16:05

## 2021-03-13 RX ADMIN — RIFAXIMIN 550 MG: 550 TABLET ORAL at 20:59

## 2021-03-13 RX ADMIN — SODIUM CHLORIDE 125 MG: 9 INJECTION, SOLUTION INTRAVENOUS at 09:38

## 2021-03-13 RX ADMIN — Medication 5 MG: at 20:59

## 2021-03-13 RX ADMIN — PANTOPRAZOLE SODIUM 40 MG: 40 TABLET, DELAYED RELEASE ORAL at 09:37

## 2021-03-13 RX ADMIN — LACTULOSE 20 G: 20 SOLUTION ORAL at 20:58

## 2021-03-13 RX ADMIN — MEROPENEM 1 G: 1 INJECTION, POWDER, FOR SOLUTION INTRAVENOUS at 09:30

## 2021-03-13 RX ADMIN — Medication 250 MG: at 21:00

## 2021-03-13 RX ADMIN — SUCRALFATE 1 G: 1 TABLET ORAL at 11:15

## 2021-03-13 RX ADMIN — LACTULOSE 20 G: 20 SOLUTION ORAL at 09:38

## 2021-03-13 RX ADMIN — CARVEDILOL 12.5 MG: 12.5 TABLET, FILM COATED ORAL at 17:55

## 2021-03-13 RX ADMIN — PANTOPRAZOLE SODIUM 40 MG: 40 TABLET, DELAYED RELEASE ORAL at 20:59

## 2021-03-13 RX ADMIN — SUCRALFATE 1 G: 1 TABLET ORAL at 17:55

## 2021-03-13 RX ADMIN — HEPARIN SODIUM 5000 UNITS: 5000 INJECTION INTRAVENOUS; SUBCUTANEOUS at 09:38

## 2021-03-13 RX ADMIN — ASPIRIN 81 MG: 81 TABLET, COATED ORAL at 09:37

## 2021-03-13 RX ADMIN — CEFTAZIDIME 2 G: 2 INJECTION, POWDER, FOR SOLUTION INTRAVENOUS at 16:05

## 2021-03-13 RX ADMIN — CITALOPRAM 40 MG: 40 TABLET ORAL at 09:37

## 2021-03-13 RX ADMIN — Medication 250 MG: at 09:38

## 2021-03-13 RX ADMIN — ONDANSETRON 4 MG: 2 INJECTION INTRAMUSCULAR; INTRAVENOUS at 09:39

## 2021-03-13 RX ADMIN — SUCRALFATE 1 G: 1 TABLET ORAL at 21:00

## 2021-03-13 RX ADMIN — CARVEDILOL 12.5 MG: 12.5 TABLET, FILM COATED ORAL at 09:38

## 2021-03-13 RX ADMIN — HEPARIN SODIUM 5000 UNITS: 5000 INJECTION INTRAVENOUS; SUBCUTANEOUS at 20:59

## 2021-03-13 RX ADMIN — SODIUM CHLORIDE, PRESERVATIVE FREE 10 ML: 5 INJECTION INTRAVENOUS at 09:38

## 2021-03-13 RX ADMIN — FUROSEMIDE 20 MG: 10 INJECTION, SOLUTION INTRAMUSCULAR; INTRAVENOUS at 16:05

## 2021-03-13 RX ADMIN — TAMSULOSIN HYDROCHLORIDE 0.4 MG: 0.4 CAPSULE ORAL at 09:38

## 2021-03-13 RX ADMIN — SUCRALFATE 1 G: 1 TABLET ORAL at 09:37

## 2021-03-13 NOTE — PROGRESS NOTES
INFECTIOUS DISEASE CONSULT/INITIAL HOSPITAL VISIT    Derek Kelsey  1950  [id number]    Date of Consult: 3/13/2021    Admission Date: 3/10/2021      Requesting Provider: No ref. provider found  Evaluating Physician: Davis Carrero MD    Reason for Consultation: Positive Covid PCR    History of present illness:    Patient is a 70 y.o. 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  Patient has come into the emergency room and tested positive for Covid is not hypoxic patient did have hypotension which responded to IV fluids patient is currently not requiring oxygen and feels better has been started on empiric vancomycin and meropenem.    Patient's wife is being tested for Covid results are pending.    I was previously suppressing patient with doxycycline and ciprofloxacin.      Patient is not a surgical candidate for endograft removal because of cirrhosis and high surgical mortality      3/12/21; doing well; no fever, rash, sore throat; on room air, no hypoxia overnight; blood cultures growing gnr from admission    3/13/2021 no events overnight denies fevers rash sore throat or diarrhea feels better        Past Medical History:   Diagnosis Date   • Abdominal aortic aneurysm (CMS/HCC) 9/29/2016   • Anxiety 9/29/2016   • Arthritis    • Back pain    • Black lung (CMS/[facility])    • CAD (coronary artery disease) 9/29/2016   • Cirrhosis (CMS/[facility])    • COPD (chronic obstructive pulmonary disease) (CMS/[facility]) 9/29/2016   • Depression 9/29/2016   • Depression    • GERD (gastroesophageal reflux disease)    • Hypertension 9/29/2016   • On supplemental oxygen by nasal cannula     at night   • Vitiligo    • Wears dentures    • Wears reading eyeglasses        Past Surgical History:    Procedure Laterality Date   • ABDOMINAL AORTIC ANEURYSM REPAIR WITH ENDOGRAFT N/A 6/6/2019    Procedure: ABDOMINAL AORTIC ANEURYSM REPAIR WITH ENDOGRAFT;  Surgeon: Leopoldo Burleson MD;  Location:  [facility];  Service: Vascular   • BACK SURGERY     • CARDIAC CATHETERIZATION     • CARDIAC CATHETERIZATION N/A 9/28/2018    Procedure: Left Heart Cath;  Surgeon: Douglas Galvez MD;  Location:  [facility];  Service: Cardiovascular   • CARDIAC SURGERY     • COLONOSCOPY      2015   • COLONOSCOPY N/A 10/30/2019    Procedure: COLONOSCOPY;  Surgeon: Homar Viveros MD;  Location:  [facility];  Service: Gastroenterology   • ENDOSCOPY N/A 10/30/2019    Procedure: ESOPHAGOGASTRODUODENOSCOPY;  Surgeon: Homar Viveros MD;  Location:  [facility];  Service: Gastroenterology   • EYE SURGERY      cataracts bilateral   • HAND SURGERY Left    • LUMBAR DISCECTOMY FUSION INSTRUMENTATION N/A 11/1/2018    Procedure: LUMBAR DISCECTOMY POSTERIOR WITH FUSION INSTRUMENTATION;  Surgeon: Joo Franklin MD;  Location:  [facility];  Service: Orthopedic Spine   • LUMBAR DISCECTOMY FUSION INSTRUMENTATION N/A 7/24/2019    Procedure: POSTERIOR LUMBAR SPINAL FUSION REVISION AND INSTRUMENTATION L3,L4,L5,S1;  Surgeon: Joo Franklin MD;  Location:  [facility];  Service: Orthopedic Spine   • ORIF FINGER / THUMB FRACTURE     • SHOULDER SURGERY Left        Family History   Problem Relation Age of Onset   • Stroke Mother    • Hypertension Mother    • Heart disease Mother    • Heart disease Father    • Hypertension Father    • Diabetes Sister    • Hypertension Sister    • Heart disease Sister    • Diabetes Brother    • Heart disease Brother    • Hypertension Child    • Hypertension Son    • No Known Problems Son    • Diabetes Sister    • Heart disease Sister        Social History     Socioeconomic History   • Marital status:      Spouse name: Not on file   • Number of children: 2   • Years of  education: Not on file   • Highest education level: Not on file   Tobacco Use   • Smoking status: Former Smoker     Packs/day: 1.00     Years: 30.00     Pack years: 30.00     Types: Cigarettes     Quit date: 1/1/2011     Years since quitting: 10.2   • Smokeless tobacco: Former User     Types: Chew     Quit date: 4/7/2011   Substance and Sexual Activity   • Alcohol use: Not Currently   • Drug use: No   • Sexual activity: Defer     Comment:  and lives with wife       Allergies   Allergen Reactions   • Penicillins Hives     Hives - has tolerated Zosyn and ceftriaxone 09/2019   • Percocet [Oxycodone-Acetaminophen] Confusion         Medication:    Current Facility-Administered Medications:   •  aspirin EC tablet 81 mg, 81 mg, Oral, Daily, Case, Ludmila V., DO, 81 mg at 03/13/21 0937  •  carvedilol (COREG) tablet 12.5 mg, 12.5 mg, Oral, BID With Meals, Rinku Mcrae MD, 12.5 mg at 03/13/21 0938  •  citalopram (CeleXA) tablet 40 mg, 40 mg, Oral, Daily, Case, Ludmila V., DO, 40 mg at 03/13/21 0937  •  ferric gluconate (FERRLECIT)125 MG IVPB, 125 mg, Intravenous, Daily, Rinku Mcrae MD, 125 mg at 03/13/21 0938  •  heparin (porcine) 5000 UNIT/ML injection 5,000 Units, 5,000 Units, Subcutaneous, Q12H, Rinku Mcrae MD, 5,000 Units at 03/13/21 0938  •  lactulose (CHRONULAC) 10 GM/15ML solution 10 g, 10 g, Oral, TID PRN, Case, Ludmila V., DO  •  lactulose (CHRONULAC) 10 GM/15ML solution 20 g, 20 g, Oral, TID, Rinku Mcrae MD, 20 g at 03/13/21 0938  •  magnesium sulfate 4 gram infusion - Mg less than or equal to 1mg/dL, 4 g, Intravenous, PRN **OR** magnesium sulfate 3 gram infusion (1gm x 3) - Mg 1.1 - 1.5 mg/dL, 1 g, Intravenous, PRN **OR** Magnesium Sulfate 2 gram infusion- Mg 1.6 - 1.9 mg/dL, 2 g, Intravenous, PRN, Rinku Mcrae MD, Last Rate: 25 mL/hr at 03/12/21 0941, 2 g at 03/12/21 0941  •  melatonin tablet 5 mg, 5 mg, Oral, Nightly, Case, Ludmila V., DO, 5 mg at 03/12/21 2032  •   meropenem (MERREM) 1 g/100 mL 0.9% NS VTB (mbp), 1 g, Intravenous, Q8H, Case, Ludmila V., DO, Last Rate: 0 mL/hr at 03/11/21 0405, 1 g at 03/13/21 0930  •  ondansetron (ZOFRAN) injection 4 mg, 4 mg, Intravenous, Q6H PRN, Rinku Mcrae MD, 4 mg at 03/13/21 0939  •  pantoprazole (PROTONIX) EC tablet 40 mg, 40 mg, Oral, BID, Case, Ludmila V., DO, 40 mg at 03/13/21 0937  •  riFAXIMin (XIFAXAN) tablet 550 mg, 550 mg, Oral, Q12H, Case, Ludmila V., DO, 550 mg at 03/13/21 0937  •  saccharomyces boulardii (FLORASTOR) capsule 250 mg, 250 mg, Oral, BID, Case, Ludmila V., DO, 250 mg at 03/13/21 0938  •  sodium chloride 0.9 % flush 10 mL, 10 mL, Intravenous, PRN, Case, Ludmila V., DO  •  sodium chloride 0.9 % flush 10 mL, 10 mL, Intravenous, Q12H, Case, Ludmila V., DO, 10 mL at 03/13/21 0938  •  sodium chloride 0.9 % flush 10 mL, 10 mL, Intravenous, PRN, Case, Ludmila V., DO  •  sucralfate (CARAFATE) tablet 1 g, 1 g, Oral, 4x Daily, Case, Ludmila V., DO, 1 g at 03/13/21 1115  •  tamsulosin (FLOMAX) 24 hr capsule 0.4 mg, 0.4 mg, Oral, Daily, Case, Ludmila V., DO, 0.4 mg at 03/13/21 0938    Antibiotics:  Anti-Infectives (From admission, onward)    Ordered     Dose/Rate Route Frequency Start Stop    03/10/21 1920  meropenem (MERREM) 1 g/100 mL 0.9% NS VTB (mbp)     Ordering Provider: Case, Ludmila V., DO    1 g  over 3 Hours Intravenous Every 8 Hours 03/11/21 0000 03/15/21 1499    03/10/21 2042  riFAXIMin (XIFAXAN) tablet 550 mg     Ordering Provider: Ludmila Mederos., DO    550 mg Oral Every 12 Hours Scheduled 03/10/21 2100 03/25/21 2059    03/10/21 1548  vancomycin 2250 mg/500 mL 0.9% NS IVPB ([facility])     Ordering Provider: Joo Cheung APRN    20 mg/kg × 111 kg Intravenous Once 03/10/21 1550 03/10/21 2116    03/10/21 1518  meropenem (MERREM) 1 g/100 mL 0.9% NS VTB (mbp)     Ordering Provider: Joo Cheung APRN    1 g  over 30 Minutes Intravenous Once 03/10/21 1520 03/10/21 1850            Review of Systems:  See  hpi      Physical Exam:   Vital Signs  Temp (24hrs), Av.7 °F (36.5 °C), Min:97.1 °F (36.2 °C), Max:98.1 °F (36.7 °C)    Temp  Min: 97.1 °F (36.2 °C)  Max: 98.1 °F (36.7 °C)  BP  Min: 118/66  Max: 143/91  Pulse  Min: 72  Max: 99  Resp  Min: 18  Max: 22  SpO2  Min: 90 %  Max: 96 %    GENERAL: Awake and alert, in no acute distress.  Age-appropriate  HEENT: Normocephalic, atraumatic.  PERRL. EOMI. No conjunctival injection. No icterus.  No external oral lesions    HEART:sinus rhythm on tele  LUNGS: symmetrical inspiration bilaterally  ABDOMEN: Soft, nontender  EXT: 1+ edema  :  Without Young catheter.  MSK: No joint deformity  SKIN: Warm and dry without cutaneous eruptions on Inspection/palpation.    NEURO: Oriented to PPT.  PSYCHIATRIC: Normal insight and judgement. Cooperative with PE    Laboratory Data    Results from last 7 days   Lab Units 21  0635 21  0517 21  0547   WBC 10*3/mm3 3.84 7.84 12.28*   HEMOGLOBIN g/dL 8.9* 8.9* 8.7*   HEMATOCRIT % 29.1* 27.9* 27.3*   PLATELETS 10*3/mm3 103* 99* 91*     Results from last 7 days   Lab Units 21  0635   SODIUM mmol/L 133*   POTASSIUM mmol/L 4.0   CHLORIDE mmol/L 106   CO2 mmol/L 19.0*   BUN mg/dL 11   CREATININE mg/dL 0.70*   GLUCOSE mg/dL 101*   CALCIUM mg/dL 8.9     Results from last 7 days   Lab Units 21  0635   ALK PHOS U/L 161*   BILIRUBIN mg/dL 1.0   ALT (SGPT) U/L 49*   AST (SGOT) U/L 136*         Results from last 7 days   Lab Units 21  0635   CRP mg/dL 4.11*     Results from last 7 days   Lab Units 21  0547   LACTATE mmol/L 2.6*  2.6*         Results from last 7 days   Lab Units 21  0517   VANCOMYCIN TR mcg/mL 12.40     Estimated Creatinine Clearance: 117.4 mL/min (A) (by C-G formula based on SCr of 0.7 mg/dL (L)).      Microbiology:  No results found for: ACANTHNAEG, AFBCX, BPERTUSSISCX, BLOODCX  No results found for: BCIDPCR, CXREFLEX, CSFCX, CULTURETIS  No results found for: CULTURES, HSVCX, URCX  No  results found for: EYECULTURE, GCCX, HSVCULTURE, LABHSV  No results found for: LEGIONELLA, MRSACX, MUMPSCX, MYCOPLASCX  No results found for: NOCARDIACX, STOOLCX  Urine Culture   Date Value Ref Range Status   03/10/2021 No growth  Preliminary     No results found for: VIRALCULTU, WOUNDCX        Radiology:  Imaging Results (Last 72 Hours)     Procedure Component Value Units Date/Time    XR Chest 1 View [[id number]] Collected: 03/13/21 0757     Updated: 03/13/21 0802    Narrative:      EXAMINATION: XR CHEST 1 VW-      INDICATION: dyspnea, hypoxia, covid (rule out volume overload vs covid  pneumonia; A41.9-Sepsis, unspecified organism; U07.1-COVID-19;  J12.82-Pneumonia due to Coronavirus disease 2019; E87.2-Acidosis      COMPARISON: 3/10/2021     FINDINGS: No acute osseous findings. Normal cardiomediastinal  silhouette. Pleural effusion. No pneumothorax. Bilateral apical  scarring. Pulmonary vascular congestion with increased interstitial  markings. No focal lobar opacities.        Impression:      Vascular congestion with increased interstitial markings  most consistent with interstitial edema. No pleural effusion. No new  focal airspace opacities.     This report was finalized on 3/13/2021 7:59 AM by Eddie Hirsch.       MRI abdomen wo contrast mrcp [[id number]] Collected: 03/12/21 0818     Updated: 03/12/21 1723    Narrative:      EXAMINATION: MRI ABDOMEN WO CONTRAST MRCP-      INDICATION: Rule out cholecystitis, cholangitis (gram negative  bacteremia, transient nausea and vomiting, with mild elevated bilirubin,  history cirrhosis and known chronic AAA endograft infection);  A41.9-Sepsis, unspecified organism; U07.1-COVID-19; J12.82-Pneumonia due  to Coronavirus disease 2019; E87.2-Acidosis.     TECHNIQUE: Multiplanar MRI of the abdomen without intravenous contrast  along with MRCP sequences performed including T2 coronal space  reconstruction sequence.        COMPARISON: CT abdomen and pelvis dated 03/10/2021.      FINDINGS: Lung bases are grossly clear. Liver has heterogeneous signal  throughout with nodular contours consistent with known cirrhotic hepatic  morphology. No focal lesion is evident other than a T2 hyperintense  focus in the left hemiliver most consistent with cysts although limited  evaluation of the liver parenchyma without intravenous contrast  administration. Gallbladder appears partially contracted and  unremarkable without distinct filling defect. Motion degradation on MRP  sequences, however no gross intrahepatic or extrahepatic biliary  dilatation with common bile duct measuring up to 9 mm in diameter at the  level of the alex hepatis greater than expected for caliber of mild  dilatation without pancreas divisum anatomy. No distinct filling defect  within the common bile duct to suggest choledocholithiasis with adjacent  intermediate T2 signal concerning for edema versus potential minimal  inflammation. Pancreas is somewhat limited due to motion, however no  gross abnormality. Spleen is unremarkable. Adrenals without distinct  nodule. Kidneys without hydronephrosis or hydroureter. No bulky  retroperitoneal adenopathy, however, noted redemonstration of soft  tissue and stranding surrounding the aortobiiliac endovascular stent  graft seen better on CT recently performed.       Impression:      1. Cirrhotic hepatic morphology again noted with cyst in the left liver  stable from prior CT comparison without intravenous contrast evaluate  further for a subtle parenchymal lesion.  2. Gallbladder is partially contracted without definitive cholelithiasis  or choledocholithiasis of filling defect, however, common bile duct is  prominent and greater than expected at 9 mm in diameter at the level of  the alex hepatis. Questionable minimal adjacent edema or intermediate  fluid signal may represent components of inflammation or fluid overall  indeterminate without gross intrahepatic biliary dilatation with  some  motion degradation.     D:  03/12/2021  E:  03/12/2021        This report was finalized on 3/12/2021 5:20 PM by Dr. Damien Ceballos.       CT Head Without Contrast [[id number]] Collected: 03/10/21 1648     Updated: 03/11/21 1858    Narrative:      EXAMINATION: CT HEAD WO CONTRAST- - 03/10/2021     INDICATION: Altered mental status.     TECHNIQUE: CT head without intravenous contrast     The radiation dose reduction device was turned on for each scan per the  ALARA (As Low as Reasonably Achievable) protocol.     COMPARISON: None.     FINDINGS: Midline structures are symmetric without evidence of mass,  mass effect, or midline shift. Ventricles and sulci within normal  limits. No intra-axial hemorrhage or extra-axial fluid collection.  Globes and orbits unremarkable. Paranasal sinuses and mastoid air cells  are grossly clear and well-pneumatized. Calvarium intact.       Impression:      No acute intracranial abnormality.     DICTATED:   03/10/2021  EDITED/ls :   03/10/2021        This report was finalized on 3/11/2021 6:55 PM by Dr. Damien Ceballos.       CT Angiogram Chest [[id number]] Collected: 03/10/21 1659     Updated: 03/11/21 1858    Narrative:      EXAMINATION: CT ANGIOGRAM CHEST, CT ABDOMEN/PELVIS W CONTRAST -  03/10/2021      INDICATION: Shortness of air. Altered mental status. Abdominal pain.  Evaluate for pulmonary embolus.      TECHNIQUE: CT angiogram chest with intravenous contrast administration  following PE protocol. 2D reconstructions performed, CT abdomen and  pelvis with intravenous contrast administration.     The radiation dose reduction device was turned on for each scan per the  ALARA (As Low as Reasonably Achievable) protocol.     COMPARISON: CT abdomen and pelvis 02/24/2021.     FINDINGS:      CTA CHEST: Thyroid is homogeneous in attenuation. No bulky mediastinal  adenopathy. Central airways are patent. Esophagus in normal course and  caliber to the distal segment where there is a small hiatal  hernia.  Patent nonaneurysmal minimally atherosclerotic thoracic aorta with  patent great vessel origins. No central pulmonary arterial filling  defect to suggest pulmonary embolus with equivocal optimal  opacification. Cardiac size borderline enlarged without pericardial  effusion. Extended lung windows demonstrate biapical pleural-parenchymal  scarring with paraseptal emphysema and biapical pleural-parenchymal  scarring, however, no dominant nodule or mass. No pleural effusion.  Degenerative disease of the thoracic spine without aggressive osseous  lesion. No soft tissue body wall findings of acuity involving the chest.     ABDOMEN AND PELVIS: Liver demonstrates nodular contours concerning for  parenchymal disease process such as potential cirrhotic hepatic  morphology with a low-attenuation focus likely a  cyst in the left  hemiliver. Gallbladder distended without obvious calculus associated. No  biliary dilatation. Pancreas and spleen unremarkable. Adrenals without  distinct nodule. Kidneys without hydronephrosis or hydroureter. No bulky  retroperitoneal adenopathy. Atherosclerotic aneurysmal abdominal aorta  with aortobiiliac endovascular stent in place demonstrating gross  patency with adjacent stranding to the distal aorta as seen on prior  comparison grossly similar appearance with mildly enlarged  retroperitoneal lymph nodes adjacent to the periaortic region. Portal  veins and IVC grossly patent. GI tract evaluation without focal  thickening or disproportionate dilatation. No free fluid or  intra-abdominal free air. Pelvic viscera unremarkable without bulky  pelvic adenopathy.       Impression:      1. No PE is clearly identified as no central filling defect is  identified on suboptimal evaluation due to technique despite repeat  sequencing.     2. No acute intrathoracic process.     3. Cirrhotic hepatic morphology again noted.     4. Stable appearance of the endovascular aortic aneurysm repair  with  aortobiiliac stent graft and adjacent distal infrarenal stranding and  minimal adenopathy as seen on prior comparison of  02/24/2021 without  evidence for distinct progression or definitive hemorrhage/leak clearly  evident in a similar configuration to prior comparison 02/24/2021.     DICTATED:   03/10/2021  EDITED/ls :   03/10/2021        This report was finalized on 3/11/2021 6:55 PM by Dr. Damien Ceballos.       CT Abdomen Pelvis With Contrast [[id number]] Collected: 03/10/21 1659     Updated: 03/11/21 1858    Narrative:      EXAMINATION: CT ANGIOGRAM CHEST, CT ABDOMEN/PELVIS W CONTRAST -  03/10/2021      INDICATION: Shortness of air. Altered mental status. Abdominal pain.  Evaluate for pulmonary embolus.      TECHNIQUE: CT angiogram chest with intravenous contrast administration  following PE protocol. 2D reconstructions performed, CT abdomen and  pelvis with intravenous contrast administration.     The radiation dose reduction device was turned on for each scan per the  ALARA (As Low as Reasonably Achievable) protocol.     COMPARISON: CT abdomen and pelvis 02/24/2021.     FINDINGS:      CTA CHEST: Thyroid is homogeneous in attenuation. No bulky mediastinal  adenopathy. Central airways are patent. Esophagus in normal course and  caliber to the distal segment where there is a small hiatal hernia.  Patent nonaneurysmal minimally atherosclerotic thoracic aorta with  patent great vessel origins. No central pulmonary arterial filling  defect to suggest pulmonary embolus with equivocal optimal  opacification. Cardiac size borderline enlarged without pericardial  effusion. Extended lung windows demonstrate biapical pleural-parenchymal  scarring with paraseptal emphysema and biapical pleural-parenchymal  scarring, however, no dominant nodule or mass. No pleural effusion.  Degenerative disease of the thoracic spine without aggressive osseous  lesion. No soft tissue body wall findings of acuity involving the chest.      ABDOMEN AND PELVIS: Liver demonstrates nodular contours concerning for  parenchymal disease process such as potential cirrhotic hepatic  morphology with a low-attenuation focus likely a  cyst in the left  hemiliver. Gallbladder distended without obvious calculus associated. No  biliary dilatation. Pancreas and spleen unremarkable. Adrenals without  distinct nodule. Kidneys without hydronephrosis or hydroureter. No bulky  retroperitoneal adenopathy. Atherosclerotic aneurysmal abdominal aorta  with aortobiiliac endovascular stent in place demonstrating gross  patency with adjacent stranding to the distal aorta as seen on prior  comparison grossly similar appearance with mildly enlarged  retroperitoneal lymph nodes adjacent to the periaortic region. Portal  veins and IVC grossly patent. GI tract evaluation without focal  thickening or disproportionate dilatation. No free fluid or  intra-abdominal free air. Pelvic viscera unremarkable without bulky  pelvic adenopathy.       Impression:      1. No PE is clearly identified as no central filling defect is  identified on suboptimal evaluation due to technique despite repeat  sequencing.     2. No acute intrathoracic process.     3. Cirrhotic hepatic morphology again noted.     4. Stable appearance of the endovascular aortic aneurysm repair with  aortobiiliac stent graft and adjacent distal infrarenal stranding and  minimal adenopathy as seen on prior comparison of  02/24/2021 without  evidence for distinct progression or definitive hemorrhage/leak clearly  evident in a similar configuration to prior comparison 02/24/2021.     DICTATED:   03/10/2021  EDITED/ls :   03/10/2021        This report was finalized on 3/11/2021 6:55 PM by Dr. Damien Ceballos.               Impression:   COVID-19 positivity without hypoxia  History of abdominal aortic aneurysm with endograft repair and presumed secondary infection on suppressive Cipro and doxycycline  Cirrhosis  COPD  Recent weakness  and nausea and vomiting and low-grade fevers while at home  Lactic acidosis  Ammonia 104  Pseudomonas bacteremia susc to meropenem    PLAN/RECOMMENDATIONS:   Thank you for asking us to see Derek Kelsey, I recommend the following:    Complicated case;     Patient treated empirically last year for pseudomonas although there were not positive blood cultures.  (given cefepime, meropenem for 8-12 weeks) and then changed to oral doxy, cipro.     Postive bl cx for pseudmonas very interesting and suggest there is either recurrenct infection on graft or possibly prostatitis as source.    F/u bl cultures  D/c meropenem  Start ceftazidime 2 g iv q8h  Place picc line    Check psa    I suspect the endograft is the source; the patient can have surgery I do not know how I will suppress this infection given that Pseudomonas is resistant to Cipro and Levaquin    Monitor oxygenation    D/w family  Davis Carrero MD  3/13/2021  12:31 EST

## 2021-03-13 NOTE — PLAN OF CARE
Goal Outcome Evaluation:  Plan of Care Reviewed With: patient     Outcome Summary: Pt a/o, VSS, RA, A-fib. No c/o pain/ discomfort at this time. Continue monitoring.

## 2021-03-13 NOTE — PROGRESS NOTES
[facility] Medicine Services  PROGRESS NOTE    Patient Name: Derek Kelsey  : 1950  MRN: [id number]    Date of Admission: 3/10/2021  Primary Care Physician: Raudel Zheng MD    Subjective   Subjective     CC:  Sepsis, hx endograft infection, covid, cirrhosis    HPI:  No dyspnea at rest. No current abd pain, had cramping last night. +bm's    ROS:  No headache, no focal weakness, no vision changes  Balance of ros negative except as above  Gen- chills prior to admission (resolved)  CV- No chest pain, palpitations  Resp- No cough, dyspnea        Objective   Objective     Vital Signs:   Temp:  [96.8 °F (36 °C)-98.1 °F (36.7 °C)] 96.8 °F (36 °C)  Heart Rate:  [72-99] 87  Resp:  [18-22] 22  BP: (118-143)/(62-91) 143/91        Physical Exam:  Constitutional:Alert, oriented x 3, nontoxic appearing  Psych:Normal/appropriate affect  HEENT:[place]cat, oroph clear  Neck: neck supple, full range of motion  Neuro: Face symmetric, speech clear, equal , moves all extremities  Cardiac: irr irr, regular rate currently; No pretibial pitting edema  Resp: Ctab, normal effort  GI: abd soft, nontender, +bs  Skin: No extremity rash  Musculoskeletal/extremities: no cyanosis extremities; no significant ankle edema      Results Reviewed:  Results from last 7 days   Lab Units 21  0621  0521  0854 21  0547 03/10/21  1302   WBC 10*3/mm3 3.84 7.84  --  12.28* 17.40*   HEMOGLOBIN g/dL 8.9* 8.9*  --  8.7* 9.5*   HEMATOCRIT % 29.1* 27.9*  --  27.3* 30.8*   PLATELETS 10*3/mm3 103* 99*  --  91* 111*   INR   --  1.80* 1.96*  --   --    PROCALCITONIN ng/mL  --   --   --   --  7.37*     Results from last 7 days   Lab Units 21  0635 21  0517 21  0547 03/10/21  1302   SODIUM mmol/L 133* 134* 134* 131*   POTASSIUM mmol/L 4.0 3.7 3.9 3.8   CHLORIDE mmol/L 106 103 103 97*   CO2 mmol/L 19.0* 22.0 22.0 21.0*   BUN mg/dL 11 12 14 13   CREATININE mg/dL 0.70* 0.76 0.90 1.18     GLUCOSE mg/dL 101* 102* 105* 113*   CALCIUM mg/dL 8.9 9.1 8.4* 9.1   ALT (SGPT) U/L 49* 40 33 39   AST (SGOT) U/L 136* 105* 71* 91*   TROPONIN T ng/mL  --   --   --  <0.010   PROBNP pg/mL 1,388.0*  --   --  485.0     Estimated Creatinine Clearance: 117.4 mL/min (A) (by C-G formula based on SCr of 0.7 mg/dL (L)).    Microbiology Results Abnormal     Procedure Component Value - Date/Time    Blood Culture - Blood, Arm, Right [[id number]]  (Abnormal)  (Susceptibility) Collected: 03/10/21 1510    Lab Status: Edited Result - FINAL Specimen: Blood from Arm, Right Updated: 03/13/21 1234     Blood Culture Pseudomonas aeruginosa     Isolated from Aerobic Bottle     Gram Stain Aerobic Bottle Gram negative bacilli    Susceptibility      Pseudomonas aeruginosa      ALVINO      Amikacin Susceptible (C)<!--EPICS--><sup style='font-weight:normal'>1</sup></font>      Cefazolin Resistant (C)<!--EPICS--><sup style='font-weight:normal'>1</sup></font>      Cefepime Susceptible      Ceftazidime Susceptible      Ceftazidime + Avibactam Susceptible (C)<!--EPICS--><sup style='font-weight:normal'>1</sup></font>      Ceftolozane + Tazobactam Susceptible (C)<!--EPICS--><sup style='font-weight:normal'>1</sup></font>      Ciprofloxacin Resistant      Gentamicin Susceptible (C)<!--EPICS--><sup style='font-weight:normal'>1</sup></font>      Imipenem Susceptible (C)<!--EPICS--><sup style='font-weight:normal'>1</sup></font>      Levofloxacin Resistant      Meropenem Susceptible (C)<!--EPICS--><sup style='font-weight:normal'>1</sup></font>      Piperacillin + Tazobactam Susceptible      Tobramycin Susceptible (C)<!--EPICS--><sup style='font-weight:normal'>1</sup></font>             <!--EPICS--><sup style='font-weight:normal'>1</sup></font> Appended report. These results have been appended to a previously final verified report.          Linear View                   Blood Culture - Blood, Arm, Left [[id number]]  (Abnormal) Collected: 03/10/21 1530    Lab  Status: Final result Specimen: Blood from Arm, Left Updated: 03/13/21 0728     Blood Culture Pseudomonas species     Isolated from Aerobic Bottle     Gram Stain Aerobic Bottle Gram negative bacilli    Narrative:      For MICs refer to blood culture Collected 3/10/2021 1510.     Blood Culture ID, PCR - Blood, Arm, Right [[id number]]  (Abnormal) Collected: 03/10/21 1510    Lab Status: Final result Specimen: Blood from Arm, Right Updated: 03/11/21 1741     BCID, PCR Pseudomonas aeruginosa. Identification by BCID PCR.    Urine Culture - Urine, Urine, Clean Catch [[id number]]  (Normal) Collected: 03/10/21 1459    Lab Status: Final result Specimen: Urine, Clean Catch Updated: 03/11/21 1630     Urine Culture No growth    COVID PRE-OP / PRE-PROCEDURE SCREENING ORDER (NO ISOLATION) - Swab, Nasopharynx [[id number]]  (Abnormal) Collected: 03/10/21 1522    Lab Status: Final result Specimen: Swab from Nasopharynx Updated: 03/10/21 1638    Narrative:      The following orders were created for panel order COVID PRE-OP / PRE-PROCEDURE SCREENING ORDER (NO ISOLATION) - Swab, Nasopharynx.  Procedure                               Abnormality         Status                     ---------                               -----------         ------                     COVID-19 and FLU A/B PCR...[[id number]]  Abnormal            Final result                 Please view results for these tests on the individual orders.    COVID-19 and FLU A/B PCR - Swab, Nasopharynx [[id number]]  (Abnormal) Collected: 03/10/21 1522    Lab Status: Final result Specimen: Swab from Nasopharynx Updated: 03/10/21 1638     COVID19 Detected     Influenza A PCR Not Detected     Influenza B PCR Not Detected    Narrative:      Fact sheet for providers: https://www.fda.gov/media/987082/download    Fact sheet for patients: https://www.fda.gov/media/124770/download    Test performed by PCR.  Influenza A and Influenza B negative results should be considered presumptive in samples  that have a positive SARS-CoV-2 result.    Competitive inhibition studies showed that SARS-CoV-2 virus, when present at concentrations above 3.6E+04 copies/mL, can inhibit the detection and amplification of influenza A and influenza B virus RNA if present at or below 1.8E+02 copies/mL or 4.9E+02 copies/mL, respectively, and may lead to false negative influenza virus results. If co-infection with influenza A or influenza B virus is suspected in samples with a positive SARS-CoV-2 result, the sample should be re-tested with another FDA cleared, approved, or authorized influenza test, if influenza virus detection would change clinical management.          Imaging Results (Last 24 Hours)     Procedure Component Value Units Date/Time    XR Chest 1 View [[id number]] Collected: 03/13/21 0757     Updated: 03/13/21 0802    Narrative:      EXAMINATION: XR CHEST 1 VW-      INDICATION: dyspnea, hypoxia, covid (rule out volume overload vs covid  pneumonia; A41.9-Sepsis, unspecified organism; U07.1-COVID-19;  J12.82-Pneumonia due to Coronavirus disease 2019; E87.2-Acidosis      COMPARISON: 3/10/2021     FINDINGS: No acute osseous findings. Normal cardiomediastinal  silhouette. Pleural effusion. No pneumothorax. Bilateral apical  scarring. Pulmonary vascular congestion with increased interstitial  markings. No focal lobar opacities.        Impression:      Vascular congestion with increased interstitial markings  most consistent with interstitial edema. No pleural effusion. No new  focal airspace opacities.     This report was finalized on 3/13/2021 7:59 AM by Eddie Hirsch.             Results for orders placed during the hospital encounter of 03/10/21    Adult Transthoracic Echo Complete W/ Cont if Necessary Per Protocol    Interpretation Summary  · Estimated right ventricular systolic pressure from tricuspid regurgitation is normal (<35 mmHg).  · Estimated left ventricular EF = 65% Left ventricular systolic function is  normal.      I have reviewed the medications:  Scheduled Meds:aspirin, 81 mg, Oral, Daily  carvedilol, 12.5 mg, Oral, BID With Meals  cefTAZidime, 2 g, Intravenous, Q8H  citalopram, 40 mg, Oral, Daily  ferric gluconate, 125 mg, Intravenous, Daily  furosemide, 20 mg, Intravenous, Once  heparin (porcine), 5,000 Units, Subcutaneous, Q12H  lactulose, 20 g, Oral, TID  melatonin, 5 mg, Oral, Nightly  pantoprazole, 40 mg, Oral, BID  riFAXIMin, 550 mg, Oral, Q12H  saccharomyces boulardii, 250 mg, Oral, BID  sodium chloride, 10 mL, Intravenous, Q12H  sucralfate, 1 g, Oral, 4x Daily  tamsulosin, 0.4 mg, Oral, Daily      Continuous Infusions:   PRN Meds:.lactulose  •  magnesium sulfate **OR** magnesium sulfate in D5W 1g/100mL (PREMIX) **OR** magnesium sulfate  •  ondansetron  •  sodium chloride  •  sodium chloride    Assessment/Plan   Assessment & Plan     Active Hospital Problems    Diagnosis  POA   • **Sepsis (CMS/HCC) [A41.9]  Yes   • Gram-negative bacteremia [R78.81]  Unknown     Priority: High   • 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        Brief Hospital Course to date:  Derek Kelsey is a 70 y.o. 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 Reportedly had 1st covid 19 vaccine shot approximately 2 weeks prior to this presentation. In ED patient became hypotensive requiring aggressive iv fluids, had a + covid 19 pcr swab and was admitted initially to ICU. Ct head negative. Lactate was 6.1, procalcitonin 7.37. wbc count 17,000 CT angio chest/abd/pelvis negative for pe, no acute lung dz noted, cirrhosis noted (no free  fluid/ascites noted), stable appearance of endovascular aortic aneurysm repair w/ aortobiiliac stent graft and distal infrarenal stranding & minimal adenopathy (similar to 2/24/21 scan) without evidence for distinct progression or definitive leak. acute intrathoracid process noted Overnight patient responded to iv resuscitation and was ultimately transferred to hospitalist service on day #2    *Covid 19 pcr +   -currently on home level supplemental oxygen (1-3 liters)   -holding on steroids & remdesivir currently, deferring to ID  *Severe sepsis due to pseudomonas high grade bacteremia   -pseudomonas resistant to quinalones   -felt to be due most likely to graft infection   -ID following, changed merrem--> fortaz & picc ordered 3/13/21   -mrcp 3/12/21 no overt filling defects, gb ok, cbd 9mm w/ minimal edema vs artifact area alex hepatis  *Hx AAA endovascular repair (June 2019) 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 Carrero of ID, had been on chronic suppressive oral cipro & doxy prior to this admission  *Transient n/v & abdominal cramping, improved  *New Afib, back in sinus currently   -echo ok, tsh ok   -due to acute illness/sepsis   -increased coreg to 12.5mg bid & titrate prn  *Metabolic encephalopathy, likely due to infection +/- hepatic encephalopathy, resolved   -ammonia >100-->80's, continue lactulose & continue rifaxamin -ct head negative  *Moderate COPD/chronic lung dz   *chronic hypoxic resp failure (2-3 L chronic use)  *Cirrhosis   -on lasix, coreg, rifaxamin at home (lasix currently on hold, restart prn)  *Thrombocytopenia, stable   -due to cirrhosis. Monitor 91,000 on 3/11/21  *Gerd  *HTN  *pre-dm  *Previous lumbar fusion surgery  *Hx non-obstructive CAD 9/2018 heart cath   -on asa      Plan:  -improved overall. 2Lnc overnight, on room air currently. cxr w/ vascular congestion and probnp elevated.    -lasix 20mg iv x 1 & hold iv fluids. Restart home diuretics (on hold while was  volume resuscitating) as bp, renal function allow    -david, Picc ordered per ID (feels that graft likely source of infection, and the pseudomonas is resistant to quinalones)    -continue coreg, appears back in sinus currently; monitor (afib was likely due to sepsis)  -continue asa  -continue insulins & titrate prn  -continue lactulose & rifaxamin    -updated wife Mago on 3/13/21 to update, she appreciated the call    Am labs: covid progression labs    DVT Prophylaxis:  Sq heparin tid    Disposition: I expect the patient to be discharged once clinically improved, date tbd    CODE STATUS:   Code Status and Medical Interventions:   Ordered at: 03/10/21 1906     Limited Support to NOT Include:    Cardioversion/Defibrillation    Intubation     Code Status:    No CPR     Medical Interventions (Level of Support Prior to Arrest):    Limited       Rinku Mcrae MD  03/13/21

## 2021-03-14 LAB
ALBUMIN SERPL-MCNC: 3.2 G/DL (ref 3.5–5.2)
ALBUMIN/GLOB SERPL: 0.6 G/DL
ALP SERPL-CCNC: 182 U/L (ref 39–117)
ALT SERPL W P-5'-P-CCNC: 55 U/L (ref 1–41)
ANION GAP SERPL CALCULATED.3IONS-SCNC: 10 MMOL/L (ref 5–15)
AST SERPL-CCNC: 136 U/L (ref 1–40)
BASOPHILS # BLD AUTO: 0.03 10*3/MM3 (ref 0–0.2)
BASOPHILS NFR BLD AUTO: 1.1 % (ref 0–1.5)
BILIRUB SERPL-MCNC: 1 MG/DL (ref 0–1.2)
BUN SERPL-MCNC: 8 MG/DL (ref 8–23)
BUN/CREAT SERPL: 11.3 (ref 7–25)
CALCIUM SPEC-SCNC: 9.6 MG/DL (ref 8.6–10.5)
CHLORIDE SERPL-SCNC: 100 MMOL/L (ref 98–107)
CO2 SERPL-SCNC: 24 MMOL/L (ref 22–29)
CREAT SERPL-MCNC: 0.71 MG/DL (ref 0.76–1.27)
CRP SERPL-MCNC: 3.29 MG/DL (ref 0–0.5)
D DIMER PPP FEU-MCNC: 3.41 MCGFEU/ML (ref 0–0.56)
DEPRECATED RDW RBC AUTO: 53 FL (ref 37–54)
EOSINOPHIL # BLD AUTO: 0.11 10*3/MM3 (ref 0–0.4)
EOSINOPHIL NFR BLD AUTO: 4 % (ref 0.3–6.2)
ERYTHROCYTE [DISTWIDTH] IN BLOOD BY AUTOMATED COUNT: 15 % (ref 12.3–15.4)
FERRITIN SERPL-MCNC: 390.7 NG/ML (ref 30–400)
GFR SERPL CREATININE-BSD FRML MDRD: 110 ML/MIN/1.73
GLOBULIN UR ELPH-MCNC: 5.4 GM/DL
GLUCOSE SERPL-MCNC: 92 MG/DL (ref 65–99)
HCT VFR BLD AUTO: 28.8 % (ref 37.5–51)
HGB BLD-MCNC: 8.9 G/DL (ref 13–17.7)
IMM GRANULOCYTES # BLD AUTO: 0.02 10*3/MM3 (ref 0–0.05)
IMM GRANULOCYTES NFR BLD AUTO: 0.7 % (ref 0–0.5)
LYMPHOCYTES # BLD AUTO: 0.56 10*3/MM3 (ref 0.7–3.1)
LYMPHOCYTES NFR BLD AUTO: 20.1 % (ref 19.6–45.3)
MAGNESIUM SERPL-MCNC: 1.8 MG/DL (ref 1.6–2.4)
MCH RBC QN AUTO: 30.1 PG (ref 26.6–33)
MCHC RBC AUTO-ENTMCNC: 30.9 G/DL (ref 31.5–35.7)
MCV RBC AUTO: 97.3 FL (ref 79–97)
MONOCYTES # BLD AUTO: 0.31 10*3/MM3 (ref 0.1–0.9)
MONOCYTES NFR BLD AUTO: 11.2 % (ref 5–12)
NEUTROPHILS NFR BLD AUTO: 1.75 10*3/MM3 (ref 1.7–7)
NEUTROPHILS NFR BLD AUTO: 62.9 % (ref 42.7–76)
NRBC BLD AUTO-RTO: 0 /100 WBC (ref 0–0.2)
NT-PROBNP SERPL-MCNC: 919.7 PG/ML (ref 0–900)
PLATELET # BLD AUTO: 98 10*3/MM3 (ref 140–450)
PMV BLD AUTO: 10.4 FL (ref 6–12)
POTASSIUM SERPL-SCNC: 3.5 MMOL/L (ref 3.5–5.2)
PROT SERPL-MCNC: 8.6 G/DL (ref 6–8.5)
RBC # BLD AUTO: 2.96 10*6/MM3 (ref 4.14–5.8)
SODIUM SERPL-SCNC: 134 MMOL/L (ref 136–145)
WBC # BLD AUTO: 2.78 10*3/MM3 (ref 3.4–10.8)

## 2021-03-14 PROCEDURE — 25010000002 MAGNESIUM SULFATE 2 GM/50ML SOLUTION: Performed by: INTERNAL MEDICINE

## 2021-03-14 PROCEDURE — 25010000002 FUROSEMIDE PER 20 MG: Performed by: INTERNAL MEDICINE

## 2021-03-14 PROCEDURE — 85379 FIBRIN DEGRADATION QUANT: CPT | Performed by: INTERNAL MEDICINE

## 2021-03-14 PROCEDURE — 25010000002 NA FERRIC GLUC CPLX PER 12.5 MG: Performed by: INTERNAL MEDICINE

## 2021-03-14 PROCEDURE — 85025 COMPLETE CBC W/AUTO DIFF WBC: CPT | Performed by: INTERNAL MEDICINE

## 2021-03-14 PROCEDURE — 83735 ASSAY OF MAGNESIUM: CPT | Performed by: INTERNAL MEDICINE

## 2021-03-14 PROCEDURE — 86140 C-REACTIVE PROTEIN: CPT | Performed by: INTERNAL MEDICINE

## 2021-03-14 PROCEDURE — 25010000002 ONDANSETRON PER 1 MG: Performed by: INTERNAL MEDICINE

## 2021-03-14 PROCEDURE — 99233 SBSQ HOSP IP/OBS HIGH 50: CPT | Performed by: INTERNAL MEDICINE

## 2021-03-14 PROCEDURE — 82728 ASSAY OF FERRITIN: CPT | Performed by: INTERNAL MEDICINE

## 2021-03-14 PROCEDURE — 83880 ASSAY OF NATRIURETIC PEPTIDE: CPT | Performed by: INTERNAL MEDICINE

## 2021-03-14 PROCEDURE — 25010000002 HEPARIN (PORCINE) PER 1000 UNITS: Performed by: INTERNAL MEDICINE

## 2021-03-14 PROCEDURE — 80053 COMPREHEN METABOLIC PANEL: CPT | Performed by: INTERNAL MEDICINE

## 2021-03-14 PROCEDURE — 25010000002 CEFTAZIDIME PER 500 MG: Performed by: INTERNAL MEDICINE

## 2021-03-14 RX ORDER — POTASSIUM CHLORIDE 7.45 MG/ML
10 INJECTION INTRAVENOUS
Status: DISCONTINUED | OUTPATIENT
Start: 2021-03-14 | End: 2021-03-17 | Stop reason: HOSPADM

## 2021-03-14 RX ORDER — POTASSIUM CHLORIDE 1.5 G/1.77G
40 POWDER, FOR SOLUTION ORAL AS NEEDED
Status: DISCONTINUED | OUTPATIENT
Start: 2021-03-14 | End: 2021-03-17 | Stop reason: HOSPADM

## 2021-03-14 RX ORDER — POTASSIUM CHLORIDE 750 MG/1
40 CAPSULE, EXTENDED RELEASE ORAL AS NEEDED
Status: DISCONTINUED | OUTPATIENT
Start: 2021-03-14 | End: 2021-03-17 | Stop reason: HOSPADM

## 2021-03-14 RX ORDER — LACTULOSE 10 G/15ML
10 SOLUTION ORAL 3 TIMES DAILY
Status: DISCONTINUED | OUTPATIENT
Start: 2021-03-14 | End: 2021-03-15

## 2021-03-14 RX ORDER — FUROSEMIDE 10 MG/ML
20 INJECTION INTRAMUSCULAR; INTRAVENOUS ONCE
Status: COMPLETED | OUTPATIENT
Start: 2021-03-14 | End: 2021-03-14

## 2021-03-14 RX ORDER — FUROSEMIDE 20 MG/1
20 TABLET ORAL
Status: DISCONTINUED | OUTPATIENT
Start: 2021-03-15 | End: 2021-03-17 | Stop reason: HOSPADM

## 2021-03-14 RX ADMIN — FUROSEMIDE 20 MG: 10 INJECTION, SOLUTION INTRAMUSCULAR; INTRAVENOUS at 15:54

## 2021-03-14 RX ADMIN — HEPARIN SODIUM 5000 UNITS: 5000 INJECTION INTRAVENOUS; SUBCUTANEOUS at 20:48

## 2021-03-14 RX ADMIN — MAGNESIUM SULFATE HEPTAHYDRATE 2 G: 2 INJECTION, SOLUTION INTRAVENOUS at 14:35

## 2021-03-14 RX ADMIN — Medication 5 MG: at 20:48

## 2021-03-14 RX ADMIN — LACTULOSE 10 G: 20 SOLUTION ORAL at 20:49

## 2021-03-14 RX ADMIN — CEFTAZIDIME 2 G: 2 INJECTION, POWDER, FOR SOLUTION INTRAVENOUS at 08:31

## 2021-03-14 RX ADMIN — POTASSIUM CHLORIDE 40 MEQ: 750 CAPSULE, EXTENDED RELEASE ORAL at 12:42

## 2021-03-14 RX ADMIN — SODIUM CHLORIDE, PRESERVATIVE FREE 10 ML: 5 INJECTION INTRAVENOUS at 08:40

## 2021-03-14 RX ADMIN — CARVEDILOL 12.5 MG: 12.5 TABLET, FILM COATED ORAL at 08:39

## 2021-03-14 RX ADMIN — ASPIRIN 81 MG: 81 TABLET, COATED ORAL at 08:40

## 2021-03-14 RX ADMIN — ONDANSETRON 4 MG: 2 INJECTION INTRAMUSCULAR; INTRAVENOUS at 15:56

## 2021-03-14 RX ADMIN — RIFAXIMIN 550 MG: 550 TABLET ORAL at 20:48

## 2021-03-14 RX ADMIN — SUCRALFATE 1 G: 1 TABLET ORAL at 08:40

## 2021-03-14 RX ADMIN — PANTOPRAZOLE SODIUM 40 MG: 40 TABLET, DELAYED RELEASE ORAL at 08:39

## 2021-03-14 RX ADMIN — CARVEDILOL 12.5 MG: 12.5 TABLET, FILM COATED ORAL at 17:21

## 2021-03-14 RX ADMIN — CEFTAZIDIME 2 G: 2 INJECTION, POWDER, FOR SOLUTION INTRAVENOUS at 15:55

## 2021-03-14 RX ADMIN — Medication 250 MG: at 20:48

## 2021-03-14 RX ADMIN — SODIUM CHLORIDE 125 MG: 9 INJECTION, SOLUTION INTRAVENOUS at 08:33

## 2021-03-14 RX ADMIN — SODIUM CHLORIDE, PRESERVATIVE FREE 10 ML: 5 INJECTION INTRAVENOUS at 20:49

## 2021-03-14 RX ADMIN — HEPARIN SODIUM 5000 UNITS: 5000 INJECTION INTRAVENOUS; SUBCUTANEOUS at 08:40

## 2021-03-14 RX ADMIN — CITALOPRAM 40 MG: 40 TABLET ORAL at 08:40

## 2021-03-14 RX ADMIN — CEFTAZIDIME 2 G: 2 INJECTION, POWDER, FOR SOLUTION INTRAVENOUS at 01:58

## 2021-03-14 RX ADMIN — PANTOPRAZOLE SODIUM 40 MG: 40 TABLET, DELAYED RELEASE ORAL at 20:48

## 2021-03-14 RX ADMIN — RIFAXIMIN 550 MG: 550 TABLET ORAL at 08:40

## 2021-03-14 RX ADMIN — SUCRALFATE 1 G: 1 TABLET ORAL at 12:42

## 2021-03-14 RX ADMIN — CEFTAZIDIME 2 G: 2 INJECTION, POWDER, FOR SOLUTION INTRAVENOUS at 23:42

## 2021-03-14 RX ADMIN — SUCRALFATE 1 G: 1 TABLET ORAL at 20:48

## 2021-03-14 RX ADMIN — LACTULOSE 10 G: 20 SOLUTION ORAL at 15:54

## 2021-03-14 RX ADMIN — SUCRALFATE 1 G: 1 TABLET ORAL at 17:21

## 2021-03-14 RX ADMIN — Medication 250 MG: at 08:39

## 2021-03-14 RX ADMIN — POTASSIUM CHLORIDE 40 MEQ: 750 CAPSULE, EXTENDED RELEASE ORAL at 17:20

## 2021-03-14 RX ADMIN — TAMSULOSIN HYDROCHLORIDE 0.4 MG: 0.4 CAPSULE ORAL at 08:40

## 2021-03-14 NOTE — PLAN OF CARE
Goal Outcome Evaluation:     Progress: no change  Outcome Summary: VSS. Pt was placed on 1L NC while sleeping. Remains in afib. Multiple BMs this shift. No major complaints this shift.

## 2021-03-14 NOTE — PROGRESS NOTES
[facility] Medicine Services  PROGRESS NOTE    Patient Name: Derek Kelsey  : 1950  MRN: [id number]    Date of Admission: 3/10/2021  Primary Care Physician: Raudel Zheng MD    Subjective   Subjective     CC:  Sepsis, hx endograft infection, covid, cirrhosis    HPI:  No dyspnea. abd pain significantly improved. No n/v. Lots of bm's (on lactulose) feeling overall better. No chest pain. No palpitations    ROS:  No headache, no focal weakness, no vision changes  Balance of ros negative except as above  CV- No chest pain, palpitations  Resp- No cough, dyspnea        Objective   Objective     Vital Signs:   Temp:  [97.7 °F (36.5 °C)-98 °F (36.7 °C)] 97.7 °F (36.5 °C)  Heart Rate:  [74-85] 74  Resp:  [18-22] 18  BP: (114-158)/() 126/69        Physical Exam:  Constitutional:Alert, oriented x 3, nontoxic appearing  Psych:Normal/appropriate affect  HEENT:[place]cat, oroph clear  Neck: neck supple, full range of motion  Neuro: Face symmetric, speech clear, equal , moves all extremities  Cardiac: irr irr, regular rate currently; No pretibial pitting edema  Resp: Ctab, normal effort  GI: abd soft, nontender, +bs  Skin: No extremity rash  Musculoskeletal/extremities: no cyanosis extremities; no significant ankle edema      Results Reviewed:  Results from last 7 days   Lab Units 21  0522 21  0635 21  0517 21  0854 03/10/21  1302   WBC 10*3/mm3 2.78* 3.84 7.84  --  17.40*   HEMOGLOBIN g/dL 8.9* 8.9* 8.9*  --  9.5*   HEMATOCRIT % 28.8* 29.1* 27.9*  --  30.8*   PLATELETS 10*3/mm3 98* 103* 99*  --  111*   INR   --   --  1.80* 1.96*  --    PROCALCITONIN ng/mL  --   --   --   --  7.37*     Results from last 7 days   Lab Units 21  0920 21  0635 21  0517 03/10/21  1302   SODIUM mmol/L 134* 133* 134* 131*   POTASSIUM mmol/L 3.5 4.0 3.7 3.8   CHLORIDE mmol/L 100 106 103 97*   CO2 mmol/L 24.0 19.0* 22.0 21.0*   BUN mg/dL 8 11 12 13   CREATININE mg/dL  0.71* 0.70* 0.76 1.18   GLUCOSE mg/dL 92 101* 102* 113*   CALCIUM mg/dL 9.6 8.9 9.1 9.1   ALT (SGPT) U/L 55* 49* 40 39   AST (SGOT) U/L 136* 136* 105* 91*   TROPONIN T ng/mL  --   --   --  <0.010   PROBNP pg/mL 919.7* 1,388.0*  --  485.0     Estimated Creatinine Clearance: 117.4 mL/min (A) (by C-G formula based on SCr of 0.71 mg/dL (L)).    Microbiology Results Abnormal     Procedure Component Value - Date/Time    Blood Culture - Blood, Arm, Right [[id number]]  (Abnormal)  (Susceptibility) Collected: 03/10/21 1510    Lab Status: Edited Result - FINAL Specimen: Blood from Arm, Right Updated: 03/13/21 1239     Blood Culture Pseudomonas aeruginosa     Isolated from Aerobic Bottle     Gram Stain Aerobic Bottle Gram negative bacilli    Susceptibility      Pseudomonas aeruginosa      ALVINO      Amikacin Susceptible (C)<!--EPICS--><sup style='font-weight:normal'>1</sup></font>      Cefazolin Resistant (C)<!--EPICS--><sup style='font-weight:normal'>1</sup></font>      Cefepime Susceptible      Ceftazidime Susceptible      Ceftazidime + Avibactam Susceptible (C)<!--EPICS--><sup style='font-weight:normal'>1</sup></font>      Ceftolozane + Tazobactam Susceptible (C)<!--EPICS--><sup style='font-weight:normal'>1</sup></font>      Ciprofloxacin Resistant      Gentamicin Susceptible (C)<!--EPICS--><sup style='font-weight:normal'>1</sup></font>      Imipenem Susceptible (C)<!--EPICS--><sup style='font-weight:normal'>1</sup></font>      Levofloxacin Resistant      Meropenem Susceptible (C)<!--EPICS--><sup style='font-weight:normal'>1</sup></font>      Piperacillin + Tazobactam Susceptible      Tobramycin Susceptible (C)<!--EPICS--><sup style='font-weight:normal'>1</sup></font>             <!--EPICS--><sup style='font-weight:normal'>1</sup></font> Appended report. These results have been appended to a previously final verified report.          [facility]                   Blood Culture - Blood, Arm, Left [[id number]]  (Abnormal)  Collected: 03/10/21 1530    Lab Status: Final result Specimen: Blood from Arm, Left Updated: 03/13/21 0728     Blood Culture Pseudomonas species     Isolated from Aerobic Bottle     Gram Stain Aerobic Bottle Gram negative bacilli    Narrative:      For MICs refer to blood culture Collected 3/10/2021 1510.     Blood Culture ID, PCR - Blood, Arm, Right [[id number]]  (Abnormal) Collected: 03/10/21 1510    Lab Status: Final result Specimen: Blood from Arm, Right Updated: 03/11/21 1741     BCID, PCR Pseudomonas aeruginosa. Identification by BCID PCR.    Urine Culture - Urine, Urine, Clean Catch [[id number]]  (Normal) Collected: 03/10/21 1459    Lab Status: Final result Specimen: Urine, Clean Catch Updated: 03/11/21 1630     Urine Culture No growth    COVID PRE-OP / PRE-PROCEDURE SCREENING ORDER (NO ISOLATION) - Swab, Nasopharynx [[id number]]  (Abnormal) Collected: 03/10/21 1522    Lab Status: Final result Specimen: Swab from Nasopharynx Updated: 03/10/21 1638    Narrative:      The following orders were created for panel order COVID PRE-OP / PRE-PROCEDURE SCREENING ORDER (NO ISOLATION) - Swab, Nasopharynx.  Procedure                               Abnormality         Status                     ---------                               -----------         ------                     COVID-19 and FLU A/B PCR...[[id number]]  Abnormal            Final result                 Please view results for these tests on the individual orders.    COVID-19 and FLU A/B PCR - Swab, Nasopharynx [[id number]]  (Abnormal) Collected: 03/10/21 1522    Lab Status: Final result Specimen: Swab from Nasopharynx Updated: 03/10/21 1638     COVID19 Detected     Influenza A PCR Not Detected     Influenza B PCR Not Detected    Narrative:      Fact sheet for providers: https://www.fda.gov/media/952189/download    Fact sheet for patients: https://www.fda.gov/media/759567/download    Test performed by PCR.  Influenza A and Influenza B negative results should be  considered presumptive in samples that have a positive SARS-CoV-2 result.    Competitive inhibition studies showed that SARS-CoV-2 virus, when present at concentrations above 3.6E+04 copies/mL, can inhibit the detection and amplification of influenza A and influenza B virus RNA if present at or below 1.8E+02 copies/mL or 4.9E+02 copies/mL, respectively, and may lead to false negative influenza virus results. If co-infection with influenza A or influenza B virus is suspected in samples with a positive SARS-CoV-2 result, the sample should be re-tested with another FDA cleared, approved, or authorized influenza test, if influenza virus detection would change clinical management.          Imaging Results (Last 24 Hours)     ** No results found for the last 24 hours. **          Results for orders placed during the hospital encounter of 03/10/21    Adult Transthoracic Echo Complete W/ Cont if Necessary Per Protocol    Interpretation Summary  · Estimated right ventricular systolic pressure from tricuspid regurgitation is normal (<35 mmHg).  · Estimated left ventricular EF = 65% Left ventricular systolic function is normal.      I have reviewed the medications:  Scheduled Meds:aspirin, 81 mg, Oral, Daily  carvedilol, 12.5 mg, Oral, BID With Meals  cefTAZidime, 2 g, Intravenous, Q8H  citalopram, 40 mg, Oral, Daily  furosemide, 20 mg, Intravenous, Once  [START ON 3/15/2021] furosemide, 20 mg, Oral, BID  heparin (porcine), 5,000 Units, Subcutaneous, Q12H  lactulose, 10 g, Oral, TID  melatonin, 5 mg, Oral, Nightly  pantoprazole, 40 mg, Oral, BID  riFAXIMin, 550 mg, Oral, Q12H  saccharomyces boulardii, 250 mg, Oral, BID  sodium chloride, 10 mL, Intravenous, Q12H  sucralfate, 1 g, Oral, 4x Daily  tamsulosin, 0.4 mg, Oral, Daily      Continuous Infusions:   PRN Meds:.lactulose  •  magnesium sulfate **OR** magnesium sulfate in D5W 1g/100mL (PREMIX) **OR** magnesium sulfate  •  ondansetron  •  potassium chloride **OR** potassium  chloride **OR** potassium chloride  •  sodium chloride  •  sodium chloride    Assessment/Plan   Assessment & Plan     Active Hospital Problems    Diagnosis  POA   • **Sepsis (CMS/HCC) [A41.9]  Yes   • Gram-negative bacteremia [R78.81]  Unknown     Priority: High   • 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        Brief Hospital Course to date:  Derek Kelsey is a 70 y.o. 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 Reportedly had 1st covid 19 vaccine shot approximately 2 weeks prior to this presentation. In ED patient became hypotensive requiring aggressive iv fluids, had a + covid 19 pcr swab and was admitted initially to ICU. Ct head negative. Lactate was 6.1, procalcitonin 7.37. wbc count 17,000 CT angio chest/abd/pelvis negative for pe, no acute lung dz noted, cirrhosis noted (no free fluid/ascites noted), stable appearance of endovascular aortic aneurysm repair w/ aortobiiliac stent graft and distal infrarenal stranding & minimal adenopathy (similar to 2/24/21 scan) without evidence for distinct progression or definitive leak. acute intrathoracid process noted Overnight patient responded to iv resuscitation and was ultimately transferred to hospitalist service on day #2    *Severe Sepsis w/ high grade Pseudomonas bacteremia likely due to AAA endograft relapsed infection  *Hx previous AAA repair (June '19) w/ subsequent endograft infection  -had been on suppressive po cipro & doxy as outpatient  -the pseudomonas is quinalone resistant  -ID managing antibiotics, on fortaz, PICC ordered. Not likely surgical candidate but  will defer to ID  -mrcp 3/12/21 no overt filling defects, gb ok  *Covid 19 pcr +, likely asymptomatic  *A/C DHF (iatrogenic due to volume resuscitation, holding diuretics due to sepsis)  -holding on steroids/remdesivir as do not feel causing respiratory issues currently  -transient mild hypoxia felt due to volume overload (iatrogenic from volume resuscitation & holding scheduled po diuretics)  -s/p gentle iv diuresis, restarting home lasix 20mg po bid as bp & renal function tolerate  *Afib w/ RVR, new onset (due to sepsis)  -due to acute illness/sepsis; echo ok, tsh ok;   -increased coreg to 12.5mg bid currently rate controlled. Deferring anticoagulation for now (inr 1.8-1.9). if remains in afib would d/w Dr. Galvez his regular cardiologist  *Metabolic encephalopathy, resolved  -due to sepsis, hepatic encephalopathy  -treating sepsis, added lactulose to rifaxamin, mentation normal & no asterixis  *Hx Cirrhosis w/ thrombocytopenia & coagulopathy  -inr 1.8-1.9 this admission  -continue lasix,rifaxamin, added low dose lactulose, coreg  *Moderate COPD  *Chronic hypoxic resp failure (2-3 L chronic use)  *Non-obstructive CAD (9/2018 [facility])  -asa  *Gerd  *HTN  *Previous lumbar fusion surgery    -updated wife Mago on 3/14/21, she appreciated the call    Am labs: covid progression labs    DVT Prophylaxis:  Sq heparin tid    Disposition: I expect the patient to be discharged once final antibiotic plan/picc line in place & ok w/ ID, possibly in couple days  CODE STATUS:   Code Status and Medical Interventions:   Ordered at: 03/10/21 1906     Limited Support to NOT Include:    Cardioversion/Defibrillation    Intubation     Code Status:    No CPR     Medical Interventions (Level of Support Prior to Arrest):    Limited       Rinku Mcrae MD  03/14/21

## 2021-03-14 NOTE — PROGRESS NOTES
INFECTIOUS DISEASE CONSULT/INITIAL HOSPITAL VISIT    Derek Kelsey  1950  [id number]    Date of Consult: 3/14/2021    Admission Date: 3/10/2021      Requesting Provider: No ref. provider found  Evaluating Physician: Davis Carrero MD    Reason for Consultation: Positive Covid PCR    History of present illness:    Patient is a 70 y.o. 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  Patient has come into the emergency room and tested positive for Covid is not hypoxic patient did have hypotension which responded to IV fluids patient is currently not requiring oxygen and feels better has been started on empiric vancomycin and meropenem.    Patient's wife is being tested for Covid results are pending.    I was previously suppressing patient with doxycycline and ciprofloxacin.      Patient is not a surgical candidate for endograft removal because of cirrhosis and high surgical mortality      3/12/21; doing well; no fever, rash, sore throat; on room air, no hypoxia overnight; blood cultures growing gnr from admission    3/13/2021 no events overnight denies fevers rash sore throat or diarrhea feels better    3/14/21;  Afebrile normotensive;  Resting comfortably briefly on o2; now on 1 L          Past Medical History:   Diagnosis Date   • Abdominal aortic aneurysm (CMS/[facility]) 9/29/2016   • Anxiety 9/29/2016   • Arthritis    • Back pain    • Black lung (CMS/[facility])    • CAD (coronary artery disease) 9/29/2016   • Cirrhosis (CMS/[facility])    • COPD (chronic obstructive pulmonary disease) (CMS/[facility]) 9/29/2016   • Depression 9/29/2016   • Depression    • GERD (gastroesophageal reflux disease)    • Hypertension 9/29/2016   • On supplemental oxygen by nasal cannula     at night   • Vitiligo    •  Wears dentures    • Wears reading eyeglasses        Past Surgical History:   Procedure Laterality Date   • ABDOMINAL AORTIC ANEURYSM REPAIR WITH ENDOGRAFT N/A 6/6/2019    Procedure: ABDOMINAL AORTIC ANEURYSM REPAIR WITH ENDOGRAFT;  Surgeon: Leopoldo Burleson MD;  Location:  [facility];  Service: Vascular   • BACK SURGERY     • CARDIAC CATHETERIZATION     • CARDIAC CATHETERIZATION N/A 9/28/2018    Procedure: Left Heart Cath;  Surgeon: Douglas Galvez MD;  Location:  [facility];  Service: Cardiovascular   • CARDIAC SURGERY     • COLONOSCOPY      2015   • COLONOSCOPY N/A 10/30/2019    Procedure: COLONOSCOPY;  Surgeon: Homar Viveros MD;  Location:  [facility];  Service: Gastroenterology   • ENDOSCOPY N/A 10/30/2019    Procedure: ESOPHAGOGASTRODUODENOSCOPY;  Surgeon: Homar Viveros MD;  Location:  [facility];  Service: Gastroenterology   • EYE SURGERY      cataracts bilateral   • HAND SURGERY Left    • LUMBAR DISCECTOMY FUSION INSTRUMENTATION N/A 11/1/2018    Procedure: LUMBAR DISCECTOMY POSTERIOR WITH FUSION INSTRUMENTATION;  Surgeon: Joo Franklin MD;  Location:  [facility];  Service: Orthopedic Spine   • LUMBAR DISCECTOMY FUSION INSTRUMENTATION N/A 7/24/2019    Procedure: POSTERIOR LUMBAR SPINAL FUSION REVISION AND INSTRUMENTATION L3,L4,L5,S1;  Surgeon: Joo Franklin MD;  Location:  [facility];  Service: Orthopedic Spine   • ORIF FINGER / THUMB FRACTURE     • SHOULDER SURGERY Left        Family History   Problem Relation Age of Onset   • Stroke Mother    • Hypertension Mother    • Heart disease Mother    • Heart disease Father    • Hypertension Father    • Diabetes Sister    • Hypertension Sister    • Heart disease Sister    • Diabetes Brother    • Heart disease Brother    • Hypertension Child    • Hypertension Son    • No Known Problems Son    • Diabetes Sister    • Heart disease Sister        Social History     Socioeconomic History   • Marital status:       Spouse name: Not on file   • Number of children: 2   • Years of education: Not on file   • Highest education level: Not on file   Tobacco Use   • Smoking status: Former Smoker     Packs/day: 1.00     Years: 30.00     Pack years: 30.00     Types: Cigarettes     Quit date: 1/1/2011     Years since quitting: 10.2   • Smokeless tobacco: Former User     Types: Chew     Quit date: 4/7/2011   Substance and Sexual Activity   • Alcohol use: Not Currently   • Drug use: No   • Sexual activity: Defer     Comment:  and lives with wife       Allergies   Allergen Reactions   • Penicillins Hives     Hives - has tolerated Zosyn and ceftriaxone 09/2019   • Percocet [Oxycodone-Acetaminophen] Confusion         Medication:    Current Facility-Administered Medications:   •  aspirin EC tablet 81 mg, 81 mg, Oral, Daily, Case, Ludmila V., DO, 81 mg at 03/14/21 0840  •  carvedilol (COREG) tablet 12.5 mg, 12.5 mg, Oral, BID With Meals, Rinku Mcrae MD, 12.5 mg at 03/14/21 0839  •  cefTAZidime (FORTAZ) 2 g/100 mL 0.9% NS IVPB (mpb), 2 g, Intravenous, Q8H, Davis Carrero MD, 2 g at 03/14/21 0831  •  citalopram (CeleXA) tablet 40 mg, 40 mg, Oral, Daily, Case, Ludmila V., DO, 40 mg at 03/14/21 0840  •  heparin (porcine) 5000 UNIT/ML injection 5,000 Units, 5,000 Units, Subcutaneous, Q12H, Rinku Mcrae MD, 5,000 Units at 03/14/21 0840  •  lactulose (CHRONULAC) 10 GM/15ML solution 10 g, 10 g, Oral, TID PRN, Case, Ludmila V., DO  •  lactulose (CHRONULAC) 10 GM/15ML solution 10 g, 10 g, Oral, TID, Rinku Mcrae MD  •  magnesium sulfate 4 gram infusion - Mg less than or equal to 1mg/dL, 4 g, Intravenous, PRN **OR** magnesium sulfate 3 gram infusion (1gm x 3) - Mg 1.1 - 1.5 mg/dL, 1 g, Intravenous, PRN **OR** Magnesium Sulfate 2 gram infusion- Mg 1.6 - 1.9 mg/dL, 2 g, Intravenous, PRN, Rinku Mcrae MD, Last Rate: 25 mL/hr at 03/13/21 1755, 2 g at 03/13/21 1755  •  melatonin tablet 5 mg, 5 mg,  Oral, Nightly, Case, Ludmila V., DO, 5 mg at 03/13/21 2059  •  ondansetron (ZOFRAN) injection 4 mg, 4 mg, Intravenous, Q6H PRN, Rinku Mcrae MD, 4 mg at 03/13/21 0939  •  pantoprazole (PROTONIX) EC tablet 40 mg, 40 mg, Oral, BID, Case, Ludmila V., DO, 40 mg at 03/14/21 0839  •  potassium chloride (MICRO-K) CR capsule 40 mEq, 40 mEq, Oral, PRN, 40 mEq at 03/14/21 1242 **OR** potassium chloride (KLOR-CON) packet 40 mEq, 40 mEq, Oral, PRN **OR** potassium chloride 10 mEq in 100 mL IVPB, 10 mEq, Intravenous, Q1H PRN, Rinku Mcrae MD  •  riFAXIMin (XIFAXAN) tablet 550 mg, 550 mg, Oral, Q12H, Case, Ludmila V., DO, 550 mg at 03/14/21 0840  •  saccharomyces boulardii (FLORASTOR) capsule 250 mg, 250 mg, Oral, BID, Case, Ludmila V., DO, 250 mg at 03/14/21 0839  •  sodium chloride 0.9 % flush 10 mL, 10 mL, Intravenous, PRN, Case, Ludmila V., DO  •  sodium chloride 0.9 % flush 10 mL, 10 mL, Intravenous, Q12H, Case, Ludmila V., DO, 10 mL at 03/14/21 0840  •  sodium chloride 0.9 % flush 10 mL, 10 mL, Intravenous, PRN, Case, Ludmila V., DO  •  sucralfate (CARAFATE) tablet 1 g, 1 g, Oral, 4x Daily, Case, Ludmila V., DO, 1 g at 03/14/21 1242  •  tamsulosin (FLOMAX) 24 hr capsule 0.4 mg, 0.4 mg, Oral, Daily, Case, Ludmila V., DO, 0.4 mg at 03/14/21 0840    Antibiotics:  Anti-Infectives (From admission, onward)    Ordered     Dose/Rate Route Frequency Start Stop    03/13/21 1235  cefTAZidime (FORTAZ) 2 g/100 mL 0.9% NS IVPB (mpb)     Note to Pharmacy: Has tolerated IV cephalosporins before   Ordering Provider: Davis Carrero MD    2 g  over 30 Minutes Intravenous Every 8 Hours 03/13/21 1600 03/20/21 1559    03/10/21 2042  riFAXIMin (XIFAXAN) tablet 550 mg     Ordering Provider: Ludmila Mederos, DO    550 mg Oral Every 12 Hours Scheduled 03/10/21 2100 03/25/21 2059    03/10/21 1548  vancomycin 2250 mg/500 mL 0.9% NS IVPB ([facility])     Ordering Provider: Joo Cheung APRN    20 mg/kg × 111 kg Intravenous Once  03/10/21 1550 03/10/21 2116    03/10/21 1518  meropenem (MERREM) 1 g/100 mL 0.9% NS VTB ([facility])     Ordering Provider: Joo Cheung APRN    1 g  over 30 Minutes Intravenous Once 03/10/21 1520 03/10/21 1850            Review of Systems:  See hpi      Physical Exam:   Vital Signs  Temp (24hrs), Av.9 °F (36.6 °C), Min:97.7 °F (36.5 °C), Max:98 °F (36.7 °C)    Temp  Min: 97.7 °F (36.5 °C)  Max: 98 °F (36.7 °C)  BP  Min: 114/68  Max: 158/115  Pulse  Min: 74  Max: 85  Resp  Min: 18  Max: 22  SpO2  Min: 94 %  Max: 96 %    GENERAL: resting quietly in no distress  HEENT: Normocephalic, atraumatic.  PERRL. EOMI. No conjunctival injection. No icterus.  No external oral lesions    HEART:sinus rhythm on tele  LUNGS: symmetrical inspiration bilaterally  ABDOMEN: Soft, nontender  EXT: 1+ edema  :  Without Young catheter.  MSK: No joint deformity  SKIN: Warm and dry without cutaneous eruptions on Inspection/palpation.        Laboratory Data    Results from last 7 days   Lab Units 21  0522 21  0635 21  0517   WBC 10*3/mm3 2.78* 3.84 7.84   HEMOGLOBIN g/dL 8.9* 8.9* 8.9*   HEMATOCRIT % 28.8* 29.1* 27.9*   PLATELETS 10*3/mm3 98* 103* 99*     Results from last 7 days   Lab Units 21  0920   SODIUM mmol/L 134*   POTASSIUM mmol/L 3.5   CHLORIDE mmol/L 100   CO2 mmol/L 24.0   BUN mg/dL 8   CREATININE mg/dL 0.71*   GLUCOSE mg/dL 92   CALCIUM mg/dL 9.6     Results from last 7 days   Lab Units 21  0920   ALK PHOS U/L 182*   BILIRUBIN mg/dL 1.0   ALT (SGPT) U/L 55*   AST (SGOT) U/L 136*         Results from last 7 days   Lab Units 21  0920   CRP mg/dL 3.29*     Results from last 7 days   Lab Units 21  0547   LACTATE mmol/L 2.6*  2.6*         Results from last 7 days   Lab Units 21  0517   VANCOMYCIN TR mcg/mL 12.40     Estimated Creatinine Clearance: 117.4 mL/min (A) (by C-G formula based on SCr of 0.71 mg/dL (L)).      Microbiology:  No results found for: ACANTHNAEG, AFBCX, BPERTUSSISCX,  BLOODCX  No results found for: BCIDPCR, CXREFLEX, CSFCX, CULTURETIS  No results found for: CULTURES, HSVCX, URCX  No results found for: EYECULTURE, GCCX, HSVCULTURE, LABHSV  No results found for: LEGIONELLA, MRSACX, MUMPSCX, MYCOPLASCX  No results found for: NOCARDIACX, STOOLCX  Urine Culture   Date Value Ref Range Status   03/10/2021 No growth  Preliminary     No results found for: VIRALCULTU, WOUNDCX        Radiology:  Imaging Results (Last 72 Hours)     Procedure Component Value Units Date/Time    XR Chest 1 View [[id number]] Collected: 03/13/21 0757     Updated: 03/13/21 0802    Narrative:      EXAMINATION: XR CHEST 1 VW-      INDICATION: dyspnea, hypoxia, covid (rule out volume overload vs covid  pneumonia; A41.9-Sepsis, unspecified organism; U07.1-COVID-19;  J12.82-Pneumonia due to Coronavirus disease 2019; E87.2-Acidosis      COMPARISON: 3/10/2021     FINDINGS: No acute osseous findings. Normal cardiomediastinal  silhouette. Pleural effusion. No pneumothorax. Bilateral apical  scarring. Pulmonary vascular congestion with increased interstitial  markings. No focal lobar opacities.        Impression:      Vascular congestion with increased interstitial markings  most consistent with interstitial edema. No pleural effusion. No new  focal airspace opacities.     This report was finalized on 3/13/2021 7:59 AM by Eddie Hirsch.       MRI abdomen wo contrast mrcp [[id number]] Collected: 03/12/21 0818     Updated: 03/12/21 1723    Narrative:      EXAMINATION: MRI ABDOMEN WO CONTRAST MRCP-      INDICATION: Rule out cholecystitis, cholangitis (gram negative  bacteremia, transient nausea and vomiting, with mild elevated bilirubin,  history cirrhosis and known chronic AAA endograft infection);  A41.9-Sepsis, unspecified organism; U07.1-COVID-19; J12.82-Pneumonia due  to Coronavirus disease 2019; E87.2-Acidosis.     TECHNIQUE: Multiplanar MRI of the abdomen without intravenous contrast  along with MRCP sequences performed  including T2 coronal space  reconstruction sequence.        COMPARISON: CT abdomen and pelvis dated 03/10/2021.     FINDINGS: Lung bases are grossly clear. Liver has heterogeneous signal  throughout with nodular contours consistent with known cirrhotic hepatic  morphology. No focal lesion is evident other than a T2 hyperintense  focus in the left hemiliver most consistent with cysts although limited  evaluation of the liver parenchyma without intravenous contrast  administration. Gallbladder appears partially contracted and  unremarkable without distinct filling defect. Motion degradation on MRP  sequences, however no gross intrahepatic or extrahepatic biliary  dilatation with common bile duct measuring up to 9 mm in diameter at the  level of the alex hepatis greater than expected for caliber of mild  dilatation without pancreas divisum anatomy. No distinct filling defect  within the common bile duct to suggest choledocholithiasis with adjacent  intermediate T2 signal concerning for edema versus potential minimal  inflammation. Pancreas is somewhat limited due to motion, however no  gross abnormality. Spleen is unremarkable. Adrenals without distinct  nodule. Kidneys without hydronephrosis or hydroureter. No bulky  retroperitoneal adenopathy, however, noted redemonstration of soft  tissue and stranding surrounding the aortobiiliac endovascular stent  graft seen better on CT recently performed.       Impression:      1. Cirrhotic hepatic morphology again noted with cyst in the left liver  stable from prior CT comparison without intravenous contrast evaluate  further for a subtle parenchymal lesion.  2. Gallbladder is partially contracted without definitive cholelithiasis  or choledocholithiasis of filling defect, however, common bile duct is  prominent and greater than expected at 9 mm in diameter at the level of  the alex hepatis. Questionable minimal adjacent edema or intermediate  fluid signal may represent  components of inflammation or fluid overall  indeterminate without gross intrahepatic biliary dilatation with some  motion degradation.     D:  03/12/2021  E:  03/12/2021        This report was finalized on 3/12/2021 5:20 PM by Dr. Damien Ceballos.       CT Head Without Contrast [[id number]] Collected: 03/10/21 1648     Updated: 03/11/21 1858    Narrative:      EXAMINATION: CT HEAD WO CONTRAST- - 03/10/2021     INDICATION: Altered mental status.     TECHNIQUE: CT head without intravenous contrast     The radiation dose reduction device was turned on for each scan per the  ALARA (As Low as Reasonably Achievable) protocol.     COMPARISON: None.     FINDINGS: Midline structures are symmetric without evidence of mass,  mass effect, or midline shift. Ventricles and sulci within normal  limits. No intra-axial hemorrhage or extra-axial fluid collection.  Globes and orbits unremarkable. Paranasal sinuses and mastoid air cells  are grossly clear and well-pneumatized. Calvarium intact.       Impression:      No acute intracranial abnormality.     DICTATED:   03/10/2021  EDITED/ls :   03/10/2021        This report was finalized on 3/11/2021 6:55 PM by Dr. Damien Ceballos.       CT Angiogram Chest [[id number]] Collected: 03/10/21 1659     Updated: 03/11/21 1858    Narrative:      EXAMINATION: CT ANGIOGRAM CHEST, CT ABDOMEN/PELVIS W CONTRAST -  03/10/2021      INDICATION: Shortness of air. Altered mental status. Abdominal pain.  Evaluate for pulmonary embolus.      TECHNIQUE: CT angiogram chest with intravenous contrast administration  following PE protocol. 2D reconstructions performed, CT abdomen and  pelvis with intravenous contrast administration.     The radiation dose reduction device was turned on for each scan per the  ALARA (As Low as Reasonably Achievable) protocol.     COMPARISON: CT abdomen and pelvis 02/24/2021.     FINDINGS:      CTA CHEST: Thyroid is homogeneous in attenuation. No bulky mediastinal  adenopathy. Central  airways are patent. Esophagus in normal course and  caliber to the distal segment where there is a small hiatal hernia.  Patent nonaneurysmal minimally atherosclerotic thoracic aorta with  patent great vessel origins. No central pulmonary arterial filling  defect to suggest pulmonary embolus with equivocal optimal  opacification. Cardiac size borderline enlarged without pericardial  effusion. Extended lung windows demonstrate biapical pleural-parenchymal  scarring with paraseptal emphysema and biapical pleural-parenchymal  scarring, however, no dominant nodule or mass. No pleural effusion.  Degenerative disease of the thoracic spine without aggressive osseous  lesion. No soft tissue body wall findings of acuity involving the chest.     ABDOMEN AND PELVIS: Liver demonstrates nodular contours concerning for  parenchymal disease process such as potential cirrhotic hepatic  morphology with a low-attenuation focus likely a  cyst in the left  hemiliver. Gallbladder distended without obvious calculus associated. No  biliary dilatation. Pancreas and spleen unremarkable. Adrenals without  distinct nodule. Kidneys without hydronephrosis or hydroureter. No bulky  retroperitoneal adenopathy. Atherosclerotic aneurysmal abdominal aorta  with aortobiiliac endovascular stent in place demonstrating gross  patency with adjacent stranding to the distal aorta as seen on prior  comparison grossly similar appearance with mildly enlarged  retroperitoneal lymph nodes adjacent to the periaortic region. Portal  veins and IVC grossly patent. GI tract evaluation without focal  thickening or disproportionate dilatation. No free fluid or  intra-abdominal free air. Pelvic viscera unremarkable without bulky  pelvic adenopathy.       Impression:      1. No PE is clearly identified as no central filling defect is  identified on suboptimal evaluation due to technique despite repeat  sequencing.     2. No acute intrathoracic process.     3. Cirrhotic  hepatic morphology again noted.     4. Stable appearance of the endovascular aortic aneurysm repair with  aortobiiliac stent graft and adjacent distal infrarenal stranding and  minimal adenopathy as seen on prior comparison of  02/24/2021 without  evidence for distinct progression or definitive hemorrhage/leak clearly  evident in a similar configuration to prior comparison 02/24/2021.     DICTATED:   03/10/2021  EDITED/ls :   03/10/2021        This report was finalized on 3/11/2021 6:55 PM by Dr. Damien Ceballos.       CT Abdomen Pelvis With Contrast [[id number]] Collected: 03/10/21 1659     Updated: 03/11/21 1858    Narrative:      EXAMINATION: CT ANGIOGRAM CHEST, CT ABDOMEN/PELVIS W CONTRAST -  03/10/2021      INDICATION: Shortness of air. Altered mental status. Abdominal pain.  Evaluate for pulmonary embolus.      TECHNIQUE: CT angiogram chest with intravenous contrast administration  following PE protocol. 2D reconstructions performed, CT abdomen and  pelvis with intravenous contrast administration.     The radiation dose reduction device was turned on for each scan per the  ALARA (As Low as Reasonably Achievable) protocol.     COMPARISON: CT abdomen and pelvis 02/24/2021.     FINDINGS:      CTA CHEST: Thyroid is homogeneous in attenuation. No bulky mediastinal  adenopathy. Central airways are patent. Esophagus in normal course and  caliber to the distal segment where there is a small hiatal hernia.  Patent nonaneurysmal minimally atherosclerotic thoracic aorta with  patent great vessel origins. No central pulmonary arterial filling  defect to suggest pulmonary embolus with equivocal optimal  opacification. Cardiac size borderline enlarged without pericardial  effusion. Extended lung windows demonstrate biapical pleural-parenchymal  scarring with paraseptal emphysema and biapical pleural-parenchymal  scarring, however, no dominant nodule or mass. No pleural effusion.  Degenerative disease of the thoracic spine  without aggressive osseous  lesion. No soft tissue body wall findings of acuity involving the chest.     ABDOMEN AND PELVIS: Liver demonstrates nodular contours concerning for  parenchymal disease process such as potential cirrhotic hepatic  morphology with a low-attenuation focus likely a  cyst in the left  hemiliver. Gallbladder distended without obvious calculus associated. No  biliary dilatation. Pancreas and spleen unremarkable. Adrenals without  distinct nodule. Kidneys without hydronephrosis or hydroureter. No bulky  retroperitoneal adenopathy. Atherosclerotic aneurysmal abdominal aorta  with aortobiiliac endovascular stent in place demonstrating gross  patency with adjacent stranding to the distal aorta as seen on prior  comparison grossly similar appearance with mildly enlarged  retroperitoneal lymph nodes adjacent to the periaortic region. Portal  veins and IVC grossly patent. GI tract evaluation without focal  thickening or disproportionate dilatation. No free fluid or  intra-abdominal free air. Pelvic viscera unremarkable without bulky  pelvic adenopathy.       Impression:      1. No PE is clearly identified as no central filling defect is  identified on suboptimal evaluation due to technique despite repeat  sequencing.     2. No acute intrathoracic process.     3. Cirrhotic hepatic morphology again noted.     4. Stable appearance of the endovascular aortic aneurysm repair with  aortobiiliac stent graft and adjacent distal infrarenal stranding and  minimal adenopathy as seen on prior comparison of  02/24/2021 without  evidence for distinct progression or definitive hemorrhage/leak clearly  evident in a similar configuration to prior comparison 02/24/2021.     DICTATED:   03/10/2021  EDITED/ls :   03/10/2021        This report was finalized on 3/11/2021 6:55 PM by Dr. Damien Ceballos.               Impression:   COVID-19 positivity without hypoxia  History of abdominal aortic aneurysm with endograft repair  and presumed secondary infection on suppressive Cipro and doxycycline  Cirrhosis  COPD  Recent weakness and nausea and vomiting and low-grade fevers while at home  Lactic acidosis  Ammonia 104  Pseudomonas bacteremia susc to meropenem    PLAN/RECOMMENDATIONS:   Thank you for asking us to see Derek Kelsey, I recommend the following:    Complicated case;     Patient treated empirically last year for pseudomonas although there were not positive blood cultures.  (given cefepime, meropenem for 8-12 weeks) and then changed to oral doxy, cipro.     Postive bl cx for pseudmonas very interesting and suggest there is either recurrenct infection on graft or possibly prostatitis as source.    F/u bl cultures  cont ceftazidime 2 g iv q8h  Place picc line     psa normal  I suspect the endograft is the source; the patient can have surgery I do not know how I will suppress this infection given that Pseudomonas is resistant to Cipro and Levaquin    Monitor oxygenation    Potential d/c home early this week    Davis Carrero MD  3/14/2021  13:22 EDT

## 2021-03-15 ENCOUNTER — APPOINTMENT (OUTPATIENT)
Dept: GENERAL RADIOLOGY | Facility: HOSPITAL | Age: 71
End: 2021-03-15

## 2021-03-15 LAB
ALBUMIN SERPL-MCNC: 3.3 G/DL (ref 3.5–5.2)
ALBUMIN/GLOB SERPL: 0.6 G/DL
ALP SERPL-CCNC: 179 U/L (ref 39–117)
ALT SERPL W P-5'-P-CCNC: 52 U/L (ref 1–41)
ANION GAP SERPL CALCULATED.3IONS-SCNC: 7 MMOL/L (ref 5–15)
AST SERPL-CCNC: 124 U/L (ref 1–40)
BASOPHILS # BLD AUTO: 0.05 10*3/MM3 (ref 0–0.2)
BASOPHILS NFR BLD AUTO: 1.2 % (ref 0–1.5)
BILIRUB SERPL-MCNC: 0.8 MG/DL (ref 0–1.2)
BUN SERPL-MCNC: 10 MG/DL (ref 8–23)
BUN/CREAT SERPL: 12.5 (ref 7–25)
CALCIUM SPEC-SCNC: 9.4 MG/DL (ref 8.6–10.5)
CHLORIDE SERPL-SCNC: 101 MMOL/L (ref 98–107)
CO2 SERPL-SCNC: 27 MMOL/L (ref 22–29)
CREAT SERPL-MCNC: 0.8 MG/DL (ref 0.76–1.27)
CRP SERPL-MCNC: 2.19 MG/DL (ref 0–0.5)
DEPRECATED RDW RBC AUTO: 53 FL (ref 37–54)
EOSINOPHIL # BLD AUTO: 0.12 10*3/MM3 (ref 0–0.4)
EOSINOPHIL NFR BLD AUTO: 2.9 % (ref 0.3–6.2)
ERYTHROCYTE [DISTWIDTH] IN BLOOD BY AUTOMATED COUNT: 15.2 % (ref 12.3–15.4)
FERRITIN SERPL-MCNC: 454.5 NG/ML (ref 30–400)
GFR SERPL CREATININE-BSD FRML MDRD: 96 ML/MIN/1.73
GLOBULIN UR ELPH-MCNC: 5.2 GM/DL
GLUCOSE SERPL-MCNC: 94 MG/DL (ref 65–99)
HCT VFR BLD AUTO: 31.2 % (ref 37.5–51)
HCT VFR BLD AUTO: 31.3 % (ref 37.5–51)
HGB BLD-MCNC: 9.6 G/DL (ref 13–17.7)
HGB BLD-MCNC: 9.7 G/DL (ref 13–17.7)
IMM GRANULOCYTES # BLD AUTO: 0.12 10*3/MM3 (ref 0–0.05)
IMM GRANULOCYTES NFR BLD AUTO: 2.9 % (ref 0–0.5)
LYMPHOCYTES # BLD AUTO: 0.6 10*3/MM3 (ref 0.7–3.1)
LYMPHOCYTES NFR BLD AUTO: 14.6 % (ref 19.6–45.3)
MAGNESIUM SERPL-MCNC: 1.8 MG/DL (ref 1.6–2.4)
MCH RBC QN AUTO: 29.6 PG (ref 26.6–33)
MCHC RBC AUTO-ENTMCNC: 31.1 G/DL (ref 31.5–35.7)
MCV RBC AUTO: 95.1 FL (ref 79–97)
MONOCYTES # BLD AUTO: 0.41 10*3/MM3 (ref 0.1–0.9)
MONOCYTES NFR BLD AUTO: 10 % (ref 5–12)
NEUTROPHILS NFR BLD AUTO: 2.81 10*3/MM3 (ref 1.7–7)
NEUTROPHILS NFR BLD AUTO: 68.4 % (ref 42.7–76)
NRBC BLD AUTO-RTO: 0 /100 WBC (ref 0–0.2)
PLATELET # BLD AUTO: 112 10*3/MM3 (ref 140–450)
PMV BLD AUTO: 9.6 FL (ref 6–12)
POTASSIUM SERPL-SCNC: 4.1 MMOL/L (ref 3.5–5.2)
PROT SERPL-MCNC: 8.5 G/DL (ref 6–8.5)
RBC # BLD AUTO: 3.28 10*6/MM3 (ref 4.14–5.8)
SODIUM SERPL-SCNC: 135 MMOL/L (ref 136–145)
WBC # BLD AUTO: 4.11 10*3/MM3 (ref 3.4–10.8)

## 2021-03-15 PROCEDURE — 83735 ASSAY OF MAGNESIUM: CPT | Performed by: INTERNAL MEDICINE

## 2021-03-15 PROCEDURE — 85014 HEMATOCRIT: CPT | Performed by: INTERNAL MEDICINE

## 2021-03-15 PROCEDURE — 85018 HEMOGLOBIN: CPT | Performed by: INTERNAL MEDICINE

## 2021-03-15 PROCEDURE — 71045 X-RAY EXAM CHEST 1 VIEW: CPT

## 2021-03-15 PROCEDURE — 25010000002 CEFTAZIDIME PER 500 MG: Performed by: INTERNAL MEDICINE

## 2021-03-15 PROCEDURE — 02HV33Z INSERTION OF INFUSION DEVICE INTO SUPERIOR VENA CAVA, PERCUTANEOUS APPROACH: ICD-10-PCS | Performed by: INTERNAL MEDICINE

## 2021-03-15 PROCEDURE — 82728 ASSAY OF FERRITIN: CPT | Performed by: INTERNAL MEDICINE

## 2021-03-15 PROCEDURE — 86140 C-REACTIVE PROTEIN: CPT | Performed by: INTERNAL MEDICINE

## 2021-03-15 PROCEDURE — 97110 THERAPEUTIC EXERCISES: CPT

## 2021-03-15 PROCEDURE — 97116 GAIT TRAINING THERAPY: CPT

## 2021-03-15 PROCEDURE — 25010000003 MAGNESIUM SULFATE 4 GM/100ML SOLUTION: Performed by: INTERNAL MEDICINE

## 2021-03-15 PROCEDURE — 85025 COMPLETE CBC W/AUTO DIFF WBC: CPT | Performed by: INTERNAL MEDICINE

## 2021-03-15 PROCEDURE — 99233 SBSQ HOSP IP/OBS HIGH 50: CPT | Performed by: INTERNAL MEDICINE

## 2021-03-15 PROCEDURE — C1751 CATH, INF, PER/CENT/MIDLINE: HCPCS

## 2021-03-15 PROCEDURE — C1894 INTRO/SHEATH, NON-LASER: HCPCS

## 2021-03-15 PROCEDURE — 25010000002 HEPARIN (PORCINE) PER 1000 UNITS: Performed by: INTERNAL MEDICINE

## 2021-03-15 PROCEDURE — 80053 COMPREHEN METABOLIC PANEL: CPT | Performed by: INTERNAL MEDICINE

## 2021-03-15 RX ORDER — MAGNESIUM SULFATE HEPTAHYDRATE 40 MG/ML
4 INJECTION, SOLUTION INTRAVENOUS AS NEEDED
Status: DISCONTINUED | OUTPATIENT
Start: 2021-03-15 | End: 2021-03-17 | Stop reason: HOSPADM

## 2021-03-15 RX ORDER — MAGNESIUM SULFATE HEPTAHYDRATE 40 MG/ML
2 INJECTION, SOLUTION INTRAVENOUS AS NEEDED
Status: DISCONTINUED | OUTPATIENT
Start: 2021-03-15 | End: 2021-03-17 | Stop reason: HOSPADM

## 2021-03-15 RX ORDER — SODIUM CHLORIDE 0.9 % (FLUSH) 0.9 %
20 SYRINGE (ML) INJECTION AS NEEDED
Status: DISCONTINUED | OUTPATIENT
Start: 2021-03-15 | End: 2021-03-17 | Stop reason: HOSPADM

## 2021-03-15 RX ORDER — SODIUM CHLORIDE 0.9 % (FLUSH) 0.9 %
10 SYRINGE (ML) INJECTION EVERY 12 HOURS SCHEDULED
Status: DISCONTINUED | OUTPATIENT
Start: 2021-03-15 | End: 2021-03-17 | Stop reason: HOSPADM

## 2021-03-15 RX ORDER — SODIUM CHLORIDE 0.9 % (FLUSH) 0.9 %
10 SYRINGE (ML) INJECTION AS NEEDED
Status: DISCONTINUED | OUTPATIENT
Start: 2021-03-15 | End: 2021-03-17 | Stop reason: HOSPADM

## 2021-03-15 RX ADMIN — SUCRALFATE 1 G: 1 TABLET ORAL at 17:39

## 2021-03-15 RX ADMIN — Medication 5 MG: at 22:09

## 2021-03-15 RX ADMIN — FUROSEMIDE 20 MG: 20 TABLET ORAL at 17:39

## 2021-03-15 RX ADMIN — Medication 250 MG: at 22:09

## 2021-03-15 RX ADMIN — RIFAXIMIN 550 MG: 550 TABLET ORAL at 08:40

## 2021-03-15 RX ADMIN — SUCRALFATE 1 G: 1 TABLET ORAL at 22:08

## 2021-03-15 RX ADMIN — CEFTAZIDIME 2 G: 2 INJECTION, POWDER, FOR SOLUTION INTRAVENOUS at 22:10

## 2021-03-15 RX ADMIN — MAGNESIUM SULFATE HEPTAHYDRATE 4 G: 40 INJECTION, SOLUTION INTRAVENOUS at 11:26

## 2021-03-15 RX ADMIN — CEFTAZIDIME 2 G: 2 INJECTION, POWDER, FOR SOLUTION INTRAVENOUS at 08:39

## 2021-03-15 RX ADMIN — PANTOPRAZOLE SODIUM 40 MG: 40 TABLET, DELAYED RELEASE ORAL at 22:09

## 2021-03-15 RX ADMIN — CARVEDILOL 12.5 MG: 12.5 TABLET, FILM COATED ORAL at 17:39

## 2021-03-15 RX ADMIN — TAMSULOSIN HYDROCHLORIDE 0.4 MG: 0.4 CAPSULE ORAL at 08:40

## 2021-03-15 RX ADMIN — SUCRALFATE 1 G: 1 TABLET ORAL at 11:26

## 2021-03-15 RX ADMIN — SUCRALFATE 1 G: 1 TABLET ORAL at 08:40

## 2021-03-15 RX ADMIN — RIFAXIMIN 550 MG: 550 TABLET ORAL at 22:09

## 2021-03-15 RX ADMIN — PANTOPRAZOLE SODIUM 40 MG: 40 TABLET, DELAYED RELEASE ORAL at 08:41

## 2021-03-15 RX ADMIN — FUROSEMIDE 20 MG: 20 TABLET ORAL at 08:40

## 2021-03-15 RX ADMIN — CITALOPRAM 40 MG: 40 TABLET ORAL at 08:40

## 2021-03-15 RX ADMIN — SODIUM CHLORIDE, PRESERVATIVE FREE 10 ML: 5 INJECTION INTRAVENOUS at 16:01

## 2021-03-15 RX ADMIN — CARVEDILOL 12.5 MG: 12.5 TABLET, FILM COATED ORAL at 08:41

## 2021-03-15 RX ADMIN — HEPARIN SODIUM 5000 UNITS: 5000 INJECTION INTRAVENOUS; SUBCUTANEOUS at 08:46

## 2021-03-15 RX ADMIN — Medication 250 MG: at 08:40

## 2021-03-15 RX ADMIN — CEFTAZIDIME 2 G: 2 INJECTION, POWDER, FOR SOLUTION INTRAVENOUS at 15:57

## 2021-03-15 RX ADMIN — ASPIRIN 81 MG: 81 TABLET, COATED ORAL at 08:40

## 2021-03-15 RX ADMIN — SODIUM CHLORIDE, PRESERVATIVE FREE 10 ML: 5 INJECTION INTRAVENOUS at 22:09

## 2021-03-15 NOTE — PROGRESS NOTES
INFECTIOUS DISEASE CONSULT/INITIAL HOSPITAL VISIT    Derek Kelsey  1950  [id number]    Date of Consult: 3/15/2021    Admission Date: 3/10/2021      Requesting Provider: No ref. provider found  Evaluating Physician: Davis Carrero MD    Reason for Consultation: Positive Covid PCR    History of present illness:    Patient is a 70 y.o. 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  Patient has come into the emergency room and tested positive for Covid is not hypoxic patient did have hypotension which responded to IV fluids patient is currently not requiring oxygen and feels better has been started on empiric vancomycin and meropenem.    Patient's wife is being tested for Covid results are pending.    I was previously suppressing patient with doxycycline and ciprofloxacin.      Patient is not a surgical candidate for endograft removal because of cirrhosis and high surgical mortality      3/12/21; doing well; no fever, rash, sore throat; on room air, no hypoxia overnight; blood cultures growing gnr from admission    3/13/2021 no events overnight denies fevers rash sore throat or diarrhea feels better    3/14/21;  Afebrile normotensive;  Resting comfortably briefly on o2; now on 1 L      3/15/21; doing well; no events overnight; no fever, rash, sore throat      Past Medical History:   Diagnosis Date   • Abdominal aortic aneurysm (CMS/[facility]) 9/29/2016   • Anxiety 9/29/2016   • Arthritis    • Back pain    • Black lung (CMS/[facility])    • CAD (coronary artery disease) 9/29/2016   • Cirrhosis (CMS/[facility])    • COPD (chronic obstructive pulmonary disease) (CMS/[facility]) 9/29/2016   • Depression 9/29/2016   • Depression    • GERD (gastroesophageal reflux disease)    • Hypertension 9/29/2016   •  On supplemental oxygen by nasal cannula     at night   • Vitiligo    • Wears dentures    • Wears reading eyeglasses        Past Surgical History:   Procedure Laterality Date   • ABDOMINAL AORTIC ANEURYSM REPAIR WITH ENDOGRAFT N/A 6/6/2019    Procedure: ABDOMINAL AORTIC ANEURYSM REPAIR WITH ENDOGRAFT;  Surgeon: Leopoldo Burleson MD;  Location:  [facility];  Service: Vascular   • BACK SURGERY     • CARDIAC CATHETERIZATION     • CARDIAC CATHETERIZATION N/A 9/28/2018    Procedure: Left Heart Cath;  Surgeon: Douglas Galvez MD;  Location:  [facility];  Service: Cardiovascular   • CARDIAC SURGERY     • COLONOSCOPY      2015   • COLONOSCOPY N/A 10/30/2019    Procedure: COLONOSCOPY;  Surgeon: Homar Viveros MD;  Location:  [facility];  Service: Gastroenterology   • ENDOSCOPY N/A 10/30/2019    Procedure: ESOPHAGOGASTRODUODENOSCOPY;  Surgeon: Homar Viveros MD;  Location:  [facility];  Service: Gastroenterology   • EYE SURGERY      cataracts bilateral   • HAND SURGERY Left    • LUMBAR DISCECTOMY FUSION INSTRUMENTATION N/A 11/1/2018    Procedure: LUMBAR DISCECTOMY POSTERIOR WITH FUSION INSTRUMENTATION;  Surgeon: Joo Franklin MD;  Location:  [facility];  Service: Orthopedic Spine   • LUMBAR DISCECTOMY FUSION INSTRUMENTATION N/A 7/24/2019    Procedure: POSTERIOR LUMBAR SPINAL FUSION REVISION AND INSTRUMENTATION L3,L4,L5,S1;  Surgeon: Joo Franklin MD;  Location:  [facility];  Service: Orthopedic Spine   • ORIF FINGER / THUMB FRACTURE     • SHOULDER SURGERY Left        Family History   Problem Relation Age of Onset   • Stroke Mother    • Hypertension Mother    • Heart disease Mother    • Heart disease Father    • Hypertension Father    • Diabetes Sister    • Hypertension Sister    • Heart disease Sister    • Diabetes Brother    • Heart disease Brother    • Hypertension Child    • Hypertension Son    • No Known Problems Son    • Diabetes Sister    • Heart disease Sister         Social History     Socioeconomic History   • Marital status:      Spouse name: Not on file   • Number of children: 2   • Years of education: Not on file   • Highest education level: Not on file   Tobacco Use   • Smoking status: Former Smoker     Packs/day: 1.00     Years: 30.00     Pack years: 30.00     Types: Cigarettes     Quit date: 1/1/2011     Years since quitting: 10.2   • Smokeless tobacco: Former User     Types: Chew     Quit date: 4/7/2011   Substance and Sexual Activity   • Alcohol use: Not Currently   • Drug use: No   • Sexual activity: Defer     Comment:  and lives with wife       Allergies   Allergen Reactions   • Penicillins Hives     Hives - has tolerated Zosyn and ceftriaxone 09/2019   • Percocet [Oxycodone-Acetaminophen] Confusion         Medication:    Current Facility-Administered Medications:   •  aspirin EC tablet 81 mg, 81 mg, Oral, Daily, Case, Ludmila V., DO, 81 mg at 03/15/21 0840  •  carvedilol (COREG) tablet 12.5 mg, 12.5 mg, Oral, BID With Meals, Rinku Mcrae MD, 12.5 mg at 03/15/21 0841  •  cefTAZidime (FORTAZ) 2 g/100 mL 0.9% NS IVPB (mpb), 2 g, Intravenous, Q8H, Davis Carrero MD, 2 g at 03/15/21 0839  •  citalopram (CeleXA) tablet 40 mg, 40 mg, Oral, Daily, Case, Ludmila V., DO, 40 mg at 03/15/21 0840  •  furosemide (LASIX) tablet 20 mg, 20 mg, Oral, BID, Rinku Mcrae MD, 20 mg at 03/15/21 0840  •  lactulose (CHRONULAC) 10 GM/15ML solution 10 g, 10 g, Oral, TID PRN, Case, Ludmila V., DO  •  Magnesium Sulfate 2 gram Bolus, followed by 8 gram infusion (total Mg dose 10 grams)- Mg less than or equal to 1mg/dL, 2 g, Intravenous, PRN **OR** Magnesium Sulfate 2 gram / 50mL Infusion (GIVE X 3 BAGS TO EQUAL 6GM TOTAL DOSE) - Mg 1.1 - 1.5 mg/dl, 2 g, Intravenous, PRN **OR** Magnesium Sulfate 4 gram infusion- Mg 1.6-1.9 mg/dL, 4 g, Intravenous, PRN, Awa Viveros MD, Last Rate: 25 mL/hr at 03/15/21 1126, 4 g at 03/15/21 1126  •  melatonin  tablet 5 mg, 5 mg, Oral, Nightly, Case, Ludmila V., DO, 5 mg at 03/14/21 2048  •  ondansetron (ZOFRAN) injection 4 mg, 4 mg, Intravenous, Q6H PRN, Rinku Mcrae MD, 4 mg at 03/14/21 1556  •  pantoprazole (PROTONIX) EC tablet 40 mg, 40 mg, Oral, BID, Case, Ludmila V., DO, 40 mg at 03/15/21 0841  •  potassium chloride (MICRO-K) CR capsule 40 mEq, 40 mEq, Oral, PRN, 40 mEq at 03/14/21 1720 **OR** potassium chloride (KLOR-CON) packet 40 mEq, 40 mEq, Oral, PRN **OR** potassium chloride 10 mEq in 100 mL IVPB, 10 mEq, Intravenous, Q1H PRN, Rinku Mcrae MD  •  riFAXIMin (XIFAXAN) tablet 550 mg, 550 mg, Oral, Q12H, Case, Ludmila V., DO, 550 mg at 03/15/21 0840  •  saccharomyces boulardii (FLORASTOR) capsule 250 mg, 250 mg, Oral, BID, Case, Ludmila V., DO, 250 mg at 03/15/21 0840  •  sodium chloride 0.9 % flush 10 mL, 10 mL, Intravenous, PRN, Case, Ludmila V., DO  •  sodium chloride 0.9 % flush 10 mL, 10 mL, Intravenous, Q12H, Case, Ludmila V., DO, 10 mL at 03/14/21 2049  •  sodium chloride 0.9 % flush 10 mL, 10 mL, Intravenous, PRN, Case, Ludmila V., DO  •  sodium chloride 0.9 % flush 10 mL, 10 mL, Intravenous, Q12H, Davis Carrero MD  •  sodium chloride 0.9 % flush 10 mL, 10 mL, Intravenous, PRN, Davis Carrero MD  •  sodium chloride 0.9 % flush 20 mL, 20 mL, Intravenous, PRN, Davis Carrero MD  •  sucralfate (CARAFATE) tablet 1 g, 1 g, Oral, 4x Daily, Case, Ludmila V., DO, 1 g at 03/15/21 1126  •  tamsulosin (FLOMAX) 24 hr capsule 0.4 mg, 0.4 mg, Oral, Daily, Case, Ludmila V., DO, 0.4 mg at 03/15/21 0840    Antibiotics:  Anti-Infectives (From admission, onward)    Ordered     Dose/Rate Route Frequency Start Stop    03/13/21 1235  cefTAZidime (FORTAZ) 2 g/100 mL 0.9% NS IVPB (mpb)     Note to Pharmacy: Has tolerated IV cephalosporins before   Mannie Buckley, [facility] reviewed the order on 03/15/21 1342.   Ordering Provider: Davis Carrero MD    2 g  over 30 Minutes Intravenous Every  8 Hours 21 1600 21 1559    03/10/21 2042  riFAXIMin (XIFAXAN) tablet 550 mg     Mannie Buckley, [facility] reviewed the order on 03/15/21 1344.   Ordering Provider: Ludmila Mederos DO    550 mg Oral Every 12 Hours Scheduled 03/10/21 2100 21 2059    03/10/21 1548  vancomycin 2250 mg/500 mL 0.9% NS IVPB ([facility])     Ordering Provider: Joo Cheung APRN    20 mg/kg × 111 kg Intravenous Once 03/10/21 1550 03/10/21 2116    03/10/21 1518  meropenem (MERREM) 1 g/100 mL 0.9% NS VTB ([facility])     Ordering Provider: Joo Cheung APRN    1 g  over 30 Minutes Intravenous Once 03/10/21 1520 03/10/21 1850            Review of Systems:  See hpi      Physical Exam:   Vital Signs  Temp (24hrs), Av.1 °F (36.7 °C), Min:97.8 °F (36.6 °C), Max:98.4 °F (36.9 °C)    Temp  Min: 97.8 °F (36.6 °C)  Max: 98.4 °F (36.9 °C)  BP  Min: 119/66  Max: 151/84  Pulse  Min: 70  Max: 93  Resp  Min: 16  Max: 18  SpO2  Min: 94 %  Max: 96 %    GENERAL: resting quietly in no distress  HEENT: Normocephalic, atraumatic.  PERRL. EOMI. No conjunctival injection. No icterus.  No external oral lesions    HEART:sinus rhythm on tele  LUNGS: symmetrical inspiration bilaterally  ABDOMEN: Soft, nontender  EXT: 1+ edema  :  Without Young catheter.  MSK: No joint deformity  SKIN: Warm and dry without cutaneous eruptions on Inspection/palpation.        Laboratory Data    Results from last 7 days   Lab Units 03/15/21  0806 21  0522 21  0635   WBC 10*3/mm3 4.11 2.78* 3.84   HEMOGLOBIN g/dL 9.7* 8.9* 8.9*   HEMATOCRIT % 31.2* 28.8* 29.1*   PLATELETS 10*3/mm3 112* 98* 103*     Results from last 7 days   Lab Units 03/15/21  0806   SODIUM mmol/L 135*   POTASSIUM mmol/L 4.1   CHLORIDE mmol/L 101   CO2 mmol/L 27.0   BUN mg/dL 10   CREATININE mg/dL 0.80   GLUCOSE mg/dL 94   CALCIUM mg/dL 9.4     Results from last 7 days   Lab Units 03/15/21  0806   ALK PHOS U/L 179*   BILIRUBIN mg/dL 0.8   ALT (SGPT) U/L 52*   AST (SGOT) U/L 124*         Results from  last 7 days   Lab Units 03/15/21  0806   CRP mg/dL 2.19*     Results from last 7 days   Lab Units 03/11/21  0547   LACTATE mmol/L 2.6*  2.6*         Results from last 7 days   Lab Units 03/12/21  0517   VANCOMYCIN TR mcg/mL 12.40     Estimated Creatinine Clearance: 116.4 mL/min (by C-G formula based on SCr of 0.8 mg/dL).      Microbiology:  No results found for: ACANTHNAEG, AFBCX, BPERTUSSISCX, BLOODCX  No results found for: BCIDPCR, CXREFLEX, CSFCX, CULTURETIS  No results found for: CULTURES, HSVCX, URCX  No results found for: EYECULTURE, GCCX, HSVCULTURE, LABHSV  No results found for: LEGIONELLA, MRSACX, MUMPSCX, MYCOPLASCX  No results found for: NOCARDIACX, STOOLCX  Urine Culture   Date Value Ref Range Status   03/10/2021 No growth  Preliminary     No results found for: VIRALCULTU, WOUNDCX        Radiology:  Imaging Results (Last 72 Hours)     Procedure Component Value Units Date/Time    XR Chest 1 View [[id number]] Collected: 03/15/21 1359     Updated: 03/15/21 1407    Narrative:      EXAMINATION: XR CHEST 1 VW- 03/15/2021     INDICATION: PICC placement; A41.9-Sepsis, unspecified organism;  U07.1-COVID-19; J12.82-Pneumonia due to Coronavirus disease 2019;  E87.2-Acidosis      COMPARISON: Chest x-ray 03/13/2021     FINDINGS: Right arm PICC terminates within the mid SVC without  postprocedure pneumothorax. Chronic changes of the lungs otherwise noted  similar to prior without consolidation or effusion.       Impression:      Right arm PICC terminates within the mid SVC without  postprocedure pneumothorax. Chronic changes of the lungs otherwise noted  similar to prior without consolidation or effusion.     D:  03/15/2021  E:  03/15/2021          XR Chest 1 View [[id number]] Collected: 03/13/21 0757     Updated: 03/13/21 0802    Narrative:      EXAMINATION: XR CHEST 1 VW-      INDICATION: dyspnea, hypoxia, covid (rule out volume overload vs covid  pneumonia; A41.9-Sepsis, unspecified organism;  U07.1-COVID-19;  J12.82-Pneumonia due to Coronavirus disease 2019; E87.2-Acidosis      COMPARISON: 3/10/2021     FINDINGS: No acute osseous findings. Normal cardiomediastinal  silhouette. Pleural effusion. No pneumothorax. Bilateral apical  scarring. Pulmonary vascular congestion with increased interstitial  markings. No focal lobar opacities.        Impression:      Vascular congestion with increased interstitial markings  most consistent with interstitial edema. No pleural effusion. No new  focal airspace opacities.     This report was finalized on 3/13/2021 7:59 AM by Eddie Hirsch.               Impression:   COVID-19 positivity without hypoxia  History of abdominal aortic aneurysm with endograft repair and presumed secondary infection on suppressive Cipro and doxycycline  Cirrhosis  COPD  Recent weakness and nausea and vomiting and low-grade fevers while at home  Lactic acidosis  Ammonia 104  Pseudomonas bacteremia susc to meropenem    PLAN/RECOMMENDATIONS:   Thank you for asking us to see Derek Kelsey, I recommend the following:    Complicated case;     Patient treated empirically last year for pseudomonas although there were not positive blood cultures.  (given cefepime, meropenem for 8-12 weeks) and then changed to oral doxy, cipro.     Postive bl cx for pseudmonas very interesting and suggest there is either recurrenct infection on graft or possibly prostatitis as source.    F/u bl cultures  cont ceftazidime 2 g iv q8h  Place picc line     psa normal  I suspect the endograft is the source; the patient can have surgery I do not know how I will suppress this infection given that Pseudomonas is resistant to Cipro and Levaquin    Monitor oxygenation    Potential d/c home tomrrow on iv ceftazidime    Davis Carrero MD  3/15/2021  14:49 EDT

## 2021-03-15 NOTE — PROGRESS NOTES
Continued Stay Note  [facility]     Patient Name: Derek Kelsey  MRN: [id number]  Today's Date: 3/15/2021    Admit Date: 3/10/2021    Discharge Plan     Row Name 03/15/21 1421       Plan    Plan  CM update - home at discharge    Patient/Family in Agreement with Plan  yes    Plan Comments  Patient having a PICC line placed today - CM will await Infectious disease's orders regarding home infusions and arrange as indicated. PT and OT recommend outpatient PT/OT, CM will see if patient agreeable. He does have a cane and walker at home already as well as oxygen he uses [facility]. Goal is to return home with spouse upon discharge - CM will continue to follow - kelly [phone]    Final Discharge Disposition Code  01 - home or self-care        Discharge Codes    No documentation.             Kelly Farrar RN

## 2021-03-15 NOTE — PLAN OF CARE
Goal Outcome Evaluation:  Plan of Care Reviewed With: patient  Progress: improving  Outcome Summary: Pt t/s STS and SPT with CGA, amb laps in room with qc with CGA with occasional Elbert due to weakness. Pt needs assist at times to manage lines. Distance limited by weakness and decreased activity shu. Pt's O2sat 88% after activity on RA, quickly increased to >90% with seated rest. Pt perforrmed pulm therex with good participation, O2 sat 96% after therex on RA. Continue POC.

## 2021-03-15 NOTE — THERAPY TREATMENT NOTE
Patient Name: Derek Kelsey  : 1950    MRN: [id number]                              Today's Date: 3/15/2021       Admit Date: 3/10/2021    Visit Dx:     ICD-10-CM ICD-9-CM   1. Sepsis, due to unspecified organism, unspecified whether acute organ dysfunction present (CMS/Formerly [facility])  A41.9 038.9     995.91   2. Pneumonia due to COVID-19 virus  U07.1 480.8    J12.82 079.89   3. Lactic acidosis  E87.2 276.2     Patient Active Problem List   Diagnosis   • Anxiety   • Abdominal aortic aneurysm (AAA) without rupture (CMS/Formerly [facility])   • Depression   • Hypertension   • Cataract, bilateral   • CAD (coronary artery disease)   • Abnormal stress test   • Moderate COPD   • Former smoker   • Coal workers pneumoconiosis, simple   • Back pain   • S/P lumbar fusion   • Prediabetes   • Acute blood loss anemia, mild, asymptomatic   • Acute postoperative pain   • Hyponatremia, mild   • Chronic respiratory failure with hypoxia and hypercapnia (CMS/Formerly [facility])   • Renal insufficiency   • Acute cystitis without hematuria   • Acute UTI   • Sepsis (CMS/Formerly [facility])   • Bacteremia   • Anemia   • Aortitis (CMS/Formerly [facility])   • Elevated C-reactive protein (CRP)   • Cirrhosis of liver (CMS/Formerly [facility])   • High anion gap metabolic acidosis   • S/P AAA repair   • GERD (gastroesophageal reflux disease)   • Person under investigation for COVID-19   • On supplemental oxygen by nasal cannula   • Infected aortic endograft (CMS/Formerly [facility])   • Elevated transaminase level   • Thrombocytopenia (CMS/[facility]ly [facility])   • Pneumonia   • Gram-negative bacteremia     Past Medical History:   Diagnosis Date   • Abdominal aortic aneurysm (CMS/HCC) 2016   • Anxiety 2016   • Arthritis    • Back pain    • Black lung (CMS/HCC)    • CAD (coronary artery disease) 2016   • Cirrhosis (CMS/HCC)    • COPD (chronic obstructive pulmonary disease) (CMS/Formerly [facility]) 2016   • Depression 2016   • Depression    • GERD (gastroesophageal reflux disease)    • Hypertension 2016   • On supplemental oxygen by  nasal cannula     at night   • Vitiligo    • Wears dentures    • Wears reading eyeglasses      Past Surgical History:   Procedure Laterality Date   • ABDOMINAL AORTIC ANEURYSM REPAIR WITH ENDOGRAFT N/A 6/6/2019    Procedure: ABDOMINAL AORTIC ANEURYSM REPAIR WITH ENDOGRAFT;  Surgeon: Leopoldo Burleson MD;  Location: [facility] HYBRID OR 15;  Service: Vascular   • BACK SURGERY     • CARDIAC CATHETERIZATION     • CARDIAC CATHETERIZATION N/A 9/28/2018    Procedure: Left Heart Cath;  Surgeon: Douglas Galvez MD;  Location:  [facility] CATH INVASIVE LOCATION;  Service: Cardiovascular   • CARDIAC SURGERY     • COLONOSCOPY      2015   • COLONOSCOPY N/A 10/30/2019    Procedure: COLONOSCOPY;  Surgeon: Homar Viveros MD;  Location:  [facility] ENDOSCOPY;  Service: Gastroenterology   • ENDOSCOPY N/A 10/30/2019    Procedure: ESOPHAGOGASTRODUODENOSCOPY;  Surgeon: Homar Viveros MD;  Location:  [facility] ENDOSCOPY;  Service: Gastroenterology   • EYE SURGERY      cataracts bilateral   • HAND SURGERY Left    • LUMBAR DISCECTOMY FUSION INSTRUMENTATION N/A 11/1/2018    Procedure: LUMBAR DISCECTOMY POSTERIOR WITH FUSION INSTRUMENTATION;  Surgeon: Joo Franklin MD;  Location:  [facility] OR;  Service: Orthopedic Spine   • LUMBAR DISCECTOMY FUSION INSTRUMENTATION N/A 7/24/2019    Procedure: POSTERIOR LUMBAR SPINAL FUSION REVISION AND INSTRUMENTATION L3,L4,L5,S1;  Surgeon: Joo Franklin MD;  Location:  [facility] OR;  Service: Orthopedic Spine   • ORIF FINGER / THUMB FRACTURE     • SHOULDER SURGERY Left      General Information     Row Name 03/15/21 1435          Physical Therapy Time and Intention    Document Type  therapy note (daily note)  -     Mode of Treatment  individual therapy;physical therapy  -     Row Name 03/15/21 1430          General Information    Existing Precautions/Restrictions  fall;other (see comments) contact and airbone  -     Row Name 03/15/21 1433          Cognition    Orientation Status (Cognition)  oriented x  4  -SJ     Row Name 03/15/21 1435          Safety Issues, Functional Mobility    Safety Issues Affecting Function (Mobility)  insight into deficits/self-awareness;sequencing abilities  -SJ     Impairments Affecting Function (Mobility)  balance;endurance/activity tolerance;strength  -SJ       User Key  (r) = Recorded By, (t) = Taken By, (c) = Cosigned By    Initials Name Provider Type    Sophia Kamara PT Physical Therapist        Mobility     Row Name 03/15/21 1524          Bed Mobility    Comment (Bed Mobility)  UIC  -SJ     Row Name 03/15/21 1524          Transfers    Comment (Transfers)  Pt able to t/f sit <> stand and SPT bed <> BSC without difficulty  -SJ     Row Name 03/15/21 1524          Bed-Chair Transfer    Bed-Chair [place] (Transfers)  contact guard;1 person assist  -SJ     Row Name 03/15/21 1524          Sit-Stand Transfer    Sit-Stand [place] (Transfers)  supervision  -     Assistive Device (Sit-Stand Transfers)  cane, quad  -SJ     Row Name 03/15/21 1524          Gait/Stairs (Locomotion)    [place] Level (Gait)  contact guard;minimum assist (75% patient effort);1 person assist  -SJ     Assistive Device (Gait)  cane, quad  -SJ     Distance in Feet (Gait)  75  -SJ     Deviations/Abnormal Patterns (Gait)  ana decreased;gait speed decreased;stride length decreased  -SJ     Bilateral Gait Deviations  forward flexed posture;heel strike decreased  -SJ     Comment (Gait/Stairs)  Pt amb laps in room with qc with CGA with occasional Elbert due to weakness. Pt needs assist at times to manage lines. Distance limited by weakness and decreased activity shu. Pt's O2sat 88% after activity on RA, quickly increased to >90% with seated rest.  -SJ       User Key  (r) = Recorded By, (t) = Taken By, (c) = Cosigned By    Initials Name Provider Type    Sophia Kamara PT Physical Therapist        Obj/Interventions     Row Name 03/15/21 1527          Motor Skills    Motor Skills  functional  endurance  -     Functional Endurance  pulm set: scap retraction, shoulder flex, horizontal abd/add; seated 5-10reps with cues for breathing technique.  -[facility] Name 03/15/21 1527          Balance    Balance Assessment  sitting static balance;sitting dynamic balance;standing static balance  -     Static Sitting Balance  WNL;sitting in chair;unsupported  -     Dynamic Sitting Balance  WNL;sitting in chair;unsupported  -     Static Standing Balance  WFL;supported;standing  -SJ     Dynamic Standing Balance  mild impairment;unsupported  -SJ       User Key  (r) = Recorded By, (t) = Taken By, (c) = Cosigned By    Initials Name Provider Type    SJ Sophia Byers, PT Physical Therapist        Goals/Plan    No documentation.       Clinical Impression     [facility] Name 03/15/21 1529          Pain    Additional Documentation  Pain Scale: Numbers Pre/Post-Treatment (Group)  -SJ     Row Name 03/15/21 1529          Pain Scale: Numbers Pre/Post-Treatment    Pretreatment Pain Rating  0/10 - no pain  -     Posttreatment Pain Rating  0/10 - no pain  -SJ     Row Name 03/15/21 1529          Plan of Care Review    Plan of Care Reviewed With  patient  -     Progress  improving  -     Outcome Summary  Pt t/s STS and SPT with CGA, amb laps in room with qc with CGA with occasional Elbert due to weakness. Pt needs assist at times to manage lines. Distance limited by weakness and decreased activity shu. Pt's O2sat 88% after activity on RA, quickly increased to >90% with seated rest. Pt perforrmed pulm therex with good participation, O2 sat 96% after therex on RA. Continue POC.  -     Row Name 03/15/21 1529          Vital Signs    Pre Systolic BP Rehab  122  -SJ     Pre Treatment Diastolic BP  59  -SJ     Post Systolic BP Rehab  128  -SJ     Post Treatment Diastolic BP  77  -SJ     Pretreatment Heart Rate (beats/min)  69  -SJ     Posttreatment Heart Rate (beats/min)  69  -SJ     Pre SpO2 (%)  93  -SJ     O2 Delivery Pre  Treatment  room air  -SJ     Intra SpO2 (%)  88  -SJ     O2 Delivery Intra Treatment  room air  -SJ     Post SpO2 (%)  96  -SJ     O2 Delivery Post Treatment  room air  -SJ     Pre Patient Position  Sitting  -SJ     Intra Patient Position  Standing  -SJ     Post Patient Position  Sitting  -SJ     Row Name 03/15/21 1529          Positioning and Restraints    Pre-Treatment Position  sitting in chair/recliner  -SJ     Post Treatment Position  chair  -SJ     In Chair  notified nsg;reclined;call light within reach;encouraged to call for assist;waffle cushion;legs elevated  -SJ       User Key  (r) = Recorded By, (t) = Taken By, (c) = Cosigned By    Initials Name Provider Type    Sophia Kamara PT Physical Therapist        Outcome Measures     Row Name 03/15/21 1531          How much help from another person do you currently need...    Turning from your back to your side while in flat bed without using bedrails?  4  -SJ     Moving from lying on back to sitting on the side of a flat bed without bedrails?  4  -SJ     Moving to and from a bed to a chair (including a wheelchair)?  4  -SJ     Standing up from a chair using your arms (e.g., wheelchair, bedside chair)?  4  -SJ     Climbing 3-5 steps with a railing?  3  -SJ     To walk in hospital room?  3  -SJ     AM-PAC 6 Clicks Score (PT)  22  -     Row Name 03/15/21 1531          Functional Assessment    Outcome Measure Options  AM-PAC 6 Clicks Basic Mobility (PT)  -       User Key  (r) = Recorded By, (t) = Taken By, (c) = Cosigned By    Initials Name Provider Type    Sophia Kamara PT Physical Therapist        Physical Therapy Education                 Title: PT OT SLP Therapies (Not Started)     Topic: Physical Therapy (Done)     Point: Mobility training (Done)     Learning Progress Summary           Patient Acceptance, E,D, SKYE,MADELEINE by TERRY at 3/15/2021 1532    Acceptance, E, VU by NS at 3/12/2021 1150    Comment: Patient was educated about PT POC, safety with  mobility, and benefits of OP PT for improving strength.                   Point: Home exercise program (Done)     Learning Progress Summary           Patient Acceptance, E,D, VU,DU by  at 3/15/2021 1532                   Point: Body mechanics (Done)     Learning Progress Summary           Patient Acceptance, E,D, VU,DU by  at 3/15/2021 1532    Acceptance, E, VU by NS at 3/12/2021 1150    Comment: Patient was educated about PT POC, safety with mobility, and benefits of OP PT for improving strength.                   Point: Precautions (Done)     Learning Progress Summary           Patient Acceptance, E,D, VU,DU by  at 3/15/2021 1532    Acceptance, E, VU by NS at 3/12/2021 1150    Comment: Patient was educated about PT POC, safety with mobility, and benefits of OP PT for improving strength.                               User Key     Initials Effective Dates Name Provider Type Discipline     06/19/15 -  Sophia Byers, PT Physical Therapist PT    NS 09/10/19 -  Sarah oRy, SCARLET Physical Therapist PT              PT Recommendation and Plan     Plan of Care Reviewed With: patient  Progress: improving  Outcome Summary: Pt t/s STS and SPT with CGA, amb laps in room with qc with CGA with occasional Elbert due to weakness. Pt needs assist at times to manage lines. Distance limited by weakness and decreased activity shu. Pt's O2sat 88% after activity on RA, quickly increased to >90% with seated rest. Pt perforrmed pulm therex with good participation, O2 sat 96% after therex on RA. Continue POC.     Time Calculation:   PT Charges     Row Name 03/15/21 1532             Time Calculation    Start Time  1435  -SJ      PT Received On  03/15/21  -      PT Goal Re-Cert Due Date  03/22/21  -         Time Calculation- PT    Total Timed Code Minutes- PT  26 minute(s)  -SJ         Timed Charges    24720 - PT Therapeutic Exercise Minutes  10  -SJ      83130 - Gait Training Minutes   16  -SJ        User Key  (r) = Recorded By,  (t) = Taken By, (c) = Cosigned By    Initials Name Provider Type     Sophia Byers, PT Physical Therapist        Therapy Charges for Today     Code Description Service Date Service Provider Modifiers Qty    99433076189 HC PT THER PROC EA 15 MIN 3/15/2021 Sophia Byers, PT GP 1    64261890208 HC GAIT TRAINING EA 15 MIN 3/15/2021 Sophia Byers, PT GP 1    [id number]  PT THER SUPP EA 15 MIN 3/15/2021 Sophia Byers, PT GP 1          PT G-Codes  Outcome Measure Options: AM-PAC 6 Clicks Basic Mobility (PT)  AM-PAC 6 Clicks Score (PT): 22  AM-PAC 6 Clicks Score (OT): 21    Sophia Byers PT  3/15/2021

## 2021-03-15 NOTE — PLAN OF CARE
Goal Outcome Evaluation:     Progress: improving  Outcome Summary: VSS, A-fib on the monitor, Room air while awake, Pt required 2LNC while asleep. No complaints this shift. Will continue to monitor.

## 2021-03-15 NOTE — CONSULTS
KERON PICC placed per grant Campos rn [facility]  Will need cxr to confirm tip d/t afib  Please page with results

## 2021-03-15 NOTE — PLAN OF CARE
Goal Outcome Evaluation:     Progress: no change  Outcome Summary: Pt VSS, afib per tele. 02 2L when sleeping otherwise 02 room air. Getting up to BSC and chair independently. PICC line inserted today, CHG bath completed. Pt has had multiple watery BM with bright red blood present. Lactulose and SQ Heparin d/c. Q8 hour H&H ordered. Magnesium replaced per protocol- recheck in the AM. Posssible GI consult? Bath complete.

## 2021-03-15 NOTE — PROGRESS NOTES
[facility]  PROGRESS NOTE    Patient Name: Derek Kelsey  : 1950  MRN: [id number]    Date of Admission: 3/10/2021  Primary Care Physician: Raudel Zheng MD    Subjective   Subjective     CC:  Sepsis, hx endograft infection, covid, cirrhosis    HPI:  Pt had BRBPR x 2 this am.  He thinks it's due to irritation from wiping, but states that he had blood in the stool and not just on the toilet paper. Denies any other issues overnight, wants to know when he'd be ready to go home.     ROS:  Gen- No fevers, chills  CV- No chest pain, palpitations  Resp- No cough, dyspnea  GI- No N/V/D, abd pain            Objective   Objective     Vital Signs:   Temp:  [97.7 °F (36.5 °C)-98.4 °F (36.9 °C)] 98.4 °F (36.9 °C)  Heart Rate:  [74-86] 86  Resp:  [16-18] 16  BP: (119-151)/(66-87) 137/87        Physical Exam:  With patient's consent, physical exam was conducted via visual telemedicine encounter due to patient's current isolation requirements in the interest of PPE conservation.    Constitutional: No acute distress, awake, alert, nontoxic, normal body habitus  HENT: NCAT, MMM, no conjunctival injection  Respiratory: Good effort, nonlabored respirations   Cardiovascular:  tele with NSR  Musculoskeletal: No edema, normal muscle tone and mass for age  Psychiatric: Appropriate affect, good insight and judgement, cooperative  Neurologic: Oriented x 3, movements symmetric BUE and BLE, speech clear and fluent  Skin: No visible rashes, no jaundice seen on exposed skin through window        Results Reviewed:  Results from last 7 days   Lab Units 03/15/21  0806 21  0522 21  0635 21  0517 21  0854 03/10/21  1302   WBC 10*3/mm3 4.11 2.78* 3.84 7.84  --  17.40*   HEMOGLOBIN g/dL 9.7* 8.9* 8.9* 8.9*  --  9.5*   HEMATOCRIT % 31.2* 28.8* 29.1* 27.9*  --  30.8*   PLATELETS 10*3/mm3 112* 98* 103* 99*  --  111*   INR   --   --   --  1.80* 1.96*  --    PROCALCITONIN  ng/mL  --   --   --   --   --  7.37*     Results from last 7 days   Lab Units 03/14/21  0920 03/13/21  0635 03/12/21  0517 03/10/21  1302   SODIUM mmol/L 134* 133* 134* 131*   POTASSIUM mmol/L 3.5 4.0 3.7 3.8   CHLORIDE mmol/L 100 106 103 97*   CO2 mmol/L 24.0 19.0* 22.0 21.0*   BUN mg/dL 8 11 12 13   CREATININE mg/dL 0.71* 0.70* 0.76 1.18   GLUCOSE mg/dL 92 101* 102* 113*   CALCIUM mg/dL 9.6 8.9 9.1 9.1   ALT (SGPT) U/L 55* 49* 40 39   AST (SGOT) U/L 136* 136* 105* 91*   TROPONIN T ng/mL  --   --   --  <0.010   PROBNP pg/mL 919.7* 1,388.0*  --  485.0     Estimated Creatinine Clearance: 116.4 mL/min (A) (by C-G formula based on SCr of 0.71 mg/dL (L)).    Microbiology Results Abnormal     Procedure Component Value - Date/Time    Blood Culture - Blood, Arm, Right [[id number]]  (Abnormal)  (Susceptibility) Collected: 03/10/21 1510    Lab Status: Edited Result - FINAL Specimen: Blood from Arm, Right Updated: 03/13/21 1234     Blood Culture Pseudomonas aeruginosa     Isolated from Aerobic Bottle     Gram Stain Aerobic Bottle Gram negative bacilli    Susceptibility      Pseudomonas aeruginosa      ALVINO      Amikacin Susceptible (C)<sup style='font-weight:normal'>1</sup></font>      Cefazolin Resistant (C)<sup style='font-weight:normal'>1</sup></font>      Cefepime Susceptible      Ceftazidime Susceptible      Ceftazidime + Avibactam Susceptible (C)<sup style='font-weight:normal'>1</sup></font>      Ceftolozane + Tazobactam Susceptible (C)<sup style='font-weight:normal'>1</sup></font>      Ciprofloxacin Resistant      Gentamicin Susceptible (C)<sup style='font-weight:normal'>1</sup></font>      Imipenem Susceptible (C)<sup style='font-weight:normal'>1</sup></font>      Levofloxacin Resistant      Meropenem Susceptible (C)<sup style='font-weight:normal'>1</sup></font>      Piperacillin + Tazobactam Susceptible      Tobramycin Susceptible (C)<sup style='font-weight:normal'>1</sup></font>             <sup  style='font-weight:normal'>1</sup></font> Appended report. These results have been appended to a previously final verified report.          Linear View                   Blood Culture - Blood, Arm, Left [[id number]]  (Abnormal) Collected: 03/10/21 1530    Lab Status: Final result Specimen: Blood from Arm, Left Updated: 03/13/21 0728     Blood Culture Pseudomonas species     Isolated from Aerobic Bottle     Gram Stain Aerobic Bottle Gram negative bacilli    Narrative:      For MICs refer to blood culture Collected 3/10/2021 1510.     Blood Culture ID, PCR - Blood, Arm, Right [[id number]]  (Abnormal) Collected: 03/10/21 1510    Lab Status: Final result Specimen: Blood from Arm, Right Updated: 03/11/21 1741     BCID, PCR Pseudomonas aeruginosa. Identification by BCID PCR.    Urine Culture - Urine, Urine, Clean Catch [[id number]]  (Normal) Collected: 03/10/21 1459    Lab Status: Final result Specimen: Urine, Clean Catch Updated: 03/11/21 1630     Urine Culture No growth    COVID PRE-OP / PRE-PROCEDURE SCREENING ORDER (NO ISOLATION) - Swab, Nasopharynx [[id number]]  (Abnormal) Collected: 03/10/21 1522    Lab Status: Final result Specimen: Swab from Nasopharynx Updated: 03/10/21 1638    Narrative:      The following orders were created for panel order COVID PRE-OP / PRE-PROCEDURE SCREENING ORDER (NO ISOLATION) - Swab, Nasopharynx.  Procedure                               Abnormality         Status                     ---------                               -----------         ------                     COVID-19 and FLU A/B PCR...[[id number]]  Abnormal            Final result                 Please view results for these tests on the individual orders.    COVID-19 and FLU A/B PCR - Swab, Nasopharynx [[id number]]  (Abnormal) Collected: 03/10/21 1522    Lab Status: Final result Specimen: Swab from Nasopharynx Updated: 03/10/21 1638     COVID19 Detected     Influenza A PCR Not Detected     Influenza B PCR Not Detected    Narrative:       Fact sheet for providers: https://www.fda.gov/media/902191/download    Fact sheet for patients: https://www.fda.gov/media/720526/download    Test performed by PCR.  Influenza A and Influenza B negative results should be considered presumptive in samples that have a positive SARS-CoV-2 result.    Competitive inhibition studies showed that SARS-CoV-2 virus, when present at concentrations above 3.6E+04 copies/mL, can inhibit the detection and amplification of influenza A and influenza B virus RNA if present at or below 1.8E+02 copies/mL or 4.9E+02 copies/mL, respectively, and may lead to false negative influenza virus results. If co-infection with influenza A or influenza B virus is suspected in samples with a positive SARS-CoV-2 result, the sample should be re-tested with another FDA cleared, approved, or authorized influenza test, if influenza virus detection would change clinical management.          Imaging Results (Last 24 Hours)     ** No results found for the last 24 hours. **          Results for orders placed during the hospital encounter of 03/10/21    Adult Transthoracic Echo Complete W/ Cont if Necessary Per Protocol    Interpretation Summary  · Estimated right ventricular systolic pressure from tricuspid regurgitation is normal (<35 mmHg).  · Estimated left ventricular EF = 65% Left ventricular systolic function is normal.      I have reviewed the medications:  Scheduled Meds:aspirin, 81 mg, Oral, Daily  carvedilol, 12.5 mg, Oral, BID With Meals  cefTAZidime, 2 g, Intravenous, Q8H  citalopram, 40 mg, Oral, Daily  furosemide, 20 mg, Oral, BID  heparin (porcine), 5,000 Units, Subcutaneous, Q12H  lactulose, 10 g, Oral, TID  melatonin, 5 mg, Oral, Nightly  pantoprazole, 40 mg, Oral, BID  riFAXIMin, 550 mg, Oral, Q12H  saccharomyces boulardii, 250 mg, Oral, BID  sodium chloride, 10 mL, Intravenous, Q12H  sucralfate, 1 g, Oral, 4x Daily  tamsulosin, 0.4 mg, Oral, Daily      Continuous Infusions:   PRN  Meds:.lactulose  •  magnesium sulfate **OR** magnesium sulfate in D5W 1g/100mL (PREMIX) **OR** magnesium sulfate  •  ondansetron  •  potassium chloride **OR** potassium chloride **OR** potassium chloride  •  sodium chloride  •  sodium chloride    Assessment/Plan   Assessment & Plan     Active Hospital Problems    Diagnosis  POA   • **Sepsis (CMS/HCC) [A41.9]  Yes   • Gram-negative bacteremia [R78.81]  Unknown   • 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        Brief Hospital Course to date:  Derek Kelsey is a 70 y.o. 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 Reportedly had 1st covid 19 vaccine shot approximately 2 weeks prior to this presentation. In ED patient became hypotensive requiring aggressive iv fluids, had a + covid 19 pcr swab and was admitted initially to ICU. Ct head negative. Lactate was 6.1, procalcitonin 7.37. wbc count 17,000 CT angio chest/abd/pelvis negative for pe, no acute lung dz noted, cirrhosis noted (no free fluid/ascites noted), stable appearance of endovascular aortic aneurysm repair w/ aortobiiliac stent graft and distal infrarenal stranding & minimal adenopathy (similar to 2/24/21 scan) without evidence for distinct progression or definitive leak. acute intrathoracid process noted. Patient responded to iv resuscitation and was ultimately transferred to hospitalist service on day #2    This patient's problems and plans were partially entered by my partner and updated as appropriate by me 03/15/21.    Plan:    Severe Sepsis w/ high grade Pseudomonas bacteremia likely due to AAA endograft  relapsed infection  Hx previous AAA repair (June '19) w/ subsequent endograft infection  -- had been on suppressive po cipro & doxy as outpatient  -- the pseudomonas is quinolone resistant  -- ID managing antibiotics, on fortaz, PICC ordered (to be placed today). Not likely surgical candidate but will defer to ID  -- mrcp 3/12/21 no overt filling defects, gb ok    GIB  -- suspect hemorrhoidal in etiology but will trend H&H and hold Lactulose for this afternoon.  --if H&H stable, will restart Lactulose  --defer GI consult for now    Covid 19 pcr +, likely asymptomatic  A/C DHF (iatrogenic due to volume resuscitation, holding diuretics due to sepsis)  -- holding on steroids/remdesivir as do not feel causing respiratory issues currently  -- transient mild hypoxia felt due to volume overload (iatrogenic from volume resuscitation & holding scheduled po diuretics)  -= s/p gentle iv diuresis, restarted home lasix 20mg po bid as bp & renal function tolerate    Afib w/ RVR, new onset (due to sepsis)  -- due to acute illness/sepsis; echo ok, tsh ok;   -increased coreg to 12.5mg bid currently rate controlled. Deferring anticoagulation for now (inr 1.8-1.9). if remains in afib would d/w Dr. Galvez his regular cardiologist    Metabolic encephalopathy, resolved  -due to sepsis, hepatic encephalopathy  -treating sepsis, added lactulose to rifaxamin, mentation normal & no asterixis. Hold Lactulose this pm as above    Hx Cirrhosis w/ thrombocytopenia & coagulopathy  -inr 1.8-1.9 this admission  -continue rifaxamin, added low dose lactulose, coreg, hold Lactulose as above    Moderate COPD  Chronic hypoxic resp failure (2-3 L chronic use)    Non-obstructive CAD (9/2018 [facility])  -asa    Gerd    HTN    Previous lumbar fusion surgery      updated wife Mago on 3/15/21, she appreciated the call      DVT Prophylaxis:  Sq heparin tid (hold for now due to GIB)    Disposition: I expect the patient to be discharged once final antibiotic  plan/picc line in place & ok w/ ID, possibly in couple days. Once H&H stable  CODE STATUS:   Code Status and Medical Interventions:   Ordered at: 03/10/21 1906     Limited Support to NOT Include:    Cardioversion/Defibrillation    Intubation     Code Status:    No CPR     Medical Interventions (Level of Support Prior to Arrest):    Limited       Awa Viveros MD  03/15/21

## 2021-03-16 LAB
ALBUMIN SERPL-MCNC: 3.1 G/DL (ref 3.5–5.2)
ALBUMIN/GLOB SERPL: 0.6 G/DL
ALP SERPL-CCNC: 157 U/L (ref 39–117)
ALT SERPL W P-5'-P-CCNC: 46 U/L (ref 1–41)
ANION GAP SERPL CALCULATED.3IONS-SCNC: 6 MMOL/L (ref 5–15)
AST SERPL-CCNC: 97 U/L (ref 1–40)
BASOPHILS # BLD AUTO: 0.05 10*3/MM3 (ref 0–0.2)
BASOPHILS NFR BLD AUTO: 1.2 % (ref 0–1.5)
BILIRUB SERPL-MCNC: 0.9 MG/DL (ref 0–1.2)
BUN SERPL-MCNC: 11 MG/DL (ref 8–23)
BUN/CREAT SERPL: 14.9 (ref 7–25)
CALCIUM SPEC-SCNC: 9 MG/DL (ref 8.6–10.5)
CHLORIDE SERPL-SCNC: 99 MMOL/L (ref 98–107)
CO2 SERPL-SCNC: 29 MMOL/L (ref 22–29)
CREAT SERPL-MCNC: 0.74 MG/DL (ref 0.76–1.27)
CRP SERPL-MCNC: 1.67 MG/DL (ref 0–0.5)
D DIMER PPP FEU-MCNC: 3.1 MCGFEU/ML (ref 0–0.56)
DEPRECATED RDW RBC AUTO: 52.1 FL (ref 37–54)
EOSINOPHIL # BLD AUTO: 0.11 10*3/MM3 (ref 0–0.4)
EOSINOPHIL NFR BLD AUTO: 2.6 % (ref 0.3–6.2)
ERYTHROCYTE [DISTWIDTH] IN BLOOD BY AUTOMATED COUNT: 15.2 % (ref 12.3–15.4)
FERRITIN SERPL-MCNC: 411.2 NG/ML (ref 30–400)
GFR SERPL CREATININE-BSD FRML MDRD: 105 ML/MIN/1.73
GLOBULIN UR ELPH-MCNC: 5.1 GM/DL
GLUCOSE SERPL-MCNC: 89 MG/DL (ref 65–99)
HCT VFR BLD AUTO: 25.9 % (ref 37.5–51)
HCT VFR BLD AUTO: 27.9 % (ref 37.5–51)
HCT VFR BLD AUTO: 28.3 % (ref 37.5–51)
HCT VFR BLD AUTO: 29.5 % (ref 37.5–51)
HGB BLD-MCNC: 8.2 G/DL (ref 13–17.7)
HGB BLD-MCNC: 8.8 G/DL (ref 13–17.7)
HGB BLD-MCNC: 8.9 G/DL (ref 13–17.7)
HGB BLD-MCNC: 9.4 G/DL (ref 13–17.7)
IMM GRANULOCYTES # BLD AUTO: 0.06 10*3/MM3 (ref 0–0.05)
IMM GRANULOCYTES NFR BLD AUTO: 1.4 % (ref 0–0.5)
LYMPHOCYTES # BLD AUTO: 0.65 10*3/MM3 (ref 0.7–3.1)
LYMPHOCYTES NFR BLD AUTO: 15.2 % (ref 19.6–45.3)
MAGNESIUM SERPL-MCNC: 1.9 MG/DL (ref 1.6–2.4)
MCH RBC QN AUTO: 29.9 PG (ref 26.6–33)
MCHC RBC AUTO-ENTMCNC: 31.4 G/DL (ref 31.5–35.7)
MCV RBC AUTO: 95 FL (ref 79–97)
MONOCYTES # BLD AUTO: 0.47 10*3/MM3 (ref 0.1–0.9)
MONOCYTES NFR BLD AUTO: 11 % (ref 5–12)
NEUTROPHILS NFR BLD AUTO: 2.94 10*3/MM3 (ref 1.7–7)
NEUTROPHILS NFR BLD AUTO: 68.6 % (ref 42.7–76)
NRBC BLD AUTO-RTO: 0 /100 WBC (ref 0–0.2)
PLATELET # BLD AUTO: 107 10*3/MM3 (ref 140–450)
PMV BLD AUTO: 9.8 FL (ref 6–12)
POTASSIUM SERPL-SCNC: 3.9 MMOL/L (ref 3.5–5.2)
PROT SERPL-MCNC: 8.2 G/DL (ref 6–8.5)
RBC # BLD AUTO: 2.98 10*6/MM3 (ref 4.14–5.8)
SODIUM SERPL-SCNC: 134 MMOL/L (ref 136–145)
WBC # BLD AUTO: 4.28 10*3/MM3 (ref 3.4–10.8)

## 2021-03-16 PROCEDURE — 25010000002 CEFTAZIDIME PER 500 MG: Performed by: INTERNAL MEDICINE

## 2021-03-16 PROCEDURE — 80053 COMPREHEN METABOLIC PANEL: CPT | Performed by: INTERNAL MEDICINE

## 2021-03-16 PROCEDURE — 97530 THERAPEUTIC ACTIVITIES: CPT

## 2021-03-16 PROCEDURE — 85025 COMPLETE CBC W/AUTO DIFF WBC: CPT | Performed by: INTERNAL MEDICINE

## 2021-03-16 PROCEDURE — 99222 1ST HOSP IP/OBS MODERATE 55: CPT | Performed by: PHYSICIAN ASSISTANT

## 2021-03-16 PROCEDURE — 85014 HEMATOCRIT: CPT | Performed by: INTERNAL MEDICINE

## 2021-03-16 PROCEDURE — 25010000003 PHYTONADIONE 10 MG/ML SOLUTION 1 ML AMPULE: Performed by: PHYSICIAN ASSISTANT

## 2021-03-16 PROCEDURE — 97535 SELF CARE MNGMENT TRAINING: CPT

## 2021-03-16 PROCEDURE — 85379 FIBRIN DEGRADATION QUANT: CPT | Performed by: INTERNAL MEDICINE

## 2021-03-16 PROCEDURE — 82728 ASSAY OF FERRITIN: CPT | Performed by: INTERNAL MEDICINE

## 2021-03-16 PROCEDURE — 86140 C-REACTIVE PROTEIN: CPT | Performed by: INTERNAL MEDICINE

## 2021-03-16 PROCEDURE — 85018 HEMOGLOBIN: CPT | Performed by: INTERNAL MEDICINE

## 2021-03-16 PROCEDURE — 99233 SBSQ HOSP IP/OBS HIGH 50: CPT | Performed by: INTERNAL MEDICINE

## 2021-03-16 PROCEDURE — 83735 ASSAY OF MAGNESIUM: CPT | Performed by: INTERNAL MEDICINE

## 2021-03-16 PROCEDURE — 97116 GAIT TRAINING THERAPY: CPT

## 2021-03-16 PROCEDURE — 97110 THERAPEUTIC EXERCISES: CPT

## 2021-03-16 RX ORDER — LACTULOSE 10 G/15ML
10 SOLUTION ORAL DAILY
Status: DISCONTINUED | OUTPATIENT
Start: 2021-03-17 | End: 2021-03-17 | Stop reason: HOSPADM

## 2021-03-16 RX ORDER — HYDROCORTISONE 25 MG/G
CREAM TOPICAL 2 TIMES DAILY
Status: DISCONTINUED | OUTPATIENT
Start: 2021-03-16 | End: 2021-03-17 | Stop reason: HOSPADM

## 2021-03-16 RX ADMIN — CARVEDILOL 12.5 MG: 12.5 TABLET, FILM COATED ORAL at 09:18

## 2021-03-16 RX ADMIN — RIFAXIMIN 550 MG: 550 TABLET ORAL at 09:18

## 2021-03-16 RX ADMIN — TAMSULOSIN HYDROCHLORIDE 0.4 MG: 0.4 CAPSULE ORAL at 09:18

## 2021-03-16 RX ADMIN — CITALOPRAM 40 MG: 40 TABLET ORAL at 09:18

## 2021-03-16 RX ADMIN — PANTOPRAZOLE SODIUM 40 MG: 40 TABLET, DELAYED RELEASE ORAL at 09:18

## 2021-03-16 RX ADMIN — Medication 250 MG: at 21:52

## 2021-03-16 RX ADMIN — CEFTAZIDIME 2 G: 2 INJECTION, POWDER, FOR SOLUTION INTRAVENOUS at 09:19

## 2021-03-16 RX ADMIN — CEFTAZIDIME 2 G: 2 INJECTION, POWDER, FOR SOLUTION INTRAVENOUS at 17:38

## 2021-03-16 RX ADMIN — SUCRALFATE 1 G: 1 TABLET ORAL at 09:18

## 2021-03-16 RX ADMIN — HYDROCORTISONE 2.5%: 25 CREAM TOPICAL at 21:52

## 2021-03-16 RX ADMIN — Medication 250 MG: at 09:18

## 2021-03-16 RX ADMIN — SODIUM CHLORIDE, PRESERVATIVE FREE 10 ML: 5 INJECTION INTRAVENOUS at 21:52

## 2021-03-16 RX ADMIN — CARVEDILOL 12.5 MG: 12.5 TABLET, FILM COATED ORAL at 17:38

## 2021-03-16 RX ADMIN — Medication 5 MG: at 21:52

## 2021-03-16 RX ADMIN — ASPIRIN 81 MG: 81 TABLET, COATED ORAL at 09:18

## 2021-03-16 RX ADMIN — PANTOPRAZOLE SODIUM 40 MG: 40 TABLET, DELAYED RELEASE ORAL at 21:52

## 2021-03-16 RX ADMIN — FUROSEMIDE 20 MG: 20 TABLET ORAL at 09:21

## 2021-03-16 RX ADMIN — SODIUM CHLORIDE, PRESERVATIVE FREE 10 ML: 5 INJECTION INTRAVENOUS at 09:19

## 2021-03-16 RX ADMIN — PHYTONADIONE 10 MG: 10 INJECTION, EMULSION INTRAMUSCULAR; INTRAVENOUS; SUBCUTANEOUS at 16:27

## 2021-03-16 RX ADMIN — FUROSEMIDE 20 MG: 20 TABLET ORAL at 17:38

## 2021-03-16 RX ADMIN — RIFAXIMIN 550 MG: 550 TABLET ORAL at 21:52

## 2021-03-16 RX ADMIN — SUCRALFATE 1 G: 1 TABLET ORAL at 11:45

## 2021-03-16 NOTE — PROGRESS NOTES
INFECTIOUS DISEASE CONSULT/INITIAL HOSPITAL VISIT    Derek Kelsey  1950  [id number]    Date of Consult: 3/16/2021    Admission Date: 3/10/2021      Requesting Provider: No ref. provider found  Evaluating Physician: Davis Carrero MD    Reason for Consultation: Positive Covid PCR    History of present illness:    Patient is a 70 y.o. 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  Patient has come into the emergency room and tested positive for Covid is not hypoxic patient did have hypotension which responded to IV fluids patient is currently not requiring oxygen and feels better has been started on empiric vancomycin and meropenem.    Patient's wife is being tested for Covid results are pending.    I was previously suppressing patient with doxycycline and ciprofloxacin.      Patient is not a surgical candidate for endograft removal because of cirrhosis and high surgical mortality      3/12/21; doing well; no fever, rash, sore throat; on room air, no hypoxia overnight; blood cultures growing gnr from admission    3/13/2021 no events overnight denies fevers rash sore throat or diarrhea feels better    3/14/21;  Afebrile normotensive;  Resting comfortably briefly on o2; now on 1 L      3/15/21; doing well; no events overnight; no fever, rash, sore throat    3/16/21; doing well; no events overnight; no fever, rash, sore throat tolerating abx; picc line placed    Past Medical History:   Diagnosis Date   • Abdominal aortic aneurysm (CMS/HCC) 9/29/2016   • Anxiety 9/29/2016   • Arthritis    • Back pain    • Black lung (CMS/[facility])    • CAD (coronary artery disease) 9/29/2016   • Cirrhosis (CMS/[facility])    • COPD (chronic obstructive pulmonary disease) (CMS/[facility]) 9/29/2016   •  Depression 9/29/2016   • Depression    • GERD (gastroesophageal reflux disease)    • Hypertension 9/29/2016   • On supplemental oxygen by nasal cannula     at night   • Vitiligo    • Wears dentures    • Wears reading eyeglasses        Past Surgical History:   Procedure Laterality Date   • ABDOMINAL AORTIC ANEURYSM REPAIR WITH ENDOGRAFT N/A 6/6/2019    Procedure: ABDOMINAL AORTIC ANEURYSM REPAIR WITH ENDOGRAFT;  Surgeon: Leopoldo Burleson MD;  Location:  [facility];  Service: Vascular   • BACK SURGERY     • CARDIAC CATHETERIZATION     • CARDIAC CATHETERIZATION N/A 9/28/2018    Procedure: Left Heart Cath;  Surgeon: Douglas Galvez MD;  Location:  [facility];  Service: Cardiovascular   • CARDIAC SURGERY     • COLONOSCOPY      2015   • COLONOSCOPY N/A 10/30/2019    Procedure: COLONOSCOPY;  Surgeon: Homar Viveros MD;  Location:  [facility];  Service: Gastroenterology   • ENDOSCOPY N/A 10/30/2019    Procedure: ESOPHAGOGASTRODUODENOSCOPY;  Surgeon: Homar Viveros MD;  Location:  [facility];  Service: Gastroenterology   • EYE SURGERY      cataracts bilateral   • HAND SURGERY Left    • LUMBAR DISCECTOMY FUSION INSTRUMENTATION N/A 11/1/2018    Procedure: LUMBAR DISCECTOMY POSTERIOR WITH FUSION INSTRUMENTATION;  Surgeon: Joo Franklin MD;  Location:  [facility];  Service: Orthopedic Spine   • LUMBAR DISCECTOMY FUSION INSTRUMENTATION N/A 7/24/2019    Procedure: POSTERIOR LUMBAR SPINAL FUSION REVISION AND INSTRUMENTATION L3,L4,L5,S1;  Surgeon: Joo Franklin MD;  Location:  [facility];  Service: Orthopedic Spine   • ORIF FINGER / THUMB FRACTURE     • SHOULDER SURGERY Left        Family History   Problem Relation Age of Onset   • Stroke Mother    • Hypertension Mother    • Heart disease Mother    • Heart disease Father    • Hypertension Father    • Diabetes Sister    • Hypertension Sister    • Heart disease Sister    • Diabetes Brother    • Heart disease Brother    •  Hypertension Child    • Hypertension Son    • No Known Problems Son    • Diabetes Sister    • Heart disease Sister        Social History     Socioeconomic History   • Marital status:      Spouse name: Not on file   • Number of children: 2   • Years of education: Not on file   • Highest education level: Not on file   Tobacco Use   • Smoking status: Former Smoker     Packs/day: 1.00     Years: 30.00     Pack years: 30.00     Types: Cigarettes     Quit date: 1/1/2011     Years since quitting: 10.2   • Smokeless tobacco: Former User     Types: Chew     Quit date: 4/7/2011   Substance and Sexual Activity   • Alcohol use: Not Currently   • Drug use: No   • Sexual activity: Defer     Comment:  and lives with wife       Allergies   Allergen Reactions   • Penicillins Hives     Hives - has tolerated Zosyn and ceftriaxone 09/2019   • Percocet [Oxycodone-Acetaminophen] Confusion         Medication:    Current Facility-Administered Medications:   •  aspirin EC tablet 81 mg, 81 mg, Oral, Daily, Case, Ludmila V., DO, 81 mg at 03/16/21 0918  •  carvedilol (COREG) tablet 12.5 mg, 12.5 mg, Oral, BID With Meals, Rinku Mcrae MD, 12.5 mg at 03/16/21 0918  •  cefTAZidime (FORTAZ) 2 g/100 mL 0.9% NS IVPB (mpb), 2 g, Intravenous, Q8H, Davis Carrero MD, 2 g at 03/16/21 0919  •  citalopram (CeleXA) tablet 40 mg, 40 mg, Oral, Daily, Case, Ludmila V., DO, 40 mg at 03/16/21 0918  •  furosemide (LASIX) tablet 20 mg, 20 mg, Oral, BID, Rinku Mcrae MD, 20 mg at 03/16/21 0921  •  lactulose (CHRONULAC) 10 GM/15ML solution 10 g, 10 g, Oral, TID PRN, Case, Ludmila V., DO  •  Magnesium Sulfate 2 gram Bolus, followed by 8 gram infusion (total Mg dose 10 grams)- Mg less than or equal to 1mg/dL, 2 g, Intravenous, PRN **OR** Magnesium Sulfate 2 gram / 50mL Infusion (GIVE X 3 BAGS TO EQUAL 6GM TOTAL DOSE) - Mg 1.1 - 1.5 mg/dl, 2 g, Intravenous, PRN **OR** Magnesium Sulfate 4 gram infusion- Mg 1.6-1.9 mg/dL, 4 g,  Intravenous, PRN, Awa Viveros MD, Last Rate: 25 mL/hr at 03/15/21 1126, 4 g at 03/15/21 1126  •  melatonin tablet 5 mg, 5 mg, Oral, Nightly, Case, Ludmila V., DO, 5 mg at 03/15/21 2209  •  ondansetron (ZOFRAN) injection 4 mg, 4 mg, Intravenous, Q6H PRN, Rinku Mcrae MD, 4 mg at 03/14/21 1556  •  pantoprazole (PROTONIX) EC tablet 40 mg, 40 mg, Oral, BID, Case, Ludmila V., DO, 40 mg at 03/16/21 0918  •  potassium chloride (MICRO-K) CR capsule 40 mEq, 40 mEq, Oral, PRN, 40 mEq at 03/14/21 1720 **OR** potassium chloride (KLOR-CON) packet 40 mEq, 40 mEq, Oral, PRN **OR** potassium chloride 10 mEq in 100 mL IVPB, 10 mEq, Intravenous, Q1H PRN, Rinku Mcrae MD  •  riFAXIMin (XIFAXAN) tablet 550 mg, 550 mg, Oral, Q12H, Case, Ludmila V., DO, 550 mg at 03/16/21 0918  •  saccharomyces boulardii (FLORASTOR) capsule 250 mg, 250 mg, Oral, BID, Case, Ludmila V., DO, 250 mg at 03/16/21 0918  •  sodium chloride 0.9 % flush 10 mL, 10 mL, Intravenous, PRN, Case, Ludmila V., DO  •  sodium chloride 0.9 % flush 10 mL, 10 mL, Intravenous, Q12H, Case, Ludmila V., DO, 10 mL at 03/14/21 2049  •  sodium chloride 0.9 % flush 10 mL, 10 mL, Intravenous, PRN, Case, Ludmila V., DO  •  sodium chloride 0.9 % flush 10 mL, 10 mL, Intravenous, Q12H, Davis Carrero MD, 10 mL at 03/16/21 0919  •  sodium chloride 0.9 % flush 10 mL, 10 mL, Intravenous, PRN, Davis Carrero MD  •  sodium chloride 0.9 % flush 20 mL, 20 mL, Intravenous, PRN, Davis Carrero MD  •  sucralfate (CARAFATE) tablet 1 g, 1 g, Oral, 4x Daily, Case, Ludmila V., DO, 1 g at 03/16/21 0918  •  tamsulosin (FLOMAX) 24 hr capsule 0.4 mg, 0.4 mg, Oral, Daily, Case, Ludmila V., DO, 0.4 mg at 03/16/21 0918    Antibiotics:  Anti-Infectives (From admission, onward)    Ordered     Dose/Rate Route Frequency Start Stop    03/13/21 1235  cefTAZidime (FORTAZ) 2 g/100 mL 0.9% NS IVPB (mpb)     Note to Pharmacy: Has tolerated IV cephalosporins before    [facility] reviewed the order on 03/15/21 1342.   Ordering Provider: Davis Carrero MD    2 g  over 30 Minutes Intravenous Every 8 Hours 21 1600 21 1559    03/10/21 204  riFAXIMin (XIFAXAN) tablet 550 mg     [facility] reviewed the order on 03/15/21 1344.   Ordering Provider: Gatito, Ludmila V., DO    550 mg Oral Every 12 Hours Scheduled 03/10/21 2100 21 2059    03/10/21 1548  vancomycin 2250 mg/500 mL 0.9% NS IVPB ([facility])     Ordering Provider: Joo Cheung APRN    20 mg/kg × 111 kg Intravenous Once 03/10/21 1550 03/10/21 2116    03/10/21 1518  meropenem (MERREM) 1 g/100 mL 0.9% NS VTB ([facility])     Ordering Provider: Joo Cheung APRN    1 g  over 30 Minutes Intravenous Once 03/10/21 1520 03/10/21 1850            Review of Systems:  See hpi      Physical Exam:   Vital Signs  Temp (24hrs), Av.6 °F (36.4 °C), Min:97 °F (36.1 °C), Max:98 °F (36.7 °C)    Temp  Min: 97 °F (36.1 °C)  Max: 98 °F (36.7 °C)  BP  Min: 122/59  Max: 134/78  Pulse  Min: 68  Max: 87  Resp  Min: 18  Max: 18  SpO2  Min: 93 %  Max: 96 %    GENERAL: resting quietly in no distress  HEENT: Normocephalic, atraumatic.  PERRL. EOMI. No conjunctival injection. No icterus.  No external oral lesions    HEART:sinus rhythm on tele  LUNGS: symmetrical inspiration bilaterally  ABDOMEN: Soft, nontender  EXT: 1+ edema  :  Without Young catheter.  MSK: No joint deformity  SKIN: Warm and dry without cutaneous eruptions on Inspection/palpation.        Laboratory Data    Results from last 7 days   Lab Units 21  0741 21  0613 03/15/21  2338 03/15/21  0806 21  0522   WBC 10*3/mm3  --  4.28  --  4.11 2.78*   HEMOGLOBIN g/dL 9.4* 8.9* 8.2* 9.7* 8.9*   HEMATOCRIT % 29.5* 28.3* 25.9* 31.2* 28.8*   PLATELETS 10*3/mm3  --  107*  --  112* 98*     Results from last 7 days   Lab Units 21  0613   SODIUM mmol/L 134*   POTASSIUM mmol/L 3.9   CHLORIDE mmol/L 99   CO2 mmol/L 29.0   BUN mg/dL 11    CREATININE mg/dL 0.74*   GLUCOSE mg/dL 89   CALCIUM mg/dL 9.0     Results from last 7 days   Lab Units 03/16/21  0613   ALK PHOS U/L 157*   BILIRUBIN mg/dL 0.9   ALT (SGPT) U/L 46*   AST (SGOT) U/L 97*         Results from last 7 days   Lab Units 03/16/21  0613   CRP mg/dL 1.67*     Results from last 7 days   Lab Units 03/11/21  0547   LACTATE mmol/L 2.6*  2.6*         Results from last 7 days   Lab Units 03/12/21  0517   VANCOMYCIN TR mcg/mL 12.40     Estimated Creatinine Clearance: 115.4 mL/min (A) (by C-G formula based on SCr of 0.74 mg/dL (L)).      Microbiology:  No results found for: ACANTHNAEG, AFBCX, BPERTUSSISCX, BLOODCX  No results found for: BCIDPCR, CXREFLEX, CSFCX, CULTURETIS  No results found for: CULTURES, HSVCX, URCX  No results found for: EYECULTURE, GCCX, HSVCULTURE, LABHSV  No results found for: LEGIONELLA, MRSACX, MUMPSCX, MYCOPLASCX  No results found for: NOCARDIACX, STOOLCX  Urine Culture   Date Value Ref Range Status   03/10/2021 No growth  Preliminary     No results found for: VIRALCULTU, WOUNDCX        Radiology:  Imaging Results (Last 72 Hours)     Procedure Component Value Units Date/Time    XR Chest 1 View [[id number]] Collected: 03/15/21 1359     Updated: 03/15/21 1407    Narrative:      EXAMINATION: XR CHEST 1 VW- 03/15/2021     INDICATION: PICC placement; A41.9-Sepsis, unspecified organism;  U07.1-COVID-19; J12.82-Pneumonia due to Coronavirus disease 2019;  E87.2-Acidosis      COMPARISON: Chest x-ray 03/13/2021     FINDINGS: Right arm PICC terminates within the mid SVC without  postprocedure pneumothorax. Chronic changes of the lungs otherwise noted  similar to prior without consolidation or effusion.       Impression:      Right arm PICC terminates within the mid SVC without  postprocedure pneumothorax. Chronic changes of the lungs otherwise noted  similar to prior without consolidation or effusion.     D:  03/15/2021  E:  03/15/2021                  Impression:   COVID-19  positivity without hypoxia  History of abdominal aortic aneurysm with endograft repair and presumed secondary infection on suppressive Cipro and doxycycline  Cirrhosis  COPD  Recent weakness and nausea and vomiting and low-grade fevers while at home  Lactic acidosis  Ammonia 104  Pseudomonas bacteremia susc to meropenem     PLAN/RECOMMENDATIONS:   Thank you for asking us to see Derek Kelsey, I recommend the following:    Complicated case;     Patient treated empirically last year for pseudomonas although there were not positive blood cultures.  (given cefepime, meropenem for 8-12 weeks) and then changed to oral doxy, cipro.     Postive bl cx for pseudmonas very interesting and suggest there is either recurrenct infection on graft or possibly prostatitis as source.    F/u bl cultures  cont ceftazidime 2 g iv q8h  Place picc line     psa normal  I suspect the endograft is the source; the patient can have surgery I do not know how I will suppress this infection given that Pseudomonas is resistant to Cipro and Levaquin    Monitor oxygenation    D/w family, Dr. Mcrae; will discuss again with Dr. Burleson; patient has considerable operative mortality if endograft was removed.      We are at a point not that Iv abx could quiet down the infection but there are not oral abx that could suppress this thereafter.    May need to see if their is opinion at [facility].    Dp/cm time  45 minutes    Davis Carrero MD  3/16/2021  10:06 EDT

## 2021-03-16 NOTE — CONSULTS
[facility]enterology Consult    Referring Provider: Lorene Viveros MD   PCP: Raudel Zheng MD    Reason for Consultation: Bright red blood per rectum     Chief complaint: Fever     History of present illness:    Derek Kelsey is a 70 y.o. male who is admitted with fever and feeling poorly at home.   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  He is followed outpatient by Dr. Viveros for liver cirrhosis with previous EGD on 10/30/2019 showing severe portal hypertensive gastropathy and grade 2 esophageal varices.  He is chronically anemic with a Hgb ~ 9.6 since June 2020.   He presented to our ER on 3/10/21 for fever at home and was found to be positive for COVID-19.   He fortunately has not had significant hypoxia.      GI is consulted due to bright red blood per rectum last night.  The patient states he had some difficulty with constipation at home recently.  He began Lactulose as instructed and had ~ 10 bowel movements in 1 day.   He then noticed some bright red blood dripping to the toilet and on his toilet paper.   He denies any abdominal pain, nausea nor vomiting currently. Hgb this morning is stable at 9.4.      Allergies:  Penicillins and Percocet [oxycodone-acetaminophen]    Scheduled Meds:  aspirin, 81 mg, Oral, Daily  carvedilol, 12.5 mg, Oral, BID With Meals  cefTAZidime, 2 g, Intravenous, Q8H  citalopram, 40 mg, Oral, Daily  furosemide, 20 mg, Oral, BID  melatonin, 5 mg, Oral, Nightly  pantoprazole, 40 mg, Oral, BID  riFAXIMin, 550 mg, Oral, Q12H  saccharomyces boulardii, 250 mg, Oral, BID  sodium chloride, 10 mL, Intravenous, Q12H  sodium chloride, 10 mL, Intravenous, Q12H  sucralfate, 1 g, Oral, 4x Daily  tamsulosin, 0.4 mg, Oral, Daily         Infusions:       PRN Meds:  lactulose  •  magnesium sulfate **OR** magnesium sulfate **OR** magnesium sulfate  •  ondansetron  •  potassium chloride **OR** potassium chloride **OR** potassium chloride  •  sodium  chloride  •  sodium chloride  •  sodium chloride  •  sodium chloride    Home Meds:  Medications Prior to Admission   Medication Sig Dispense Refill Last Dose   • amLODIPine (NORVASC) 10 MG tablet Take 0.5 tablets by mouth 2 (Two) Times a Day.   Past Week at Unknown time   • ASPIRIN LOW DOSE 81 MG tablet Take 1 tablet by mouth Daily. Last dose 10-21-18 (Patient taking differently: Take 81 mg by mouth Daily.)   Past Week at Unknown time   • ciprofloxacin (CIPRO) 500 MG tablet 2 (two) times a day.   Past Week at Unknown time   • citalopram (CeleXA) 20 MG tablet Take 40 mg by mouth Daily.   Past Week at Unknown time   • doxycycline (MONODOX) 100 MG capsule 2 (two) times a day.   Past Week at Unknown time   • ferrous gluconate (FERGON) 324 MG tablet Take 1 tablet by mouth Daily With Breakfast. (Patient taking differently: Take 324 mg by mouth 2 (Two) Times a Day.) 30 tablet 0 Past Week at Unknown time   • furosemide (LASIX) 20 MG tablet Take 20-40 mg by mouth Every Morning.   Past Week at Unknown time   • acetaminophen (TYLENOL) 650 MG 8 hr tablet Take 650 mg by mouth Every 8 (Eight) Hours As Needed for Mild Pain .   Unknown at Unknown time   • furosemide (LASIX) 20 MG tablet 2 (two) times a day.      • lactulose (CHRONULAC) 10 GM/15ML solution Take 15 ml q 6 hours as needed for constipation 1892 mL 3    • melatonin 5 MG tablet tablet Take 5 mg by mouth Every Night.      • meropenem (MERREM) 1 g injection       • Multiple Vitamin (MULTI-VITAMIN DAILY) tablet Take 1 tablet by mouth Daily.      • O2 (OXYGEN)       • Ondansetron 4 MG film 3 (Three) Times a Day.      • pantoprazole (PROTONIX) 40 MG EC tablet Take 40 mg by mouth 2 (Two) Times a Day.      • POTASSIUM PO Take  by mouth Daily. 595 mg      • saccharomyces boulardii (FLORASTOR) 250 MG capsule Take 250 mg by mouth 2 (Two) Times a Day.      • sucralfate (CARAFATE) 1 g tablet TAKE 1 TABLET FOUR TIMES DAILY 360 tablet 3    • tamsulosin (FLOMAX) 0.4 MG capsule 24 hr  capsule Take 1 capsule by mouth Daily. 15 capsule 0    • Trelegy Ellipta 100-62.5-25 MCG/INH aerosol powder  INHALE 1 PUFF ONCE DAILY 60 each 0    • triamcinolone (KENALOG) 0.1 % cream Apply 1 application topically to the appropriate area as directed 2 (Two) Times a Day.      • Xifaxan 550 MG tablet 2 (Two) Times a Day.          ROS: Review of Systems   Constitutional: Positive for activity change and fever.   HENT: Negative.    Eyes: Negative.    Respiratory: Negative.    Cardiovascular: Negative.    Gastrointestinal: Positive for blood in stool and constipation. Negative for abdominal pain, nausea and vomiting.   Genitourinary: Negative.    Musculoskeletal: Positive for gait problem.   Skin: Negative.    Allergic/Immunologic: Negative.    Neurological: Positive for weakness.   Hematological: Negative.    Psychiatric/Behavioral: Negative.        PAST MED HX:  Past Medical History:   Diagnosis Date   • Abdominal aortic aneurysm (CMS/[facility]) 9/29/2016   • Anxiety 9/29/2016   • Arthritis    • Back pain    • Black lung (CMS/[facility])    • CAD (coronary artery disease) 9/29/2016   • Cirrhosis (CMS/[facility])    • COPD (chronic obstructive pulmonary disease) (CMS/[facility]) 9/29/2016   • Depression 9/29/2016   • Depression    • GERD (gastroesophageal reflux disease)    • Hypertension 9/29/2016   • On supplemental oxygen by nasal cannula     at night   • Vitiligo    • Wears dentures    • Wears reading eyeglasses        PAST SURG HX:  Past Surgical History:   Procedure Laterality Date   • ABDOMINAL AORTIC ANEURYSM REPAIR WITH ENDOGRAFT N/A 6/6/2019    Procedure: ABDOMINAL AORTIC ANEURYSM REPAIR WITH ENDOGRAFT;  Surgeon: Leopoldo Burleson MD;  Location:  [facility];  Service: Vascular   • BACK SURGERY     • CARDIAC CATHETERIZATION     • CARDIAC CATHETERIZATION N/A 9/28/2018    Procedure: Left Heart Cath;  Surgeon: Douglas Galvez MD;  Location:  [facility]ATH INVASIVE LOCATION;  Service: Cardiovascular   • CARDIAC SURGERY     •  COLONOSCOPY      2015   • COLONOSCOPY N/A 10/30/2019    Procedure: COLONOSCOPY;  Surgeon: Homar Viveros MD;  Location:  [facility];  Service: Gastroenterology   • ENDOSCOPY N/A 10/30/2019    Procedure: ESOPHAGOGASTRODUODENOSCOPY;  Surgeon: Homar Viveros MD;  Location:  [facility];  Service: Gastroenterology   • EYE SURGERY      cataracts bilateral   • HAND SURGERY Left    • LUMBAR DISCECTOMY FUSION INSTRUMENTATION N/A 11/1/2018    Procedure: LUMBAR DISCECTOMY POSTERIOR WITH FUSION INSTRUMENTATION;  Surgeon: Joo Franklin MD;  Location:  [facility];  Service: Orthopedic Spine   • LUMBAR DISCECTOMY FUSION INSTRUMENTATION N/A 7/24/2019    Procedure: POSTERIOR LUMBAR SPINAL FUSION REVISION AND INSTRUMENTATION L3,L4,L5,S1;  Surgeon: Joo Franklin MD;  Location:  [facility];  Service: Orthopedic Spine   • ORIF FINGER / THUMB FRACTURE     • SHOULDER SURGERY Left        FAM HX:  Family History   Problem Relation Age of Onset   • Stroke Mother    • Hypertension Mother    • Heart disease Mother    • Heart disease Father    • Hypertension Father    • Diabetes Sister    • Hypertension Sister    • Heart disease Sister    • Diabetes Brother    • Heart disease Brother    • Hypertension Child    • Hypertension Son    • No Known Problems Son    • Diabetes Sister    • Heart disease Sister        SOC HX:  Social History     Socioeconomic History   • Marital status:      Spouse name: Not on file   • Number of children: 2   • Years of education: Not on file   • Highest education level: Not on file   Tobacco Use   • Smoking status: Former Smoker     Packs/day: 1.00     Years: 30.00     Pack years: 30.00     Types: Cigarettes     Quit date: 1/1/2011     Years since quitting: 10.2   • Smokeless tobacco: Former User     Types: Chew     Quit date: 4/7/2011   Substance and Sexual Activity   • Alcohol use: Not Currently   • Drug use: No   • Sexual activity: Defer     Comment:  and lives with wife  "      PHYSICAL EXAM  /66 (BP Location: Left arm, Patient Position: Sitting)   Pulse 87   Temp 98.3 °F (36.8 °C) (Oral)   Resp 18   Ht 190.5 cm (75\")   Wt 104 kg (230 lb 1.6 oz)   SpO2 97%   BMI 28.76 kg/m²   Wt Readings from Last 3 Encounters:   03/16/21 104 kg (230 lb 1.6 oz)   02/24/21 110 kg (243 lb)   02/22/21 111 kg (245 lb)   ,body mass index is 28.76 kg/m².  Physical Exam  Patient is currently in contact and airborne isolation for active COVID19 infection.  With his consent, physical exam was conducted with telemedicine visit and direct visualization through hallway window in effort to conserve PPE during the viral pandemic.  Patient is alert and oriented x 3.  Very pleasant to speak with and in no distress  Cardio: Normal sinus rhythm on telemetry monitor.   Respiratory: Breathing unlabored.  On room air,  No respiratory distress  Neuro: He is alert and oriented x 3   Psych: Appropriate affect     Results Review:   I reviewed the patient's new clinical results.    Lab Results   Component Value Date    WBC 4.28 03/16/2021    HGB 9.4 (L) 03/16/2021    HGB 8.9 (L) 03/16/2021    HGB 8.2 (L) 03/15/2021    HCT 29.5 (L) 03/16/2021    MCV 95.0 03/16/2021     (L) 03/16/2021       Lab Results   Component Value Date    INR 1.80 (H) 03/12/2021    INR 1.96 (H) 03/11/2021    INR 1.58 (H) 09/10/2020       Lab Results   Component Value Date    GLUCOSE 89 03/16/2021    BUN 11 03/16/2021    CREATININE 0.74 (L) 03/16/2021    EGFRIFNONA 105 03/16/2021    EGFRIFAFRI 90 12/17/2019    BCR 14.9 03/16/2021     (L) 03/16/2021    K 3.9 03/16/2021    CO2 29.0 03/16/2021    CALCIUM 9.0 03/16/2021    PROTENTOTREF 8.2 06/18/2020    ALBUMIN 3.10 (L) 03/16/2021    ALKPHOS 157 (H) 03/16/2021    BILITOT 0.9 03/16/2021    ALT 46 (H) 03/16/2021    AST 97 (H) 03/16/2021     I reviewed his outpatient EGD and Colonoscopy results from 10/30/2019 with Dr. Viveros.  He had Grade II esophageal varices in the lower third of the " esophagus. Severe portal hypertensive gastropathy was found.  Localized nodular mucosa was found in the gastric antrum. A submucosal varix could not be ruled out.  He was started on Carvedilol for which he still remains on.    Colonoscopy showed internal hemorrhoids and five sessile polyps, removed.       ASSESSMENTS/PLANS    1. Bright red blood per rectum, suspect hemorrhoidal   2. Liver cirrhosis   3. History of grade 2 esophageal varices and severe portal hypertensive gastropathy (10/30/2019)  4. Chronic normocytic anemia   5. Coagulopathy  6. COVID-19 infection      Mr. Kelsey is a very pleasant 70 year old male with history of infected AAA endograft and liver cirrhosis that was admitted on 3/10/21 for fever and positive COVID-19 virus.  He was having difficulty with constipation at home and began Lactulose.  He since had several large bowel movements.   He had bright red blood dripping in the toilet last night.   He has history of hemorrhoids on previous colonoscopy.  His Hgb is fortunately stable/improved today.  He denies any abdominal pain, nausea nor vomiting.  BUN of 11 would not suggest upper GI source.       >>> Recommend anusol BID for 10 days  >>> Lactulose 10 gm daily .  Goal of 2-3 soft bowel movements daily  >>> IV Vitamin K+ 10 mg x 1 for coagulopathy.  Repeat INR tomorrow.     >>> Recommend outpatient EGD with Dr. Viveros when his quarantine is complete.    >>> Patient noted to be long term Carafate.  Unclear indication.  Will hold as this can interfere with absorption of other medication.       I discussed the patient's findings and my recommendations with patient    NIGHAT Saul  03/16/21  12:46 EDT

## 2021-03-16 NOTE — PLAN OF CARE
Goal Outcome Evaluation:  Plan of Care Reviewed With: patient  Progress: improving  Outcome Summary: Pt. on 2L on arrival, per pt. he thinks nursing just forgot to take it off.  Pt. MI OOB, stood MI and to bathroom quad cane and around room S.  Pt. able to perform grooming minus shaving without assist sinkside, bath armpit, mari area and buttocks with some setup and S only.  Assist with back.  Pt. donned and doffed pants S and gown mod A due to lines.  02 sats 98% and no rest periods needed. Good progress to all goals.

## 2021-03-16 NOTE — THERAPY TREATMENT NOTE
Patient Name: Derek Kelsey  : 1950    MRN: [id number]                              Today's Date: 3/16/2021       Admit Date: 3/10/2021    Visit Dx:     ICD-10-CM ICD-9-CM   1. Sepsis, due to unspecified organism, unspecified whether acute organ dysfunction present (CMS/[facility])  A41.9 038.9     995.91   2. Pneumonia due to COVID-19 virus  U07.1 480.8    J12.82 079.89   3. Lactic acidosis  E87.2 276.2     Patient Active Problem List   Diagnosis   • Anxiety   • Abdominal aortic aneurysm (AAA) without rupture (CMS/[facility])   • Depression   • Hypertension   • Cataract, bilateral   • CAD (coronary artery disease)   • Abnormal stress test   • Moderate COPD   • Former smoker   • Coal workers pneumoconiosis, simple   • Back pain   • S/P lumbar fusion   • Prediabetes   • Acute blood loss anemia, mild, asymptomatic   • Acute postoperative pain   • Hyponatremia, mild   • Chronic respiratory failure with hypoxia and hypercapnia (CMS/[facility])   • Renal insufficiency   • Acute cystitis without hematuria   • Acute UTI   • Sepsis (CMS/[facility])   • Bacteremia   • Anemia   • Aortitis (CMS/[facility])   • Elevated C-reactive protein (CRP)   • Cirrhosis of liver (CMS/[facility])   • High anion gap metabolic acidosis   • S/P AAA repair   • GERD (gastroesophageal reflux disease)   • Person under investigation for COVID-19   • On supplemental oxygen by nasal cannula   • Infected aortic endograft (CMS/[facility])   • Elevated transaminase level   • Thrombocytopenia (CMS/[facility])   • Pneumonia   • Gram-negative bacteremia     Past Medical History:   Diagnosis Date   • Abdominal aortic aneurysm (CMS/HCC) 2016   • Anxiety 2016   • Arthritis    • Back pain    • Black lung (CMS/HCC)    • CAD (coronary artery disease) 2016   • Cirrhosis (CMS/HCC)    • COPD (chronic obstructive pulmonary disease) (CMS/[facility]) 2016   • Depression 2016   • Depression    • GERD (gastroesophageal reflux disease)    • Hypertension 2016   • On supplemental oxygen by  nasal cannula     at night   • Vitiligo    • Wears dentures    • Wears reading eyeglasses      Past Surgical History:   Procedure Laterality Date   • ABDOMINAL AORTIC ANEURYSM REPAIR WITH ENDOGRAFT N/A 6/6/2019    Procedure: ABDOMINAL AORTIC ANEURYSM REPAIR WITH ENDOGRAFT;  Surgeon: Leopoldo Burleson MD;  Location: [facility] HYBRID OR 15;  Service: Vascular   • BACK SURGERY     • CARDIAC CATHETERIZATION     • CARDIAC CATHETERIZATION N/A 9/28/2018    Procedure: Left Heart Cath;  Surgeon: Douglas Galvez MD;  Location:  [facility] CATH INVASIVE [facility]ATION;  Service: Cardiovascular   • CARDIAC SURGERY     • COLONOSCOPY      2015   • COLONOSCOPY N/A 10/30/2019    Procedure: COLONOSCOPY;  Surgeon: Homar Viveros MD;  Location:  [facility] ENDOSCOPY;  Service: Gastroenterology   • ENDOSCOPY N/A 10/30/2019    Procedure: ESOPHAGOGASTRODUODENOSCOPY;  Surgeon: Homar Viveros MD;  Location:  [facility] ENDOSCOPY;  Service: Gastroenterology   • EYE SURGERY      cataracts bilateral   • HAND SURGERY Left    • LUMBAR DISCECTOMY FUSION INSTRUMENTATION N/A 11/1/2018    Procedure: LUMBAR DISCECTOMY POSTERIOR WITH FUSION INSTRUMENTATION;  Surgeon: Joo Franklin MD;  Location:  [facility] OR;  Service: Orthopedic Spine   • LUMBAR DISCECTOMY FUSION INSTRUMENTATION N/A 7/24/2019    Procedure: POSTERIOR LUMBAR SPINAL FUSION REVISION AND INSTRUMENTATION L3,L4,L5,S1;  Surgeon: Joo Franklin MD;  Location:  [facility] OR;  Service: Orthopedic Spine   • ORIF FINGER / THUMB FRACTURE     • SHOULDER SURGERY Left      General Information     Row Name 03/16/21 1122          OT Time and Intention    Document Type  therapy note (daily note)  -YENIFER     Mode of Treatment  individual therapy;occupational therapy  -YENIFER     Row Name 03/16/21 1122          General Information    Existing Precautions/Restrictions  fall;oxygen therapy device and L/min  -YENIFER     Barriers to Rehab  medically complex  -YENIFER     Row Name 03/16/21 1122          Cognition    Orientation  Status (Cognition)  oriented to;person other not retested  -     Row Name 03/16/21 1122          Safety Issues, Functional Mobility    Impairments Affecting Function (Mobility)  endurance/activity tolerance;balance;strength  -       User Key  (r) = Recorded By, (t) = Taken By, (c) = Cosigned By    Initials Name Provider Type     Raven Mirza, OT Occupational Therapist          Mobility/ADL's     Row Name 03/16/21 1122          Transfers    Transfers  sit-stand transfer  -     Comment (Transfers)  off and on bed several times with quad cane.  -     Sit-Stand [place] (Transfers)  modified independence  -     Row Name 03/16/21 1122          Sit-Stand Transfer    Assistive Device (Sit-Stand Transfers)  cane, quad  -[facility] Name 03/16/21 1122          Functional Mobility    Functional Mobility- Ind. Level  supervision required  -     Functional Mobility- Device  quad cane  -     Functional Mobility-Distance (Feet)  40  -     Functional Mobility- Safety Issues  step length decreased;supplemental O2  -     Functional Mobility- Comment  to bathroom, to chair, then one more lap in room  -     Row Name 03/16/21 [facility]          Activities of Daily Living    BADL Assessment/Intervention  lower body dressing;grooming;upper body dressing;bathing  -[facility] Name 03/16/21 [facility]          Lower Body Dressing Assessment/Training    [place] Level (Lower Body Dressing)  doff;don;pants/bottoms;supervision  -     Position (Lower Body Dressing)  edge of bed sitting;unsupported standing  -     Row Name 03/16/21 1122          Grooming Assessment/Training    [place] Level (Grooming)  hair care, combing/brushing;oral care regimen;wash face, hands  -     Position (Grooming)  sink side  -     Comment (Grooming)  pt. also able to lean head under sink faucet and wash and dry hair without assist.  -     Row Name 03/16/21 1122          Bathing Assessment/Intervention    Position (Bathing)  sink  side;supported standing;unsupported sitting  -YENIFER     Comment (Bathing)  pt. washed armpits and OT washed back, pt. able to wash mari area and buttocks setup.  Used UE support on sink only portion of time.  -YENIFER     Row Name 03/16/21 1122          Upper Body Dressing Assessment/Training    Day Level (Upper Body Dressing)  don;moderate assist (50% patient effort)  -YENIFER     Position (Upper Body Dressing)  edge of bed sitting  -YENIFER     Comment (Upper Body Dressing)  gown  -YENIFER       User Key  (r) = Recorded By, (t) = Taken By, (c) = Cosigned By    Initials Name Provider Type    Raven Young, OT Occupational Therapist        Obj/Interventions     Row Name 03/16/21 1126          Balance    Static Sitting Balance  WFL;sitting, edge of bed;unsupported  -YENIFER     Dynamic Sitting Balance  WFL;sitting, edge of bed;unsupported  -YENIFER     Static Standing Balance  WFL;unsupported;standing  -YENIFER     Dynamic Standing Balance  WFL;unsupported grooming and dressing  -YENIFER       User Key  (r) = Recorded By, (t) = Taken By, (c) = Cosigned By    Initials Name Provider Type    Raven Young, OT Occupational Therapist        Goals/Plan     Row Name 03/16/21 1130          Transfer Goal 1 (OT)    Progress/Outcome (Transfer Goal 1, OT)  goal partially met;goal ongoing met minus toilet  -YENIFER     Row Name 03/16/21 1130          Toileting Goal 1 (OT)    Progress/Outcome (Toileting Goal 1, OT)  continuing progress toward goal  -YENIFER     Row Name 03/16/21 1130          Grooming Goal 1 (OT)    Progress/Outcome (Grooming Goal 1, OT)  goal met  -YENIFER       User Key  (r) = Recorded By, (t) = Taken By, (c) = Cosigned By    Initials Name Provider Type    Raven Young, OT Occupational Therapist        Clinical Impression     Row Name 03/16/21 1126          Pain Assessment    Additional Documentation  Pain Scale: Numbers Pre/Post-Treatment (Group)  -YENIFER     Row Name 03/16/21 1126          Pain Scale: Numbers Pre/Post-Treatment    Pretreatment  Pain Rating  0/10 - no pain  -YENIFER     Posttreatment Pain Rating  0/10 - no pain  -YENIFER     Row Name 03/16/21 1126          Plan of Care Review    Plan of Care Reviewed With  patient  -YENIFER     Progress  improving  -YENIFER     Outcome Summary  Pt. on 2L on arrival, per pt. he thinks nursing just forgot to take it off.  Pt. MI OOB, stood MI and to bathroom quad cane and around room S.  Pt. able to perform grooming minus shaving without assist sinkside, bath armpit, mari area and buttocks with some setup and S only.  Assist with back.  Pt. donned and doffed pants S and gown mod A due to lines.  02 sats 98% and no rest periods needed. Good progress to all goals.  -YENIFER     Row Name 03/16/21 1126          Vital Signs    Post Systolic BP Rehab  118  -YENIFER     Post Treatment Diastolic BP  67  -YENIFER     Posttreatment Heart Rate (beats/min)  78  -YENIFER     O2 Delivery Pre Treatment  supplemental O2  -YENIFER     O2 Delivery Intra Treatment  supplemental O2  -YENIFER     Post SpO2 (%)  98  -YENIFER     O2 Delivery Post Treatment  supplemental O2  -YENIFER     Pre Patient Position  Supine  -YENIFER     Intra Patient Position  Standing  -YENIFER     Post Patient Position  Sitting  -YENIFER     Row Name 03/16/21 1126          Positioning and Restraints    Pre-Treatment Position  in bed  -YENIFER     Post Treatment Position  chair  -YENIFER     In Chair  reclined;call light within reach;encouraged to call for assist no alarm on arrival, C setup so can use I pre and post tx.  -YENIFER       User Key  (r) = Recorded By, (t) = Taken By, (c) = Cosigned By    Initials Name Provider Type    Raven Young, OT Occupational Therapist        Outcome Measures     Row Name 03/16/21 1131          How much help from another is currently needed...    Putting on and taking off regular lower body clothing?  4  -YENIFER     Bathing (including washing, rinsing, and drying)  3  -YENIFER     Toileting (which includes using toilet bed pan or urinal)  4  -YENIFER     Putting on and taking off regular upper body clothing  3   -YENIFER     Taking care of personal grooming (such as brushing teeth)  4  -YENIFER     Eating meals  4  -YENIFER     AM-PAC 6 Clicks Score (OT)  22  -YENIFER     Row Name 03/16/21 1131          Functional Assessment    Outcome Measure Options  AM-PAC 6 Clicks Daily Activity (OT)  -YENIFER       User Key  (r) = Recorded By, (t) = Taken By, (c) = Cosigned By    Initials Name Provider Type    Raven Young, OT Occupational Therapist        Occupational Therapy Education                 Title: PT OT SLP Therapies (Not Started)     Topic: Occupational Therapy (In Progress)     Point: ADL training (Done)     Description:   Instruct learner(s) on proper safety adaptation and remediation techniques during self care or transfers.   Instruct in proper use of assistive devices.              Learning Progress Summary           Patient Acceptance, E, VU by YENIFER at 3/16/2021 1132    Comment: progress to goals, safety with 02 cord.    Acceptance, E, VU by [facility] at 3/12/2021 1125    Comment: Role of OT                   Point: Home exercise program (Not Started)     Description:   Instruct learner(s) on appropriate technique for monitoring, assisting and/or progressing therapeutic exercises/activities.              Learner Progress:  Not documented in this visit.          Point: Precautions (Done)     Description:   Instruct learner(s) on prescribed precautions during self-care and functional transfers.              Learning Progress Summary           Patient Acceptance, E, VU by YENIFER at 3/12/2021 1125    Comment: Role of OT                   Point: Body mechanics (Done)     Description:   Instruct learner(s) on proper positioning and spine alignment during self-care, functional mobility activities and/or exercises.              Learning Progress Summary           Patient Acceptance, E, VU by [facility] at 3/12/2021 1125    Comment: Role of OT                               User Key     Initials Effective Dates Name Provider Type [facility] 06/08/18 -   Raven Mirza OT Occupational Therapist OT    JB1 02/14/20 -  Diana Peters OT Occupational Therapist OT              OT Recommendation and Plan     Plan of Care Review  Plan of Care Reviewed With: patient  Progress: improving  Outcome Summary: Pt. on 2L on arrival, per pt. he thinks nursing just forgot to take it off.  Pt. MI OOB, stood MI and to bathroom quad cane and around room S.  Pt. able to perform grooming minus shaving without assist sinkside, bath armpit, mari area and buttocks with some setup and S only.  Assist with back.  Pt. donned and doffed pants S and gown mod A due to lines.  02 sats 98% and no rest periods needed. Good progress to all goals.     Time Calculation:   Time Calculation- OT     Row Name 03/16/21 1132             Time Calculation- OT    OT Start Time  1042  -YENIFER      OT Received On  03/16/21  -YENIFER      OT Goal Re-Cert Due Date  03/22/21  -YENIFER         Timed Charges    02338 - OT Therapeutic Activity Minutes  5  -YENIFER      77341 - OT Self Care/Mgmt Minutes  33  -YENIFER        User Key  (r) = Recorded By, (t) = Taken By, (c) = Cosigned By    Initials Name Provider Type    YENIFER Raven Mirza OT Occupational Therapist        Therapy Charges for Today     Code Description Service Date Service Provider Modifiers Qty    57534859282 HC OT THERAPEUTIC ACT EA 15 MIN 3/16/2021 Raven Mirza OT GO 1    [id number] HC OT SELF CARE/MGMT/TRAIN EA 15 MIN 3/16/2021 Raven Mirza OT GO 2               Raven Mirza OT  3/16/2021

## 2021-03-16 NOTE — PLAN OF CARE
Goal Outcome Evaluation:    Patient has been on room air this afternoon sating in the low to mid 90s. No c/o pain or discomfort. Will continue to monitor

## 2021-03-16 NOTE — PROGRESS NOTES
[facility] Medicine Services  PROGRESS NOTE    Patient Name: Derek Kelsey  : 1950  MRN: [id number]    Date of Admission: 3/10/2021  Primary Care Physician: Raudel Zheng MD    Subjective   Subjective     CC:  Sepsis, hx endograft infection, covid, cirrhosis    HPI:  Pt doing well this am, denies any issues overnight but having persistent blood in stool.     ROS:  Gen- No fevers, chills  CV- No chest pain, palpitations  Resp- No cough, dyspnea  GI- No N/V/D, abd pain            Objective   Objective     Vital Signs:   Temp:  [97 °F (36.1 °C)-98 °F (36.7 °C)] 97 °F (36.1 °C)  Heart Rate:  [68-93] 87  Resp:  [18] 18  BP: (122-134)/(59-78) 128/76        Physical Exam:  With patient's consent, physical exam was conducted via visual telemedicine encounter due to patient's current isolation requirements in the interest of PPE conservation.    Constitutional: No acute distress, awake, alert, nontoxic, normal body habitus  HENT: NCAT, MMM, no conjunctival injection  Respiratory: Good effort, nonlabored respirations   Cardiovascular:  tele with NSR  Musculoskeletal: No edema, normal muscle tone and mass for age  Psychiatric: Appropriate affect, good insight and judgement, cooperative  Neurologic: Oriented x 3, movements symmetric BUE and BLE, speech clear and fluent  Skin: No visible rashes, no jaundice seen on exposed skin through window        Results Reviewed:  Results from last 7 days   Lab Units 21  0741 21  0613 03/15/21  2338 03/15/21  0806 21  0522 21  0517 21  0854 03/10/21  1302   WBC 10*3/mm3  --  4.28  --  4.11 2.78* 7.84  --  17.40*   HEMOGLOBIN g/dL 9.4* 8.9* 8.2* 9.7* 8.9* 8.9*  --  9.5*   HEMATOCRIT % 29.5* 28.3* 25.9* 31.2* 28.8* 27.9*  --  30.8*   PLATELETS 10*3/mm3  --  107*  --  112* 98* 99*  --  111*   INR   --   --   --   --   --  1.80* 1.96*  --    PROCALCITONIN ng/mL  --   --   --   --   --   --   --  7.37*     Results from  last 7 days   Lab Units 03/16/21  0613 03/15/21  0806 03/14/21  0920 03/13/21  0635 03/10/21  1302   SODIUM mmol/L 134* 135* 134* 133* 131*   POTASSIUM mmol/L 3.9 4.1 3.5 4.0 3.8   CHLORIDE mmol/L 99 101 100 106 97*   CO2 mmol/L 29.0 27.0 24.0 19.0* 21.0*   BUN mg/dL 11 10 8 11 13   CREATININE mg/dL 0.74* 0.80 0.71* 0.70* 1.18   GLUCOSE mg/dL 89 94 92 101* 113*   CALCIUM mg/dL 9.0 9.4 9.6 8.9 9.1   ALT (SGPT) U/L 46* 52* 55* 49* 39   AST (SGOT) U/L 97* 124* 136* 136* 91*   TROPONIN T ng/mL  --   --   --   --  <0.010   PROBNP pg/mL  --   --  919.7* 1,388.0* 485.0     Estimated Creatinine Clearance: 115.4 mL/min (A) (by C-G formula based on SCr of 0.74 mg/dL (L)).    Microbiology Results Abnormal     Procedure Component Value - Date/Time    Blood Culture - Blood, Arm, Right [[id number]]  (Abnormal)  (Susceptibility) Collected: 03/10/21 1510    Lab Status: Edited Result - FINAL Specimen: Blood from Arm, Right Updated: 03/13/21 1234     Blood Culture Pseudomonas aeruginosa     Isolated from Aerobic Bottle     Gram Stain Aerobic Bottle Gram negative bacilli    Susceptibility      Pseudomonas aeruginosa      ALVINO      Amikacin Susceptible (C)<sup style='font-weight:normal'>1</sup></font>      Cefazolin Resistant (C)<sup style='font-weight:normal'>1</sup></font>      Cefepime Susceptible      Ceftazidime Susceptible      Ceftazidime + Avibactam Susceptible (C)<sup style='font-weight:normal'>1</sup></font>      Ceftolozane + Tazobactam Susceptible (C)<sup style='font-weight:normal'>1</sup></font>      Ciprofloxacin Resistant      Gentamicin Susceptible (C)<sup style='font-weight:normal'>1</sup></font>      Imipenem Susceptible (C)<sup style='font-weight:normal'>1</sup></font>      Levofloxacin Resistant      Meropenem Susceptible (C)<sup style='font-weight:normal'>1</sup></font>      Piperacillin + Tazobactam Susceptible      Tobramycin Susceptible (C)<sup style='font-weight:normal'>1</sup></font>             <sup  style='font-weight:normal'>1</sup></font> Appended report. These results have been appended to a previously final verified report.          Linear View                   Blood Culture - Blood, Arm, Left [[id number]]  (Abnormal) Collected: 03/10/21 1530    Lab Status: Final result Specimen: Blood from Arm, Left Updated: 03/13/21 0728     Blood Culture Pseudomonas species     Isolated from Aerobic Bottle     Gram Stain Aerobic Bottle Gram negative bacilli    Narrative:      For MICs refer to blood culture Collected 3/10/2021 1510.     Blood Culture ID, PCR - Blood, Arm, Right [[id number]]  (Abnormal) Collected: 03/10/21 1510    Lab Status: Final result Specimen: Blood from Arm, Right Updated: 03/11/21 1741     BCID, PCR Pseudomonas aeruginosa. Identification by BCID PCR.    Urine Culture - Urine, Urine, Clean Catch [[id number]]  (Normal) Collected: 03/10/21 1459    Lab Status: Final result Specimen: Urine, Clean Catch Updated: 03/11/21 1630     Urine Culture No growth    COVID PRE-OP / PRE-PROCEDURE SCREENING ORDER (NO ISOLATION) - Swab, Nasopharynx [[id number]]  (Abnormal) Collected: 03/10/21 1522    Lab Status: Final result Specimen: Swab from Nasopharynx Updated: 03/10/21 1638    Narrative:      The following orders were created for panel order COVID PRE-OP / PRE-PROCEDURE SCREENING ORDER (NO ISOLATION) - Swab, Nasopharynx.  Procedure                               Abnormality         Status                     ---------                               -----------         ------                     COVID-19 and FLU A/B PCR...[[id number]]  Abnormal            Final result                 Please view results for these tests on the individual orders.    COVID-19 and FLU A/B PCR - Swab, Nasopharynx [[id number]]  (Abnormal) Collected: 03/10/21 1522    Lab Status: Final result Specimen: Swab from Nasopharynx Updated: 03/10/21 1638     COVID19 Detected     Influenza A PCR Not Detected     Influenza B PCR Not Detected    Narrative:       Fact sheet for providers: https://www.fda.gov/media/723644/download    Fact sheet for patients: https://www.fda.gov/media/144304/download    Test performed by PCR.  Influenza A and Influenza B negative results should be considered presumptive in samples that have a positive SARS-CoV-2 result.    Competitive inhibition studies showed that SARS-CoV-2 virus, when present at concentrations above 3.6E+04 copies/mL, can inhibit the detection and amplification of influenza A and influenza B virus RNA if present at or below 1.8E+02 copies/mL or 4.9E+02 copies/mL, respectively, and may lead to false negative influenza virus results. If co-infection with influenza A or influenza B virus is suspected in samples with a positive SARS-CoV-2 result, the sample should be re-tested with another FDA cleared, approved, or authorized influenza test, if influenza virus detection would change clinical management.          Imaging Results (Last 24 Hours)     Procedure Component Value Units Date/Time    XR Chest 1 View [[id number]] Collected: 03/15/21 1359     Updated: 03/15/21 1407    Narrative:      EXAMINATION: XR CHEST 1 VW- 03/15/2021     INDICATION: PICC placement; A41.9-Sepsis, unspecified organism;  U07.1-COVID-19; J12.82-Pneumonia due to Coronavirus disease 2019;  E87.2-Acidosis      COMPARISON: Chest x-ray 03/13/2021     FINDINGS: Right arm PICC terminates within the mid SVC without  postprocedure pneumothorax. Chronic changes of the lungs otherwise noted  similar to prior without consolidation or effusion.       Impression:      Right arm PICC terminates within the mid SVC without  postprocedure pneumothorax. Chronic changes of the lungs otherwise noted  similar to prior without consolidation or effusion.     D:  03/15/2021  E:  03/15/2021                Results for orders placed during the hospital encounter of 03/10/21    Adult Transthoracic Echo Complete W/ Cont if Necessary Per Protocol    Interpretation Summary  ·  Estimated right ventricular systolic pressure from tricuspid regurgitation is normal (<35 mmHg).  · Estimated left ventricular EF = 65% Left ventricular systolic function is normal.      I have reviewed the medications:  Scheduled Meds:aspirin, 81 mg, Oral, Daily  carvedilol, 12.5 mg, Oral, BID With Meals  cefTAZidime, 2 g, Intravenous, Q8H  citalopram, 40 mg, Oral, Daily  furosemide, 20 mg, Oral, BID  melatonin, 5 mg, Oral, Nightly  pantoprazole, 40 mg, Oral, BID  riFAXIMin, 550 mg, Oral, Q12H  saccharomyces boulardii, 250 mg, Oral, BID  sodium chloride, 10 mL, Intravenous, Q12H  sodium chloride, 10 mL, Intravenous, Q12H  sucralfate, 1 g, Oral, 4x Daily  tamsulosin, 0.4 mg, Oral, Daily      Continuous Infusions:   PRN Meds:.lactulose  •  magnesium sulfate **OR** magnesium sulfate **OR** magnesium sulfate  •  ondansetron  •  potassium chloride **OR** potassium chloride **OR** potassium chloride  •  sodium chloride  •  sodium chloride  •  sodium chloride  •  sodium chloride    Assessment/Plan   Assessment & Plan     Active Hospital Problems    Diagnosis  POA   • **Sepsis (CMS/HCC) [A41.9]  Yes   • Gram-negative bacteremia [R78.81]  Unknown   • 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        Brief Hospital Course to date:  Derek Kelsey is a 70 y.o. 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 Reportedly had 1st covid 19 vaccine shot approximately 2 weeks prior to this presentation. In ED patient became hypotensive requiring aggressive iv fluids, had a + covid 19 pcr swab and was admitted initially to ICU. Ct head negative.  Lactate was 6.1, procalcitonin 7.37. wbc count 17,000 CT angio chest/abd/pelvis negative for pe, no acute lung dz noted, cirrhosis noted (no free fluid/ascites noted), stable appearance of endovascular aortic aneurysm repair w/ aortobiiliac stent graft and distal infrarenal stranding & minimal adenopathy (similar to 2/24/21 scan) without evidence for distinct progression or definitive leak. acute intrathoracid process noted. Patient responded to iv resuscitation and was ultimately transferred to hospitalist service on day #2    This patient's problems and plans were partially entered by my partner and updated as appropriate by me 03/16/21.    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 Not likely surgical candidate but will defer to ID  -- mrcp 3/12/21 no overt filling defects, gb ok    GIB  -- suspect hemorrhoidal in etiology, persists this am.   --consult GI, appreciate their input  --continue to monitor H&H Q8 hours and transfuse PRN Hgb<7    Covid 19 pcr +, likely asymptomatic  A/C DHF (iatrogenic due to volume resuscitation, holding diuretics due to sepsis)  -- holding on steroids/remdesivir as do not feel causing respiratory issues currently  -- transient mild hypoxia felt due to volume overload (iatrogenic from volume resuscitation & holding scheduled po diuretics)  -= s/p gentle iv diuresis, restarted home lasix 20mg po bid as bp & renal function tolerate    Afib w/ RVR, new onset (due to sepsis)  -- due to acute illness/sepsis; echo ok, tsh ok;   -increased coreg to 12.5mg bid currently rate controlled. Deferring anticoagulation for now (inr 1.8-1.9). if remains in afib would d/w Dr. Galvez his regular  cardiologist    Metabolic encephalopathy, resolved  -due to sepsis, hepatic encephalopathy  -treating sepsis, added lactulose to rifaxamin, mentation normal & no asterixis.    Hx Cirrhosis w/ thrombocytopenia & coagulopathy  -inr 1.8-1.9 this admission  -continue rifaxamin, added low dose lactulose, coreg  --consult GI for above    Moderate COPD  Chronic hypoxic resp failure (2-3 L chronic use)    Non-obstructive CAD (9/2018 [facility])  -asa    Gerd    HTN    Previous lumbar fusion surgery      updated wife Mago on 3/16/21, she appreciated the call      DVT Prophylaxis:  Sq heparin tid (hold for now due to GIB)    Disposition: I expect the patient to be discharged once final antibiotic plan/picc line in place & ok w/ ID, if no further GIB.     CODE STATUS:   Code Status and Medical Interventions:   Ordered at: 03/10/21 1906     Limited Support to NOT Include:    Cardioversion/Defibrillation    Intubation     Code Status:    No CPR     Medical Interventions (Level of Support Prior to Arrest):    Limited       Awa Viveros MD  03/16/21

## 2021-03-16 NOTE — PLAN OF CARE
Goal Outcome Evaluation:  Plan of Care Reviewed With: patient  Progress: improving  Outcome Summary: Pt amb laps in room with quad cane x100ft with SBA with no LOB, good safety awareness, and improved gait quality. O2sat 93% after activity on RA. Pt performed pulm therex with good effort. Continue POC.

## 2021-03-16 NOTE — DISCHARGE PLACEMENT REQUEST
"Please see order for home health    Possible discharge in a few days or less - will fax DC summary upon discharge     Thank you!!    Jennie Farrar [phone]       Derek Tejeda (70 y.o. Male)     Date of Birth Social Security Number Address Home Phone MRN    1950  [address] [place] [id number] [phone] [id number]    Buddhist Marital Status          Lutheran        Admission Date Admission Type Admitting Provider Attending Provider Department, Room/Bed    3/10/21 Emergency Awa Viveros MD Howard, Gabriela Kirk, MD [facility] 6A, N622/    Discharge Date Discharge Disposition Discharge Destination                       Attending Provider: Awa Viveros MD    Allergies: Penicillins, Percocet [Oxycodone-acetaminophen]    Isolation: Contact Air   Infection: COVID (confirmed) (03/10/21)   Code Status: No CPR    Ht: 190.5 cm (75\")   Wt: 104 kg (230 lb 1.6 oz)    Admission Cmt: None   Principal Problem: Sepsis (CMS/[facility]) [A41.9]                 Active Insurance as of 3/10/2021     Primary Coverage     Payor Plan Insurance Group Employer/Plan Group    HUMANA MEDICARE REPLACEMENT HUMANA MEDICARE REPLACEMENT [id number]     Payor Plan Address Payor Plan Phone Number Payor Plan Fax Number Effective Dates    [address] [address] [phone]  2018 - None Entered    [facility] [address]       Subscriber Name Subscriber Birth Date Member ID       DEREK TEJEDA 1950 [id number]                 Emergency Contacts      (Rel.) Home Phone Work Phone Mobile Phone    Mago Tejeda (Spouse) [phone] -- [phone]    Brady Tejeda (Son) -- -- [phone]        [address]  [address] [facility] [address]  Phone:  [phone]  Fax:  [phone] Date: Mar 16, 2021      Ambulatory Referral to Home Health     Patient:  Derek Tejeda MRN:  [id number]   [address] [facility] [address] :  " 1950  N:    Phone: [phone] Sex:  M      INSURANCE PAYOR PLAN GROUP # SUBSCRIBER ID   Primary:    HUMANA MEDICARE REPLACEMENT [id number]      Referring Provider Information:  KELSEY ENCARNACION Phone: [phone] Fax: [phone]      Referral Information:   # Visits:  1 Referral Type: Home Health [42]   Urgency:  Routine Referral Reason: Specialty Services Required   Start Date: Mar 16, 2021 End Date:  To be determined by Insurer   Diagnosis: Sepsis, due to unspecified organism, unspecified whether acute organ dysfunction present (CMS/HCC) (A41.9 [ICD-10-CM] 038.9,995.91 [ICD-9-CM])  Gram-negative bacteremia (R78.81 [ICD-10-CM] 790.7,041.85 [ICD-9-CM])  Chronic respiratory failure with hypoxia and hypercapnia (CMS/HCC) (J96.11,J96.12 [ICD-10-CM] 518.83,799.02,786.09 [ICD-9-CM])  Infection of aortic endograft, subsequent encounter (T82.7XXD [ICD-10-CM] V58.89 [ICD-9-CM])      Refer to Dept:   Refer to Provider:   Refer to Facility:    1)ceftazidime 2 g iv every 8 hours x 6-12 weeks   2)weekly picc care   3)check cbc, cmp, esr q Monday   Dr Carrero Infectious Disease [phone]  Face to Face Visit Date: 3/16/2021  Follow-up provider for Plan of Care? I treated the patient in an acute care facility and will not continue treatment after discharge.  Follow-up provider: KRYSTA GALINDO [[id number]]  Reason/Clinical Findings: Gram-negative bacteremia, Sepsis, Infected aortic endograft  Describe mobility limitations that make leaving home difficult: impaired mobility, impaired endurance, impaired ADL's  Nursing/Therapeutic Services Requested: Skilled Nursing  Nursing/Therapeutic Services Requested: Physical Therapy  Skilled nursing orders: Medication education  Skilled nursing orders: Cardiopulmonary assessments  Skilled nursing orders: PICC line care/instruction     This document serves as a request of services and does not constitute Insurance authorization or approval of  services.  To determine eligibility, please contact the members Insurance carrier to verify and review coverage.     If you have medical questions regarding this request for services. Please contact [address] at [phone] during normal business hours.       Authorizing Provider:Awa Viveros MD  Authorizing Provider's NPI: [id number]  Order Entered By: Jennie Farrar RN 3/16/2021  1:45 PM     Electronically signed by: Awa Viveros MD 3/16/2021  1:45 PM            Problem List         Codes Noted - Resolved       Hospital    Gram-negative bacteremia ICD-10-CM: R78.81  ICD-9-CM: 790.7, 041.85 3/11/2021 - Present    On supplemental oxygen by nasal cannula (Chronic) ICD-10-CM: Z78.9  ICD-9-CM: V49.89 9/10/2020 - Present    Infected aortic endograft (CMS/HCC) ICD-10-CM: T82.7XXA  ICD-9-CM: 996.62 9/10/2020 - Present    Cirrhosis of liver (CMS/HCC) ICD-10-CM: K74.60  ICD-9-CM: 571.5 6/18/2020 - Present    * (Principal) Sepsis (CMS/HCC) ICD-10-CM: A41.9  ICD-9-CM: 038.9, 995.91 9/5/2019 - Present    Moderate COPD ICD-10-CM: J44.9  ICD-9-CM: 496 10/17/2018 - Present    Hypertension ICD-10-CM: I10  ICD-9-CM: 401.9 9/29/2016 - Present       Non-Hospital    Pneumonia ICD-10-CM: J18.9  ICD-9-CM: 486 9/11/2020 - Present    GERD (gastroesophageal reflux disease) (Chronic) ICD-10-CM: K21.9  ICD-9-CM: 530.81 9/10/2020 - Present    Person under investigation for COVID-19 ICD-10-CM: Z20.822  ICD-9-CM: V01.79 9/10/2020 - Present    Elevated transaminase level ICD-10-CM: R74.01  ICD-9-CM: 790.4 9/10/2020 - Present    Thrombocytopenia (CMS/HCC) ICD-10-CM: D69.6  ICD-9-CM: 287.5 9/10/2020 - Present    S/P AAA repair ICD-10-CM: Z98.890, Z86.79  ICD-9-CM: V45.89 7/28/2020 - Present    Aortitis (CMS/HCC) ICD-10-CM: I77.6  ICD-9-CM: 447.6 6/18/2020 - Present    Elevated C-reactive protein (CRP) ICD-10-CM: R79.82  ICD-9-CM: 790.95 6/18/2020 - Present    High anion gap metabolic acidosis ICD-10-CM:  E87.2  ICD-9-CM: 276.2 6/18/2020 - Present    Anemia ICD-10-CM: D64.9  ICD-9-CM: 285.9 10/16/2019 - Present    Bacteremia ICD-10-CM: R78.81  ICD-9-CM: 790.7 9/5/2019 - Present    Acute cystitis without hematuria ICD-10-CM: N30.00  ICD-9-CM: 595.0 9/4/2019 - Present    Acute UTI ICD-10-CM: N39.0  ICD-9-CM: 599.0 9/4/2019 - Present    Renal insufficiency ICD-10-CM: N28.9  ICD-9-CM: 593.9 7/25/2019 - Present    Chronic respiratory failure with hypoxia and hypercapnia (CMS/[facility]) ICD-10-CM: J96.11, J96.12  ICD-9-CM: 518.83, 799.02, 786.09 3/18/2019 - Present    Acute blood loss anemia, mild, asymptomatic ICD-10-CM: D62  ICD-9-CM: 285.1 11/2/2018 - Present    Acute postoperative pain ICD-10-CM: G89.18  ICD-9-CM: 338.18 11/2/2018 - Present    Hyponatremia, mild ICD-10-CM: E87.1  ICD-9-CM: 276.1 11/2/2018 - Present    Back pain ICD-10-CM: M54.9  ICD-9-CM: 724.5 11/1/2018 - Present    S/P lumbar fusion ICD-10-CM: Z98.1  ICD-9-CM: V45.4 11/1/2018 - Present    Prediabetes ICD-10-CM: R73.03  ICD-9-CM: 790.29 11/1/2018 - Present    Former smoker ICD-10-CM: Z87.891  ICD-9-CM: V15.82 10/17/2018 - Present    Coal workers pneumoconiosis, simple ICD-10-CM: J60  ICD-9-CM: 500 10/17/2018 - Present    Abnormal stress test ICD-10-CM: R94.39  ICD-9-CM: 794.39 9/24/2018 - Present    Anxiety ICD-10-CM: F41.9  ICD-9-CM: 300.00 9/29/2016 - Present    Abdominal aortic aneurysm (AAA) without rupture (CMS/HCC) ICD-10-CM: I71.4  ICD-9-CM: 441.4 9/29/2016 - Present    Depression ICD-10-CM: F32.9  ICD-9-CM: 311 9/29/2016 - Present    Cataract, bilateral ICD-10-CM: H26.9  ICD-9-CM: 366.9 9/29/2016 - Present    CAD (coronary artery disease) ICD-10-CM: I25.10  ICD-9-CM: 414.00 9/29/2016 - Present             History & Physical      Case, Ludmila RYAN DO at 03/10/21 2043          INTENSIVIST   INITIAL HOSPITAL VISIT NOTE     Hospital:  LOS: 0 days     Mr. Derek Kelsey, 70 y.o. male is seen for a Chief Complaint of:  Chief Complaint   Patient presents  with   • Altered Mental Status       Hypertension    Moderate COPD    Sepsis (CMS/HCC)    Cirrhosis of liver (CMS/HCC)    On supplemental oxygen by nasal cannula    Infected aortic endograft (CMS/[facility])      Subjective   S     Mr. 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 He had his first COVID vaccine approximately 2 weeks ago. He has some mild abdominal pain which is chronic. In the ED, he was swabbed for COVID and found to be positive. He does not have any prior history of COVID infection. He then became hypotensive and required IV fluid infusion.        PMH: He  has a past medical history of Abdominal aortic aneurysm (CMS/HCC) (9/29/2016), Anxiety (9/29/2016), Arthritis, Back pain, Black lung (CMS/[facility]), CAD (coronary artery disease) (9/29/2016), Cirrhosis (CMS/[facility]), COPD (chronic obstructive pulmonary disease) (CMS/[facility]) (9/29/2016), Depression (9/29/2016), Depression, GERD (gastroesophageal reflux disease), Hypertension (9/29/2016), On supplemental oxygen by nasal cannula, Vitiligo, Wears dentures, and Wears reading eyeglasses.   PSxH: He  has a past surgical history that includes Hand surgery (Left); Shoulder surgery (Left); Cardiac catheterization; Cardiac catheterization (N/A, 9/28/2018); lumbar discectomy fusion instrumentation (N/A, 11/1/2018); Eye surgery; Back surgery; Colonoscopy; ORIF finger / thumb fracture; AAA repair, endovascular (N/A, 6/6/2019); lumbar discectomy fusion instrumentation (N/A, 7/24/2019); Colonoscopy (N/A, 10/30/2019); Esophagogastroduodenoscopy (N/A, 10/30/2019); and Cardiac surgery.      Medications:  No current facility-administered medications on file prior to encounter.     Current Outpatient Medications on File Prior to Encounter   Medication Sig   • acetaminophen (TYLENOL) 650 MG 8 hr tablet Take 650 mg by mouth Every 8 (Eight) Hours As Needed  for Mild Pain .   • amLODIPine (NORVASC) 10 MG tablet Take 0.5 tablets by mouth 2 (Two) Times a Day.   • ASPIRIN LOW DOSE 81 MG tablet Take 1 tablet by mouth Daily. Last dose 10-21-18 (Patient taking differently: Take 81 mg by mouth Daily.)   • carvedilol (COREG) 6.25 MG tablet TAKE 1 TABLET TWICE DAILY WITH MEALS   • ciprofloxacin (CIPRO) 500 MG tablet 2 (two) times a day.   • citalopram (CeleXA) 20 MG tablet Take 40 mg by mouth Daily.   • doxycycline (MONODOX) 100 MG capsule 2 (two) times a day.   • ferrous gluconate (FERGON) 324 MG tablet Take 1 tablet by mouth Daily With Breakfast. (Patient taking differently: Take 324 mg by mouth 2 (Two) Times a Day.)   • furosemide (LASIX) 20 MG tablet Take 20-40 mg by mouth Every Morning.   • furosemide (LASIX) 20 MG tablet 2 (two) times a day.   • lactulose (CHRONULAC) 10 GM/15ML solution Take 15 ml q 6 hours as needed for constipation   • melatonin 5 MG tablet tablet Take 5 mg by mouth Every Night.   • meropenem (MERREM) 1 g injection    • Multiple Vitamin (MULTI-VITAMIN DAILY) tablet Take 1 tablet by mouth Daily.   • O2 (OXYGEN)    • Ondansetron 4 MG film 3 (Three) Times a Day.   • pantoprazole (PROTONIX) 40 MG EC tablet Take 40 mg by mouth 2 (Two) Times a Day.   • POTASSIUM PO Take  by mouth Daily. 595 mg   • saccharomyces boulardii (FLORASTOR) 250 MG capsule Take 250 mg by mouth 2 (Two) Times a Day.   • sucralfate (CARAFATE) 1 g tablet TAKE 1 TABLET FOUR TIMES DAILY   • tamsulosin (FLOMAX) 0.4 MG capsule 24 hr capsule Take 1 capsule by mouth Daily.   • Trelegy Ellipta 100-62.5-25 MCG/INH aerosol powder  INHALE 1 PUFF ONCE DAILY   • triamcinolone (KENALOG) 0.1 % cream Apply 1 application topically to the appropriate area as directed 2 (Two) Times a Day.   • Xifaxan 550 MG tablet 2 (Two) Times a Day.       Allergies: He is allergic to penicillins and percocet [oxycodone-acetaminophen].   FH: His family history includes Diabetes in his brother, sister, and sister; Heart  disease in his brother, father, mother, sister, and sister; Hypertension in his child, father, mother, sister, and son; No Known Problems in his son; Stroke in his mother.   SH: He  reports that he quit smoking about 10 years ago. His smoking use included cigarettes. He has a 30.00 pack-year smoking history. He quit smokeless tobacco use about 9 years ago.  His smokeless tobacco use included chew. He reports previous alcohol use. He reports that he does not use drugs.     The patient's relevant past medical, surgical and social history were reviewed and updated in Epic as appropriate.     ROS (14):   Review of Systems   Constitutional: Positive for fatigue and fever.   HENT: Negative.    Eyes: Negative.    Respiratory: Positive for shortness of breath.    Cardiovascular: Negative.    Gastrointestinal: Positive for abdominal pain and nausea.   Endocrine: Negative.    Genitourinary: Negative.    Musculoskeletal: Negative.    Skin: Negative.    Allergic/Immunologic: Negative.    Neurological: Negative.    Hematological: Negative.    Psychiatric/Behavioral: Negative.        Objective   O     Vitals    Temp  Min: 98 °F (36.7 °C)  Max: 98 °F (36.7 °C)  BP  Min: 137/74  Max: 138/83  Pulse  Min: 95  Max: 113  Resp  Min: 19  Max: 20  SpO2  Min: 93 %  Max: 97 % No data recorded     I/O 24 hrs (7:00AM - 6:59 AM)  Intake/Output       03/10/21 0700 - 03/11/21 0659    Intake (ml) 2100    Output (ml) --    Net (ml) 2100    Last Weight  111 kg (245 lb)          Medications (drips):  dextrose 5 % and sodium chloride 0.9 %        Physical Examination    Telemetry: Normal sinus rhythm.    Constitutional:  No acute distress.  Conversant. Resting in bed on room air.    HEENT: Normocephalic and atraumatic.   Neck:  Normal range of motion. Neck supple.   No JVD present.   No thyromegaly.    Cardiovascular: Regular rate and rhythm.  No murmurs, rub or gallop.  No peripheral edema.   Respiratory: Normal respiratory effort.  Diminished  bilaterally. No wheezing.    Abdominal:  Soft. No masses.   Non-tender. No distension.   No hepatosplenomegaly.   MSK: Normal muscle strength and tone.   Extremities: No digital cyanosis or clubbing.   Skin: Skin is warm and dry.  No rashes, lesions or ulcers noted.    Neurological:   Alert and Oriented to person, place, and time.   Moves all extremities.   Psychiatric:  Normal affect.   Normal judgment.            Results from last 7 days   Lab Units 03/10/21  1302   WBC 10*3/mm3 17.40*   HEMOGLOBIN g/dL 9.5*   MCV fL 97.8*   PLATELETS 10*3/mm3 111*     Results from last 7 days   Lab Units 03/10/21  1302   SODIUM mmol/L 131*   POTASSIUM mmol/L 3.8   CO2 mmol/L 21.0*   CREATININE mg/dL 1.18   MAGNESIUM mg/dL 1.5*     Estimated Creatinine Clearance: 80.6 mL/min (by C-G formula based on SCr of 1.18 mg/dL).  Results from last 7 days   Lab Units 03/10/21  1302   ALK PHOS U/L 143*   BILIRUBIN mg/dL 1.3*   ALT (SGPT) U/L 39   AST (SGOT) U/L 91*             Images:    Imaging Results (Last 24 Hours)     Procedure Component Value Units Date/Time    CT Angiogram Chest [[id number]] Collected: 03/10/21 1659     Updated: 03/10/21 1744    Narrative:      EXAMINATION: CT ANGIOGRAM CHEST, CT ABDOMEN/PELVIS W CONTRAST -  03/10/2021      INDICATION: Shortness of air. Altered mental status. Abdominal pain.  Evaluate for pulmonary embolus.      TECHNIQUE: CT angiogram chest with intravenous contrast administration  following PE protocol. 2D reconstructions performed, CT abdomen and  pelvis with intravenous contrast administration.     The radiation dose reduction device was turned on for each scan per the  ALARA (As Low as Reasonably Achievable) protocol.     COMPARISON: CT abdomen and pelvis 02/24/2021.     FINDINGS:      CTA CHEST: Thyroid is homogeneous in attenuation. No bulky mediastinal  adenopathy. Central airways are patent. Esophagus in normal course and  caliber to the distal segment where there is a small hiatal  hernia.  Patent nonaneurysmal minimally atherosclerotic thoracic aorta with  patent great vessel origins. No central pulmonary arterial filling  defect to suggest pulmonary embolus with equivocal optimal  opacification. Cardiac size borderline enlarged without pericardial  effusion. Extended lung windows demonstrate biapical pleural-parenchymal  scarring with paraseptal emphysema and biapical pleural-parenchymal  scarring, however, no dominant nodule or mass. No pleural effusion.  Degenerative disease of the thoracic spine without aggressive osseous  lesion. No soft tissue body wall findings of acuity involving the chest.     ABDOMEN AND PELVIS: Liver demonstrates nodular contours concerning for  parenchymal disease process such as potential cirrhotic hepatic  morphology with a low-attenuation focus likely a  cyst in the left  hemiliver. Gallbladder distended without obvious calculus associated. No  biliary dilatation. Pancreas and spleen unremarkable. Adrenals without  distinct nodule. Kidneys without hydronephrosis or hydroureter. No bulky  retroperitoneal adenopathy. Atherosclerotic aneurysmal abdominal aorta  with aortobiiliac endovascular stent in place demonstrating gross  patency with adjacent stranding to the distal aorta as seen on prior  comparison grossly similar appearance with mildly enlarged  retroperitoneal lymph nodes adjacent to the periaortic region. Portal  veins and IVC grossly patent. GI tract evaluation without focal  thickening or disproportionate dilatation. No free fluid or  intra-abdominal free air. Pelvic viscera unremarkable without bulky  pelvic adenopathy.       Impression:      1. No PE is clearly identified as no central filling defect is  identified on suboptimal evaluation due to technique despite repeat  sequencing.     2. No acute intrathoracic process.     3. Cirrhotic hepatic morphology again noted.     4. Stable appearance of the endovascular aortic aneurysm repair  with  aortobiiliac stent graft and adjacent distal infrarenal stranding and  minimal adenopathy as seen on prior comparison of  02/24/2021 without  evidence for distinct progression or definitive hemorrhage/leak clearly  evident in a similar configuration to prior comparison 02/24/2021.     DICTATED:   03/10/2021  EDITED/ls :   03/10/2021          CT Abdomen Pelvis With Contrast [[id number]] Collected: 03/10/21 1659     Updated: 03/10/21 1747    Narrative:      EXAMINATION: CT ANGIOGRAM CHEST, CT ABDOMEN/PELVIS W CONTRAST -  03/10/2021      INDICATION: Shortness of air. Altered mental status. Abdominal pain.  Evaluate for pulmonary embolus.      TECHNIQUE: CT angiogram chest with intravenous contrast administration  following PE protocol. 2D reconstructions performed, CT abdomen and  pelvis with intravenous contrast administration.     The radiation dose reduction device was turned on for each scan per the  ALARA (As Low as Reasonably Achievable) protocol.     COMPARISON: CT abdomen and pelvis 02/24/2021.     FINDINGS:      CTA CHEST: Thyroid is homogeneous in attenuation. No bulky mediastinal  adenopathy. Central airways are patent. Esophagus in normal course and  caliber to the distal segment where there is a small hiatal hernia.  Patent nonaneurysmal minimally atherosclerotic thoracic aorta with  patent great vessel origins. No central pulmonary arterial filling  defect to suggest pulmonary embolus with equivocal optimal  opacification. Cardiac size borderline enlarged without pericardial  effusion. Extended lung windows demonstrate biapical pleural-parenchymal  scarring with paraseptal emphysema and biapical pleural-parenchymal  scarring, however, no dominant nodule or mass. No pleural effusion.  Degenerative disease of the thoracic spine without aggressive osseous  lesion. No soft tissue body wall findings of acuity involving the chest.     ABDOMEN AND PELVIS: Liver demonstrates nodular contours concerning  for  parenchymal disease process such as potential cirrhotic hepatic  morphology with a low-attenuation focus likely a  cyst in the left  hemiliver. Gallbladder distended without obvious calculus associated. No  biliary dilatation. Pancreas and spleen unremarkable. Adrenals without  distinct nodule. Kidneys without hydronephrosis or hydroureter. No bulky  retroperitoneal adenopathy. Atherosclerotic aneurysmal abdominal aorta  with aortobiiliac endovascular stent in place demonstrating gross  patency with adjacent stranding to the distal aorta as seen on prior  comparison grossly similar appearance with mildly enlarged  retroperitoneal lymph nodes adjacent to the periaortic region. Portal  veins and IVC grossly patent. GI tract evaluation without focal  thickening or disproportionate dilatation. No free fluid or  intra-abdominal free air. Pelvic viscera unremarkable without bulky  pelvic adenopathy.       Impression:      1. No PE is clearly identified as no central filling defect is  identified on suboptimal evaluation due to technique despite repeat  sequencing.     2. No acute intrathoracic process.     3. Cirrhotic hepatic morphology again noted.     4. Stable appearance of the endovascular aortic aneurysm repair with  aortobiiliac stent graft and adjacent distal infrarenal stranding and  minimal adenopathy as seen on prior comparison of  02/24/2021 without  evidence for distinct progression or definitive hemorrhage/leak clearly  evident in a similar configuration to prior comparison 02/24/2021.     DICTATED:   03/10/2021  EDITED/ls :   03/10/2021          CT Head Without Contrast [[id number]] Collected: 03/10/21 1648     Updated: 03/10/21 1655    Narrative:      EXAMINATION: CT HEAD WO CONTRAST- - 03/10/2021     INDICATION: Altered mental status.     TECHNIQUE: CT head without intravenous contrast     The radiation dose reduction device was turned on for each scan per the  ALARA (As Low as Reasonably Achievable)  protocol.     COMPARISON: None.     FINDINGS: Midline structures are symmetric without evidence of mass,  mass effect, or midline shift. Ventricles and sulci within normal  limits. No intra-axial hemorrhage or extra-axial fluid collection.  Globes and orbits unremarkable. Paranasal sinuses and mastoid air cells  are grossly clear and well-pneumatized. Calvarium intact.       Impression:      No acute intracranial abnormality.     DICTATED:   03/10/2021  EDITED/ls :   03/10/2021          XR Chest 1 View [[id number]] Collected: 03/10/21 1341     Updated: 03/10/21 1345    Narrative:      EXAMINATION: XR CHEST 1 VW-      INDICATION: Weak/Dizzy/AMS triage protocol      COMPARISON: 9/10/2020     FINDINGS: Mild bibasilar atelectasis is present. No focal airspace  disease otherwise. No significant effusion or distinct pneumothorax.  Unchanged heart and mediastinal contours.       Impression:      Mild bibasilar atelectasis. No acute disease in the chest  otherwise.     This report was finalized on 3/10/2021 1:42 PM by Douglas Vieira.           ECG/EMG Results (last 24 hours)     Procedure Component Value Units Date/Time    ECG 12 Lead [[id number]] Collected: 03/10/21 1304     Updated: 03/10/21 1513          I reviewed the patient's new laboratory and imaging results.  I independently reviewed the patient's new images.    Assessment/Plan   A / P     Mr. Kelsey is a 69yo M with a history of infected AAA endograft, COPD, and cirrhosis who presented to the ED with fever and weakness and was found to be positive for COVID. He developed hypotension and ICU admission was requested. He had a CT scan of the chest which did not show any airspace disease consistent with COVID and he is on room air.     Nutrition:   NPO Diet  Advance Directives:   Code Status and Medical Interventions:   Ordered at: 03/10/21 1906     Limited Support to NOT Include:    Cardioversion/Defibrillation    Intubation     Code Status:    No CPR     Medical  Interventions (Level of Support Prior to Arrest):    Limited       Active Hospital Problems    Diagnosis    • Infected aortic endograft (CMS/HCC)    • On supplemental oxygen by nasal cannula      at night     • Cirrhosis of liver (CMS/HCC)    • Sepsis (CMS/HCC)    • Moderate COPD    • Hypertension        Assessment / Plan:    1. Admit to ICU  2. Continue fluid resucitation. Vasopressors if he becomes unresponsive to IVF.   3. Hold home diuretics and antihypertensives.   4. Vancomycin and meropenem for history of infected endograft  5. We will have ID see him in the AM since he follows with them as an outpatient.   6. Check an echocardiogram.   7. Home medications reviewed.  8. AM labs    Plan of care and goals reviewed during interdisciplinary rounds.  I discussed the patient's findings and my recommendations with patient    Ludmila Mederos DO  Pulmonary and Critical Care Medicine    Electronically signed by Ludmila Mederos DO at 03/10/21 2103          Physician Progress Notes (most recent note)      Davis Carrero MD at 03/16/21 1006          INFECTIOUS DISEASE CONSULT/INITIAL HOSPITAL VISIT    Derek Kelsey  1950  [id number]    Date of Consult: 3/16/2021    Admission Date: 3/10/2021      Requesting Provider: No ref. provider found  Evaluating Physician: Davis Carrero MD    Reason for Consultation: Positive Covid PCR    History of present illness:    Patient is a 70 y.o. 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  Patient has come into the emergency room and tested positive for Covid is not hypoxic patient did have hypotension which responded to IV fluids patient is currently not requiring oxygen and feels better has been started on empiric vancomycin and  meropenem.    Patient's wife is being tested for Covid results are pending.    I was previously suppressing patient with doxycycline and ciprofloxacin.      Patient is not a surgical candidate for endograft removal because of cirrhosis and high surgical mortality      3/12/21; doing well; no fever, rash, sore throat; on room air, no hypoxia overnight; blood cultures growing gnr from admission    3/13/2021 no events overnight denies fevers rash sore throat or diarrhea feels better    3/14/21;  Afebrile normotensive;  Resting comfortably briefly on o2; now on 1 L      3/15/21; doing well; no events overnight; no fever, rash, sore throat    3/16/21; doing well; no events overnight; no fever, rash, sore throat tolerating abx; picc line placed    Past Medical History:   Diagnosis Date   • Abdominal aortic aneurysm (CMS/HCC) 9/29/2016   • Anxiety 9/29/2016   • Arthritis    • Back pain    • Black lung (CMS/[facility])    • CAD (coronary artery disease) 9/29/2016   • Cirrhosis (CMS/[facility])    • COPD (chronic obstructive pulmonary disease) (CMS/[facility]) 9/29/2016   • Depression 9/29/2016   • Depression    • GERD (gastroesophageal reflux disease)    • Hypertension 9/29/2016   • On supplemental oxygen by nasal cannula     at night   • Vitiligo    • Wears dentures    • Wears reading eyeglasses        Past Surgical History:   Procedure Laterality Date   • ABDOMINAL AORTIC ANEURYSM REPAIR WITH ENDOGRAFT N/A 6/6/2019    Procedure: ABDOMINAL AORTIC ANEURYSM REPAIR WITH ENDOGRAFT;  Surgeon: Leopoldo Burleson MD;  Location: [facility] OR 15;  Service: Vascular   • BACK SURGERY     • CARDIAC CATHETERIZATION     • CARDIAC CATHETERIZATION N/A 9/28/2018    Procedure: Left Heart Cath;  Surgeon: Douglas Galvez MD;  Location: [facility] CATH INVASIVE LOCATION;  Service: Cardiovascular   • CARDIAC SURGERY     • COLONOSCOPY      2015   • COLONOSCOPY N/A 10/30/2019    Procedure: COLONOSCOPY;  Surgeon: Homar Viveros MD;  Location: [facility]  ENDOSCOPY;  Service: Gastroenterology   • ENDOSCOPY N/A 10/30/2019    Procedure: ESOPHAGOGASTRODUODENOSCOPY;  Surgeon: Homar Viveros MD;  Location:  [facility] ENDOSCOPY;  Service: Gastroenterology   • EYE SURGERY      cataracts bilateral   • HAND SURGERY Left    • LUMBAR DISCECTOMY FUSION INSTRUMENTATION N/A 11/1/2018    Procedure: LUMBAR DISCECTOMY POSTERIOR WITH FUSION INSTRUMENTATION;  Surgeon: Joo Franklin MD;  Location:  [facility] OR;  Service: Orthopedic Spine   • LUMBAR DISCECTOMY FUSION INSTRUMENTATION N/A 7/24/2019    Procedure: POSTERIOR LUMBAR SPINAL FUSION REVISION AND INSTRUMENTATION L3,L4,L5,S1;  Surgeon: Joo Franklin MD;  Location:  [facility] OR;  Service: Orthopedic Spine   • ORIF FINGER / THUMB FRACTURE     • SHOULDER SURGERY Left        Family History   Problem Relation Age of Onset   • Stroke Mother    • Hypertension Mother    • Heart disease Mother    • Heart disease Father    • Hypertension Father    • Diabetes Sister    • Hypertension Sister    • Heart disease Sister    • Diabetes Brother    • Heart disease Brother    • Hypertension Child    • Hypertension Son    • No Known Problems Son    • Diabetes Sister    • Heart disease Sister        Social History     Socioeconomic History   • Marital status:      Spouse name: Not on file   • Number of children: 2   • Years of education: Not on file   • Highest education level: Not on file   Tobacco Use   • Smoking status: Former Smoker     Packs/day: 1.00     Years: 30.00     Pack years: 30.00     Types: Cigarettes     Quit date: 1/1/2011     Years since quitting: 10.2   • Smokeless tobacco: Former User     Types: Chew     Quit date: 4/7/2011   Substance and Sexual Activity   • Alcohol use: Not Currently   • Drug use: No   • Sexual activity: Defer     Comment:  and lives with wife       Allergies   Allergen Reactions   • Penicillins Hives     Hives - has tolerated Zosyn and ceftriaxone 09/2019   • Percocet [Oxycodone-Acetaminophen]  Confusion         Medication:    Current Facility-Administered Medications:   •  aspirin EC tablet 81 mg, 81 mg, Oral, Daily, Gatito, Ludmila V., DO, 81 mg at 03/16/21 0918  •  carvedilol (COREG) tablet 12.5 mg, 12.5 mg, Oral, BID With Meals, Rinku Mcrae MD, 12.5 mg at 03/16/21 0918  •  cefTAZidime (FORTAZ) 2 g/100 mL 0.9% NS IVPB (mpb), 2 g, Intravenous, Q8H, Davis Carrero MD, 2 g at 03/16/21 0919  •  citalopram (CeleXA) tablet 40 mg, 40 mg, Oral, Daily, Gatito Ludmila V., DO, 40 mg at 03/16/21 0918  •  furosemide (LASIX) tablet 20 mg, 20 mg, Oral, BID, Rinku Mcrae MD, 20 mg at 03/16/21 0921  •  lactulose (CHRONULAC) 10 GM/15ML solution 10 g, 10 g, Oral, TID PRN, Gatito Ludmila V., DO  •  Magnesium Sulfate 2 gram Bolus, followed by 8 gram infusion (total Mg dose 10 grams)- Mg less than or equal to 1mg/dL, 2 g, Intravenous, PRN **OR** Magnesium Sulfate 2 gram / 50mL Infusion (GIVE X 3 BAGS TO EQUAL 6GM TOTAL DOSE) - Mg 1.1 - 1.5 mg/dl, 2 g, Intravenous, PRN **OR** Magnesium Sulfate 4 gram infusion- Mg 1.6-1.9 mg/dL, 4 g, Intravenous, PRN, Awa Viveros MD, Last Rate: 25 mL/hr at 03/15/21 1126, 4 g at 03/15/21 1126  •  melatonin tablet 5 mg, 5 mg, Oral, Nightly, Gatito Ludmila V., DO, 5 mg at 03/15/21 2209  •  ondansetron (ZOFRAN) injection 4 mg, 4 mg, Intravenous, Q6H PRN, Rinku Mcrae MD, 4 mg at 03/14/21 1556  •  pantoprazole (PROTONIX) EC tablet 40 mg, 40 mg, Oral, BID, Case, Ludmila V., DO, 40 mg at 03/16/21 0918  •  potassium chloride (MICRO-K) CR capsule 40 mEq, 40 mEq, Oral, PRN, 40 mEq at 03/14/21 1720 **OR** potassium chloride (KLOR-CON) packet 40 mEq, 40 mEq, Oral, PRN **OR** potassium chloride 10 mEq in 100 mL IVPB, 10 mEq, Intravenous, Q1H PRN, Rinku Mcrae MD  •  riFAXIMin (XIFAXAN) tablet 550 mg, 550 mg, Oral, Q12H, Case, Ludmila V., DO, 550 mg at 03/16/21 0918  •  saccharomyces boulardii (FLORASTOR) capsule 250 mg, 250 mg, Oral, BID, Case, Ludmila V., DO,  250 mg at 21  •  sodium chloride 0.9 % flush 10 mL, 10 mL, Intravenous, PRN, Case, Ludmila V., DO  •  sodium chloride 0.9 % flush 10 mL, 10 mL, Intravenous, Q12H, Case, Ludmial V., DO, 10 mL at 21  •  sodium chloride 0.9 % flush 10 mL, 10 mL, Intravenous, PRN, Case, Ludmila V., DO  •  sodium chloride 0.9 % flush 10 mL, 10 mL, Intravenous, Q12H, Davis Carrero MD, 10 mL at 21  •  sodium chloride 0.9 % flush 10 mL, 10 mL, Intravenous, PRN, Davis Carrero MD  •  sodium chloride 0.9 % flush 20 mL, 20 mL, Intravenous, PRN, Davis Carrero MD  •  sucralfate (CARAFATE) tablet 1 g, 1 g, Oral, 4x Daily, Case, Ludmila V., DO, 1 g at 21  •  tamsulosin (FLOMAX) 24 hr capsule 0.4 mg, 0.4 mg, Oral, Daily, Case, Ludmila V., DO, 0.4 mg at 21    Antibiotics:  Anti-Infectives (From admission, onward)    Ordered     Dose/Rate Route Frequency Start Stop    21 1235  cefTAZidime (FORTAZ) 2 g/100 mL 0.9% NS IVPB (mpb)     Note to Pharmacy: Has tolerated IV cephalosporins before   Mannie Buckley [facility] reviewed the order on 03/15/21 1342.   Ordering Provider: Davis Carrero MD    2 g  over 30 Minutes Intravenous Every 8 Hours 21 1600 21 1559    03/10/21 2042  riFAXIMin (XIFAXAN) tablet 550 mg     Mannie Buckley [facility] reviewed the order on 03/15/21 1344.   Ordering Provider: Case, Ludmila V., DO    550 mg Oral Every 12 Hours Scheduled 03/10/21 2100 21 2059    03/10/21 1548  vancomycin 2250 mg/500 mL 0.9% NS IVPB ([facility])     Ordering Provider: Joo Cheung APRN    20 mg/kg × 111 kg Intravenous Once 03/10/21 1550 03/10/21 2116    03/10/21 1518  meropenem (MERREM) 1 g/100 mL 0.9% NS VTB (mbp)     Ordering Provider: Joo Cheung APRN    1 g  over 30 Minutes Intravenous Once 03/10/21 1520 03/10/21 1850            Review of Systems:  See hpi      Physical Exam:   Vital Signs  Temp (24hrs), Av.6 °F (36.4 °C), Min:97 °F (36.1 °C),  Max:98 °F (36.7 °C)    Temp  Min: 97 °F (36.1 °C)  Max: 98 °F (36.7 °C)  BP  Min: 122/59  Max: 134/78  Pulse  Min: 68  Max: 87  Resp  Min: 18  Max: 18  SpO2  Min: 93 %  Max: 96 %    GENERAL: resting quietly in no distress  HEENT: Normocephalic, atraumatic.  PERRL. EOMI. No conjunctival injection. No icterus.  No external oral lesions    HEART:sinus rhythm on tele  LUNGS: symmetrical inspiration bilaterally  ABDOMEN: Soft, nontender  EXT: 1+ edema  :  Without Young catheter.  MSK: No joint deformity  SKIN: Warm and dry without cutaneous eruptions on Inspection/palpation.        Laboratory Data    Results from last 7 days   Lab Units 03/16/21  0741 03/16/21  0613 03/15/21  2338 03/15/21  0806 03/14/21  0522   WBC 10*3/mm3  --  4.28  --  4.11 2.78*   HEMOGLOBIN g/dL 9.4* 8.9* 8.2* 9.7* 8.9*   HEMATOCRIT % 29.5* 28.3* 25.9* 31.2* 28.8*   PLATELETS 10*3/mm3  --  107*  --  112* 98*     Results from last 7 days   Lab Units 03/16/21  0613   SODIUM mmol/L 134*   POTASSIUM mmol/L 3.9   CHLORIDE mmol/L 99   CO2 mmol/L 29.0   BUN mg/dL 11   CREATININE mg/dL 0.74*   GLUCOSE mg/dL 89   CALCIUM mg/dL 9.0     Results from last 7 days   Lab Units 03/16/21  0613   ALK PHOS U/L 157*   BILIRUBIN mg/dL 0.9   ALT (SGPT) U/L 46*   AST (SGOT) U/L 97*         Results from last 7 days   Lab Units 03/16/21  0613   CRP mg/dL 1.67*     Results from last 7 days   Lab Units 03/11/21  0547   LACTATE mmol/L 2.6*  2.6*         Results from last 7 days   Lab Units 03/12/21  0517   VANCOMYCIN TR mcg/mL 12.40     Estimated Creatinine Clearance: 115.4 mL/min (A) (by C-G formula based on SCr of 0.74 mg/dL (L)).      Microbiology:  No results found for: ACANTHNAEG, AFBCX, BPERTUSSISCX, BLOODCX  No results found for: BCIDPCR, CXREFLEX, CSFCX, CULTURETIS  No results found for: CULTURES, HSVCX, URCX  No results found for: EYECULTURE, GCCX, HSVCULTURE, LABHSV  No results found for: LEGIONELLA, MRSACX, MUMPSCX, MYCOPLASCX  No results found for: NOCARDIACX,  STOOLCX  Urine Culture   Date Value Ref Range Status   03/10/2021 No growth  Preliminary     No results found for: VIRALCULTU, WOUNDCX        Radiology:  Imaging Results (Last 72 Hours)     Procedure Component Value Units Date/Time    XR Chest 1 View [[id number]] Collected: 03/15/21 1359     Updated: 03/15/21 1407    Narrative:      EXAMINATION: XR CHEST 1 VW- 03/15/2021     INDICATION: PICC placement; A41.9-Sepsis, unspecified organism;  U07.1-COVID-19; J12.82-Pneumonia due to Coronavirus disease 2019;  E87.2-Acidosis      COMPARISON: Chest x-ray 03/13/2021     FINDINGS: Right arm PICC terminates within the mid SVC without  postprocedure pneumothorax. Chronic changes of the lungs otherwise noted  similar to prior without consolidation or effusion.       Impression:      Right arm PICC terminates within the mid SVC without  postprocedure pneumothorax. Chronic changes of the lungs otherwise noted  similar to prior without consolidation or effusion.     D:  03/15/2021  E:  03/15/2021                  Impression:   COVID-19 positivity without hypoxia  History of abdominal aortic aneurysm with endograft repair and presumed secondary infection on suppressive Cipro and doxycycline  Cirrhosis  COPD  Recent weakness and nausea and vomiting and low-grade fevers while at home  Lactic acidosis  Ammonia 104  Pseudomonas bacteremia susc to meropenem     PLAN/RECOMMENDATIONS:   Thank you for asking us to see Derek Kelsey, I recommend the following:    Complicated case;     Patient treated empirically last year for pseudomonas although there were not positive blood cultures.  (given cefepime, meropenem for 8-12 weeks) and then changed to oral doxy, cipro.     Postive bl cx for pseudmonas very interesting and suggest there is either recurrenct infection on graft or possibly prostatitis as source.    F/u bl cultures  cont ceftazidime 2 g iv q8h  Place picc line     psa normal  I suspect the endograft is the source; the patient can  have surgery I do not know how I will suppress this infection given that Pseudomonas is resistant to Cipro and Levaquin    Monitor oxygenation    D/w family, Dr. Mcrae; will discuss again with Dr. Burleson; patient has considerable operative mortality if endograft was removed.      We are at a point not that Iv abx could quiet down the infection but there are not oral abx that could suppress this thereafter.    May need to see if their is opinion at [facility].    Dp/cm time  45 minutes    Davis Carrero MD  3/16/2021  10:06 EDT                    Electronically signed by Davis Carrero MD at 03/16/21 1009          Consult Notes (most recent note)      Davis Carrero MD at 03/11/21 1513      Consult Orders    1. Inpatient Infectious Diseases Consult [[id number]] ordered by Ludmila Mederos DO at 03/10/21 1908               INFECTIOUS DISEASE CONSULT/INITIAL HOSPITAL VISIT    Derek Kelsey  1950  [id number]    Date of Consult: 3/11/2021    Admission Date: 3/10/2021      Requesting Provider: No ref. provider found  Evaluating Physician: Davis Carrero MD    Reason for Consultation: Positive Covid PCR    History of present illness:    Patient is a 70 y.o. 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  Patient has come into the emergency room and tested positive for Covid is not hypoxic patient did have hypotension which responded to IV fluids patient is currently not requiring oxygen and feels better has been started on empiric vancomycin and meropenem.    Patient's wife is being tested for Covid results are pending.    I was previously suppressing patient with doxycycline and ciprofloxacin.      Patient is not a surgical candidate for endograft  removal because of cirrhosis and high surgical mortality          Past Medical History:   Diagnosis Date   • Abdominal aortic aneurysm (CMS/HCC) 9/29/2016   • Anxiety 9/29/2016   • Arthritis    • Back pain    • Black lung (CMS/HCC)    • CAD (coronary artery disease) 9/29/2016   • Cirrhosis (CMS/HCC)    • COPD (chronic obstructive pulmonary disease) (CMS/HCC) 9/29/2016   • Depression 9/29/2016   • Depression    • GERD (gastroesophageal reflux disease)    • Hypertension 9/29/2016   • On supplemental oxygen by nasal cannula     at night   • Vitiligo    • Wears dentures    • Wears reading eyeglasses        Past Surgical History:   Procedure Laterality Date   • ABDOMINAL AORTIC ANEURYSM REPAIR WITH ENDOGRAFT N/A 6/6/2019    Procedure: ABDOMINAL AORTIC ANEURYSM REPAIR WITH ENDOGRAFT;  Surgeon: Leopoldo Burleson MD;  Location:  [facility] OR 15;  Service: Vascular   • BACK SURGERY     • CARDIAC CATHETERIZATION     • CARDIAC CATHETERIZATION N/A 9/28/2018    Procedure: Left Heart Cath;  Surgeon: Douglas Galvez MD;  Location:  [facility] CATH INVASIVE LOCATION;  Service: Cardiovascular   • CARDIAC SURGERY     • COLONOSCOPY      2015   • COLONOSCOPY N/A 10/30/2019    Procedure: COLONOSCOPY;  Surgeon: Homar Viveros MD;  Location:  [facility] ENDOSCOPY;  Service: Gastroenterology   • ENDOSCOPY N/A 10/30/2019    Procedure: ESOPHAGOGASTRODUODENOSCOPY;  Surgeon: Homar Viveros MD;  Location:  [facility];  Service: Gastroenterology   • EYE SURGERY      cataracts bilateral   • HAND SURGERY Left    • LUMBAR DISCECTOMY FUSION INSTRUMENTATION N/A 11/1/2018    Procedure: LUMBAR DISCECTOMY POSTERIOR WITH FUSION INSTRUMENTATION;  Surgeon: Joo Franklin MD;  Location:  [facility] OR;  Service: Orthopedic Spine   • LUMBAR DISCECTOMY FUSION INSTRUMENTATION N/A 7/24/2019    Procedure: POSTERIOR LUMBAR SPINAL FUSION REVISION AND INSTRUMENTATION L3,L4,L5,S1;  Surgeon: Joo Franklin MD;  Location:  [facility] OR;  Service:  Orthopedic Spine   • ORIF FINGER / THUMB FRACTURE     • SHOULDER SURGERY Left        Family History   Problem Relation Age of Onset   • Stroke Mother    • Hypertension Mother    • Heart disease Mother    • Heart disease Father    • Hypertension Father    • Diabetes Sister    • Hypertension Sister    • Heart disease Sister    • Diabetes Brother    • Heart disease Brother    • Hypertension Child    • Hypertension Son    • No Known Problems Son    • Diabetes Sister    • Heart disease Sister        Social History     Socioeconomic History   • Marital status:      Spouse name: Not on file   • Number of children: 2   • Years of education: Not on file   • Highest education level: Not on file   Tobacco Use   • Smoking status: Former Smoker     Packs/day: 1.00     Years: 30.00     Pack years: 30.00     Types: Cigarettes     Quit date: 1/1/2011     Years since quitting: 10.1   • Smokeless tobacco: Former User     Types: Chew     Quit date: 4/7/2011   Substance and Sexual Activity   • Alcohol use: Not Currently   • Drug use: No   • Sexual activity: Defer     Comment:  and lives with wife       Allergies   Allergen Reactions   • Penicillins Hives     Hives - has tolerated Zosyn and ceftriaxone 09/2019   • Percocet [Oxycodone-Acetaminophen] Confusion         Medication:    Current Facility-Administered Medications:   •  [START ON 3/12/2021] ! Vanc trough due 05:30 am 3/12 - hold 6am vanc dose until trough reviewed by Pharmacist, , Does not apply, Once, Case, Ludmila V., DO  •  aspirin EC tablet 81 mg, 81 mg, Oral, Daily, Case, Ludmila V., DO, 81 mg at 03/11/21 0844  •  citalopram (CeleXA) tablet 40 mg, 40 mg, Oral, Daily, Case, Ludmila V., DO, 40 mg at 03/11/21 0844  •  enoxaparin (LOVENOX) syringe 40 mg, 40 mg, Subcutaneous, Q24H, Rinku Mcrae MD  •  lactulose (CHRONULAC) 10 GM/15ML solution 10 g, 10 g, Oral, TID PRN, Case, Ludmila V., DO  •  magnesium sulfate 4 gram infusion - Mg less than or equal to  1mg/dL, 4 g, Intravenous, PRN **OR** magnesium sulfate 3 gram infusion (1gm x 3) - Mg 1.1 - 1.5 mg/dL, 1 g, Intravenous, PRN **OR** Magnesium Sulfate 2 gram infusion- Mg 1.6 - 1.9 mg/dL, 2 g, Intravenous, PRN, Rinku Mcrae MD, Last Rate: 25 mL/hr at 03/11/21 1054, 2 g at 03/11/21 1054  •  melatonin tablet 5 mg, 5 mg, Oral, Nightly, Case, Ludmila V., DO  •  meropenem (MERREM) 1 g/100 mL 0.9% NS VTB (mbp), 1 g, Intravenous, Q8H, Case, Ludmila V., DO, Last Rate: 0 mL/hr at 03/11/21 0405, 1 g at 03/11/21 0844  •  pantoprazole (PROTONIX) EC tablet 40 mg, 40 mg, Oral, BID, Case, Ludmila V., DO, 40 mg at 03/11/21 0844  •  Pharmacy Consult - Pharmacy to dose Vacomycin and Meropenem, , Does not apply, Once, Case, Ludmila V., DO  •  riFAXIMin (XIFAXAN) tablet 550 mg, 550 mg, Oral, Q12H, Case, Ludmila V., DO, 550 mg at 03/11/21 0844  •  saccharomyces boulardii (FLORASTOR) capsule 250 mg, 250 mg, Oral, BID, Case, Ludmila V., DO, 250 mg at 03/11/21 0844  •  sodium chloride 0.9 % flush 10 mL, 10 mL, Intravenous, PRN, Case, Ludmila V., DO  •  sodium chloride 0.9 % flush 10 mL, 10 mL, Intravenous, Q12H, Case, Ludmila V., DO, 10 mL at 03/11/21 0845  •  sodium chloride 0.9 % flush 10 mL, 10 mL, Intravenous, PRN, Case, Ludmila V., DO  •  sodium chloride 0.9 % infusion, 50 mL/hr, Intravenous, Continuous, Rinku Mcrae MD, Last Rate: 50 mL/hr at 03/11/21 1055, 50 mL/hr at 03/11/21 1055  •  sucralfate (CARAFATE) tablet 1 g, 1 g, Oral, 4x Daily, Case, Ludmila V., DO, 1 g at 03/11/21 1104  •  tamsulosin (FLOMAX) 24 hr capsule 0.4 mg, 0.4 mg, Oral, Daily, Case, Ludmila V., DO, 0.4 mg at 03/11/21 0844  •  vancomycin (VANCOCIN) in iso-osmotic dextrose IVPB 1 g (premix) 200 mL, 1,000 mg, Intravenous, Q12H, Case, Ludmila V., DO, 1,000 mg at 03/11/21 0547    Antibiotics:  Anti-Infectives (From admission, onward)    Ordered     Dose/Rate Route Frequency Start Stop    03/10/21 1914  vancomycin (VANCOCIN) in iso-osmotic dextrose IVPB  1 g (premix) 200 mL     Ordering Provider: Tamiko Mederossa V., DO    1,000 mg  over 60 Minutes Intravenous Every 12 Hours 21 0600 21 0559    03/10/21 1920  meropenem (MERREM) 1 g/100 mL 0.9% NS VTB (mbp)     Ordering Provider: Tamiko Mederossa V., DO    1 g  over 3 Hours Intravenous Every 8 Hours 21 0000 03/15/21 2359    03/10/21 2042  riFAXIMin (XIFAXAN) tablet 550 mg     Ordering Provider: Gatito Ludmila V., DO    550 mg Oral Every 12 Hours Scheduled 03/10/21 2100 21 2059    03/10/21 1548  vancomycin 2250 mg/500 mL 0.9% NS IVPB ([facility])     Ordering Provider: Joo Cheung APRN    20 mg/kg × 111 kg Intravenous Once 03/10/21 1550 03/10/21 2116    03/10/21 1518  meropenem (MERREM) 1 g/100 mL 0.9% NS VTB (mbp)     Ordering Provider: Joo Cheung APRN    1 g  over 30 Minutes Intravenous Once 03/10/21 1520 03/10/21 1850            Review of Systems:  Remarkable for fatigue, fever, shortness of breath, abdominal pain, nausea    Rest of review of systems were reviewed and were unremarkable      Physical Exam:   Vital Signs  Temp (24hrs), Av.9 °F (36.6 °C), Min:97.8 °F (36.6 °C), Max:97.9 °F (36.6 °C)    Temp  Min: 97.8 °F (36.6 °C)  Max: 97.9 °F (36.6 °C)  BP  Min: 94/48  Max: 166/79  Pulse  Min: 74  Max: 115  Resp  Min: 18  Max: 20  SpO2  Min: 85 %  Max: 98 %    GENERAL: Awake and alert, in no acute distress.  Age-appropriate  HEENT: Normocephalic, atraumatic.  PERRL. EOMI. No conjunctival injection. No icterus.  No external oral lesions    HEART: RRR; No murmur,  LUNGS: Clear to auscultation bilaterally  ABDOMEN: Soft, nontender, nondistended.   EXT: 1+ edema  :  Without Young catheter.  MSK: No joint deformity  SKIN: Warm and dry without cutaneous eruptions on Inspection/palpation.    NEURO: Oriented to PPT.  PSYCHIATRIC: Normal insight and judgement. Cooperative with PE    Laboratory Data    Results from last 7 days   Lab Units 21  0547 03/10/21  1302   WBC 10*3/mm3 12.28* 17.40*    HEMOGLOBIN g/dL 8.7* 9.5*   HEMATOCRIT % 27.3* 30.8*   PLATELETS 10*3/mm3 91* 111*     Results from last 7 days   Lab Units 03/11/21  0547   SODIUM mmol/L 134*   POTASSIUM mmol/L 3.9   CHLORIDE mmol/L 103   CO2 mmol/L 22.0   BUN mg/dL 14   CREATININE mg/dL 0.90   GLUCOSE mg/dL 105*   CALCIUM mg/dL 8.4*     Results from last 7 days   Lab Units 03/11/21  0547   ALK PHOS U/L 133*   BILIRUBIN mg/dL 1.3*   ALT (SGPT) U/L 33   AST (SGOT) U/L 71*         Results from last 7 days   Lab Units 03/11/21  0547   CRP mg/dL 7.47*     Results from last 7 days   Lab Units 03/11/21  0547   LACTATE mmol/L 2.6*  2.6*             Estimated Creatinine Clearance: 105.7 mL/min (by C-G formula based on SCr of 0.9 mg/dL).      Microbiology:  No results found for: ACANTHNAEG, AFBCX, BPERTUSSISCX, BLOODCX  No results found for: BCIDPCR, CXREFLEX, CSFCX, CULTURETIS  No results found for: CULTURES, HSVCX, URCX  No results found for: EYECULTURE, GCCX, HSVCULTURE, LABHSV  No results found for: LEGIONELLA, MRSACX, MUMPSCX, MYCOPLASCX  No results found for: NOCARDIACX, STOOLCX  Urine Culture   Date Value Ref Range Status   03/10/2021 No growth  Preliminary     No results found for: VIRALCULTU, WOUNDCX        Radiology:  Imaging Results (Last 72 Hours)     Procedure Component Value Units Date/Time    CT Angiogram Chest [[id number]] Collected: 03/10/21 1659     Updated: 03/10/21 4273    Narrative:      EXAMINATION: CT ANGIOGRAM CHEST, CT ABDOMEN/PELVIS W CONTRAST -  03/10/2021      INDICATION: Shortness of air. Altered mental status. Abdominal pain.  Evaluate for pulmonary embolus.      TECHNIQUE: CT angiogram chest with intravenous contrast administration  following PE protocol. 2D reconstructions performed, CT abdomen and  pelvis with intravenous contrast administration.     The radiation dose reduction device was turned on for each scan per the  ALARA (As Low as Reasonably Achievable) protocol.     COMPARISON: CT abdomen and pelvis 02/24/2021.      FINDINGS:      CTA CHEST: Thyroid is homogeneous in attenuation. No bulky mediastinal  adenopathy. Central airways are patent. Esophagus in normal course and  caliber to the distal segment where there is a small hiatal hernia.  Patent nonaneurysmal minimally atherosclerotic thoracic aorta with  patent great vessel origins. No central pulmonary arterial filling  defect to suggest pulmonary embolus with equivocal optimal  opacification. Cardiac size borderline enlarged without pericardial  effusion. Extended lung windows demonstrate biapical pleural-parenchymal  scarring with paraseptal emphysema and biapical pleural-parenchymal  scarring, however, no dominant nodule or mass. No pleural effusion.  Degenerative disease of the thoracic spine without aggressive osseous  lesion. No soft tissue body wall findings of acuity involving the chest.     ABDOMEN AND PELVIS: Liver demonstrates nodular contours concerning for  parenchymal disease process such as potential cirrhotic hepatic  morphology with a low-attenuation focus likely a  cyst in the left  hemiliver. Gallbladder distended without obvious calculus associated. No  biliary dilatation. Pancreas and spleen unremarkable. Adrenals without  distinct nodule. Kidneys without hydronephrosis or hydroureter. No bulky  retroperitoneal adenopathy. Atherosclerotic aneurysmal abdominal aorta  with aortobiiliac endovascular stent in place demonstrating gross  patency with adjacent stranding to the distal aorta as seen on prior  comparison grossly similar appearance with mildly enlarged  retroperitoneal lymph nodes adjacent to the periaortic region. Portal  veins and IVC grossly patent. GI tract evaluation without focal  thickening or disproportionate dilatation. No free fluid or  intra-abdominal free air. Pelvic viscera unremarkable without bulky  pelvic adenopathy.       Impression:      1. No PE is clearly identified as no central filling defect is  identified on suboptimal  evaluation due to technique despite repeat  sequencing.     2. No acute intrathoracic process.     3. Cirrhotic hepatic morphology again noted.     4. Stable appearance of the endovascular aortic aneurysm repair with  aortobiiliac stent graft and adjacent distal infrarenal stranding and  minimal adenopathy as seen on prior comparison of  02/24/2021 without  evidence for distinct progression or definitive hemorrhage/leak clearly  evident in a similar configuration to prior comparison 02/24/2021.     DICTATED:   03/10/2021  EDITED/ls :   03/10/2021          CT Abdomen Pelvis With Contrast [[id number]] Collected: 03/10/21 1659     Updated: 03/10/21 1744    Narrative:      EXAMINATION: CT ANGIOGRAM CHEST, CT ABDOMEN/PELVIS W CONTRAST -  03/10/2021      INDICATION: Shortness of air. Altered mental status. Abdominal pain.  Evaluate for pulmonary embolus.      TECHNIQUE: CT angiogram chest with intravenous contrast administration  following PE protocol. 2D reconstructions performed, CT abdomen and  pelvis with intravenous contrast administration.     The radiation dose reduction device was turned on for each scan per the  ALARA (As Low as Reasonably Achievable) protocol.     COMPARISON: CT abdomen and pelvis 02/24/2021.     FINDINGS:      CTA CHEST: Thyroid is homogeneous in attenuation. No bulky mediastinal  adenopathy. Central airways are patent. Esophagus in normal course and  caliber to the distal segment where there is a small hiatal hernia.  Patent nonaneurysmal minimally atherosclerotic thoracic aorta with  patent great vessel origins. No central pulmonary arterial filling  defect to suggest pulmonary embolus with equivocal optimal  opacification. Cardiac size borderline enlarged without pericardial  effusion. Extended lung windows demonstrate biapical pleural-parenchymal  scarring with paraseptal emphysema and biapical pleural-parenchymal  scarring, however, no dominant nodule or mass. No pleural  effusion.  Degenerative disease of the thoracic spine without aggressive osseous  lesion. No soft tissue body wall findings of acuity involving the chest.     ABDOMEN AND PELVIS: Liver demonstrates nodular contours concerning for  parenchymal disease process such as potential cirrhotic hepatic  morphology with a low-attenuation focus likely a  cyst in the left  hemiliver. Gallbladder distended without obvious calculus associated. No  biliary dilatation. Pancreas and spleen unremarkable. Adrenals without  distinct nodule. Kidneys without hydronephrosis or hydroureter. No bulky  retroperitoneal adenopathy. Atherosclerotic aneurysmal abdominal aorta  with aortobiiliac endovascular stent in place demonstrating gross  patency with adjacent stranding to the distal aorta as seen on prior  comparison grossly similar appearance with mildly enlarged  retroperitoneal lymph nodes adjacent to the periaortic region. Portal  veins and IVC grossly patent. GI tract evaluation without focal  thickening or disproportionate dilatation. No free fluid or  intra-abdominal free air. Pelvic viscera unremarkable without bulky  pelvic adenopathy.       Impression:      1. No PE is clearly identified as no central filling defect is  identified on suboptimal evaluation due to technique despite repeat  sequencing.     2. No acute intrathoracic process.     3. Cirrhotic hepatic morphology again noted.     4. Stable appearance of the endovascular aortic aneurysm repair with  aortobiiliac stent graft and adjacent distal infrarenal stranding and  minimal adenopathy as seen on prior comparison of  02/24/2021 without  evidence for distinct progression or definitive hemorrhage/leak clearly  evident in a similar configuration to prior comparison 02/24/2021.     DICTATED:   03/10/2021  EDITED/ls :   03/10/2021          CT Head Without Contrast [[id number]] Collected: 03/10/21 1648     Updated: 03/10/21 1655    Narrative:      EXAMINATION: CT HEAD WO  CONTRAST- - 03/10/2021     INDICATION: Altered mental status.     TECHNIQUE: CT head without intravenous contrast     The radiation dose reduction device was turned on for each scan per the  ALARA (As Low as Reasonably Achievable) protocol.     COMPARISON: None.     FINDINGS: Midline structures are symmetric without evidence of mass,  mass effect, or midline shift. Ventricles and sulci within normal  limits. No intra-axial hemorrhage or extra-axial fluid collection.  Globes and orbits unremarkable. Paranasal sinuses and mastoid air cells  are grossly clear and well-pneumatized. Calvarium intact.       Impression:      No acute intracranial abnormality.     DICTATED:   03/10/2021  EDITED/ls :   03/10/2021          XR Chest 1 View [[id number]] Collected: 03/10/21 1341     Updated: 03/10/21 1345    Narrative:      EXAMINATION: XR CHEST 1 VW-      INDICATION: Weak/Dizzy/AMS triage protocol      COMPARISON: 9/10/2020     FINDINGS: Mild bibasilar atelectasis is present. No focal airspace  disease otherwise. No significant effusion or distinct pneumothorax.  Unchanged heart and mediastinal contours.       Impression:      Mild bibasilar atelectasis. No acute disease in the chest  otherwise.     This report was finalized on 3/10/2021 1:42 PM by Douglas Vieira.               Impression:   COVID-19 positivity without hypoxia  History of abdominal aortic aneurysm with endograft repair and presumed secondary infection on suppressive Cipro and doxycycline  Cirrhosis  COPD  Recent weakness and nausea and vomiting and low-grade fevers while at home  Lactic acidosis  Ammonia 104    PLAN/RECOMMENDATIONS:   Thank you for asking us to see Derek Kelsey, I recommend the following:    Patient does not appear to have pulmonary inflammation on CT angio.    For this reason we can watch patient closely and hold off on antivirals and steroids    Covid still could explain patient's weakness and nausea    Follow-up blood  cultures    Continue vancomycin per pharmacy dosing    Continue meropenem 1 g every 8 hours    I do not expect patient will require long-term IV antibiotics upon discharge    Davis Carrero MD  3/11/2021  15:13 EST                    Electronically signed by Davis Carrero MD at 03/11/21 9253

## 2021-03-16 NOTE — THERAPY TREATMENT NOTE
Patient Name: Derek Kelsey  : 1950    MRN: [id number]                              Today's Date: 3/16/2021       Admit Date: 3/10/2021    Visit Dx:     ICD-10-CM ICD-9-CM   1. Sepsis, due to unspecified organism, unspecified whether acute organ dysfunction present (CMS/[facility])  A41.9 038.9     995.91   2. Pneumonia due to COVID-19 virus  U07.1 480.8    J12.82 079.89   3. Lactic acidosis  E87.2 276.2   4. Gram-negative bacteremia  R78.81 790.7     041.85   5. Chronic respiratory failure with hypoxia and hypercapnia (CMS/[facility])  J96.11 518.83    J96.12 799.02     786.09   6. Infection of aortic endograft, subsequent encounter  T82.7XXD V58.89     Patient Active Problem List   Diagnosis   • Anxiety   • Abdominal aortic aneurysm (AAA) without rupture (CMS/[facility])   • Depression   • Hypertension   • Cataract, bilateral   • CAD (coronary artery disease)   • Abnormal stress test   • Moderate COPD   • Former smoker   • Coal workers pneumoconiosis, simple   • Back pain   • S/P lumbar fusion   • Prediabetes   • Acute blood loss anemia, mild, asymptomatic   • Acute postoperative pain   • Hyponatremia, mild   • Chronic respiratory failure with hypoxia and hypercapnia (CMS/[facility])   • Renal insufficiency   • Acute cystitis without hematuria   • Acute UTI   • Sepsis (CMS/[facility])   • Bacteremia   • Anemia   • Aortitis (CMS/[facility])   • Elevated C-reactive protein (CRP)   • Cirrhosis of liver (CMS/[facility])   • High anion gap metabolic acidosis   • S/P AAA repair   • GERD (gastroesophageal reflux disease)   • Person under investigation for COVID-19   • On supplemental oxygen by nasal cannula   • Infected aortic endograft (CMS/[facility])   • Elevated transaminase level   • Thrombocytopenia (CMS/[facility])   • Pneumonia   • Gram-negative bacteremia     Past Medical History:   Diagnosis Date   • Abdominal aortic aneurysm (CMS/HCC) 2016   • Anxiety 2016   • Arthritis    • Back pain    • Black lung (CMS/[facility])    • CAD (coronary artery disease)  9/29/2016   • Cirrhosis (CMS/[facility])    • COPD (chronic obstructive pulmonary disease) (CMS/[facility]) 9/29/2016   • Depression 9/29/2016   • Depression    • GERD (gastroesophageal reflux disease)    • Hypertension 9/29/2016   • On supplemental oxygen by nasal cannula     at night   • Vitiligo    • Wears dentures    • Wears reading eyeglasses      Past Surgical History:   Procedure Laterality Date   • ABDOMINAL AORTIC ANEURYSM REPAIR WITH ENDOGRAFT N/A 6/6/2019    Procedure: ABDOMINAL AORTIC ANEURYSM REPAIR WITH ENDOGRAFT;  Surgeon: Leopoldo Burleson MD;  Location:  [facility];  Service: Vascular   • BACK SURGERY     • CARDIAC CATHETERIZATION     • CARDIAC CATHETERIZATION N/A 9/28/2018    Procedure: Left Heart Cath;  Surgeon: Douglas Galvez MD;  Location:  [facility];  Service: Cardiovascular   • CARDIAC SURGERY     • COLONOSCOPY      2015   • COLONOSCOPY N/A 10/30/2019    Procedure: COLONOSCOPY;  Surgeon: Homar Viveros MD;  Location: [facility];  Service: Gastroenterology   • ENDOSCOPY N/A 10/30/2019    Procedure: ESOPHAGOGASTRODUODENOSCOPY;  Surgeon: Homar Viveros MD;  Location:  [facility]OSCOPY;  Service: Gastroenterology   • EYE SURGERY      cataracts bilateral   • HAND SURGERY Left    • LUMBAR DISCECTOMY FUSION INSTRUMENTATION N/A 11/1/2018    Procedure: LUMBAR DISCECTOMY POSTERIOR WITH FUSION INSTRUMENTATION;  Surgeon: Joo Franklin MD;  Location:  [facility];  Service: Orthopedic Spine   • LUMBAR DISCECTOMY FUSION INSTRUMENTATION N/A 7/24/2019    Procedure: POSTERIOR LUMBAR SPINAL FUSION REVISION AND INSTRUMENTATION L3,L4,L5,S1;  Surgeon: Joo Franklin MD;  Location:  [facility];  Service: Orthopedic Spine   • ORIF FINGER / THUMB FRACTURE     • SHOULDER SURGERY Left      General Information     Row Name 03/16/21 1320          Physical Therapy Time and Intention    Document Type  therapy note (daily note)  -SJ     Mode of Treatment  individual therapy;physical  therapy  -     Row Name 03/16/21 1320          General Information    Existing Precautions/Restrictions  fall  -SJ     Row Name 03/16/21 1320          Cognition    Orientation Status (Cognition)  oriented x 4  -SJ     Row Name 03/16/21 1320          Safety Issues, Functional Mobility    Impairments Affecting Function (Mobility)  endurance/activity tolerance;balance;strength  -SJ       User Key  (r) = Recorded By, (t) = Taken By, (c) = Cosigned By    Initials Name Provider Type    SJ Sophia Byers, PT Physical Therapist        Mobility     Row Name 03/16/21 1413          Bed Mobility    Comment (Bed Mobility)  UIC  -     Row Name 03/16/21 1413          Transfers    Comment (Transfers)  Pt able to perform t/fs independently without difficulty  -     Row Name 03/16/21 1413          Sit-Stand Transfer    Sit-Stand [place] (Transfers)  modified independence  -     Assistive Device (Sit-Stand Transfers)  cane, quad  -SJ     Row Name 03/16/21 1413          Gait/Stairs (Locomotion)    [place] Level (Gait)  standby assist;1 person assist  -     Assistive Device (Gait)  cane, quad  -SJ     Distance in Feet (Gait)  100  -SJ     Deviations/Abnormal Patterns (Gait)  gait speed decreased  -SJ     Bilateral Gait Deviations  forward flexed posture;heel strike decreased  -SJ     Comment (Gait/Stairs)  Pt amb laps in room with quad cane with no LOB, good safety awareness, and improved gait quality. O2sat 93% after activity on RA.  -SJ       User Key  (r) = Recorded By, (t) = Taken By, (c) = Cosigned By    Initials Name Provider Type    SJ Sophia Byers PT Physical Therapist        Obj/Interventions     Row Name 03/16/21 1416          Motor Skills    Motor Skills  functional endurance  -     Functional Endurance  pulm set: scap retraction, shoulder flex, horiz abd/add; seated 8-10reps with cues for breathing technique  -SJ       User Key  (r) = Recorded By, (t) = Taken By, (c) = Cosigned By    Initials  Name Provider Type    Sophia Kamara PT Physical Therapist        Goals/Plan    No documentation.       Clinical Impression     Row Name 03/16/21 1418          Pain    Additional Documentation  Pain Scale: Numbers Pre/Post-Treatment (Group)  -     Row Name 03/16/21 1418          Pain Scale: Numbers Pre/Post-Treatment    Pretreatment Pain Rating  0/10 - no pain  -     Posttreatment Pain Rating  0/10 - no pain  -     Row Name 03/16/21 1418          Plan of Care Review    Plan of Care Reviewed With  patient  -     Progress  improving  -     Outcome Summary  Pt amb laps in room with quad cane x100ft with SBA with no LOB, good safety awareness, and improved gait quality. O2sat 93% after activity on RA. Pt performed pulm therex with good effort. Continue POC.  -     Row Name 03/16/21 1418          Vital Signs    Pre Systolic BP Rehab  114  -SJ     Pre Treatment Diastolic BP  66  -SJ     Pretreatment Heart Rate (beats/min)  75  -SJ     Pre SpO2 (%)  92  -SJ     O2 Delivery Pre Treatment  room air  -     Intra SpO2 (%)  93  -SJ     O2 Delivery Intra Treatment  room air  -SJ     Post SpO2 (%)  94  -SJ     O2 Delivery Post Treatment  room air  -     Row Name 03/16/21 1418          Positioning and Restraints    Pre-Treatment Position  sitting in chair/recliner  -     Post Treatment Position  chair  -SJ     In Chair  reclined;call light within reach;encouraged to call for assist;waffle cushion  -       User Key  (r) = Recorded By, (t) = Taken By, (c) = Cosigned By    Initials Name Provider Type    Sophia Kamara PT Physical Therapist        Outcome Measures     Row Name 03/16/21 1420          How much help from another person do you currently need...    Turning from your back to your side while in flat bed without using bedrails?  4  -SJ     Moving from lying on back to sitting on the side of a flat bed without bedrails?  4  -SJ     Moving to and from a bed to a chair (including a wheelchair)?  4   -SJ     Standing up from a chair using your arms (e.g., wheelchair, bedside chair)?  4  -SJ     Climbing 3-5 steps with a railing?  4  -SJ     To walk in hospital room?  4  -SJ     AM-PAC 6 Clicks Score (PT)  24  -     Row Name 03/16/21 1420          Functional Assessment    Outcome Measure Options  AM-PAC 6 Clicks Basic Mobility (PT)  -       User Key  (r) = Recorded By, (t) = Taken By, (c) = Cosigned By    Initials Name Provider Type    Sophia Kamara PT Physical Therapist        Physical Therapy Education                 Title: PT OT SLP Therapies (Not Started)     Topic: Physical Therapy (Done)     Point: Mobility training (Done)     Learning Progress Summary           Patient Eager, E, VU,DU by  at 3/16/2021 1420    Acceptance, E,D, VU,DU by  at 3/15/2021 1532    Acceptance, E, VU by NS at 3/12/2021 1150    Comment: Patient was educated about PT POC, safety with mobility, and benefits of OP PT for improving strength.                   Point: Home exercise program (Done)     Learning Progress Summary           Patient Eager, E, VU,DU by  at 3/16/2021 1420    Acceptance, E,D, VU,DU by  at 3/15/2021 1532                   Point: Body mechanics (Done)     Learning Progress Summary           Patient Eager, E, VU,DU by  at 3/16/2021 1420    Acceptance, E,D, VU,DU by  at 3/15/2021 1532    Acceptance, E, VU by NS at 3/12/2021 1150    Comment: Patient was educated about PT POC, safety with mobility, and benefits of OP PT for improving strength.                   Point: Precautions (Done)     Learning Progress Summary           Patient Eager, E, VU,DU by  at 3/16/2021 1420    Acceptance, E,D, VU,DU by  at 3/15/2021 1532    Acceptance, E, VU by NS at 3/12/2021 1150    Comment: Patient was educated about PT POC, safety with mobility, and benefits of OP PT for improving strength.                               User Key     Initials Effective Dates Name Provider Type Discipline     06/19/15 -   Sophia Byers, PT Physical Therapist PT    NS 09/10/19 -  Sarah Roy PT Physical Therapist PT              PT Recommendation and Plan     Plan of Care Reviewed With: patient  Progress: improving  Outcome Summary: Pt amb laps in room with quad cane x100ft with SBA with no LOB, good safety awareness, and improved gait quality. O2sat 93% after activity on RA. Pt performed pulm therex with good effort. Continue POC.     Time Calculation:   PT Charges     Row Name 03/16/21 1420             Time Calculation    Start Time  1320  -SJ      PT Received On  03/16/21  -      PT Goal Re-Cert Due Date  03/22/21  -         Time Calculation- PT    Total Timed Code Minutes- PT  23 minute(s)  -SJ         Timed Charges    98698 - PT Therapeutic Exercise Minutes  8  -SJ      57807 - Gait Training Minutes   15  -SJ        User Key  (r) = Recorded By, (t) = Taken By, (c) = Cosigned By    Initials Name Provider Type     Sophia Byers, PT Physical Therapist        Therapy Charges for Today     Code Description Service Date Service Provider Modifiers Qty    86589420960 HC PT THER PROC EA 15 MIN 3/15/2021 Sophia Byers, PT GP 1    98037026137 HC GAIT TRAINING EA 15 MIN 3/15/2021 Sophia Byers, PT GP 1    96764744553 HC PT THER SUPP EA 15 MIN 3/15/2021 Sophia Byers, PT GP 1    70309726625 HC PT THER PROC EA 15 MIN 3/16/2021 Sophia Byers, PT GP 1    15896697421 HC GAIT TRAINING EA 15 MIN 3/16/2021 Sophia Byers, PT GP 1          PT G-Codes  Outcome Measure Options: AM-PAC 6 Clicks Basic Mobility (PT)  AM-PAC 6 Clicks Score (PT): 24  AM-PAC 6 Clicks Score (OT): 22    Sophia Byers PT  3/16/2021

## 2021-03-16 NOTE — PROGRESS NOTES
Continued Stay Note   [place]     Patient Name: Derek Kelsey  MRN: [id number]  Today's Date: 3/16/2021    Admit Date: 3/10/2021    Discharge Plan     Row Name 03/16/21 8621       Plan    Plan  Home with IV Antibiotics    Patient/Family in Agreement with Plan  yes    Plan Comments  Talked with patient and wife over the phone - Wife states she can handle his home IV infusions without home health. He has gone to [facility] in the past (when doing IV antbx at home) for the weekly labs and dressing change - Attempted to call to get arranged today but was unable to get an answer - Will just need to get Infectious disease order faxed to them on day of discharge and date for first dressing change and labs - Wife states she can transport him weekly to their outpatient clinic-  was just unable to reach them today. IV antbx arranged with [facility] - just call [phone] on day of discharge for delivery to bedside (nurses station) Wife is familiar with where to pick them up in the future -  following- kelly [phone]    Final Discharge Disposition Code  01 - home or self-care        Discharge Codes    No documentation.             Kelly Farrar RN

## 2021-03-17 ENCOUNTER — READMISSION MANAGEMENT (OUTPATIENT)
Dept: CALL CENTER | Facility: HOSPITAL | Age: 71
End: 2021-03-17

## 2021-03-17 VITALS
OXYGEN SATURATION: 95 % | RESPIRATION RATE: 20 BRPM | WEIGHT: 233.3 LBS | HEART RATE: 59 BPM | SYSTOLIC BLOOD PRESSURE: 145 MMHG | TEMPERATURE: 98.2 F | HEIGHT: 75 IN | DIASTOLIC BLOOD PRESSURE: 72 MMHG | BODY MASS INDEX: 29.01 KG/M2

## 2021-03-17 PROBLEM — U07.1 COVID-19: Status: ACTIVE | Noted: 2021-03-17

## 2021-03-17 PROBLEM — A41.9 SEPSIS (HCC): Status: RESOLVED | Noted: 2019-09-05 | Resolved: 2021-03-17

## 2021-03-17 LAB
ALBUMIN SERPL-MCNC: 2.9 G/DL (ref 3.5–5.2)
ALBUMIN/GLOB SERPL: 0.6 G/DL
ALP SERPL-CCNC: 155 U/L (ref 39–117)
ALT SERPL W P-5'-P-CCNC: 44 U/L (ref 1–41)
ANION GAP SERPL CALCULATED.3IONS-SCNC: 8 MMOL/L (ref 5–15)
AST SERPL-CCNC: 97 U/L (ref 1–40)
BASOPHILS # BLD AUTO: 0.03 10*3/MM3 (ref 0–0.2)
BASOPHILS NFR BLD AUTO: 0.8 % (ref 0–1.5)
BILIRUB SERPL-MCNC: 0.7 MG/DL (ref 0–1.2)
BUN SERPL-MCNC: 13 MG/DL (ref 8–23)
BUN/CREAT SERPL: 18.1 (ref 7–25)
CALCIUM SPEC-SCNC: 8.6 MG/DL (ref 8.6–10.5)
CHLORIDE SERPL-SCNC: 99 MMOL/L (ref 98–107)
CO2 SERPL-SCNC: 28 MMOL/L (ref 22–29)
CREAT SERPL-MCNC: 0.72 MG/DL (ref 0.76–1.27)
CRP SERPL-MCNC: 1.38 MG/DL (ref 0–0.5)
DEPRECATED RDW RBC AUTO: 52.9 FL (ref 37–54)
EOSINOPHIL # BLD AUTO: 0.11 10*3/MM3 (ref 0–0.4)
EOSINOPHIL NFR BLD AUTO: 3.1 % (ref 0.3–6.2)
ERYTHROCYTE [DISTWIDTH] IN BLOOD BY AUTOMATED COUNT: 15.6 % (ref 12.3–15.4)
FERRITIN SERPL-MCNC: 339.5 NG/ML (ref 30–400)
GFR SERPL CREATININE-BSD FRML MDRD: 109 ML/MIN/1.73
GLOBULIN UR ELPH-MCNC: 4.7 GM/DL
GLUCOSE SERPL-MCNC: 89 MG/DL (ref 65–99)
HCT VFR BLD AUTO: 26.4 % (ref 37.5–51)
HCT VFR BLD AUTO: 26.5 % (ref 37.5–51)
HCT VFR BLD AUTO: 27.7 % (ref 37.5–51)
HGB BLD-MCNC: 8.2 G/DL (ref 13–17.7)
HGB BLD-MCNC: 8.3 G/DL (ref 13–17.7)
HGB BLD-MCNC: 8.7 G/DL (ref 13–17.7)
IMM GRANULOCYTES # BLD AUTO: 0.06 10*3/MM3 (ref 0–0.05)
IMM GRANULOCYTES NFR BLD AUTO: 1.7 % (ref 0–0.5)
INR PPP: 1.53 (ref 0.85–1.16)
LYMPHOCYTES # BLD AUTO: 0.69 10*3/MM3 (ref 0.7–3.1)
LYMPHOCYTES NFR BLD AUTO: 19.4 % (ref 19.6–45.3)
MAGNESIUM SERPL-MCNC: 1.7 MG/DL (ref 1.6–2.4)
MCH RBC QN AUTO: 30.2 PG (ref 26.6–33)
MCHC RBC AUTO-ENTMCNC: 31.4 G/DL (ref 31.5–35.7)
MCV RBC AUTO: 96 FL (ref 79–97)
MONOCYTES # BLD AUTO: 0.46 10*3/MM3 (ref 0.1–0.9)
MONOCYTES NFR BLD AUTO: 13 % (ref 5–12)
NEUTROPHILS NFR BLD AUTO: 2.2 10*3/MM3 (ref 1.7–7)
NEUTROPHILS NFR BLD AUTO: 62 % (ref 42.7–76)
NRBC BLD AUTO-RTO: 0 /100 WBC (ref 0–0.2)
PLATELET # BLD AUTO: 99 10*3/MM3 (ref 140–450)
PMV BLD AUTO: 9.9 FL (ref 6–12)
POTASSIUM SERPL-SCNC: 3.8 MMOL/L (ref 3.5–5.2)
PROT SERPL-MCNC: 7.6 G/DL (ref 6–8.5)
PROTHROMBIN TIME: 18.1 SECONDS (ref 11.5–14)
RBC # BLD AUTO: 2.75 10*6/MM3 (ref 4.14–5.8)
SODIUM SERPL-SCNC: 135 MMOL/L (ref 136–145)
WBC # BLD AUTO: 3.55 10*3/MM3 (ref 3.4–10.8)

## 2021-03-17 PROCEDURE — 85014 HEMATOCRIT: CPT | Performed by: INTERNAL MEDICINE

## 2021-03-17 PROCEDURE — 82728 ASSAY OF FERRITIN: CPT | Performed by: INTERNAL MEDICINE

## 2021-03-17 PROCEDURE — 80053 COMPREHEN METABOLIC PANEL: CPT | Performed by: INTERNAL MEDICINE

## 2021-03-17 PROCEDURE — 85610 PROTHROMBIN TIME: CPT | Performed by: PHYSICIAN ASSISTANT

## 2021-03-17 PROCEDURE — 85018 HEMOGLOBIN: CPT | Performed by: INTERNAL MEDICINE

## 2021-03-17 PROCEDURE — 86140 C-REACTIVE PROTEIN: CPT | Performed by: INTERNAL MEDICINE

## 2021-03-17 PROCEDURE — 85025 COMPLETE CBC W/AUTO DIFF WBC: CPT | Performed by: INTERNAL MEDICINE

## 2021-03-17 PROCEDURE — 25010000002 CEFTAZIDIME PER 500 MG: Performed by: INTERNAL MEDICINE

## 2021-03-17 PROCEDURE — 99239 HOSP IP/OBS DSCHRG MGMT >30: CPT | Performed by: INTERNAL MEDICINE

## 2021-03-17 PROCEDURE — 83735 ASSAY OF MAGNESIUM: CPT | Performed by: INTERNAL MEDICINE

## 2021-03-17 RX ORDER — LACTULOSE 10 G/15ML
15 SOLUTION ORAL 3 TIMES DAILY
Qty: 1892 ML | Refills: 3 | Status: SHIPPED | OUTPATIENT
Start: 2021-03-17

## 2021-03-17 RX ORDER — HYDROCORTISONE 25 MG/G
CREAM TOPICAL 2 TIMES DAILY
Qty: 28.35 G | Refills: 0 | Status: SHIPPED | OUTPATIENT
Start: 2021-03-17 | End: 2021-04-01

## 2021-03-17 RX ORDER — CARVEDILOL 12.5 MG/1
12.5 TABLET ORAL 2 TIMES DAILY WITH MEALS
Qty: 60 TABLET | Refills: 0 | Status: SHIPPED | OUTPATIENT
Start: 2021-03-17

## 2021-03-17 RX ADMIN — LACTULOSE 10 G: 20 SOLUTION ORAL at 08:25

## 2021-03-17 RX ADMIN — RIFAXIMIN 550 MG: 550 TABLET ORAL at 08:24

## 2021-03-17 RX ADMIN — TAMSULOSIN HYDROCHLORIDE 0.4 MG: 0.4 CAPSULE ORAL at 08:24

## 2021-03-17 RX ADMIN — FUROSEMIDE 20 MG: 20 TABLET ORAL at 08:24

## 2021-03-17 RX ADMIN — CARVEDILOL 12.5 MG: 12.5 TABLET, FILM COATED ORAL at 08:24

## 2021-03-17 RX ADMIN — SODIUM CHLORIDE, PRESERVATIVE FREE 10 ML: 5 INJECTION INTRAVENOUS at 08:26

## 2021-03-17 RX ADMIN — PANTOPRAZOLE SODIUM 40 MG: 40 TABLET, DELAYED RELEASE ORAL at 08:24

## 2021-03-17 RX ADMIN — Medication 250 MG: at 08:24

## 2021-03-17 RX ADMIN — CEFTAZIDIME 2 G: 2 INJECTION, POWDER, FOR SOLUTION INTRAVENOUS at 01:02

## 2021-03-17 RX ADMIN — CITALOPRAM 40 MG: 40 TABLET ORAL at 08:24

## 2021-03-17 RX ADMIN — ASPIRIN 81 MG: 81 TABLET, COATED ORAL at 08:24

## 2021-03-17 RX ADMIN — HYDROCORTISONE 2.5%: 25 CREAM TOPICAL at 08:25

## 2021-03-17 RX ADMIN — CEFTAZIDIME 2 G: 2 INJECTION, POWDER, FOR SOLUTION INTRAVENOUS at 08:25

## 2021-03-17 NOTE — PROGRESS NOTES
Clinical Nutrition   Reason For Visit: LOS    Patient Name: Derek Kelsey  YOB: 1950  MRN: [id number]  Date of Encounter: 03/17/21 10:02 EDT  Admission date: 3/10/2021      Comments:       Nutrition Assessment     Admission Problem List:    Sepsis     Moderate COPD    Infected aortic endograft (CMS/HCC)    Covid-19    GIB      Applicable PMH:  Liver Cirrhosis  Infected AAA  COPD  HTN  AAA endograft  Depression  GERD    Applicable Nutrition-Related Information:        Applicable medical tests/procedures since admission:        Reported/Observed/Food/Nutrition Related History     Patient with good PO intake. Note some weight changes during admission, likely r/t fluid changes. Last BM noted 3/16.       Anthropometrics   Height: 75 in   Weight: 233lbs  BMI: 29.16  BMI classification: Overweight: 25.0-29.9kg/m2    IBW: 196lbs      Weight change:     Weight Weight (kg) Weight (lbs) Weight Method VISIT REPORT   3/17/2021 105.824 kg 233 lb 4.8 oz Bed scale -   3/16/2021 104.373 kg 230 lb 1.6 oz Bed scale -   3/15/2021 105.643 kg 232 lb 14.4 oz - -   3/12/2021 107.548 kg 237 lb 1.6 oz Bed scale -   3/11/2021 111.13 kg 245 lb Bed scale -   3/10/2021 111.131 kg 245 lb Stated -   2/24/2021 110.224 kg 243 lb - Report   2/22/2021 111.131 kg 245 lb - Report   9/11/2020 106 kg 233 lb 11 oz - -   9/10/2020 106.142 kg 234 lb Stated -   9/1/2020 107.049 kg 236 lb - Report   7/27/2020 111.131 kg 245 lb - Report   6/18/2020 102.059 kg 225 lb Bed scale -   6/18/2020 111.131 kg 245 lb Stated -   1/17/2020 106.142 kg 234 lb - -         Labs reviewed   Labs reviewed: Yes    Results from last 7 days   Lab Units 03/17/21  0551 03/16/21  0613 03/15/21  0806 03/11/21  0547   SODIUM mmol/L 135* 134* 135* 134*   POTASSIUM mmol/L 3.8 3.9 4.1 3.9   CHLORIDE mmol/L 99 99 101 103   CO2 mmol/L 28.0 29.0 27.0 22.0   BUN mg/dL 13 11 10 14   CREATININE mg/dL 0.72* 0.74* 0.80 0.90   GLUCOSE mg/dL 89 89 94 105*   CALCIUM mg/dL 8.6 9.0 9.4  8.4*   PHOSPHORUS mg/dL  --   --   --  2.6   MAGNESIUM mg/dL 1.7 1.9 1.8 1.8     Results from last 7 days   Lab Units 03/17/21  0551 03/16/21  0613 03/15/21  0806   WBC 10*3/mm3 3.55 4.28 4.11   ALBUMIN g/dL 2.90* 3.10* 3.30*   CRP mg/dL 1.38* 1.67* 2.19*       Medications reviewed   Medications reviewed: Yes  Pertinent: Coreg, Lasix, Lactulose, Protonix, Probiotics  GTT:  PRN:    Current Nutrition Prescription   PO: Diet Regular; Cardiac  Oral Nutrition Supplement: No active supplement orders     Evaluation of Received Nutrient/Fluid Intake:  78% avg PO intake x 7 meals      Nutrition Diagnosis     Problem No nutrition diagnosis at this time   Etiology    Signs/Symptoms      Intervention   Intervention: Follow treatment progress, Care plan reviewed    - Continue cardiac diet.    Goal:   General: Nutrition support treatment  PO: Maintain intake     Additional goals:      Monitoring/Evaluation:   Monitoring/Evaluation: Per protocol, I&O, PO intake, Pertinent labs, Weight, GI status, Symptoms    Laney Ratliff RD  Time Spent: 20 min

## 2021-03-17 NOTE — PROGRESS NOTES
INFECTIOUS DISEASE CONSULT/INITIAL HOSPITAL VISIT    Derek Kelsey  1950  [id number]    Date of Consult: 3/17/2021    Admission Date: 3/10/2021      Requesting Provider: No ref. provider found  Evaluating Physician: Davis Carrero MD    Reason for Consultation: Positive Covid PCR    History of present illness:    Patient is a 70 y.o. 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  Patient has come into the emergency room and tested positive for Covid is not hypoxic patient did have hypotension which responded to IV fluids patient is currently not requiring oxygen and feels better has been started on empiric vancomycin and meropenem.    Patient's wife is being tested for Covid results are pending.    I was previously suppressing patient with doxycycline and ciprofloxacin.      Patient is not a surgical candidate for endograft removal because of cirrhosis and high surgical mortality      3/12/21; doing well; no fever, rash, sore throat; on room air, no hypoxia overnight; blood cultures growing gnr from admission    3/13/2021 no events overnight denies fevers rash sore throat or diarrhea feels better    3/14/21;  Afebrile normotensive;  Resting comfortably briefly on o2; now on 1 L      3/15/21; doing well; no events overnight; no fever, rash, sore throat    3/16/21; doing well; no events overnight; no fever, rash, sore throat tolerating abx; picc line placed    3/17/21; doing well; no events overnight; to go home today; afebrile ,normotensive  Past Medical History:   Diagnosis Date   • Abdominal aortic aneurysm (CMS/HCC) 9/29/2016   • Anxiety 9/29/2016   • Arthritis    • Back pain    • Black lung (CMS/HCC)    • CAD (coronary artery disease) 9/29/2016   • Cirrhosis  (CMS/[facility])    • COPD (chronic obstructive pulmonary disease) (CMS/[facility]) 9/29/2016   • Depression 9/29/2016   • Depression    • GERD (gastroesophageal reflux disease)    • Hypertension 9/29/2016   • On supplemental oxygen by nasal cannula     at night   • Vitiligo    • Wears dentures    • Wears reading eyeglasses        Past Surgical History:   Procedure Laterality Date   • ABDOMINAL AORTIC ANEURYSM REPAIR WITH ENDOGRAFT N/A 6/6/2019    Procedure: ABDOMINAL AORTIC ANEURYSM REPAIR WITH ENDOGRAFT;  Surgeon: Leopoldo Burleson MD;  Location:  [facility];  Service: Vascular   • BACK SURGERY     • CARDIAC CATHETERIZATION     • CARDIAC CATHETERIZATION N/A 9/28/2018    Procedure: Left Heart Cath;  Surgeon: Douglas Galvez MD;  Location:  [facility];  Service: Cardiovascular   • CARDIAC SURGERY     • COLONOSCOPY      2015   • COLONOSCOPY N/A 10/30/2019    Procedure: COLONOSCOPY;  Surgeon: Homar Viveros MD;  Location:  [facility];  Service: Gastroenterology   • ENDOSCOPY N/A 10/30/2019    Procedure: ESOPHAGOGASTRODUODENOSCOPY;  Surgeon: Homar Viveros MD;  Location:  [facility];  Service: Gastroenterology   • EYE SURGERY      cataracts bilateral   • HAND SURGERY Left    • LUMBAR DISCECTOMY FUSION INSTRUMENTATION N/A 11/1/2018    Procedure: LUMBAR DISCECTOMY POSTERIOR WITH FUSION INSTRUMENTATION;  Surgeon: Joo Franklin MD;  Location:  [facility] OR;  Service: Orthopedic Spine   • LUMBAR DISCECTOMY FUSION INSTRUMENTATION N/A 7/24/2019    Procedure: POSTERIOR LUMBAR SPINAL FUSION REVISION AND INSTRUMENTATION L3,L4,L5,S1;  Surgeon: Joo Franklin MD;  Location:  [facility] OR;  Service: Orthopedic Spine   • ORIF FINGER / THUMB FRACTURE     • SHOULDER SURGERY Left        Family History   Problem Relation Age of Onset   • Stroke Mother    • Hypertension Mother    • Heart disease Mother    • Heart disease Father    • Hypertension Father    • Diabetes Sister    • Hypertension Sister     • Heart disease Sister    • Diabetes Brother    • Heart disease Brother    • Hypertension Child    • Hypertension Son    • No Known Problems Son    • Diabetes Sister    • Heart disease Sister        Social History     Socioeconomic History   • Marital status:      Spouse name: Not on file   • Number of children: 2   • Years of education: Not on file   • Highest education level: Not on file   Tobacco Use   • Smoking status: Former Smoker     Packs/day: 1.00     Years: 30.00     Pack years: 30.00     Types: Cigarettes     Quit date: 1/1/2011     Years since quitting: 10.2   • Smokeless tobacco: Former User     Types: Chew     Quit date: 4/7/2011   Substance and Sexual Activity   • Alcohol use: Not Currently   • Drug use: No   • Sexual activity: Defer     Comment:  and lives with wife       Allergies   Allergen Reactions   • Penicillins Hives     Hives - has tolerated Zosyn and ceftriaxone 09/2019   • Percocet [Oxycodone-Acetaminophen] Confusion         Medication:    Current Facility-Administered Medications:   •  aspirin EC tablet 81 mg, 81 mg, Oral, Daily, Case, Ludmila V., DO, 81 mg at 03/17/21 0824  •  carvedilol (COREG) tablet 12.5 mg, 12.5 mg, Oral, BID With Meals, Rinku Mcrae MD, 12.5 mg at 03/17/21 0824  •  cefTAZidime (FORTAZ) 2 g/100 mL 0.9% NS IVPB (mpb), 2 g, Intravenous, Q8H, Davis Carrero MD, 2 g at 03/17/21 0825  •  citalopram (CeleXA) tablet 40 mg, 40 mg, Oral, Daily, Gatito, Ludmila V., DO, 40 mg at 03/17/21 0824  •  furosemide (LASIX) tablet 20 mg, 20 mg, Oral, BID, Rinku Mcrae MD, 20 mg at 03/17/21 0824  •  Hydrocortisone (Perianal) (ANUSOL-HC) 2.5 % rectal cream, , Rectal, BID, Criss Sharif PA, Given at 03/17/21 0825  •  lactulose (CHRONULAC) 10 GM/15ML solution 10 g, 10 g, Oral, TID PRN, Case, Ludmila V., DO  •  lactulose (CHRONULAC) 10 GM/15ML solution 10 g, 10 g, Oral, Daily, Criss Sharif PA, 10 g at 03/17/21 0808  •  Magnesium Sulfate 2 gram Bolus,  followed by 8 gram infusion (total Mg dose 10 grams)- Mg less than or equal to 1mg/dL, 2 g, Intravenous, PRN **OR** Magnesium Sulfate 2 gram / 50mL Infusion (GIVE X 3 BAGS TO EQUAL 6GM TOTAL DOSE) - Mg 1.1 - 1.5 mg/dl, 2 g, Intravenous, PRN **OR** Magnesium Sulfate 4 gram infusion- Mg 1.6-1.9 mg/dL, 4 g, Intravenous, PRN, Awa Viveros MD, Last Rate: 25 mL/hr at 03/15/21 1126, 4 g at 03/15/21 1126  •  melatonin tablet 5 mg, 5 mg, Oral, Nightly, Case, Ludmila V., DO, 5 mg at 03/16/21 2152  •  ondansetron (ZOFRAN) injection 4 mg, 4 mg, Intravenous, Q6H PRN, Rinku Mcrae MD, 4 mg at 03/14/21 1556  •  pantoprazole (PROTONIX) EC tablet 40 mg, 40 mg, Oral, BID, Case, Ludmila V., DO, 40 mg at 03/17/21 0824  •  potassium chloride (MICRO-K) CR capsule 40 mEq, 40 mEq, Oral, PRN, 40 mEq at 03/14/21 1720 **OR** potassium chloride (KLOR-CON) packet 40 mEq, 40 mEq, Oral, PRN **OR** potassium chloride 10 mEq in 100 mL IVPB, 10 mEq, Intravenous, Q1H PRN, Rinku Mcrae MD  •  riFAXIMin (XIFAXAN) tablet 550 mg, 550 mg, Oral, Q12H, Case, Ludmila V., DO, 550 mg at 03/17/21 0824  •  saccharomyces boulardii (FLORASTOR) capsule 250 mg, 250 mg, Oral, BID, Case, Ludmila V., DO, 250 mg at 03/17/21 0824  •  sodium chloride 0.9 % flush 10 mL, 10 mL, Intravenous, PRN, Case, Ludmila V., DO  •  sodium chloride 0.9 % flush 10 mL, 10 mL, Intravenous, Q12H, Case, Ludmila V., DO, 10 mL at 03/14/21 2049  •  sodium chloride 0.9 % flush 10 mL, 10 mL, Intravenous, PRN, Case, Ludmila V., DO  •  sodium chloride 0.9 % flush 10 mL, 10 mL, Intravenous, Q12H, Davis Carrero MD, 10 mL at 03/17/21 0826  •  sodium chloride 0.9 % flush 10 mL, 10 mL, Intravenous, PRN, Davis Carrero MD  •  sodium chloride 0.9 % flush 20 mL, 20 mL, Intravenous, PRN, Davis Carrero MD  •  tamsulosin (FLOMAX) 24 hr capsule 0.4 mg, 0.4 mg, Oral, Daily, Case, Ludmila V., DO, 0.4 mg at 03/17/21 0824    Antibiotics:  Anti-Infectives (From  admission, onward)    Ordered     Dose/Rate Route Frequency Start Stop    21 1013  cefTAZidime (FORTAZ) 2 g/100 mL 0.9% NS IVPB (mpb)     Ordering Provider: Awa Viveros MD    2 g Intravenous Every 8 Hours 21 0000 21 2359    21 1235  cefTAZidime (FORTAZ) 2 g/100 mL 0.9% NS IVPB (mpb)     Note to Pharmacy: Has tolerated IV cephalosporins before   Mannie Buckley [facility] reviewed the order on 03/15/21 1342.   Ordering Provider: Davis aCrrero MD    2 g  over 30 Minutes Intravenous Every 8 Hours 21 1600 21 1559    03/10/21 2042  riFAXIMin (XIFAXAN) tablet 550 mg     Mannie Buckley [facility] reviewed the order on 03/15/21 1344.   Ordering Provider: Case, Ludmila V., DO    550 mg Oral Every 12 Hours Scheduled 03/10/21 2100 21 2059    03/10/21 1548  vancomycin 2250 mg/500 mL 0.9% NS IVPB ([facility])     Ordering Provider: Joo Cheung APRN    20 mg/kg × 111 kg Intravenous Once 03/10/21 1550 03/10/21 2116    03/10/21 1518  meropenem (MERREM) 1 g/100 mL 0.9% NS VTB ([facility])     Ordering Provider: Joo Cheung APRN    1 g  over 30 Minutes Intravenous Once 03/10/21 1520 03/10/21 1850            Review of Systems:  See hpi      Physical Exam:   Vital Signs  Temp (24hrs), Av.1 °F (36.7 °C), Min:97.9 °F (36.6 °C), Max:98.2 °F (36.8 °C)    Temp  Min: 97.9 °F (36.6 °C)  Max: 98.2 °F (36.8 °C)  BP  Min: 106/67  Max: 147/74  Pulse  Min: 64  Max: 84  Resp  Min: 16  Max: 20  SpO2  Min: 93 %  Max: 95 %    GENERAL: resting quietly in no distress  HEENT: Normocephalic, atraumatic.  PERRL. EOMI. No conjunctival injection. No icterus.  No external oral lesions    HEART:sinus rhythm on tele  LUNGS: symmetrical inspiration bilaterally  ABDOMEN: nondistended  EXT: 1+ edema  :  Without Young catheter.  MSK: No joint deformity  SKIN: Warm and dry without cutaneous eruptions on Inspection/palpation.        Laboratory Data    Results from last 7 days   Lab Units 21  0785  03/17/21  0551 03/17/21  0047 03/16/21  0613 03/15/21  0806   WBC 10*3/mm3  --  3.55  --  4.28 4.11   HEMOGLOBIN g/dL 8.2* 8.3* 8.7* 8.9* 9.7*   HEMATOCRIT % 26.5* 26.4* 27.7* 28.3* 31.2*   PLATELETS 10*3/mm3  --  99*  --  107* 112*     Results from last 7 days   Lab Units 03/17/21  0551   SODIUM mmol/L 135*   POTASSIUM mmol/L 3.8   CHLORIDE mmol/L 99   CO2 mmol/L 28.0   BUN mg/dL 13   CREATININE mg/dL 0.72*   GLUCOSE mg/dL 89   CALCIUM mg/dL 8.6     Results from last 7 days   Lab Units 03/17/21  0551   ALK PHOS U/L 155*   BILIRUBIN mg/dL 0.7   ALT (SGPT) U/L 44*   AST (SGOT) U/L 97*         Results from last 7 days   Lab Units 03/17/21  0551   CRP mg/dL 1.38*     Results from last 7 days   Lab Units 03/11/21  0547   LACTATE mmol/L 2.6*  2.6*         Results from last 7 days   Lab Units 03/12/21  0517   VANCOMYCIN TR mcg/mL 12.40     Estimated Creatinine Clearance: 116.4 mL/min (A) (by C-G formula based on SCr of 0.72 mg/dL (L)).      Microbiology:  No results found for: ACANTHNAEG, AFBCX, BPERTUSSISCX, BLOODCX  No results found for: BCIDPCR, CXREFLEX, CSFCX, CULTURETIS  No results found for: CULTURES, HSVCX, URCX  No results found for: EYECULTURE, GCCX, HSVCULTURE, LABHSV  No results found for: LEGIONELLA, MRSACX, MUMPSCX, MYCOPLASCX  No results found for: NOCARDIACX, STOOLCX  Urine Culture   Date Value Ref Range Status   03/10/2021 No growth  Preliminary     No results found for: VIRALCULTU, WOUNDCX        Radiology:  Imaging Results (Last 72 Hours)     Procedure Component Value Units Date/Time    XR Chest 1 View [[id number]] Collected: 03/15/21 1359     Updated: 03/17/21 1149    Narrative:      EXAMINATION: XR CHEST 1 VW- 03/15/2021     INDICATION: PICC placement; A41.9-Sepsis, unspecified organism;  U07.1-COVID-19; J12.82-Pneumonia due to Coronavirus disease 2019;  E87.2-Acidosis      COMPARISON: Chest x-ray 03/13/2021     FINDINGS: Right arm PICC terminates within the mid SVC without  postprocedure  pneumothorax. Chronic changes of the lungs otherwise noted  similar to prior without consolidation or effusion.       Impression:      Right arm PICC terminates within the mid SVC without  postprocedure pneumothorax. Chronic changes of the lungs otherwise noted  similar to prior without consolidation or effusion.     D:  03/15/2021  E:  03/15/2021     This report was finalized on 3/17/2021 11:46 AM by Dr. Damien Ceballos.               Impression:   COVID-19 positivity without hypoxia  History of abdominal aortic aneurysm with endograft repair and presumed secondary infection on suppressive Cipro and doxycycline  Cirrhosis  COPD  Recent weakness and nausea and vomiting and low-grade fevers while at home  Lactic acidosis  Ammonia 104  Pseudomonas bacteremia susc to meropenem     PLAN/RECOMMENDATIONS:   Thank you for asking us to see Derek Kelsey, I recommend the following:    Complicated case;     Patient treated empirically last year for pseudomonas although there were not positive blood cultures.  (given cefepime, meropenem for 8-12 weeks) and then changed to oral doxy, cipro.     Postive bl cx for pseudmonas very interesting and suggest there is either recurrenct infection on graft or possibly prostatitis as source.    F/u bl cultures  cont ceftazidime 2 g iv q8h x 6 - 8 weeks  Place picc line     psa normal  I suspect the endograft is the source; the patient can have surgery I do not know how I will suppress this infection given that Pseudomonas is resistant to Cipro and Levaquin    Monitor oxygenation    D/w family, Dr. Viveros    As outpatient will look into referral for second opinion for endograft removal        Dp/cm time  45 minutes    Davis Carrero MD  3/17/2021  13:53 EDT

## 2021-03-17 NOTE — PROGRESS NOTES
Case Management Discharge Note      Final Note: Patient's plan is home with IV antibiotics to be provided by [facility].  Wife will be up on her way to hospital.  Patient will go to [facility] every Monday for labs, PICC care.  Orders called and faxed to [facility].  First appointment scheduled and placed in AVS.  Updated patient and his wife by phone.  No other needs voiced.  Heaven Bliss, RN [phone]         Selected Continued Care - Admitted Since 3/10/2021     Destination    No services have been selected for the patient.              Durable Medical Equipment    No services have been selected for the patient.              Dialysis/Infusion     Service Provider Selected Services Address Phone Fax Patient Preferred    [facility]  Infusion and IV Therapy [address], [place] [id number] [phone] --          Home Medical Care    No services have been selected for the patient.              Therapy    No services have been selected for the patient.              Community Resources    No services have been selected for the patient.                       Final Discharge Disposition Code: 01 - home or self-care

## 2021-03-17 NOTE — DISCHARGE SUMMARY
[facility] Medicine Services  DISCHARGE SUMMARY    Patient Name: Derek Kelsey  : 1950  MRN: [id number]    Date of Admission: 3/10/2021  2:42 PM  Date of Discharge:  3/17/2021  Primary Care Physician: Raudel Zheng MD    Consults     Date and Time Order Name Status Description    3/16/2021 10:59 AM Inpatient Gastroenterology Consult Completed     3/11/2021 12:34 AM Inpatient Infectious Diseases Consult Completed           Hospital Course     Presenting Problem:   Sepsis, due to unspecified organism, unspecified whether acute organ dysfunction present (CMS/HCC) [A41.9]    Active Hospital Problems    Diagnosis  POA   • **Sepsis (CMS/HCC) [A41.9]  Yes   • Gram-negative bacteremia [R78.81]  Unknown   • 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          Hospital Course:  Derek Kelsey is a 70 y.o. 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 Reportedly had 1st covid 19 vaccine shot approximately 2 weeks prior to this presentation. In ED patient became hypotensive requiring aggressive iv fluids, had a + covid 19 pcr swab and was admitted initially to ICU. Ct head negative. Lactate was 6.1, procalcitonin 7.37. wbc count 17,000 CT angio chest/abd/pelvis negative for pe, no acute lung dz noted, cirrhosis noted (no free fluid/ascites noted), stable appearance of endovascular aortic aneurysm repair w/ aortobiiliac stent graft and distal infrarenal stranding & minimal adenopathy (similar to 21 scan) without evidence for distinct progression or definitive leak. acute intrathoracid process  noted. Patient responded to iv resuscitation and was ultimately transferred to hospitalist service on day #2            Severe Sepsis w/ high grade Pseudomonas bacteremia likely due to AAA endograft relapsed infection  Hx previous AAA repair (June '19) w/ subsequent endograft infection  -- had been on suppressive po cipro & doxy as outpatient  -- the pseudomonas is quinolone resistant  -- ID managing antibiotics, on fortaz, PICC in place. Continue IV ABX as outpatient.   Not a surgical candidate.  -- mrcp 3/12/21 no overt filling defects, gb ok     GIB  -- suspect hemorrhoidal in etiology  --consulted GI, appreciate their input. They advised anusol BID for ten days, increasing PPI to BID, stopping Carafate and gave one time dose of Vitamin K for coagulopathy  --H&H stable this am  --will need EGD with Dr. Johnson Viveros once out of quarantine.   --pt reports BRBPR has stopped      Covid 19 pcr +, likely asymptomatic  A/C DHF (iatrogenic due to volume resuscitation, holding diuretics due to sepsis)  -- holding on steroids/remdesivir as do not feel causing respiratory issues currently  -- transient mild hypoxia felt due to volume overload (iatrogenic from volume resuscitation & holding scheduled po diuretics)  -- s/p gentle iv diuresis, restarted home lasix 20mg po bid      Afib w/ RVR, new onset (due to sepsis)  -- due to acute illness/sepsis; echo ok, tsh ok;   -increased coreg to 12.5mg bid currently rate controlled. Deferring anticoagulation for now (inr 1.8-1.9). if remains in afib would d/w Dr. Galvez his regular cardiologist     Metabolic encephalopathy, resolved  -due to sepsis, hepatic encephalopathy  -treating sepsis, added lactulose to rifaxamin, mentation normal & no asterixis.     Hx Cirrhosis w/ thrombocytopenia & coagulopathy  -inr 1.8-1.9 this admission, given IV Vitamin K on 3/16, INR better today at 1.52  -continue rifaxamin, added low dose lactulose, coreg  --consulted GI for above, will need EGD with  Dr. Johnson Viveros once out of quarantine     Moderate COPD  Chronic hypoxic resp failure (2-3 L chronic use)     Non-obstructive CAD (9/2018 [facility])  -asa     Gerd     HTN     Previous lumbar fusion surgery      Discharge Follow Up Recommendations for outpatient labs/diagnostics:  1. Follow up with PCP within one week via telehealth  2. Follow up with GI once out of isolation  3. Follow up with ID/Dr. Carrero as per his instructions    COVID Symptom Date of Onset:  ______  Date of first positive COVID test: 3/10/2021  Quarantine Complete 20 days after date of onset: 3/20/21    I discussed with Dr. Carrero this afternoon re: pt's second COVID19 vaccine which he was due to receive on 3/19/21.  Pt may get his second shot after he comes out of isolation on 3/20/21. I will update his wife.     Day of Discharge     HPI:   Doing well today, states that he has had two BMs already and no BRBPR except when he uses toilet paper (ie none in the commode)    Review of Systems  Gen- No fevers, chills  CV- No chest pain, palpitations  Resp- No cough, dyspnea  GI- No N/V/D, abd pain      Vital Signs:   Temp:  [97.9 °F (36.6 °C)-98.3 °F (36.8 °C)] 98 °F (36.7 °C)  Heart Rate:  [64-84] 74  Resp:  [16-18] 18  BP: (106-147)/(62-74) 147/74     Physical Exam:  With patient's consent, physical exam was conducted via visual telemedicine encounter due to patient's current isolation requirements in the interest of PPE conservation.     Constitutional: No acute distress, awake, alert, nontoxic, normal body habitus  HENT: NCAT, MMM, no conjunctival injection  Respiratory: Good effort, nonlabored respirations   Cardiovascular:  tele with NSR  Musculoskeletal: No edema, normal muscle tone and mass for age  Psychiatric: Appropriate affect, good insight and judgement, cooperative  Neurologic: Oriented x 3, movements symmetric BUE and BLE, speech clear and fluent  Skin: No visible rashes, no jaundice seen on exposed skin through window    Pertinent  and/or  Most Recent Results     LAB RESULTS:      Lab 03/17/21  0719 03/17/21  0551 03/17/21  0047 03/16/21  1545 03/16/21  0741 03/16/21  0613 03/15/21  0806 03/14/21  0920 03/14/21  0522 03/14/21  0521 03/13/21  0635 03/12/21  0517 03/11/21  0854 03/11/21  0547 03/11/21  0547 03/10/21  1302 03/10/21  1302   WBC  --  3.55  --   --   --  4.28 4.11  --  2.78*  --  3.84 7.84  --   --  12.28*  --  17.40*   HEMOGLOBIN 8.2* 8.3* 8.7* 8.8* 9.4* 8.9* 9.7*  --  8.9*  --  8.9* 8.9*  --   --  8.7*  --  9.5*   HEMATOCRIT 26.5* 26.4* 27.7* 27.9* 29.5* 28.3* 31.2*  --  28.8*  --  29.1* 27.9*  --   --  27.3*  --  30.8*   PLATELETS  --  99*  --   --   --  107* 112*  --  98*  --  103* 99*  --   --  91*  --  111*   NEUTROS ABS  --  2.20  --   --   --  2.94 2.81  --  1.75  --  2.83 6.34  --    < >  --   --  16.88*   IMMATURE GRANS (ABS)  --  0.06*  --   --   --  0.06* 0.12*  --  0.02  --  0.02 0.04  --    < >  --   --   --    LYMPHS ABS  --  0.69*  --   --   --  0.65* 0.60*  --  0.56*  --  0.48* 0.61*  --    < >  --   --   --    MONOS ABS  --  0.46  --   --   --  0.47 0.41  --  0.31  --  0.37 0.68  --    < >  --   --   --    EOS ABS  --  0.11  --   --   --  0.11 0.12  --  0.11  --  0.11 0.13  --    < >  --   --  0.00   MCV  --  96.0  --   --   --  95.0 95.1  --  97.3*  --  97.7* 95.9  --   --  95.5  --  97.8*   CRP  --  1.38*  --   --   --  1.67* 2.19* 3.29*  --   --  4.11* 6.25*  --   --  7.47*   < >  --    PROCALCITONIN  --   --   --   --   --   --   --   --   --   --   --   --   --   --   --   --  7.37*   LACTATE  --   --   --   --   --   --   --   --   --   --   --   --   --   --  2.6*  2.6*  --  6.1*   PROTIME  --  18.1*  --   --   --   --   --   --   --   --   --  20.6* 22.0*  --   --   --   --    D DIMER QUANT  --   --   --   --   --  3.10*  --   --   --  3.41*  --  2.75* 3.66*  --   --   --   --     < > = values in this interval not displayed.         Lab 03/17/21  0551 03/16/21  0613 03/15/21  0806 03/14/21  0920 03/13/21  0635  03/12/21  0517 03/11/21  0547   SODIUM 135* 134* 135* 134* 133* 134* 134*   POTASSIUM 3.8 3.9 4.1 3.5 4.0 3.7 3.9   CHLORIDE 99 99 101 100 106 103 103   CO2 28.0 29.0 27.0 24.0 19.0* 22.0 22.0   ANION GAP 8.0 6.0 7.0 10.0 8.0 9.0 9.0   BUN 13 11 10 8 11 12 14   CREATININE 0.72* 0.74* 0.80 0.71* 0.70* 0.76 0.90   GLUCOSE 89 89 94 92 101* 102* 105*   CALCIUM 8.6 9.0 9.4 9.6 8.9 9.1 8.4*   MAGNESIUM 1.7 1.9 1.8 1.8 1.8 1.8 1.8   PHOSPHORUS  --   --   --   --   --   --  2.6   HEMOGLOBIN A1C  --   --   --   --   --   --  5.10   TSH  --   --   --   --   --  3.980  --          Lab 03/17/21  0551 03/16/21  0613 03/15/21  0806 03/14/21  0920 03/13/21  0635   TOTAL PROTEIN 7.6 8.2 8.5 8.6* 8.0   ALBUMIN 2.90* 3.10* 3.30* 3.20* 2.70*   GLOBULIN 4.7 5.1 5.2 5.4 5.3   ALT (SGPT) 44* 46* 52* 55* 49*   AST (SGOT) 97* 97* 124* 136* 136*   BILIRUBIN 0.7 0.9 0.8 1.0 1.0   ALK PHOS 155* 157* 179* 182* 161*         Lab 03/17/21  0551 03/14/21  0920 03/13/21  0635 03/12/21  0517 03/11/21  0854 03/10/21  1302   PROBNP  --  919.7* 1,388.0*  --   --  485.0   TROPONIN T  --   --   --   --   --  <0.010   PROTIME 18.1*  --   --  20.6* 22.0*  --    INR 1.53*  --   --  1.80* 1.96*  --              Lab 03/16/21  0613 03/12/21  0517   IRON  --  25*   IRON SATURATION  --  8*   TIBC  --  311   TRANSFERRIN  --  209   FERRITIN 411.20* 161.50         Brief Urine Lab Results  (Last result in the past 365 days)      Color   Clarity   Blood   Leuk Est   Nitrite   Protein   CREAT   Urine HCG        03/10/21 1459 Dark Yellow Clear Negative Trace Negative 30 mg/dL (1+)             Microbiology Results (last 10 days)     Procedure Component Value - Date/Time    Blood Culture - Blood, Arm, Left [[id number]]  (Abnormal) Collected: 03/10/21 1530    Lab Status: Final result Specimen: Blood from Arm, Left Updated: 03/13/21 0728     Blood Culture Pseudomonas species     Isolated from Aerobic Bottle     Gram Stain Aerobic Bottle Gram negative bacilli    Narrative:       For MICs refer to blood culture Collected 3/10/2021 1510.     COVID PRE-OP / PRE-PROCEDURE SCREENING ORDER (NO ISOLATION) - Swab, Nasopharynx [[id number]]  (Abnormal) Collected: 03/10/21 1522    Lab Status: Final result Specimen: Swab from Nasopharynx Updated: 03/10/21 1638    Narrative:      The following orders were created for panel order COVID PRE-OP / PRE-PROCEDURE SCREENING ORDER (NO ISOLATION) - Swab, Nasopharynx.  Procedure                               Abnormality         Status                     ---------                               -----------         ------                     COVID-19 and FLU A/B PCR...[[id number]]  Abnormal            Final result                 Please view results for these tests on the individual orders.    COVID-19 and FLU A/B PCR - Swab, Nasopharynx [[id number]]  (Abnormal) Collected: 03/10/21 1522    Lab Status: Final result Specimen: Swab from Nasopharynx Updated: 03/10/21 1638     COVID19 Detected     Influenza A PCR Not Detected     Influenza B PCR Not Detected    Narrative:      Fact sheet for providers: https://www.fda.gov/media/638178/download    Fact sheet for patients: https://www.fda.gov/media/832762/download    Test performed by PCR.  Influenza A and Influenza B negative results should be considered presumptive in samples that have a positive SARS-CoV-2 result.    Competitive inhibition studies showed that SARS-CoV-2 virus, when present at concentrations above 3.6E+04 copies/mL, can inhibit the detection and amplification of influenza A and influenza B virus RNA if present at or below 1.8E+02 copies/mL or 4.9E+02 copies/mL, respectively, and may lead to false negative influenza virus results. If co-infection with influenza A or influenza B virus is suspected in samples with a positive SARS-CoV-2 result, the sample should be re-tested with another FDA cleared, approved, or authorized influenza test, if influenza virus detection would change clinical management.     Blood Culture - Blood, Arm, Right [[id number]]  (Abnormal)  (Susceptibility) Collected: 03/10/21 1510    Lab Status: Edited Result - FINAL Specimen: Blood from Arm, Right Updated: 03/13/21 1234     Blood Culture Pseudomonas aeruginosa     Isolated from Aerobic Bottle     Gram Stain Aerobic Bottle Gram negative bacilli    Susceptibility      Pseudomonas aeruginosa      ALVINO      Amikacin Susceptible (C)<sup style='font-weight:normal'>1</sup></font>      Cefazolin Resistant (C)<sup style='font-weight:normal'>1</sup></font>      Cefepime Susceptible      Ceftazidime Susceptible      Ceftazidime + Avibactam Susceptible (C)<sup style='font-weight:normal'>1</sup></font>      Ceftolozane + Tazobactam Susceptible (C)<sup style='font-weight:normal'>1</sup></font>      Ciprofloxacin Resistant      Gentamicin Susceptible (C)<sup style='font-weight:normal'>1</sup></font>      Imipenem Susceptible (C)<sup style='font-weight:normal'>1</sup></font>      Levofloxacin Resistant      Meropenem Susceptible (C)<sup style='font-weight:normal'>1</sup></font>      Piperacillin + Tazobactam Susceptible      Tobramycin Susceptible (C)<sup style='font-weight:normal'>1</sup></font>             <sup style='font-weight:normal'>1</sup></font> Appended report. These results have been appended to a previously final verified report.          Linear View                   Blood Culture ID, PCR - Blood, Arm, Right [[id number]]  (Abnormal) Collected: 03/10/21 1510    Lab Status: Final result Specimen: Blood from Arm, Right Updated: 03/11/21 1741     BCID, PCR Pseudomonas aeruginosa. Identification by BCID PCR.    Urine Culture - Urine, Urine, Clean Catch [[id number]]  (Normal) Collected: 03/10/21 1459    Lab Status: Final result Specimen: Urine, Clean Catch Updated: 03/11/21 1630     Urine Culture No growth          Adult Transthoracic Echo Complete W/ Cont if Necessary Per Protocol    Result Date: 3/11/2021  · Estimated right ventricular systolic  pressure from tricuspid regurgitation is normal (<35 mmHg). · Estimated left ventricular EF = 65% Left ventricular systolic function is normal.      CT Head Without Contrast    Result Date: 3/11/2021  EXAMINATION: CT HEAD WO CONTRAST- - 03/10/2021  INDICATION: Altered mental status.  TECHNIQUE: CT head without intravenous contrast  The radiation dose reduction device was turned on for each scan per the ALARA (As Low as Reasonably Achievable) protocol.  COMPARISON: None.  FINDINGS: Midline structures are symmetric without evidence of mass, mass effect, or midline shift. Ventricles and sulci within normal limits. No intra-axial hemorrhage or extra-axial fluid collection. Globes and orbits unremarkable. Paranasal sinuses and mastoid air cells are grossly clear and well-pneumatized. Calvarium intact.      No acute intracranial abnormality.  DICTATED:   03/10/2021 EDITED/ls :   03/10/2021   This report was finalized on 3/11/2021 6:55 PM by Dr. Damien Ceballos.      CT Abdomen Pelvis With Contrast    Result Date: 3/11/2021  EXAMINATION: CT ANGIOGRAM CHEST, CT ABDOMEN/PELVIS W CONTRAST - 03/10/2021  INDICATION: Shortness of air. Altered mental status. Abdominal pain. Evaluate for pulmonary embolus.  TECHNIQUE: CT angiogram chest with intravenous contrast administration following PE protocol. 2D reconstructions performed, CT abdomen and pelvis with intravenous contrast administration.  The radiation dose reduction device was turned on for each scan per the ALARA (As Low as Reasonably Achievable) protocol.  COMPARISON: CT abdomen and pelvis 02/24/2021.  FINDINGS:  CTA CHEST: Thyroid is homogeneous in attenuation. No bulky mediastinal adenopathy. Central airways are patent. Esophagus in normal course and caliber to the distal segment where there is a small hiatal hernia. Patent nonaneurysmal minimally atherosclerotic thoracic aorta with patent great vessel origins. No central pulmonary arterial filling defect to suggest  pulmonary embolus with equivocal optimal opacification. Cardiac size borderline enlarged without pericardial effusion. Extended lung windows demonstrate biapical pleural-parenchymal scarring with paraseptal emphysema and biapical pleural-parenchymal scarring, however, no dominant nodule or mass. No pleural effusion. Degenerative disease of the thoracic spine without aggressive osseous lesion. No soft tissue body wall findings of acuity involving the chest.  ABDOMEN AND PELVIS: Liver demonstrates nodular contours concerning for parenchymal disease process such as potential cirrhotic hepatic morphology with a low-attenuation focus likely a  cyst in the left hemiliver. Gallbladder distended without obvious calculus associated. No biliary dilatation. Pancreas and spleen unremarkable. Adrenals without distinct nodule. Kidneys without hydronephrosis or hydroureter. No bulky retroperitoneal adenopathy. Atherosclerotic aneurysmal abdominal aorta with aortobiiliac endovascular stent in place demonstrating gross patency with adjacent stranding to the distal aorta as seen on prior comparison grossly similar appearance with mildly enlarged retroperitoneal lymph nodes adjacent to the periaortic region. Portal veins and IVC grossly patent. GI tract evaluation without focal thickening or disproportionate dilatation. No free fluid or intra-abdominal free air. Pelvic viscera unremarkable without bulky pelvic adenopathy.      1. No PE is clearly identified as no central filling defect is identified on suboptimal evaluation due to technique despite repeat sequencing.  2. No acute intrathoracic process.  3. Cirrhotic hepatic morphology again noted.  4. Stable appearance of the endovascular aortic aneurysm repair with aortobiiliac stent graft and adjacent distal infrarenal stranding and minimal adenopathy as seen on prior comparison of  02/24/2021 without evidence for distinct progression or definitive hemorrhage/leak clearly evident in  a similar configuration to prior comparison 02/24/2021.  DICTATED:   03/10/2021 EDITED/ls :   03/10/2021   This report was finalized on 3/11/2021 6:55 PM by Dr. Damien Ceballos.      XR Chest 1 View    Result Date: 3/15/2021  EXAMINATION: XR CHEST 1 VW- 03/15/2021  INDICATION: PICC placement; A41.9-Sepsis, unspecified organism; U07.1-COVID-19; J12.82-Pneumonia due to Coronavirus disease 2019; E87.2-Acidosis  COMPARISON: Chest x-ray 03/13/2021  FINDINGS: Right arm PICC terminates within the mid SVC without postprocedure pneumothorax. Chronic changes of the lungs otherwise noted similar to prior without consolidation or effusion.      Right arm PICC terminates within the mid SVC without postprocedure pneumothorax. Chronic changes of the lungs otherwise noted similar to prior without consolidation or effusion.  D:  03/15/2021 E:  03/15/2021       XR Chest 1 View    Result Date: 3/13/2021  EXAMINATION: XR CHEST 1 VW-  INDICATION: dyspnea, hypoxia, covid (rule out volume overload vs covid pneumonia; A41.9-Sepsis, unspecified organism; U07.1-COVID-19; J12.82-Pneumonia due to Coronavirus disease 2019; E87.2-Acidosis  COMPARISON: 3/10/2021  FINDINGS: No acute osseous findings. Normal cardiomediastinal silhouette. Pleural effusion. No pneumothorax. Bilateral apical scarring. Pulmonary vascular congestion with increased interstitial markings. No focal lobar opacities.      Vascular congestion with increased interstitial markings most consistent with interstitial edema. No pleural effusion. No new focal airspace opacities.  This report was finalized on 3/13/2021 7:59 AM by Eddie Hirsch.      XR Chest 1 View    Result Date: 3/10/2021  EXAMINATION: XR CHEST 1 VW-  INDICATION: Weak/Dizzy/AMS triage protocol  COMPARISON: 9/10/2020  FINDINGS: Mild bibasilar atelectasis is present. No focal airspace disease otherwise. No significant effusion or distinct pneumothorax. Unchanged heart and mediastinal contours.      Mild bibasilar  atelectasis. No acute disease in the chest otherwise.  This report was finalized on 3/10/2021 1:42 PM by Douglas Vieira.      MRI abdomen wo contrast mrcp    Result Date: 3/12/2021  EXAMINATION: MRI ABDOMEN WO CONTRAST MRCP-  INDICATION: Rule out cholecystitis, cholangitis (gram negative bacteremia, transient nausea and vomiting, with mild elevated bilirubin, history cirrhosis and known chronic AAA endograft infection); A41.9-Sepsis, unspecified organism; U07.1-COVID-19; J12.82-Pneumonia due to Coronavirus disease 2019; E87.2-Acidosis.  TECHNIQUE: Multiplanar MRI of the abdomen without intravenous contrast along with MRCP sequences performed including T2 coronal space reconstruction sequence.    COMPARISON: CT abdomen and pelvis dated 03/10/2021.  FINDINGS: Lung bases are grossly clear. Liver has heterogeneous signal throughout with nodular contours consistent with known cirrhotic hepatic morphology. No focal lesion is evident other than a T2 hyperintense focus in the left hemiliver most consistent with cysts although limited evaluation of the liver parenchyma without intravenous contrast administration. Gallbladder appears partially contracted and unremarkable without distinct filling defect. Motion degradation on MRP sequences, however no gross intrahepatic or extrahepatic biliary dilatation with common bile duct measuring up to 9 mm in diameter at the level of the alex hepatis greater than expected for caliber of mild dilatation without pancreas divisum anatomy. No distinct filling defect within the common bile duct to suggest choledocholithiasis with adjacent intermediate T2 signal concerning for edema versus potential minimal inflammation. Pancreas is somewhat limited due to motion, however no gross abnormality. Spleen is unremarkable. Adrenals without distinct nodule. Kidneys without hydronephrosis or hydroureter. No bulky retroperitoneal adenopathy, however, noted redemonstration of soft tissue and  stranding surrounding the aortobiiliac endovascular stent graft seen better on CT recently performed.      1. Cirrhotic hepatic morphology again noted with cyst in the left liver stable from prior CT comparison without intravenous contrast evaluate further for a subtle parenchymal lesion. 2. Gallbladder is partially contracted without definitive cholelithiasis or choledocholithiasis of filling defect, however, common bile duct is prominent and greater than expected at 9 mm in diameter at the level of the alex hepatis. Questionable minimal adjacent edema or intermediate fluid signal may represent components of inflammation or fluid overall indeterminate without gross intrahepatic biliary dilatation with some motion degradation.  D:  03/12/2021 E:  03/12/2021   This report was finalized on 3/12/2021 5:20 PM by Dr. Damien Ceballos.      CT Angiogram Chest    Result Date: 3/11/2021  EXAMINATION: CT ANGIOGRAM CHEST, CT ABDOMEN/PELVIS W CONTRAST - 03/10/2021  INDICATION: Shortness of air. Altered mental status. Abdominal pain. Evaluate for pulmonary embolus.  TECHNIQUE: CT angiogram chest with intravenous contrast administration following PE protocol. 2D reconstructions performed, CT abdomen and pelvis with intravenous contrast administration.  The radiation dose reduction device was turned on for each scan per the ALARA (As Low as Reasonably Achievable) protocol.  COMPARISON: CT abdomen and pelvis 02/24/2021.  FINDINGS:  CTA CHEST: Thyroid is homogeneous in attenuation. No bulky mediastinal adenopathy. Central airways are patent. Esophagus in normal course and caliber to the distal segment where there is a small hiatal hernia. Patent nonaneurysmal minimally atherosclerotic thoracic aorta with patent great vessel origins. No central pulmonary arterial filling defect to suggest pulmonary embolus with equivocal optimal opacification. Cardiac size borderline enlarged without pericardial effusion. Extended lung windows  demonstrate biapical pleural-parenchymal scarring with paraseptal emphysema and biapical pleural-parenchymal scarring, however, no dominant nodule or mass. No pleural effusion. Degenerative disease of the thoracic spine without aggressive osseous lesion. No soft tissue body wall findings of acuity involving the chest.  ABDOMEN AND PELVIS: Liver demonstrates nodular contours concerning for parenchymal disease process such as potential cirrhotic hepatic morphology with a low-attenuation focus likely a  cyst in the left hemiliver. Gallbladder distended without obvious calculus associated. No biliary dilatation. Pancreas and spleen unremarkable. Adrenals without distinct nodule. Kidneys without hydronephrosis or hydroureter. No bulky retroperitoneal adenopathy. Atherosclerotic aneurysmal abdominal aorta with aortobiiliac endovascular stent in place demonstrating gross patency with adjacent stranding to the distal aorta as seen on prior comparison grossly similar appearance with mildly enlarged retroperitoneal lymph nodes adjacent to the periaortic region. Portal veins and IVC grossly patent. GI tract evaluation without focal thickening or disproportionate dilatation. No free fluid or intra-abdominal free air. Pelvic viscera unremarkable without bulky pelvic adenopathy.      1. No PE is clearly identified as no central filling defect is identified on suboptimal evaluation due to technique despite repeat sequencing.  2. No acute intrathoracic process.  3. Cirrhotic hepatic morphology again noted.  4. Stable appearance of the endovascular aortic aneurysm repair with aortobiiliac stent graft and adjacent distal infrarenal stranding and minimal adenopathy as seen on prior comparison of  02/24/2021 without evidence for distinct progression or definitive hemorrhage/leak clearly evident in a similar configuration to prior comparison 02/24/2021.  DICTATED:   03/10/2021 EDITED/ls :   03/10/2021   This report was finalized on  3/11/2021 6:55 PM by Dr. Damien Ceballos.        Results for orders placed during the hospital encounter of 09/10/20    Duplex Venous Lower Extremity - Right CAR    Interpretation Summary  · Normal right lower extremity venous duplex scan.      Results for orders placed during the hospital encounter of 09/10/20    Duplex Venous Lower Extremity - Right CAR    Interpretation Summary  · Normal right lower extremity venous duplex scan.      Results for orders placed during the hospital encounter of 03/10/21    Adult Transthoracic Echo Complete W/ Cont if Necessary Per Protocol    Interpretation Summary  · Estimated right ventricular systolic pressure from tricuspid regurgitation is normal (<35 mmHg).  · Estimated left ventricular EF = 65% Left ventricular systolic function is normal.      Plan for Follow-up of Pending Labs/Results:   Pending Labs     Order Current Status    Ferritin In process        Discharge Details        Discharge Medications      New Medications      Instructions Start Date   cefTAZidime  Commonly known as: FORTAZ   2 g, Intravenous, Every 8 Hours      Hydrocortisone (Perianal) 2.5 % rectal cream  Commonly known as: ANUSOL-HC   Rectal, 2 Times Daily         Changes to Medications      Instructions Start Date   Aspirin Low Dose 81 MG tablet  Generic drug: aspirin  What changed: additional instructions   81 mg, Oral, Daily, Last dose 10-21-18      carvedilol 12.5 MG tablet  Commonly known as: COREG  What changed:   · medication strength  · how much to take   12.5 mg, Oral, 2 Times Daily With Meals      ferrous gluconate 324 MG tablet  Commonly known as: FERGON  What changed: when to take this   324 mg, Oral, Daily With Breakfast      furosemide 20 MG tablet  Commonly known as: LASIX  What changed: Another medication with the same name was removed. Continue taking this medication, and follow the directions you see here.   2 times daily      lactulose 10 GM/15ML solution  Commonly known as:  CHRONULAC  What changed: additional instructions   Take 15 ml TID for goal of 3 BMs daily         Continue These Medications      Instructions Start Date   acetaminophen 650 MG 8 hr tablet  Commonly known as: TYLENOL   650 mg, Oral, Every 8 Hours PRN      amLODIPine 10 MG tablet  Commonly known as: NORVASC   5 mg, Oral, 2 Times Daily      citalopram 20 MG tablet  Commonly known as: CeleXA   40 mg, Oral, Daily      melatonin 5 MG tablet tablet   5 mg, Oral, Nightly      Multi-Vitamin Daily tablet tablet  Generic drug: multivitamin   1 tablet, Oral, Daily      O2  Commonly known as: OXYGEN   No dose, route, or frequency recorded.      Ondansetron 4 MG film   3 Times Daily      pantoprazole 40 MG EC tablet  Commonly known as: PROTONIX   40 mg, Oral, 2 Times Daily      saccharomyces boulardii 250 MG capsule  Commonly known as: FLORASTOR   250 mg, Oral, 2 Times Daily      tamsulosin 0.4 MG capsule 24 hr capsule  Commonly known as: FLOMAX   0.4 mg, Oral, Daily      Trelegy Ellipta 100-62.5-25 MCG/INH aerosol powder   Generic drug: Fluticasone-Umeclidin-Vilant   INHALE 1 PUFF ONCE DAILY      Xifaxan 550 MG tablet  Generic drug: riFAXIMin   2 Times Daily         Stop These Medications    ciprofloxacin 500 MG tablet  Commonly known as: CIPRO     doxycycline 100 MG capsule  Commonly known as: MONODOX     meropenem 1 g injection  Commonly known as: MERREM     POTASSIUM PO     sucralfate 1 g tablet  Commonly known as: CARAFATE     triamcinolone 0.1 % cream  Commonly known as: KENALOG            Allergies   Allergen Reactions   • Penicillins Hives     Hives - has tolerated Zosyn and ceftriaxone 09/2019   • Percocet [Oxycodone-Acetaminophen] Confusion         Discharge Disposition:      Diet:  Hospital:  Diet Order   Procedures   • Diet Regular; Cardiac       Activity:      Restrictions or Other Recommendations:         CODE STATUS:    Code Status and Medical Interventions:   Ordered at: 03/10/21 4744     Limited Support to NOT  Include:    Cardioversion/Defibrillation    Intubation     Code Status:    No CPR     Medical Interventions (Level of Support Prior to Arrest):    Limited       Future Appointments   Date Time Provider Department Center   3/29/2021 12:15 PM Leopoldo Burleson MD [facility] [place] None   5/26/2021 12:45 PM RAD TECH [facility] [place] [facility] [place] None   5/26/2021  1:00 PM [facility] [place], PFT LAB 3 [facility] [place] None   5/26/2021  1:30 PM Yolie Bates, APRN [facility] [place] None       Additional Instructions for the Follow-ups that You Need to Schedule     Ambulatory Referral to Home Health   As directed      1)ceftazidime 2 g iv every 8 hours x 6-12 weeks   2)weekly picc care   3)check cbc, cmp, esr q Monday   Dr Carrero Infectious Disease [phone]    Order Comments: 1)ceftazidime 2 g iv every 8 hours x 6-12 weeks 2)weekly picc care 3)check cbc, cmp, esr q Monday Dr Carrero Infectious Disease [phone]     Face to Face Visit Date: 3/16/2021    Follow-up provider for Plan of Care?: I treated the patient in an acute care facility and will not continue treatment after discharge.    Follow-up provider: KRYSTA GALINDO [[id number]]    Reason/Clinical Findings: Gram-negative bacteremia, Sepsis, Infected aortic endograft    Describe mobility limitations that make leaving home difficult: impaired mobility, impaired endurance, impaired ADL's    Nursing/Therapeutic Services Requested: Skilled Nursing Physical Therapy    Skilled nursing orders: Medication education Cardiopulmonary assessments PICC line care/instruction                     Awa Viveros MD  03/17/21      Time Spent on Discharge:  I spent  35  minutes on this discharge activity which included: face-to-face encounter with the patient, reviewing the data in the system, coordination of the care with the nursing staff as well as consultants, documentation, and entering orders.

## 2021-03-18 ENCOUNTER — READMISSION MANAGEMENT (OUTPATIENT)
Dept: CALL CENTER | Facility: HOSPITAL | Age: 71
End: 2021-03-18

## 2021-03-18 NOTE — OUTREACH NOTE
Prep Survey      Responses   [facility] patient discharged from?  [place]   Is LACE score < 7 ?  No   Emergency Room discharge w/ pulse ox?  No   Eligibility  Readm Mgmt   Discharge diagnosis  Sepsis   Does the patient have one of the following disease processes/diagnoses(primary or secondary)?  COVID-19   Does the patient have Home health ordered?  No   Is there a DME ordered?  No   Prep survey completed?  Yes          Michelle Mendez RN

## 2021-03-18 NOTE — OUTREACH NOTE
COVID-19 Week 1 Survey      Responses   [facility] patient discharged from?  [place]   Does the patient have one of the following disease processes/diagnoses(primary or secondary)?  COVID-19   COVID-19 underlying condition?  Sepsis   Call Number  Call 1   Week 1 Call successful?  Yes   Call start time  [phone]   Call end time  0931   Discharge diagnosis  Sepsis   Is patient permission given to speak with other caregiver?  Yes   List who call center can speak with  Mago arrieta   [facility] reviewed with patient/caregiver?  Yes   Is the patient having any side effects they believe may be caused by any medication additions or changes?  No   Does the patient have all medications ordered at discharge?  Yes   Is the patient taking all medications as directed (includes completed medication regime)?  Yes   Does the patient have a primary care provider?   Yes   Does the patient have an appointment with their PCP or specialist within 7 days of discharge?  No   What is preventing the patient from scheduling follow up appointments within 7 days of discharge?  Haven't had time   Has the patient kept scheduled appointments due by today?  N/A   Psychosocial issues?  No   Did the patient receive a copy of their discharge instructions?  Yes   Did the patient receive a copy of COVID-19 specific instructions?  Yes   Nursing interventions  Reviewed instructions with patient   What is the patient's perception of their health status since discharge?  Same   Nursing Interventions  Nurse provided patient education   Pulse Ox monitoring  Intermittent   Pulse Ox device source  Patient   O2 Sat: education provided  Sat levels, Monitoring frequency, When to seek care   Is the patient/caregiver able to teach back steps to recovery at home?  Set small, achievable goals for return to baseline health, Make a list of questions for provider's appointment, Eat a well-balance diet   If the patient is a current smoker, are they able to teach back  resources for cessation?  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 PCP, Specialist, Home health nurse, Urgent Care, ED, 911  Yes   Is the patient/caregiver able to teach back Sepsis?  S - Shivering,fever or very cold, E - Extreme pain or generalized discomfort (worst ever,especially abdomen), S - Sleepy, difficult to arouse,confused, S - Short of breath   Nursing interventions  Nurse provided reassurance to patient   Is patient/caregiver able to teach back steps to recovery at home?  Set small, achievable goals for return to baseline health, Make a list of questions for PCP appoinment   Is the patient/caregiver able to teach back signs and symptoms of worsening condition:  Fever   COVID-19 call completed?  Yes   Wrap up additional comments  pt sounds good, stated he feels good.           Earnestine Méndez, RN

## 2021-03-19 ENCOUNTER — READMISSION MANAGEMENT (OUTPATIENT)
Dept: CALL CENTER | Facility: HOSPITAL | Age: 71
End: 2021-03-19

## 2021-03-19 NOTE — OUTREACH NOTE
COVID-19 Week 1 Survey      Responses   [facility] patient discharged from?  [place]   Does the patient have one of the following disease processes/diagnoses(primary or secondary)?  COVID-19   COVID-19 underlying condition?  Sepsis   Call Number  Call 2   Week 1 Call successful?  Yes   Call start time  1039   Call end time  1053   Person spoke with today (if not patient) and relationship  Patient and Mago-spouse.   Meds reviewed with patient/caregiver?  Yes   Is the patient having any side effects they believe may be caused by any medication additions or changes?  No   Does the patient have all medications ordered at discharge?  Yes   Is the patient taking all medications as directed (includes completed medication regime)?  Yes   Medication comments  Wife continues to administer IV antibiotics at home through PICC line.   Comments regarding appointments  Telehealth with Dr Carrero 03/22/21-gets second vaccine 03/23/21 which has been approved by Dr Carrero. States has checked in with PCP, and to wait until finishes other appts.   Does the patient have a primary care provider?   Yes   Does the patient have an appointment with their PCP or specialist within 7 days of discharge?  Greater than 7 days   What is preventing the patient from scheduling follow up appointments within 7 days of discharge?  Earlier appointment not available   Has the patient kept scheduled appointments due by today?  N/A   Has home health visited the patient within 72 hours of discharge?  N/A   Psychosocial issues?  No   Nursing interventions  Reviewed instructions with patient   What is the patient's perception of their health status since discharge?  Improving   Does the patient have any of the following symptoms?  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   O2 Sat: education provided  Sat levels, Monitoring frequency, When to seek care   O2 Sat education comments  Advised to return to ER if O2 sats remain below 92%.   COVID-19 call completed?  Yes   Wrap up additional comments  Wife states patient is slowly improving-states is resting better. Reports weakness, baseline SOA. STates temp 97.4 this morning. States wears home O2 at night only. Advised to recheck O2 sat at rest-call PCP immediately if O2 sat below 92% or go to ER if unable to reach PCP-voiced understanding. Denies any worsening SOA or chest pain.          Torie Clifton, RN

## 2021-03-20 ENCOUNTER — READMISSION MANAGEMENT (OUTPATIENT)
Dept: CALL CENTER | Facility: HOSPITAL | Age: 71
End: 2021-03-20

## 2021-03-20 NOTE — OUTREACH NOTE
COVID-19 Week 1 Survey      Responses   [facility] patient discharged from?  [place]   Does the patient have one of the following disease processes/diagnoses(primary or secondary)?  COVID-19   COVID-19 underlying condition?  Sepsis   Call Number  Call 3   Week 1 Call successful?  Yes   Call start time  1126   Call end time  1141   Discharge diagnosis  Sepsis   Person spoke with today (if not patient) and relationship  florencia Mercedes   Meds reviewed with patient/caregiver?  Yes   Is the patient having any side effects they believe may be caused by any medication additions or changes?  No   Does the patient have all medications ordered at discharge?  Yes   Is the patient taking all medications as directed (includes completed medication regime)?  Yes   Does the patient have a primary care provider?   Yes   Does the patient have an appointment with their PCP or specialist within 7 days of discharge?  Greater than 7 days   What is preventing the patient from scheduling follow up appointments within 7 days of discharge?  Uncomfortable attending in person appointment   Nursing Interventions  Verified appointment date/time/provider   Has the patient kept scheduled appointments due by today?  N/A   Has home health visited the patient within 72 hours of discharge?  N/A   Psychosocial issues?  No   Comments  wife states managing PICC line antibiotic infusions, has PICC line dsg changes at [facility] weekly   Did the patient receive a copy of their discharge instructions?  Yes   Did the patient receive a copy of COVID-19 specific instructions?  Yes   Nursing interventions  Reviewed instructions with patient   What is the patient's perception of their health status since discharge?  Improving   Does the patient have any of the following symptoms?  None   Nursing Interventions  Nurse provided patient education   Pulse Ox monitoring  Intermittent   Pulse Ox device source  Patient   O2 Sat comments  94% on 2L O2 most  of day   O2 Sat: education provided  Sat levels, Monitoring frequency, When to seek care   Is the patient/caregiver able to teach back steps to recovery at home?  Set small, achievable goals for return to baseline health, Rest and rebuild strength, gradually increase activity, Eat a well-balance diet, Make a list of questions for provider's appointment   Is the patient/caregiver able to teach back the hierarchy of who to call/visit for symptoms/problems? PCP, Specialist, Home health nurse, Urgent Care, ED, 911  Yes [wife had questions sbout pt being off K+, magnesium meds, adding iron in diet, advised pt to make list of questions to discuss with MD at telehealth appt on 3/22/21, wife and pt agreed]   Is the patient/caregiver able to teach back Sepsis?  S - Shivering,fever or very cold, S - Sleepy, difficult to arouse,confused, I -   I feel like I might die-a feeling of hopelessness, S - Short of breath   Nursing interventions  Nurse provided reassurance to patient   Is patient/caregiver able to teach back steps to recovery at home?  Set small, achievable goals for return to baseline health, Talk about feelings with family/friends, Rest and regain strength, Eat a balanced diet, Make a list of questions for PCP appoinment   Is the patient/caregiver able to teach back signs and symptoms of worsening condition:  Fever, Hyperthermia, Shortness of breath/rapid respiratory rate   COVID-19 call completed?  Yes          Denisha Santos RN

## 2021-03-24 ENCOUNTER — READMISSION MANAGEMENT (OUTPATIENT)
Dept: CALL CENTER | Facility: HOSPITAL | Age: 71
End: 2021-03-24

## 2021-03-29 ENCOUNTER — OFFICE VISIT (OUTPATIENT)
Dept: CARDIAC SURGERY | Facility: CLINIC | Age: 71
End: 2021-03-29

## 2021-03-29 VITALS
OXYGEN SATURATION: 81 % | DIASTOLIC BLOOD PRESSURE: 82 MMHG | HEART RATE: 79 BPM | SYSTOLIC BLOOD PRESSURE: 156 MMHG | BODY MASS INDEX: 32.34 KG/M2 | WEIGHT: 252 LBS | TEMPERATURE: 98.2 F | HEIGHT: 74 IN

## 2021-03-29 DIAGNOSIS — T82.7XXD INFECTION OF AORTIC ENDOGRAFT, SUBSEQUENT ENCOUNTER: Primary | ICD-10-CM

## 2021-03-29 PROCEDURE — 99213 OFFICE O/P EST LOW 20 MIN: CPT | Performed by: NURSE PRACTITIONER

## 2021-04-01 ENCOUNTER — READMISSION MANAGEMENT (OUTPATIENT)
Dept: CALL CENTER | Facility: HOSPITAL | Age: 71
End: 2021-04-01

## 2021-04-01 NOTE — OUTREACH NOTE
COVID-19 Week 3 Survey      Responses   [facility] patient discharged from?  [place]   Does the patient have one of the following disease processes/diagnoses(primary or secondary)?  COVID-19   COVID-19 underlying condition?  Sepsis   Call Number  Call 1   COVID-19 Week 3: Call 1 attempt successful?  Yes   Call start time  1355   Call end time  1411   Discharge diagnosis  **Sepsis COVID   Meds reviewed with patient/caregiver?  Yes   Is the patient having any side effects they believe may be caused by any medication additions or changes?  No   Does the patient have all medications ordered at discharge?  Yes   Is the patient taking all medications as directed (includes completed medication regime)?  Yes   Does the patient have a primary care provider?   Yes   Does the patient have an appointment with their PCP or specialist within 7 days of discharge?  Yes   Has the patient kept scheduled appointments due by today?  N/A   Psychosocial issues?  No   What is the patient's perception of their health status since discharge?  Improving   Does the patient have any of the following symptoms?  Cough, Shortness of breath   Nursing Interventions  Nurse provided patient education   Pulse Ox monitoring  Intermittent   Pulse Ox device source  Patient   O2 Sat comments  92% O2   O2 Sat: education provided  Sat levels, Monitoring frequency, When to seek care   Is the patient/caregiver able to teach back steps to recovery at home?  Rest and rebuild strength, gradually increase activity, Set small, achievable goals for return to baseline health, Eat a well-balance diet   If the patient is a current smoker, are they able to teach back resources for cessation?  Not a smoker   Is the patient/caregiver able to teach back the hierarchy of who to call/visit for symptoms/problems? PCP, Specialist, Home health nurse, Urgent Care, ED, 911  Yes   COVID-19 call completed?  Yes   Wrap up additional comments  Pt has cough, and SOB. Pt is doing  good some days,  he will go to [facility] r/t Sepsis. Pt is currently on  IV antibiotics per Dr. Carrero, Infectious disease. No questions/concerns at this time.          Lata Mukherjee RN

## 2021-04-08 ENCOUNTER — READMISSION MANAGEMENT (OUTPATIENT)
Dept: CALL CENTER | Facility: HOSPITAL | Age: 71
End: 2021-04-08

## 2021-04-08 NOTE — OUTREACH NOTE
COVID-19 Week 4 Survey      Responses   [facility] patient discharged from?  [place]   Does the patient have one of the following disease processes/diagnoses(primary or secondary)?  COVID-19   COVID-19 underlying condition?  Sepsis   Call Number  Call 1   COVID-19 Week 4: Call 1 attempt successful?  No          Torie Clifton RN

## 2021-04-12 ENCOUNTER — HOSPITAL ENCOUNTER (INPATIENT)
Facility: HOSPITAL | Age: 71
LOS: 7 days | Discharge: HOSPICE | End: 2021-04-19
Attending: INTERNAL MEDICINE | Admitting: HOSPITALIST

## 2021-04-12 DIAGNOSIS — D62 ACUTE BLOOD LOSS ANEMIA: Primary | ICD-10-CM

## 2021-04-12 DIAGNOSIS — K92.1 GASTROINTESTINAL HEMORRHAGE WITH MELENA: ICD-10-CM

## 2021-04-12 LAB
ABO GROUP BLD: NORMAL
ALBUMIN SERPL-MCNC: 2.9 G/DL (ref 3.5–5.2)
ALBUMIN/GLOB SERPL: 0.7 G/DL
ALP SERPL-CCNC: 123 U/L (ref 39–117)
ALT SERPL W P-5'-P-CCNC: 40 U/L (ref 1–41)
AMMONIA BLD-SCNC: 50 UMOL/L (ref 16–60)
ANION GAP SERPL CALCULATED.3IONS-SCNC: 10 MMOL/L (ref 5–15)
AST SERPL-CCNC: 85 U/L (ref 1–40)
BASOPHILS # BLD AUTO: 0.01 10*3/MM3 (ref 0–0.2)
BASOPHILS NFR BLD AUTO: 0.2 % (ref 0–1.5)
BILIRUB SERPL-MCNC: 1.1 MG/DL (ref 0–1.2)
BILIRUB UR QL STRIP: NEGATIVE
BLD GP AB SCN SERPL QL: NEGATIVE
BUN SERPL-MCNC: 20 MG/DL (ref 8–23)
BUN/CREAT SERPL: 20.6 (ref 7–25)
CALCIUM SPEC-SCNC: 8.7 MG/DL (ref 8.6–10.5)
CHLORIDE SERPL-SCNC: 92 MMOL/L (ref 98–107)
CLARITY UR: CLEAR
CO2 SERPL-SCNC: 25 MMOL/L (ref 22–29)
COLOR UR: YELLOW
CORTIS AM PEAK SERPL-MCNC: 11.66 MCG/DL
CREAT SERPL-MCNC: 0.97 MG/DL (ref 0.76–1.27)
DEPRECATED RDW RBC AUTO: 61.5 FL (ref 37–54)
EOSINOPHIL # BLD AUTO: 0.08 10*3/MM3 (ref 0–0.4)
EOSINOPHIL NFR BLD AUTO: 1.6 % (ref 0.3–6.2)
ERYTHROCYTE [DISTWIDTH] IN BLOOD BY AUTOMATED COUNT: 16.8 % (ref 12.3–15.4)
FERRITIN SERPL-MCNC: 133.1 NG/ML (ref 30–400)
GFR SERPL CREATININE-BSD FRML MDRD: 77 ML/MIN/1.73
GLOBULIN UR ELPH-MCNC: 4.2 GM/DL
GLUCOSE SERPL-MCNC: 101 MG/DL (ref 65–99)
GLUCOSE UR STRIP-MCNC: NEGATIVE MG/DL
HBA1C MFR BLD: 4.5 % (ref 4.8–5.6)
HCT VFR BLD AUTO: 18.7 % (ref 37.5–51)
HGB BLD-MCNC: 5.6 G/DL (ref 13–17.7)
HGB UR QL STRIP.AUTO: NEGATIVE
IMM GRANULOCYTES # BLD AUTO: 0.04 10*3/MM3 (ref 0–0.05)
IMM GRANULOCYTES NFR BLD AUTO: 0.8 % (ref 0–0.5)
INR PPP: 1.75 (ref 0.85–1.16)
IRON 24H UR-MRATE: 45 MCG/DL (ref 59–158)
IRON SATN MFR SERPL: 11 % (ref 20–50)
KETONES UR QL STRIP: NEGATIVE
LEUKOCYTE ESTERASE UR QL STRIP.AUTO: NEGATIVE
LYMPHOCYTES # BLD AUTO: 0.54 10*3/MM3 (ref 0.7–3.1)
LYMPHOCYTES NFR BLD AUTO: 11.1 % (ref 19.6–45.3)
MCH RBC QN AUTO: 30.1 PG (ref 26.6–33)
MCHC RBC AUTO-ENTMCNC: 29.9 G/DL (ref 31.5–35.7)
MCV RBC AUTO: 100.5 FL (ref 79–97)
MONOCYTES # BLD AUTO: 0.62 10*3/MM3 (ref 0.1–0.9)
MONOCYTES NFR BLD AUTO: 12.7 % (ref 5–12)
NEUTROPHILS NFR BLD AUTO: 3.59 10*3/MM3 (ref 1.7–7)
NEUTROPHILS NFR BLD AUTO: 73.6 % (ref 42.7–76)
NITRITE UR QL STRIP: NEGATIVE
NRBC BLD AUTO-RTO: 0 /100 WBC (ref 0–0.2)
NT-PROBNP SERPL-MCNC: 391.4 PG/ML (ref 0–900)
PH UR STRIP.AUTO: 5.5 [PH] (ref 5–8)
PLATELET # BLD AUTO: 87 10*3/MM3 (ref 140–450)
PMV BLD AUTO: 10.3 FL (ref 6–12)
POTASSIUM SERPL-SCNC: 3.8 MMOL/L (ref 3.5–5.2)
PROT SERPL-MCNC: 7.1 G/DL (ref 6–8.5)
PROT UR QL STRIP: ABNORMAL
PROTHROMBIN TIME: 19.7 SECONDS (ref 11.4–14.4)
RBC # BLD AUTO: 1.86 10*6/MM3 (ref 4.14–5.8)
RH BLD: POSITIVE
SODIUM SERPL-SCNC: 127 MMOL/L (ref 136–145)
SP GR UR STRIP: 1.01 (ref 1–1.03)
T&S EXPIRATION DATE: NORMAL
TIBC SERPL-MCNC: 428 MCG/DL (ref 298–536)
TRANSFERRIN SERPL-MCNC: 287 MG/DL (ref 200–360)
UROBILINOGEN UR QL STRIP: ABNORMAL
WBC # BLD AUTO: 4.88 10*3/MM3 (ref 3.4–10.8)

## 2021-04-12 PROCEDURE — 94799 UNLISTED PULMONARY SVC/PX: CPT

## 2021-04-12 PROCEDURE — 85014 HEMATOCRIT: CPT | Performed by: NURSE PRACTITIONER

## 2021-04-12 PROCEDURE — 87205 SMEAR GRAM STAIN: CPT | Performed by: PHYSICIAN ASSISTANT

## 2021-04-12 PROCEDURE — 85025 COMPLETE CBC W/AUTO DIFF WBC: CPT | Performed by: NURSE PRACTITIONER

## 2021-04-12 PROCEDURE — 82728 ASSAY OF FERRITIN: CPT | Performed by: NURSE PRACTITIONER

## 2021-04-12 PROCEDURE — 85610 PROTHROMBIN TIME: CPT | Performed by: PHYSICIAN ASSISTANT

## 2021-04-12 PROCEDURE — 94640 AIRWAY INHALATION TREATMENT: CPT

## 2021-04-12 PROCEDURE — 80053 COMPREHEN METABOLIC PANEL: CPT | Performed by: NURSE PRACTITIONER

## 2021-04-12 PROCEDURE — 84156 ASSAY OF PROTEIN URINE: CPT | Performed by: PHYSICIAN ASSISTANT

## 2021-04-12 PROCEDURE — 87899 AGENT NOS ASSAY W/OPTIC: CPT | Performed by: PHYSICIAN ASSISTANT

## 2021-04-12 PROCEDURE — G0378 HOSPITAL OBSERVATION PER HR: HCPCS

## 2021-04-12 PROCEDURE — 86900 BLOOD TYPING SEROLOGIC ABO: CPT | Performed by: NURSE PRACTITIONER

## 2021-04-12 PROCEDURE — 84300 ASSAY OF URINE SODIUM: CPT | Performed by: PHYSICIAN ASSISTANT

## 2021-04-12 PROCEDURE — 86901 BLOOD TYPING SEROLOGIC RH(D): CPT | Performed by: NURSE PRACTITIONER

## 2021-04-12 PROCEDURE — 82105 ALPHA-FETOPROTEIN SERUM: CPT | Performed by: PHYSICIAN ASSISTANT

## 2021-04-12 PROCEDURE — 83540 ASSAY OF IRON: CPT | Performed by: NURSE PRACTITIONER

## 2021-04-12 PROCEDURE — 82533 TOTAL CORTISOL: CPT | Performed by: PHYSICIAN ASSISTANT

## 2021-04-12 PROCEDURE — 86850 RBC ANTIBODY SCREEN: CPT | Performed by: NURSE PRACTITIONER

## 2021-04-12 PROCEDURE — 99220 PR INITIAL OBSERVATION CARE/DAY 70 MINUTES: CPT | Performed by: FAMILY MEDICINE

## 2021-04-12 PROCEDURE — 82140 ASSAY OF AMMONIA: CPT | Performed by: PHYSICIAN ASSISTANT

## 2021-04-12 PROCEDURE — 82747 ASSAY OF FOLIC ACID RBC: CPT | Performed by: NURSE PRACTITIONER

## 2021-04-12 PROCEDURE — 86923 COMPATIBILITY TEST ELECTRIC: CPT

## 2021-04-12 PROCEDURE — 36430 TRANSFUSION BLD/BLD COMPNT: CPT

## 2021-04-12 PROCEDURE — 82607 VITAMIN B-12: CPT | Performed by: NURSE PRACTITIONER

## 2021-04-12 PROCEDURE — 83036 HEMOGLOBIN GLYCOSYLATED A1C: CPT | Performed by: NURSE PRACTITIONER

## 2021-04-12 PROCEDURE — 81003 URINALYSIS AUTO W/O SCOPE: CPT | Performed by: PHYSICIAN ASSISTANT

## 2021-04-12 PROCEDURE — 82570 ASSAY OF URINE CREATININE: CPT | Performed by: PHYSICIAN ASSISTANT

## 2021-04-12 PROCEDURE — 83935 ASSAY OF URINE OSMOLALITY: CPT | Performed by: PHYSICIAN ASSISTANT

## 2021-04-12 PROCEDURE — 83880 ASSAY OF NATRIURETIC PEPTIDE: CPT | Performed by: PHYSICIAN ASSISTANT

## 2021-04-12 PROCEDURE — 86900 BLOOD TYPING SEROLOGIC ABO: CPT

## 2021-04-12 PROCEDURE — 84466 ASSAY OF TRANSFERRIN: CPT | Performed by: NURSE PRACTITIONER

## 2021-04-12 PROCEDURE — P9016 RBC LEUKOCYTES REDUCED: HCPCS

## 2021-04-12 RX ORDER — CEFTAZIDIME 1 G/1
2 INJECTION, POWDER, FOR SOLUTION INTRAMUSCULAR; INTRAVENOUS 3 TIMES DAILY
Status: DISCONTINUED | OUTPATIENT
Start: 2021-04-12 | End: 2021-04-12

## 2021-04-12 RX ORDER — IPRATROPIUM BROMIDE AND ALBUTEROL SULFATE 2.5; .5 MG/3ML; MG/3ML
3 SOLUTION RESPIRATORY (INHALATION) EVERY 4 HOURS PRN
Status: DISCONTINUED | OUTPATIENT
Start: 2021-04-12 | End: 2021-04-13

## 2021-04-12 RX ORDER — TAMSULOSIN HYDROCHLORIDE 0.4 MG/1
0.4 CAPSULE ORAL DAILY
Status: DISCONTINUED | OUTPATIENT
Start: 2021-04-13 | End: 2021-04-19 | Stop reason: HOSPADM

## 2021-04-12 RX ORDER — CARVEDILOL 12.5 MG/1
12.5 TABLET ORAL EVERY 12 HOURS SCHEDULED
Status: DISCONTINUED | OUTPATIENT
Start: 2021-04-12 | End: 2021-04-19 | Stop reason: HOSPADM

## 2021-04-12 RX ORDER — AMOXICILLIN 250 MG
2 CAPSULE ORAL 2 TIMES DAILY
Status: DISCONTINUED | OUTPATIENT
Start: 2021-04-12 | End: 2021-04-19 | Stop reason: HOSPADM

## 2021-04-12 RX ORDER — LACTULOSE 10 G/15ML
15 SOLUTION ORAL 3 TIMES DAILY
Status: DISCONTINUED | OUTPATIENT
Start: 2021-04-12 | End: 2021-04-19 | Stop reason: HOSPADM

## 2021-04-12 RX ORDER — BISACODYL 5 MG/1
5 TABLET, DELAYED RELEASE ORAL DAILY PRN
Status: DISCONTINUED | OUTPATIENT
Start: 2021-04-12 | End: 2021-04-19 | Stop reason: HOSPADM

## 2021-04-12 RX ORDER — ACETAMINOPHEN 650 MG/1
650 SUPPOSITORY RECTAL EVERY 4 HOURS PRN
Status: DISCONTINUED | OUTPATIENT
Start: 2021-04-12 | End: 2021-04-19 | Stop reason: HOSPADM

## 2021-04-12 RX ORDER — CALCIUM CARBONATE 200(500)MG
2 TABLET,CHEWABLE ORAL 2 TIMES DAILY PRN
Status: DISCONTINUED | OUTPATIENT
Start: 2021-04-12 | End: 2021-04-19 | Stop reason: HOSPADM

## 2021-04-12 RX ORDER — PANTOPRAZOLE SODIUM 40 MG/1
40 TABLET, DELAYED RELEASE ORAL 2 TIMES DAILY
Status: DISCONTINUED | OUTPATIENT
Start: 2021-04-12 | End: 2021-04-12

## 2021-04-12 RX ORDER — TERAZOSIN 2 MG/1
2 CAPSULE ORAL NIGHTLY
Status: DISCONTINUED | OUTPATIENT
Start: 2021-04-12 | End: 2021-04-19 | Stop reason: HOSPADM

## 2021-04-12 RX ORDER — SACCHAROMYCES BOULARDII 250 MG
250 CAPSULE ORAL 2 TIMES DAILY
Status: DISCONTINUED | OUTPATIENT
Start: 2021-04-12 | End: 2021-04-19 | Stop reason: HOSPADM

## 2021-04-12 RX ORDER — ONDANSETRON 4 MG/1
4 TABLET, FILM COATED ORAL EVERY 6 HOURS PRN
Status: DISCONTINUED | OUTPATIENT
Start: 2021-04-12 | End: 2021-04-19 | Stop reason: HOSPADM

## 2021-04-12 RX ORDER — DIPHENOXYLATE HYDROCHLORIDE AND ATROPINE SULFATE 2.5; .025 MG/1; MG/1
1 TABLET ORAL DAILY
Status: DISCONTINUED | OUTPATIENT
Start: 2021-04-13 | End: 2021-04-19 | Stop reason: HOSPADM

## 2021-04-12 RX ORDER — SODIUM CHLORIDE 0.9 % (FLUSH) 0.9 %
10 SYRINGE (ML) INJECTION EVERY 12 HOURS SCHEDULED
Status: DISCONTINUED | OUTPATIENT
Start: 2021-04-12 | End: 2021-04-19 | Stop reason: HOSPADM

## 2021-04-12 RX ORDER — SODIUM CHLORIDE 0.9 % (FLUSH) 0.9 %
10 SYRINGE (ML) INJECTION AS NEEDED
Status: DISCONTINUED | OUTPATIENT
Start: 2021-04-12 | End: 2021-04-19 | Stop reason: HOSPADM

## 2021-04-12 RX ORDER — ACETAMINOPHEN 325 MG/1
650 TABLET ORAL EVERY 4 HOURS PRN
Status: DISCONTINUED | OUTPATIENT
Start: 2021-04-12 | End: 2021-04-19 | Stop reason: HOSPADM

## 2021-04-12 RX ORDER — BISACODYL 10 MG
10 SUPPOSITORY, RECTAL RECTAL DAILY PRN
Status: DISCONTINUED | OUTPATIENT
Start: 2021-04-12 | End: 2021-04-19 | Stop reason: HOSPADM

## 2021-04-12 RX ORDER — HYDROCORTISONE 25 MG/G
1 CREAM TOPICAL 2 TIMES DAILY
COMMUNITY
End: 2021-04-19 | Stop reason: HOSPADM

## 2021-04-12 RX ORDER — CHOLECALCIFEROL (VITAMIN D3) 125 MCG
5 CAPSULE ORAL NIGHTLY
Status: DISCONTINUED | OUTPATIENT
Start: 2021-04-12 | End: 2021-04-19 | Stop reason: HOSPADM

## 2021-04-12 RX ORDER — ACETAMINOPHEN 160 MG/5ML
650 SOLUTION ORAL EVERY 4 HOURS PRN
Status: DISCONTINUED | OUTPATIENT
Start: 2021-04-12 | End: 2021-04-19 | Stop reason: HOSPADM

## 2021-04-12 RX ORDER — CITALOPRAM 40 MG/1
40 TABLET ORAL DAILY
Status: DISCONTINUED | OUTPATIENT
Start: 2021-04-13 | End: 2021-04-19 | Stop reason: HOSPADM

## 2021-04-12 RX ORDER — POTASSIUM CHLORIDE 1.5 G/1.77G
40 POWDER, FOR SOLUTION ORAL AS NEEDED
Status: DISCONTINUED | OUTPATIENT
Start: 2021-04-12 | End: 2021-04-19 | Stop reason: HOSPADM

## 2021-04-12 RX ORDER — FERROUS SULFATE 325(65) MG
325 TABLET ORAL
Status: DISCONTINUED | OUTPATIENT
Start: 2021-04-13 | End: 2021-04-19 | Stop reason: HOSPADM

## 2021-04-12 RX ORDER — DOXAZOSIN MESYLATE 1 MG/1
TABLET ORAL 2 TIMES DAILY
COMMUNITY

## 2021-04-12 RX ORDER — MAGNESIUM SULFATE HEPTAHYDRATE 40 MG/ML
4 INJECTION, SOLUTION INTRAVENOUS AS NEEDED
Status: DISCONTINUED | OUTPATIENT
Start: 2021-04-12 | End: 2021-04-19 | Stop reason: HOSPADM

## 2021-04-12 RX ORDER — PANTOPRAZOLE SODIUM 40 MG/10ML
40 INJECTION, POWDER, LYOPHILIZED, FOR SOLUTION INTRAVENOUS
Status: DISCONTINUED | OUTPATIENT
Start: 2021-04-13 | End: 2021-04-19 | Stop reason: HOSPADM

## 2021-04-12 RX ORDER — BUDESONIDE AND FORMOTEROL FUMARATE DIHYDRATE 160; 4.5 UG/1; UG/1
2 AEROSOL RESPIRATORY (INHALATION)
Status: DISCONTINUED | OUTPATIENT
Start: 2021-04-12 | End: 2021-04-19 | Stop reason: HOSPADM

## 2021-04-12 RX ORDER — MAGNESIUM SULFATE HEPTAHYDRATE 40 MG/ML
2 INJECTION, SOLUTION INTRAVENOUS AS NEEDED
Status: DISCONTINUED | OUTPATIENT
Start: 2021-04-12 | End: 2021-04-19 | Stop reason: HOSPADM

## 2021-04-12 RX ORDER — BUMETANIDE 0.25 MG/ML
2 INJECTION INTRAMUSCULAR; INTRAVENOUS ONCE
Status: COMPLETED | OUTPATIENT
Start: 2021-04-12 | End: 2021-04-13

## 2021-04-12 RX ORDER — POTASSIUM CHLORIDE 7.45 MG/ML
10 INJECTION INTRAVENOUS
Status: DISCONTINUED | OUTPATIENT
Start: 2021-04-12 | End: 2021-04-19 | Stop reason: HOSPADM

## 2021-04-12 RX ORDER — POTASSIUM CHLORIDE 750 MG/1
40 CAPSULE, EXTENDED RELEASE ORAL AS NEEDED
Status: DISCONTINUED | OUTPATIENT
Start: 2021-04-12 | End: 2021-04-19 | Stop reason: HOSPADM

## 2021-04-12 RX ORDER — POLYETHYLENE GLYCOL 3350 17 G/17G
17 POWDER, FOR SOLUTION ORAL DAILY PRN
Status: DISCONTINUED | OUTPATIENT
Start: 2021-04-12 | End: 2021-04-19 | Stop reason: HOSPADM

## 2021-04-12 RX ORDER — ASPIRIN 81 MG/1
81 TABLET ORAL DAILY
Status: DISCONTINUED | OUTPATIENT
Start: 2021-04-13 | End: 2021-04-12

## 2021-04-12 RX ORDER — FUROSEMIDE 40 MG/1
40 TABLET ORAL DAILY
Status: DISCONTINUED | OUTPATIENT
Start: 2021-04-13 | End: 2021-04-13

## 2021-04-12 RX ORDER — CEFTAZIDIME 1 G/1
2 INJECTION, POWDER, FOR SOLUTION INTRAMUSCULAR; INTRAVENOUS 3 TIMES DAILY
COMMUNITY
End: 2021-04-19 | Stop reason: HOSPADM

## 2021-04-12 RX ORDER — ONDANSETRON 2 MG/ML
4 INJECTION INTRAMUSCULAR; INTRAVENOUS EVERY 6 HOURS PRN
Status: DISCONTINUED | OUTPATIENT
Start: 2021-04-12 | End: 2021-04-19 | Stop reason: HOSPADM

## 2021-04-12 RX ADMIN — Medication 250 MG: at 20:21

## 2021-04-12 RX ADMIN — LACTULOSE 15 ML: 20 SOLUTION ORAL at 20:21

## 2021-04-12 RX ADMIN — IPRATROPIUM BROMIDE 0.5 MG: 0.5 SOLUTION RESPIRATORY (INHALATION) at 19:38

## 2021-04-12 RX ADMIN — BUDESONIDE AND FORMOTEROL FUMARATE DIHYDRATE 2 PUFF: 160; 4.5 AEROSOL RESPIRATORY (INHALATION) at 19:38

## 2021-04-12 RX ADMIN — TERAZOSIN HYDROCHLORIDE 2 MG: 2 CAPSULE ORAL at 20:21

## 2021-04-12 RX ADMIN — Medication 5 MG: at 20:21

## 2021-04-12 RX ADMIN — SODIUM CHLORIDE, PRESERVATIVE FREE 10 ML: 5 INJECTION INTRAVENOUS at 20:27

## 2021-04-12 RX ADMIN — RIFAXIMIN 550 MG: 550 TABLET ORAL at 20:20

## 2021-04-12 RX ADMIN — SODIUM CHLORIDE, PRESERVATIVE FREE 10 ML: 5 INJECTION INTRAVENOUS at 22:25

## 2021-04-12 RX ADMIN — PANTOPRAZOLE SODIUM 40 MG: 40 TABLET, DELAYED RELEASE ORAL at 20:20

## 2021-04-12 RX ADMIN — DOCUSATE SODIUM 50MG AND SENNOSIDES 8.6MG 2 TABLET: 8.6; 5 TABLET, FILM COATED ORAL at 20:20

## 2021-04-12 RX ADMIN — CARVEDILOL 12.5 MG: 12.5 TABLET, FILM COATED ORAL at 20:21

## 2021-04-12 NOTE — NURSING NOTE
1756 - Paged Dr Lomax.     1803 - Informed Dr Lomax Pt has arrived and intake complete. She asked me to also page Dr Do.    1804 - Paged Dr Do.    1815 - Informed Dr Do Pt has arrived and intake complete.

## 2021-04-12 NOTE — H&P
[facility] Medicine Services  HISTORY AND PHYSICAL    Patient Name: Derek Kelsey  : 1950  MRN: [id number]  Primary Care Physician: Raudel Zheng MD  Date of admission: 2021    Subjective   Subjective     Chief Complaint:  Shortness of breath     HPI:  Derek Kelsey is a 70 y.o. male with a medical history significant for coronary artery disease, diastolic heart failure, A.fib (not on AC), hypertension, cirrhosis, chronic respiratory failure (2-3L @ home), COPD, and an infected AAA endograft. The patient has been following with ID specialist, Dr. Carrero. He is scheduled to follow up with [facility] on  to discuss options regarding the graft infection. He was recently admitted to our facility on 3/10 for fever, malaise and confusion. He was initially hypotensive and sent to the ICU but responded to fluid resuscitation and was transferred to the floor on day #2. He was treated with fortaz per ID for severe sepsis w/ high grade Pseudomonas bacteremia likely due to AAA endograft infection.  The hospitalization was complicated by a GIB suspected to be hemorrhoids. GI recommended anusol IBD and PPI. Last colonoscopy with Dr. Viveros ~ 2 years ago.     The patient was sent from PCP office for low hemoglobin. History is primarily obtained from the patient's wife due to the patient's significant shortness of air. She reports the patient has had increased shortness of breath, productive cough, wheezing, abdomen, scrotal and leg swelling for the past 1 week. The patient also admits to decreased appetitive, intermittent nausea, loose stools, dark stools, dizziness, weakness and confusion for the past week. The patient says he sleeps propped up on 3 pillows most nights and has been requiring 4L NC throughout the week. Additionally, the wife has noticed a bad odor to the patient's urine for several days and today the patient had some difficulty urinating.  The patient saw  his cardiologist, Dr. Alicia last Monday 4/5 who increased his Lasix from 20 mg BID to 40 mg BID. Cardiology also discontinued amlodipine and started him on doxazosin. However, the patient's swelling continued to worsen.      COVID Details: [] No Symptoms OR  Date of Symptom Onset:  ____ Date of first positive COVID test:        Symptoms: [] Fever []  Cough [] Shortness of breath [] Change in taste or smell       Risks: [] Direct Exposure [] High risk facility [] None known       COVID VACCINE status vaccine has been given     Review of Systems   Constitutional: Positive for appetite change (decreased). Negative for chills, diaphoresis, fatigue and fever.   HENT: Negative for congestion, sore throat and trouble swallowing.    Eyes: Negative for pain and visual disturbance.   Respiratory: Positive for cough (gray), shortness of breath and wheezing. Negative for chest tightness.    Cardiovascular: Positive for leg swelling. Negative for chest pain and palpitations.   Gastrointestinal: Positive for blood in stool (black), diarrhea and nausea. Negative for abdominal pain, constipation and vomiting.   Genitourinary: Positive for difficulty urinating and scrotal swelling. Negative for dysuria.        Urine odor x 2 days   Musculoskeletal: Negative for back pain and gait problem.   Skin: Negative for rash and wound.   Neurological: Positive for dizziness, weakness and light-headedness. Negative for syncope, speech difficulty and headaches.   Hematological: Negative for adenopathy. Does not bruise/bleed easily.   Psychiatric/Behavioral: Positive for confusion (x 1 week). Negative for agitation.      All other systems reviewed and are negative.     Personal History     Past Medical History:   Diagnosis Date   • Abdominal aortic aneurysm (CMS/HCC) 9/29/2016   • Anxiety 9/29/2016   • Arthritis    • Back pain    • Black lung (CMS/HCC)    • CAD (coronary artery disease) 9/29/2016   • Cirrhosis (CMS/HCC)    • COPD (chronic  obstructive pulmonary disease) (CMS/[facility]) 9/29/2016   • Depression 9/29/2016   • Depression    • GERD (gastroesophageal reflux disease)    • Hypertension 9/29/2016   • On supplemental oxygen by nasal cannula     at night   • Vitiligo    • Wears dentures    • Wears reading eyeglasses        Past Surgical History:   Procedure Laterality Date   • ABDOMINAL AORTIC ANEURYSM REPAIR WITH ENDOGRAFT N/A 6/6/2019    Procedure: ABDOMINAL AORTIC ANEURYSM REPAIR WITH ENDOGRAFT;  Surgeon: Leopoldo Burleson MD;  Location:  [facility]BR[facility];  Service: Vascular   • BACK SURGERY     • CARDIAC CATHETERIZATION     • CARDIAC CATHETERIZATION N/A 9/28/2018    Procedure: Left Heart Cath;  Surgeon: Douglas Galvez MD;  Location:  [facility]ATH INVASIVE LOCATION;  Service: Cardiovascular   • CARDIAC SURGERY     • COLONOSCOPY      2015   • COLONOSCOPY N/A 10/30/2019    Procedure: COLONOSCOPY;  Surgeon: Homar Viveros MD;  Location:  [facility] ENDOSCOPY;  Service: Gastroenterology   • ENDOSCOPY N/A 10/30/2019    Procedure: ESOPHAGOGASTRODUODENOSCOPY;  Surgeon: Homar Viveros MD;  Location:  [facility]OSCOPY;  Service: Gastroenterology   • EYE SURGERY      cataracts bilateral   • HAND SURGERY Left    • LUMBAR DISCECTOMY FUSION INSTRUMENTATION N/A 11/1/2018    Procedure: LUMBAR DISCECTOMY POSTERIOR WITH FUSION INSTRUMENTATION;  Surgeon: Joo Franklin MD;  Location:  [facility] OR;  Service: Orthopedic Spine   • LUMBAR DISCECTOMY FUSION INSTRUMENTATION N/A 7/24/2019    Procedure: POSTERIOR LUMBAR SPINAL FUSION REVISION AND INSTRUMENTATION L3,L4,L5,S1;  Surgeon: Joo Franklin MD;  Location:  [facility] OR;  Service: Orthopedic Spine   • ORIF FINGER / THUMB FRACTURE     • SHOULDER SURGERY Left        Family History: family history includes Diabetes in his brother, sister, and sister; Heart disease in his brother, father, mother, sister, and sister; Hypertension in his child, father, mother, sister, and son; No Known Problems in his son;  Stroke in his mother. Otherwise pertinent FHx was reviewed and unremarkable.     Social History:  reports that he quit smoking about 10 years ago. His smoking use included cigarettes. He has a 30.00 pack-year smoking history. He quit smokeless tobacco use about 10 years ago.  His smokeless tobacco use included chew. He reports previous alcohol use. He reports that he does not use drugs.  Social History     Social History Narrative    Lives in [place].    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    Is considered disabled    Has been granted black lung disability benefits    Smoked one pack of cigarettes per day or more his entire adult life up until 2011    Denies alcohol use- quit >8 years ago    Uses walker or cane for ambulation       Medications:  Fluticasone-Umeclidin-Vilant, Hydrocortisone (Perianal), O2, Ondansetron, acetaminophen, aspirin, carvedilol, cefTAZidime, citalopram, doxazosin, ferrous gluconate, furosemide, lactulose, melatonin, multivitamin, pantoprazole, riFAXIMin, saccharomyces boulardii, and tamsulosin    Allergies   Allergen Reactions   • Penicillins Hives     Hives - has tolerated Zosyn and ceftriaxone 09/2019   • Percocet [Oxycodone-Acetaminophen] Confusion       Objective   Objective     Vital Signs:   Temp:  [97.1 °F (36.2 °C)] 97.1 °F (36.2 °C)  Heart Rate:  [65] 65  Resp:  [20] 20  BP: (141)/(70) 141/70  Flow (L/min):  [5] 5    Physical Exam   Constitutional: Awake, alert, chronically ill-appearing, sitting propped up in bed  Eyes: PERRLA, sclerae anicteric, no conjunctival injection  HENT: NCAT, mucous membranes moist, face symmetric  Neck: Supple, no thyromegaly, no lymphadenopathy, trachea midline, no meningeal signs   Respiratory: Expiratory wheeze, labored respirations, unable to speak in complete sentences, 5L NC at 93%  Cardiovascular: RRR, no murmurs appreciated, palpable PT pulses bilaterally  Gastrointestinal: Positive bowel sounds, taut, nontender, significant  distention  Musculoskeletal: 2+ pitting edema extending up beyond knees, no clubbing or cyanosis to extremities  Psychiatric: Appropriate affect, cooperative, thought process and content normal   Neurologic: Oriented x 3, moves all extremities, normal tone, strength and sensation symmetric in all extremities, Cranial Nerves grossly intact to confrontation, speech clear  Skin: Cool dry, turgor normal     Result Review:  I have personally reviewed the results from the time of this admission to 04/12/21 7:05 PM EDT and agree with these findings:  [x]  Laboratory  []  Microbiology  []  Radiology  []  EKG/Telemetry   []  Cardiology/Vascular   []  Pathology  []  Old records  []  Other:  Most notable findings include:   Hemoglobin 5.6, platelets 87.    LAB RESULTS:                              Brief Urine Lab Results  (Last result in the past 365 days)      Color   Clarity   Blood   Leuk Est   Nitrite   Protein   CREAT   Urine HCG        03/10/21 1459 Dark Yellow Clear Negative Trace Negative 30 mg/dL (1+)             Microbiology Results (last 10 days)     ** No results found for the last 240 hours. **          No radiology results from the last 24 hrs    Results for orders placed during the hospital encounter of 03/10/21    Adult Transthoracic Echo Complete W/ Cont if Necessary Per Protocol    Interpretation Summary  · Estimated right ventricular systolic pressure from tricuspid regurgitation is normal (<35 mmHg).  · Estimated left ventricular EF = 65% Left ventricular systolic function is normal.      Assessment/Plan   Assessment & Plan       Symptomatic anemia    Depression    Hypertension    CAD (coronary artery disease)    Moderate COPD    Cirrhosis of liver (CMS/HCC)    On supplemental oxygen by nasal cannula    Infected aortic endograft (CMS/HCC)    Derek Kelsey is a 70 y.o. male with a medical history significant for coronary artery disease, diastolic heart failure, A.fib (not on AC), hypertension, cirrhosis,  chronic respiratory failure (2-3L @ home), COPD, and an infected AAA endograft.     1. Symptomatic anemia   2. Recent GIB  -Hgb 5.6 / hct 18.7. Transfuse 2 units of pRBCs with 2 mg of IV Bumex in between.  -Monitor H&H q4h. Obtain iron studies, stool occult.  -Consult GI for consideration of EGD, follows with Dr. Viveros  -Conintue Protonix BID and iron supplement. Strict I&Os.     3. Infected aortic endograft   4. Pseudomonas bacteremia   -Continue ceftazidime through PICC. Consult Infectious disease, Dr. Carrero.   -Scheduled to follow up with [facility] on 4/19.    5. Acute on chronic respiratory failure   6. COPD  -Currently requiring 5L NC saturating 93%. Uses 2-3L @ home at baseline.  -Obtain CTA of chest to rule out PE or pneumonia   -Continue home inhalers and DuoNebs q4h prn.    7. Cirrhosis   8. Thrombocytopenia  9. Coagulopathy    -Check ammonia, PT/INR and get CT Abdomen pelvis   -Consult GI placed, followed by Dr. Viveros.  -Hold aspirin d/t coagulopathy. Continue rifaximin and lactulose.    10. Hyponatremia  -Sodium 127. Monitor sodium q6h.   -Obtain urine studies and cortisol in the a.m.    11. Diastolic heart failure   -Check proBNP and CTA of chest.   -Recently followed up with his cardiologist, Dr. Black who increased his Lasix to 40 mg BID. Consult cards. Appreciated their input on diuresis.   -Will give 2 mg of IV Bumex between units of blood. Daily weights. Bladder scans PRN.    12. Hypertension   -Blood pressure stable. Continue doxazosin.     13. Atrial fibrillation  -[id number] 4. Not anticoagulated. New onset during last admission d/t sepsis/infection.   -Continue carvedilol     14. Depression   -Continue citalopram.    DVT prophylaxis: SCDs    CODE STATUS:    Code Status and Medical Interventions:   Ordered at: 04/12/21 1644     Limited Support to NOT Include:    Artificial Nutrition    Dialysis    Cardioversion/Defibrillation    Intubation     Level Of Support Discussed With:     Patient     Code Status:    No CPR     Medical Interventions (Level of Support Prior to Arrest):    Limited     This note has been completed as part of a split-shared workflow.   Signature: Electronically signed by Katherine Rowland PA-C, 04/12/21, 7:18 PM EDT.      Attending   Admission Attestation       I have seen and examined the patient, performing an independent face-to-face diagnostic evaluation with plan of care reviewed and developed with the advanced practice clinician (APC).      Brief Summary Statement:   Derek Kelsey is a 70 y.o. male with PMH significant for infection of AAA endograft for which he follows with Dr. Carrero (has appointment schedule at [facility] on 4/19), CAD, dHF, atrial fib (not on AC), HTN, cirrhosis, O2 dependent COPD (2-3 LPM at home).  The patient was here from 3/10/21 through 3/17/21 for sepsis secondary to Pseudomonas bacteremia likely due to endograft infection.  Furthermore, the patient was noted to have a GIB which was at that time thought to be due to hemorrhoids.  GI recommended anusol IBD and PPI.    Today, the patient was sent from outpatient facility for low hemoglobin.  The patient has not noted any GIB but does state his stools have been black for some time due to iron supplementation.  Additionally, the patient complains of increased SOA as well as lightheadedness and intermittent visual changes.  He has also had cough, wheezing and increased BLE edema as well as increased abdominal girth.  He is having 3 pillow orthopnea.      Dr Black increased the patient's lasix to 40 mg BID on 4/5/21 and also changed amlodipine to doxazosin.  The patient's edema has not improved.    Remainder of detailed HPI is as noted by APC and has been reviewed and/or edited by me for completeness.    Attending Physical Exam:  Constitutional: Awake, alert, chronically ill appearing  Eyes: PERRLA, sclerae anicteric, no conjunctival injection  HENT: NCAT, mucous membranes moist  Neck:  Supple, no thyromegaly, no lymphadenopathy, trachea midline  Respiratory: Coarse with wheezing bilaterally, respiratory effort has improved, no longer dyspneic  Cardiovascular: RRR, palpable pedal pulses bilaterally  Gastrointestinal: Positive bowel sounds, protuberant and somewhat distended, nontender  Musculoskeletal: 1-2+ bilateral ankle edema, no clubbing or cyanosis to extremities  Psychiatric: Appropriate affect, cooperative  Neurologic: Oriented x 3, strength symmetric in all extremities, Cranial Nerves grossly intact to confrontation, speech clear  Skin: Pale      Brief Assessment/Plan :  See detailed assessment and plan developed with APC which I have reviewed and/or edited for completeness.        Admission Status: I believe that this patient meets inpatient status due to symptomatic anemia in the setting of likely GI bleed, acute on chronic respiratory failure, and decompensated heart failure and cirrhosis.      Rebekah Do MD  04/12/21

## 2021-04-13 ENCOUNTER — APPOINTMENT (OUTPATIENT)
Dept: CT IMAGING | Facility: HOSPITAL | Age: 71
End: 2021-04-13

## 2021-04-13 PROBLEM — K92.1 GASTROINTESTINAL HEMORRHAGE WITH MELENA: Status: ACTIVE | Noted: 2021-04-12

## 2021-04-13 PROBLEM — D64.9 ANEMIA: Status: ACTIVE | Noted: 2021-04-13

## 2021-04-13 LAB
ALPHA-FETOPROTEIN: 9.19 NG/ML (ref 0–8.3)
ANION GAP SERPL CALCULATED.3IONS-SCNC: 10 MMOL/L (ref 5–15)
BUN SERPL-MCNC: 20 MG/DL (ref 8–23)
BUN/CREAT SERPL: 21.3 (ref 7–25)
CALCIUM SPEC-SCNC: 8.6 MG/DL (ref 8.6–10.5)
CHLORIDE SERPL-SCNC: 94 MMOL/L (ref 98–107)
CO2 SERPL-SCNC: 24 MMOL/L (ref 22–29)
CREAT SERPL-MCNC: 0.94 MG/DL (ref 0.76–1.27)
CREAT UR-MCNC: 73.8 MG/DL
DEPRECATED RDW RBC AUTO: 58.4 FL (ref 37–54)
EOSINOPHIL SPEC QL MICRO: 0 % EOS/100 CELLS (ref 0–0)
ERYTHROCYTE [DISTWIDTH] IN BLOOD BY AUTOMATED COUNT: 16.7 % (ref 12.3–15.4)
GFR SERPL CREATININE-BSD FRML MDRD: 80 ML/MIN/1.73
GLUCOSE SERPL-MCNC: 106 MG/DL (ref 65–99)
HCT VFR BLD AUTO: 19.1 % (ref 37.5–51)
HCT VFR BLD AUTO: 22.7 % (ref 37.5–51)
HCT VFR BLD AUTO: 23.2 % (ref 37.5–51)
HEMOCCULT STL QL: POSITIVE
HGB BLD-MCNC: 5.9 G/DL (ref 13–17.7)
HGB BLD-MCNC: 7 G/DL (ref 13–17.7)
HGB BLD-MCNC: 7.2 G/DL (ref 13–17.7)
INR PPP: 1.66 (ref 0.85–1.16)
L PNEUMO1 AG UR QL IA: NEGATIVE
MAGNESIUM SERPL-MCNC: 1.6 MG/DL (ref 1.6–2.4)
MCH RBC QN AUTO: 29.9 PG (ref 26.6–33)
MCHC RBC AUTO-ENTMCNC: 30.8 G/DL (ref 31.5–35.7)
MCV RBC AUTO: 97 FL (ref 79–97)
OSMOLALITY SERPL: 277 MOSM/KG (ref 275–295)
OSMOLALITY UR: 278 MOSM/KG (ref 300–1100)
PLATELET # BLD AUTO: 77 10*3/MM3 (ref 140–450)
PMV BLD AUTO: 10.6 FL (ref 6–12)
POTASSIUM SERPL-SCNC: 3.6 MMOL/L (ref 3.5–5.2)
PROCALCITONIN SERPL-MCNC: 0.16 NG/ML (ref 0–0.25)
PROT UR-MCNC: 25.8 MG/DL
PROTHROMBIN TIME: 19 SECONDS (ref 11.4–14.4)
RBC # BLD AUTO: 2.34 10*6/MM3 (ref 4.14–5.8)
S PNEUM AG SPEC QL LA: NEGATIVE
SODIUM SERPL-SCNC: 127 MMOL/L (ref 136–145)
SODIUM SERPL-SCNC: 128 MMOL/L (ref 136–145)
SODIUM UR-SCNC: <20 MMOL/L
VIT B12 BLD-MCNC: >2000 PG/ML (ref 211–946)
WBC # BLD AUTO: 4.32 10*3/MM3 (ref 3.4–10.8)

## 2021-04-13 PROCEDURE — 97116 GAIT TRAINING THERAPY: CPT

## 2021-04-13 PROCEDURE — P9016 RBC LEUKOCYTES REDUCED: HCPCS

## 2021-04-13 PROCEDURE — 99222 1ST HOSP IP/OBS MODERATE 55: CPT | Performed by: PHYSICIAN ASSISTANT

## 2021-04-13 PROCEDURE — 83930 ASSAY OF BLOOD OSMOLALITY: CPT | Performed by: PHYSICIAN ASSISTANT

## 2021-04-13 PROCEDURE — 94799 UNLISTED PULMONARY SVC/PX: CPT

## 2021-04-13 PROCEDURE — 85027 COMPLETE CBC AUTOMATED: CPT | Performed by: NURSE PRACTITIONER

## 2021-04-13 PROCEDURE — 87899 AGENT NOS ASSAY W/OPTIC: CPT | Performed by: PHYSICIAN ASSISTANT

## 2021-04-13 PROCEDURE — 25010000003 MAGNESIUM SULFATE 4 GM/100ML SOLUTION: Performed by: NURSE PRACTITIONER

## 2021-04-13 PROCEDURE — 85018 HEMOGLOBIN: CPT | Performed by: PHYSICIAN ASSISTANT

## 2021-04-13 PROCEDURE — 25010000002 OCTREOTIDE PER 25 MCG: Performed by: PHYSICIAN ASSISTANT

## 2021-04-13 PROCEDURE — 71275 CT ANGIOGRAPHY CHEST: CPT

## 2021-04-13 PROCEDURE — 94640 AIRWAY INHALATION TREATMENT: CPT

## 2021-04-13 PROCEDURE — 36430 TRANSFUSION BLD/BLD COMPNT: CPT

## 2021-04-13 PROCEDURE — 99233 SBSQ HOSP IP/OBS HIGH 50: CPT | Performed by: INTERNAL MEDICINE

## 2021-04-13 PROCEDURE — 25010000002 CEFTAZIDIME PER 500 MG: Performed by: INTERNAL MEDICINE

## 2021-04-13 PROCEDURE — 97162 PT EVAL MOD COMPLEX 30 MIN: CPT

## 2021-04-13 PROCEDURE — 74176 CT ABD & PELVIS W/O CONTRAST: CPT

## 2021-04-13 PROCEDURE — 86900 BLOOD TYPING SEROLOGIC ABO: CPT

## 2021-04-13 PROCEDURE — 80048 BASIC METABOLIC PNL TOTAL CA: CPT | Performed by: INTERNAL MEDICINE

## 2021-04-13 PROCEDURE — 85610 PROTHROMBIN TIME: CPT | Performed by: PHYSICIAN ASSISTANT

## 2021-04-13 PROCEDURE — 0 IOPAMIDOL PER 1 ML: Performed by: FAMILY MEDICINE

## 2021-04-13 PROCEDURE — 84295 ASSAY OF SERUM SODIUM: CPT | Performed by: PHYSICIAN ASSISTANT

## 2021-04-13 PROCEDURE — 25010000002 CEFTAZIDIME PER 500 MG: Performed by: NURSE PRACTITIONER

## 2021-04-13 PROCEDURE — 85014 HEMATOCRIT: CPT | Performed by: INTERNAL MEDICINE

## 2021-04-13 PROCEDURE — 25010000003 PHYTONADIONE 10 MG/ML SOLUTION 1 ML AMPULE: Performed by: INTERNAL MEDICINE

## 2021-04-13 PROCEDURE — 83735 ASSAY OF MAGNESIUM: CPT | Performed by: INTERNAL MEDICINE

## 2021-04-13 PROCEDURE — 82272 OCCULT BLD FECES 1-3 TESTS: CPT | Performed by: FAMILY MEDICINE

## 2021-04-13 PROCEDURE — 97535 SELF CARE MNGMENT TRAINING: CPT

## 2021-04-13 PROCEDURE — 85018 HEMOGLOBIN: CPT | Performed by: INTERNAL MEDICINE

## 2021-04-13 PROCEDURE — 85014 HEMATOCRIT: CPT | Performed by: PHYSICIAN ASSISTANT

## 2021-04-13 PROCEDURE — 97166 OT EVAL MOD COMPLEX 45 MIN: CPT

## 2021-04-13 PROCEDURE — 84145 PROCALCITONIN (PCT): CPT | Performed by: PHYSICIAN ASSISTANT

## 2021-04-13 RX ORDER — IPRATROPIUM BROMIDE AND ALBUTEROL SULFATE 2.5; .5 MG/3ML; MG/3ML
3 SOLUTION RESPIRATORY (INHALATION) EVERY 4 HOURS PRN
Status: DISCONTINUED | OUTPATIENT
Start: 2021-04-13 | End: 2021-04-19 | Stop reason: HOSPADM

## 2021-04-13 RX ORDER — BUMETANIDE 0.25 MG/ML
2 INJECTION INTRAMUSCULAR; INTRAVENOUS ONCE
Status: COMPLETED | OUTPATIENT
Start: 2021-04-13 | End: 2021-04-13

## 2021-04-13 RX ORDER — FUROSEMIDE 40 MG/1
40 TABLET ORAL
Status: DISCONTINUED | OUTPATIENT
Start: 2021-04-13 | End: 2021-04-14

## 2021-04-13 RX ADMIN — TAMSULOSIN HYDROCHLORIDE 0.4 MG: 0.4 CAPSULE ORAL at 08:07

## 2021-04-13 RX ADMIN — CARVEDILOL 12.5 MG: 12.5 TABLET, FILM COATED ORAL at 08:07

## 2021-04-13 RX ADMIN — CEFTAZIDIME 2 G: 2 INJECTION, POWDER, FOR SOLUTION INTRAVENOUS at 08:16

## 2021-04-13 RX ADMIN — LACTULOSE 15 ML: 20 SOLUTION ORAL at 08:08

## 2021-04-13 RX ADMIN — IPRATROPIUM BROMIDE 0.5 MG: 0.5 SOLUTION RESPIRATORY (INHALATION) at 15:56

## 2021-04-13 RX ADMIN — SODIUM CHLORIDE, PRESERVATIVE FREE 10 ML: 5 INJECTION INTRAVENOUS at 11:22

## 2021-04-13 RX ADMIN — IPRATROPIUM BROMIDE 0.5 MG: 0.5 SOLUTION RESPIRATORY (INHALATION) at 07:38

## 2021-04-13 RX ADMIN — LACTULOSE 15 ML: 20 SOLUTION ORAL at 22:49

## 2021-04-13 RX ADMIN — OCTREOTIDE ACETATE 50 MCG/HR: 500 INJECTION, SOLUTION INTRAVENOUS; SUBCUTANEOUS at 12:55

## 2021-04-13 RX ADMIN — POTASSIUM CHLORIDE 40 MEQ: 750 CAPSULE, EXTENDED RELEASE ORAL at 11:23

## 2021-04-13 RX ADMIN — BUDESONIDE AND FORMOTEROL FUMARATE DIHYDRATE 2 PUFF: 160; 4.5 AEROSOL RESPIRATORY (INHALATION) at 07:38

## 2021-04-13 RX ADMIN — TERAZOSIN HYDROCHLORIDE 2 MG: 2 CAPSULE ORAL at 22:52

## 2021-04-13 RX ADMIN — POTASSIUM CHLORIDE 40 MEQ: 750 CAPSULE, EXTENDED RELEASE ORAL at 16:00

## 2021-04-13 RX ADMIN — DOCUSATE SODIUM 50MG AND SENNOSIDES 8.6MG 2 TABLET: 8.6; 5 TABLET, FILM COATED ORAL at 08:07

## 2021-04-13 RX ADMIN — THERA TABS 1 TABLET: TAB at 08:07

## 2021-04-13 RX ADMIN — PANTOPRAZOLE SODIUM 40 MG: 40 INJECTION, POWDER, FOR SOLUTION INTRAVENOUS at 08:07

## 2021-04-13 RX ADMIN — BUDESONIDE AND FORMOTEROL FUMARATE DIHYDRATE 2 PUFF: 160; 4.5 AEROSOL RESPIRATORY (INHALATION) at 21:37

## 2021-04-13 RX ADMIN — RIFAXIMIN 550 MG: 550 TABLET ORAL at 08:07

## 2021-04-13 RX ADMIN — SODIUM CHLORIDE, PRESERVATIVE FREE 10 ML: 5 INJECTION INTRAVENOUS at 08:08

## 2021-04-13 RX ADMIN — LACTULOSE 15 ML: 20 SOLUTION ORAL at 16:00

## 2021-04-13 RX ADMIN — Medication 250 MG: at 08:07

## 2021-04-13 RX ADMIN — PHYTONADIONE 10 MG: 10 INJECTION, EMULSION INTRAMUSCULAR; INTRAVENOUS; SUBCUTANEOUS at 11:22

## 2021-04-13 RX ADMIN — FUROSEMIDE 40 MG: 40 TABLET ORAL at 08:07

## 2021-04-13 RX ADMIN — MAGNESIUM SULFATE HEPTAHYDRATE 4 G: 40 INJECTION, SOLUTION INTRAVENOUS at 12:55

## 2021-04-13 RX ADMIN — CARVEDILOL 12.5 MG: 12.5 TABLET, FILM COATED ORAL at 22:48

## 2021-04-13 RX ADMIN — CEFTAZIDIME 2 G: 2 INJECTION, POWDER, FOR SOLUTION INTRAVENOUS at 01:06

## 2021-04-13 RX ADMIN — PANTOPRAZOLE SODIUM 40 MG: 40 INJECTION, POWDER, FOR SOLUTION INTRAVENOUS at 18:44

## 2021-04-13 RX ADMIN — Medication 250 MG: at 22:46

## 2021-04-13 RX ADMIN — RIFAXIMIN 550 MG: 550 TABLET ORAL at 22:48

## 2021-04-13 RX ADMIN — SODIUM CHLORIDE, PRESERVATIVE FREE 10 ML: 5 INJECTION INTRAVENOUS at 22:49

## 2021-04-13 RX ADMIN — CITALOPRAM 40 MG: 40 TABLET ORAL at 08:06

## 2021-04-13 RX ADMIN — CEFTAZIDIME 2 G: 2 INJECTION, POWDER, FOR SOLUTION INTRAVENOUS at 22:45

## 2021-04-13 RX ADMIN — BUMETANIDE 2 MG: 0.25 INJECTION INTRAMUSCULAR; INTRAVENOUS at 11:23

## 2021-04-13 RX ADMIN — IOPAMIDOL 60 ML: 755 INJECTION, SOLUTION INTRAVENOUS at 01:48

## 2021-04-13 RX ADMIN — IPRATROPIUM BROMIDE 0.5 MG: 0.5 SOLUTION RESPIRATORY (INHALATION) at 21:37

## 2021-04-13 RX ADMIN — FUROSEMIDE 40 MG: 40 TABLET ORAL at 22:48

## 2021-04-13 RX ADMIN — CEFTAZIDIME 2 G: 2 INJECTION, POWDER, FOR SOLUTION INTRAVENOUS at 13:43

## 2021-04-13 RX ADMIN — BUMETANIDE 2 MG: 0.25 INJECTION INTRAMUSCULAR; INTRAVENOUS at 01:06

## 2021-04-13 RX ADMIN — SODIUM CHLORIDE, PRESERVATIVE FREE 10 ML: 5 INJECTION INTRAVENOUS at 22:52

## 2021-04-13 RX ADMIN — IPRATROPIUM BROMIDE 0.5 MG: 0.5 SOLUTION RESPIRATORY (INHALATION) at 11:47

## 2021-04-13 RX ADMIN — FERROUS SULFATE TAB 325 MG (65 MG ELEMENTAL FE) 325 MG: 325 (65 FE) TAB at 08:07

## 2021-04-13 RX ADMIN — DOCUSATE SODIUM 50MG AND SENNOSIDES 8.6MG 2 TABLET: 8.6; 5 TABLET, FILM COATED ORAL at 22:47

## 2021-04-13 RX ADMIN — Medication 5 MG: at 22:49

## 2021-04-13 NOTE — CONSULTS
Clinical Nutrition     Reason for Visit:   Reduced oral intake, Consult    Patient Name: Derek Kelsey  YOB: 1950  MRN: [id number]  Date of Encounter: 04/13/21 10:26 EDT  Admission date: 4/12/2021    Comments: Consult received and chart reviewed. Unable to clarify any weight loss d/t fluid gain. Decreased appetite/intake x2 weeks prior to admission. Noted palliative involvement.     RD will continue to follow per protocol    Nutrition Assessment   Assessment     Admission diagnosis  Dyspnea    Additional diagnosis/conditions/procedures this admission  Recent admission from 3/10 - 3/17 for sepsis 2/2 bacteremia    Symptomatic anemia  Edema - +3 abdomen/general, +2 BL LE and BL UE  Acute/chronic respiratory failure  Infected aortic endograft  Pseudomonas bacteremia  Thrombocytopenia  Hyponatremia  (4/14) EGD pending    Additional PMH/procedures:  CAD  AAA  Afib  HTN  Cirrhosis  GERD  Esophageal varices  Portal hypertensive gastropathy  Depression  COPD  Back pain  Black lung  Anxiety    AAA (6/2019)  Back surgery  L shoulder surgery      Reported/Observed/Food/Nutrition Related History:     4/13) Patient on the phone at visit. Majority of information provided by wife at bedside. Wife reports patient has had a decreased appetite over the past month, but worse in the past 2 weeks prior to admission. Patient has been eating bites of meals for the past 2 weeks. Prior to wife reports patient was eating canned pork and beans and ice cream x3-4 times daily. Wife tried to monitor sodium intake and cook for patient at home but states he was having taste changes, did not want many meat products. She reports early satiety. Son is a kinesiology major and was encouraging wife to make sure patient stayed hydrated. They were encouraging a lot of juices, water, gatorade, etc. Discussed risk of malnutrition in liver cirrhosis. Explained that weight loss can be masked by fluid retention and not be as  "apparent on the scale, however, cirrhotic involvement with the liver increased estimated needs and can cause muscle wasting if oral intake is inadequate. Discussed signs of muscle/fat wasting. Encouraged oral nutrition/protein supplements, spacing liquids from meals to allow for increased PO intake, smaller, frequent meals spread throughout the day. Wife acknowledged understanding of information discussed.       Anthropometrics     Height: 190.5 cm (75\")  Last filed wt: Weight: 129 kg (283 lb 9.6 oz) (04/13/21 0600)  Weight Method: Bed scale    BMI: BMI (Calculated): 35.4  Obese Class II: 35-39.9kg/m2    Ideal Body Weight (IBW) (kg): 90.45  Admission wt: 280 lb 8 oz  Method obtained: bed scale weight per charting 4/12    Weight Weight (kg) Weight (lbs) Weight Method VISIT REPORT   2/22/2021 111.131 kg 245 lb     2/24/2021 110.224 kg 243 lb     3/10/2021 111.131 kg 245 lb Stated    3/11/2021 111.13 kg 245 lb Bed scale    3/12/2021 107.548 kg 237 lb 1.6 oz Bed scale    3/15/2021 105.643 kg 232 lb 14.4 oz     3/16/2021 104.373 kg 230 lb 1.6 oz Bed scale    3/17/2021 105.824 kg 233 lb 4.8 oz Bed scale    3/29/2021 114.306 kg 252 lb     4/12/2021 127.234 kg 280 lb 8 oz Bed scale    4/12/2021 127.007 kg 280 lb     4/13/2021 127.506 kg 281 lb 1.6 oz Bed scale    4/13/2021 128.64 kg 283 lb 9.6 oz     4/14/2021 125.828 kg 277 lb 6.4 oz Bed scale        Weight Change   UBW: unsure d/t fluid gain  Weight change:   % wt change:   Time frame of weight loss:     Labs reviewed     Results from last 7 days   Lab Units 04/13/21  0928 04/13/21  0209 04/12/21  1852   GLUCOSE mg/dL 106*  --  101*   BUN mg/dL 20  --  20   CREATININE mg/dL 0.94  --  0.97   SODIUM mmol/L 128* 127* 127*   CHLORIDE mmol/L 94*  --  92*   POTASSIUM mmol/L 3.6  --  3.8   MAGNESIUM mg/dL 1.6  --   --    ALT (SGPT) U/L  --   --  40     Results from last 7 days   Lab Units 04/12/21  1852   ALBUMIN g/dL 2.90*           Lab Results   Lab Value Date/Time    HGBA1C " 4.50 (L) 04/12/2021 1852    HGBA1C 5.10 03/11/2021 0547       Medications reviewed   Pertinent: coreg, ceftazidime, ferrous sulfate, lasix, MVI, protonix, xifaxan, florastor, pericolace, hytrin  GTT: octreotide 500 mcg IV      Intake/Output 24 hrs (7:00AM - 6:59 AM)     Intake & Output (last day)       04/12 0701 - 04/13 0700 04/13 0701 - 04/14 0700    Blood 268 400    Total Intake(mL/kg) 268 (2.1) 400 (3.1)    Urine (mL/kg/hr) 1425 275 (0.6)    Stool 0     Total Output 1425 275    Net -1157 +125          Urine Unmeasured Occurrence 1 x     Stool Unmeasured Occurrence 1 x           Current Nutrition Prescription     PO: Diet Clear Liquid  Dietary Nutrition Supplements: Vanilla Boost Plus x3/day  Intake: insuff data - 25% x 1 meal    Nutrition Diagnosis     4/14  Problem Predicted suboptimal energy intake   Etiology Decreased appetite, anasarca   Signs/Symptoms Report of decreased intake intake/appetite x2 weeks prior to admission       Nutrition Intervention     1.  Follow treatment progress, Care plan reviewed  2.  Advise alternate selection, Interview for preferences   3. Cont to send supplements as ordered  4. Monitor NPO/clear liquid status  5. RD provided nutrition education for cirrhosis at patient's wife's request. Will provide written materials as desired.    Goal:   General: Nutrition support treatment / Palliative care  PO: Establish PO      Monitoring/Evaluation:   Per protocol, PO intake, Pertinent labs, Weight, GI status, Symptoms, POC/GOC      Esther Choudhary RDN, LD  Time Spent: 35 minutes

## 2021-04-13 NOTE — PROGRESS NOTES
[facility] Medicine Services  PROGRESS NOTE    Patient Name: Derek Kelsey  : 1950  MRN: [id number]    Date of Admission: 2021  Primary Care Physician: Raudel Zheng MD    Subjective   Subjective     CC:  Anemia, cirrhosis, melena, respiratory failure, chronically infected endograft    HPI:  No bm. Dyspnea no better or worse. No fever. No chest pain.    ROS:  No palpitations. No chest pain  Gen- No fevers, chills  CV- No chest pain, palpitations  Resp- No cough, otherwise as above  GI- No N/V/D, abd pain, mild distension abdomen        Objective   Objective     Vital Signs:   Temp:  [97.1 °F (36.2 °C)-98.5 °F (36.9 °C)] 98 °F (36.7 °C)  Heart Rate:  [59-86] 64  Resp:  [16-20] 16  BP: (119-148)/(60-86) 135/61        Physical Exam:  Constitutional:Alert, oriented to person, place, answers simple quesitons appropriately, no distress  Psych:Normal/appropriate affect  HEENT:Ncat, oroph clear  Neck: neck supple, full range of motion  Neuro: Face symmetric, speech clear, equal , moves all extremities  Cardiac: irr irr, regular rate; 2+ BLE edema  Resp: Ctab, normal effort  GI: abd soft, nontender, mild distended, obese  Skin: No extremity rash  Musculoskeletal/extremities: no cyanosis extremities          Results Reviewed:  Results from last 7 days   Lab Units 21  0029 21   WBC 10*3/mm3  --   --  4.88   HEMOGLOBIN g/dL 5.9*  --  5.6*   HEMATOCRIT % 19.1*  --  18.7*   PLATELETS 10*3/mm3  --   --  87*   INR   --  1.75*  --      Results from last 7 days   Lab Units 21  02021  1852   SODIUM mmol/L 127* 127*   POTASSIUM mmol/L  --  3.8   CHLORIDE mmol/L  --  92*   CO2 mmol/L  --  25.0   BUN mg/dL  --  20   CREATININE mg/dL  --  0.97   GLUCOSE mg/dL  --  101*   CALCIUM mg/dL  --  8.7   ALT (SGPT) U/L  --  40   AST (SGOT) U/L  --  85*   PROBNP pg/mL  --  391.4     Estimated Creatinine Clearance: 105.2 mL/min (by C-G formula based  on SCr of 0.97 mg/dL).    Microbiology Results Abnormal     Procedure Component Value - Date/Time    Eosinophil Smear - Urine, Urine, Clean Catch [[id number]]  (Normal) Collected: 04/12/21 2325    Lab Status: Final result Specimen: Urine, Clean Catch Updated: 04/13/21 0054     Eosinophil Smear 0 % EOS/100 Cells     Narrative:      No eosinophil seen          Imaging Results (Last 24 Hours)     Procedure Component Value Units Date/Time    CT Angiogram Chest With & Without Contrast [[id number]] Collected: 04/13/21 0217     Updated: 04/13/21 0219    Narrative:      CT CHEST WITH CONTRAST, PE PROTOCOL, 4/13/2021    HISTORY:  70-year-old male recently discharged from the hospital after treatment for AAA endograft infection with sepsis, Pseudomonas bacteremia. GI bleed. History of hepatic cirrhosis, chronic respiratory failure and diastolic heart failure. He is admitted to the  hospital today after low hemoglobin was noted by his primary provider. Symptoms include shortness of breath, leg swelling, black stools,    TECHNIQUE:  CT examination of the chest with IV contrast. CTA MIP multiplanar pulmonary artery images were reformatted with 3-D postprocessing. Radiation dose reduction techniques included automated exposure control. Radiation audit for CT and nuclear cardiology  exams in the last 12 months: 8.    COMPARISON:  *  CTA chest, 3/10/2021.    FINDINGS:  No pulmonary embolism is demonstrated. Thoracic aorta is normal in caliber with no aneurysm or dissection. Heart size is normal, and there is no pericardial effusion.    Multifocal airspace infiltrates are scattered throughout the central portions of both lungs, greatest in the left upper lobe, mostly in a central peribronchovascular distribution. This was not present on the prior study. Multifocal pneumonitis is  suspected. In addition, the patient has developed a new small to moderate right pleural effusion and tiny left pleural effusion, and there is consolidation  and atelectasis of the dependent right posterior lung base. Trachea and central bronchi are  patent.    Lymph node enlargement is seen throughout the mediastinum, both pulmonary viet and in the axillary regions where there is bilateral axillary edema. This is nonspecific but may represent reactive adenopathy or lymphatic engorgement. Neoplastic adenopathy  is unlikely given the time course.      Impression:      1.  No evidence of pulmonary embolism or other acute vascular abnormality within the chest.  2.  Multifocal groundglass and more dense airspace infiltrates in both lungs as detailed above. Consider atypical pneumonitis. Imaging features can be seen with COVID-19 pneumonia, although are nonspecific and can can occur with a variety of infectious  and noninfectious processes.  3.  Small to moderate right pleural effusion and tiny left pleural effusion with right posterior lung base consolidation and atelectasis, new since prior.  4.  Adenopathy within the mediastinum, pulmonary viet and axillary regions. Axillary soft tissue edema. Soft tissue edema is also present throughout the abdominal mesentery and body wall. Lymph node enlargement due to lymphatic engorgement is likely.    Signer Name: Jim Yousif MD   Signed: 4/13/2021 2:17 AM   Workstation Name: [email or web address]    [facility]    CT Abdomen Pelvis Without Contrast [[id number]] Collected: 04/13/21 0210     Updated: 04/13/21 0212    Narrative:      CT ABDOMEN AND PELVIS, NONCONTRAST, 4/13/2021    HISTORY:  70-year-old male recently discharged from the hospital after treatment for AAA endograft infection with sepsis, Pseudomonas bacteremia. GI bleed. History of hepatic cirrhosis, chronic respiratory failure and diastolic heart failure. He is admitted to the  hospital today after low hemoglobin was noted by his primary provider. Symptoms include shortness of breath, leg swelling, black stools,    TECHNIQUE:  CT imaging of the  abdomen and pelvis ordered without oral or IV contrast. Radiation dose reduction techniques included automated exposure control. Radiation audit for CT and nuclear cardiology exams in the last 12 months: 8.    COMPARISON:  *  CT abdomen and pelvis, 3/10/2021.    ABDOMEN FINDINGS:  Morphologic changes of hepatic cirrhosis with moderate hepatosplenomegaly. Stable left lobe liver cyst. No gallbladder distention or visible bile duct dilatation. Pancreas is unremarkable. Soft tissue edema throughout the abdominal mesentery and body  wall and small volume ascites within the abdomen and pelvis. Small bilateral pleural effusions, larger on the right.    Bifurcated aortoiliac stent graft with continued ill-defined thickening of the wall of the native aneurysm and mild periaortic adenopathy. Vascular assessment is limited without contrast administration. Both kidneys are negative with no evidence of  urinary obstruction or nephrolithiasis.    Small bowel and colon are normal in caliber and appearance, as imaged. No gastric distention or distal esophageal dilatation.    PELVIS FINDINGS:  Urinary bladder, prostate and rectum are within normal limits.    Postoperative changes of previous lower lumbar spine fusion with instrumentation.      Impression:      1.  No new or acute abnormality within the abdomen or pelvis when compared with prior study.  2.  Advanced hepatic cirrhosis with hepatosplenomegaly, small volume ascites and extensive soft tissue edema throughout the abdominal mesentery and body wall.  3.  Bifurcated aortoiliac stent graft with continued ill-defined wall thickening of the native aneurysm and mild periaortic adenopathy. Noncontrast study.    Signer Name: Jim Yousif MD   Signed: 4/13/2021 2:10 AM   Workstation Name: [facility]    [facility]          Results for orders placed during the hospital encounter of 03/10/21    Adult Transthoracic Echo Complete W/ Cont if Necessary  Per Protocol    Interpretation Summary  · Estimated right ventricular systolic pressure from tricuspid regurgitation is normal (<35 mmHg).  · Estimated left ventricular EF = 65% Left ventricular systolic function is normal.      I have reviewed the medications:  Scheduled Meds:budesonide-formoterol, 2 puff, Inhalation, BID - RT   And  ipratropium, 0.5 mg, Nebulization, 4x Daily - RT  bumetanide, 2 mg, Intravenous, Once  carvedilol, 12.5 mg, Oral, Q12H  cefTAZidime 2 g in 100 mL NS MBP, 2 g, Intravenous, Q8H  citalopram, 40 mg, Oral, Daily  ferrous sulfate, 325 mg, Oral, Daily With Breakfast  furosemide, 40 mg, Oral, BID  lactulose, 15 mL, Oral, TID  melatonin, 5 mg, Oral, Nightly  multivitamin, 1 tablet, Oral, Daily  pantoprazole, 40 mg, Intravenous, BID AC  riFAXIMin, 550 mg, Oral, Q12H  saccharomyces boulardii, 250 mg, Oral, BID  senna-docusate sodium, 2 tablet, Oral, BID  sodium chloride, 10 mL, Intravenous, Q12H  sodium chloride, 10 mL, Intravenous, Q12H  tamsulosin, 0.4 mg, Oral, Daily  terazosin, 2 mg, Oral, Nightly      Continuous Infusions:   PRN Meds:.•  acetaminophen **OR** acetaminophen **OR** acetaminophen  •  senna-docusate sodium **AND** polyethylene glycol **AND** bisacodyl **AND** bisacodyl  •  calcium carbonate  •  ipratropium-albuterol  •  magnesium sulfate **OR** magnesium sulfate **OR** magnesium sulfate  •  ondansetron **OR** ondansetron  •  potassium chloride **OR** potassium chloride **OR** potassium chloride  •  sodium chloride  •  sodium chloride    Assessment/Plan   Assessment & Plan     Active Hospital Problems    Diagnosis  POA   • **Symptomatic anemia [D64.9]  Yes   • 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        Brief Hospital Course to date:  Derek Kelsey is a 70 y.o. male w/ cirrhosis, chronically infected aaa endograft (on fortaz followed by Dr. Carrero), afib, dhf, copd/ILD w/ 3-4L chronic oxygen dependence, also w/ hx of covid + pcr on 3/10/21 (also got 2nd covid 19 vaccination shot on 3/23/21) who presented w/ dyspnea on exertion, fatigue. Apparently had appointment at [facility] mid April to consider \"options\" for his chronically infected endograft. Noted had hgb 5. Has had melena recently and some mild increased confusion. Ct revealed no PE but bilateral effusions. Transfused 2 unit prbc w/ bumex. Gi, cards, ID consulted.     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 Carrero   -apparently has appointment @ [facility] next week re: options  *Afib (not anticoagulated)   -follows w/ Dr. Galvez, on coreg; no a/c due to gi bleeding/cirrhosis  *hx covid 19 pcr + (on 3/10/21)   -also had covid vacinnation x 2 shots (2nd shot 3/23/21)   -no isolatoin    Plan:  -(s/p 2 unit prbc w/ bumex 2mg); q6h H&H, transfuse prn hgb <7 (likely would need lasix w/ transfusion); bid ppi; gi consulted for consideration of egd; for now continue; lactulose tid & rifaxamin; vitamin K 10mg iv x 1    -bumex 2mg iv x 1 now; restart lasix 40mg bid, titrate up prn    -continue fortaz per ID suppressive abx ; Dr. Carrero to see    -Cards (Dr. Galvez or [place]) to see re: afib    -on chronic 3-4, currently on 5 liters oxygen recently, wean as able (on 5L currently); " symbicort & scheduled atrovent; prn duonebs; wean oxygen as able    -s/p 2nd covid 19 vaccination shot on 3/23/21 (also had + covid 19 pcr on 3/10/21)    -consult palliative re: goals/limits, overall poor disease trajectory. Patient is dnr/dni in event of cardiopulmonary arrest, but due to overall poor trajectory I will ask palliative to follow due to high risk of further deterioration    Labs: q6h H&H, cbc,cmp,inr,mag in a.m.    DVT Prophylaxis:  mechanical      Disposition: I expect the patient to be discharged tbd    CODE STATUS:   Code Status and Medical Interventions:   Ordered at: 04/12/21 1844     Limited Support to NOT Include:    Artificial Nutrition    Dialysis    Cardioversion/Defibrillation    Intubation     Level Of Support Discussed With:    Patient     Code Status:    No CPR     Medical Interventions (Level of Support Prior to Arrest):    Limited       Rinku Mcrae MD  04/13/21

## 2021-04-13 NOTE — CONSULTS
Raudel Zheng MD  Consulting physician: Thor    No chief complaint on file.        HPI: Patient is a 70-year-old male brought in hospital due to generalized weakness and anemia.  Patient again has been found to be anemic, possibly related to GI bleed.    Unfortunate the patient has a history of multiple medical issues including cirrhosis, infected aortic graft, and just a generalized decline over the last couple of years with a more acute decline over the last couple of weeks.    Wife states that patient has been getting around with a walker at home but over the last couple of weeks in particular over the last couple days prior to admission the patient was to the point where he could not get up and move from bed to chair without assistance.      Past Medical History:   Diagnosis Date   • Abdominal aortic aneurysm (CMS/[facility]) 9/29/2016   • Anxiety 9/29/2016   • Arthritis    • Back pain    • Black lung (CMS/[facility])    • CAD (coronary artery disease) 9/29/2016   • Cirrhosis (CMS/[facility])    • COPD (chronic obstructive pulmonary disease) (CMS/[facility]) 9/29/2016   • Depression 9/29/2016   • Depression    • GERD (gastroesophageal reflux disease)    • Hypertension 9/29/2016   • On supplemental oxygen by nasal cannula     at night   • Vitiligo    • Wears dentures    • Wears reading eyeglasses      Past Surgical History:   Procedure Laterality Date   • ABDOMINAL AORTIC ANEURYSM REPAIR WITH ENDOGRAFT N/A 6/6/2019    Procedure: ABDOMINAL AORTIC ANEURYSM REPAIR WITH ENDOGRAFT;  Surgeon: Leopoldo Burleson MD;  Location: [facility] HYBRID OR 15;  Service: Vascular   • BACK SURGERY     • CARDIAC CATHETERIZATION     • CARDIAC CATHETERIZATION N/A 9/28/2018    Procedure: Left Heart Cath;  Surgeon: Douglas Galvez MD;  Location: [facility] CATH INVASIVE LOCATION;  Service: Cardiovascular   • CARDIAC SURGERY     • COLONOSCOPY      2015   • COLONOSCOPY N/A 10/30/2019    Procedure: COLONOSCOPY;  Surgeon: Homar Viveros MD;  Location:   [facility];  Service: Gastroenterology   • ENDOSCOPY N/A 10/30/2019    Procedure: ESOPHAGOGASTRODUODENOSCOPY;  Surgeon: Homar Viveros MD;  Location:  [facility];  Service: Gastroenterology   • EYE SURGERY      cataracts bilateral   • HAND SURGERY Left    • LUMBAR DISCECTOMY FUSION INSTRUMENTATION N/A 11/1/2018    Procedure: LUMBAR DISCECTOMY POSTERIOR WITH FUSION INSTRUMENTATION;  Surgeon: Joo Franklin MD;  Location:  [facility] OR;  Service: Orthopedic Spine   • LUMBAR DISCECTOMY FUSION INSTRUMENTATION N/A 7/24/2019    Procedure: POSTERIOR LUMBAR SPINAL FUSION REVISION AND INSTRUMENTATION L3,L4,L5,S1;  Surgeon: Joo Franklin MD;  Location:  [facility] OR;  Service: Orthopedic Spine   • ORIF FINGER / THUMB FRACTURE     • SHOULDER SURGERY Left      Current Code Status     Date Active Code Status Order ID Comments User Context       4/12/2021 1844 No CPR [id number]  Esther Drew APRN Inpatient     Advance Care Planning Activity      Questions for Current Code Status     Question Answer Comment    Code Status No CPR     Medical Interventions (Level of Support Prior to Arrest) Limited     Limited Support to NOT Include Artificial Nutrition      Dialysis      Cardioversion/Defibrillation      Intubation     Level Of Support Discussed With Patient         Current Facility-Administered Medications   Medication Dose Route Frequency Provider Last Rate Last Admin   • acetaminophen (TYLENOL) tablet 650 mg  650 mg Oral Q4H PRN Esther Drew APRN        Or   • acetaminophen (TYLENOL) 160 MG/5ML solution 650 mg  650 mg Oral Q4H PRN Esther Drew APRN        Or   • acetaminophen (TYLENOL) suppository 650 mg  650 mg Rectal Q4H PRN Esther Drew APRN       • sennosides-docusate (PERICOLACE) 8.6-50 MG per tablet 2 tablet  2 tablet Oral BID Esther Drew APRN   2 tablet at 04/13/21 0807    And   • polyethylene glycol (MIRALAX) packet 17 g  17 g Oral Daily PRN Esther Drew APRN        And   • bisacodyl (DULCOLAX) EC  tablet 5 mg  5 mg Oral Daily PRN Esther Drew APRN        And   • bisacodyl (DULCOLAX) suppository 10 mg  10 mg Rectal Daily PRN Esther Drew APRN       • budesonide-formoterol (SYMBICORT) 160-4.5 MCG/ACT inhaler 2 puff  2 puff Inhalation BID - RT Esther Drew APRN   2 puff at 04/13/21 0738    And   • ipratropium (ATROVENT) nebulizer solution 0.5 mg  0.5 mg Nebulization 4x Daily - RT Esther Drew APRN   0.5 mg at 04/13/21 1147   • calcium carbonate (TUMS) chewable tablet 500 mg (200 mg elemental)  2 tablet Oral BID PRN Esther Drew APRN       • carvedilol (COREG) tablet 12.5 mg  12.5 mg Oral Q12H Esther Drew APRN   12.5 mg at 04/13/21 0807   • cefTAZidime 2 g in 100 mL NS MBP  2 g Intravenous Q8H Esther Drew APRN 200 mL/hr at 04/13/21 0816 2 g at 04/13/21 0816   • citalopram (CeleXA) tablet 40 mg  40 mg Oral Daily Esther Drew APRN   40 mg at 04/13/21 0806   • ferrous sulfate tablet 325 mg  325 mg Oral Daily With Breakfast Esther Drew APRN   325 mg at 04/13/21 0807   • furosemide (LASIX) tablet 40 mg  40 mg Oral BID Rinku Mcrae MD       • ipratropium-albuterol (DUO-NEB) nebulizer solution 3 mL  3 mL Nebulization Q4H PRN Rinku Mcrae MD       • lactulose (CHRONULAC) 10 GM/15ML solution 15 mL  15 mL Oral TID Esther Drew APRN   15 mL at 04/13/21 0808   • Magnesium Sulfate 2 gram Bolus, followed by 8 gram infusion (total Mg dose 10 grams)- Mg less than or equal to 1mg/dL  2 g Intravenous PRN Esther Drew APRN        Or   • Magnesium Sulfate 2 gram / 50mL Infusion (GIVE X 3 BAGS TO EQUAL 6GM TOTAL DOSE) - Mg 1.1 - 1.5 mg/dl  2 g Intravenous PRN Esther Drew APRN        Or   • Magnesium Sulfate 4 gram infusion- Mg 1.6-1.9 mg/dL  4 g Intravenous PRN Esther Drew APRN 25 mL/hr at 04/13/21 1255 4 g at 04/13/21 1255   • melatonin tablet 5 mg  5 mg Oral Nightly Esther Drew APRN   5 mg at 04/12/21 2021   • multivitamin (THERAGRAN) tablet 1 tablet  1 tablet Oral Daily Rajendra  LYNDA Santana   1 tablet at 04/13/21 0807   • octreotide (sandoSTATIN) 500 mcg in sodium chloride 0.9 % 100 mL (5 mcg/mL) infusion  50 mcg/hr Intravenous Continuous Criss Sharif PA 10 mL/hr at 04/13/21 1255 50 mcg/hr at 04/13/21 1255   • ondansetron (ZOFRAN) tablet 4 mg  4 mg Oral Q6H PRN Esther Drew APRN        Or   • ondansetron (ZOFRAN) injection 4 mg  4 mg Intravenous Q6H PRN Esther Drew APRN       • pantoprazole (PROTONIX) injection 40 mg  40 mg Intravenous BID AC Katherine Rowland PA-C   40 mg at 04/13/21 0807   • potassium chloride (MICRO-K) CR capsule 40 mEq  40 mEq Oral PRN Esther Drew APRN   40 mEq at 04/13/21 1123    Or   • potassium chloride (KLOR-CON) packet 40 mEq  40 mEq Oral PRN Esther Drew APRN        Or   • potassium chloride 10 mEq in 100 mL IVPB  10 mEq Intravenous Q1H PRN Esther Drew APRN       • riFAXIMin (XIFAXAN) tablet 550 mg  550 mg Oral Q12H Esther Drew APRN   550 mg at 04/13/21 0807   • saccharomyces boulardii (FLORASTOR) capsule 250 mg  250 mg Oral BID Esther Drew APRN   250 mg at 04/13/21 0807   • sodium chloride 0.9 % flush 10 mL  10 mL Intravenous Q12H Esther Drew APRN   10 mL at 04/12/21 2027   • sodium chloride 0.9 % flush 10 mL  10 mL Intravenous PRN Esther Drew APRN       • sodium chloride 0.9 % flush 10 mL  10 mL Intravenous Q12H Katherine Rowland PA-C   10 mL at 04/13/21 0808   • sodium chloride 0.9 % flush 10 mL  10 mL Intravenous PRN Katherine Rowland PA-C   10 mL at 04/13/21 1122   • tamsulosin (FLOMAX) 24 hr capsule 0.4 mg  0.4 mg Oral Daily Esther Drew APRN   0.4 mg at 04/13/21 0807   • terazosin (HYTRIN) capsule 2 mg  2 mg Oral Nightly Esther Drew APRN   2 mg at 04/12/21 2021     octreotide (SandoSTATIN) infusion, 50 mcg/hr, Last Rate: 50 mcg/hr (04/13/21 1255)      •  acetaminophen **OR** acetaminophen **OR** acetaminophen  •  senna-docusate sodium **AND** polyethylene glycol **AND** bisacodyl **AND** bisacodyl  •  calcium carbonate  •   "ipratropium-albuterol  •  magnesium sulfate **OR** magnesium sulfate **OR** magnesium sulfate  •  ondansetron **OR** ondansetron  •  potassium chloride **OR** potassium chloride **OR** potassium chloride  •  sodium chloride  •  sodium chloride  Allergies   Allergen Reactions   • Penicillins Hives     Hives - has tolerated Zosyn and ceftriaxone 09/2019   • Percocet [Oxycodone-Acetaminophen] Confusion     Family History   Problem Relation Age of Onset   • Stroke Mother    • Hypertension Mother    • Heart disease Mother    • Heart disease Father    • Hypertension Father    • Diabetes Sister    • Hypertension Sister    • Heart disease Sister    • Diabetes Brother    • Heart disease Brother    • Hypertension Child    • Hypertension Son    • No Known Problems Son    • Diabetes Sister    • Heart disease Sister      Social History     Socioeconomic History   • Marital status:      Spouse name: Not on file   • Number of children: 2   • Years of education: Not on file   • Highest education level: Not on file   Tobacco Use   • Smoking status: Former Smoker     Packs/day: 1.00     Years: 30.00     Pack years: 30.00     Types: Cigarettes     Quit date: 1/1/2011     Years since quitting: 10.2   • Smokeless tobacco: Former User     Types: Chew     Quit date: 4/7/2011   Substance and Sexual Activity   • Alcohol use: Not Currently   • Drug use: No   • Sexual activity: Defer     Comment:  and lives with wife     Review of Systems -all others reviewed and found negative that mentioned in the HPI      /64 (BP Location: Left arm, Patient Position: Sitting)   Pulse 62   Temp 98 °F (36.7 °C) (Oral)   Resp 15   Ht 190.5 cm (75\")   Wt 129 kg (283 lb 9.6 oz)   SpO2 97%   BMI 35.45 kg/m²     Intake/Output Summary (Last 24 hours) at 4/13/2021 1257  Last data filed at 4/13/2021 1237  Gross per 24 hour   Intake 668 ml   Output 2525 ml   Net -1857 ml     Physical Exam:      General Appearance:    Alert, cooperative, in " no acute distress   Head:    Normocephalic, without obvious abnormality, atraumatic   Eyes:            Lids and lashes normal, conjunctivae and sclerae normal, no   icterus, no pallor, corneas clear, PERRLA   Ears:    Ears appear intact with no abnormalities noted   Throat:   No oral lesions, no thrush, oral mucosa moist   Neck:   No adenopathy, supple, trachea midline, no thyromegaly, no   carotid bruit, no JVD   Back:     No kyphosis present, no scoliosis present, no skin lesions,      erythema or scars, no tenderness to percussion or                   palpation,   range of motion normal   Lungs:     Clear to auscultation,respirations regular, even and                  unlabored    Heart:    Regular rhythm and normal rate, normal S1 and S2, no            murmur, no gallop, no rub, no click   Chest Wall:    No abnormalities observed   Abdomen:     Normal bowel sounds, no masses, no organomegaly, soft        non-tender, non-distended, no guarding, no rebound                tenderness   Rectal:     Deferred   Extremities:   Moves all extremities well, no edema, no cyanosis, no             redness   Pulses:   Pulses palpable and equal bilaterally   Skin:   No bleeding, bruising or rash   Lymph nodes:   No palpable adenopathy   Neurologic:   Cranial nerves 2 - 12 grossly intact, sensation intact, DTR       present and equal bilaterally       Results from last 7 days   Lab Units 04/13/21  0928   WBC 10*3/mm3 4.32   HEMOGLOBIN g/dL 7.0*   HEMATOCRIT % 22.7*   PLATELETS 10*3/mm3 77*     Results from last 7 days   Lab Units 04/13/21  0928 04/12/21  1852   SODIUM mmol/L 128* 127*   POTASSIUM mmol/L 3.6 3.8   CHLORIDE mmol/L 94* 92*   CO2 mmol/L 24.0 25.0   BUN mg/dL 20 20   CREATININE mg/dL 0.94 0.97   CALCIUM mg/dL 8.6 8.7   BILIRUBIN mg/dL  --  1.1   ALK PHOS U/L  --  123*   ALT (SGPT) U/L  --  40   AST (SGOT) U/L  --  85*   GLUCOSE mg/dL 106* 101*     Results from last 7 days   Lab Units 04/13/21  0928   SODIUM mmol/L  128*   POTASSIUM mmol/L 3.6   CHLORIDE mmol/L 94*   CO2 mmol/L 24.0   BUN mg/dL 20   CREATININE mg/dL 0.94   GLUCOSE mg/dL 106*   CALCIUM mg/dL 8.6     Imaging Results (Last 72 Hours)     Procedure Component Value Units Date/Time    CT Angiogram Chest With & Without Contrast [[id number]] Collected: 04/13/21 0217     Updated: 04/13/21 0219    Narrative:      CT CHEST WITH CONTRAST, PE PROTOCOL, 4/13/2021    HISTORY:  70-year-old male recently discharged from the hospital after treatment for AAA endograft infection with sepsis, Pseudomonas bacteremia. GI bleed. History of hepatic cirrhosis, chronic respiratory failure and diastolic heart failure. He is admitted to the  hospital today after low hemoglobin was noted by his primary provider. Symptoms include shortness of breath, leg swelling, black stools,    TECHNIQUE:  CT examination of the chest with IV contrast. CTA MIP multiplanar pulmonary artery images were reformatted with 3-D postprocessing. Radiation dose reduction techniques included automated exposure control. Radiation audit for CT and nuclear cardiology  exams in the last 12 months: 8.    COMPARISON:  *  CTA chest, 3/10/2021.    FINDINGS:  No pulmonary embolism is demonstrated. Thoracic aorta is normal in caliber with no aneurysm or dissection. Heart size is normal, and there is no pericardial effusion.    Multifocal airspace infiltrates are scattered throughout the central portions of both lungs, greatest in the left upper lobe, mostly in a central peribronchovascular distribution. This was not present on the prior study. Multifocal pneumonitis is  suspected. In addition, the patient has developed a new small to moderate right pleural effusion and tiny left pleural effusion, and there is consolidation and atelectasis of the dependent right posterior lung base. Trachea and central bronchi are  patent.    Lymph node enlargement is seen throughout the mediastinum, both pulmonary viet and in the axillary  regions where there is bilateral axillary edema. This is nonspecific but may represent reactive adenopathy or lymphatic engorgement. Neoplastic adenopathy  is unlikely given the time course.      Impression:      1.  No evidence of pulmonary embolism or other acute vascular abnormality within the chest.  2.  Multifocal groundglass and more dense airspace infiltrates in both lungs as detailed above. Consider atypical pneumonitis. Imaging features can be seen with COVID-19 pneumonia, although are nonspecific and can can occur with a variety of infectious  and noninfectious processes.  3.  Small to moderate right pleural effusion and tiny left pleural effusion with right posterior lung base consolidation and atelectasis, new since prior.  4.  Adenopathy within the mediastinum, pulmonary viet and axillary regions. Axillary soft tissue edema. Soft tissue edema is also present throughout the abdominal mesentery and body wall. Lymph node enlargement due to lymphatic engorgement is likely.    Signer Name: Jim Yousif MD   Signed: 4/13/2021 2:17 AM   Workstation [email or web address]    [facility]    CT Abdomen Pelvis Without Contrast [[id number]] Collected: 04/13/21 0210     Updated: 04/13/21 0212    Narrative:      CT ABDOMEN AND PELVIS, NONCONTRAST, 4/13/2021    HISTORY:  70-year-old male recently discharged from the hospital after treatment for AAA endograft infection with sepsis, Pseudomonas bacteremia. GI bleed. History of hepatic cirrhosis, chronic respiratory failure and diastolic heart failure. He is admitted to the  hospital today after low hemoglobin was noted by his primary provider. Symptoms include shortness of breath, leg swelling, black stools,    TECHNIQUE:  CT imaging of the abdomen and pelvis ordered without oral or IV contrast. Radiation dose reduction techniques included automated exposure control. Radiation audit for CT and nuclear cardiology exams in the last 12 months:  8.    COMPARISON:  *  CT abdomen and pelvis, 3/10/2021.    ABDOMEN FINDINGS:  Morphologic changes of hepatic cirrhosis with moderate hepatosplenomegaly. Stable left lobe liver cyst. No gallbladder distention or visible bile duct dilatation. Pancreas is unremarkable. Soft tissue edema throughout the abdominal mesentery and body  wall and small volume ascites within the abdomen and pelvis. Small bilateral pleural effusions, larger on the right.    Bifurcated aortoiliac stent graft with continued ill-defined thickening of the wall of the native aneurysm and mild periaortic adenopathy. Vascular assessment is limited without contrast administration. Both kidneys are negative with no evidence of  urinary obstruction or nephrolithiasis.    Small bowel and colon are normal in caliber and appearance, as imaged. No gastric distention or distal esophageal dilatation.    PELVIS FINDINGS:  Urinary bladder, prostate and rectum are within normal limits.    Postoperative changes of previous lower lumbar spine fusion with instrumentation.      Impression:      1.  No new or acute abnormality within the abdomen or pelvis when compared with prior study.  2.  Advanced hepatic cirrhosis with hepatosplenomegaly, small volume ascites and extensive soft tissue edema throughout the abdominal mesentery and body wall.  3.  Bifurcated aortoiliac stent graft with continued ill-defined wall thickening of the native aneurysm and mild periaortic adenopathy. Noncontrast study.    Signer Name: Jim Yousif MD   Signed: 4/13/2021 2:10 AM   Workstation Name: [email or web address]    [facility]        Impression: Anemia  Debility  Cirrhosis  Infected aortic graft    Plan: Did speak briefly about goals of care, however right now we are currently pursuing a work-up to see what the patient's anemia is due to.  I will continue to follow and have further discussion with the patient and the patient's wife as we proceed forward.  Raymond  concerning that the patient has been declining over the last couple of years and getting progressively weaker, which leans towards a poor overall prognosis.        Sedrick Ayon DO  04/13/21  12:57 EDT

## 2021-04-13 NOTE — PROGRESS NOTES
Discharge Planning Assessment  [facility]     Patient Name: Derek Kelsey  MRN: [id number]  Today's Date: 4/13/2021    Admit Date: 4/12/2021    Discharge Needs Assessment     Row Name 04/13/21 0805       Living Environment    Lives With  spouse    Name(s) of Who Lives With Patient  Mago Kelsey (wife) [phone]    Current Living Arrangements  home/apartment/condo    Primary Care Provided by  self    Provides Primary Care For  no one    Family Caregiver if Needed  spouse    Family Caregiver Names  Mago Kelsey (wife) [phone]    Able to Return to Prior Arrangements  yes       Resource/Environmental Concerns    Resource/Environmental Concerns  none    Transportation Concerns  car, none       Transition Planning    Patient/Family Anticipates Transition to  home with family    Patient/Family Anticipated Services at Transition  none    Transportation Anticipated  family or friend will provide       Discharge Needs Assessment    Readmission Within the Last 30 Days  previous discharge plan unsuccessful    Current Outpatient/Agency/Support Group  infusion therapy, home    Equipment Currently Used at Home  cane, quad;walker, rolling;oxygen    Concerns to be Addressed  denies needs/concerns at this time    Anticipated Changes Related to Illness  none    Equipment Needed After Discharge  none    Outpatient/Agency/Support Group Needs  infusion therapy, home        Discharge Plan     Row Name 04/13/21 0808       Plan    Plan  Home with [facility]    Patient/Family in Agreement with Plan  yes    Plan Comments  Spoke with patient and spouse at bedside. Lives with Mago Kelsey (wife) [phone] in [facility]. Is independent with ADL's. No problems with [employer] Medicare or medications. Has a quad cane. rolling walker and oxygen from [employer]. Plan is home with [facility] and [facility] for PICC line care and labs. CM will continue to follow.    Final Discharge Disposition Code  01  - home or self-care        Continued Care and Services - Admitted Since 4/12/2021    Coordination has not been started for this encounter.     Selected Continued Care - Prior Encounters Includes selections from prior encounters from 1/12/2021 to 4/13/2021    Discharged on 3/17/2021 Admission date: 3/10/2021 - Discharge disposition: Home or Self Care    Dialysis/Infusion     Service Provider Selected Services Address Phone Fax Patient Preferred    [facility]  Infusion and IV Therapy [address] [place] [id number] [phone] --                      Demographic Summary     Row Name 04/13/21 0804       General Information    Admission Type  observation    Arrived From  home    Referral Source  admission list    Reason for Consult  discharge planning    Preferred Language  English     Used During This Interaction  no       Contact Information    Permission Granted to Share Info With      Contact Information Obtained for      Contact Information Comments  PCP is Raudel Zheng MD        Functional Status     Row Name 04/13/21 0805       Functional Status    Usual Activity Tolerance  moderate    Current Activity Tolerance  moderate       Functional Status, IADL    Medications  independent    Meal Preparation  independent    Housekeeping  independent    Laundry  independent    Shopping  independent       Mental Status    General Appearance WDL  WDL       Mental Status Summary    Recent Changes in Mental Status/Cognitive Functioning  no changes       Employment/    Employment Status  retired        Psychosocial    No documentation.       Abuse/Neglect    No documentation.       Legal    No documentation.       Substance Abuse    No documentation.       Patient Forms    No documentation.           Jim Platt RN

## 2021-04-13 NOTE — CONSULTS
[facility] Consult Note      Patient Care Team:  Raudel Zheng MD as PCP - General  FranklinJoo platt MD as Consulting Physician (Orthopedic Surgery)  Leopoldo Burleson MD as Surgeon (Cardiothoracic Surgery)  Douglas Galvez MD as Consulting Physician (Cardiology)  Davis Ford MD as Consulting Physician (Urology)  Raudel Zheng MD Hunter, Sarah M, MD    Subjective     History of Present Illness: Mr. Kelsey is a pleasant 70-year-old male with a history of diastolic CHF, atrial fibrillation, hypertension, cirrhosis, chronic respiratory failure/COPD, and an infected AAA endograft.  He is currently being followed by ID and was scheduled to follow-up with the [facility] [place] next week to discuss further options regarding his infected endograft.  He was admitted to [facility] yesterday due to anemia.  He is complaining of a 1 week history of increasing shortness of breath, productive cough, and abdominal swelling.  He is also noted dark stools with occasional dizziness and weakness and confusion.  Upon admission his H&H was 5.6/18.7.  He is post 2 units PRBCs.  He is to be evaluated by GI.  He is currently resting comfortably and is denying any chest pain, shortness of breath, or palpitations.      Current Facility-Administered Medications:   •  acetaminophen (TYLENOL) tablet 650 mg, 650 mg, Oral, Q4H PRN **OR** acetaminophen (TYLENOL) 160 MG/5ML solution 650 mg, 650 mg, Oral, Q4H PRN **OR** acetaminophen (TYLENOL) suppository 650 mg, 650 mg, Rectal, Q4H PRN, Esther Drew, APRN  •  sennosides-docusate (PERICOLACE) 8.6-50 MG per tablet 2 tablet, 2 tablet, Oral, BID, 2 tablet at 04/13/21 0807 **AND** polyethylene glycol (MIRALAX) packet 17 g, 17 g, Oral, Daily PRN **AND** bisacodyl (DULCOLAX) EC tablet 5 mg, 5 mg, Oral, Daily PRN **AND** bisacodyl (DULCOLAX) suppository 10 mg, 10 mg, Rectal, Daily PRN, Esther Drew, APRN  •   budesonide-formoterol (SYMBICORT) 160-4.5 MCG/ACT inhaler 2 puff, 2 puff, Inhalation, BID - RT, 2 puff at 04/13/21 0738 **AND** ipratropium (ATROVENT) nebulizer solution 0.5 mg, 0.5 mg, Nebulization, 4x Daily - RT, Esther Drew APRN, 0.5 mg at 04/13/21 0738  •  bumetanide (BUMEX) injection 2 mg, 2 mg, Intravenous, Once, Rinku Mcrae MD  •  calcium carbonate (TUMS) chewable tablet 500 mg (200 mg elemental), 2 tablet, Oral, BID PRN, Esther Drew APRN  •  carvedilol (COREG) tablet 12.5 mg, 12.5 mg, Oral, Q12H, Esther Drew APRN, 12.5 mg at 04/13/21 0807  •  cefTAZidime 2 g in 100 mL NS MBP, 2 g, Intravenous, Q8H, Esther Drew APRN, Last Rate: 200 mL/hr at 04/13/21 0816, 2 g at 04/13/21 0816  •  citalopram (CeleXA) tablet 40 mg, 40 mg, Oral, Daily, Esther Drew APRN, 40 mg at 04/13/21 0806  •  ferrous sulfate tablet 325 mg, 325 mg, Oral, Daily With Breakfast, Esther Drew APRN, 325 mg at 04/13/21 0807  •  furosemide (LASIX) tablet 40 mg, 40 mg, Oral, BID, Rinku Mcrae MD  •  ipratropium-albuterol (DUO-NEB) nebulizer solution 3 mL, 3 mL, Nebulization, Q4H PRN, Rinku Mcrae MD  •  lactulose (CHRONULAC) 10 GM/15ML solution 15 mL, 15 mL, Oral, TID, Esther Drew APRN, 15 mL at 04/13/21 0808  •  Magnesium Sulfate 2 gram Bolus, followed by 8 gram infusion (total Mg dose 10 grams)- Mg less than or equal to 1mg/dL, 2 g, Intravenous, PRN **OR** Magnesium Sulfate 2 gram / 50mL Infusion (GIVE X 3 BAGS TO EQUAL 6GM TOTAL DOSE) - Mg 1.1 - 1.5 mg/dl, 2 g, Intravenous, PRN **OR** Magnesium Sulfate 4 gram infusion- Mg 1.6-1.9 mg/dL, 4 g, Intravenous, PRN, Esther Drew, LYNDA  •  melatonin tablet 5 mg, 5 mg, Oral, Nightly, Esther Drew APRN, 5 mg at 04/12/21 2021  •  multivitamin (THERAGRAN) tablet 1 tablet, 1 tablet, Oral, Daily, Esther Drew APRN, 1 tablet at 04/13/21 0807  •  ondansetron (ZOFRAN) tablet 4 mg, 4 mg, Oral, Q6H PRN **OR** ondansetron (ZOFRAN) injection 4 mg, 4 mg, Intravenous,  Q6H PRN, Esther Drew APRN  •  pantoprazole (PROTONIX) injection 40 mg, 40 mg, Intravenous, BID AC, Katherine Rowland PA-C, 40 mg at 04/13/21 0807  •  phytonadione (AQUA-MEPHYTON, VITAMIN K) 10 mg in sodium chloride 0.9 % 50 mL IVPB, 10 mg, Intravenous, Once, Rinku Mcrae MD  •  potassium chloride (MICRO-K) CR capsule 40 mEq, 40 mEq, Oral, PRN **OR** potassium chloride (KLOR-CON) packet 40 mEq, 40 mEq, Oral, PRN **OR** potassium chloride 10 mEq in 100 mL IVPB, 10 mEq, Intravenous, Q1H PRN, Esther Drew APRN  •  riFAXIMin (XIFAXAN) tablet 550 mg, 550 mg, Oral, Q12H, Esther Drew, APRN, 550 mg at 04/13/21 0807  •  saccharomyces boulardii (FLORASTOR) capsule 250 mg, 250 mg, Oral, BID, Esther Drew, APRN, 250 mg at 04/13/21 0807  •  sodium chloride 0.9 % flush 10 mL, 10 mL, Intravenous, Q12H, Esther Drew, APRN, 10 mL at 04/12/21 2027  •  sodium chloride 0.9 % flush 10 mL, 10 mL, Intravenous, PRN, Esther Drew, APRN  •  sodium chloride 0.9 % flush 10 mL, 10 mL, Intravenous, Q12H, Katherine Rowland PA-C, 10 mL at 04/13/21 0808  •  sodium chloride 0.9 % flush 10 mL, 10 mL, Intravenous, PRN, Katherine Rowland, PA-C  •  tamsulosin (FLOMAX) 24 hr capsule 0.4 mg, 0.4 mg, Oral, Daily, Esther Drew APRN, 0.4 mg at 04/13/21 0807  •  terazosin (HYTRIN) capsule 2 mg, 2 mg, Oral, Nightly, Esther Drew, APRN, 2 mg at 04/12/21 2021    Social History     Socioeconomic History   • Marital status:      Spouse name: Not on file   • Number of children: 2   • Years of education: Not on file   • Highest education level: Not on file   Tobacco Use   • Smoking status: Former Smoker     Packs/day: 1.00     Years: 30.00     Pack years: 30.00     Types: Cigarettes     Quit date: 1/1/2011     Years since quitting: 10.2   • Smokeless tobacco: Former User     Types: Chew     Quit date: 4/7/2011   Substance and Sexual Activity   • Alcohol use: Not Currently   • Drug use: No   • Sexual activity: Defer     Comment:  and  lives with wife       Family History   Problem Relation Age of Onset   • Stroke Mother    • Hypertension Mother    • Heart disease Mother    • Heart disease Father    • Hypertension Father    • Diabetes Sister    • Hypertension Sister    • Heart disease Sister    • Diabetes Brother    • Heart disease Brother    • Hypertension Child    • Hypertension Son    • No Known Problems Son    • Diabetes Sister    • Heart disease Sister        Review of Systems   Constitutional: Positive for activity change and fatigue.   HENT: Negative.    Eyes: Negative.    Respiratory: Positive for shortness of breath.    Cardiovascular: Positive for leg swelling.   Gastrointestinal: Positive for abdominal distention, blood in stool and nausea.   Endocrine: Negative.    Genitourinary: Negative.    Musculoskeletal: Positive for arthralgias.   Skin: Positive for pallor.   Allergic/Immunologic: Negative.    Neurological: Positive for light-headedness.   Hematological: Bruises/bleeds easily.   Psychiatric/Behavioral: Positive for confusion.          Objective     Vital Signs  Temp:  [97.1 °F (36.2 °C)-98.5 °F (36.9 °C)] 98 °F (36.7 °C)  Heart Rate:  [59-86] 64  Resp:  [16-20] 16  BP: (119-148)/(60-86) 135/61      Intake/Output Summary (Last 24 hours) at 4/13/2021 1005  Last data filed at 4/13/2021 0804  Gross per 24 hour   Intake 668 ml   Output 1700 ml   Net -1032 ml     I/O this shift:  In: 400 [Blood:400]  Out: 275 [Urine:275]    Constitutional:       Appearance: Chronically ill-appearing.   Eyes:      Conjunctiva/sclera: Conjunctivae normal.      Pupils: Pupils are equal, round, and reactive to light.   HENT:      Nose: Nose normal.    Mouth/Throat:      Pharynx: Oropharynx is clear.   Neck:      Thyroid: Thyroid normal. No thyromegaly.      Vascular: JVD normal.      Lymphadenopathy: No cervical adenopathy.   Pulmonary:      Effort: Increased respiratory effort.      Comments: Decreased breath sounds at bases  Chest:      Chest wall: Not  tender to palpatation.   Cardiovascular:      Tachycardia present. Irregularly irregular rhythm.      Murmurs: There is no murmur.      No gallop. No click.   Edema:     Pretibial: bilateral 2+ edema of the pretibial area.  Abdominal:      General: Bowel sounds are normal. There is distension.      Palpations: Abdomen is soft.      Tenderness: There is no abdominal tenderness.   Musculoskeletal: Normal range of motion.         General: No tenderness.      Cervical back: Normal range of motion. Skin:     General: Skin is warm and dry.   Neurological:      Mental Status: Alert and oriented to person, place and time.           Results Review:    I reviewed the patient's new clinical results.    WBC WBC   Date/Time Value Ref Range Status   04/13/2021 0928 4.32 3.40 - 10.80 10*3/mm3 Final   04/12/2021 1852 4.88 3.40 - 10.80 10*3/mm3 Final      HGB Hemoglobin   Date/Time Value Ref Range Status   04/13/2021 0928 7.0 (L) 13.0 - 17.7 g/dL Final   04/13/2021 0029 5.9 (C) 13.0 - 17.7 g/dL Final   04/12/2021 1852 5.6 (C) 13.0 - 17.7 g/dL Final      HCT Hematocrit   Date/Time Value Ref Range Status   04/13/2021 0928 22.7 (L) 37.5 - 51.0 % Final   04/13/2021 0029 19.1 (C) 37.5 - 51.0 % Final   04/12/2021 1852 18.7 (C) 37.5 - 51.0 % Final      Platelets Platelets   Date/Time Value Ref Range Status   04/13/2021 0928 77 (L) 140 - 450 10*3/mm3 Final   04/12/2021 1852 87 (L) 140 - 450 10*3/mm3 Final        PT/INR:      Lab Results   Component Value Date    PROTIME 19.0 (H) 04/13/2021    PROTIME 19.7 (H) 04/12/2021    PROTIME 18.1 (H) 03/17/2021    PROTIME 20.6 (H) 03/12/2021    PROTIME 22.0 (H) 03/11/2021    PROTIME 18.5 (H) 09/10/2020    PROTIME 15.8 (H) 06/18/2020    INR 1.66 (H) 04/13/2021    INR 1.75 (H) 04/12/2021    INR 1.53 (H) 03/17/2021    INR 1.80 (H) 03/12/2021    INR 1.96 (H) 03/11/2021    INR 1.58 (H) 09/10/2020    INR 1.29 (H) 06/18/2020       Sodium Sodium   Date/Time Value Ref Range Status   04/13/2021 0209 127 (L) 136  - 145 mmol/L Final   04/12/2021 1852 127 (L) 136 - 145 mmol/L Final      Potassium Potassium   Date/Time Value Ref Range Status   04/12/2021 1852 3.8 3.5 - 5.2 mmol/L Final      Chloride Chloride   Date/Time Value Ref Range Status   04/12/2021 1852 92 (L) 98 - 107 mmol/L Final      Bicarbonate No results found for: PLASMABICARB   BUN BUN   Date/Time Value Ref Range Status   04/12/2021 1852 20 8 - 23 mg/dL Final      Creatinine Creatinine   Date/Time Value Ref Range Status   04/12/2021 1852 0.97 0.76 - 1.27 mg/dL Final      Calcium Calcium   Date/Time Value Ref Range Status   04/12/2021 1852 8.7 8.6 - 10.5 mg/dL Final      Magnesium @RESULFAST(MG:3)@   Troponin       No results found for: TROPONINI                EKG: atrial fibrillation      Patient Active Problem List   Diagnosis   • Anxiety   • Abdominal aortic aneurysm (AAA) without rupture (CMS/HCC)   • Depression   • Hypertension   • Cataract, bilateral   • CAD (coronary artery disease)   • Abnormal stress test   • Moderate COPD   • Former smoker   • [employer] workers pneumoconiosis, simple   • Back pain   • S/P lumbar fusion   • Prediabetes   • Acute blood loss anemia, mild, asymptomatic   • Acute postoperative pain   • Hyponatremia, mild   • Chronic respiratory failure with hypoxia and hypercapnia (CMS/HCC)   • Renal insufficiency   • Acute cystitis without hematuria   • Acute UTI   • Bacteremia   • Symptomatic anemia   • Aortitis (CMS/HCC)   • Elevated C-reactive protein (CRP)   • Cirrhosis of liver (CMS/HCC)   • High anion gap metabolic acidosis   • S/P AAA repair   • GERD (gastroesophageal reflux disease)   • Person under investigation for COVID-19   • On supplemental oxygen by nasal cannula   • Infected aortic endograft (CMS/HCC)   • Elevated transaminase level   • Thrombocytopenia (CMS/HCC)   • Pneumonia   • Gram-negative bacteremia   • COVID-19   • Anemia       Assessment/Plan   69 yo male admitted with GI bleed/anemia.  Post transfusion.  He also has afib  and a known infected AAA endograft.    Echocardiogram to assess LV systolic function  Continue rate control  GI to evaluate  ID to see      I discussed the patient's findings and my recommendations with patient and family     MD Christiano Levy PA  04/13/21  10:05 EDT

## 2021-04-13 NOTE — CONSULTS
[facility]    Referring Provider: Tammi Lomax MD    PCP: Raudel Zheng MD    Reason for Consultation: Symptomatic anemia     Chief complaint: Extreme fatigue, worsening hypoxia     History of present illness:    Derek Kelsey is a 70 y.o. male who is admitted with symptomatic anemia and melena.   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 Carrero.   He his known to our service for history of liver cirrhosis followed outpatient with Dr. Viveros.   His last EGD was on 10/30/2019 that showed grade 2 esophageal varices, severe portal hypertensive gastropathy and nodular mucosa in the gastric antrum.   He had a colonoscopy at that time with findings of hemorrhoids and multiple colon polyps removed.  He was recently admitted to our hospital last month due to COVID-19 infection and he had an episode of bright red blood per rectum at that time.  His H&H however was stable and he was managed for hemorrhoidal bleeding with anusol suppositories.       He takes Carvedilol 12.5 mg BID.  He began noticing dark stools 3 weeks ago.  He has chronic dull abdominal pain that is unchanged for several months.   His wife notes worsening hypoxia in the last few weeks.   He is on 5 L O2 currently.        Allergies:  Penicillins and Percocet [oxycodone-acetaminophen]    Scheduled Meds:  budesonide-formoterol, 2 puff, Inhalation, BID - RT   And  ipratropium, 0.5 mg, Nebulization, 4x Daily - RT  bumetanide, 2 mg, Intravenous, Once  carvedilol, 12.5 mg, Oral, Q12H  cefTAZidime 2 g in 100 mL NS MBP, 2 g, Intravenous, Q8H  citalopram, 40 mg, Oral, Daily  ferrous sulfate, 325 mg, Oral, Daily With Breakfast  furosemide, 40 mg, Oral, BID  lactulose, 15 mL, Oral, TID  melatonin, 5 mg, Oral, Nightly  multivitamin, 1 tablet, Oral, Daily  pantoprazole, 40 mg, Intravenous, BID AC  phytonadione (VITAMIN K) IVPB, 10 mg, Intravenous, Once  riFAXIMin, 550 mg, Oral,  Q12H  saccharomyces boulardii, 250 mg, Oral, BID  senna-docusate sodium, 2 tablet, Oral, BID  sodium chloride, 10 mL, Intravenous, Q12H  sodium chloride, 10 mL, Intravenous, Q12H  tamsulosin, 0.4 mg, Oral, Daily  terazosin, 2 mg, Oral, Nightly         Infusions:       PRN Meds:  •  acetaminophen **OR** acetaminophen **OR** acetaminophen  •  senna-docusate sodium **AND** polyethylene glycol **AND** bisacodyl **AND** bisacodyl  •  calcium carbonate  •  ipratropium-albuterol  •  magnesium sulfate **OR** magnesium sulfate **OR** magnesium sulfate  •  ondansetron **OR** ondansetron  •  potassium chloride **OR** potassium chloride **OR** potassium chloride  •  sodium chloride  •  sodium chloride    Home Meds:  Medications Prior to Admission   Medication Sig Dispense Refill Last Dose   • acetaminophen (TYLENOL) 650 MG 8 hr tablet Take 650 mg by mouth Every 8 (Eight) Hours As Needed for Mild Pain .   Past Week at Unknown time   • ASPIRIN LOW DOSE 81 MG tablet Take 1 tablet by mouth Daily. Last dose 10-21-18 (Patient taking differently: Take 81 mg by mouth Daily.)   4/12/2021 at Unknown time   • carvedilol (COREG) 12.5 MG tablet Take 1 tablet by mouth 2 (Two) Times a Day With Meals. 60 tablet 0 4/12/2021 at Unknown time   • cefTAZidime (FORTAZ) 1 g injection Infuse 2 g into a venous catheter 3 (Three) Times a Day. 100ml every 8 hours via PICC line   4/12/2021 at Unknown time   • citalopram (CeleXA) 20 MG tablet Take 40 mg by mouth Daily.   4/12/2021 at Unknown time   • doxazosin (CARDURA) 1 MG tablet Take  by mouth 2 (Two) Times a Day.   4/12/2021 at Unknown time   • ferrous gluconate (FERGON) 324 MG tablet Take 1 tablet by mouth Daily With Breakfast. (Patient taking differently: Take 65 mg by mouth Daily.) 30 tablet 0 4/12/2021 at Unknown time   • furosemide (LASIX) 20 MG tablet 40 mg 2 (two) times a day.   4/12/2021 at Unknown time   • Hydrocortisone, Perianal, (ANUSOL-HC) 2.5 % rectal cream Insert 1 application into the  rectum 2 (Two) Times a Day.   Past Month at Unknown time   • lactulose (CHRONULAC) 10 GM/15ML solution Take 15 mL by mouth 3 (Three) Times a Day for goal of 3 BMs daily 1892 mL 3 4/11/2021 at Unknown time   • melatonin 5 MG tablet tablet Take 5 mg by mouth Every Night.   4/11/2021 at Unknown time   • Multiple Vitamin (MULTI-VITAMIN DAILY) tablet Take 1 tablet by mouth Daily.   4/12/2021 at Unknown time   • O2 (OXYGEN)    4/12/2021 at Unknown time   • Ondansetron 4 MG film 3 (Three) Times a Day As Needed.   Past Week at Unknown time   • pantoprazole (PROTONIX) 40 MG EC tablet Take 40 mg by mouth 2 (Two) Times a Day.   4/12/2021 at Unknown time   • saccharomyces boulardii (FLORASTOR) 250 MG capsule Take 250 mg by mouth 2 (Two) Times a Day.   4/12/2021 at Unknown time   • tamsulosin (FLOMAX) 0.4 MG capsule 24 hr capsule Take 1 capsule by mouth Daily. 15 capsule 0 4/12/2021 at Unknown time   • Trelegy Ellipta 100-62.5-25 MCG/INH aerosol powder  INHALE 1 PUFF ONCE DAILY 60 each 0 4/12/2021 at Unknown time   • Xifaxan 550 MG tablet 2 (Two) Times a Day.   4/12/2021 at Unknown time       ROS: Review of Systems   Constitutional: Positive for fatigue.   HENT: Negative.    Eyes: Negative.    Respiratory: Positive for shortness of breath.    Cardiovascular: Positive for leg swelling.   Gastrointestinal: Positive for abdominal pain and blood in stool.   Endocrine: Negative.    Genitourinary: Negative.    Musculoskeletal: Negative.    Skin: Positive for pallor.   Allergic/Immunologic: Negative.    Neurological: Positive for weakness.   Hematological: Negative.    Psychiatric/Behavioral: Negative.        PAST MED HX:  Past Medical History:   Diagnosis Date   • Abdominal aortic aneurysm (CMS/HCC) 9/29/2016   • Anxiety 9/29/2016   • Arthritis    • Back pain    • Black lung (CMS/HCC)    • CAD (coronary artery disease) 9/29/2016   • Cirrhosis (CMS/HCC)    • COPD (chronic obstructive pulmonary disease) (CMS/HCC) 9/29/2016   •  Depression 9/29/2016   • Depression    • GERD (gastroesophageal reflux disease)    • Hypertension 9/29/2016   • On supplemental oxygen by nasal cannula     at night   • Vitiligo    • Wears dentures    • Wears reading eyeglasses        PAST SURG HX:  Past Surgical History:   Procedure Laterality Date   • ABDOMINAL AORTIC ANEURYSM REPAIR WITH ENDOGRAFT N/A 6/6/2019    Procedure: ABDOMINAL AORTIC ANEURYSM REPAIR WITH ENDOGRAFT;  Surgeon: Leopoldo Burleson MD;  Location:  [facility];  Service: Vascular   • BACK SURGERY     • CARDIAC CATHETERIZATION     • CARDIAC CATHETERIZATION N/A 9/28/2018    Procedure: Left Heart Cath;  Surgeon: Douglas Galvez MD;  Location:  [facility];  Service: Cardiovascular   • CARDIAC SURGERY     • COLONOSCOPY      2015   • COLONOSCOPY N/A 10/30/2019    Procedure: COLONOSCOPY;  Surgeon: Homar Viveros MD;  Location:  [facility];  Service: Gastroenterology   • ENDOSCOPY N/A 10/30/2019    Procedure: ESOPHAGOGASTRODUODENOSCOPY;  Surgeon: Homar Viveros MD;  Location:  [facility];  Service: Gastroenterology   • EYE SURGERY      cataracts bilateral   • HAND SURGERY Left    • LUMBAR DISCECTOMY FUSION INSTRUMENTATION N/A 11/1/2018    Procedure: LUMBAR DISCECTOMY POSTERIOR WITH FUSION INSTRUMENTATION;  Surgeon: Joo Franklin MD;  Location:  [facility];  Service: Orthopedic Spine   • LUMBAR DISCECTOMY FUSION INSTRUMENTATION N/A 7/24/2019    Procedure: POSTERIOR LUMBAR SPINAL FUSION REVISION AND INSTRUMENTATION L3,L4,L5,S1;  Surgeon: Joo Franklin MD;  Location:  [facility];  Service: Orthopedic Spine   • ORIF FINGER / THUMB FRACTURE     • SHOULDER SURGERY Left        FAM HX:  Family History   Problem Relation Age of Onset   • Stroke Mother    • Hypertension Mother    • Heart disease Mother    • Heart disease Father    • Hypertension Father    • Diabetes Sister    • Hypertension Sister    • Heart disease Sister    • Diabetes Brother    • Heart  "disease Brother    • Hypertension Child    • Hypertension Son    • No Known Problems Son    • Diabetes Sister    • Heart disease Sister        SOC HX:  Social History     Socioeconomic History   • Marital status:      Spouse name: Not on file   • Number of children: 2   • Years of education: Not on file   • Highest education level: Not on file   Tobacco Use   • Smoking status: Former Smoker     Packs/day: 1.00     Years: 30.00     Pack years: 30.00     Types: Cigarettes     Quit date: 1/1/2011     Years since quitting: 10.2   • Smokeless tobacco: Former User     Types: Chew     Quit date: 4/7/2011   Substance and Sexual Activity   • Alcohol use: Not Currently   • Drug use: No   • Sexual activity: Defer     Comment:  and lives with wife       PHYSICAL EXAM  /61 (BP Location: Left arm, Patient Position: Lying)   Pulse 64   Temp 98 °F (36.7 °C) (Oral)   Resp 16   Ht 190.5 cm (75\")   Wt 129 kg (283 lb 9.6 oz)   SpO2 95%   BMI 35.45 kg/m²   Wt Readings from Last 3 Encounters:   04/13/21 129 kg (283 lb 9.6 oz)   03/29/21 114 kg (252 lb)   03/17/21 106 kg (233 lb 4.8 oz)   ,body mass index is 35.45 kg/m².  Physical Exam  Constitutional:       Comments: Appears older than stated age and chronically ill   HENT:      Head: Normocephalic and atraumatic.      Mouth/Throat:      Mouth: Mucous membranes are dry.   Eyes:      General: No scleral icterus.  Cardiovascular:      Rate and Rhythm: Normal rate and regular rhythm.   Pulmonary:      Comments: Decreased breath sounds bilaterally  On 5 L O2 via nasal cannula  Mild tachypnea in conversation  Abdominal:      General: Bowel sounds are normal. There is distension.      Palpations: Abdomen is soft.      Tenderness: There is no abdominal tenderness.   Musculoskeletal:      Right lower leg: Edema present.      Left lower leg: Edema present.   Neurological:      Mental Status: He is alert and oriented to person, place, and time.   Psychiatric:         " Behavior: Behavior normal.       Results Review:   I reviewed the patient's new clinical results.    Lab Results   Component Value Date    WBC 4.88 04/12/2021    HGB 5.9 (C) 04/13/2021    HGB 5.6 (C) 04/12/2021    HGB 8.2 (L) 03/17/2021    HCT 19.1 (C) 04/13/2021    .5 (H) 04/12/2021    PLT 87 (L) 04/12/2021       Lab Results   Component Value Date    INR 1.75 (H) 04/12/2021    INR 1.53 (H) 03/17/2021    INR 1.80 (H) 03/12/2021       Lab Results   Component Value Date    GLUCOSE 101 (H) 04/12/2021    BUN 20 04/12/2021    CREATININE 0.97 04/12/2021    EGFRIFNONA 77 04/12/2021    EGFRIFAFRI 90 12/17/2019    BCR 20.6 04/12/2021     (L) 04/13/2021    K 3.8 04/12/2021    CO2 25.0 04/12/2021    CALCIUM 8.7 04/12/2021    PROTENTOTREF 8.2 06/18/2020    ALBUMIN 2.90 (L) 04/12/2021    ALKPHOS 123 (H) 04/12/2021    BILITOT 1.1 04/12/2021    ALT 40 04/12/2021    AST 85 (H) 04/12/2021     CT abdomen/pelvis without contrast (as interpreted by radiologist)  ABDOMEN FINDINGS:  Morphologic changes of hepatic cirrhosis with moderate hepatosplenomegaly. Stable left lobe liver cyst. No gallbladder distention or visible bile duct dilatation. Pancreas is unremarkable. Soft tissue edema throughout the abdominal mesentery and body wall and small volume ascites within the abdomen and pelvis. Small bilateral pleural effusions, larger on the right.   Bifurcated aortoiliac stent graft with continued ill-defined thickening of the wall of the native aneurysm and mild periaortic adenopathy. Vascular assessment is limited without contrast administration. Both kidneys are negative with no evidence of  urinary obstruction or nephrolithiasis.   Small bowel and colon are normal in caliber and appearance, as imaged. No gastric distention or distal esophageal dilatation.   PELVIS FINDINGS:  Urinary bladder, prostate and rectum are within normal limits.   Postoperative changes of previous lower lumbar spine fusion with  instrumentation.   IMPRESSION:  1.  No new or acute abnormality within the abdomen or pelvis when compared with prior study.  2.  Advanced hepatic cirrhosis with hepatosplenomegaly, small volume ascites and extensive soft tissue edema throughout the abdominal mesentery and body wall.  3.  Bifurcated aortoiliac stent graft with continued ill-defined wall thickening of the native aneurysm and mild periaortic adenopathy. Noncontrast study.    ASSESSMENTS/PLANS    1. Acute blood loss anemia, symptomatic  2. GI bleed with melena, subacute  3. Liver cirrhosis with trace ascites  4. History of grade 2 esophageal varices and severe portal hypertensive gastropathy  5. Chronically infected endo graft  6. Coagulopathy  7. Elevated AFP     Mr. Kelsey is a pleasant 70 year old male with history of liver cirrhosis, grade 2 esophageal varices and severe portal hypertensive gastropathy that presents with a three week history of dark stools and progressive fatigue.  He had recent COVID-19 infection and has had worsening hypoxia at home.   He is currently on 5 L O2 via nasal cannula.   His CTA chest shows new multifocal airspace infiltrates.   Suspect subacute upper GI source of bleeding, likely related to portal hypertensive gastropathy.        >>> Recommend EGD tomorrow if respiratory status is stable/improved.    >>> IV Bumex per cardiology.   TTE pending  >>> Begin IV Octreotide drip   >>> IV Protonix 40 mg BID  >>> IV Ceftazidime   >>> IV Vitamin K+ 10 mg x 1   >>> Obtain AFP    I discussed the patient's findings and my recommendations with patient and his wife whom is at bedside.      NIGHAT Saul  04/13/21  09:49 EDT

## 2021-04-13 NOTE — CONSULTS
INFECTIOUS DISEASE CONSULT/INITIAL HOSPITAL VISIT    Derek Kelsey  1950  [id number]    Date of Consult: 4/13/2021    Admission Date: 4/12/2021      Requesting Provider: Tammi Lomax MD  Evaluating Physician: Davis Carrero MD    Reason for Consultation: anemia, pseudomonas infection    History of present illness:    Patient is a 70 y.o. male  with a medical history significant for coronary artery disease, diastolic heart failure, A.fib (not on AC), hypertension, cirrhosis, chronic respiratory failure (2-3L @ home), COPD, and an infected AAA endograft.  Recent positive blood cultures for Cipro and Levaquin resistant Pseudomonas aeruginosa led to treatment with IV ceftazidime as an outpatient.    Patient has had worsening anemia fatigue and shortness of breath during telemedicine visit yesterday.    Patient has a hemoglobin now below 7 which I recommended patient be direct admitted to the hospital.    Because the endograft infection cannot be cured with IV antibiotics patient is referred to [facility] which has an appointment for patient this Monday.    Patient has had worsening swelling of face arms and legs and has recently had increased dose of his Lasix.      Patient denies fevers or chills;    Reports feeling very tired.    Ports that he does not want endograft removal  at this time  Past Medical History:   Diagnosis Date   • Abdominal aortic aneurysm (CMS/HCC) 9/29/2016   • Anxiety 9/29/2016   • Arthritis    • Back pain    • Black lung (CMS/Formerly [facility])    • CAD (coronary artery disease) 9/29/2016   • Cirrhosis (CMS/[facility])    • COPD (chronic obstructive pulmonary disease) (CMS/Formerly [facility]) 9/29/2016   • Depression 9/29/2016   • Depression    • GERD (gastroesophageal reflux disease)    • Hypertension 9/29/2016   • On supplemental oxygen by nasal cannula     at night   • Vitiligo    • Wears dentures    • Wears reading eyeglasses        Past Surgical History:   Procedure Laterality Date   • ABDOMINAL AORTIC  ANEURYSM REPAIR WITH ENDOGRAFT N/A 6/6/2019    Procedure: ABDOMINAL AORTIC ANEURYSM REPAIR WITH ENDOGRAFT;  Surgeon: Leopoldo Burleson MD;  Location:  [facility];  Service: Vascular   • BACK SURGERY     • CARDIAC CATHETERIZATION     • CARDIAC CATHETERIZATION N/A 9/28/2018    Procedure: Left Heart Cath;  Surgeon: Douglas Galvez MD;  Location:  [facility];  Service: Cardiovascular   • CARDIAC SURGERY     • COLONOSCOPY      2015   • COLONOSCOPY N/A 10/30/2019    Procedure: COLONOSCOPY;  Surgeon: Homar Viveros MD;  Location:  [facility];  Service: Gastroenterology   • ENDOSCOPY N/A 10/30/2019    Procedure: ESOPHAGOGASTRODUODENOSCOPY;  Surgeon: Homar Viveros MD;  Location:  [facility];  Service: Gastroenterology   • EYE SURGERY      cataracts bilateral   • HAND SURGERY Left    • LUMBAR DISCECTOMY FUSION INSTRUMENTATION N/A 11/1/2018    Procedure: LUMBAR DISCECTOMY POSTERIOR WITH FUSION INSTRUMENTATION;  Surgeon: Joo Franklin MD;  Location:  [facility];  Service: Orthopedic Spine   • LUMBAR DISCECTOMY FUSION INSTRUMENTATION N/A 7/24/2019    Procedure: POSTERIOR LUMBAR SPINAL FUSION REVISION AND INSTRUMENTATION L3,L4,L5,S1;  Surgeon: Joo Franklin MD;  Location:  [facility] OR;  Service: Orthopedic Spine   • ORIF FINGER / THUMB FRACTURE     • SHOULDER SURGERY Left        Family History   Problem Relation Age of Onset   • Stroke Mother    • Hypertension Mother    • Heart disease Mother    • Heart disease Father    • Hypertension Father    • Diabetes Sister    • Hypertension Sister    • Heart disease Sister    • Diabetes Brother    • Heart disease Brother    • Hypertension Child    • Hypertension Son    • No Known Problems Son    • Diabetes Sister    • Heart disease Sister        Social History     Socioeconomic History   • Marital status:      Spouse name: Not on file   • Number of children: 2   • Years of education: Not on file   • Highest education level:  Not on file   Tobacco Use   • Smoking status: Former Smoker     Packs/day: 1.00     Years: 30.00     Pack years: 30.00     Types: Cigarettes     Quit date: 1/1/2011     Years since quitting: 10.2   • Smokeless tobacco: Former User     Types: Chew     Quit date: 4/7/2011   Substance and Sexual Activity   • Alcohol use: Not Currently   • Drug use: No   • Sexual activity: Defer     Comment:  and lives with wife       Allergies   Allergen Reactions   • Penicillins Hives     Hives - has tolerated Zosyn and ceftriaxone 09/2019   • Percocet [Oxycodone-Acetaminophen] Confusion         Medication:    Current Facility-Administered Medications:   •  acetaminophen (TYLENOL) tablet 650 mg, 650 mg, Oral, Q4H PRN **OR** acetaminophen (TYLENOL) 160 MG/5ML solution 650 mg, 650 mg, Oral, Q4H PRN **OR** acetaminophen (TYLENOL) suppository 650 mg, 650 mg, Rectal, Q4H PRN, Esther Drew APRN  •  sennosides-docusate (PERICOLACE) 8.6-50 MG per tablet 2 tablet, 2 tablet, Oral, BID, 2 tablet at 04/13/21 0807 **AND** polyethylene glycol (MIRALAX) packet 17 g, 17 g, Oral, Daily PRN **AND** bisacodyl (DULCOLAX) EC tablet 5 mg, 5 mg, Oral, Daily PRN **AND** bisacodyl (DULCOLAX) suppository 10 mg, 10 mg, Rectal, Daily PRN, Esther Drew APRN  •  budesonide-formoterol (SYMBICORT) 160-4.5 MCG/ACT inhaler 2 puff, 2 puff, Inhalation, BID - RT, 2 puff at 04/13/21 0738 **AND** ipratropium (ATROVENT) nebulizer solution 0.5 mg, 0.5 mg, Nebulization, 4x Daily - RT, Esther Drew APRN, 0.5 mg at 04/13/21 0738  •  calcium carbonate (TUMS) chewable tablet 500 mg (200 mg elemental), 2 tablet, Oral, BID PRN, Esther Drew APRN  •  carvedilol (COREG) tablet 12.5 mg, 12.5 mg, Oral, Q12H, Esther Drew, LYNDA, 12.5 mg at 04/13/21 0807  •  cefTAZidime 2 g in 100 mL NS MBP, 2 g, Intravenous, Q8H, Esther Drew APRN, Last Rate: 200 mL/hr at 04/13/21 0816, 2 g at 04/13/21 0816  •  citalopram (CeleXA) tablet 40 mg, 40 mg, Oral, Daily, Esther Drew APRN,  40 mg at 04/13/21 0806  •  ferrous sulfate tablet 325 mg, 325 mg, Oral, Daily With Breakfast, Esther Drew APRN, 325 mg at 04/13/21 0807  •  furosemide (LASIX) tablet 40 mg, 40 mg, Oral, BID, Rinku Mcrae MD  •  ipratropium-albuterol (DUO-NEB) nebulizer solution 3 mL, 3 mL, Nebulization, Q4H PRN, Rinku Mcrae MD  •  lactulose (CHRONULAC) 10 GM/15ML solution 15 mL, 15 mL, Oral, TID, Esther Drew APRN, 15 mL at 04/13/21 0808  •  Magnesium Sulfate 2 gram Bolus, followed by 8 gram infusion (total Mg dose 10 grams)- Mg less than or equal to 1mg/dL, 2 g, Intravenous, PRN **OR** Magnesium Sulfate 2 gram / 50mL Infusion (GIVE X 3 BAGS TO EQUAL 6GM TOTAL DOSE) - Mg 1.1 - 1.5 mg/dl, 2 g, Intravenous, PRN **OR** Magnesium Sulfate 4 gram infusion- Mg 1.6-1.9 mg/dL, 4 g, Intravenous, PRN, Esther Drew, LYNDA  •  melatonin tablet 5 mg, 5 mg, Oral, Nightly, Esther Drew APRN, 5 mg at 04/12/21 2021  •  multivitamin (THERAGRAN) tablet 1 tablet, 1 tablet, Oral, Daily, Esther Drew APRN, 1 tablet at 04/13/21 0807  •  octreotide (sandoSTATIN) 500 mcg in sodium chloride 0.9 % 100 mL (5 mcg/mL) infusion, 50 mcg/hr, Intravenous, Continuous, Criss Sharif PA  •  ondansetron (ZOFRAN) tablet 4 mg, 4 mg, Oral, Q6H PRN **OR** ondansetron (ZOFRAN) injection 4 mg, 4 mg, Intravenous, Q6H PRN, Esther Drew APRN  •  pantoprazole (PROTONIX) injection 40 mg, 40 mg, Intravenous, BID SILVIA, Katherine Rowland PA-C, 40 mg at 04/13/21 0807  •  phytonadione (AQUA-MEPHYTON, VITAMIN K) 10 mg in sodium chloride 0.9 % 50 mL IVPB, 10 mg, Intravenous, Once, Rinku Mcrae MD, Last Rate: 50 mL/hr at 04/13/21 1122, 10 mg at 04/13/21 1122  •  potassium chloride (MICRO-K) CR capsule 40 mEq, 40 mEq, Oral, PRN, 40 mEq at 04/13/21 1123 **OR** potassium chloride (KLOR-CON) packet 40 mEq, 40 mEq, Oral, PRN **OR** potassium chloride 10 mEq in 100 mL IVPB, 10 mEq, Intravenous, Q1H PRN, Esther Drew APRN  •  riFAXIMin (XIFAXAN) tablet 550 mg,  550 mg, Oral, Q12H, Esther Drew APRN, 550 mg at 21 0807  •  saccharomyces boulardii (FLORASTOR) capsule 250 mg, 250 mg, Oral, BID, Esther Drew, APRN, 250 mg at 21 0807  •  sodium chloride 0.9 % flush 10 mL, 10 mL, Intravenous, Q12H, Esther Drew, APRN, 10 mL at 21  •  sodium chloride 0.9 % flush 10 mL, 10 mL, Intravenous, PRN, Esther Drew, APRN  •  sodium chloride 0.9 % flush 10 mL, 10 mL, Intravenous, Q12H, Rowland, Katherine, PA-C, 10 mL at 21 0808  •  sodium chloride 0.9 % flush 10 mL, 10 mL, Intravenous, PRN, Rowland, Katherine, PA-C, 10 mL at 21 1122  •  tamsulosin (FLOMAX) 24 hr capsule 0.4 mg, 0.4 mg, Oral, Daily, Esther Drew APRN, 0.4 mg at 21 0807  •  terazosin (HYTRIN) capsule 2 mg, 2 mg, Oral, Nightly, Esther Drew APRN, 2 mg at 21    Antibiotics:  Anti-Infectives (From admission, onward)    Ordered     Dose/Rate Route Frequency Start Stop    21 1853  cefTAZidime 2 g in 100 mL NS MBP     Irina Chan, PharmD reviewed the order on 21 7588.   Ordering Provider: Esther Drew APRN    2 g  200 mL/hr over 30 Minutes Intravenous Every 8 Hours Scheduled 21 2200 21 [id number]  riFAXIMin (XIFAXAN) tablet 550 mg     Irina Chan, PharmD reviewed the order on 21 2323.   Ordering Provider: Esther Drew APRN    550 mg Oral Every 12 Hours Scheduled 21 2100 21            Review of Systems:  Remark for appetite change cough shortness of breath wheezing black stool diarrhea nausea difficulty urinating scrotal swelling dizziness lightheadedness and confusion    Rest of review of systems were reviewed and were unremarkable    Physical Exam:   Vital Signs  Temp (24hrs), Av.8 °F (36.6 °C), Min:97.1 °F (36.2 °C), Max:98.5 °F (36.9 °C)    Temp  Min: 97.1 °F (36.2 °C)  Max: 98.5 °F (36.9 °C)  BP  Min: 119/86  Max: 148/69  Pulse  Min: 59  Max: 86  Resp  Min: 16  Max: 20  SpO2  Min: 90 %  Max: 97  %    GENERAL: Awake and alert, in no acute distress.   HEENT: Normocephalic, atraumatic.  PERRL. EOMI. No conjunctival injection. No icterus. Swollen cheeks      HEART: RRR; No murmur  LUNGS: Clear to auscultation bilaterally  ABDOMEN: distended, nontender, no fluid wave  EXT: 2+ pedal edema  :  Without Young catheter.  MSK:  No joint deromvity  SKIN: no rash  NEURO: Oriented to PPT.  PSYCHIATRIC: Normal insight and judgement. Cooperative with PE    Laboratory Data    Results from last 7 days   Lab Units 04/13/21  0928 04/13/21  0029 04/12/21  1852   WBC 10*3/mm3 4.32  --  4.88   HEMOGLOBIN g/dL 7.0* 5.9* 5.6*   HEMATOCRIT % 22.7* 19.1* 18.7*   PLATELETS 10*3/mm3 77*  --  87*     Results from last 7 days   Lab Units 04/13/21  0928   SODIUM mmol/L 128*   POTASSIUM mmol/L 3.6   CHLORIDE mmol/L 94*   CO2 mmol/L 24.0   BUN mg/dL 20   CREATININE mg/dL 0.94   GLUCOSE mg/dL 106*   CALCIUM mg/dL 8.6     Results from last 7 days   Lab Units 04/12/21  1852   ALK PHOS U/L 123*   BILIRUBIN mg/dL 1.1   ALT (SGPT) U/L 40   AST (SGOT) U/L 85*                         Estimated Creatinine Clearance: 108.5 mL/min (by C-G formula based on SCr of 0.94 mg/dL).      Microbiology:  No results found for: ACANTHNAEG, AFBCX, BPERTUSSISCX, BLOODCX  No results found for: BCIDPCR, CXREFLEX, CSFCX, CULTURETIS  No results found for: CULTURES, HSVCX, URCX  No results found for: EYECULTURE, GCCX, HSVCULTURE, LABHSV  No results found for: LEGIONELLA, MRSACX, MUMPSCX, MYCOPLASCX  No results found for: NOCARDIACX, STOOLCX  No results found for: THROATCX, UNSTIMCULT, URINECX, CULTURE, VZVCULTUR  No results found for: VIRALCULTU, WOUNDCX        Radiology:  Imaging Results (Last 72 Hours)     Procedure Component Value Units Date/Time    CT Angiogram Chest With & Without Contrast [[id number]] Collected: 04/13/21 0217     Updated: 04/13/21 0219    Narrative:      CT CHEST WITH CONTRAST, PE PROTOCOL, 4/13/2021    HISTORY:  70-year-old male recently  discharged from the hospital after treatment for AAA endograft infection with sepsis, Pseudomonas bacteremia. GI bleed. History of hepatic cirrhosis, chronic respiratory failure and diastolic heart failure. He is admitted to the  hospital today after low hemoglobin was noted by his primary provider. Symptoms include shortness of breath, leg swelling, black stools,    TECHNIQUE:  CT examination of the chest with IV contrast. CTA MIP multiplanar pulmonary artery images were reformatted with 3-D postprocessing. Radiation dose reduction techniques included automated exposure control. Radiation audit for CT and nuclear cardiology  exams in the last 12 months: 8.    COMPARISON:  *  CTA chest, 3/10/2021.    FINDINGS:  No pulmonary embolism is demonstrated. Thoracic aorta is normal in caliber with no aneurysm or dissection. Heart size is normal, and there is no pericardial effusion.    Multifocal airspace infiltrates are scattered throughout the central portions of both lungs, greatest in the left upper lobe, mostly in a central peribronchovascular distribution. This was not present on the prior study. Multifocal pneumonitis is  suspected. In addition, the patient has developed a new small to moderate right pleural effusion and tiny left pleural effusion, and there is consolidation and atelectasis of the dependent right posterior lung base. Trachea and central bronchi are  patent.    Lymph node enlargement is seen throughout the mediastinum, both pulmonary viet and in the axillary regions where there is bilateral axillary edema. This is nonspecific but may represent reactive adenopathy or lymphatic engorgement. Neoplastic adenopathy  is unlikely given the time course.      Impression:      1.  No evidence of pulmonary embolism or other acute vascular abnormality within the chest.  2.  Multifocal groundglass and more dense airspace infiltrates in both lungs as detailed above. Consider atypical pneumonitis. Imaging features  can be seen with COVID-19 pneumonia, although are nonspecific and can can occur with a variety of infectious  and noninfectious processes.  3.  Small to moderate right pleural effusion and tiny left pleural effusion with right posterior lung base consolidation and atelectasis, new since prior.  4.  Adenopathy within the mediastinum, pulmonary viet and axillary regions. Axillary soft tissue edema. Soft tissue edema is also present throughout the abdominal mesentery and body wall. Lymph node enlargement due to lymphatic engorgement is likely.    Signer Name: Jim Yousif MD   Signed: 4/13/2021 2:17 AM   Workstation Name: [id number]    [facility]    CT Abdomen Pelvis Without Contrast [[id number]] Collected: 04/13/21 0210     Updated: 04/13/21 0212    Narrative:      CT ABDOMEN AND PELVIS, NONCONTRAST, 4/13/2021    HISTORY:  70-year-old male recently discharged from the hospital after treatment for AAA endograft infection with sepsis, Pseudomonas bacteremia. GI bleed. History of hepatic cirrhosis, chronic respiratory failure and diastolic heart failure. He is admitted to the  hospital today after low hemoglobin was noted by his primary provider. Symptoms include shortness of breath, leg swelling, black stools,    TECHNIQUE:  CT imaging of the abdomen and pelvis ordered without oral or IV contrast. Radiation dose reduction techniques included automated exposure control. Radiation audit for CT and nuclear cardiology exams in the last 12 months: 8.    COMPARISON:  *  CT abdomen and pelvis, 3/10/2021.    ABDOMEN FINDINGS:  Morphologic changes of hepatic cirrhosis with moderate hepatosplenomegaly. Stable left lobe liver cyst. No gallbladder distention or visible bile duct dilatation. Pancreas is unremarkable. Soft tissue edema throughout the abdominal mesentery and body  wall and small volume ascites within the abdomen and pelvis. Small bilateral pleural effusions, larger on the  right.    Bifurcated aortoiliac stent graft with continued ill-defined thickening of the wall of the native aneurysm and mild periaortic adenopathy. Vascular assessment is limited without contrast administration. Both kidneys are negative with no evidence of  urinary obstruction or nephrolithiasis.    Small bowel and colon are normal in caliber and appearance, as imaged. No gastric distention or distal esophageal dilatation.    PELVIS FINDINGS:  Urinary bladder, prostate and rectum are within normal limits.    Postoperative changes of previous lower lumbar spine fusion with instrumentation.      Impression:      1.  No new or acute abnormality within the abdomen or pelvis when compared with prior study.  2.  Advanced hepatic cirrhosis with hepatosplenomegaly, small volume ascites and extensive soft tissue edema throughout the abdominal mesentery and body wall.  3.  Bifurcated aortoiliac stent graft with continued ill-defined wall thickening of the native aneurysm and mild periaortic adenopathy. Noncontrast study.    Signer Name: Jim Yousif MD   Signed: 4/13/2021 2:10 AM   Workstation Name: [email or web address]    [facility]            Impression:   Acute GI bleed  Anemia  Cirrhosis  Chronic respiratory failure  Pseudomonas bacteremia with presumed infected endograft  Atrial fibrillation  History of Covid 3/10/2021    Estimated Creatinine Clearance: 108.5 mL/min (by C-G formula based on SCr of 0.94 mg/dL).        PLAN/RECOMMENDATIONS:   Thank you for asking us to see Derek Kelsey, I recommend the following:  Strongly complicated case; I suspect patient needs upper and lower endoscopy blood transfusion and can continue ceftazidime 2 g IV every 8 hours.    Patient appears to be swollen with anasarca and may benefit from diuresis at the same time.    Patient does not want endograft surgery at [facility] because he has a fear of operative mortality.    I have explained that without that  surgery I cannot cure this infection after finishing 8-12 weeks from 3/10/2021 of IV antibiotics the Pseudomonas will likely recur.    Patient appears to have 1 of 2 issues which may relate to cirrhosis.    We obviously need to control the bleeding, volume resuscitate and see what options we have to manage cirrhosis.    Poor overall prognosis    If patient is not going to go to [facility] I recommend that we change care towards comfort measures.    Discussed with family at length    Continue ceftazidime 2 g IV every 8 hours           Davis Carrero MD  4/13/2021  11:42 EDT

## 2021-04-13 NOTE — THERAPY EVALUATION
Patient Name: Derek Kelsey  : 1950    MRN: [id number]                              Today's Date: 2021       Admit Date: 2021    Visit Dx:     ICD-10-CM ICD-9-CM   1. Acute blood loss anemia, mild, asymptomatic  D62 285.1   2. Gastrointestinal hemorrhage with melena  K92.1 578.1     Patient Active Problem List   Diagnosis   • Anxiety   • Abdominal aortic aneurysm (AAA) without rupture (CMS/HCC)   • Depression   • Hypertension   • Cataract, bilateral   • CAD (coronary artery disease)   • Abnormal stress test   • Moderate COPD   • Former smoker   • Coal workers pneumoconiosis, simple   • Back pain   • S/P lumbar fusion   • Prediabetes   • Acute blood loss anemia, mild, asymptomatic   • Acute postoperative pain   • Hyponatremia, mild   • Chronic respiratory failure with hypoxia and hypercapnia (CMS/HCC)   • Renal insufficiency   • Acute cystitis without hematuria   • Acute UTI   • Bacteremia   • Symptomatic anemia   • Aortitis (CMS/HCC)   • Elevated C-reactive protein (CRP)   • Cirrhosis of liver (CMS/HCC)   • High anion gap metabolic acidosis   • S/P AAA repair   • GERD (gastroesophageal reflux disease)   • Person under investigation for COVID-19   • On supplemental oxygen by nasal cannula   • Infected aortic endograft (CMS/HCC)   • Elevated transaminase level   • Thrombocytopenia (CMS/HCC)   • Pneumonia   • Gram-negative bacteremia   • COVID-19   • Anemia     Past Medical History:   Diagnosis Date   • Abdominal aortic aneurysm (CMS/HCC) 2016   • Anxiety 2016   • Arthritis    • Back pain    • Black lung (CMS/HCC)    • CAD (coronary artery disease) 2016   • Cirrhosis (CMS/HCC)    • COPD (chronic obstructive pulmonary disease) (CMS/HCC) 2016   • Depression 2016   • Depression    • GERD (gastroesophageal reflux disease)    • Hypertension 2016   • On supplemental oxygen by nasal cannula     at night   • Vitiligo    • Wears dentures    • Wears reading eyeglasses      Past  Surgical History:   Procedure Laterality Date   • ABDOMINAL AORTIC ANEURYSM REPAIR WITH ENDOGRAFT N/A 6/6/2019    Procedure: ABDOMINAL AORTIC ANEURYSM REPAIR WITH ENDOGRAFT;  Surgeon: Leopoldo Burleson MD;  Location:  [facility];  Service: Vascular   • BACK SURGERY     • CARDIAC CATHETERIZATION     • CARDIAC CATHETERIZATION N/A 9/28/2018    Procedure: Left Heart Cath;  Surgeon: Douglas Galvez MD;  Location:  [facility];  Service: Cardiovascular   • CARDIAC SURGERY     • COLONOSCOPY      2015   • COLONOSCOPY N/A 10/30/2019    Procedure: COLONOSCOPY;  Surgeon: Homar Viveros MD;  Location: [facility];  Service: Gastroenterology   • ENDOSCOPY N/A 10/30/2019    Procedure: ESOPHAGOGASTRODUODENOSCOPY;  Surgeon: Homar Viveros MD;  Location:  [facility];  Service: Gastroenterology   • EYE SURGERY      cataracts bilateral   • HAND SURGERY Left    • LUMBAR DISCECTOMY FUSION INSTRUMENTATION N/A 11/1/2018    Procedure: LUMBAR DISCECTOMY POSTERIOR WITH FUSION INSTRUMENTATION;  Surgeon: Joo Franklin MD;  Location:  [facility];  Service: Orthopedic Spine   • LUMBAR DISCECTOMY FUSION INSTRUMENTATION N/A 7/24/2019    Procedure: POSTERIOR LUMBAR SPINAL FUSION REVISION AND INSTRUMENTATION L3,L4,L5,S1;  Surgeon: Joo Franklin MD;  Location:  [facility];  Service: Orthopedic Spine   • ORIF FINGER / THUMB FRACTURE     • SHOULDER SURGERY Left      General Information     Row Name 04/13/21 1039          OT Time and Intention    Document Type  evaluation  -CS     Mode of Treatment  occupational therapy  -CS     Row Name 04/13/21 1039          General Information    Patient Profile Reviewed  yes Prior to recent decline (over last 2 weeks), Pt/Spouse report independence in ADL completion and mobility w/ quad-cane as needed. Over last two weeks Pt using RW and assist for all ADLs  -CS     Prior Level of Function  independent:;ADL's;bed mobility;all household mobility  -CS      Existing Precautions/Restrictions  fall;oxygen therapy device and L/min;other (see comments) Low H&H, Monitor O2 sats, [place]  -CS     Barriers to Rehab  medically complex;previous functional deficit  -CS     Row Name 04/13/21 1039          Living Environment    Lives With  spouse  -CS     Row Name 04/13/21 1039          Home Main Entrance    Number of Stairs, Main Entrance  three;other (see comments) 2-5 steps for various entrances to home, reports no railing  -CS     Stair Railings, Main Entrance  none  -CS     Row Name 04/13/21 1039          Stairs Within Home, Primary    Stairs, Within Home, Primary  One level home, Pt uses bath w/ walk-in shower and shower chair  -CS     Number of Stairs, Within Home, Primary  none  -CS     Row Name 04/13/21 1039          Cognition    Orientation Status (Cognition)  oriented x 4  -CS     Row Name 04/13/21 1039          Safety Issues, Functional Mobility    Safety Issues Affecting Function (Mobility)  insight into deficits/self-awareness;awareness of need for assistance;safety precaution awareness;safety precautions follow-through/compliance  -CS     Impairments Affecting Function (Mobility)  balance;endurance/activity tolerance;shortness of breath;strength  -CS       User Key  (r) = Recorded By, (t) = Taken By, (c) = Cosigned By    Initials Name Provider Type    CS Bartolome Dietrich OT Occupational Therapist          Mobility/ADL's     Row Name 04/13/21 1042          Bed Mobility    Bed Mobility  rolling left;sidelying-sit  -CS     Rolling Left [place] (Bed Mobility)  standby assist  -CS     Sidelying-Sit [place] (Bed Mobility)  standby assist  -CS     Comment (Bed Mobility)  HOB and bedrails lowered to simulate home environment, no cues for sequencing needed, SBA w/ extra time and effort, no dizziness upon sitting EOB  -CS     Row Name 04/13/21 1042          Transfers    Transfers  sit-stand transfer  -CS     Comment (Transfers)  Cues for safe hand placement and  sequencing when standing, demo'd good reach back when sitting  -     Sit-Stand [place] (Transfers)  minimum assist (75% patient effort);verbal cues  -[facility] Name 04/13/21 1042          Sit-Stand Transfer    Assistive Device (Sit-Stand Transfers)  walker, front-wheeled  -[facility] Name 04/13/21 1042          Functional Mobility    Functional Mobility- Comment  Mobility limitted on this date per MD/RN  -[facility] Name 04/13/21 1042          Activities of Daily Living    BADL Assessment/Intervention  lower body dressing;grooming;bathing  -[facility] Name 04/13/21 1042          Lower Body Dressing Assessment/Training    [place] Level (Lower Body Dressing)  don;socks;dependent (less than 25% patient effort)  -     Assistive Devices (Lower Body Dressing)  reacher;sock-aid  -     Position (Lower Body Dressing)  edge of bed sitting  -CS     Comment (Lower Body Dressing)  Pt reports assist for all dressing tasks during recent decline, Pt provided w/ AE for LB dressing and therapist initiated training by samantha of simulated task  -[facility] Name 04/13/21 1042          Grooming Assessment/Training    [place] Level (Grooming)  hair care, combing/brushing;set up  -CS     Position (Grooming)  supported sitting  -[facility] Name 04/13/21 1042          Bathing Assessment/Intervention    [place] Level (Bathing)  lower body  -CS     Comment (Bathing)  Pt provided w/ LHS and educated on use for EC and safety during bathing ADLs, therapist demo w/ simulated task  -CS       User Key  (r) = Recorded By, (t) = Taken By, (c) = Cosigned By    Initials Name Provider Type    Bartolome Emerson OT Occupational Therapist        Obj/Interventions     [facility] Name 04/13/21 1047          Sensory Assessment (Somatosensory)    Sensory Assessment (Somatosensory)  UE sensation intact  -[facility] Name 04/13/21 1047          Vision Assessment/Intervention    Visual Impairment/Limitations  WFL;corrective lenses full-time   -CS     Row Name 04/13/21 1047          Range of Motion Comprehensive    General Range of Motion  bilateral upper extremity ROM WFL  -     Row Name 04/13/21 1047          Strength Comprehensive (MMT)    General Manual Muscle Testing (MMT) Assessment  upper extremity strength deficits identified  -CS     Comment, General Manual Muscle Testing (MMT) Assessment  BUE grossly 4-/5  -CS     Row Name 04/13/21 1047          Motor Skills    Motor Skills  functional endurance  -CS     Functional Endurance  Pt SOA and desat to 89% upon standing and MIP on 5L NC  -     Row Name 04/13/21 1047          Balance    Balance Assessment  sitting static balance;sitting dynamic balance;standing static balance;standing dynamic balance  -CS     Static Sitting Balance  WFL;unsupported;sitting, edge of bed  -CS     Dynamic Sitting Balance  WFL;unsupported;sitting, edge of bed  -CS     Static Standing Balance  mild impairment;supported;standing  -CS     Dynamic Standing Balance  mild impairment;supported;standing  -CS     Comment, Balance  Pt CGA for balance upon standing, Edis for dynamic sit-to-stand  -CS       User Key  (r) = Recorded By, (t) = Taken By, (c) = Cosigned By    Initials Name Provider Type    CS Bartolome Dietrich L, OT Occupational Therapist        Goals/Plan     [facility] Name 04/13/21 1105          Bed Mobility Goal 1 (OT)    Activity/Assistive Device (Bed Mobility Goal 1, OT)  bed mobility activities, all  -CS     [place] Level/Cues Needed (Bed Mobility Goal 1, OT)  independent  -CS     Time Frame (Bed Mobility Goal 1, OT)  long term goal (LTG);10 days  -     Progress/Outcomes (Bed Mobility Goal 1, OT)  goal ongoing  -[facility] Name 04/13/21 1105          Transfer Goal 1 (OT)    Activity/Assistive Device (Transfer Goal 1, OT)  sit-to-stand/stand-to-sit;toilet  -     [place] Level/Cues Needed (Transfer Goal 1, OT)  modified independence  -CS     Time Frame (Transfer Goal 1, OT)  long term goal (LTG);10 days  -CS      Progress/Outcome (Transfer Goal 1, OT)  goal ongoing  -CS     Row Name 04/13/21 1105          Dressing Goal 1 (OT)    Activity/Device (Dressing Goal 1, OT)  lower body dressing  -CS     [place]/Cues Needed (Dressing Goal 1, OT)  moderate assist (50-74% patient effort)  -CS     Time Frame (Dressing Goal 1, OT)  long term goal (LTG);10 days  -CS     Strategies/Barriers (Dressing Goal 1, OT)  AE prn  -CS     Row Name 04/13/21 1105          ROM Goal 1 (OT)    ROM Goal 1 (OT)  Pt will perform 1/10reps BUE AROM ther-ex paired w/ PLB independently by discharge to promote increased activity tolerance  -CS     Time Frame (ROM Goal 1, OT)  by discharge  -CS     Progress/Outcome (ROM Goal 1, OT)  goal ongoing  -[facility] Name 04/13/21 1105          Therapy Assessment/Plan (OT)    Planned Therapy Interventions (OT)  activity tolerance training;functional balance retraining;adaptive equipment training;ROM/therapeutic exercise;transfer/mobility retraining;strengthening exercise;occupation/activity based interventions  -CS       User Key  (r) = Recorded By, (t) = Taken By, (c) = Cosigned By    Initials Name Provider Type    CS Bartolome Dietrich, OT Occupational Therapist        Clinical Impression     Row Name 04/13/21 1052          Pain Assessment    Additional Documentation  Pain Scale: Numbers Pre/Post-Treatment (Group)  -CS     Row Name 04/13/21 105          Pain Scale: Numbers Pre/Post-Treatment    Pretreatment Pain Rating  0/10 - no pain  -CS     Posttreatment Pain Rating  0/10 - no pain  -CS     Pre/Posttreatment Pain Comment  tolerated  -CS     Pain Intervention(s)  Repositioned;Ambulation/increased activity  -     Row Name 04/13/21 1298          Plan of Care Review    Plan of Care Reviewed With  patient;spouse  -CS     Progress  improving  -CS     Outcome Summary  Initial OT evaluation completed, limitted activity d/t low H&H and pulmonary status. Pt presents w/ decreased balance, strength, and activity  tolerance warranting skilled OT services to promote return to PLOF. Pt was SBA for bed mobility, dependent for LB dressing, Edis for STS transfer at RW, extra time and effort for all activities w/ O2 sats remaining > 88% on 5L NC. Pt and spouse provided w/ AE for ADL completion, educated on use by demo, discussed EC and pacing strategies, continued education/training required. Based on today's performance, recommend d/c to IP rehab.  -CS     Row Name 04/13/21 1057          Therapy Assessment/Plan (OT)    Rehab Potential (OT)  good, to achieve stated therapy goals  -CS     Criteria for Skilled Therapeutic Interventions Met (OT)  yes;meets criteria;skilled treatment is necessary  -CS     Row Name 04/13/21 1057          Therapy Plan Review/Discharge Plan (OT)    Equipment Needs Upon Discharge (OT)  other (see comments) Pt/spouse report owning [place], , grab bars, shower chair  -CS     Anticipated Discharge Disposition (OT)  inpatient rehabilitation facility  -     Row Name 04/13/21 1057          Vital Signs    Pre Systolic BP Rehab  -- RN cleared for eval, VSS on 5L NC  -CS     Pre SpO2 (%)  92  -CS     O2 Delivery Pre Treatment  nasal cannula  -CS     Intra SpO2 (%)  88  -CS     O2 Delivery Intra Treatment  nasal cannula  -CS     Post SpO2 (%)  93  -CS     O2 Delivery Post Treatment  nasal cannula  -CS     Pre Patient Position  Supine  -CS     Intra Patient Position  Standing  -CS     Post Patient Position  Sitting  -CS     Row Name 04/13/21 1054          Positioning and Restraints    Pre-Treatment Position  in bed  -CS     Post Treatment Position  chair  -CS     In Chair  notified nsg;reclined;sitting;call light within reach;encouraged to call for assist;exit alarm on;with family/caregiver;legs elevated;waffle cushion  -CS       User Key  (r) = Recorded By, (t) = Taken By, (c) = Cosigned By    Initials Name Provider Type    Barotlome Emerson, OT Occupational Therapist        Outcome Measures     Row Name  04/13/21 1107          How much help from another is currently needed...    Putting on and taking off regular lower body clothing?  2  -CS     Bathing (including washing, rinsing, and drying)  2  -CS     Toileting (which includes using toilet bed pan or urinal)  2  -CS     Putting on and taking off regular upper body clothing  3  -CS     Taking care of personal grooming (such as brushing teeth)  3  -CS     Eating meals  4  -CS     AM-PAC 6 Clicks Score (OT)  16  -CS     Row Name 04/13/21 1107          Functional Assessment    Outcome Measure Options  AM-PAC 6 Clicks Daily Activity (OT)  -CS       User Key  (r) = Recorded By, (t) = Taken By, (c) = Cosigned By    Initials Name Provider Type    CS Bartolome Dietrich OT Occupational Therapist        Occupational Therapy Education                 Title: PT OT SLP Therapies (In Progress)     Topic: Occupational Therapy (In Progress)     Point: ADL training (Done)     Description:   Instruct learner(s) on proper safety adaptation and remediation techniques during self care or transfers.   Instruct in proper use of assistive devices.              Learning Progress Summary           Patient Acceptance, E,D, VU,NR by CS at 4/13/2021 1107    Comment: OT role and POC, EC strategies, AE for LBD compeltoin   Significant Other Acceptance, E,D, VU,NR by CS at 4/13/2021 1107    Comment: OT role and POC, EC strategies, AE for LBD compeltoin                   Point: Home exercise program (Not Started)     Description:   Instruct learner(s) on appropriate technique for monitoring, assisting and/or progressing therapeutic exercises/activities.              Learner Progress:  Not documented in this visit.          Point: Precautions (Done)     Description:   Instruct learner(s) on prescribed precautions during self-care and functional transfers.              Learning Progress Summary           Patient Acceptance, E,D, VU,NR by CS at 4/13/2021 1107    Comment: OT role and POC, EC  strategies, AE for LBD compeltoin   Significant Other Acceptance, E,D, VU,NR by  at 4/13/2021 1107    Comment: OT role and POC, EC strategies, AE for LBD compeltoin                   Point: Body mechanics (Done)     Description:   Instruct learner(s) on proper positioning and spine alignment during self-care, functional mobility activities and/or exercises.              Learning Progress Summary           Patient Acceptance, E,D, VU,NR by  at 4/13/2021 1107    Comment: OT role and POC, EC strategies, AE for LBD compeltoin   Significant Other Acceptance, E,D, VU,NR by CS at 4/13/2021 1107    Comment: OT role and POC, EC strategies, AE for LBD compeltoin                               User Key     Initials Effective Dates Name Provider Type Discipline     10/21/20 -  Bartolome Dietrich OT Occupational Therapist OT              OT Recommendation and Plan  Planned Therapy Interventions (OT): activity tolerance training, functional balance retraining, adaptive equipment training, ROM/therapeutic exercise, transfer/mobility retraining, strengthening exercise, occupation/activity based interventions  Plan of Care Review  Plan of Care Reviewed With: patient, spouse  Progress: improving  Outcome Summary: Initial OT evaluation completed, limitted activity d/t low H&H and pulmonary status. Pt presents w/ decreased balance, strength, and activity tolerance warranting skilled OT services to promote return to PLOF. Pt was SBA for bed mobility, dependent for LB dressing, Edis for STS transfer at RW, extra time and effort for all activities w/ O2 sats remaining > 88% on 5L NC. Pt and spouse provided w/ AE for ADL completion, educated on use by demo, discussed EC and pacing strategies, continued education/training required. Based on today's performance, recommend d/c to IP rehab.     Time Calculation:   Time Calculation- OT     Row Name 04/13/21 1108             Time Calculation- OT    OT Start Time  0936  -      OT Received  On  04/13/21  -      OT Goal Re-Cert Due Date  04/23/21  -         Timed Charges    38797 - OT Self Care/Mgmt Minutes  10  -CS        User Key  (r) = Recorded By, (t) = Taken By, (c) = Cosigned By    Initials Name Provider Type    CS Bartolome Dietrich OT Occupational Therapist        Therapy Charges for Today     Code Description Service Date Service Provider Modifiers Qty    [id number] HC OT SELF CARE/MGMT/TRAIN EA 15 MIN 4/13/2021 Bartolome Dietrich OT GO 1    [id number]  OT EVAL MOD COMPLEXITY 4 4/13/2021 Bartolome Dietrich OT GO 1               Bartolome Dietrich OT  4/13/2021

## 2021-04-13 NOTE — THERAPY EVALUATION
Patient Name: Derek Kelsey  : 1950    MRN: [id number]                              Today's Date: 2021       Admit Date: 2021    Visit Dx:     ICD-10-CM ICD-9-CM   1. Acute blood loss anemia, mild, asymptomatic  D62 285.1   2. Gastrointestinal hemorrhage with melena  K92.1 578.1     Patient Active Problem List   Diagnosis   • Anxiety   • Abdominal aortic aneurysm (AAA) without rupture (CMS/HCC)   • Depression   • Hypertension   • Cataract, bilateral   • CAD (coronary artery disease)   • Abnormal stress test   • Moderate COPD   • Former smoker   • Coal workers pneumoconiosis, simple   • Back pain   • S/P lumbar fusion   • Prediabetes   • Acute blood loss anemia, mild, asymptomatic   • Acute postoperative pain   • Hyponatremia, mild   • Chronic respiratory failure with hypoxia and hypercapnia (CMS/HCC)   • Renal insufficiency   • Acute cystitis without hematuria   • Acute UTI   • Bacteremia   • Symptomatic anemia   • Aortitis (CMS/HCC)   • Elevated C-reactive protein (CRP)   • Cirrhosis of liver (CMS/HCC)   • High anion gap metabolic acidosis   • S/P AAA repair   • GERD (gastroesophageal reflux disease)   • Person under investigation for COVID-19   • On supplemental oxygen by nasal cannula   • Infected aortic endograft (CMS/HCC)   • Elevated transaminase level   • Thrombocytopenia (CMS/HCC)   • Pneumonia   • Gram-negative bacteremia   • COVID-19   • Anemia   • Gastrointestinal hemorrhage with melena     Past Medical History:   Diagnosis Date   • Abdominal aortic aneurysm (CMS/HCC) 2016   • Anxiety 2016   • Arthritis    • Back pain    • Black lung (CMS/HCC)    • CAD (coronary artery disease) 2016   • Cirrhosis (CMS/HCC)    • COPD (chronic obstructive pulmonary disease) (CMS/HCC) 2016   • Depression 2016   • Depression    • GERD (gastroesophageal reflux disease)    • Hypertension 2016   • On supplemental oxygen by nasal cannula     at night   • Vitiligo    • Wears  dentures    • Wears reading eyeglasses      Past Surgical History:   Procedure Laterality Date   • ABDOMINAL AORTIC ANEURYSM REPAIR WITH ENDOGRAFT N/A 6/6/2019    Procedure: ABDOMINAL AORTIC ANEURYSM REPAIR WITH ENDOGRAFT;  Surgeon: Leopoldo Burleson MD;  Location:  [facility];  Service: Vascular   • BACK SURGERY     • CARDIAC CATHETERIZATION     • CARDIAC CATHETERIZATION N/A 9/28/2018    Procedure: Left Heart Cath;  Surgeon: Douglas Galvez MD;  Location:  [facility];  Service: Cardiovascular   • CARDIAC SURGERY     • COLONOSCOPY      2015   • COLONOSCOPY N/A 10/30/2019    Procedure: COLONOSCOPY;  Surgeon: Homar Viveros MD;  Location:  [facility];  Service: Gastroenterology   • ENDOSCOPY N/A 10/30/2019    Procedure: ESOPHAGOGASTRODUODENOSCOPY;  Surgeon: Homar Viveros MD;  Location:  [facility];  Service: Gastroenterology   • EYE SURGERY      cataracts bilateral   • HAND SURGERY Left    • LUMBAR DISCECTOMY FUSION INSTRUMENTATION N/A 11/1/2018    Procedure: LUMBAR DISCECTOMY POSTERIOR WITH FUSION INSTRUMENTATION;  Surgeon: Joo Franklin MD;  Location:  [facility];  Service: Orthopedic Spine   • LUMBAR DISCECTOMY FUSION INSTRUMENTATION N/A 7/24/2019    Procedure: POSTERIOR LUMBAR SPINAL FUSION REVISION AND INSTRUMENTATION L3,L4,L5,S1;  Surgeon: Joo Franklin MD;  Location:  [facility];  Service: Orthopedic Spine   • ORIF FINGER / THUMB FRACTURE     • SHOULDER SURGERY Left      General Information     Row Name 04/13/21 0954          Physical Therapy Time and Intention    Document Type  evaluation  -LR     Mode of Treatment  physical therapy;individual therapy  -LR     Row Name 04/13/21 0954          General Information    Patient Profile Reviewed  yes  -LR     Prior Level of Function  min assist:;all household mobility;gait;transfer;bed mobility;ADL's use of RW/quad cane for mobility d/t recent declined; at baseline, patient does not use AD for mobility  -LR      Existing Precautions/Restrictions  fall;oxygen therapy device and L/min;other (see comments) low H&H, monitor O2 sats and BP  -LR     Barriers to Rehab  medically complex;previous functional deficit  -LR     Row Name 04/13/21 0954          Living Environment    Lives With  spouse can assist at all times upon d/c home  -LR     Row Name 04/13/21 0954          Home Main Entrance    Number of Stairs, Main Entrance  two  -LR     Stair Railings, Main Entrance  none  -LR     Row Name 04/13/21 0954          Stairs Within Home, Primary    Stairs Comment, Within Home, Primary  stays on first floor of home  -LR     Row Name 04/13/21 0954          Cognition    Orientation Status (Cognition)  oriented x 4  -LR     Row Name 04/13/21 0954          Safety Issues, Functional Mobility    Safety Issues Affecting Function (Mobility)  awareness of need for assistance;insight into deficits/self-awareness;safety precautions follow-through/compliance;safety precaution awareness;sequencing abilities;positioning of assistive device  -LR     Impairments Affecting Function (Mobility)  balance;strength;endurance/activity tolerance;pain;postural/trunk control;shortness of breath  -LR       User Key  (r) = Recorded By, (t) = Taken By, (c) = Cosigned By    Initials Name Provider Type    LR Esther Min, PT Physical Therapist        Mobility     Row Name 04/13/21 0954          Bed Mobility    Comment (Bed Mobility)  [facility] on arrival and at end of treatment.  -LR     Row Name 04/13/21 0954          Transfers    Comment (Transfers)  Verbal cues to push up from chair to stand and to reach back for chair to lower into sitting. Denied dizziness upon standing up.  -LR     Row Name 04/13/21 0954          Bed-Chair Transfer    Bed-Chair [place] (Transfers)  not tested  -LR     Row Name 04/13/21 0954          Sit-Stand Transfer    Sit-Stand [place] (Transfers)  verbal cues;contact guard  -LR     Assistive Device (Sit-Stand Transfers)   walker, front-wheeled  -LR     Row Name 04/13/21 0954          Gait/Stairs (Locomotion)    [place] Level (Gait)  verbal cues;contact guard  -LR     Assistive Device (Gait)  walker, front-wheeled  -LR     Distance in Feet (Gait)  25  -LR     Deviations/Abnormal Patterns (Gait)  bilateral deviations;ana decreased;gait speed decreased;stride length decreased  -LR     Bilateral Gait Deviations  forward flexed posture;heel strike decreased  -LR     [place] Level (Stairs)  not tested  -LR     Comment (Gait/Stairs)  Patient ambulated with step through gait pattern at slow pace. Verbal cues for correct sequencing of steps, upright posture, increased LE weight bearing, decreased UE weight bearing, and increased step length. Slight improvement with cues for correction. Gait limited by fatigue, SOA, and dizziness.  -LR       User Key  (r) = Recorded By, (t) = Taken By, (c) = Cosigned By    Initials Name Provider Type    Esther Rivas, PT Physical Therapist        Obj/Interventions     Row Name 04/13/21 0954          Range of Motion Comprehensive    General Range of Motion  no range of motion deficits identified  -LR     Row Name 04/13/21 0954          Strength Comprehensive (MMT)    General Manual Muscle Testing (MMT) Assessment  lower extremity strength deficits identified  -LR     Row Name 04/13/21 0954          Balance    Balance Assessment  sitting static balance;sitting dynamic balance;standing static balance;standing dynamic balance  -LR     Static Sitting Balance  WFL;unsupported;sitting in chair  -LR     Dynamic Sitting Balance  WFL;unsupported;sitting in chair  -LR     Static Standing Balance  mild impairment;supported;standing  -LR     Dynamic Standing Balance  mild impairment;supported;standing  -LR     Row Name 04/13/21 0954          Sensory Assessment (Somatosensory)    Sensory Assessment (Somatosensory)  other (see comments) reports light touch intact throughout B LEs but reports  diminished and medial and lateral R lower leg; denies numbness/tingling;  -LR     Row Name 04/13/21 0954          Lower Extremity (Manual Muscle Testing)    Comment, MMT: Lower Extremity  B hip flexors: 3-/5, B knee extensors: 5/5, B knee flexors: 4/5, B ankle DFs: 5/5  -LR       User Key  (r) = Recorded By, (t) = Taken By, (c) = Cosigned By    Initials Name Provider Type    Esther Rivas, PT Physical Therapist        Goals/Plan     Row Name 04/13/21 0954          Bed Mobility Goal 1 (PT)    Activity/Assistive Device (Bed Mobility Goal 1, PT)  sit to supine/supine to sit  -LR     [place] Level/Cues Needed (Bed Mobility Goal 1, PT)  modified independence  -LR     Time Frame (Bed Mobility Goal 1, PT)  long term goal (LTG);5 days  -LR     Progress/Outcomes (Bed Mobility Goal 1, PT)  goal ongoing  -LR     Row Name 04/13/21 0954          Transfer Goal 1 (PT)    Activity/Assistive Device (Transfer Goal 1, PT)  sit-to-stand/stand-to-sit;walker, rolling  -LR     [place] Level/Cues Needed (Transfer Goal 1, PT)  modified independence  -LR     Time Frame (Transfer Goal 1, PT)  long term goal (LTG);5 days  -LR     Progress/Outcome (Transfer Goal 1, PT)  goal ongoing  -LR     Row Name 04/13/21 0954          Gait Training Goal 1 (PT)    Activity/Assistive Device (Gait Training Goal 1, PT)  gait (walking locomotion);walker, rolling  -LR     [place] Level (Gait Training Goal 1, PT)  modified independence  -LR     Distance (Gait Training Goal 1, PT)  150 feet  -LR     Time Frame (Gait Training Goal 1, PT)  long term goal (LTG);5 days  -LR     Progress/Outcome (Gait Training Goal 1, PT)  goal ongoing  -LR       User Key  (r) = Recorded By, (t) = Taken By, (c) = Cosigned By    Initials Name Provider Type    Esther Rivas, PT Physical Therapist        Clinical Impression     Row Name 04/13/21 0954          Pain    Additional Documentation  Pain Scale: Numbers Pre/Post-Treatment (Group)  -LR      Row Name 04/13/21 0954          Pain Scale: Numbers Pre/Post-Treatment    Pretreatment Pain Rating  0/10 - no pain  -LR     Posttreatment Pain Rating  0/10 - no pain  -LR     Pain Intervention(s)  Ambulation/increased activity;Repositioned  -LR     Row Name 04/13/21 0954          Plan of Care Review    Plan of Care Reviewed With  patient;spouse  -LR     Progress  improving  -LR     Outcome Summary  Patient ambulated 25 feet with RW and step through gait pattern, limited by fatigue and SOA. CGA for all mobility. Demonstrates B LE weakness and significant deconditioning. Recommend SNF at d/c. Will continue to progress as able.  -LR     Row Name 04/13/21 0954          Therapy Assessment/Plan (PT)    Patient/Family Therapy Goals Statement (PT)  go home  -LR     Rehab Potential (PT)  good, to achieve stated therapy goals  -LR     Criteria for Skilled Interventions Met (PT)  yes;skilled treatment is necessary;no problems identified which require skilled intervention  -LR     Row Name 04/13/21 0954          Vital Signs    Pre Systolic BP Rehab  127  -LR     Pre Treatment Diastolic BP  58  -LR     Post Systolic BP Rehab  120  -LR     Post Treatment Diastolic BP  64  -LR     Pretreatment Heart Rate (beats/min)  61  -LR     Pre SpO2 (%)  95  -LR     O2 Delivery Pre Treatment  supplemental O2  -LR     Intra SpO2 (%)  91  -LR     O2 Delivery Intra Treatment  supplemental O2  -LR     Post SpO2 (%)  94  -LR     O2 Delivery Post Treatment  supplemental O2  -LR     Pre Patient Position  Sitting  -LR     Intra Patient Position  Sitting  -LR     Post Patient Position  Sitting  -LR     Row Name 04/13/21 0954          Positioning and Restraints    Pre-Treatment Position  sitting in chair/recliner  -LR     Post Treatment Position  chair  -LR     In Chair  notified nsg;reclined;sitting;call light within reach;encouraged to call for assist;exit alarm on;with family/caregiver;legs elevated;waffle cushion  -LR       User Key  (r) = Recorded  By, (t) = Taken By, (c) = Cosigned By    Initials Name Provider Type    Esther Rivas, PT Physical Therapist        Outcome Measures     Row Name 04/13/21 0954          How much help from another person do you currently need...    Turning from your back to your side while in flat bed without using bedrails?  3  -LR     Moving from lying on back to sitting on the side of a flat bed without bedrails?  3  -LR     Moving to and from a bed to a chair (including a wheelchair)?  3  -LR     Standing up from a chair using your arms (e.g., wheelchair, bedside chair)?  3  -LR     Climbing 3-5 steps with a railing?  2  -LR     To walk in hospital room?  3  -LR     AM-PAC 6 Clicks Score (PT)  17  -LR     Row Name 04/13/21 0954          Functional Assessment    Outcome Measure Options  AM-PAC 6 Clicks Basic Mobility (PT)  -LR       User Key  (r) = Recorded By, (t) = Taken By, (c) = Cosigned By    Initials Name Provider Type    Esther Rivas, [place] Physical Therapist        Physical Therapy Education                 Title: PT OT SLP Therapies (In Progress)     Topic: Physical Therapy (Done)     Point: Mobility training (Done)     Learning Progress Summary           Patient Acceptance, E,D, VU,NR by LR at 4/13/2021 0954    Comment: Educated on benefits of mobility, safety with mobility, correct sit<->stand t/f technique, correct gait mechanics, PLB, and progression of POC.   Significant Other Acceptance, E,D, VU,NR by LR at 4/13/2021 0954    Comment: Educated on benefits of mobility, safety with mobility, correct sit<->stand t/f technique, correct gait mechanics, PLB, and progression of POC.                   Point: Home exercise program (Done)     Learning Progress Summary           Patient Acceptance, E,D, VU,NR by LR at 4/13/2021 0954    Comment: Educated on benefits of mobility, safety with mobility, correct sit<->stand t/f technique, correct gait mechanics, PLB, and progression of POC.   Significant Other  Acceptance, E,D, VU,NR by LR at 4/13/2021 0954    Comment: Educated on benefits of mobility, safety with mobility, correct sit<->stand t/f technique, correct gait mechanics, PLB, and progression of POC.                   Point: Body mechanics (Done)     Learning Progress Summary           Patient Acceptance, E,D, VU,NR by LR at 4/13/2021 0954    Comment: Educated on benefits of mobility, safety with mobility, correct sit<->stand t/f technique, correct gait mechanics, PLB, and progression of POC.   Significant Other Acceptance, E,D, VU,NR by LR at 4/13/2021 0954    Comment: Educated on benefits of mobility, safety with mobility, correct sit<->stand t/f technique, correct gait mechanics, PLB, and progression of POC.                   Point: Precautions (Done)     Learning Progress Summary           Patient Acceptance, E,D, VU,NR by LR at 4/13/2021 0954    Comment: Educated on benefits of mobility, safety with mobility, correct sit<->stand t/f technique, correct gait mechanics, PLB, and progression of POC.   Significant Other Acceptance, E,D, VU,NR by LR at 4/13/2021 0954    Comment: Educated on benefits of mobility, safety with mobility, correct sit<->stand t/f technique, correct gait mechanics, PLB, and progression of POC.                               User Key     Initials Effective Dates Name Provider Type Discipline    LR 06/19/15 -  Esther Min, PT Physical Therapist PT              PT Recommendation and Plan  Planned Therapy Interventions (PT): balance training, bed mobility training, gait training, home exercise program, patient/family education, ROM (range of motion), strengthening, transfer training  Plan of Care Reviewed With: patient, spouse  Progress: improving  Outcome Summary: Patient ambulated 25 feet with RW and step through gait pattern, limited by fatigue and SOA. CGA for all mobility. Demonstrates B LE weakness and significant deconditioning. Recommend SNF at d/c. Will continue to  progress as able.     Time Calculation:   PT Charges     Row Name 04/13/21 0954             Time Calculation    Start Time  0954  -LR      PT Received On  04/13/21  -LR      PT Goal Re-Cert Due Date  04/23/21  -LR         Time Calculation- PT    Total Timed Code Minutes- PT  8 minute(s)  -LR         Timed Charges    30759 - Gait Training Minutes   8  -LR        User Key  (r) = Recorded By, (t) = Taken By, (c) = Cosigned By    Initials Name Provider Type    LR Esther Min, PT Physical Therapist        Therapy Charges for Today     Code Description Service Date Service Provider Modifiers Qty    [id number]  GAIT TRAINING EA 15 MIN 4/13/2021 Esther Min, PT GP 1    [id number] HC PT EVAL MOD COMPLEXITY 4 4/13/2021 Esther Min, PT GP 1          PT G-Codes  Outcome Measure Options: AM-PAC 6 Clicks Daily Activity (OT)  AM-PAC 6 Clicks Score (PT): 17  AM-PAC 6 Clicks Score (OT): 16    Esther Min, PT  4/13/2021

## 2021-04-14 ENCOUNTER — ANESTHESIA (OUTPATIENT)
Dept: GASTROENTEROLOGY | Facility: HOSPITAL | Age: 71
End: 2021-04-14

## 2021-04-14 ENCOUNTER — ANESTHESIA EVENT (OUTPATIENT)
Dept: GASTROENTEROLOGY | Facility: HOSPITAL | Age: 71
End: 2021-04-14

## 2021-04-14 LAB
ALBUMIN SERPL-MCNC: 3.1 G/DL (ref 3.5–5.2)
ALBUMIN/GLOB SERPL: 0.8 G/DL
ALP SERPL-CCNC: 124 U/L (ref 39–117)
ALT SERPL W P-5'-P-CCNC: 37 U/L (ref 1–41)
ANION GAP SERPL CALCULATED.3IONS-SCNC: 8 MMOL/L (ref 5–15)
AST SERPL-CCNC: 81 U/L (ref 1–40)
BH BB BLOOD EXPIRATION DATE: NORMAL
BH BB BLOOD EXPIRATION DATE: NORMAL
BH BB BLOOD TYPE BARCODE: 5100
BH BB BLOOD TYPE BARCODE: 5100
BH BB DISPENSE STATUS: NORMAL
BH BB DISPENSE STATUS: NORMAL
BH BB PRODUCT CODE: NORMAL
BH BB PRODUCT CODE: NORMAL
BH BB UNIT NUMBER: NORMAL
BH BB UNIT NUMBER: NORMAL
BILIRUB SERPL-MCNC: 1.8 MG/DL (ref 0–1.2)
BUN SERPL-MCNC: 20 MG/DL (ref 8–23)
BUN/CREAT SERPL: 18.2 (ref 7–25)
CALCIUM SPEC-SCNC: 8.2 MG/DL (ref 8.6–10.5)
CHLORIDE SERPL-SCNC: 95 MMOL/L (ref 98–107)
CO2 SERPL-SCNC: 30 MMOL/L (ref 22–29)
CREAT SERPL-MCNC: 1.1 MG/DL (ref 0.76–1.27)
CROSSMATCH INTERPRETATION: NORMAL
CROSSMATCH INTERPRETATION: NORMAL
DEPRECATED RDW RBC AUTO: 63.7 FL (ref 37–54)
ERYTHROCYTE [DISTWIDTH] IN BLOOD BY AUTOMATED COUNT: 17.8 % (ref 12.3–15.4)
FOLATE BLD-MCNC: 342 NG/ML
FOLATE RBC-MCNC: 1911 NG/ML
GFR SERPL CREATININE-BSD FRML MDRD: 67 ML/MIN/1.73
GLOBULIN UR ELPH-MCNC: 4.1 GM/DL
GLUCOSE SERPL-MCNC: 117 MG/DL (ref 65–99)
HCT VFR BLD AUTO: 17.9 % (ref 37.5–51)
HCT VFR BLD AUTO: 22.5 % (ref 37.5–51)
HCT VFR BLD AUTO: 24.4 % (ref 37.5–51)
HCT VFR BLD AUTO: 24.8 % (ref 37.5–51)
HCT VFR BLD AUTO: 26.3 % (ref 37.5–51)
HGB BLD-MCNC: 6.8 G/DL (ref 13–17.7)
HGB BLD-MCNC: 7.5 G/DL (ref 13–17.7)
HGB BLD-MCNC: 7.5 G/DL (ref 13–17.7)
HGB BLD-MCNC: 8 G/DL (ref 13–17.7)
INR PPP: 1.67 (ref 0.85–1.16)
MAGNESIUM SERPL-MCNC: 1.8 MG/DL (ref 1.6–2.4)
MCH RBC QN AUTO: 29.9 PG (ref 26.6–33)
MCHC RBC AUTO-ENTMCNC: 30.2 G/DL (ref 31.5–35.7)
MCV RBC AUTO: 98.8 FL (ref 79–97)
PLATELET # BLD AUTO: 70 10*3/MM3 (ref 140–450)
PMV BLD AUTO: 10.2 FL (ref 6–12)
POTASSIUM SERPL-SCNC: 3.8 MMOL/L (ref 3.5–5.2)
PROT SERPL-MCNC: 7.2 G/DL (ref 6–8.5)
PROTHROMBIN TIME: 19 SECONDS (ref 11.4–14.4)
RBC # BLD AUTO: 2.51 10*6/MM3 (ref 4.14–5.8)
SODIUM SERPL-SCNC: 133 MMOL/L (ref 136–145)
UNIT  ABO: NORMAL
UNIT  ABO: NORMAL
UNIT  RH: NORMAL
UNIT  RH: NORMAL
WBC # BLD AUTO: 3.38 10*3/MM3 (ref 3.4–10.8)

## 2021-04-14 PROCEDURE — 43235 EGD DIAGNOSTIC BRUSH WASH: CPT | Performed by: INTERNAL MEDICINE

## 2021-04-14 PROCEDURE — 83735 ASSAY OF MAGNESIUM: CPT | Performed by: INTERNAL MEDICINE

## 2021-04-14 PROCEDURE — 25010000002 CEFTAZIDIME PER 500 MG: Performed by: NURSE PRACTITIONER

## 2021-04-14 PROCEDURE — 36430 TRANSFUSION BLD/BLD COMPNT: CPT

## 2021-04-14 PROCEDURE — 97535 SELF CARE MNGMENT TRAINING: CPT

## 2021-04-14 PROCEDURE — 25010000003 MAGNESIUM SULFATE 4 GM/100ML SOLUTION: Performed by: NURSE PRACTITIONER

## 2021-04-14 PROCEDURE — 25010000002 FUROSEMIDE PER 20 MG: Performed by: PHYSICIAN ASSISTANT

## 2021-04-14 PROCEDURE — 0DJ08ZZ INSPECTION OF UPPER INTESTINAL TRACT, VIA NATURAL OR ARTIFICIAL OPENING ENDOSCOPIC: ICD-10-PCS | Performed by: INTERNAL MEDICINE

## 2021-04-14 PROCEDURE — 85027 COMPLETE CBC AUTOMATED: CPT | Performed by: INTERNAL MEDICINE

## 2021-04-14 PROCEDURE — 25010000002 FUROSEMIDE PER 20 MG: Performed by: INTERNAL MEDICINE

## 2021-04-14 PROCEDURE — 94799 UNLISTED PULMONARY SVC/PX: CPT

## 2021-04-14 PROCEDURE — 86900 BLOOD TYPING SEROLOGIC ABO: CPT

## 2021-04-14 PROCEDURE — 85014 HEMATOCRIT: CPT | Performed by: INTERNAL MEDICINE

## 2021-04-14 PROCEDURE — 87070 CULTURE OTHR SPECIMN AEROBIC: CPT | Performed by: INTERNAL MEDICINE

## 2021-04-14 PROCEDURE — 87205 SMEAR GRAM STAIN: CPT | Performed by: INTERNAL MEDICINE

## 2021-04-14 PROCEDURE — 80053 COMPREHEN METABOLIC PANEL: CPT | Performed by: INTERNAL MEDICINE

## 2021-04-14 PROCEDURE — 99233 SBSQ HOSP IP/OBS HIGH 50: CPT | Performed by: INTERNAL MEDICINE

## 2021-04-14 PROCEDURE — 25010000002 PROPOFOL 10 MG/ML EMULSION: Performed by: NURSE ANESTHETIST, CERTIFIED REGISTERED

## 2021-04-14 PROCEDURE — 25010000002 OCTREOTIDE PER 25 MCG: Performed by: PHYSICIAN ASSISTANT

## 2021-04-14 PROCEDURE — 85018 HEMOGLOBIN: CPT | Performed by: INTERNAL MEDICINE

## 2021-04-14 PROCEDURE — P9016 RBC LEUKOCYTES REDUCED: HCPCS

## 2021-04-14 PROCEDURE — 25010000002 CEFTAZIDIME PER 500 MG: Performed by: INTERNAL MEDICINE

## 2021-04-14 PROCEDURE — 25010000003 LIDOCAINE 1 % SOLUTION: Performed by: NURSE ANESTHETIST, CERTIFIED REGISTERED

## 2021-04-14 PROCEDURE — 25010000003 PHYTONADIONE 10 MG/ML SOLUTION 1 ML AMPULE: Performed by: INTERNAL MEDICINE

## 2021-04-14 PROCEDURE — 85610 PROTHROMBIN TIME: CPT | Performed by: INTERNAL MEDICINE

## 2021-04-14 RX ORDER — SODIUM CHLORIDE 0.9 % (FLUSH) 0.9 %
10 SYRINGE (ML) INJECTION EVERY 12 HOURS SCHEDULED
Status: DISCONTINUED | OUTPATIENT
Start: 2021-04-14 | End: 2021-04-14 | Stop reason: HOSPADM

## 2021-04-14 RX ORDER — SODIUM CHLORIDE, SODIUM LACTATE, POTASSIUM CHLORIDE, CALCIUM CHLORIDE 600; 310; 30; 20 MG/100ML; MG/100ML; MG/100ML; MG/100ML
9 INJECTION, SOLUTION INTRAVENOUS CONTINUOUS
Status: DISCONTINUED | OUTPATIENT
Start: 2021-04-14 | End: 2021-04-15

## 2021-04-14 RX ORDER — SODIUM CHLORIDE 0.9 % (FLUSH) 0.9 %
10 SYRINGE (ML) INJECTION AS NEEDED
Status: DISCONTINUED | OUTPATIENT
Start: 2021-04-14 | End: 2021-04-14 | Stop reason: HOSPADM

## 2021-04-14 RX ORDER — LIDOCAINE HYDROCHLORIDE 10 MG/ML
INJECTION, SOLUTION INFILTRATION; PERINEURAL AS NEEDED
Status: DISCONTINUED | OUTPATIENT
Start: 2021-04-14 | End: 2021-04-14 | Stop reason: SURG

## 2021-04-14 RX ORDER — FUROSEMIDE 10 MG/ML
40 INJECTION INTRAMUSCULAR; INTRAVENOUS ONCE
Status: COMPLETED | OUTPATIENT
Start: 2021-04-14 | End: 2021-04-14

## 2021-04-14 RX ORDER — ONDANSETRON 2 MG/ML
4 INJECTION INTRAMUSCULAR; INTRAVENOUS ONCE AS NEEDED
Status: DISCONTINUED | OUTPATIENT
Start: 2021-04-14 | End: 2021-04-14 | Stop reason: HOSPADM

## 2021-04-14 RX ORDER — GUAIFENESIN/DEXTROMETHORPHAN 100-10MG/5
5 SYRUP ORAL EVERY 6 HOURS PRN
Status: DISCONTINUED | OUTPATIENT
Start: 2021-04-14 | End: 2021-04-19 | Stop reason: HOSPADM

## 2021-04-14 RX ORDER — MIDAZOLAM HYDROCHLORIDE 1 MG/ML
1 INJECTION INTRAMUSCULAR; INTRAVENOUS
Status: DISCONTINUED | OUTPATIENT
Start: 2021-04-14 | End: 2021-04-14 | Stop reason: HOSPADM

## 2021-04-14 RX ORDER — BENZONATATE 100 MG/1
200 CAPSULE ORAL 3 TIMES DAILY PRN
Status: DISCONTINUED | OUTPATIENT
Start: 2021-04-14 | End: 2021-04-19 | Stop reason: HOSPADM

## 2021-04-14 RX ORDER — LIDOCAINE HYDROCHLORIDE 10 MG/ML
0.5 INJECTION, SOLUTION EPIDURAL; INFILTRATION; INTRACAUDAL; PERINEURAL ONCE AS NEEDED
Status: DISCONTINUED | OUTPATIENT
Start: 2021-04-14 | End: 2021-04-14 | Stop reason: HOSPADM

## 2021-04-14 RX ORDER — MIDAZOLAM HYDROCHLORIDE 1 MG/ML
0.5 INJECTION INTRAMUSCULAR; INTRAVENOUS
Status: DISCONTINUED | OUTPATIENT
Start: 2021-04-14 | End: 2021-04-14 | Stop reason: HOSPADM

## 2021-04-14 RX ORDER — FUROSEMIDE 40 MG/1
40 TABLET ORAL
Status: DISCONTINUED | OUTPATIENT
Start: 2021-04-15 | End: 2021-04-19 | Stop reason: HOSPADM

## 2021-04-14 RX ORDER — PROPOFOL 10 MG/ML
VIAL (ML) INTRAVENOUS AS NEEDED
Status: DISCONTINUED | OUTPATIENT
Start: 2021-04-14 | End: 2021-04-14 | Stop reason: SURG

## 2021-04-14 RX ORDER — IPRATROPIUM BROMIDE AND ALBUTEROL SULFATE 2.5; .5 MG/3ML; MG/3ML
3 SOLUTION RESPIRATORY (INHALATION) ONCE AS NEEDED
Status: DISCONTINUED | OUTPATIENT
Start: 2021-04-14 | End: 2021-04-14 | Stop reason: HOSPADM

## 2021-04-14 RX ADMIN — FUROSEMIDE 40 MG: 10 INJECTION, SOLUTION INTRAVENOUS at 17:55

## 2021-04-14 RX ADMIN — FUROSEMIDE 40 MG: 40 TABLET ORAL at 11:26

## 2021-04-14 RX ADMIN — IPRATROPIUM BROMIDE 0.5 MG: 0.5 SOLUTION RESPIRATORY (INHALATION) at 07:11

## 2021-04-14 RX ADMIN — FERROUS SULFATE TAB 325 MG (65 MG ELEMENTAL FE) 325 MG: 325 (65 FE) TAB at 11:26

## 2021-04-14 RX ADMIN — DOCUSATE SODIUM 50MG AND SENNOSIDES 8.6MG 2 TABLET: 8.6; 5 TABLET, FILM COATED ORAL at 11:26

## 2021-04-14 RX ADMIN — PANTOPRAZOLE SODIUM 40 MG: 40 INJECTION, POWDER, FOR SOLUTION INTRAVENOUS at 06:22

## 2021-04-14 RX ADMIN — Medication 250 MG: at 21:18

## 2021-04-14 RX ADMIN — TERAZOSIN HYDROCHLORIDE 2 MG: 2 CAPSULE ORAL at 21:17

## 2021-04-14 RX ADMIN — Medication 250 MG: at 11:26

## 2021-04-14 RX ADMIN — CEFTAZIDIME 2 G: 2 INJECTION, POWDER, FOR SOLUTION INTRAVENOUS at 16:06

## 2021-04-14 RX ADMIN — MAGNESIUM SULFATE HEPTAHYDRATE 4 G: 40 INJECTION, SOLUTION INTRAVENOUS at 11:32

## 2021-04-14 RX ADMIN — LACTULOSE 15 ML: 20 SOLUTION ORAL at 11:25

## 2021-04-14 RX ADMIN — SODIUM CHLORIDE, PRESERVATIVE FREE 10 ML: 5 INJECTION INTRAVENOUS at 21:18

## 2021-04-14 RX ADMIN — THERA TABS 1 TABLET: TAB at 11:26

## 2021-04-14 RX ADMIN — Medication 5 MG: at 21:18

## 2021-04-14 RX ADMIN — CITALOPRAM 40 MG: 40 TABLET ORAL at 11:26

## 2021-04-14 RX ADMIN — CEFTAZIDIME 2 G: 2 INJECTION, POWDER, FOR SOLUTION INTRAVENOUS at 05:20

## 2021-04-14 RX ADMIN — LIDOCAINE HYDROCHLORIDE 100 MG: 10 INJECTION, SOLUTION INFILTRATION; PERINEURAL at 10:15

## 2021-04-14 RX ADMIN — RIFAXIMIN 550 MG: 550 TABLET ORAL at 11:26

## 2021-04-14 RX ADMIN — PHYTONADIONE 10 MG: 10 INJECTION, EMULSION INTRAMUSCULAR; INTRAVENOUS; SUBCUTANEOUS at 17:55

## 2021-04-14 RX ADMIN — PANTOPRAZOLE SODIUM 40 MG: 40 INJECTION, POWDER, FOR SOLUTION INTRAVENOUS at 17:55

## 2021-04-14 RX ADMIN — IPRATROPIUM BROMIDE 0.5 MG: 0.5 SOLUTION RESPIRATORY (INHALATION) at 16:08

## 2021-04-14 RX ADMIN — PROPOFOL 50 MG: 10 INJECTION, EMULSION INTRAVENOUS at 10:15

## 2021-04-14 RX ADMIN — SODIUM CHLORIDE, POTASSIUM CHLORIDE, SODIUM LACTATE AND CALCIUM CHLORIDE 9 ML/HR: 600; 310; 30; 20 INJECTION, SOLUTION INTRAVENOUS at 09:51

## 2021-04-14 RX ADMIN — GUAIFENESIN AND DEXTROMETHORPHAN 5 ML: 100; 10 SYRUP ORAL at 21:17

## 2021-04-14 RX ADMIN — LACTULOSE 15 ML: 20 SOLUTION ORAL at 16:06

## 2021-04-14 RX ADMIN — LACTULOSE 15 ML: 20 SOLUTION ORAL at 21:17

## 2021-04-14 RX ADMIN — FUROSEMIDE 40 MG: 10 INJECTION, SOLUTION INTRAVENOUS at 03:00

## 2021-04-14 RX ADMIN — CARVEDILOL 12.5 MG: 12.5 TABLET, FILM COATED ORAL at 21:18

## 2021-04-14 RX ADMIN — CARVEDILOL 12.5 MG: 12.5 TABLET, FILM COATED ORAL at 11:26

## 2021-04-14 RX ADMIN — IPRATROPIUM BROMIDE 0.5 MG: 0.5 SOLUTION RESPIRATORY (INHALATION) at 20:56

## 2021-04-14 RX ADMIN — PHENOL 1 SPRAY: 1.5 LIQUID ORAL at 03:13

## 2021-04-14 RX ADMIN — RIFAXIMIN 550 MG: 550 TABLET ORAL at 21:18

## 2021-04-14 RX ADMIN — TAMSULOSIN HYDROCHLORIDE 0.4 MG: 0.4 CAPSULE ORAL at 11:27

## 2021-04-14 RX ADMIN — IPRATROPIUM BROMIDE 0.5 MG: 0.5 SOLUTION RESPIRATORY (INHALATION) at 11:41

## 2021-04-14 RX ADMIN — DOCUSATE SODIUM 50MG AND SENNOSIDES 8.6MG 2 TABLET: 8.6; 5 TABLET, FILM COATED ORAL at 21:17

## 2021-04-14 RX ADMIN — BUDESONIDE AND FORMOTEROL FUMARATE DIHYDRATE 2 PUFF: 160; 4.5 AEROSOL RESPIRATORY (INHALATION) at 07:12

## 2021-04-14 RX ADMIN — PROPOFOL 30 MG: 10 INJECTION, EMULSION INTRAVENOUS at 10:18

## 2021-04-14 RX ADMIN — SODIUM CHLORIDE, PRESERVATIVE FREE 10 ML: 5 INJECTION INTRAVENOUS at 11:25

## 2021-04-14 RX ADMIN — CEFTAZIDIME 2 G: 2 INJECTION, POWDER, FOR SOLUTION INTRAVENOUS at 21:18

## 2021-04-14 RX ADMIN — BUDESONIDE AND FORMOTEROL FUMARATE DIHYDRATE 2 PUFF: 160; 4.5 AEROSOL RESPIRATORY (INHALATION) at 20:56

## 2021-04-14 RX ADMIN — OCTREOTIDE ACETATE 50 MCG/HR: 500 INJECTION, SOLUTION INTRAVENOUS; SUBCUTANEOUS at 11:32

## 2021-04-14 NOTE — PROGRESS NOTES
INFECTIOUS DISEASE CONSULT/INITIAL HOSPITAL VISIT    Derek Kelsey  1950  [id number]    Date of Consult: 4/14/2021    Admission Date: 4/12/2021      Requesting Provider: Tammi Lomax MD  Evaluating Physician: Davis Carrero MD    Reason for Consultation: anemia, pseudomonas infection    History of present illness:    Patient is a 70 y.o. male  with a medical history significant for coronary artery disease, diastolic heart failure, A.fib (not on AC), hypertension, cirrhosis, chronic respiratory failure (2-3L @ home), COPD, and an infected AAA endograft.  Recent positive blood cultures for Cipro and Levaquin resistant Pseudomonas aeruginosa led to treatment with IV ceftazidime as an outpatient.    Patient has had worsening anemia fatigue and shortness of breath during telemedicine visit yesterday.    Patient has a hemoglobin now below 7 which I recommended patient be direct admitted to the hospital.    Because the endograft infection cannot be cured with IV antibiotics patient is referred to [facility] which has an appointment for patient this Monday.    Patient has had worsening swelling of face arms and legs and has recently had increased dose of his Lasix.      Patient denies fevers or chills;    Reports feeling very tired.    reports that he does not want endograft removal  at this time    4/14/21; no events overnight; afebrile, no fever, rash, sore throat  Past Medical History:   Diagnosis Date   • Abdominal aortic aneurysm (CMS/HCC) 9/29/2016   • Anxiety 9/29/2016   • Arthritis    • Back pain    • Black lung (CMS/[facility])    • CAD (coronary artery disease) 9/29/2016   • Cirrhosis (CMS/[facility])    • COPD (chronic obstructive pulmonary disease) (CMS/[facility]) 9/29/2016   • Depression 9/29/2016   • Depression    • GERD (gastroesophageal reflux disease)    • Hypertension 9/29/2016   • On supplemental oxygen by nasal cannula     at night   • Vitiligo    • Wears dentures    • Wears reading eyeglasses         Past Surgical History:   Procedure Laterality Date   • ABDOMINAL AORTIC ANEURYSM REPAIR WITH ENDOGRAFT N/A 6/6/2019    Procedure: ABDOMINAL AORTIC ANEURYSM REPAIR WITH ENDOGRAFT;  Surgeon: Leopoldo Burleson MD;  Location:  [facility];  Service: Vascular   • BACK SURGERY     • CARDIAC CATHETERIZATION     • CARDIAC CATHETERIZATION N/A 9/28/2018    Procedure: Left Heart Cath;  Surgeon: Douglas Galvez MD;  Location:  [facility];  Service: Cardiovascular   • CARDIAC SURGERY     • COLONOSCOPY      2015   • COLONOSCOPY N/A 10/30/2019    Procedure: COLONOSCOPY;  Surgeon: Homar Viveros MD;  Location: [employer] [facility];  Service: Gastroenterology   • ENDOSCOPY N/A 10/30/2019    Procedure: ESOPHAGOGASTRODUODENOSCOPY;  Surgeon: Homar Viveros MD;  Location:  [facility];  Service: Gastroenterology   • EYE SURGERY      cataracts bilateral   • HAND SURGERY Left    • LUMBAR DISCECTOMY FUSION INSTRUMENTATION N/A 11/1/2018    Procedure: LUMBAR DISCECTOMY POSTERIOR WITH FUSION INSTRUMENTATION;  Surgeon: Joo Franklin MD;  Location:  [facility];  Service: Orthopedic Spine   • LUMBAR DISCECTOMY FUSION INSTRUMENTATION N/A 7/24/2019    Procedure: POSTERIOR LUMBAR SPINAL FUSION REVISION AND INSTRUMENTATION L3,L4,L5,S1;  Surgeon: Joo Franklin MD;  Location:  [facility];  Service: Orthopedic Spine   • ORIF FINGER / THUMB FRACTURE     • SHOULDER SURGERY Left        Family History   Problem Relation Age of Onset   • Stroke Mother    • Hypertension Mother    • Heart disease Mother    • Heart disease Father    • Hypertension Father    • Diabetes Sister    • Hypertension Sister    • Heart disease Sister    • Diabetes Brother    • Heart disease Brother    • Hypertension Child    • Hypertension Son    • No Known Problems Son    • Diabetes Sister    • Heart disease Sister        Social History     Socioeconomic History   • Marital status:      Spouse name: Not on file   • Number of  children: 2   • Years of education: Not on file   • Highest education level: Not on file   Tobacco Use   • Smoking status: Former Smoker     Packs/day: 1.00     Years: 30.00     Pack years: 30.00     Types: Cigarettes     Quit date: 1/1/2011     Years since quitting: 10.2   • Smokeless tobacco: Former User     Types: Chew     Quit date: 4/7/2011   Substance and Sexual Activity   • Alcohol use: Not Currently   • Drug use: No   • Sexual activity: Defer     Comment:  and lives with wife       Allergies   Allergen Reactions   • Penicillins Hives     Hives - has tolerated Zosyn and ceftriaxone 09/2019   • Percocet [Oxycodone-Acetaminophen] Confusion         Medication:    Current Facility-Administered Medications:   •  acetaminophen (TYLENOL) tablet 650 mg, 650 mg, Oral, Q4H PRN **OR** acetaminophen (TYLENOL) 160 MG/5ML solution 650 mg, 650 mg, Oral, Q4H PRN **OR** acetaminophen (TYLENOL) suppository 650 mg, 650 mg, Rectal, Q4H PRN, Eddie Benton MD  •  sennosides-docusate (PERICOLACE) 8.6-50 MG per tablet 2 tablet, 2 tablet, Oral, BID, 2 tablet at 04/14/21 1126 **AND** polyethylene glycol (MIRALAX) packet 17 g, 17 g, Oral, Daily PRN **AND** bisacodyl (DULCOLAX) EC tablet 5 mg, 5 mg, Oral, Daily PRN **AND** bisacodyl (DULCOLAX) suppository 10 mg, 10 mg, Rectal, Daily PRN, Eddie Benton MD  •  budesonide-formoterol (SYMBICORT) 160-4.5 MCG/ACT inhaler 2 puff, 2 puff, Inhalation, BID - RT, 2 puff at 04/14/21 0712 **AND** ipratropium (ATROVENT) nebulizer solution 0.5 mg, 0.5 mg, Nebulization, 4x Daily - RT, Eddie Benton MD, 0.5 mg at 04/14/21 1141  •  calcium carbonate (TUMS) chewable tablet 500 mg (200 mg elemental), 2 tablet, Oral, BID PRN, Eddie Benton MD  •  carvedilol (COREG) tablet 12.5 mg, 12.5 mg, Oral, Q12H, Eddie Benton MD, 12.5 mg at 04/14/21 1126  •  cefTAZidime 2 g in 100 mL NS MBP, 2 g, Intravenous, Q8H, Eddie Benton MD, Last Rate: 200 mL/hr at 04/14/21 0520, 2 g at 04/14/21 0520  •   citalopram (CeleXA) tablet 40 mg, 40 mg, Oral, Daily, Eddie Benton MD, 40 mg at 04/14/21 1126  •  ferrous sulfate tablet 325 mg, 325 mg, Oral, Daily With Breakfast, Eddie Benton MD, 325 mg at 04/14/21 1126  •  furosemide (LASIX) tablet 40 mg, 40 mg, Oral, BID, Eddie Benton MD, 40 mg at 04/14/21 1126  •  ipratropium-albuterol (DUO-NEB) nebulizer solution 3 mL, 3 mL, Nebulization, Q4H PRN, Eddie Benton MD  •  lactated ringers infusion, 9 mL/hr, Intravenous, Continuous, Eddie Benton MD, Last Rate: 9 mL/hr at 04/14/21 0951, Restarted at 04/14/21 1011  •  lactulose (CHRONULAC) 10 GM/15ML solution 15 mL, 15 mL, Oral, TID, Eddie Benton MD, 15 mL at 04/14/21 1125  •  Magnesium Sulfate 2 gram Bolus, followed by 8 gram infusion (total Mg dose 10 grams)- Mg less than or equal to 1mg/dL, 2 g, Intravenous, PRN **OR** Magnesium Sulfate 2 gram / 50mL Infusion (GIVE X 3 BAGS TO EQUAL 6GM TOTAL DOSE) - Mg 1.1 - 1.5 mg/dl, 2 g, Intravenous, PRN **OR** Magnesium Sulfate 4 gram infusion- Mg 1.6-1.9 mg/dL, 4 g, Intravenous, PRN, Eddie Benton MD, Last Rate: 25 mL/hr at 04/14/21 1132, 4 g at 04/14/21 1132  •  melatonin tablet 5 mg, 5 mg, Oral, Nightly, Eddie Benton MD, 5 mg at 04/13/21 2249  •  multivitamin (THERAGRAN) tablet 1 tablet, 1 tablet, Oral, Daily, Eddie Benton MD, 1 tablet at 04/14/21 1126  •  octreotide (sandoSTATIN) 500 mcg in sodium chloride 0.9 % 100 mL (5 mcg/mL) infusion, 50 mcg/hr, Intravenous, Continuous, Eddie Benton MD, Last Rate: 10 mL/hr at 04/14/21 1132, 50 mcg/hr at 04/14/21 1132  •  ondansetron (ZOFRAN) tablet 4 mg, 4 mg, Oral, Q6H PRN **OR** ondansetron (ZOFRAN) injection 4 mg, 4 mg, Intravenous, Q6H PRN, Eddie Benton MD  •  pantoprazole (PROTONIX) injection 40 mg, 40 mg, Intravenous, BID AC, Eddie Benton MD, 40 mg at 04/14/21 0622  •  phenol (CHLORASEPTIC) 1.4 % liquid 1 spray, 1 spray, Mouth/Throat, Q2H PRN, Eddie Benton MD, 1 spray at 04/14/21 0313  •   potassium chloride (MICRO-K) CR capsule 40 mEq, 40 mEq, Oral, PRN, 40 mEq at 21 1600 **OR** potassium chloride (KLOR-CON) packet 40 mEq, 40 mEq, Oral, PRN **OR** potassium chloride 10 mEq in 100 mL IVPB, 10 mEq, Intravenous, Q1H PRN, Eddie Benton MD  •  riFAXIMin (XIFAXAN) tablet 550 mg, 550 mg, Oral, Q12H, Eddie Benton MD, 550 mg at 21 1126  •  saccharomyces boulardii (FLORASTOR) capsule 250 mg, 250 mg, Oral, BID, Eddie Benton MD, 250 mg at 21 1126  •  sodium chloride 0.9 % flush 10 mL, 10 mL, Intravenous, Q12H, Eddie Benton MD, 10 mL at 21 1125  •  sodium chloride 0.9 % flush 10 mL, 10 mL, Intravenous, PRN, Eddie Benton MD  •  sodium chloride 0.9 % flush 10 mL, 10 mL, Intravenous, Q12H, Eddie Benton MD, 10 mL at 21 2249  •  sodium chloride 0.9 % flush 10 mL, 10 mL, Intravenous, PRN, Eddie Benton MD, 10 mL at 21 1122  •  tamsulosin (FLOMAX) 24 hr capsule 0.4 mg, 0.4 mg, Oral, Daily, Eddie Benton MD, 0.4 mg at 21 1127  •  terazosin (HYTRIN) capsule 2 mg, 2 mg, Oral, Nightly, Eddie Benton MD, 2 mg at 21 2252    Antibiotics:  Anti-Infectives (From admission, onward)    Ordered     Dose/Rate Route Frequency Start Stop    21 1853  cefTAZidime 2 g in 100 mL NS Irina Allan, PharmD reviewed the order on 21 1920.   Ordering Provider: Eddie Benton MD    2 g  200 mL/hr over 30 Minutes Intravenous Every 8 Hours Scheduled 21 22021 [id number]  riFAXIMin (XIFAXAN) tablet 550 mg     Irina Chan, PharmD reviewed the order on 21 2554.   Ordering Provider: Eddie Benton MD    550 mg Oral Every 12 Hours Scheduled 21            Review of Systems:  See hpi    Physical Exam:   Vital Signs  Temp (24hrs), Av.9 °F (36.6 °C), Min:97.4 °F (36.3 °C), Max:98.5 °F (36.9 °C)    Temp  Min: 97.4 °F (36.3 °C)  Max: 98.5 °F (36.9 °C)  BP  Min: 106/64  Max: 153/64  Pulse   Min: 50  Max: 72  Resp  Min: 12  Max: 22  SpO2  Min: 89 %  Max: 98 %    GENERAL: Awake and alert, in no acute distress.   HEENT: Normocephalic, atraumatic.  PERRL. EOMI. No conjunctival injection.     HEART: RRR; No murmur  LUNGS: Clear to auscultation bilaterally  ABDOMEN: nontender  EXT: 2+ pedal edema  :  Without Young catheter.  MSK:  No joint deromvity  SKIN: no rash  NEURO: Oriented to PPT.  PSYCHIATRIC: Normal insight and judgement. Cooperative with PE    Laboratory Data    Results from last 7 days   Lab Units 04/14/21  1213 04/14/21  0804 04/14/21  0031 04/13/21  0928 04/12/21  1852   WBC 10*3/mm3  --  3.38*  --  4.32 4.88   HEMOGLOBIN g/dL 7.5* 7.5* 6.8* 7.0* 5.6*   HEMATOCRIT % 24.4* 24.8* 22.5* 22.7* 18.7*  17.9*   PLATELETS 10*3/mm3  --  70*  --  77* 87*     Results from last 7 days   Lab Units 04/14/21  0804   SODIUM mmol/L 133*   POTASSIUM mmol/L 3.8   CHLORIDE mmol/L 95*   CO2 mmol/L 30.0*   BUN mg/dL 20   CREATININE mg/dL 1.10   GLUCOSE mg/dL 117*   CALCIUM mg/dL 8.2*     Results from last 7 days   Lab Units 04/14/21  0804   ALK PHOS U/L 124*   BILIRUBIN mg/dL 1.8*   ALT (SGPT) U/L 37   AST (SGOT) U/L 81*                         Estimated Creatinine Clearance: 91.8 mL/min (by C-G formula based on SCr of 1.1 mg/dL).      Microbiology:  No results found for: ACANTHNAEG, AFBCX, BPERTUSSISCX, BLOODCX  No results found for: BCIDPCR, CXREFLEX, CSFCX, CULTURETIS  No results found for: CULTURES, HSVCX, URCX  No results found for: EYECULTURE, GCCX, HSVCULTURE, LABHSV  No results found for: LEGIONELLA, MRSACX, MUMPSCX, MYCOPLASCX  No results found for: NOCARDIACX, STOOLCX  No results found for: THROATCX, UNSTIMCULT, URINECX, CULTURE, VZVCULTUR  No results found for: VIRALCULTU, WOUNDCX        Radiology:  Imaging Results (Last 72 Hours)     Procedure Component Value Units Date/Time    CT Angiogram Chest With & Without Contrast [[id number]] Collected: 04/13/21 0217     Updated: 04/13/21 0219    Narrative:       CT CHEST WITH CONTRAST, PE PROTOCOL, 4/13/2021    HISTORY:  70-year-old male recently discharged from the hospital after treatment for AAA endograft infection with sepsis, Pseudomonas bacteremia. GI bleed. History of hepatic cirrhosis, chronic respiratory failure and diastolic heart failure. He is admitted to the  hospital today after low hemoglobin was noted by his primary provider. Symptoms include shortness of breath, leg swelling, black stools,    TECHNIQUE:  CT examination of the chest with IV contrast. CTA MIP multiplanar pulmonary artery images were reformatted with 3-D postprocessing. Radiation dose reduction techniques included automated exposure control. Radiation audit for CT and nuclear cardiology  exams in the last 12 months: 8.    COMPARISON:  *  CTA chest, 3/10/2021.    FINDINGS:  No pulmonary embolism is demonstrated. Thoracic aorta is normal in caliber with no aneurysm or dissection. Heart size is normal, and there is no pericardial effusion.    Multifocal airspace infiltrates are scattered throughout the central portions of both lungs, greatest in the left upper lobe, mostly in a central peribronchovascular distribution. This was not present on the prior study. Multifocal pneumonitis is  suspected. In addition, the patient has developed a new small to moderate right pleural effusion and tiny left pleural effusion, and there is consolidation and atelectasis of the dependent right posterior lung base. Trachea and central bronchi are  patent.    Lymph node enlargement is seen throughout the mediastinum, both pulmonary viet and in the axillary regions where there is bilateral axillary edema. This is nonspecific but may represent reactive adenopathy or lymphatic engorgement. Neoplastic adenopathy  is unlikely given the time course.      Impression:      1.  No evidence of pulmonary embolism or other acute vascular abnormality within the chest.  2.  Multifocal groundglass and more dense airspace  infiltrates in both lungs as detailed above. Consider atypical pneumonitis. Imaging features can be seen with COVID-19 pneumonia, although are nonspecific and can can occur with a variety of infectious  and noninfectious processes.  3.  Small to moderate right pleural effusion and tiny left pleural effusion with right posterior lung base consolidation and atelectasis, new since prior.  4.  Adenopathy within the mediastinum, pulmonary viet and axillary regions. Axillary soft tissue edema. Soft tissue edema is also present throughout the abdominal mesentery and body wall. Lymph node enlargement due to lymphatic engorgement is likely.    Signer Name: Jim Yousif MD   Signed: 4/13/2021 2:17 AM   Workstation Name: [facility]    [facility]    CT Abdomen Pelvis Without Contrast [[id number]] Collected: 04/13/21 0210     Updated: 04/13/21 0212    Narrative:      CT ABDOMEN AND PELVIS, NONCONTRAST, 4/13/2021    HISTORY:  70-year-old male recently discharged from the hospital after treatment for AAA endograft infection with sepsis, Pseudomonas bacteremia. GI bleed. History of hepatic cirrhosis, chronic respiratory failure and diastolic heart failure. He is admitted to the  hospital today after low hemoglobin was noted by his primary provider. Symptoms include shortness of breath, leg swelling, black stools,    TECHNIQUE:  CT imaging of the abdomen and pelvis ordered without oral or IV contrast. Radiation dose reduction techniques included automated exposure control. Radiation audit for CT and nuclear cardiology exams in the last 12 months: 8.    COMPARISON:  *  CT abdomen and pelvis, 3/10/2021.    ABDOMEN FINDINGS:  Morphologic changes of hepatic cirrhosis with moderate hepatosplenomegaly. Stable left lobe liver cyst. No gallbladder distention or visible bile duct dilatation. Pancreas is unremarkable. Soft tissue edema throughout the abdominal mesentery and body  wall and small volume ascites  within the abdomen and pelvis. Small bilateral pleural effusions, larger on the right.    Bifurcated aortoiliac stent graft with continued ill-defined thickening of the wall of the native aneurysm and mild periaortic adenopathy. Vascular assessment is limited without contrast administration. Both kidneys are negative with no evidence of  urinary obstruction or nephrolithiasis.    Small bowel and colon are normal in caliber and appearance, as imaged. No gastric distention or distal esophageal dilatation.    PELVIS FINDINGS:  Urinary bladder, prostate and rectum are within normal limits.    Postoperative changes of previous lower lumbar spine fusion with instrumentation.      Impression:      1.  No new or acute abnormality within the abdomen or pelvis when compared with prior study.  2.  Advanced hepatic cirrhosis with hepatosplenomegaly, small volume ascites and extensive soft tissue edema throughout the abdominal mesentery and body wall.  3.  Bifurcated aortoiliac stent graft with continued ill-defined wall thickening of the native aneurysm and mild periaortic adenopathy. Noncontrast study.    Signer Name: Jim Yousif MD   Signed: 4/13/2021 2:10 AM   Workstation Name: [email or web address]    [facility] [place]            Impression:   Acute GI bleed  Anemia  Cirrhosis  Chronic respiratory failure  Pseudomonas bacteremia with presumed infected endograft  Atrial fibrillation  History of Covid 3/10/2021    Estimated Creatinine Clearance: 91.8 mL/min (by C-G formula based on SCr of 1.1 mg/dL).        PLAN/RECOMMENDATIONS:   Thank you for asking us to see Derek Kelsey, I recommend the following:  Strongly complicated case; I suspect patient needs upper and lower endoscopy blood transfusion and can continue ceftazidime 2 g IV every 8 hours.    EGD shows some bleeding from gastropathy      Agree with colonoscopy  Defer to GI re: TIPS    Continue ceftazidime 2 g IV every 8 hours    D/w  family  Complicated case         Davis Carrero MD  4/14/2021  15:10 EDT

## 2021-04-14 NOTE — POST-PROCEDURE NOTE
EGD  One Grade ll varix with no stigmata.  Oozing from gastropathy.      REC Will plan colonoscopy when respiratory status is improved.  If bleeding persists may need TIPS

## 2021-04-14 NOTE — ANESTHESIA PREPROCEDURE EVALUATION
Anesthesia Evaluation     Patient summary reviewed and Nursing notes reviewed   NPO Solid Status: > 8 hours  NPO Liquid Status: > 8 hours           Airway   Mallampati: I  TM distance: >3 FB  Neck ROM: full  No difficulty expected  Dental    (+) upper dentures    Pulmonary    (+) COPD moderate, home oxygen, shortness of breath (no recent change ),   (-) pneumonia (CXR chronic changes only ), recent URI    ROS comment: peumoconiosis coal miners  Cardiovascular     ECG reviewed    (+) hypertension, dysrhythmias Atrial Fib,   (-) CAD (mild), angina    ROS comment: ECG A Fib RVR RBBB  ECHO 2021 EF >61%RVSP (TR)<35 mmHg).  CATH 2018 false pos GXT min CAD     Neuro/Psych  (+) psychiatric history,     (-) seizures, CVA  GI/Hepatic/Renal/Endo    (+) morbid obesity, GERD, GI bleeding (hematemesis 3 weeks ago ) upper resolved, liver disease (Cirrhosis),   (-) no renal disease    Musculoskeletal     (+) back pain,   Abdominal    Substance History      OB/GYN          Other   arthritis,      ROS/Med Hx Other: HCT22  Na 128   Pts 70K   creat /K normal  INR1.6- bilirubin normal                 Anesthesia Plan    ASA 3     general and MAC   (PFL)  intravenous induction     Anesthetic plan, all risks, benefits, and alternatives have been provided, discussed and informed consent has been obtained with: patient.    Plan discussed with CRNA.

## 2021-04-14 NOTE — ANESTHESIA POSTPROCEDURE EVALUATION
Patient: Derek Kelsey    Procedure Summary     Date: 04/14/21 Room / Location:  [facility]    Anesthesia Start: 1011 Anesthesia Stop:     Procedure: ESOPHAGOGASTRODUODENOSCOPY (N/A ) Diagnosis:       Gastrointestinal hemorrhage with melena      (Gastrointestinal hemorrhage with melena [K92.1])    Surgeons: Eddie Benton MD Provider: Barry Mathias MD    Anesthesia Type: general, MAC ASA Status: 3          Anesthesia Type: general, MAC    Vitals  No vitals data found for the desired time range.          Post Anesthesia Care and Evaluation    Patient location during evaluation: PACU  Patient participation: complete - patient participated  Level of consciousness: awake and alert  Pain management: adequate  Airway patency: patent  Anesthetic complications: No anesthetic complications  PONV Status: none  Cardiovascular status: hemodynamically stable and acceptable  Respiratory status: nonlabored ventilation, acceptable and nasal cannula  Hydration status: acceptable

## 2021-04-14 NOTE — PROGRESS NOTES
Continued Stay Note  [facility]     Patient Name: Derek Kelsey  MRN: [id number]  Today's Date: 4/14/2021    Admit Date: 4/12/2021    Discharge Plan     Row Name 04/14/21 1239       Plan    Plan  Home with family    Patient/Family in Agreement with Plan  yes    Plan Comments  Spoke with patient and spouse at bedside. Plan is home with [facility] and [facility] for PICC line changes and labs. Patient said he will have a colonoscopy of Friday or Saturday. CM will continue to follow.    Final Discharge Disposition Code  01 - home or self-care        Discharge Codes    No documentation.       Expected Discharge Date and Time     Expected Discharge Date Expected Discharge Time    Apr 15, 2021             Jim Platt RN

## 2021-04-14 NOTE — THERAPY TREATMENT NOTE
Patient Name: Derek Kelsey  : 1950    MRN: [id number]                              Today's Date: 2021       Admit Date: 2021    Visit Dx:     ICD-10-CM ICD-9-CM   1. Acute blood loss anemia, mild, asymptomatic  D62 285.1   2. Gastrointestinal hemorrhage with melena  K92.1 578.1     Patient Active Problem List   Diagnosis   • Anxiety   • Abdominal aortic aneurysm (AAA) without rupture (CMS/HCC)   • Depression   • Hypertension   • Cataract, bilateral   • CAD (coronary artery disease)   • Abnormal stress test   • Moderate COPD   • Former smoker   • Coal workers pneumoconiosis, simple   • Back pain   • S/P lumbar fusion   • Prediabetes   • Acute blood loss anemia, mild, asymptomatic   • Acute postoperative pain   • Hyponatremia, mild   • Chronic respiratory failure with hypoxia and hypercapnia (CMS/HCC)   • Renal insufficiency   • Acute cystitis without hematuria   • Acute UTI   • Bacteremia   • Symptomatic anemia   • Aortitis (CMS/HCC)   • Elevated C-reactive protein (CRP)   • Cirrhosis of liver (CMS/HCC)   • High anion gap metabolic acidosis   • S/P AAA repair   • GERD (gastroesophageal reflux disease)   • Person under investigation for COVID-19   • On supplemental oxygen by nasal cannula   • Infected aortic endograft (CMS/HCC)   • Elevated transaminase level   • Thrombocytopenia (CMS/HCC)   • Pneumonia   • Gram-negative bacteremia   • COVID-19   • Anemia   • Gastrointestinal hemorrhage with melena     Past Medical History:   Diagnosis Date   • Abdominal aortic aneurysm (CMS/HCC) 2016   • Anxiety 2016   • Arthritis    • Back pain    • Black lung (CMS/HCC)    • CAD (coronary artery disease) 2016   • Cirrhosis (CMS/HCC)    • COPD (chronic obstructive pulmonary disease) (CMS/HCC) 2016   • Depression 2016   • Depression    • GERD (gastroesophageal reflux disease)    • Hypertension 2016   • On supplemental oxygen by nasal cannula     at night   • Vitiligo    • Wears  dentures    • Wears reading eyeglasses      Past Surgical History:   Procedure Laterality Date   • ABDOMINAL AORTIC ANEURYSM REPAIR WITH ENDOGRAFT N/A 6/6/2019    Procedure: ABDOMINAL AORTIC ANEURYSM REPAIR WITH ENDOGRAFT;  Surgeon: Leopoldo Burleson MD;  Location:  [facility];  Service: Vascular   • BACK SURGERY     • CARDIAC CATHETERIZATION     • CARDIAC CATHETERIZATION N/A 9/28/2018    Procedure: Left Heart Cath;  Surgeon: Douglas Galvez MD;  Location:  [facility] CATH INVASIVE LOCATION;  Service: Cardiovascular   • CARDIAC SURGERY     • COLONOSCOPY      2015   • COLONOSCOPY N/A 10/30/2019    Procedure: COLONOSCOPY;  Surgeon: Homar Viveros MD;  Location:  [facility] ENDOSCOPY;  Service: Gastroenterology   • ENDOSCOPY N/A 10/30/2019    Procedure: ESOPHAGOGASTRODUODENOSCOPY;  Surgeon: Homar Viveros MD;  Location:  [facility] ENDOSCOPY;  Service: Gastroenterology   • EYE SURGERY      cataracts bilateral   • HAND SURGERY Left    • LUMBAR DISCECTOMY FUSION INSTRUMENTATION N/A 11/1/2018    Procedure: LUMBAR DISCECTOMY POSTERIOR WITH FUSION INSTRUMENTATION;  Surgeon: Joo Franklin MD;  Location:  [facility] OR;  Service: Orthopedic Spine   • LUMBAR DISCECTOMY FUSION INSTRUMENTATION N/A 7/24/2019    Procedure: POSTERIOR LUMBAR SPINAL FUSION REVISION AND INSTRUMENTATION L3,L4,L5,S1;  Surgeon: Joo Franklin MD;  Location:  [facility] OR;  Service: Orthopedic Spine   • ORIF FINGER / THUMB FRACTURE     • SHOULDER SURGERY Left      General Information     Row Name 04/14/21 1517          OT Time and Intention    Document Type  therapy note (daily note)  -TB     Mode of Treatment  occupational therapy;individual therapy  -TB     Row Name 04/14/21 1517          General Information    Patient Profile Reviewed  yes  -TB     Existing Precautions/Restrictions  fall;oxygen therapy device and L/min;other (see comments) Symptomatic anemia, [place], monitor vitals  -TB     Barriers to Rehab  medically complex;previous functional  deficit  -TB     Row Name 04/14/21 1517          Cognition    Orientation Status (Cognition)  oriented x 4  -TB     Row Name 04/14/21 1517          Safety Issues, Functional Mobility    Safety Issues Affecting Function (Mobility)  insight into deficits/self-awareness;safety precaution awareness;safety precautions follow-through/compliance  -TB     Impairments Affecting Function (Mobility)  balance;endurance/activity tolerance;strength  -TB     Comment, Safety Issues/Impairments (Mobility)  Pt up with RW and CGAx1  -TB       User Key  (r) = Recorded By, (t) = Taken By, (c) = Cosigned By    Initials Name Provider Type    TB Sadia Corrales, OT Occupational Therapist          Mobility/ADL's     Row Name 04/14/21 1519          Bed Mobility    Comment (Bed Mobility)  Pt sitting EOB  -[facility] Name 04/14/21 1519          Transfers    Transfers  sit-stand transfer;bed-chair transfer  -TB     Comment (Transfers)  Cues for hand placement and safety  -     Bed-Chair [place] (Transfers)  contact guard;verbal cues  -     Assistive Device (Bed-Chair Transfers)  walker, front-wheeled  -TB     Sit-Stand [place] (Transfers)  contact guard;verbal cues  -     Row Name 04/14/21 1519          Sit-Stand Transfer    Assistive Device (Sit-Stand Transfers)  walker, front-wheeled  -TB     Row Name 04/14/21 1519          Functional Mobility    Functional Mobility- Ind. Level  contact guard assist  -TB     Functional Mobility-Distance (Feet)  15  -TB     Functional Mobility- Safety Issues  balance decreased during turns;supplemental O2;step length decreased  -     Row Name 04/14/21 1519          Activities of Daily Living    BADL Assessment/Intervention  bathing;upper body dressing;lower body dressing;grooming;toileting  -     Row Name 04/14/21 1519          Lower Body Dressing Assessment/Training    [place] Level (Lower Body Dressing)  doff;don;socks;maximum assist (25% patient effort)  -     Position  (Lower Body Dressing)  edge of bed sitting  -TB     Row Name 04/14/21 1519          Grooming Assessment/Training    [place] Level (Grooming)  set up;wash face, hands  -TB     Position (Grooming)  edge of bed sitting  -TB     Comment (Grooming)  Pt able to wash hair with washclothe  -TB     Row Name 04/14/21 1519          Bathing Assessment/Intervention    [place] Level (Bathing)  upper body;upper extremities;chest/trunk;set up  -TB     Position (Bathing)  edge of bed sitting  -TB     Comment (Bathing)  Min A for back  -TB     Row Name 04/14/21 1519          Upper Body Dressing Assessment/Training    [place] Level (Upper Body Dressing)  doff;don;pajama/robe;minimum assist (75% patient effort)  -TB     Position (Upper Body Dressing)  edge of bed sitting  -TB     Comment (Upper Body Dressing)  assist for lines  -TB     Row Name 04/14/21 1519          Toileting Assessment/Training    [place] Level (Toileting)  maximum assist (25% patient effort);adjust/manage clothing  -TB     Position (Toileting)  unsupported sitting  -TB     Comment (Toileting)  Pt on BSC at end of session, spouse present, encouraged to call out when ready for PCT. RN notified.  -TB       User Key  (r) = Recorded By, (t) = Taken By, (c) = Cosigned By    Initials Name Provider Type    Sadia Moncada OT Occupational Therapist        Obj/Interventions     Row Name 04/14/21 1525          Balance    Balance Assessment  sitting dynamic balance;standing dynamic balance  -TB     Dynamic Sitting Balance  WFL;unsupported;sitting, edge of bed  -TB     Dynamic Standing Balance  mild impairment;supported;standing  -TB     Balance Interventions  sitting;standing;sit to stand;supported;dynamic;occupation based/functional task;UE activity with balance activity  -TB     Comment, Balance  Pt up with RW for balance and energy conservation  -TB       User Key  (r) = Recorded By, (t) = Taken By, (c) = Cosigned By    Initials Name Provider  Type    TB Sadia Corrales OT Occupational Therapist        Goals/Plan    No documentation.       Clinical Impression     Row Name 04/14/21 1526          Pain Assessment    Additional Documentation  Pain Scale: Numbers Pre/Post-Treatment (Group)  -TB     Row Name 04/14/21 1526          Pain Scale: Numbers Pre/Post-Treatment    Pretreatment Pain Rating  0/10 - no pain  -TB     Posttreatment Pain Rating  0/10 - no pain  -TB     Pre/Posttreatment Pain Comment  Pt denies pain with ADL activities  -TB     Pain Intervention(s)  Ambulation/increased activity;Repositioned  -TB     Row Name 04/14/21 1526          Plan of Care Review    Plan of Care Reviewed With  patient;spouse  -TB     Outcome Summary  Pt is motivated to work with OT and regain occupational independence. Set-up/SBA UB bathing, Min A UB dressing, Max A LB dressing. Pt up in room with CGA using RW. CGA BSC tx. Brief review for EC with ADLs. OT will follow IP to advance POC as able.  -TB     Row Name 04/14/21 1526          Therapy Plan Review/Discharge Plan (OT)    Anticipated Discharge Disposition (OT)  inpatient rehabilitation facility  -TB     Row Name 04/14/21 1526          Vital Signs    Pre Systolic BP Rehab  -- RN cleared OT  -TB     O2 Delivery Pre Treatment  nasal cannula  -TB     O2 Delivery Intra Treatment  room air  -TB     O2 Delivery Post Treatment  nasal cannula  -TB     Pre Patient Position  Sitting  -TB     Intra Patient Position  Standing  -TB     Post Patient Position  Sitting  -TB     Row Name 04/14/21 1526          Positioning and Restraints    Pre-Treatment Position  in bed  -TB     Post Treatment Position  bsc  -TB     In Chair  notified nsg  -TB     On BS commode  notified nsg;sitting;call light within reach;encouraged to call for assist;with family/caregiver  -TB       User Key  (r) = Recorded By, (t) = Taken By, (c) = Cosigned By    Initials Name Provider Type    TB Sadia Corrales, SARAY Occupational Therapist         Outcome Measures     Row Name 04/14/21 1530          How much help from another is currently needed...    Putting on and taking off regular lower body clothing?  2  -TB     Bathing (including washing, rinsing, and drying)  2  -TB     Toileting (which includes using toilet bed pan or urinal)  2  -TB     Putting on and taking off regular upper body clothing  3  -TB     Taking care of personal grooming (such as brushing teeth)  3  -TB     Eating meals  4  -TB     AM-PAC 6 Clicks Score (OT)  16  -TB     Row Name 04/14/21 1530          Functional Assessment    Outcome Measure Options  AM-PAC 6 Clicks Daily Activity (OT)  -TB       User Key  (r) = Recorded By, (t) = Taken By, (c) = Cosigned By    Initials Name Provider Type    TB Sadia Corrales OT Occupational Therapist        Occupational Therapy Education                 Title: PT OT SLP Therapies (In Progress)     Topic: Occupational Therapy (In Progress)     Point: ADL training (Done)     Description:   Instruct learner(s) on proper safety adaptation and remediation techniques during self care or transfers.   Instruct in proper use of assistive devices.              Learning Progress Summary           Patient Acceptance, E,D, VU,DU,NR by TB at 4/14/2021 1530    Acceptance, E,D, VU,NR by  at 4/13/2021 1107    Comment: OT role and POC, EC strategies, AE for LBD compeltoin   Significant Other Acceptance, E,D, VU,NR by  at 4/13/2021 1107    Comment: OT role and POC, EC strategies, AE for LBD compeltoin                   Point: Home exercise program (Not Started)     Description:   Instruct learner(s) on appropriate technique for monitoring, assisting and/or progressing therapeutic exercises/activities.              Learner Progress:  Not documented in this visit.          Point: Precautions (Done)     Description:   Instruct learner(s) on prescribed precautions during self-care and functional transfers.              Learning Progress Summary            Patient Acceptance, E,D, VU,NR by CS at 4/13/2021 1107    Comment: OT role and POC, EC strategies, AE for LBD compeltoin   Significant Other Acceptance, E,D, VU,NR by CS at 4/13/2021 1107    Comment: OT role and POC, EC strategies, AE for LBD compeltoin                   Point: Body mechanics (Done)     Description:   Instruct learner(s) on proper positioning and spine alignment during self-care, functional mobility activities and/or exercises.              Learning Progress Summary           Patient Acceptance, E,D, VU,NR by CS at 4/13/2021 1107    Comment: OT role and POC, EC strategies, AE for LBD compeltoin   Significant Other Acceptance, E,D, VU,NR by CS at 4/13/2021 1107    Comment: OT role and POC, EC strategies, AE for LBD compeltoin                               User Key     Initials Effective Dates Name Provider Type Discipline     06/08/18 -  Sadia Corrales OT Occupational Therapist OT     10/21/20 -  Bartolome Dietrich OT Occupational Therapist OT              OT Recommendation and Plan     Plan of Care Review  Plan of Care Reviewed With: patient, spouse  Outcome Summary: Pt is motivated to work with OT and regain occupational independence. Set-up/SBA UB bathing, Min A UB dressing, Max A LB dressing. Pt up in room with CGA using RW. CGA BSC tx. Brief review for EC with ADLs. OT will follow IP to advance POC as able.     Time Calculation:   Time Calculation- OT     Row Name 04/14/21 1436             Time Calculation- OT    OT Start Time  1436  -TB      OT Received On  04/14/21  -TB      OT Goal Re-Cert Due Date  04/23/21  -TB         Timed Charges    33556 - OT Self Care/Mgmt Minutes  25  -TB        User Key  (r) = Recorded By, (t) = Taken By, (c) = Cosigned By    Initials Name Provider Type    TB Sadia Corrales OT Occupational Therapist        Therapy Charges for Today     Code Description Service Date Service Provider Modifiers Qty    [id number]  OT SELF CARE/MGMT/TRAIN EA 15  MIN 4/14/2021 Sadia Corrales, OT GO 2               Sadia Corrales OT  4/14/2021

## 2021-04-14 NOTE — PROGRESS NOTES
[facility] Medicine Services  PROGRESS NOTE    Patient Name: Derek Kelsey  : 1950  MRN: [id number]    Date of Admission: 2021  Primary Care Physician: Raudel Zheng MD    Subjective   Subjective     CC:  Anemia, cirrhosis, melena, respiratory failure, chronically infected endograft    HPI:  Dark bm's persist. Received blood x 1 unit early this morning w/ lasix and good urination. Respiratory status improved overall. No pain. + fatigue  ROS:  No palpitations. No chest pain  Gen- No fevers, chills  CV- No chest pain, palpitations  Resp- No cough, otherwise as above  GI- No N/V, no abd pain, dark stools as above      Objective   Objective     Vital Signs:   Temp:  [97.4 °F (36.3 °C)-98 °F (36.7 °C)] 97.8 °F (36.6 °C)  Heart Rate:  [50-72] 63  Resp:  [14-18] 16  BP: (106-144)/(54-71) 109/54        Physical Exam:  Constitutional:Alert, oriented to person, place, time. Answers most questions appropriately. Elderly & frail but currently nontoxic appearing  Psych:Normal/appropriate affect  HEENT:[place]cat, oroph clear, nasal canula in place  Neck: neck supple, full range of motion  Neuro: Face symmetric, speech clear, equal , moves all extremities  Cardiac: irr irr, regular rate; 2+ BLE edema  Resp: Ctab, normal effort  GI: abd soft, nontender, mild distended, obese  Skin: No extremity rash  Musculoskeletal/extremities: no cyanosis extremities          Results Reviewed:  Results from last 7 days   Lab Units 21  0031 21  1608 21  0928 21  2045 21  1852   WBC 10*3/mm3  --   --  4.32  --  4.88   HEMOGLOBIN g/dL 6.8* 7.2* 7.0*  --  5.6*   HEMATOCRIT % 22.5* 23.2* 22.7*  --  18.7*   PLATELETS 10*3/mm3  --   --  77*  --  87*   INR   --   --  1.66* 1.75*  --    PROCALCITONIN ng/mL  --   --  0.16  --   --      Results from last 7 days   Lab Units 21  0928 21  0209 21  1852   SODIUM mmol/L 128* 127* 127*   POTASSIUM mmol/L 3.6  --  3.8    CHLORIDE mmol/L 94*  --  92*   CO2 mmol/L 24.0  --  25.0   BUN mg/dL 20  --  20   CREATININE mg/dL 0.94  --  0.97   GLUCOSE mg/dL 106*  --  101*   CALCIUM mg/dL 8.6  --  8.7   ALT (SGPT) U/L  --   --  40   AST (SGOT) U/L  --   --  85*   PROBNP pg/mL  --   --  391.4     Estimated Creatinine Clearance: 107.4 mL/min (by C-G formula based on SCr of 0.94 mg/dL).    Microbiology Results Abnormal     Procedure Component Value - Date/Time    Legionella Antigen, Urine - Urine, Urine, Clean Catch [[id number]]  (Normal) Collected: 04/13/21 0726    Lab Status: Final result Specimen: Urine, Clean Catch Updated: 04/13/21 1341     LEGIONELLA ANTIGEN, URINE Negative    S. Pneumo Ag Urine or CSF - Urine, Urine, Clean Catch [[id number]]  (Normal) Collected: 04/12/21 2325    Lab Status: Final result Specimen: Urine, Clean Catch Updated: 04/13/21 1021     Strep Pneumo Ag Negative    Eosinophil Smear - Urine, Urine, Clean Catch [[id number]]  (Normal) Collected: 04/12/21 2325    Lab Status: Final result Specimen: Urine, Clean Catch Updated: 04/13/21 0054     Eosinophil Smear 0 % EOS/100 Cells     Narrative:      No eosinophil seen          Imaging Results (Last 24 Hours)     ** No results found for the last 24 hours. **          Results for orders placed during the hospital encounter of 03/10/21    Adult Transthoracic Echo Complete W/ Cont if Necessary Per Protocol    Interpretation Summary  · Estimated right ventricular systolic pressure from tricuspid regurgitation is normal (<35 mmHg).  · Estimated left ventricular EF = 65% Left ventricular systolic function is normal.      I have reviewed the medications:  Scheduled Meds:budesonide-formoterol, 2 puff, Inhalation, BID - RT   And  ipratropium, 0.5 mg, Nebulization, 4x Daily - RT  carvedilol, 12.5 mg, Oral, Q12H  cefTAZidime 2 g in 100 mL NS MBP, 2 g, Intravenous, Q8H  citalopram, 40 mg, Oral, Daily  ferrous sulfate, 325 mg, Oral, Daily With Breakfast  furosemide, 40 mg, Oral,  BID  lactulose, 15 mL, Oral, TID  melatonin, 5 mg, Oral, Nightly  multivitamin, 1 tablet, Oral, Daily  pantoprazole, 40 mg, Intravenous, BID AC  riFAXIMin, 550 mg, Oral, Q12H  saccharomyces boulardii, 250 mg, Oral, BID  senna-docusate sodium, 2 tablet, Oral, BID  sodium chloride, 10 mL, Intravenous, Q12H  sodium chloride, 10 mL, Intravenous, Q12H  tamsulosin, 0.4 mg, Oral, Daily  terazosin, 2 mg, Oral, Nightly      Continuous Infusions:octreotide (SandoSTATIN) infusion, 50 mcg/hr, Last Rate: 50 mcg/hr (04/13/21 1255)      PRN Meds:.•  acetaminophen **OR** acetaminophen **OR** acetaminophen  •  senna-docusate sodium **AND** polyethylene glycol **AND** bisacodyl **AND** bisacodyl  •  calcium carbonate  •  ipratropium-albuterol  •  magnesium sulfate **OR** magnesium sulfate **OR** magnesium sulfate  •  ondansetron **OR** ondansetron  •  phenol  •  potassium chloride **OR** potassium chloride **OR** potassium chloride  •  sodium chloride  •  sodium chloride    Assessment/Plan   Assessment & Plan     Active Hospital Problems    Diagnosis  POA   • **Symptomatic anemia [D64.9]  Yes   • Anemia [D64.9]  Yes   • Gastrointestinal hemorrhage with melena [K92.1]  Unknown   • 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        Brief Hospital Course to date:  Derek Kelsey is a 70 y.o. male w/ cirrhosis, chronically infected aaa endograft (on fortaz followed by Dr. Carrero), afib, dhf, copd/ILD w/ 3-4L chronic oxygen dependence, also w/ hx of covid + pcr on 3/10/21 (also got 2nd covid 19 vaccination shot on 3/23/21) who presented w/ dyspnea on exertion, fatigue. Apparently had appointment at [facility] mid April to  "consider \"options\" for his chronically infected endograft. Noted had hgb 5. Has had melena recently and some mild increased confusion. Ct revealed no PE but bilateral effusions. Transfused 2 unit prbc w/ bumex. Gi, cards, ID consulted.     *Acute GI bleed, likely upper (melena)  *Decompensated cirrhosis (w/ coagulopathy, thrombocytopenia, hepatic encephalopathy)   -recent melena, also hx hemorrhoid bleeding   -bid ppi, octreotide; q6h H&H   -EGD today planned by GI   -s/p 3 units thus far (4/12/21 x 2 units; 4/14/21 morning x 1 unit); give bumex 1 or lasix 40mg w/ blood transfusions   -lactulose & rifaxamin; improved mentation  *Acute on chronic hypoxic resp failure (chronic 3L)  *Hx \"black lung\"/fibrotic lung dz  *A/C DHF/Volume overload  *hx COPD   -currently on 4Lnc (recently 3Lnc, chronically 2Lnc as outpatient)   -continue lasix 40mg po bid w/ prn iv diuresis   -echo w/ preserved EF; cards following  *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 Carrero   -apparently has appointment @ [facility] next week re: options; however patient now declining this and accepts that this means he will have recurrent/persistent infection and likely sepsis   -palliative following; appreciate further assistance in future goals/plans; perhaps hospice in order if/when clinically deteriorates  *Afib (not anticoagulated)   -Dr. Gardner following, on coreg; no a/c due to gi bleeding/cirrhosis   -currently rate controlled  *hx covid 19 pcr + (on 3/10/21)   -also had covid vacinnation x 2 shots (2nd shot 3/23/21)   -no isolatoin    Plan:  -am labs pending, will review  -egd today; octreotide, protonix per GI  -1 unit prbc w/ lasix 40mg early this a.m  -q6h hgb (transfuse hgb <7 or symptoms)  -heart rate control per Dr. Black  -continue fortax per Dr. Carrero  -continued palliative discussions will be key here, not wishing to pursue [facility] evaluation for explantation options " of infected graft, which means not able to cure and likely recurrent sepsis in future. ? Hospice. Patient has definitively stated to me that in event of cardiopulmonary decompensation he would NOT want intubation nor cpr    Labs: q6h H&H; cbc,cmp,inr,mag in a.m.    Addendum @ 17:33  -egd w/ portal gasropathy w/ oozing (varices noted but no overt bleeding); continue protonix. ? Continue octreotide. GI planning on c-scope once respiratory status improved. GI note mentions possible TIPs if continued bleeding, however patient does have hx of hepatic encephalopathy, so would need to discuss further w/ gi & patient before proceeding  -vitamin k 10mg   -lasix 40mg iv x 1  -q6h H&H, transfuse prn (would give iv lasix if/when transfuse blood)    DVT Prophylaxis:  mechanical      Disposition: I expect the patient to be discharged TBD, once stable    CODE STATUS:   Code Status and Medical Interventions:   Ordered at: 04/12/21 9314     Limited Support to NOT Include:    Artificial Nutrition    Dialysis    Cardioversion/Defibrillation    Intubation     Level Of Support Discussed With:    Patient     Code Status:    No CPR     Medical Interventions (Level of Support Prior to Arrest):    Limited       Rinku Mcrae MD  04/14/21

## 2021-04-15 LAB
ALBUMIN SERPL-MCNC: 2.7 G/DL (ref 3.5–5.2)
ALBUMIN/GLOB SERPL: 0.7 G/DL
ALP SERPL-CCNC: 113 U/L (ref 39–117)
ALT SERPL W P-5'-P-CCNC: 33 U/L (ref 1–41)
ANION GAP SERPL CALCULATED.3IONS-SCNC: 8 MMOL/L (ref 5–15)
AST SERPL-CCNC: 73 U/L (ref 1–40)
BH BB BLOOD EXPIRATION DATE: NORMAL
BH BB BLOOD TYPE BARCODE: 5100
BH BB DISPENSE STATUS: NORMAL
BH BB PRODUCT CODE: NORMAL
BH BB UNIT NUMBER: NORMAL
BILIRUB SERPL-MCNC: 1.1 MG/DL (ref 0–1.2)
BUN SERPL-MCNC: 19 MG/DL (ref 8–23)
BUN/CREAT SERPL: 16.8 (ref 7–25)
CALCIUM SPEC-SCNC: 7.7 MG/DL (ref 8.6–10.5)
CHLORIDE SERPL-SCNC: 96 MMOL/L (ref 98–107)
CO2 SERPL-SCNC: 30 MMOL/L (ref 22–29)
CREAT SERPL-MCNC: 1.13 MG/DL (ref 0.76–1.27)
CROSSMATCH INTERPRETATION: NORMAL
DEPRECATED RDW RBC AUTO: 63.9 FL (ref 37–54)
ERYTHROCYTE [DISTWIDTH] IN BLOOD BY AUTOMATED COUNT: 17.8 % (ref 12.3–15.4)
GFR SERPL CREATININE-BSD FRML MDRD: 65 ML/MIN/1.73
GLOBULIN UR ELPH-MCNC: 3.8 GM/DL
GLUCOSE SERPL-MCNC: 103 MG/DL (ref 65–99)
HCT VFR BLD AUTO: 23.1 % (ref 37.5–51)
HCT VFR BLD AUTO: 23.6 % (ref 37.5–51)
HCT VFR BLD AUTO: 29.3 % (ref 37.5–51)
HGB BLD-MCNC: 7 G/DL (ref 13–17.7)
HGB BLD-MCNC: 7.1 G/DL (ref 13–17.7)
HGB BLD-MCNC: 9.1 G/DL (ref 13–17.7)
INR PPP: 1.66 (ref 0.85–1.16)
MAGNESIUM SERPL-MCNC: 2.1 MG/DL (ref 1.6–2.4)
MCH RBC QN AUTO: 30.3 PG (ref 26.6–33)
MCHC RBC AUTO-ENTMCNC: 30.7 G/DL (ref 31.5–35.7)
MCV RBC AUTO: 98.7 FL (ref 79–97)
PLATELET # BLD AUTO: 73 10*3/MM3 (ref 140–450)
PMV BLD AUTO: 10.3 FL (ref 6–12)
POTASSIUM SERPL-SCNC: 3.6 MMOL/L (ref 3.5–5.2)
POTASSIUM SERPL-SCNC: 4.2 MMOL/L (ref 3.5–5.2)
PROT SERPL-MCNC: 6.5 G/DL (ref 6–8.5)
PROTHROMBIN TIME: 19 SECONDS (ref 11.4–14.4)
RBC # BLD AUTO: 2.34 10*6/MM3 (ref 4.14–5.8)
SODIUM SERPL-SCNC: 134 MMOL/L (ref 136–145)
UNIT  ABO: NORMAL
UNIT  RH: NORMAL
WBC # BLD AUTO: 4.05 10*3/MM3 (ref 3.4–10.8)

## 2021-04-15 PROCEDURE — 85610 PROTHROMBIN TIME: CPT | Performed by: INTERNAL MEDICINE

## 2021-04-15 PROCEDURE — 85027 COMPLETE CBC AUTOMATED: CPT | Performed by: INTERNAL MEDICINE

## 2021-04-15 PROCEDURE — 85014 HEMATOCRIT: CPT | Performed by: INTERNAL MEDICINE

## 2021-04-15 PROCEDURE — 85018 HEMOGLOBIN: CPT | Performed by: INTERNAL MEDICINE

## 2021-04-15 PROCEDURE — 25010000002 CEFTAZIDIME PER 500 MG: Performed by: INTERNAL MEDICINE

## 2021-04-15 PROCEDURE — 84132 ASSAY OF SERUM POTASSIUM: CPT | Performed by: INTERNAL MEDICINE

## 2021-04-15 PROCEDURE — 97535 SELF CARE MNGMENT TRAINING: CPT

## 2021-04-15 PROCEDURE — 36430 TRANSFUSION BLD/BLD COMPNT: CPT

## 2021-04-15 PROCEDURE — 25010000003 PHYTONADIONE 10 MG/ML SOLUTION 1 ML AMPULE: Performed by: INTERNAL MEDICINE

## 2021-04-15 PROCEDURE — 83735 ASSAY OF MAGNESIUM: CPT | Performed by: INTERNAL MEDICINE

## 2021-04-15 PROCEDURE — 25010000002 OCTREOTIDE PER 25 MCG: Performed by: INTERNAL MEDICINE

## 2021-04-15 PROCEDURE — 99233 SBSQ HOSP IP/OBS HIGH 50: CPT | Performed by: INTERNAL MEDICINE

## 2021-04-15 PROCEDURE — 94799 UNLISTED PULMONARY SVC/PX: CPT

## 2021-04-15 PROCEDURE — P9016 RBC LEUKOCYTES REDUCED: HCPCS

## 2021-04-15 PROCEDURE — 86900 BLOOD TYPING SEROLOGIC ABO: CPT

## 2021-04-15 PROCEDURE — 80053 COMPREHEN METABOLIC PANEL: CPT | Performed by: INTERNAL MEDICINE

## 2021-04-15 PROCEDURE — 97530 THERAPEUTIC ACTIVITIES: CPT

## 2021-04-15 RX ORDER — BUMETANIDE 0.25 MG/ML
2 INJECTION INTRAMUSCULAR; INTRAVENOUS ONCE
Status: COMPLETED | OUTPATIENT
Start: 2021-04-15 | End: 2021-04-15

## 2021-04-15 RX ADMIN — DOCUSATE SODIUM 50MG AND SENNOSIDES 8.6MG 2 TABLET: 8.6; 5 TABLET, FILM COATED ORAL at 08:11

## 2021-04-15 RX ADMIN — FUROSEMIDE 40 MG: 40 TABLET ORAL at 08:10

## 2021-04-15 RX ADMIN — CARVEDILOL 12.5 MG: 12.5 TABLET, FILM COATED ORAL at 21:26

## 2021-04-15 RX ADMIN — CEFTAZIDIME 2 G: 2 INJECTION, POWDER, FOR SOLUTION INTRAVENOUS at 13:52

## 2021-04-15 RX ADMIN — SODIUM CHLORIDE, PRESERVATIVE FREE 10 ML: 5 INJECTION INTRAVENOUS at 21:27

## 2021-04-15 RX ADMIN — CITALOPRAM 40 MG: 40 TABLET ORAL at 08:11

## 2021-04-15 RX ADMIN — BUDESONIDE AND FORMOTEROL FUMARATE DIHYDRATE 2 PUFF: 160; 4.5 AEROSOL RESPIRATORY (INHALATION) at 10:00

## 2021-04-15 RX ADMIN — CEFTAZIDIME 2 G: 2 INJECTION, POWDER, FOR SOLUTION INTRAVENOUS at 06:31

## 2021-04-15 RX ADMIN — Medication 5 MG: at 21:27

## 2021-04-15 RX ADMIN — IPRATROPIUM BROMIDE 0.5 MG: 0.5 SOLUTION RESPIRATORY (INHALATION) at 13:47

## 2021-04-15 RX ADMIN — CARVEDILOL 12.5 MG: 12.5 TABLET, FILM COATED ORAL at 08:11

## 2021-04-15 RX ADMIN — IPRATROPIUM BROMIDE 0.5 MG: 0.5 SOLUTION RESPIRATORY (INHALATION) at 16:00

## 2021-04-15 RX ADMIN — PANTOPRAZOLE SODIUM 40 MG: 40 INJECTION, POWDER, FOR SOLUTION INTRAVENOUS at 17:31

## 2021-04-15 RX ADMIN — Medication 250 MG: at 08:11

## 2021-04-15 RX ADMIN — LACTULOSE 15 ML: 20 SOLUTION ORAL at 21:25

## 2021-04-15 RX ADMIN — FUROSEMIDE 40 MG: 40 TABLET ORAL at 17:31

## 2021-04-15 RX ADMIN — FERROUS SULFATE TAB 325 MG (65 MG ELEMENTAL FE) 325 MG: 325 (65 FE) TAB at 08:10

## 2021-04-15 RX ADMIN — Medication 250 MG: at 21:27

## 2021-04-15 RX ADMIN — IPRATROPIUM BROMIDE 0.5 MG: 0.5 SOLUTION RESPIRATORY (INHALATION) at 21:07

## 2021-04-15 RX ADMIN — LACTULOSE 15 ML: 20 SOLUTION ORAL at 17:31

## 2021-04-15 RX ADMIN — SODIUM CHLORIDE, PRESERVATIVE FREE 10 ML: 5 INJECTION INTRAVENOUS at 21:28

## 2021-04-15 RX ADMIN — PHYTONADIONE 10 MG: 10 INJECTION, EMULSION INTRAMUSCULAR; INTRAVENOUS; SUBCUTANEOUS at 17:30

## 2021-04-15 RX ADMIN — IPRATROPIUM BROMIDE 0.5 MG: 0.5 SOLUTION RESPIRATORY (INHALATION) at 10:00

## 2021-04-15 RX ADMIN — TAMSULOSIN HYDROCHLORIDE 0.4 MG: 0.4 CAPSULE ORAL at 08:11

## 2021-04-15 RX ADMIN — OCTREOTIDE ACETATE 50 MCG/HR: 500 INJECTION, SOLUTION INTRAVENOUS; SUBCUTANEOUS at 10:25

## 2021-04-15 RX ADMIN — PANTOPRAZOLE SODIUM 40 MG: 40 INJECTION, POWDER, FOR SOLUTION INTRAVENOUS at 06:31

## 2021-04-15 RX ADMIN — LACTULOSE 15 ML: 20 SOLUTION ORAL at 08:10

## 2021-04-15 RX ADMIN — BUMETANIDE 2 MG: 0.25 INJECTION INTRAMUSCULAR; INTRAVENOUS at 10:25

## 2021-04-15 RX ADMIN — THERA TABS 1 TABLET: TAB at 08:11

## 2021-04-15 RX ADMIN — OCTREOTIDE ACETATE 50 MCG/HR: 500 INJECTION, SOLUTION INTRAVENOUS; SUBCUTANEOUS at 23:27

## 2021-04-15 RX ADMIN — POTASSIUM CHLORIDE 40 MEQ: 750 CAPSULE, EXTENDED RELEASE ORAL at 08:11

## 2021-04-15 RX ADMIN — RIFAXIMIN 550 MG: 550 TABLET ORAL at 08:10

## 2021-04-15 RX ADMIN — TERAZOSIN HYDROCHLORIDE 2 MG: 2 CAPSULE ORAL at 21:27

## 2021-04-15 RX ADMIN — BUDESONIDE AND FORMOTEROL FUMARATE DIHYDRATE 2 PUFF: 160; 4.5 AEROSOL RESPIRATORY (INHALATION) at 21:08

## 2021-04-15 RX ADMIN — RIFAXIMIN 550 MG: 550 TABLET ORAL at 21:26

## 2021-04-15 RX ADMIN — POTASSIUM CHLORIDE 40 MEQ: 750 CAPSULE, EXTENDED RELEASE ORAL at 12:10

## 2021-04-15 RX ADMIN — SODIUM CHLORIDE, PRESERVATIVE FREE 10 ML: 5 INJECTION INTRAVENOUS at 08:10

## 2021-04-15 NOTE — PROGRESS NOTES
Palliative Care Progress Note    Date of Admission: 4/12/2021    Subjective: Patient continues to state that he is feeling weak but denies any other complaints.  Wife states that he continues to have dark bowel movements.  Current Code Status     Date Active Code Status Order ID Comments User Context       4/12/2021 1844 No CPR [id number]  Esther Drew APRN Inpatient     Advance Care Planning Activity      Questions for Current Code Status     Question Answer Comment    Code Status No CPR     Medical Interventions (Level of Support Prior to Arrest) Limited     Limited Support to NOT Include Artificial Nutrition      Dialysis      Cardioversion/Defibrillation      Intubation     Level Of Support Discussed With Patient         No current facility-administered medications on file prior to encounter.     Current Outpatient Medications on File Prior to Encounter   Medication Sig Dispense Refill   • acetaminophen (TYLENOL) 650 MG 8 hr tablet Take 650 mg by mouth Every 8 (Eight) Hours As Needed for Mild Pain .     • ASPIRIN LOW DOSE 81 MG tablet Take 1 tablet by mouth Daily. Last dose 10-21-18 (Patient taking differently: Take 81 mg by mouth Daily.)     • carvedilol (COREG) 12.5 MG tablet Take 1 tablet by mouth 2 (Two) Times a Day With Meals. 60 tablet 0   • cefTAZidime (FORTAZ) 1 g injection Infuse 2 g into a venous catheter 3 (Three) Times a Day. 100ml every 8 hours via PICC line     • citalopram (CeleXA) 20 MG tablet Take 40 mg by mouth Daily.     • doxazosin (CARDURA) 1 MG tablet Take  by mouth 2 (Two) Times a Day.     • ferrous gluconate (FERGON) 324 MG tablet Take 1 tablet by mouth Daily With Breakfast. (Patient taking differently: Take 65 mg by mouth Daily.) 30 tablet 0   • furosemide (LASIX) 20 MG tablet 40 mg 2 (two) times a day.     • Hydrocortisone, Perianal, (ANUSOL-HC) 2.5 % rectal cream Insert 1 application into the rectum 2 (Two) Times a Day.     • lactulose (CHRONULAC) 10 GM/15ML solution Take 15 mL by  "mouth 3 (Three) Times a Day for goal of 3 BMs daily 1892 mL 3   • melatonin 5 MG tablet tablet Take 5 mg by mouth Every Night.     • Multiple Vitamin (MULTI-VITAMIN DAILY) tablet Take 1 tablet by mouth Daily.     • O2 (OXYGEN)      • Ondansetron 4 MG film 3 (Three) Times a Day As Needed.     • pantoprazole (PROTONIX) 40 MG EC tablet Take 40 mg by mouth 2 (Two) Times a Day.     • saccharomyces boulardii (FLORASTOR) 250 MG capsule Take 250 mg by mouth 2 (Two) Times a Day.     • tamsulosin (FLOMAX) 0.4 MG capsule 24 hr capsule Take 1 capsule by mouth Daily. 15 capsule 0   • Trelegy Ellipta 100-62.5-25 MCG/INH aerosol powder  INHALE 1 PUFF ONCE DAILY 60 each 0   • Xifaxan 550 MG tablet 2 (Two) Times a Day.       lactated ringers, 9 mL/hr, Last Rate: 9 mL/hr (04/14/21 0951)  octreotide (SandoSTATIN) infusion, 50 mcg/hr, Last Rate: 50 mcg/hr (04/15/21 1025)      •  acetaminophen **OR** acetaminophen **OR** acetaminophen  •  benzonatate  •  senna-docusate sodium **AND** polyethylene glycol **AND** bisacodyl **AND** bisacodyl  •  calcium carbonate  •  guaiFENesin-dextromethorphan  •  ipratropium-albuterol  •  magnesium sulfate **OR** magnesium sulfate **OR** magnesium sulfate  •  ondansetron **OR** ondansetron  •  phenol  •  potassium chloride **OR** potassium chloride **OR** potassium chloride  •  sodium chloride  •  sodium chloride    Objective: /68   Pulse 59   Temp 98 °F (36.7 °C) (Oral)   Resp 16   Ht 190.5 cm (75\")   Wt 124 kg (274 lb 3.2 oz)   SpO2 92%   BMI 34.27 kg/m²      Intake/Output Summary (Last 24 hours) at 4/15/2021 1034  Last data filed at 4/15/2021 0900  Gross per 24 hour   Intake 1003 ml   Output 3110 ml   Net -2107 ml     Physical Exam:      General Appearance:    Alert, cooperative, in no acute distress   Head:    Normocephalic, without obvious abnormality, atraumatic   Eyes:            Lids and lashes normal, conjunctivae and sclerae normal, no   icterus, no pallor, corneas clear, PERRLA "   Ears:    Ears appear intact with no abnormalities noted   Throat:   No oral lesions, no thrush, oral mucosa moist   Neck:   No adenopathy, supple, trachea midline, no thyromegaly, no   carotid bruit, no JVD   Back:     No kyphosis present, no scoliosis present, no skin lesions,      erythema or scars, no tenderness to percussion or                   palpation,   range of motion normal   Lungs:     Clear to auscultation,respirations regular, even and                  unlabored    Heart:    Regular rhythm and normal rate, normal S1 and S2, no            murmur, no gallop, no rub, no click   Chest Wall:    No abnormalities observed   Abdomen:     Normal bowel sounds, no masses, no organomegaly, soft        non-tender, non-distended, no guarding, no rebound                tenderness   Rectal:     Deferred   Extremities:   Moves all extremities well, no edema, no cyanosis, no             redness   Pulses:   Pulses palpable and equal bilaterally   Skin:   No bleeding, bruising or rash   Lymph nodes:   No palpable adenopathy   Neurologic:   Cranial nerves 2 - 12 grossly intact, sensation intact, DTR       present and equal bilaterally     Results from last 7 days   Lab Units 04/15/21  0422   WBC 10*3/mm3 4.05   HEMOGLOBIN g/dL 7.1*   HEMATOCRIT % 23.1*   PLATELETS 10*3/mm3 73*     Results from last 7 days   Lab Units 04/15/21  0422   SODIUM mmol/L 134*   POTASSIUM mmol/L 3.6   CHLORIDE mmol/L 96*   CO2 mmol/L 30.0*   BUN mg/dL 19   CREATININE mg/dL 1.13   CALCIUM mg/dL 7.7*   BILIRUBIN mg/dL 1.1   ALK PHOS U/L 113   ALT (SGPT) U/L 33   AST (SGOT) U/L 73*   GLUCOSE mg/dL 103*       Impression: Anemia  Debility  Cirrhosis  Infected aortic graft  Plan: Talk to the patient as well the patient's wife is at bedside.  We discussed continued current plan of care versus a comfort/hospice plan of care.  Wife would like to have a family meeting tomorrow with the patient's 2 children as well.  Did discuss with the hospitalist.  We  will plan on meeting the family around 1030.        Sedrick Ayon,   04/15/21  10:34 EDT

## 2021-04-15 NOTE — PROGRESS NOTES
[facility]       LOS: 2 days   Patient Care Team:  Raudel Zheng MD as PCP - General  Joo Franklin MD as Consulting Physician (Orthopedic Surgery)  Leopoldo Burleson MD as Surgeon (Cardiothoracic Surgery)  Douglas Galvez MD as Consulting Physician (Cardiology)  Davis Ford MD as Consulting Physician (Urology)        Subjective       Patient Denies:  Cp, sob, palpitations.      Vital Signs  Temp:  [97.8 °F (36.6 °C)-98.5 °F (36.9 °C)] 97.9 °F (36.6 °C)  Heart Rate:  [57-66] 60  Resp:  [12-22] 20  BP: (104-153)/(56-91) 137/66    Intake/Output Summary (Last 24 hours) at 4/15/2021 0835  Last data filed at 4/15/2021 0200  Gross per 24 hour   Intake 733 ml   Output 3260 ml   Net -2527 ml     No intake/output data recorded.    Physical Exam:     General Appearance:    Alert, cooperative, in no acute distress       Neck:   No adenopathy, supple, trachea midline, no thyromegaly, no JVD   Lungs:     Clear to auscultation,respirations regular, even and                unlabored    Heart:    Regular rhythm and normal rate, normal S1 and S2, no         murmur, no gallop, no rub, no click   Chest Wall:    No abnormalities observed   Abdomen:     Normal bowel sounds, no masses, no organomegaly, soft     nontender, nondistended   Extremities:   Moves all extremities well, 2+ edema, no cyanosis, no           redness   Pulses:   Pulses palpable and equal bilaterally     Results Review:     I reviewed the patient's new clinical results.      WBC WBC   Date/Time Value Ref Range Status   04/15/2021 0422 4.05 3.40 - 10.80 10*3/mm3 Final   04/14/2021 0804 3.38 (L) 3.40 - 10.80 10*3/mm3 Final   04/13/2021 0928 4.32 3.40 - 10.80 10*3/mm3 Final   04/12/2021 1852 4.88 3.40 - 10.80 10*3/mm3 Final            HGB Hemoglobin   Date/Time Value Ref Range Status   04/15/2021 0422 7.1 (L) 13.0 - 17.7 g/dL Final   04/15/2021 0012 7.0 (L) 13.0 - 17.7 g/dL Final    04/14/2021 1753 8.0 (L) 13.0 - 17.7 g/dL Final   04/14/2021 1213 7.5 (L) 13.0 - 17.7 g/dL Final   04/14/2021 0804 7.5 (L) 13.0 - 17.7 g/dL Final   04/14/2021 0031 6.8 (C) 13.0 - 17.7 g/dL Final     Comment:     Confirmed/called   04/13/2021 1608 7.2 (L) 13.0 - 17.7 g/dL Final   04/13/2021 0928 7.0 (L) 13.0 - 17.7 g/dL Final   04/13/2021 0029 5.9 (C) 13.0 - 17.7 g/dL Final   04/12/2021 1852 5.6 (C) 13.0 - 17.7 g/dL Final           HCT Hematocrit   Date/Time Value Ref Range Status   04/15/2021 0422 23.1 (L) 37.5 - 51.0 % Final   04/15/2021 0012 23.6 (L) 37.5 - 51.0 % Final   04/14/2021 1753 26.3 (L) 37.5 - 51.0 % Final   04/14/2021 1213 24.4 (L) 37.5 - 51.0 % Final   04/14/2021 0804 24.8 (L) 37.5 - 51.0 % Final   04/14/2021 0031 22.5 (L) 37.5 - 51.0 % Final   04/13/2021 1608 23.2 (L) 37.5 - 51.0 % Final   04/13/2021 0928 22.7 (L) 37.5 - 51.0 % Final   04/13/2021 0029 19.1 (C) 37.5 - 51.0 % Final   04/12/2021 1852 17.9 (C) 37.5 - 51.0 % Final     Comment:     **Verified by repeat analysis**   04/12/2021 1852 18.7 (C) 37.5 - 51.0 % Final            Platelets Platelets   Date/Time Value Ref Range Status   04/15/2021 0422 73 (L) 140 - 450 10*3/mm3 Final   04/14/2021 0804 70 (L) 140 - 450 10*3/mm3 Final   04/13/2021 0928 77 (L) 140 - 450 10*3/mm3 Final   04/12/2021 1852 87 (L) 140 - 450 10*3/mm3 Final     Sodium  Sodium   Date/Time Value Ref Range Status   04/15/2021 0422 134 (L) 136 - 145 mmol/L Final   04/14/2021 0804 133 (L) 136 - 145 mmol/L Final   04/13/2021 0928 128 (L) 136 - 145 mmol/L Final   04/13/2021 0209 127 (L) 136 - 145 mmol/L Final   04/12/2021 1852 127 (L) 136 - 145 mmol/L Final     Potassium  Potassium   Date/Time Value Ref Range Status   04/15/2021 0422 3.6 3.5 - 5.2 mmol/L Final   04/14/2021 0804 3.8 3.5 - 5.2 mmol/L Final   04/13/2021 0928 3.6 3.5 - 5.2 mmol/L Final   04/12/2021 1852 3.8 3.5 - 5.2 mmol/L Final     Chloride  Chloride   Date/Time Value Ref Range Status   04/15/2021 0422 96 (L) 98 - 107  mmol/L Final   04/14/2021 0804 95 (L) 98 - 107 mmol/L Final   04/13/2021 0928 94 (L) 98 - 107 mmol/L Final   04/12/2021 1852 92 (L) 98 - 107 mmol/L Final     BicarbonateNo results found for: PLASMABICARB    BUN BUN   Date/Time Value Ref Range Status   04/15/2021 0422 19 8 - 23 mg/dL Final   04/14/2021 0804 20 8 - 23 mg/dL Final   04/13/2021 0928 20 8 - 23 mg/dL Final   04/12/2021 1852 20 8 - 23 mg/dL Final      Creatinine Creatinine   Date/Time Value Ref Range Status   04/15/2021 0422 1.13 0.76 - 1.27 mg/dL Final   04/14/2021 0804 1.10 0.76 - 1.27 mg/dL Final   04/13/2021 0928 0.94 0.76 - 1.27 mg/dL Final   04/12/2021 1852 0.97 0.76 - 1.27 mg/dL Final      Calcium Calcium   Date/Time Value Ref Range Status   04/15/2021 0422 7.7 (L) 8.6 - 10.5 mg/dL Final   04/14/2021 0804 8.2 (L) 8.6 - 10.5 mg/dL Final   04/13/2021 0928 8.6 8.6 - 10.5 mg/dL Final   04/12/2021 1852 8.7 8.6 - 10.5 mg/dL Final      Mag @RESULFAST(MG:3)@        PT/INR:       Lab Results   Component Value Date    PROTIME 19.0 (H) 04/15/2021    PROTIME 19.0 (H) 04/14/2021    PROTIME 19.0 (H) 04/13/2021    PROTIME 19.7 (H) 04/12/2021    PROTIME 18.1 (H) 03/17/2021    PROTIME 20.6 (H) 03/12/2021    PROTIME 22.0 (H) 03/11/2021    INR 1.66 (H) 04/15/2021    INR 1.67 (H) 04/14/2021    INR 1.66 (H) 04/13/2021    INR 1.75 (H) 04/12/2021    INR 1.53 (H) 03/17/2021    INR 1.80 (H) 03/12/2021    INR 1.96 (H) 03/11/2021      Troponin I:  No results found for: TROPONINI   Lab Results   Component Value Date    CKTOTAL 362 (H) 07/26/2019    TROPONINT <0.010 03/10/2021       budesonide-formoterol, 2 puff, Inhalation, BID - RT   And  ipratropium, 0.5 mg, Nebulization, 4x Daily - RT  carvedilol, 12.5 mg, Oral, Q12H  cefTAZidime 2 g in 100 mL NS MBP, 2 g, Intravenous, Q8H  citalopram, 40 mg, Oral, Daily  ferrous sulfate, 325 mg, Oral, Daily With Breakfast  furosemide, 40 mg, Oral, BID  lactulose, 15 mL, Oral, TID  melatonin, 5 mg, Oral, Nightly  multivitamin, 1 tablet,  Oral, Daily  pantoprazole, 40 mg, Intravenous, BID AC  riFAXIMin, 550 mg, Oral, Q12H  saccharomyces boulardii, 250 mg, Oral, BID  senna-docusate sodium, 2 tablet, Oral, BID  sodium chloride, 10 mL, Intravenous, Q12H  sodium chloride, 10 mL, Intravenous, Q12H  tamsulosin, 0.4 mg, Oral, Daily  terazosin, 2 mg, Oral, Nightly      lactated ringers, 9 mL/hr, Last Rate: 9 mL/hr (04/14/21 0951)  octreotide (SandoSTATIN) infusion, 50 mcg/hr, Last Rate: 50 mcg/hr (04/14/21 1132)        Assessment/Plan     Patient Active Problem List   Diagnosis Code   • Anxiety F41.9   • Abdominal aortic aneurysm (AAA) without rupture (CMS/HCC) I71.4   • Depression F32.9   • Hypertension I10   • Cataract, bilateral H26.9   • CAD (coronary artery disease) I25.10   • Abnormal stress test R94.39   • Moderate COPD J44.9   • Former smoker Z87.891   • Coal workers pneumoconiosis, simple J60   • Back pain M54.9   • S/P lumbar fusion Z98.1   • Prediabetes R73.03   • Acute blood loss anemia, mild, asymptomatic D62   • Acute postoperative pain G89.18   • Hyponatremia, mild E87.1   • Chronic respiratory failure with hypoxia and hypercapnia (CMS/HCC) J96.11, J96.12   • Renal insufficiency N28.9   • Acute cystitis without hematuria N30.00   • Acute UTI N39.0   • Bacteremia R78.81   • Symptomatic anemia D64.9   • 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   • Elevated C-reactive protein (CRP) R79.82   • Cirrhosis of liver (CMS/HCC) K74.60   • High anion gap metabolic acidosis E87.2   • S/P AAA repair Z98.890, Z86.79   • GERD (gastroesophageal reflux disease) K21.9   • Person under investigation for COVID-19 Z20.822   • On supplemental oxygen by nasal cannula Z78.9   • 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

## 2021-04-15 NOTE — PROGRESS NOTES
[facility] Medicine Services  PROGRESS NOTE    Patient Name: Derek Kelsey  : 1950  MRN: [id number]    Date of Admission: 2021  Primary Care Physician: Raudel Zheng MD    Subjective   Subjective     CC:  Anemia, cirrhosis, melena, respiratory failure, chronically infected endograft    HPI:  Dark bm's persist. Received blood x 1 unit early this morning w/ lasix and good urination. Respiratory status improved overall. No pain. + fatigue  ROS:  No palpitations. No chest pain  Gen- No fevers, chills  CV- No chest pain, palpitations  Resp- No cough, otherwise as above  GI- No N/V, no abd pain, dark stools as above      Objective   Objective     Vital Signs:   Temp:  [97.9 °F (36.6 °C)-98.7 °F (37.1 °C)] 98.5 °F (36.9 °C)  Heart Rate:  [57-83] 83  Resp:  [16-20] 18  BP: (104-141)/(49-99) 134/49        Physical Exam:  Constitutional:Alert, oriented to person, place, time. Answers most questions appropriately. Elderly & frail but currently nontoxic appearing. No distress  Psych:Normal/appropriate affect  HEENT:Ncat, oroph clear, nasal canula in place  Neck: neck supple, full range of motion  Neuro: Face symmetric, speech clear, equal , moves all extremities  Cardiac: irr irr, regular rate; 2+ BLE edema  Resp: Ctab, normal effort  GI: abd soft, nontender, mild distended, obese  Skin: No extremity rash  Musculoskeletal/extremities: no cyanosis extremities          Results Reviewed:  Results from last 7 days   Lab Units 04/15/21  0422 04/15/21  0012 21  1753 21  0804 21  0928   WBC 10*3/mm3 4.05  --   --  3.38* 4.32   HEMOGLOBIN g/dL 7.1* 7.0* 8.0* 7.5* 7.0*   HEMATOCRIT % 23.1* 23.6* 26.3* 24.8* 22.7*   PLATELETS 10*3/mm3 73*  --   --  70* 77*   INR  1.66*  --   --  1.67* 1.66*   PROCALCITONIN ng/mL  --   --   --   --  0.16     Results from last 7 days   Lab Units 04/15/21  0422 21  0804 21  0928 21  1852   SODIUM mmol/L 134* 133* 128*  127*   POTASSIUM mmol/L 3.6 3.8 3.6 3.8   CHLORIDE mmol/L 96* 95* 94* 92*   CO2 mmol/L 30.0* 30.0* 24.0 25.0   BUN mg/dL 19 20 20 20   CREATININE mg/dL 1.13 1.10 0.94 0.97   GLUCOSE mg/dL 103* 117* 106* 101*   CALCIUM mg/dL 7.7* 8.2* 8.6 8.7   ALT (SGPT) U/L 33 37  --  40   AST (SGOT) U/L 73* 81*  --  85*   PROBNP pg/mL  --   --   --  391.4     Estimated Creatinine Clearance: 88.5 mL/min (by C-G formula based on SCr of 1.13 mg/dL).    Microbiology Results Abnormal     Procedure Component Value - Date/Time    Respiratory Culture - Sputum, Cough [[id number]] Collected: 04/14/21 1201    Lab Status: Preliminary result Specimen: Sputum from Cough Updated: 04/15/21 1041     Respiratory Culture Moderate growth (3+) Normal Respiratory Abby: NO S.aureus/MRSA or Pseudomonas aeruginosa     Gram Stain Many (4+) Epithelial cells per low power field      Few (2+) WBCs per low power field      Moderate (3+) Mixed bacterial morphotypes seen on Gram Stain      Few (2+) Yeast    Legionella Antigen, Urine - Urine, Urine, Clean Catch [[id number]]  (Normal) Collected: 04/13/21 0726    Lab Status: Final result Specimen: Urine, Clean Catch Updated: 04/13/21 1341     LEGIONELLA ANTIGEN, URINE Negative    S. Pneumo Ag Urine or CSF - Urine, Urine, Clean Catch [[id number]]  (Normal) Collected: 04/12/21 2325    Lab Status: Final result Specimen: Urine, Clean Catch Updated: 04/13/21 1021     Strep Pneumo Ag Negative    Eosinophil Smear - Urine, Urine, Clean Catch [[id number]]  (Normal) Collected: 04/12/21 2325    Lab Status: Final result Specimen: Urine, Clean Catch Updated: 04/13/21 0054     Eosinophil Smear 0 % EOS/100 Cells     Narrative:      No eosinophil seen          Imaging Results (Last 24 Hours)     ** No results found for the last 24 hours. **          Results for orders placed during the hospital encounter of 03/10/21    Adult Transthoracic Echo Complete W/ Cont if Necessary Per Protocol    Interpretation Summary  · Estimated right  ventricular systolic pressure from tricuspid regurgitation is normal (<35 mmHg).  · Estimated left ventricular EF = 65% Left ventricular systolic function is normal.      I have reviewed the medications:  Scheduled Meds:budesonide-formoterol, 2 puff, Inhalation, BID - RT   And  ipratropium, 0.5 mg, Nebulization, 4x Daily - RT  carvedilol, 12.5 mg, Oral, Q12H  [START ON 4/16/2021] cefTAZidime 2 g in 100 mL NS MBP, 2 g, Intravenous, Q12H  citalopram, 40 mg, Oral, Daily  ferrous sulfate, 325 mg, Oral, Daily With Breakfast  furosemide, 40 mg, Oral, BID  lactulose, 15 mL, Oral, TID  melatonin, 5 mg, Oral, Nightly  multivitamin, 1 tablet, Oral, Daily  pantoprazole, 40 mg, Intravenous, BID AC  phytonadione (VITAMIN K) IVPB, 10 mg, Intravenous, Once  riFAXIMin, 550 mg, Oral, Q12H  saccharomyces boulardii, 250 mg, Oral, BID  senna-docusate sodium, 2 tablet, Oral, BID  sodium chloride, 10 mL, Intravenous, Q12H  sodium chloride, 10 mL, Intravenous, Q12H  tamsulosin, 0.4 mg, Oral, Daily  terazosin, 2 mg, Oral, Nightly      Continuous Infusions:octreotide (SandoSTATIN) infusion, 50 mcg/hr, Last Rate: 50 mcg/hr (04/15/21 1025)      PRN Meds:.•  acetaminophen **OR** acetaminophen **OR** acetaminophen  •  benzonatate  •  senna-docusate sodium **AND** polyethylene glycol **AND** bisacodyl **AND** bisacodyl  •  calcium carbonate  •  guaiFENesin-dextromethorphan  •  ipratropium-albuterol  •  magnesium sulfate **OR** magnesium sulfate **OR** magnesium sulfate  •  ondansetron **OR** ondansetron  •  phenol  •  potassium chloride **OR** potassium chloride **OR** potassium chloride  •  sodium chloride  •  sodium chloride    Assessment/Plan   Assessment & Plan     Active Hospital Problems    Diagnosis  POA   • **Symptomatic anemia [D64.9]  Yes   • Anemia [D64.9]  Yes   • Gastrointestinal hemorrhage with melena [K92.1]  Unknown   • 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]  Yes   • On supplemental oxygen by nasal cannula [Z78.9]  Yes  "  • Cirrhosis of liver (CMS/HCC) [K74.60]  Yes   • Moderate COPD [J44.9]  Yes   • Depression [F32.9]  Yes   • Hypertension [I10]  Yes   • CAD (coronary artery disease) [I25.10]  Yes      Resolved Hospital Problems   No resolved problems to display.        Brief Hospital Course to date:  Derek Kelsey is a 70 y.o. male w/ cirrhosis, chronically infected aaa endograft (on fortaz followed by Dr. Carrero), afib, dhf, copd/ILD w/ 3-4L chronic oxygen dependence, also w/ hx of covid + pcr on 3/10/21 (also got 2nd covid 19 vaccination shot on 3/23/21) who presented w/ dyspnea on exertion, fatigue. Apparently had appointment at [facility] mid April to consider \"options\" for his chronically infected endograft. Noted had hgb 5. Has had melena recently and some mild increased confusion. Ct revealed no PE but bilateral effusions. Transfused 2 unit prbc w/ bumex. Gi, cards, ID consulted.     *Acute GI bleed, likely upper (melena)  *Decompensated cirrhosis (w/ coagulopathy, thrombocytopenia, hepatic encephalopathy)   -4/14/21: egd w/ portal gasropathy w/ oozing (varices noted but no overt bleeding; -bid ppi, octreotide   -GI planning c-scope tentatively 4/16/21   -q6h H&H, transfuse prn   -received 4th & 5th units 4/15/21 w/ bumex 2mg iv   -lactulose & rifaxamin; improved mentation  *Acute on chronic hypoxic resp failure (chronic 3L)  *Hx \"black lung\"/fibrotic lung dz  *A/C DHF/Volume overload  *hx COPD   -currently on 4Lnc (recently 3Lnc, chronically 2Lnc as outpatient)   -continue lasix 40mg po bid w/ prn iv diuresis   -echo w/ preserved EF; cards following  *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 Carrero   -apparently has appointment @ [facility] next week re: options; however patient now declining this and accepts that this means he will have recurrent/persistent infection and likely sepsis   -palliative following; appreciate further assistance in future " goals/plans; perhaps hospice in order if/when clinically deteriorates  *Afib (not anticoagulated)   -Dr. Gardner following, on coreg; no a/c due to gi bleeding/cirrhosis   -currently rate controlled  *hx covid 19 pcr + (on 3/10/21)   -also had covid vacinnation x 2 shots (2nd shot 3/23/21)   -no isolatoin    Plan:  -2 unit prbc today (4th & 5th units) w/ bumex 2mg today  -continue ppi & octreotide for now  -q6h H&H, transfuse prn  -gi planning tentatively for c-scope tomorrow since still bleeding. egd yesterday w/ gastric oozing but no overt cause for ongoing melena.   -continue fortaz per ID.   -There is no cure for this aaa graft infection without surgical excision (which would be very high risk given comorbidities) and patient not interested in going to his appointment at [facility] for evlauation for this. Consequently, palliative following and assisting in goals/limits (already dnr/dni, no heroics). Patient ok w/ c-scope to see if can stop ongoing bleeding, but he does understand his poor prognosis overall and will revisit tomorrow. Meeting w/ palliative & myself tentatively planned tomorrow at 10:30. Hospice may be appropriate. 45 min spent on care, >50% counseling w/ patient, wife, son at bedside. I also spoke w/ gi and palliative care    Labs: q6h H&H; cbc,bmp,inr,mag in a.m.    DVT Prophylaxis:  mechanical      Disposition: I expect the patient to be discharged TBD, once stable    CODE STATUS:   Code Status and Medical Interventions:   Ordered at: 04/12/21 3014     Limited Support to NOT Include:    Artificial Nutrition    Dialysis    Cardioversion/Defibrillation    Intubation     Level Of Support Discussed With:    Patient     Code Status:    No CPR     Medical Interventions (Level of Support Prior to Arrest):    Limited       Rinku Mcrae MD  04/15/21

## 2021-04-15 NOTE — PROGRESS NOTES
INFECTIOUS DISEASE CONSULT/INITIAL HOSPITAL VISIT    Derek Kelsey  1950  [id number]    Date of Consult: 4/15/2021    Admission Date: 4/12/2021      Requesting Provider: Tammi Lomax MD  Evaluating Physician: Davis Carrero MD    Reason for Consultation: anemia, pseudomonas infection    History of present illness:    Patient is a 70 y.o. male  with a medical history significant for coronary artery disease, diastolic heart failure, A.fib (not on AC), hypertension, cirrhosis, chronic respiratory failure (2-3L @ home), COPD, and an infected AAA endograft.  Recent positive blood cultures for Cipro and Levaquin resistant Pseudomonas aeruginosa led to treatment with IV ceftazidime as an outpatient.    Patient has had worsening anemia fatigue and shortness of breath during telemedicine visit yesterday.    Patient has a hemoglobin now below 7 which I recommended patient be direct admitted to the hospital.    Because the endograft infection cannot be cured with IV antibiotics patient is referred to [facility] which has an appointment for patient this Monday.    Patient has had worsening swelling of face arms and legs and has recently had increased dose of his Lasix.      Patient denies fevers or chills;    Reports feeling very tired.    reports that he does not want endograft removal  at this time    4/14/21; no events overnight; afebrile, no fever, rash, sore throat  4/15/21; patient made DNR, family meeting tomorrow; no fever, rash, sore throat  Past Medical History:   Diagnosis Date   • Abdominal aortic aneurysm (CMS/HCC) 9/29/2016   • Anxiety 9/29/2016   • Arthritis    • Back pain    • Black lung (CMS/[facility])    • CAD (coronary artery disease) 9/29/2016   • Cirrhosis (CMS/[facility])    • COPD (chronic obstructive pulmonary disease) (CMS/[facility]) 9/29/2016   • Depression 9/29/2016   • Depression    • GERD (gastroesophageal reflux disease)    • Hypertension 9/29/2016   • On supplemental oxygen by nasal cannula      at night   • Vitiligo    • Wears dentures    • Wears reading eyeglasses        Past Surgical History:   Procedure Laterality Date   • ABDOMINAL AORTIC ANEURYSM REPAIR WITH ENDOGRAFT N/A 6/6/2019    Procedure: ABDOMINAL AORTIC ANEURYSM REPAIR WITH ENDOGRAFT;  Surgeon: Leopoldo Burleson MD;  Location:  [facility]BR[facility];  Service: Vascular   • BACK SURGERY     • CARDIAC CATHETERIZATION     • CARDIAC CATHETERIZATION N/A 9/28/2018    Procedure: Left Heart Cath;  Surgeon: Douglas Galvez MD;  Location:  [facility]ATH INVASIVE LOCATION;  Service: Cardiovascular   • CARDIAC SURGERY     • COLONOSCOPY      2015   • COLONOSCOPY N/A 10/30/2019    Procedure: COLONOSCOPY;  Surgeon: Homar Viveros MD;  Location:  [facility];  Service: Gastroenterology   • ENDOSCOPY N/A 10/30/2019    Procedure: ESOPHAGOGASTRODUODENOSCOPY;  Surgeon: Homar Viveros MD;  Location:  [facility];  Service: Gastroenterology   • ENDOSCOPY N/A 4/14/2021    Procedure: ESOPHAGOGASTRODUODENOSCOPY;  Surgeon: Eddie Benton MD;  Location:  [facility];  Service: Gastroenterology;  Laterality: N/A;   • EYE SURGERY      cataracts bilateral   • HAND SURGERY Left    • LUMBAR DISCECTOMY FUSION INSTRUMENTATION N/A 11/1/2018    Procedure: LUMBAR DISCECTOMY POSTERIOR WITH FUSION INSTRUMENTATION;  Surgeon: Joo Franklin MD;  Location:  [facility] OR;  Service: Orthopedic Spine   • LUMBAR DISCECTOMY FUSION INSTRUMENTATION N/A 7/24/2019    Procedure: POSTERIOR LUMBAR SPINAL FUSION REVISION AND INSTRUMENTATION L3,L4,L5,S1;  Surgeon: Joo Franklin MD;  Location:  [facility] OR;  Service: Orthopedic Spine   • ORIF FINGER / THUMB FRACTURE     • SHOULDER SURGERY Left        Family History   Problem Relation Age of Onset   • Stroke Mother    • Hypertension Mother    • Heart disease Mother    • Heart disease Father    • Hypertension Father    • Diabetes Sister    • Hypertension Sister    • Heart disease Sister    • Diabetes Brother    • Heart  disease Brother    • Hypertension Child    • Hypertension Son    • No Known Problems Son    • Diabetes Sister    • Heart disease Sister        Social History     Socioeconomic History   • Marital status:      Spouse name: Not on file   • Number of children: 2   • Years of education: Not on file   • Highest education level: Not on file   Tobacco Use   • Smoking status: Former Smoker     Packs/day: 1.00     Years: 30.00     Pack years: 30.00     Types: Cigarettes     Quit date: 1/1/2011     Years since quitting: 10.2   • Smokeless tobacco: Former User     Types: Chew     Quit date: 4/7/2011   Substance and Sexual Activity   • Alcohol use: Not Currently   • Drug use: No   • Sexual activity: Defer     Comment:  and lives with wife       Allergies   Allergen Reactions   • Penicillins Hives     Hives - has tolerated Zosyn and ceftriaxone 09/2019   • Percocet [Oxycodone-Acetaminophen] Confusion         Medication:    Current Facility-Administered Medications:   •  acetaminophen (TYLENOL) tablet 650 mg, 650 mg, Oral, Q4H PRN **OR** acetaminophen (TYLENOL) 160 MG/5ML solution 650 mg, 650 mg, Oral, Q4H PRN **OR** acetaminophen (TYLENOL) suppository 650 mg, 650 mg, Rectal, Q4H PRN, Eddie Benton MD  •  benzonatate (TESSALON) capsule 200 mg, 200 mg, Oral, TID PRN, Rinku Mcrae MD  •  sennosides-docusate (PERICOLACE) 8.6-50 MG per tablet 2 tablet, 2 tablet, Oral, BID, 2 tablet at 04/15/21 0811 **AND** polyethylene glycol (MIRALAX) packet 17 g, 17 g, Oral, Daily PRN **AND** bisacodyl (DULCOLAX) EC tablet 5 mg, 5 mg, Oral, Daily PRN **AND** bisacodyl (DULCOLAX) suppository 10 mg, 10 mg, Rectal, Daily PRN, Eddie Benton MD  •  budesonide-formoterol (SYMBICORT) 160-4.5 MCG/ACT inhaler 2 puff, 2 puff, Inhalation, BID - RT, 2 puff at 04/15/21 1000 **AND** ipratropium (ATROVENT) nebulizer solution 0.5 mg, 0.5 mg, Nebulization, 4x Daily - RT, Eddie Benton MD, 0.5 mg at 04/15/21 1347  •  calcium carbonate  (TUMS) chewable tablet 500 mg (200 mg elemental), 2 tablet, Oral, BID PRN, Eddie Benton MD  •  carvedilol (COREG) tablet 12.5 mg, 12.5 mg, Oral, Q12H, Eddie Benton MD, 12.5 mg at 04/15/21 0811  •  cefTAZidime 2 g in 100 mL NS MBP, 2 g, Intravenous, Q8H, Eddie Benton MD, Last Rate: 200 mL/hr at 04/15/21 1352, 2 g at 04/15/21 1352  •  citalopram (CeleXA) tablet 40 mg, 40 mg, Oral, Daily, Eddie Benton MD, 40 mg at 04/15/21 0811  •  ferrous sulfate tablet 325 mg, 325 mg, Oral, Daily With Breakfast, Eddie Benton MD, 325 mg at 04/15/21 0810  •  furosemide (LASIX) tablet 40 mg, 40 mg, Oral, BID, Rinku Mcrae MD, 40 mg at 04/15/21 0810  •  guaiFENesin-dextromethorphan (ROBITUSSIN DM) 100-10 MG/5ML syrup 5 mL, 5 mL, Oral, Q6H PRN, Rinku Mcrae MD, 5 mL at 04/14/21 2117  •  ipratropium-albuterol (DUO-NEB) nebulizer solution 3 mL, 3 mL, Nebulization, Q4H PRN, Eddie Benton MD  •  lactated ringers infusion, 9 mL/hr, Intravenous, Continuous, Eddie Benton MD, Last Rate: 9 mL/hr at 04/14/21 0951, Restarted at 04/14/21 1011  •  lactulose (CHRONULAC) 10 GM/15ML solution 15 mL, 15 mL, Oral, TID, Eddie Benton MD, 15 mL at 04/15/21 0810  •  Magnesium Sulfate 2 gram Bolus, followed by 8 gram infusion (total Mg dose 10 grams)- Mg less than or equal to 1mg/dL, 2 g, Intravenous, PRN **OR** Magnesium Sulfate 2 gram / 50mL Infusion (GIVE X 3 BAGS TO EQUAL 6GM TOTAL DOSE) - Mg 1.1 - 1.5 mg/dl, 2 g, Intravenous, PRN **OR** Magnesium Sulfate 4 gram infusion- Mg 1.6-1.9 mg/dL, 4 g, Intravenous, PRN, Eddie Benton MD, Last Rate: 25 mL/hr at 04/14/21 1132, 4 g at 04/14/21 1132  •  melatonin tablet 5 mg, 5 mg, Oral, Nightly, Eddie Benton MD, 5 mg at 04/14/21 2118  •  multivitamin (THERAGRAN) tablet 1 tablet, 1 tablet, Oral, Daily, Eddie Benton MD, 1 tablet at 04/15/21 0811  •  octreotide (sandoSTATIN) 500 mcg in sodium chloride 0.9 % 100 mL (5 mcg/mL) infusion, 50 mcg/hr, Intravenous,  Continuous, Eddie Benton MD, Last Rate: 10 mL/hr at 04/15/21 1025, 50 mcg/hr at 04/15/21 1025  •  ondansetron (ZOFRAN) tablet 4 mg, 4 mg, Oral, Q6H PRN **OR** ondansetron (ZOFRAN) injection 4 mg, 4 mg, Intravenous, Q6H PRN, Eddie Benton MD  •  pantoprazole (PROTONIX) injection 40 mg, 40 mg, Intravenous, BID AC, Eddie Benton MD, 40 mg at 04/15/21 0631  •  phenol (CHLORASEPTIC) 1.4 % liquid 1 spray, 1 spray, Mouth/Throat, Q2H PRN, Eddie Benton MD, 1 spray at 04/14/21 0313  •  potassium chloride (MICRO-K) CR capsule 40 mEq, 40 mEq, Oral, PRN, 40 mEq at 04/15/21 1210 **OR** potassium chloride (KLOR-CON) packet 40 mEq, 40 mEq, Oral, PRN **OR** potassium chloride 10 mEq in 100 mL IVPB, 10 mEq, Intravenous, Q1H PRN, Eddie Benton MD  •  riFAXIMin (XIFAXAN) tablet 550 mg, 550 mg, Oral, Q12H, Eddie Benton MD, 550 mg at 04/15/21 0810  •  saccharomyces boulardii (FLORASTOR) capsule 250 mg, 250 mg, Oral, BID, Eddie Benton MD, 250 mg at 04/15/21 0811  •  sodium chloride 0.9 % flush 10 mL, 10 mL, Intravenous, Q12H, Eddie Benton MD, 10 mL at 04/14/21 2118  •  sodium chloride 0.9 % flush 10 mL, 10 mL, Intravenous, PRN, Eddie Benton MD  •  sodium chloride 0.9 % flush 10 mL, 10 mL, Intravenous, Q12H, Eddie Benton MD, 10 mL at 04/15/21 0810  •  sodium chloride 0.9 % flush 10 mL, 10 mL, Intravenous, PRN, Eddie Benton MD, 10 mL at 04/13/21 1122  •  tamsulosin (FLOMAX) 24 hr capsule 0.4 mg, 0.4 mg, Oral, Daily, Eddie Benton MD, 0.4 mg at 04/15/21 0811  •  terazosin (HYTRIN) capsule 2 mg, 2 mg, Oral, Nightly, Eddie Benton MD, 2 mg at 04/14/21 1463    Antibiotics:  Anti-Infectives (From admission, onward)    Ordered     Dose/Rate Route Frequency Start Stop    04/12/21 8810  cefTAZidime 2 g in 100 mL NS [facility]     Irina Chan, PharmD reviewed the order on 04/13/21 8806.   Ordering Provider: Eddie Benton MD    2 g  200 mL/hr over 30 Minutes Intravenous Every 8 Hours Scheduled 04/12/21  2200 21 21521 1844  riFAXIMin (XIFAXAN) tablet 550 mg     Irina Chan, PharmD reviewed the order on 21 4279.   Ordering Provider: Eddie Benton MD    550 mg Oral Every 12 Hours Scheduled 21            Review of Systems:  See hpi    Physical Exam:   Vital Signs  Temp (24hrs), Av.1 °F (36.7 °C), Min:97.8 °F (36.6 °C), Max:98.7 °F (37.1 °C)    Temp  Min: 97.8 °F (36.6 °C)  Max: 98.7 °F (37.1 °C)  BP  Min: 104/56  Max: 141/65  Pulse  Min: 57  Max: 65  Resp  Min: 16  Max: 20  SpO2  Min: 89 %  Max: 95 %    GENERAL: Awake and alert, in no acute distress.   HEENT: Normocephalic, atraumatic.  PERRL. EOMI. No conjunctival injection.     HEART: RRR; No murmur  LUNGS: Clear to auscultation bilaterally  ABDOMEN: nontender  EXT: 2+ pedal edema    MSK:  No joint deromvity  SKIN: no rash  NEURO: Oriented to PPT.  PSYCHIATRIC: Normal insight and judgement. Cooperative with PE    Laboratory Data    Results from last 7 days   Lab Units 04/15/21  0422 04/15/21  0012 21  1753 21  0804 21  0928   WBC 10*3/mm3 4.05  --   --  3.38* 4.32   HEMOGLOBIN g/dL 7.1* 7.0* 8.0* 7.5* 7.0*   HEMATOCRIT % 23.1* 23.6* 26.3* 24.8* 22.7*   PLATELETS 10*3/mm3 73*  --   --  70* 77*     Results from last 7 days   Lab Units 04/15/21  042   SODIUM mmol/L 134*   POTASSIUM mmol/L 3.6   CHLORIDE mmol/L 96*   CO2 mmol/L 30.0*   BUN mg/dL 19   CREATININE mg/dL 1.13   GLUCOSE mg/dL 103*   CALCIUM mg/dL 7.7*     Results from last 7 days   Lab Units 04/15/21  0422   ALK PHOS U/L 113   BILIRUBIN mg/dL 1.1   ALT (SGPT) U/L 33   AST (SGOT) U/L 73*                         Estimated Creatinine Clearance: 88.5 mL/min (by C-G formula based on SCr of 1.13 mg/dL).      Microbiology:  No results found for: ACANTHNAEG, AFBCX, BPERTUSSISCX, BLOODCX  No results found for: BCIDPCR, CXREFLEX, CSFCX, CULTURETIS  No results found for: CULTURES, HSVCX, URCX  No results found for: EYECULTURE, GCCX,  HSVCULTURE, LABHSV  No results found for: LEGIONELLA, MRSACX, MUMPSCX, MYCOPLASCX  No results found for: NOCARDIACX, STOOLCX  No results found for: THROATCX, UNSTIMCULT, URINECX, CULTURE, VZVCULTUR  No results found for: VIRALCULTU, WOUNDCX        Radiology:  Imaging Results (Last 72 Hours)     Procedure Component Value Units Date/Time    CT Angiogram Chest With & Without Contrast [[id number]] Collected: 04/13/21 0217     Updated: 04/13/21 0219    Narrative:      CT CHEST WITH CONTRAST, PE PROTOCOL, 4/13/2021    HISTORY:  70-year-old male recently discharged from the hospital after treatment for AAA endograft infection with sepsis, Pseudomonas bacteremia. GI bleed. History of hepatic cirrhosis, chronic respiratory failure and diastolic heart failure. He is admitted to the  hospital today after low hemoglobin was noted by his primary provider. Symptoms include shortness of breath, leg swelling, black stools,    TECHNIQUE:  CT examination of the chest with IV contrast. CTA MIP multiplanar pulmonary artery images were reformatted with 3-D postprocessing. Radiation dose reduction techniques included automated exposure control. Radiation audit for CT and nuclear cardiology  exams in the last 12 months: 8.    COMPARISON:  *  CTA chest, 3/10/2021.    FINDINGS:  No pulmonary embolism is demonstrated. Thoracic aorta is normal in caliber with no aneurysm or dissection. Heart size is normal, and there is no pericardial effusion.    Multifocal airspace infiltrates are scattered throughout the central portions of both lungs, greatest in the left upper lobe, mostly in a central peribronchovascular distribution. This was not present on the prior study. Multifocal pneumonitis is  suspected. In addition, the patient has developed a new small to moderate right pleural effusion and tiny left pleural effusion, and there is consolidation and atelectasis of the dependent right posterior lung base. Trachea and central bronchi  are  patent.    Lymph node enlargement is seen throughout the mediastinum, both pulmonary viet and in the axillary regions where there is bilateral axillary edema. This is nonspecific but may represent reactive adenopathy or lymphatic engorgement. Neoplastic adenopathy  is unlikely given the time course.      Impression:      1.  No evidence of pulmonary embolism or other acute vascular abnormality within the chest.  2.  Multifocal groundglass and more dense airspace infiltrates in both lungs as detailed above. Consider atypical pneumonitis. Imaging features can be seen with COVID-19 pneumonia, although are nonspecific and can can occur with a variety of infectious  and noninfectious processes.  3.  Small to moderate right pleural effusion and tiny left pleural effusion with right posterior lung base consolidation and atelectasis, new since prior.  4.  Adenopathy within the mediastinum, pulmonary viet and axillary regions. Axillary soft tissue edema. Soft tissue edema is also present throughout the abdominal mesentery and body wall. Lymph node enlargement due to lymphatic engorgement is likely.    Signer Name: Jim Yousif MD   Signed: 4/13/2021 2:17 AM   Workstation Name: [email or web address]    [facility]    CT Abdomen Pelvis Without Contrast [[id number]] Collected: 04/13/21 0210     Updated: 04/13/21 0212    Narrative:      CT ABDOMEN AND PELVIS, NONCONTRAST, 4/13/2021    HISTORY:  70-year-old male recently discharged from the hospital after treatment for AAA endograft infection with sepsis, Pseudomonas bacteremia. GI bleed. History of hepatic cirrhosis, chronic respiratory failure and diastolic heart failure. He is admitted to the  hospital today after low hemoglobin was noted by his primary provider. Symptoms include shortness of breath, leg swelling, black stools,    TECHNIQUE:  CT imaging of the abdomen and pelvis ordered without oral or IV contrast. Radiation dose reduction techniques  included automated exposure control. Radiation audit for CT and nuclear cardiology exams in the last 12 months: 8.    COMPARISON:  *  CT abdomen and pelvis, 3/10/2021.    ABDOMEN FINDINGS:  Morphologic changes of hepatic cirrhosis with moderate hepatosplenomegaly. Stable left lobe liver cyst. No gallbladder distention or visible bile duct dilatation. Pancreas is unremarkable. Soft tissue edema throughout the abdominal mesentery and body  wall and small volume ascites within the abdomen and pelvis. Small bilateral pleural effusions, larger on the right.    Bifurcated aortoiliac stent graft with continued ill-defined thickening of the wall of the native aneurysm and mild periaortic adenopathy. Vascular assessment is limited without contrast administration. Both kidneys are negative with no evidence of  urinary obstruction or nephrolithiasis.    Small bowel and colon are normal in caliber and appearance, as imaged. No gastric distention or distal esophageal dilatation.    PELVIS FINDINGS:  Urinary bladder, prostate and rectum are within normal limits.    Postoperative changes of previous lower lumbar spine fusion with instrumentation.      Impression:      1.  No new or acute abnormality within the abdomen or pelvis when compared with prior study.  2.  Advanced hepatic cirrhosis with hepatosplenomegaly, small volume ascites and extensive soft tissue edema throughout the abdominal mesentery and body wall.  3.  Bifurcated aortoiliac stent graft with continued ill-defined wall thickening of the native aneurysm and mild periaortic adenopathy. Noncontrast study.    Signer Name: Jim Yousif MD   Signed: 4/13/2021 2:10 AM   Workstation Name: [email or web address]    [facility] [place] [facility]            Impression:   Acute GI bleed  Anemia  Cirrhosis  Chronic respiratory failure  Pseudomonas bacteremia with presumed infected endograft  Atrial fibrillation  History of Covid 3/10/2021    Estimated Creatinine Clearance:  88.5 mL/min (by C-G formula based on SCr of 1.13 mg/dL).        PLAN/RECOMMENDATIONS:   Thank you for asking us to see Derek Kelsey, I recommend the following:  Extremely complicated case;    Cirrhosis probably contributing to GI bleeding;     Endovascular graft a secondary problem but large amounts of ivf with abx are contributing to volume overload.      Infection is NOT suppressable or curable without surgery; patient doesn't want surgery because of operative mortality risk.    Poor overall prognosis    Comfort measures may be patient's preference    I am off until Monday ;call partners as needed    Continue iv abx per order unless family/patient chose hospice then ok to stop    Decrease ceftazidime frequency to 2 g iv q12h       Davis Carrero MD  4/15/2021  15:29 EDT

## 2021-04-15 NOTE — CONSULTS
Clinical Nutrition     Reason for Visit:   Follow-up protocol    Patient Name: Derek Kelsey  YOB: 1950  MRN: [id number]  Date of Encounter: 04/15/21 09:17 EDT  Admission date: 4/12/2021    Nutrition Assessment   Assessment     Admission diagnosis  Dyspnea    Additional diagnosis/conditions/procedures this admission  Recent admission from 3/10 - 3/17 for sepsis 2/2 bacteremia    Symptomatic anemia  Edema - +3 abdomen/general, +2 BL LE and BL UE  Acute/chronic respiratory failure  Infected aortic endograft  Pseudomonas bacteremia  Thrombocytopenia  Hyponatremia  (4/14) EGD - bleeding from gastropathy    Additional PMH/procedures:  CAD  AAA  Afib  HTN  Cirrhosis  GERD  Esophageal varices  Portal hypertensive gastropathy  Depression  COPD  Back pain  Black lung  Anxiety    AAA (6/2019)  Back surgery  L shoulder surgery      Reported/Observed/Food/Nutrition Related History:     4/15) Patient with improved oral intake. Ate well yesterday. Has been trying to eat some snacks during the day. Provided wife with nutrition information/education on cirrhosis nutrition per request. Reviewed materials and encouraged small, frequent meals, high protein, adequate calories, and monitoring of sodium intake. Patient and wife acknowledged understanding of information.    4/13) Patient on the phone at visit. Majority of information provided by wife at bedside. Wife reports patient has had a decreased appetite over the past month, but worse in the past 2 weeks prior to admission. Patient has been eating bites of meals for the past 2 weeks. Prior to wife reports patient was eating canned pork and beans and ice cream x3-4 times daily. Wife tried to monitor sodium intake and cook for patient at home but states he was having taste changes, did not want many meat products. She reports early satiety. Son is a kinesiology major and was encouraging wife to make sure patient stayed hydrated. They were  "encouraging a lot of juices, water, gatorade, etc. Discussed risk of malnutrition in liver cirrhosis. Explained that weight loss can be masked by fluid retention and not be as apparent on the scale, however, cirrhotic involvement with the liver increased estimated needs and can cause muscle wasting if oral intake is inadequate. Discussed signs of muscle/fat wasting. Encouraged oral nutrition/protein supplements, spacing liquids from meals to allow for increased PO intake, smaller, frequent meals spread throughout the day. Wife acknowledged understanding of information discussed.       Anthropometrics     Height: 190.5 cm (75\")  Last filed wt: Weight: 124 kg (274 lb 3.2 oz) (04/15/21 0500)  Weight Method: Bed scale    BMI: BMI (Calculated): 34.3  Obese Class II: 35-39.9kg/m2    Ideal Body Weight (IBW) (kg): 90.45  Admission wt: 280 lb 8 oz  Method obtained: bed scale weight per charting 4/12    Weight Weight (kg) Weight (lbs) Weight Method VISIT REPORT   2/22/2021 111.131 kg 245 lb     2/24/2021 110.224 kg 243 lb     3/10/2021 111.131 kg 245 lb Stated    3/11/2021 111.13 kg 245 lb Bed scale    3/12/2021 107.548 kg 237 lb 1.6 oz Bed scale    3/15/2021 105.643 kg 232 lb 14.4 oz     3/16/2021 104.373 kg 230 lb 1.6 oz Bed scale    3/17/2021 105.824 kg 233 lb 4.8 oz Bed scale    3/29/2021 114.306 kg 252 lb     4/12/2021 127.234 kg 280 lb 8 oz Bed scale    4/12/2021 127.007 kg 280 lb     4/13/2021 127.506 kg 281 lb 1.6 oz Bed scale    4/13/2021 128.64 kg 283 lb 9.6 oz     4/14/2021 125.828 kg 277 lb 6.4 oz Bed scale        Weight Change   UBW: unsure d/t fluid gain  Weight change:   % wt change:   Time frame of weight loss:     Labs reviewed     Results from last 7 days   Lab Units 04/15/21  0422 04/14/21  0804 04/13/21  0928 04/12/21  1852   GLUCOSE mg/dL 103* 117* 106* 101*   BUN mg/dL 19 20 20 20   CREATININE mg/dL 1.13 1.10 0.94 0.97   SODIUM mmol/L 134* 133* 128* 127*   CHLORIDE mmol/L 96* 95* 94* 92*   POTASSIUM " mmol/L 3.6 3.8 3.6 3.8   MAGNESIUM mg/dL 2.1 1.8 1.6  --    ALT (SGPT) U/L 33 37  --  40     Results from last 7 days   Lab Units 04/15/21  0422 04/14/21  0804 04/12/21  1852   ALBUMIN g/dL 2.70* 3.10* 2.90*           Lab Results   Lab Value Date/Time    HGBA1C 4.50 (L) 04/12/2021 1852    HGBA1C 5.10 03/11/2021 0547       Medications reviewed   Pertinent: coreg, ceftazidime, ferrous sulfate, lasix, MVI, protonix, xifaxan, florastor, pericolace, hytrin  GTT: octreotide 500 mcg IV      Intake/Output 24 hrs (7:00AM - 6:59 AM)     Intake & Output (last day)       04/14 0701 - 04/15 0700 04/15 0701 - 04/16 0700    P.O. 483     I.V. (mL/kg) 250 (2)     Blood      Total Intake(mL/kg) 733 (5.9)     Urine (mL/kg/hr) 3260 (1.1)     Stool 0     Total Output 3260     Net -2527           Urine Unmeasured Occurrence 1 x     Stool Unmeasured Occurrence 6 x           Current Nutrition Prescription     PO: Diet Regular; Low Sodium, Low Fiber / Low Residue  Dietary Nutrition Supplements: Vanilla Boost Plus x3/day  Intake: 75% x 4 meals    Nutrition Diagnosis     4/13 updated 4/15  Problem Predicted suboptimal energy intake   Etiology Decreased appetite, anasarca   Signs/Symptoms Report of decreased intake intake/appetite x2 weeks prior to admission   Status: improving per current trend in PO intake    Nutrition Intervention     1.  Follow treatment progress, Care plan reviewed  2.  Advise alternate selection, Interview for preferences   3. Cont to send supplements as ordered  4. RD provided nutrition education for cirrhosis at patient's wife's request. Education and written materials provided    Goal:   General: Nutrition support treatment / Palliative care  PO: Continue positive trend      Monitoring/Evaluation:   Per protocol, PO intake, Pertinent labs, Weight, GI status, Symptoms, POC/GOC      Esther Choudhary RDN, LD  Time Spent: 30 minutes

## 2021-04-15 NOTE — PROGRESS NOTES
Continued Stay Note  [facility]     Patient Name: Derek Kelsey  MRN: [id number]  Today's Date: 4/15/2021    Admit Date: 4/12/2021    Discharge Plan     Row Name 04/15/21 1111       Plan    Plan  Home with family    Plan Comments  Spoke to patient and wife at bedside. Plan is home at this time. Family is having a discussion with doctors tomorrow reference goals of care. CM will continue to follow    Final Discharge Disposition Code  01 - home or self-care        Discharge Codes    No documentation.       Expected Discharge Date and Time     Expected Discharge Date Expected Discharge Time    Apr 15, 2021             Jim Platt RN

## 2021-04-15 NOTE — THERAPY TREATMENT NOTE
Patient Name: Derek Kelsey  : 1950    MRN: [id number]                              Today's Date: 4/15/2021       Admit Date: 2021    Visit Dx:     ICD-10-CM ICD-9-CM   1. Acute blood loss anemia, mild, asymptomatic  D62 285.1   2. Gastrointestinal hemorrhage with melena  K92.1 578.1     Patient Active Problem List   Diagnosis   • Anxiety   • Abdominal aortic aneurysm (AAA) without rupture (CMS/HCC)   • Depression   • Hypertension   • Cataract, bilateral   • CAD (coronary artery disease)   • Abnormal stress test   • Moderate COPD   • Former smoker   • Coal workers pneumoconiosis, simple   • Back pain   • S/P lumbar fusion   • Prediabetes   • Acute blood loss anemia, mild, asymptomatic   • Acute postoperative pain   • Hyponatremia, mild   • Chronic respiratory failure with hypoxia and hypercapnia (CMS/HCC)   • Renal insufficiency   • Acute cystitis without hematuria   • Acute UTI   • Bacteremia   • Symptomatic anemia   • Aortitis (CMS/HCC)   • Elevated C-reactive protein (CRP)   • Cirrhosis of liver (CMS/HCC)   • High anion gap metabolic acidosis   • S/P AAA repair   • GERD (gastroesophageal reflux disease)   • Person under investigation for COVID-19   • On supplemental oxygen by nasal cannula   • Infected aortic endograft (CMS/HCC)   • Elevated transaminase level   • Thrombocytopenia (CMS/HCC)   • Pneumonia   • Gram-negative bacteremia   • COVID-19   • Anemia   • Gastrointestinal hemorrhage with melena     Past Medical History:   Diagnosis Date   • Abdominal aortic aneurysm (CMS/HCC) 2016   • Anxiety 2016   • Arthritis    • Back pain    • Black lung (CMS/HCC)    • CAD (coronary artery disease) 2016   • Cirrhosis (CMS/HCC)    • COPD (chronic obstructive pulmonary disease) (CMS/HCC) 2016   • Depression 2016   • Depression    • GERD (gastroesophageal reflux disease)    • Hypertension 2016   • On supplemental oxygen by nasal cannula     at night   • Vitiligo    • Wears  dentures    • Wears reading eyeglasses      Past Surgical History:   Procedure Laterality Date   • ABDOMINAL AORTIC ANEURYSM REPAIR WITH ENDOGRAFT N/A 6/6/2019    Procedure: ABDOMINAL AORTIC ANEURYSM REPAIR WITH ENDOGRAFT;  Surgeon: Leoplodo Burleson MD;  Location:  [facility];  Service: Vascular   • BACK SURGERY     • CARDIAC CATHETERIZATION     • CARDIAC CATHETERIZATION N/A 9/28/2018    Procedure: Left Heart Cath;  Surgeon: Douglas Galvez MD;  Location:  [facility];  Service: Cardiovascular   • CARDIAC SURGERY     • COLONOSCOPY      2015   • COLONOSCOPY N/A 10/30/2019    Procedure: COLONOSCOPY;  Surgeon: Homar Viveros MD;  Location:  [facility];  Service: Gastroenterology   • ENDOSCOPY N/A 10/30/2019    Procedure: ESOPHAGOGASTRODUODENOSCOPY;  Surgeon: Homar Viveros MD;  Location:  [facility];  Service: Gastroenterology   • ENDOSCOPY N/A 4/14/2021    Procedure: ESOPHAGOGASTRODUODENOSCOPY;  Surgeon: Eddie Benton MD;  Location:  [facility];  Service: Gastroenterology;  Laterality: N/A;   • EYE SURGERY      cataracts bilateral   • HAND SURGERY Left    • LUMBAR DISCECTOMY FUSION INSTRUMENTATION N/A 11/1/2018    Procedure: LUMBAR DISCECTOMY POSTERIOR WITH FUSION INSTRUMENTATION;  Surgeon: Joo Franklin MD;  Location:  [facility];  Service: Orthopedic Spine   • LUMBAR DISCECTOMY FUSION INSTRUMENTATION N/A 7/24/2019    Procedure: POSTERIOR LUMBAR SPINAL FUSION REVISION AND INSTRUMENTATION L3,L4,L5,S1;  Surgeon: Joo Franklin MD;  Location:  [facility];  Service: Orthopedic Spine   • ORIF FINGER / THUMB FRACTURE     • SHOULDER SURGERY Left      General Information     Row Name 04/15/21 1039          OT Time and Intention    Document Type  therapy note (daily note)  -SW     Mode of Treatment  occupational therapy;individual therapy  -SW     Row Name 04/15/21 1039          General Information    Patient Profile Reviewed  yes  -SW     Existing  Precautions/Restrictions  fall;oxygen therapy device and L/min;other (see comments) Symptomatic anemia, [place], monitor vitals  -[facility] Name 04/15/21 1039          Cognition    Orientation Status (Cognition)  oriented x 4  -SW     Row Name 04/15/21 1039          Safety Issues, Functional Mobility    Impairments Affecting Function (Mobility)  balance;endurance/activity tolerance;strength  -       User Key  (r) = Recorded By, (t) = Taken By, (c) = Cosigned By    Initials Name Provider Type     Rachel Lou, SARAY Occupational Therapist          Mobility/ADL's     Row Name 04/15/21 1039          Bed Mobility    Bed Mobility  scooting/bridging;supine-sit-supine  -     Scooting/Bridging [place] (Bed Mobility)  set up  -     Supine-Sit-Supine [place] (Bed Mobility)  set up  -SW     Row Name 04/15/21 1039          Transfers    Transfers  sit-stand transfer  -     Sit-Stand [place] (Transfers)  standby assist;verbal cues  -SW     Row Name 04/15/21 1039          Sit-Stand Transfer    Assistive Device (Sit-Stand Transfers)  walker, front-wheeled  -SW     Row Name 04/15/21 1039          Functional Mobility    Functional Mobility- Ind. Level  contact guard assist  -     Functional Mobility- Device  rolling walker  -     Functional Mobility-Distance (Feet)  5  -SW     Functional Mobility- Safety Issues  supplemental O2  -SW     Row Name 04/15/21 1039          Activities of Daily Living    12600 - OT Self Care/Mgmt Minutes  8  -SW     BADL Assessment/Intervention  lower body dressing;toileting  -SW     Row Name 04/15/21 1039          Lower Body Dressing Assessment/Training    [place] Level (Lower Body Dressing)  moderate assist (50% patient effort);socks;lower body dressing skills  -     Position (Lower Body Dressing)  edge of bed sitting  -SW     Row Name 04/15/21 1039          Toileting Assessment/Training    [place] Level (Toileting)  moderate assist (50% patient effort)  -     Assistive  Devices (Toileting)  urinal  -     Position (Toileting)  sitting up in bed  -       User Key  (r) = Recorded By, (t) = Taken By, (c) = Cosigned By    Initials Name Provider Type    Rachel Lyon OT Occupational Therapist        Obj/Interventions     [facility] Name 04/15/21 1039          Balance    Balance Assessment  sitting static balance;sitting dynamic balance;sit to stand dynamic balance;standing static balance;standing dynamic balance  -SW     Static Sitting Balance  WNL;unsupported;sitting, edge of bed  -SW     Dynamic Sitting Balance  WNL;unsupported;sitting, edge of bed  -SW     Sit to Stand Dynamic Balance  WFL  -SW     Static Standing Balance  WFL;supported;standing  -SW     Dynamic Standing Balance  WFL;supported;standing  -SW     Balance Interventions  sitting;standing;sit to stand;supported;static;dynamic;minimal challenge;manual resistance applied during activity  -       User Key  (r) = Recorded By, (t) = Taken By, (c) = Cosigned By    Initials Name Provider Type    Rachel Lyon OT Occupational Therapist        Goals/Plan    No documentation.       Clinical Impression     Row Name 04/15/21 1039          Pain Assessment    Additional Documentation  Pain Scale: Numbers Pre/Post-Treatment (Group)  -SW     Row Name 04/15/21 1039          Pain Scale: Numbers Pre/Post-Treatment    Pretreatment Pain Rating  0/10 - no pain  -     Posttreatment Pain Rating  0/10 - no pain  -SW     Row Name 04/15/21 1039          Plan of Care Review    Plan of Care Reviewed With  patient;spouse;son  -     Progress  improving  -     Outcome Summary  OT engaged pt in adl retraining with him requiring mod assist for toielting and donning socks.  He completed bed mob with setup.  He is improving with mob and dyn standing balance with no lob with manual resistance.  He stood times 4-5 minutes before requiring a break.  Overall, pt making good progress toward goals.  -[facility] Name 04/15/21 1035          Vital Signs     Pre Systolic BP Rehab  138  -SW     Pre Treatment Diastolic BP  88  -SW     Pretreatment Heart Rate (beats/min)  63  -SW     Pre SpO2 (%)  92  -SW     O2 Delivery Pre Treatment  supplemental O2  -SW     Intra SpO2 (%)  91  -SW     O2 Delivery Intra Treatment  supplemental O2  -SW     Post SpO2 (%)  94  -SW     O2 Delivery Post Treatment  supplemental O2  -SW     Pre Patient Position  Supine  -SW     Intra Patient Position  Standing  -SW     Post Patient Position  Supine  -SW     Row Name 04/15/21 1039          Positioning and Restraints    Pre-Treatment Position  in bed  -SW     Post Treatment Position  bed  -SW     In Bed  notified nsg;supine;fowlers;call light within reach;encouraged to call for assist;exit alarm on;with family/caregiver;side rails up x2  -SW       User Key  (r) = Recorded By, (t) = Taken By, (c) = Cosigned By    Initials Name Provider Type    Rachel Lyon OT Occupational Therapist        Outcome Measures     Row Name 04/15/21 1124          How much help from another is currently needed...    Putting on and taking off regular lower body clothing?  2  -SW     Bathing (including washing, rinsing, and drying)  2  -SW     Toileting (which includes using toilet bed pan or urinal)  2  -SW     Putting on and taking off regular upper body clothing  3  -SW     Taking care of personal grooming (such as brushing teeth)  4  -SW     Eating meals  4  -SW     AM-PAC 6 Clicks Score (OT)  17  -SW     Row Name 04/15/21 1124          Functional Assessment    Outcome Measure Options  AM-PAC 6 Clicks Daily Activity (OT)  -SW       User Key  (r) = Recorded By, (t) = Taken By, (c) = Cosigned By    Initials Name Provider Type    Rachel Lyon OT Occupational Therapist        Occupational Therapy Education                 Title: PT OT SLP Therapies (Done)     Topic: Occupational Therapy (Done)     Point: ADL training (Done)     Description:   Instruct learner(s) on proper safety adaptation and remediation techniques  during self care or transfers.   Instruct in proper use of assistive devices.              Learning Progress Summary           Patient Acceptance, E, VU by SW at 4/15/2021 1039    Acceptance, E,D, VU,DU,NR by TB at 4/14/2021 1530    Acceptance, E,D, VU,NR by CS at 4/13/2021 1107    Comment: OT role and POC, EC strategies, AE for LBD compeltoin   Family Acceptance, E, VU by SW at 4/15/2021 1039   Significant Other Acceptance, E,D, VU,NR by CS at 4/13/2021 1107    Comment: OT role and POC, EC strategies, AE for LBD compeltoin                   Point: Home exercise program (Done)     Description:   Instruct learner(s) on appropriate technique for monitoring, assisting and/or progressing therapeutic exercises/activities.              Learning Progress Summary           Patient Acceptance, E, VU by  at 4/15/2021 1039   Family Acceptance, E, VU by  at 4/15/2021 1039                   Point: Precautions (Done)     Description:   Instruct learner(s) on prescribed precautions during self-care and functional transfers.              Learning Progress Summary           Patient Acceptance, E,D, VU,NR by CS at 4/13/2021 1107    Comment: OT role and POC, EC strategies, AE for LBD compeltoin   Significant Other Acceptance, E,D, VU,NR by CS at 4/13/2021 1107    Comment: OT role and POC, EC strategies, AE for LBD compeltoin                   Point: Body mechanics (Done)     Description:   Instruct learner(s) on proper positioning and spine alignment during self-care, functional mobility activities and/or exercises.              Learning Progress Summary           Patient Acceptance, E,D, VU,NR by CS at 4/13/2021 1107    Comment: OT role and POC, EC strategies, AE for LBD compeltoin   Significant Other Acceptance, E,D, VU,NR by CS at 4/13/2021 1107    Comment: OT role and POC, EC strategies, AE for LBD compeltoin                               User Key     Initials Effective Dates Name Provider Type Discipline    COLLEEN 06/08/18 -   Sadia Corrales OT Occupational Therapist OT     01/29/20 -  Rachel Lou OT Occupational Therapist OT     10/21/20 -  Bartolome Dietrich OT Occupational Therapist OT              OT Recommendation and Plan     Plan of Care Review  Plan of Care Reviewed With: patient, spouse, son  Progress: improving  Outcome Summary: OT engaged pt in adl retraining with him requiring mod assist for toielting and donning socks.  He completed bed mob with setup.  He is improving with mob and dyn standing balance with no lob with manual resistance.  He stood times 4-5 minutes before requiring a break.  Overall, pt making good progress toward goals.     Time Calculation:   Time Calculation- OT     Row Name 04/15/21 1039             Time Calculation- OT    OT Start Time  1039  -SW      OT Received On  04/15/21  -         Timed Charges    73674 - OT Therapeutic Activity Minutes  20  -SW      71525 - OT Self Care/Mgmt Minutes  8  -SW        User Key  (r) = Recorded By, (t) = Taken By, (c) = Cosigned By    Initials Name Provider Type     Rachel Lou OT Occupational Therapist        Therapy Charges for Today     Code Description Service Date Service Provider Modifiers Qty    35330575850 HC OT THERAPEUTIC ACT EA 15 MIN 4/15/2021 Rachel Lou OT GO 1    58426733204 HC OT SELF CARE/MGMT/TRAIN EA 15 MIN 4/15/2021 Rachel Lou OT GO 1               Rachel Lou OT  4/15/2021

## 2021-04-16 LAB
ANION GAP SERPL CALCULATED.3IONS-SCNC: 7 MMOL/L (ref 5–15)
BACTERIA SPEC RESP CULT: NORMAL
BH BB BLOOD EXPIRATION DATE: NORMAL
BH BB BLOOD EXPIRATION DATE: NORMAL
BH BB BLOOD TYPE BARCODE: 5100
BH BB BLOOD TYPE BARCODE: 5100
BH BB DISPENSE STATUS: NORMAL
BH BB DISPENSE STATUS: NORMAL
BH BB PRODUCT CODE: NORMAL
BH BB PRODUCT CODE: NORMAL
BH BB UNIT NUMBER: NORMAL
BH BB UNIT NUMBER: NORMAL
BUN SERPL-MCNC: 16 MG/DL (ref 8–23)
BUN/CREAT SERPL: 18.2 (ref 7–25)
CALCIUM SPEC-SCNC: 8.3 MG/DL (ref 8.6–10.5)
CHLORIDE SERPL-SCNC: 99 MMOL/L (ref 98–107)
CO2 SERPL-SCNC: 31 MMOL/L (ref 22–29)
CREAT SERPL-MCNC: 0.88 MG/DL (ref 0.76–1.27)
CROSSMATCH INTERPRETATION: NORMAL
CROSSMATCH INTERPRETATION: NORMAL
DEPRECATED RDW RBC AUTO: 63.1 FL (ref 37–54)
ERYTHROCYTE [DISTWIDTH] IN BLOOD BY AUTOMATED COUNT: 17.4 % (ref 12.3–15.4)
GFR SERPL CREATININE-BSD FRML MDRD: 86 ML/MIN/1.73
GLUCOSE SERPL-MCNC: 101 MG/DL (ref 65–99)
GRAM STN SPEC: NORMAL
HCT VFR BLD AUTO: 28.7 % (ref 37.5–51)
HCT VFR BLD AUTO: 29.9 % (ref 37.5–51)
HCT VFR BLD AUTO: 29.9 % (ref 37.5–51)
HCT VFR BLD AUTO: 30.7 % (ref 37.5–51)
HCT VFR BLD AUTO: 32.1 % (ref 37.5–51)
HGB BLD-MCNC: 8.8 G/DL (ref 13–17.7)
HGB BLD-MCNC: 9.1 G/DL (ref 13–17.7)
HGB BLD-MCNC: 9.1 G/DL (ref 13–17.7)
HGB BLD-MCNC: 9.3 G/DL (ref 13–17.7)
HGB BLD-MCNC: 9.7 G/DL (ref 13–17.7)
INR PPP: 1.55 (ref 0.85–1.16)
MCH RBC QN AUTO: 30 PG (ref 26.6–33)
MCHC RBC AUTO-ENTMCNC: 30.4 G/DL (ref 31.5–35.7)
MCV RBC AUTO: 98.7 FL (ref 79–97)
PLATELET # BLD AUTO: 56 10*3/MM3 (ref 140–450)
PMV BLD AUTO: 10.1 FL (ref 6–12)
POTASSIUM SERPL-SCNC: 3.7 MMOL/L (ref 3.5–5.2)
PROTHROMBIN TIME: 18 SECONDS (ref 11.4–14.4)
RBC # BLD AUTO: 3.03 10*6/MM3 (ref 4.14–5.8)
SODIUM SERPL-SCNC: 137 MMOL/L (ref 136–145)
UNIT  ABO: NORMAL
UNIT  ABO: NORMAL
UNIT  RH: NORMAL
UNIT  RH: NORMAL
WBC # BLD AUTO: 4.53 10*3/MM3 (ref 3.4–10.8)

## 2021-04-16 PROCEDURE — 85014 HEMATOCRIT: CPT | Performed by: INTERNAL MEDICINE

## 2021-04-16 PROCEDURE — 85027 COMPLETE CBC AUTOMATED: CPT | Performed by: INTERNAL MEDICINE

## 2021-04-16 PROCEDURE — 85018 HEMOGLOBIN: CPT | Performed by: INTERNAL MEDICINE

## 2021-04-16 PROCEDURE — 97116 GAIT TRAINING THERAPY: CPT

## 2021-04-16 PROCEDURE — 85610 PROTHROMBIN TIME: CPT | Performed by: INTERNAL MEDICINE

## 2021-04-16 PROCEDURE — 25010000002 CEFTAZIDIME PER 500 MG: Performed by: INTERNAL MEDICINE

## 2021-04-16 PROCEDURE — 97110 THERAPEUTIC EXERCISES: CPT

## 2021-04-16 PROCEDURE — 80048 BASIC METABOLIC PNL TOTAL CA: CPT | Performed by: INTERNAL MEDICINE

## 2021-04-16 PROCEDURE — 25010000002 OCTREOTIDE PER 25 MCG: Performed by: INTERNAL MEDICINE

## 2021-04-16 PROCEDURE — 99232 SBSQ HOSP IP/OBS MODERATE 35: CPT | Performed by: INTERNAL MEDICINE

## 2021-04-16 PROCEDURE — 99233 SBSQ HOSP IP/OBS HIGH 50: CPT | Performed by: INTERNAL MEDICINE

## 2021-04-16 PROCEDURE — 94799 UNLISTED PULMONARY SVC/PX: CPT

## 2021-04-16 RX ADMIN — TAMSULOSIN HYDROCHLORIDE 0.4 MG: 0.4 CAPSULE ORAL at 08:21

## 2021-04-16 RX ADMIN — LACTULOSE 15 ML: 20 SOLUTION ORAL at 15:18

## 2021-04-16 RX ADMIN — FERROUS SULFATE TAB 325 MG (65 MG ELEMENTAL FE) 325 MG: 325 (65 FE) TAB at 08:21

## 2021-04-16 RX ADMIN — RIFAXIMIN 550 MG: 550 TABLET ORAL at 08:20

## 2021-04-16 RX ADMIN — BUDESONIDE AND FORMOTEROL FUMARATE DIHYDRATE 2 PUFF: 160; 4.5 AEROSOL RESPIRATORY (INHALATION) at 07:42

## 2021-04-16 RX ADMIN — Medication 250 MG: at 08:21

## 2021-04-16 RX ADMIN — TERAZOSIN HYDROCHLORIDE 2 MG: 2 CAPSULE ORAL at 20:28

## 2021-04-16 RX ADMIN — PANTOPRAZOLE SODIUM 40 MG: 40 INJECTION, POWDER, FOR SOLUTION INTRAVENOUS at 08:21

## 2021-04-16 RX ADMIN — BUDESONIDE AND FORMOTEROL FUMARATE DIHYDRATE 2 PUFF: 160; 4.5 AEROSOL RESPIRATORY (INHALATION) at 19:32

## 2021-04-16 RX ADMIN — OCTREOTIDE ACETATE 50 MCG/HR: 500 INJECTION, SOLUTION INTRAVENOUS; SUBCUTANEOUS at 21:53

## 2021-04-16 RX ADMIN — IPRATROPIUM BROMIDE 0.5 MG: 0.5 SOLUTION RESPIRATORY (INHALATION) at 12:10

## 2021-04-16 RX ADMIN — IPRATROPIUM BROMIDE 0.5 MG: 0.5 SOLUTION RESPIRATORY (INHALATION) at 19:31

## 2021-04-16 RX ADMIN — SODIUM CHLORIDE, PRESERVATIVE FREE 10 ML: 5 INJECTION INTRAVENOUS at 20:29

## 2021-04-16 RX ADMIN — LACTULOSE 15 ML: 20 SOLUTION ORAL at 08:22

## 2021-04-16 RX ADMIN — DOCUSATE SODIUM 50MG AND SENNOSIDES 8.6MG 2 TABLET: 8.6; 5 TABLET, FILM COATED ORAL at 20:28

## 2021-04-16 RX ADMIN — RIFAXIMIN 550 MG: 550 TABLET ORAL at 20:29

## 2021-04-16 RX ADMIN — DOCUSATE SODIUM 50MG AND SENNOSIDES 8.6MG 2 TABLET: 8.6; 5 TABLET, FILM COATED ORAL at 08:20

## 2021-04-16 RX ADMIN — CARVEDILOL 12.5 MG: 12.5 TABLET, FILM COATED ORAL at 20:29

## 2021-04-16 RX ADMIN — CITALOPRAM 40 MG: 40 TABLET ORAL at 08:21

## 2021-04-16 RX ADMIN — CEFTAZIDIME 2 G: 2 INJECTION, POWDER, FOR SOLUTION INTRAVENOUS at 14:09

## 2021-04-16 RX ADMIN — OCTREOTIDE ACETATE 50 MCG/HR: 500 INJECTION, SOLUTION INTRAVENOUS; SUBCUTANEOUS at 09:37

## 2021-04-16 RX ADMIN — CARVEDILOL 12.5 MG: 12.5 TABLET, FILM COATED ORAL at 08:21

## 2021-04-16 RX ADMIN — CEFTAZIDIME 2 G: 2 INJECTION, POWDER, FOR SOLUTION INTRAVENOUS at 02:23

## 2021-04-16 RX ADMIN — Medication 5 MG: at 20:29

## 2021-04-16 RX ADMIN — IPRATROPIUM BROMIDE 0.5 MG: 0.5 SOLUTION RESPIRATORY (INHALATION) at 07:41

## 2021-04-16 RX ADMIN — LACTULOSE 15 ML: 20 SOLUTION ORAL at 20:28

## 2021-04-16 RX ADMIN — IPRATROPIUM BROMIDE 0.5 MG: 0.5 SOLUTION RESPIRATORY (INHALATION) at 16:47

## 2021-04-16 RX ADMIN — THERA TABS 1 TABLET: TAB at 08:20

## 2021-04-16 RX ADMIN — PANTOPRAZOLE SODIUM 40 MG: 40 INJECTION, POWDER, FOR SOLUTION INTRAVENOUS at 17:18

## 2021-04-16 RX ADMIN — SODIUM CHLORIDE, PRESERVATIVE FREE 10 ML: 5 INJECTION INTRAVENOUS at 08:21

## 2021-04-16 RX ADMIN — FUROSEMIDE 40 MG: 40 TABLET ORAL at 08:21

## 2021-04-16 RX ADMIN — FUROSEMIDE 40 MG: 40 TABLET ORAL at 17:18

## 2021-04-16 RX ADMIN — Medication 250 MG: at 20:29

## 2021-04-16 NOTE — PROGRESS NOTES
[facility]       LOS: 3 days   Patient Care Team:  Raudel Zheng MD as PCP - General  Joo Franklin MD as Consulting Physician (Orthopedic Surgery)  Leopoldo Burleson MD as Surgeon (Cardiothoracic Surgery)  Douglas Galvez MD as Consulting Physician (Cardiology)  Davis Ford MD as Consulting Physician (Urology)        Subjective       Patient Denies:  Cp, sob, palpitations.      Vital Signs  Temp:  [98 °F (36.7 °C)-99 °F (37.2 °C)] 98.8 °F (37.1 °C)  Heart Rate:  [57-83] 61  Resp:  [16-20] 20  BP: (123-145)/(49-71) 126/67    Intake/Output Summary (Last 24 hours) at 4/16/2021 0946  Last data filed at 4/16/2021 0900  Gross per 24 hour   Intake 1385.83 ml   Output 3700 ml   Net -2314.17 ml     I/O this shift:  In: 240 [P.O.:240]  Out: -     Physical Exam:     General Appearance:    Alert, cooperative, in no acute distress       Neck:   No adenopathy, supple, trachea midline, no thyromegaly, no JVD   Lungs:     Clear to auscultation,respirations regular, even and                unlabored    Heart:    Regular rhythm and normal rate, normal S1 and S2, no         murmur, no gallop, no rub, no click   Chest Wall:    No abnormalities observed   Abdomen:     Normal bowel sounds, no masses, no organomegaly, soft     nontender, nondistended   Extremities:   Moves all extremities well, 2+ edema, no cyanosis, no           redness   Pulses:   Pulses palpable and equal bilaterally     Results Review:     I reviewed the patient's new clinical results.      WBC WBC   Date/Time Value Ref Range Status   04/16/2021 0838 4.53 3.40 - 10.80 10*3/mm3 Final   04/15/2021 0422 4.05 3.40 - 10.80 10*3/mm3 Final   04/14/2021 0804 3.38 (L) 3.40 - 10.80 10*3/mm3 Final            HGB Hemoglobin   Date/Time Value Ref Range Status   04/16/2021 0838 9.1 (L) 13.0 - 17.7 g/dL Final   04/16/2021 0838 9.1 (L) 13.0 - 17.7 g/dL Final   04/15/2021 2351 8.8 (L) 13.0 - 17.7  g/dL Final   04/15/2021 1755 9.1 (L) 13.0 - 17.7 g/dL Final   04/15/2021 0422 7.1 (L) 13.0 - 17.7 g/dL Final   04/15/2021 0012 7.0 (L) 13.0 - 17.7 g/dL Final   04/14/2021 1753 8.0 (L) 13.0 - 17.7 g/dL Final   04/14/2021 1213 7.5 (L) 13.0 - 17.7 g/dL Final   04/14/2021 0804 7.5 (L) 13.0 - 17.7 g/dL Final   04/14/2021 0031 6.8 (C) 13.0 - 17.7 g/dL Final     Comment:     Confirmed/called   04/13/2021 1608 7.2 (L) 13.0 - 17.7 g/dL Final           HCT Hematocrit   Date/Time Value Ref Range Status   04/16/2021 0838 29.9 (L) 37.5 - 51.0 % Final   04/16/2021 0838 29.9 (L) 37.5 - 51.0 % Final   04/15/2021 2351 28.7 (L) 37.5 - 51.0 % Final   04/15/2021 1755 29.3 (L) 37.5 - 51.0 % Final   04/15/2021 0422 23.1 (L) 37.5 - 51.0 % Final   04/15/2021 0012 23.6 (L) 37.5 - 51.0 % Final   04/14/2021 1753 26.3 (L) 37.5 - 51.0 % Final   04/14/2021 1213 24.4 (L) 37.5 - 51.0 % Final   04/14/2021 0804 24.8 (L) 37.5 - 51.0 % Final   04/14/2021 0031 22.5 (L) 37.5 - 51.0 % Final   04/13/2021 1608 23.2 (L) 37.5 - 51.0 % Final            Platelets Platelets   Date/Time Value Ref Range Status   04/16/2021 0838 56 (L) 140 - 450 10*3/mm3 Final   04/15/2021 0422 73 (L) 140 - 450 10*3/mm3 Final   04/14/2021 0804 70 (L) 140 - 450 10*3/mm3 Final     Sodium  Sodium   Date/Time Value Ref Range Status   04/16/2021 0838 137 136 - 145 mmol/L Final   04/15/2021 0422 134 (L) 136 - 145 mmol/L Final   04/14/2021 0804 133 (L) 136 - 145 mmol/L Final     Potassium  Potassium   Date/Time Value Ref Range Status   04/16/2021 0838 3.7 3.5 - 5.2 mmol/L Final   04/15/2021 1755 4.2 3.5 - 5.2 mmol/L Final   04/15/2021 0422 3.6 3.5 - 5.2 mmol/L Final   04/14/2021 0804 3.8 3.5 - 5.2 mmol/L Final     Chloride  Chloride   Date/Time Value Ref Range Status   04/16/2021 0838 99 98 - 107 mmol/L Final   04/15/2021 0422 96 (L) 98 - 107 mmol/L Final   04/14/2021 0804 95 (L) 98 - 107 mmol/L Final     BicarbonateNo results found for: PLASMABICARB    BUN BUN   Date/Time Value Ref  Range Status   04/16/2021 0838 16 8 - 23 mg/dL Final   04/15/2021 0422 19 8 - 23 mg/dL Final   04/14/2021 0804 20 8 - 23 mg/dL Final      Creatinine Creatinine   Date/Time Value Ref Range Status   04/16/2021 0838 0.88 0.76 - 1.27 mg/dL Final   04/15/2021 0422 1.13 0.76 - 1.27 mg/dL Final   04/14/2021 0804 1.10 0.76 - 1.27 mg/dL Final      Calcium Calcium   Date/Time Value Ref Range Status   04/16/2021 0838 8.3 (L) 8.6 - 10.5 mg/dL Final   04/15/2021 0422 7.7 (L) 8.6 - 10.5 mg/dL Final   04/14/2021 0804 8.2 (L) 8.6 - 10.5 mg/dL Final      Mag @RESULFAST(MG:3)@        PT/INR:       Lab Results   Component Value Date    PROTIME 18.0 (H) 04/16/2021    PROTIME 19.0 (H) 04/15/2021    PROTIME 19.0 (H) 04/14/2021    PROTIME 19.0 (H) 04/13/2021    PROTIME 19.7 (H) 04/12/2021    PROTIME 18.1 (H) 03/17/2021    PROTIME 20.6 (H) 03/12/2021    INR 1.55 (H) 04/16/2021    INR 1.66 (H) 04/15/2021    INR 1.67 (H) 04/14/2021    INR 1.66 (H) 04/13/2021    INR 1.75 (H) 04/12/2021    INR 1.53 (H) 03/17/2021    INR 1.80 (H) 03/12/2021      Troponin I:  No results found for: TROPONINI   Lab Results   Component Value Date    CKTOTAL 362 (H) 07/26/2019    TROPONINT <0.010 03/10/2021       budesonide-formoterol, 2 puff, Inhalation, BID - RT   And  ipratropium, 0.5 mg, Nebulization, 4x Daily - RT  carvedilol, 12.5 mg, Oral, Q12H  cefTAZidime 2 g in 100 mL NS MBP, 2 g, Intravenous, Q12H  citalopram, 40 mg, Oral, Daily  ferrous sulfate, 325 mg, Oral, Daily With Breakfast  furosemide, 40 mg, Oral, BID  lactulose, 15 mL, Oral, TID  melatonin, 5 mg, Oral, Nightly  multivitamin, 1 tablet, Oral, Daily  pantoprazole, 40 mg, Intravenous, BID AC  riFAXIMin, 550 mg, Oral, Q12H  saccharomyces boulardii, 250 mg, Oral, BID  senna-docusate sodium, 2 tablet, Oral, BID  sodium chloride, 10 mL, Intravenous, Q12H  sodium chloride, 10 mL, Intravenous, Q12H  tamsulosin, 0.4 mg, Oral, Daily  terazosin, 2 mg, Oral, Nightly      octreotide (SandoSTATIN) infusion, 50  mcg/hr, Last Rate: 50 mcg/hr (04/16/21 0937)        Assessment/Plan     Patient Active Problem List   Diagnosis Code   • Anxiety F41.9   • Abdominal aortic aneurysm (AAA) without rupture (CMS/HCC) I71.4   • Depression F32.9   • Hypertension I10   • Cataract, bilateral H26.9   • CAD (coronary artery disease) I25.10   • Abnormal stress test R94.39   • Moderate COPD J44.9   • Former smoker Z87.891   • [employer] workers pneumoconiosis, simple J60   • Back pain M54.9   • S/P lumbar fusion Z98.1   • Prediabetes R73.03   • Acute blood loss anemia, mild, asymptomatic D62   • Acute postoperative pain G89.18   • Hyponatremia, mild E87.1   • Chronic respiratory failure with hypoxia and hypercapnia (CMS/HCC) J96.11, J96.12   • Renal insufficiency N28.9   • Acute cystitis without hematuria N30.00   • Acute UTI N39.0   • Bacteremia R78.81   • Symptomatic anemia D64.9   • 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   • Elevated C-reactive protein (CRP) R79.82   • Cirrhosis of liver (CMS/HCC) K74.60   • High anion gap metabolic acidosis E87.2   • S/P AAA repair Z98.890, Z86.79   • GERD (gastroesophageal reflux disease) K21.9   • Person under investigation for COVID-19 Z20.822   • On supplemental oxygen by nasal cannula Z78.9   • 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  Patient has decided against going to the [facility]  Defer chronic anticoagulation  Continue rate and rhythm control as much as possible      Chris Black MD  04/16/21  09:46 EDT

## 2021-04-16 NOTE — PROGRESS NOTES
Continued Stay Note   [place]     Patient Name: Derek Kelsey  MRN: [id number]  Today's Date: 4/16/2021    Admit Date: 4/12/2021    Discharge Plan     Row Name 04/16/21 1553       Plan    Plan  Home with outpatient care versus palliative care    Patient/Family in Agreement with Plan  yes    Plan Comments  Patient and family meeting with palliative care today to discuss goals of care.  Patient would like to continue with IV antibiotics and current plan for now.  Palliative care will continue to follow.    Final Discharge Disposition Code  01 - home or self-care        Discharge Codes    No documentation.       Expected Discharge Date and Time     Expected Discharge Date Expected Discharge Time    Apr 20, 2021             Maty Grove RN

## 2021-04-16 NOTE — PROGRESS NOTES
[facility]       LOS: 3 days   Patient Care Team:  Raudel Zheng MD as PCP - General  Joo Franklin MD as Consulting Physician (Orthopedic Surgery)  Leopoldo Burleson MD as Surgeon (Cardiothoracic Surgery)  Douglas Galvez MD as Consulting Physician (Cardiology)  Davis Ford MD as Consulting Physician (Urology)        Subjective       Patient Denies:  Cp, sob, palpitations.      Vital Signs  Temp:  [98 °F (36.7 °C)-99 °F (37.2 °C)] 98.8 °F (37.1 °C)  Heart Rate:  [57-83] 61  Resp:  [16-20] 20  BP: (125-145)/(49-71) 126/67    Intake/Output Summary (Last 24 hours) at 4/16/2021 0948  Last data filed at 4/16/2021 0900  Gross per 24 hour   Intake 1385.83 ml   Output 3700 ml   Net -2314.17 ml     I/O this shift:  In: 240 [P.O.:240]  Out: -     Physical Exam:     General Appearance:    Alert, cooperative, in no acute distress       Neck:   No adenopathy, supple, trachea midline, no thyromegaly, no JVD   Lungs:     Clear to auscultation,respirations regular, even and                unlabored    Heart:    Regular rhythm and normal rate, normal S1 and S2, no         murmur, no gallop, no rub, no click   Chest Wall:    No abnormalities observed   Abdomen:     Normal bowel sounds, no masses, no organomegaly, soft     nontender, nondistended   Extremities:   Moves all extremities well, 1+ edema, no cyanosis, no           redness   Pulses:   Pulses palpable and equal bilaterally     Results Review:     I reviewed the patient's new clinical results.      WBC WBC   Date/Time Value Ref Range Status   04/16/2021 0838 4.53 3.40 - 10.80 10*3/mm3 Final   04/15/2021 0422 4.05 3.40 - 10.80 10*3/mm3 Final   04/14/2021 0804 3.38 (L) 3.40 - 10.80 10*3/mm3 Final            HGB Hemoglobin   Date/Time Value Ref Range Status   04/16/2021 0838 9.1 (L) 13.0 - 17.7 g/dL Final   04/16/2021 0838 9.1 (L) 13.0 - 17.7 g/dL Final   04/15/2021 2351 8.8 (L) 13.0 - 17.7  g/dL Final   04/15/2021 1755 9.1 (L) 13.0 - 17.7 g/dL Final   04/15/2021 0422 7.1 (L) 13.0 - 17.7 g/dL Final   04/15/2021 0012 7.0 (L) 13.0 - 17.7 g/dL Final   04/14/2021 1753 8.0 (L) 13.0 - 17.7 g/dL Final   04/14/2021 1213 7.5 (L) 13.0 - 17.7 g/dL Final   04/14/2021 0804 7.5 (L) 13.0 - 17.7 g/dL Final   04/14/2021 0031 6.8 (C) 13.0 - 17.7 g/dL Final     Comment:     Confirmed/called   04/13/2021 1608 7.2 (L) 13.0 - 17.7 g/dL Final           HCT Hematocrit   Date/Time Value Ref Range Status   04/16/2021 0838 29.9 (L) 37.5 - 51.0 % Final   04/16/2021 0838 29.9 (L) 37.5 - 51.0 % Final   04/15/2021 2351 28.7 (L) 37.5 - 51.0 % Final   04/15/2021 1755 29.3 (L) 37.5 - 51.0 % Final   04/15/2021 0422 23.1 (L) 37.5 - 51.0 % Final   04/15/2021 0012 23.6 (L) 37.5 - 51.0 % Final   04/14/2021 1753 26.3 (L) 37.5 - 51.0 % Final   04/14/2021 1213 24.4 (L) 37.5 - 51.0 % Final   04/14/2021 0804 24.8 (L) 37.5 - 51.0 % Final   04/14/2021 0031 22.5 (L) 37.5 - 51.0 % Final   04/13/2021 1608 23.2 (L) 37.5 - 51.0 % Final            Platelets Platelets   Date/Time Value Ref Range Status   04/16/2021 0838 56 (L) 140 - 450 10*3/mm3 Final   04/15/2021 0422 73 (L) 140 - 450 10*3/mm3 Final   04/14/2021 0804 70 (L) 140 - 450 10*3/mm3 Final     Sodium  Sodium   Date/Time Value Ref Range Status   04/16/2021 0838 137 136 - 145 mmol/L Final   04/15/2021 0422 134 (L) 136 - 145 mmol/L Final   04/14/2021 0804 133 (L) 136 - 145 mmol/L Final     Potassium  Potassium   Date/Time Value Ref Range Status   04/16/2021 0838 3.7 3.5 - 5.2 mmol/L Final   04/15/2021 1755 4.2 3.5 - 5.2 mmol/L Final   04/15/2021 0422 3.6 3.5 - 5.2 mmol/L Final   04/14/2021 0804 3.8 3.5 - 5.2 mmol/L Final     Chloride  Chloride   Date/Time Value Ref Range Status   04/16/2021 0838 99 98 - 107 mmol/L Final   04/15/2021 0422 96 (L) 98 - 107 mmol/L Final   04/14/2021 0804 95 (L) 98 - 107 mmol/L Final     BicarbonateNo results found for: PLASMABICARB    BUN BUN   Date/Time Value Ref  Range Status   04/16/2021 0838 16 8 - 23 mg/dL Final   04/15/2021 0422 19 8 - 23 mg/dL Final   04/14/2021 0804 20 8 - 23 mg/dL Final      Creatinine Creatinine   Date/Time Value Ref Range Status   04/16/2021 0838 0.88 0.76 - 1.27 mg/dL Final   04/15/2021 0422 1.13 0.76 - 1.27 mg/dL Final   04/14/2021 0804 1.10 0.76 - 1.27 mg/dL Final      Calcium Calcium   Date/Time Value Ref Range Status   04/16/2021 0838 8.3 (L) 8.6 - 10.5 mg/dL Final   04/15/2021 0422 7.7 (L) 8.6 - 10.5 mg/dL Final   04/14/2021 0804 8.2 (L) 8.6 - 10.5 mg/dL Final      Mag @RESULFAST(MG:3)@        PT/INR:       Lab Results   Component Value Date    PROTIME 18.0 (H) 04/16/2021    PROTIME 19.0 (H) 04/15/2021    PROTIME 19.0 (H) 04/14/2021    PROTIME 19.0 (H) 04/13/2021    PROTIME 19.7 (H) 04/12/2021    PROTIME 18.1 (H) 03/17/2021    PROTIME 20.6 (H) 03/12/2021    INR 1.55 (H) 04/16/2021    INR 1.66 (H) 04/15/2021    INR 1.67 (H) 04/14/2021    INR 1.66 (H) 04/13/2021    INR 1.75 (H) 04/12/2021    INR 1.53 (H) 03/17/2021    INR 1.80 (H) 03/12/2021      Troponin I:  No results found for: TROPONINI   Lab Results   Component Value Date    CKTOTAL 362 (H) 07/26/2019    TROPONINT <0.010 03/10/2021       budesonide-formoterol, 2 puff, Inhalation, BID - RT   And  ipratropium, 0.5 mg, Nebulization, 4x Daily - RT  carvedilol, 12.5 mg, Oral, Q12H  cefTAZidime 2 g in 100 mL NS MBP, 2 g, Intravenous, Q12H  citalopram, 40 mg, Oral, Daily  ferrous sulfate, 325 mg, Oral, Daily With Breakfast  furosemide, 40 mg, Oral, BID  lactulose, 15 mL, Oral, TID  melatonin, 5 mg, Oral, Nightly  multivitamin, 1 tablet, Oral, Daily  pantoprazole, 40 mg, Intravenous, BID AC  riFAXIMin, 550 mg, Oral, Q12H  saccharomyces boulardii, 250 mg, Oral, BID  senna-docusate sodium, 2 tablet, Oral, BID  sodium chloride, 10 mL, Intravenous, Q12H  sodium chloride, 10 mL, Intravenous, Q12H  tamsulosin, 0.4 mg, Oral, Daily  terazosin, 2 mg, Oral, Nightly      octreotide (SandoSTATIN) infusion, 50  mcg/hr, Last Rate: 50 mcg/hr (04/16/21 0937)        Assessment/Plan     Patient Active Problem List   Diagnosis Code   • Anxiety F41.9   • Abdominal aortic aneurysm (AAA) without rupture (CMS/HCC) I71.4   • Depression F32.9   • Hypertension I10   • Cataract, bilateral H26.9   • CAD (coronary artery disease) I25.10   • Abnormal stress test R94.39   • Moderate COPD J44.9   • Former smoker Z87.891   • [employer] workers pneumoconiosis, simple J60   • Back pain M54.9   • S/P lumbar fusion Z98.1   • Prediabetes R73.03   • Acute blood loss anemia, mild, asymptomatic D62   • Acute postoperative pain G89.18   • Hyponatremia, mild E87.1   • Chronic respiratory failure with hypoxia and hypercapnia (CMS/HCC) J96.11, J96.12   • Renal insufficiency N28.9   • Acute cystitis without hematuria N30.00   • Acute UTI N39.0   • Bacteremia R78.81   • Symptomatic anemia D64.9   • 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   • Elevated C-reactive protein (CRP) R79.82   • Cirrhosis of liver (CMS/HCC) K74.60   • High anion gap metabolic acidosis E87.2   • S/P AAA repair Z98.890, Z86.79   • GERD (gastroesophageal reflux disease) K21.9   • Person under investigation for COVID-19 Z20.822   • On supplemental oxygen by nasal cannula Z78.9   • 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

## 2021-04-16 NOTE — PROGRESS NOTES
Palliative Care Progress Note    Date of Admission: 4/12/2021    Subjective: Patient continues to state that he is feeling weak but denies any other complaints.  Wife states that he continues to have dark bowel movements.  Current Code Status     Date Active Code Status Order ID Comments User Context       4/12/2021 1844 No CPR [id number]  Esther Drew APRN Inpatient     Advance Care Planning Activity      Questions for Current Code Status     Question Answer Comment    Code Status No CPR     Medical Interventions (Level of Support Prior to Arrest) Limited     Limited Support to NOT Include Artificial Nutrition      Dialysis      Cardioversion/Defibrillation      Intubation     Level Of Support Discussed With Patient         No current facility-administered medications on file prior to encounter.     Current Outpatient Medications on File Prior to Encounter   Medication Sig Dispense Refill   • acetaminophen (TYLENOL) 650 MG 8 hr tablet Take 650 mg by mouth Every 8 (Eight) Hours As Needed for Mild Pain .     • ASPIRIN LOW DOSE 81 MG tablet Take 1 tablet by mouth Daily. Last dose 10-21-18 (Patient taking differently: Take 81 mg by mouth Daily.)     • carvedilol (COREG) 12.5 MG tablet Take 1 tablet by mouth 2 (Two) Times a Day With Meals. 60 tablet 0   • cefTAZidime (FORTAZ) 1 g injection Infuse 2 g into a venous catheter 3 (Three) Times a Day. 100ml every 8 hours via PICC line     • citalopram (CeleXA) 20 MG tablet Take 40 mg by mouth Daily.     • doxazosin (CARDURA) 1 MG tablet Take  by mouth 2 (Two) Times a Day.     • ferrous gluconate (FERGON) 324 MG tablet Take 1 tablet by mouth Daily With Breakfast. (Patient taking differently: Take 65 mg by mouth Daily.) 30 tablet 0   • furosemide (LASIX) 20 MG tablet 40 mg 2 (two) times a day.     • Hydrocortisone, Perianal, (ANUSOL-HC) 2.5 % rectal cream Insert 1 application into the rectum 2 (Two) Times a Day.     • lactulose (CHRONULAC) 10 GM/15ML solution Take 15 mL by  "mouth 3 (Three) Times a Day for goal of 3 BMs daily 1892 mL 3   • melatonin 5 MG tablet tablet Take 5 mg by mouth Every Night.     • Multiple Vitamin (MULTI-VITAMIN DAILY) tablet Take 1 tablet by mouth Daily.     • O2 (OXYGEN)      • Ondansetron 4 MG film 3 (Three) Times a Day As Needed.     • pantoprazole (PROTONIX) 40 MG EC tablet Take 40 mg by mouth 2 (Two) Times a Day.     • saccharomyces boulardii (FLORASTOR) 250 MG capsule Take 250 mg by mouth 2 (Two) Times a Day.     • tamsulosin (FLOMAX) 0.4 MG capsule 24 hr capsule Take 1 capsule by mouth Daily. 15 capsule 0   • Trelegy Ellipta 100-62.5-25 MCG/INH aerosol powder  INHALE 1 PUFF ONCE DAILY 60 each 0   • Xifaxan 550 MG tablet 2 (Two) Times a Day.       octreotide (SandoSTATIN) infusion, 50 mcg/hr, Last Rate: 50 mcg/hr (04/16/21 0937)      •  acetaminophen **OR** acetaminophen **OR** acetaminophen  •  benzonatate  •  senna-docusate sodium **AND** polyethylene glycol **AND** bisacodyl **AND** bisacodyl  •  calcium carbonate  •  guaiFENesin-dextromethorphan  •  ipratropium-albuterol  •  magnesium sulfate **OR** magnesium sulfate **OR** magnesium sulfate  •  ondansetron **OR** ondansetron  •  phenol  •  potassium chloride **OR** potassium chloride **OR** potassium chloride  •  sodium chloride  •  sodium chloride    Objective: /71 (BP Location: Right arm, Patient Position: Sitting)   Pulse 56   Temp 97.8 °F (36.6 °C) (Oral)   Resp 18   Ht 190.5 cm (75\")   Wt 124 kg (272 lb 11.2 oz)   SpO2 94%   BMI 34.09 kg/m²      Intake/Output Summary (Last 24 hours) at 4/16/2021 1250  Last data filed at 4/16/2021 0900  Gross per 24 hour   Intake 1385.83 ml   Output 3000 ml   Net -1614.17 ml     Physical Exam:      General Appearance:    Alert, cooperative, in no acute distress   Head:    Normocephalic, without obvious abnormality, atraumatic   Eyes:            Lids and lashes normal, conjunctivae and sclerae normal, no   icterus, no pallor, corneas clear, PERRLA "   Ears:    Ears appear intact with no abnormalities noted   Throat:   No oral lesions, no thrush, oral mucosa moist   Neck:   No adenopathy, supple, trachea midline, no thyromegaly, no   carotid bruit, no JVD   Back:     No kyphosis present, no scoliosis present, no skin lesions,      erythema or scars, no tenderness to percussion or                   palpation,   range of motion normal   Lungs:     Clear to auscultation,respirations regular, even and                  unlabored    Heart:    Regular rhythm and normal rate, normal S1 and S2, no            murmur, no gallop, no rub, no click   Chest Wall:    No abnormalities observed   Abdomen:     Normal bowel sounds, no masses, no organomegaly, soft        non-tender, non-distended, no guarding, no rebound                tenderness   Rectal:     Deferred   Extremities:   Moves all extremities well, no edema, no cyanosis, no             redness   Pulses:   Pulses palpable and equal bilaterally   Skin:   No bleeding, bruising or rash   Lymph nodes:   No palpable adenopathy   Neurologic:   Cranial nerves 2 - 12 grossly intact, sensation intact, DTR       present and equal bilaterally     Results from last 7 days   Lab Units 04/16/21  1150 04/16/21  0838   WBC 10*3/mm3  --  4.53   HEMOGLOBIN g/dL 9.7* 9.1*  9.1*   HEMATOCRIT % 32.1* 29.9*  29.9*   PLATELETS 10*3/mm3  --  56*     Results from last 7 days   Lab Units 04/16/21  0838 04/15/21  0422   SODIUM mmol/L 137 134*   POTASSIUM mmol/L 3.7 3.6   CHLORIDE mmol/L 99 96*   CO2 mmol/L 31.0* 30.0*   BUN mg/dL 16 19   CREATININE mg/dL 0.88 1.13   CALCIUM mg/dL 8.3* 7.7*   BILIRUBIN mg/dL  --  1.1   ALK PHOS U/L  --  113   ALT (SGPT) U/L  --  33   AST (SGOT) U/L  --  73*   GLUCOSE mg/dL 101* 103*       Impression: Anemia  Debility  Cirrhosis  Infected aortic graft  Plan: Long discussion with the patient, patient's wife, and patient's 2 kids.  We discussed continue current plan of care versus a more hospice plan of care.   Did also discuss that regardless of which route we choose the patient will continue to decline as his underlying issues are irreversible and tend to be progressive diseases.  For now to continue the current plan of care for the time being..        Sedrick Ayon DO  04/16/21  12:50 EDT

## 2021-04-16 NOTE — THERAPY TREATMENT NOTE
Patient Name: Derek Kelsey  : 1950    MRN: [id number]                              Today's Date: 2021       Admit Date: 2021    Visit Dx:     ICD-10-CM ICD-9-CM   1. Acute blood loss anemia, mild, asymptomatic  D62 285.1   2. Gastrointestinal hemorrhage with melena  K92.1 578.1     Patient Active Problem List   Diagnosis   • Anxiety   • Abdominal aortic aneurysm (AAA) without rupture (CMS/HCC)   • Depression   • Hypertension   • Cataract, bilateral   • CAD (coronary artery disease)   • Abnormal stress test   • Moderate COPD   • Former smoker   • Coal workers pneumoconiosis, simple   • Back pain   • S/P lumbar fusion   • Prediabetes   • Acute blood loss anemia, mild, asymptomatic   • Acute postoperative pain   • Hyponatremia, mild   • Chronic respiratory failure with hypoxia and hypercapnia (CMS/HCC)   • Renal insufficiency   • Acute cystitis without hematuria   • Acute UTI   • Bacteremia   • Symptomatic anemia   • Aortitis (CMS/HCC)   • Elevated C-reactive protein (CRP)   • Cirrhosis of liver (CMS/HCC)   • High anion gap metabolic acidosis   • S/P AAA repair   • GERD (gastroesophageal reflux disease)   • Person under investigation for COVID-19   • On supplemental oxygen by nasal cannula   • Infected aortic endograft (CMS/HCC)   • Elevated transaminase level   • Thrombocytopenia (CMS/HCC)   • Pneumonia   • Gram-negative bacteremia   • COVID-19   • Anemia   • Gastrointestinal hemorrhage with melena     Past Medical History:   Diagnosis Date   • Abdominal aortic aneurysm (CMS/HCC) 2016   • Anxiety 2016   • Arthritis    • Back pain    • Black lung (CMS/HCC)    • CAD (coronary artery disease) 2016   • Cirrhosis (CMS/HCC)    • COPD (chronic obstructive pulmonary disease) (CMS/HCC) 2016   • Depression 2016   • Depression    • GERD (gastroesophageal reflux disease)    • Hypertension 2016   • On supplemental oxygen by nasal cannula     at night   • Vitiligo    • Wears  dentures    • Wears reading eyeglasses      Past Surgical History:   Procedure Laterality Date   • ABDOMINAL AORTIC ANEURYSM REPAIR WITH ENDOGRAFT N/A 6/6/2019    Procedure: ABDOMINAL AORTIC ANEURYSM REPAIR WITH ENDOGRAFT;  Surgeon: Leopoldo Burleson MD;  Location:  [facility];  Service: Vascular   • BACK SURGERY     • CARDIAC CATHETERIZATION     • CARDIAC CATHETERIZATION N/A 9/28/2018    Procedure: Left Heart Cath;  Surgeon: Douglas Galvez MD;  Location:  [facility];  Service: Cardiovascular   • CARDIAC SURGERY     • COLONOSCOPY      2015   • COLONOSCOPY N/A 10/30/2019    Procedure: COLONOSCOPY;  Surgeon: Homar Viveros MD;  Location:  [facility];  Service: Gastroenterology   • ENDOSCOPY N/A 10/30/2019    Procedure: ESOPHAGOGASTRODUODENOSCOPY;  Surgeon: Homar Viveros MD;  Location:  [facility];  Service: Gastroenterology   • ENDOSCOPY N/A 4/14/2021    Procedure: ESOPHAGOGASTRODUODENOSCOPY;  Surgeon: Eddie Benton MD;  Location:  [facility];  Service: Gastroenterology;  Laterality: N/A;   • EYE SURGERY      cataracts bilateral   • HAND SURGERY Left    • LUMBAR DISCECTOMY FUSION INSTRUMENTATION N/A 11/1/2018    Procedure: LUMBAR DISCECTOMY POSTERIOR WITH FUSION INSTRUMENTATION;  Surgeon: Joo Franklin MD;  Location:  [facility];  Service: Orthopedic Spine   • LUMBAR DISCECTOMY FUSION INSTRUMENTATION N/A 7/24/2019    Procedure: POSTERIOR LUMBAR SPINAL FUSION REVISION AND INSTRUMENTATION L3,L4,L5,S1;  Surgeon: Joo Franklin MD;  Location:  [facility];  Service: Orthopedic Spine   • ORIF FINGER / THUMB FRACTURE     • SHOULDER SURGERY Left      General Information     Row Name 04/16/21 1103          Physical Therapy Time and Intention    Document Type  therapy note (daily note)  -CD     Row Name 04/16/21 1103          General Information    Patient Profile Reviewed  yes  -CD     Existing Precautions/Restrictions  fall;oxygen therapy device and L/min  symptomatic anemia, [place], monitor vitals.  -CD     Row Name 04/16/21 1103          Cognition    Orientation Status (Cognition)  oriented to;person;place;verbal cues/prompts needed for orientation;time KNEW YEAR, NOT MONTH.  -CD     Row Name 04/16/21 1103          Safety Issues, Functional Mobility    Safety Issues Affecting Function (Mobility)  insight into deficits/self-awareness;safety precaution awareness;safety precautions follow-through/compliance;awareness of need for assistance  -CD     Impairments Affecting Function (Mobility)  balance;endurance/activity tolerance;strength  -CD       User Key  (r) = Recorded By, (t) = Taken By, (c) = Cosigned By    Initials Name Provider Type    CD Loree Oquendo, PT Physical Therapist        Mobility     Row Name 04/16/21 1104          Bed Mobility    Bed Mobility  supine-sit  -CD     Supine-Sit [place] (Bed Mobility)  verbal cues;contact guard  -CD     Assistive Device (Bed Mobility)  bed rails;head of bed elevated  -CD     Row Name 04/16/21 1104          Transfers    Comment (Transfers)  CUES FOR HAND PLACEMENT.  -CD     Row Name 04/16/21 1104          Sit-Stand Transfer    Sit-Stand [place] (Transfers)  verbal cues;contact guard  -CD     Assistive Device (Sit-Stand Transfers)  walker, front-wheeled  -CD     Row Name 04/16/21 1104          Gait/Stairs (Locomotion)    [place] Level (Gait)  contact guard;verbal cues  -CD     Assistive Device (Gait)  walker, front-wheeled  -CD     Distance in Feet (Gait)  200 FEET.  -CD     Deviations/Abnormal Patterns (Gait)  ana decreased;stride length decreased  -CD     Bilateral Gait Deviations  forward flexed posture;heel strike decreased  -CD     Comment (Gait/Stairs)  CUES FOR WALKER PLACEMENT, UPRIGHT POSTURE. SOA WITH GAIT IN ROBINS BUT O2 SATS STABLE ON 4L O2.  -CD       User Key  (r) = Recorded By, (t) = Taken By, (c) = Cosigned By    Initials Name Provider Type    CD Loree Oquendo PT Physical Therapist         Obj/Interventions     Row Name 04/16/21 1106          Motor Skills    Motor Skills  functional endurance  -CD     Functional Endurance  SOA AND LIMITED BY FATIGUE BUT IMPROVED GAIT  DISTANCE TODAY AND O2 SATS STABLE.  -CD     Therapeutic Exercise  -- COMPLETED SEATED THER EX: LAQ, HIP FLEX, AP'S - 1 SET OF 10 REPS.  -CD     Row Name 04/16/21 1106          Balance    Balance Assessment  sitting static balance;sitting dynamic balance;standing static balance;standing dynamic balance  -CD     Static Sitting Balance  WFL  -CD     Dynamic Sitting Balance  WFL  -CD     Static Standing Balance  WFL;standing  -CD     Dynamic Standing Balance  supported;standing;mild impairment  -CD       User Key  (r) = Recorded By, (t) = Taken By, (c) = Cosigned By    Initials Name Provider Type    CD Loree Oquendo, PT Physical Therapist        Goals/Plan    No documentation.       Clinical Impression     Row Name 04/16/21 1110          Pain Scale: Numbers Pre/Post-Treatment    Pretreatment Pain Rating  0/10 - no pain  -CD     Posttreatment Pain Rating  0/10 - no pain  -CD     Row Name 04/16/21 1110          Plan of Care Review    Progress  improving  -CD     Outcome Summary  SOA WITH ACTIVITY BUT IMPROVED GAIT  DISTANCE  FEET WITH R WALKER AND CGA  WITH STABLE O2 SATS. PT REQUIRED MIN ASSIST FOR SUPINE TO SIT AND CGA FOR STS. GOOD EFFORT.  -CD     Row Name 04/16/21 1110          Therapy Assessment/Plan (PT)    Patient/Family Therapy Goals Statement (PT)  TO GO HOME.  -CD     Rehab Potential (PT)  good, to achieve stated therapy goals  -CD     Criteria for Skilled Interventions Met (PT)  yes;skilled treatment is necessary  -CD     Row Name 04/16/21 1110          Vital Signs    Post Systolic BP Rehab  139  -CD     Post Treatment Diastolic BP  65  -CD     Posttreatment Heart Rate (beats/min)  62  -CD     Pre SpO2 (%)  95  -CD     O2 Delivery Pre Treatment  supplemental O2  -CD     Post SpO2 (%)  95  -CD     O2 Delivery Post  Treatment  supplemental O2  -CD     Pre Patient Position  Supine  -CD     Intra Patient Position  Standing  -CD     Post Patient Position  Sitting  -CD     Row Name 04/16/21 1110          Positioning and Restraints    Pre-Treatment Position  in bed  -CD     Post Treatment Position  chair  -CD     In Chair  reclined;call light within reach;encouraged to call for assist;exit alarm on;with family/caregiver;legs elevated;notified nsg  -CD       User Key  (r) = Recorded By, (t) = Taken By, (c) = Cosigned By    Initials Name Provider Type    CD Loree Oquendo, PT Physical Therapist        Outcome Measures     Row Name 04/16/21 1113          How much help from another person do you currently need...    Turning from your back to your side while in flat bed without using bedrails?  3  -CD     Moving from lying on back to sitting on the side of a flat bed without bedrails?  3  -CD     Moving to and from a bed to a chair (including a wheelchair)?  3  -CD     Standing up from a chair using your arms (e.g., wheelchair, bedside chair)?  3  -CD     Climbing 3-5 steps with a railing?  2  -CD     To walk in hospital room?  3  -CD     AM-PAC 6 Clicks Score (PT)  17  -CD       User Key  (r) = Recorded By, (t) = Taken By, (c) = Cosigned By    Initials Name Provider Type    Loree Bliss, PT Physical Therapist        Physical Therapy Education                 Title: PT OT SLP Therapies (Done)     Topic: Physical Therapy (Done)     Point: Mobility training (Done)     Learning Progress Summary           Patient Acceptance, E, VU,NR by CD at 4/16/2021 1113    Comment: SEE FLOWSHEET.    Acceptance, E,D, VU,NR by LR at 4/13/2021 0954    Comment: Educated on benefits of mobility, safety with mobility, correct sit<->stand t/f technique, correct gait mechanics, PLB, and progression of POC.   Family Acceptance, E, VU,NR by CD at 4/16/2021 1113    Comment: SEE FLOWSHEET.   Significant Other Acceptance, E,D, VU,NR by LR at 4/13/2021 0964     Comment: Educated on benefits of mobility, safety with mobility, correct sit<->stand t/f technique, correct gait mechanics, PLB, and progression of POC.                   Point: Home exercise program (Done)     Learning Progress Summary           Patient Acceptance, E, VU,NR by CD at 4/16/2021 1113    Comment: SEE FLOWSHEET.    Acceptance, E,D, VU,NR by LR at 4/13/2021 0954    Comment: Educated on benefits of mobility, safety with mobility, correct sit<->stand t/f technique, correct gait mechanics, PLB, and progression of POC.   Family Acceptance, E, VU,NR by CD at 4/16/2021 1113    Comment: SEE FLOWSHEET.   Significant Other Acceptance, E,D, VU,NR by LR at 4/13/2021 0954    Comment: Educated on benefits of mobility, safety with mobility, correct sit<->stand t/f technique, correct gait mechanics, PLB, and progression of POC.                   Point: Body mechanics (Done)     Learning Progress Summary           Patient Acceptance, E, VU,NR by CD at 4/16/2021 1113    Comment: SEE FLOWSHEET.    Acceptance, E,D, VU,NR by LR at 4/13/2021 0954    Comment: Educated on benefits of mobility, safety with mobility, correct sit<->stand t/f technique, correct gait mechanics, PLB, and progression of POC.   Family Acceptance, E, VU,NR by CD at 4/16/2021 1113    Comment: SEE FLOWSHEET.   Significant Other Acceptance, E,D, VU,NR by LR at 4/13/2021 0954    Comment: Educated on benefits of mobility, safety with mobility, correct sit<->stand t/f technique, correct gait mechanics, PLB, and progression of POC.                   Point: Precautions (Done)     Learning Progress Summary           Patient Acceptance, E, VU,NR by CD at 4/16/2021 1113    Comment: SEE FLOWSHEET.    Acceptance, E,D, VU,NR by LR at 4/13/2021 0954    Comment: Educated on benefits of mobility, safety with mobility, correct sit<->stand t/f technique, correct gait mechanics, PLB, and progression of POC.   Family Acceptance, E, VU,NR by CD at 4/16/2021 1113    Comment:  SEE FLOWSHEET.   Significant Other Acceptance, E,D, VU,NR by LR at 4/13/2021 0953    Comment: Educated on benefits of mobility, safety with mobility, correct sit<->stand t/f technique, correct gait mechanics, PLB, and progression of POC.                               User Key     Initials Effective Dates Name Provider Type Discipline    CD 06/19/15 -  Loree Oquendo, PT Physical Therapist PT    LR 06/19/15 -  Esther Min, PT Physical Therapist PT              PT Recommendation and Plan     Progress: improving  Outcome Summary: SOA WITH ACTIVITY BUT IMPROVED GAIT  DISTANCE  FEET WITH R WALKER AND CGA  WITH STABLE O2 SATS. PT REQUIRED MIN ASSIST FOR SUPINE TO SIT AND CGA FOR STS. GOOD EFFORT.     Time Calculation:   PT Charges     Row Name 04/16/21 1115             Time Calculation    Start Time  0939  -CD      PT Received On  04/16/21  -      PT Goal Re-Cert Due Date  04/23/21  -CD         Time Calculation- PT    Total Timed Code Minutes- PT  28 minute(s)  -CD         Timed Charges    95862 - PT Therapeutic Exercise Minutes  8  -CD      64458 - Gait Training Minutes   20  -CD        User Key  (r) = Recorded By, (t) = Taken By, (c) = Cosigned By    Initials Name Provider Type    CD Loree Oquendo, PT Physical Therapist        Therapy Charges for Today     Code Description Service Date Service Provider Modifiers Qty    98131779976 HC PT THER PROC EA 15 MIN 4/16/2021 Loree Oquendo, PT GP 1    45054348851 HC GAIT TRAINING EA 15 MIN 4/16/2021 Loree Oquendo, PT GP 1          PT G-Codes  Outcome Measure Options: AM-PAC 6 Clicks Daily Activity (OT)  AM-PAC 6 Clicks Score (PT): 17  AM-PAC 6 Clicks Score (OT): 17    Loree Oquendo PT  4/16/2021

## 2021-04-16 NOTE — PROGRESS NOTES
"GI Daily Progress Note  Subjective     Derek Tejeda is a 70 y.o. male who was admitted with Symptomatic anemia.   Feels better this afternoon.  States he is sleepy.  Stools are now brown to green.His blood thinners have been discontinued  Son in room  Chief Complaint:  weakness    Objective     /66 (BP Location: Left arm, Patient Position: Lying)   Pulse 67   Temp 98.6 °F (37 °C) (Oral)   Resp 18   Ht 190.5 cm (75\")   Wt 124 kg (272 lb 11.2 oz)   SpO2 94%   BMI 34.09 kg/m²     Intake/Output last 3 shifts:  I/O last 3 completed shifts:  In: 1790.8 [P.O.:995; Blood:695.8; IV Piggyback:100]  Out: 4510 [Urine:4510]  Intake/Output this shift:  I/O this shift:  In: 240 [P.O.:240]  Out: -       Physical Exam  Wt Readings from Last 3 Encounters:   04/16/21 124 kg (272 lb 11.2 oz)   03/29/21 114 kg (252 lb)   03/17/21 106 kg (233 lb 4.8 oz)   ,body mass index is 34.09 kg/m².,@FLOWAMB(6)@,@FLOWAMB(5)@,@FLOWAMB(8)@   CONSTITUTIONAL:lying in bed  Resp Mild rhonchi  Respiration effort normal  CV RRR; no M/R/G. No lower extremity edema  GI Abd soft, NT, ND, normal active bowel sounds.    Psych:     DATA:Results for DEREK TEJEDA (MRN [id number]) as of 4/16/2021 16:35   Ref. Range 4/15/2021 00:12 4/15/2021 04:22 4/15/2021 17:55 4/15/2021 23:51 4/16/2021 08:38 4/16/2021 08:38 4/16/2021 11:50   Hemoglobin Latest Ref Range: 13.0 - 17.7 g/dL 7.0 (L) 7.1 (L) 9.1 (L) 8.8 (L) 9.1 (L) 9.1 (L) 9.7 (L)       Assessment/Plan     1. Symptomatic anemia  2. Cirrhosis of liver  3. Infected aortic endograft  4. Portal HTN gastropathy with oozing        Hg stable off blood thinners.  Have advised against colonscopy at this time unless he has further bleeding due to risk of sedation. He and son are in agreement    Has known oozing from portal HTN gastropathy  Would continue Coreg to help lower portal pressure        Symptomatic anemia    Depression    Hypertension    CAD (coronary artery disease)    Moderate COPD    Cirrhosis of " liver (CMS/HCC)    On supplemental oxygen by nasal cannula    Infected aortic endograft (CMS/HCC)    Anemia    Gastrointestinal hemorrhage with melena       LOS: 3 days     Eddie Benton MD  04/16/21  16:44 EDT

## 2021-04-16 NOTE — PROGRESS NOTES
[facility]  PROGRESS NOTE    Patient Name: Derek Kelsey  : 1950  MRN: [id number]    Date of Admission: 2021  Primary Care Physician: Raudel Zheng MD    Subjective   Subjective     CC:  Anemia, cirrhosis, melena, respiratory failure, chronically infected endograft    HPI:  stoo was more brown, less dark today. Respiratory status baseline. No abd pain. No dyspnea  ROS:  No palpitations. No chest pain  Gen- No fevers, chills  CV- No chest pain, palpitations  Resp- No cough, otherwise as above  GI- No N/V, no abd pain.       Objective   Objective     Vital Signs:   Temp:  [97.8 °F (36.6 °C)-99 °F (37.2 °C)] 98.6 °F (37 °C)  Heart Rate:  [56-79] 67  Resp:  [16-20] 18  BP: (126-145)/(62-71) 134/66        Physical Exam:  Constitutional: a&ox3 today. Sitting in chair comfortably, no distress  Psych:Normal/appropriate affect  HEENT:Ncat, oroph clear, nasal canula in place  Neck: neck supple, full range of motion  Neuro: Face symmetric, speech clear, equal , moves all extremities  Cardiac: irr irr, regular rate; BLE edema  Resp: faint bibasilar crackles, no overt wheezes, normal respiratory effort  GI: abd soft, nontender, mild distended, obese  Skin: No extremity rash  Musculoskeletal/extremities: no cyanosis extremities      Results Reviewed:  Results from last 7 days   Lab Units 21  1150 21  0838 04/15/21  2351 04/15/21  0422 21  0804 21  0928   WBC 10*3/mm3  --  4.53  --  4.05 3.38* 4.32   HEMOGLOBIN g/dL 9.7* 9.1*  9.1* 8.8* 7.1* 7.5* 7.0*   HEMATOCRIT % 32.1* 29.9*  29.9* 28.7* 23.1* 24.8* 22.7*   PLATELETS 10*3/mm3  --  56*  --  73* 70* 77*   INR   --  1.55*  --  1.66* 1.67* 1.66*   PROCALCITONIN ng/mL  --   --   --   --   --  0.16     Results from last 7 days   Lab Units 21  0838 04/15/21  1755 04/15/21  0422 21  0804 21  0209 21  1852   SODIUM mmol/L 137  --  134* 133*  --  127*   POTASSIUM  mmol/L 3.7 4.2 3.6 3.8   < > 3.8   CHLORIDE mmol/L 99  --  96* 95*   < > 92*   CO2 mmol/L 31.0*  --  30.0* 30.0*   < > 25.0   BUN mg/dL 16  --  19 20   < > 20   CREATININE mg/dL 0.88  --  1.13 1.10   < > 0.97   GLUCOSE mg/dL 101*  --  103* 117*   < > 101*   CALCIUM mg/dL 8.3*  --  7.7* 8.2*   < > 8.7   ALT (SGPT) U/L  --   --  33 37  --  40   AST (SGOT) U/L  --   --  73* 81*  --  85*   PROBNP pg/mL  --   --   --   --   --  391.4    < > = values in this interval not displayed.     Estimated Creatinine Clearance: 113.6 mL/min (by C-G formula based on SCr of 0.88 mg/dL).    Microbiology Results Abnormal     Procedure Component Value - Date/Time    Respiratory Culture - Sputum, Cough [[id number]] Collected: 04/14/21 1201    Lab Status: Final result Specimen: Sputum from Cough Updated: 04/16/21 0909     Respiratory Culture Moderate growth (3+) Normal Respiratory Abby: NO S.aureus/MRSA or Pseudomonas aeruginosa     Gram Stain Many (4+) Epithelial cells per low power field      Few (2+) WBCs per low power field      Moderate (3+) Mixed bacterial morphotypes seen on Gram Stain      Few (2+) Yeast    Legionella Antigen, Urine - Urine, Urine, Clean Catch [[id number]]  (Normal) Collected: 04/13/21 0726    Lab Status: Final result Specimen: Urine, Clean Catch Updated: 04/13/21 1341     LEGIONELLA ANTIGEN, URINE Negative    S. Pneumo Ag Urine or CSF - Urine, Urine, Clean Catch [[id number]]  (Normal) Collected: 04/12/21 2325    Lab Status: Final result Specimen: Urine, Clean Catch Updated: 04/13/21 1021     Strep Pneumo Ag Negative    Eosinophil Smear - Urine, Urine, Clean Catch [[id number]]  (Normal) Collected: 04/12/21 2325    Lab Status: Final result Specimen: Urine, Clean Catch Updated: 04/13/21 0054     Eosinophil Smear 0 % EOS/100 Cells     Narrative:      No eosinophil seen          Imaging Results (Last 24 Hours)     ** No results found for the last 24 hours. **          Results for orders placed during the hospital  encounter of 03/10/21    Adult Transthoracic Echo Complete W/ Cont if Necessary Per Protocol    Interpretation Summary  · Estimated right ventricular systolic pressure from tricuspid regurgitation is normal (<35 mmHg).  · Estimated left ventricular EF = 65% Left ventricular systolic function is normal.      I have reviewed the medications:  Scheduled Meds:budesonide-formoterol, 2 puff, Inhalation, BID - RT   And  ipratropium, 0.5 mg, Nebulization, 4x Daily - RT  carvedilol, 12.5 mg, Oral, Q12H  cefTAZidime, 2 g, Intravenous, Q12H  citalopram, 40 mg, Oral, Daily  ferrous sulfate, 325 mg, Oral, Daily With Breakfast  furosemide, 40 mg, Oral, BID  lactulose, 15 mL, Oral, TID  melatonin, 5 mg, Oral, Nightly  multivitamin, 1 tablet, Oral, Daily  pantoprazole, 40 mg, Intravenous, BID AC  riFAXIMin, 550 mg, Oral, Q12H  saccharomyces boulardii, 250 mg, Oral, BID  senna-docusate sodium, 2 tablet, Oral, BID  sodium chloride, 10 mL, Intravenous, Q12H  sodium chloride, 10 mL, Intravenous, Q12H  tamsulosin, 0.4 mg, Oral, Daily  terazosin, 2 mg, Oral, Nightly      Continuous Infusions:octreotide (SandoSTATIN) infusion, 50 mcg/hr, Last Rate: 50 mcg/hr (04/16/21 0937)      PRN Meds:.•  acetaminophen **OR** acetaminophen **OR** acetaminophen  •  benzonatate  •  senna-docusate sodium **AND** polyethylene glycol **AND** bisacodyl **AND** bisacodyl  •  calcium carbonate  •  guaiFENesin-dextromethorphan  •  ipratropium-albuterol  •  magnesium sulfate **OR** magnesium sulfate **OR** magnesium sulfate  •  ondansetron **OR** ondansetron  •  phenol  •  potassium chloride **OR** potassium chloride **OR** potassium chloride  •  sodium chloride  •  sodium chloride    Assessment/Plan   Assessment & Plan     Active Hospital Problems    Diagnosis  POA   • **Symptomatic anemia [D64.9]  Yes   • Anemia [D64.9]  Yes   • Gastrointestinal hemorrhage with melena [K92.1]  Unknown   • 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        Brief Hospital Course to date:  Derek Kelsey is a 70 y.o. male w/ cirrhosis, chronically infected aaa endograft (on fortaz followed by Dr. Carrero), afib, dhf, copd/ILD w/ 3-4L chronic oxygen dependence, also w/ hx of covid + pcr on 3/10/21 (also got 2nd covid 19 vaccination shot on 3/23/21) who presented w/ dyspnea on exertion, fatigue. Apparently had appointment at [facility] mid April to consider \"options\" for his chronically infected endograft. Noted had hgb 5. Has had melena recently and some mild increased confusion. Ct revealed no PE but bilateral effusions. Transfused 2 unit prbc w/ bumex. Gi, cards, ID consulted.     *Acute GI bleed, likely upper (melena)  *Decompensated cirrhosis (w/ coagulopathy, thrombocytopenia, hepatic encephalopathy)   -4/14/21: egd w/ portal gasropathy w/ oozing (varices noted but no overt bleeding; -bid ppi, octreotide   -GI ? planning c-scope ? 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 Carrero   -apparently has appointment @ [facility] next week re: options; however patient now declining this and accepts that this means he will have recurrent/persistent infection and likely sepsis   -palliative following; appreciate further " assistance in future goals/plans; perhaps hospice in order if/when clinically deteriorates  *Afib (not anticoagulated)   -Dr. Gardner following, on coreg; no a/c due to gi bleeding/cirrhosis   -currently rate controlled  *hx covid 19 pcr + (on 3/10/21)   -also had covid vacinnation x 2 shots (2nd shot 3/23/21)   -no isolatoin    Plan:  -continue ppi, octreotide for now, gi bleeding seems to be improved (stool more brown today, hgb stable); GI deciding on whether pursue C-scope (hgb stable today); q6h H&H, transfuse prn    -continue lasix, rate control per cards (no anticoagulation)    -continue fortaz per ID (likely another couple weeks); Dr. Carrero back Monday    -long d/w with family at bedside today. Patient has opted against [facility] for explantation evaluation of aaa graft, which admittedly would be remarkably high risk, if at all possible. All agree no heroics (dnr/dni/no heroics/no surgeries). At this point would like to continue current course of action to see if can whit gi bleeding. Will continue to monitor over weekend      Labs: q6h H&H; cbc,bmp,inr,mag    DVT Prophylaxis:  mechanical      Disposition: I expect the patient to be discharged TBD, once stable, possibly next week    CODE STATUS:   Code Status and Medical Interventions:   Ordered at: 04/12/21 5193     Limited Support to NOT Include:    Artificial Nutrition    Dialysis    Cardioversion/Defibrillation    Intubation     Level Of Support Discussed With:    Patient     Code Status:    No CPR     Medical Interventions (Level of Support Prior to Arrest):    Limited       Rinku Mcrae MD  04/16/21

## 2021-04-17 LAB
ANION GAP SERPL CALCULATED.3IONS-SCNC: 6 MMOL/L (ref 5–15)
BUN SERPL-MCNC: 13 MG/DL (ref 8–23)
BUN/CREAT SERPL: 17.6 (ref 7–25)
CALCIUM SPEC-SCNC: 8.2 MG/DL (ref 8.6–10.5)
CHLORIDE SERPL-SCNC: 99 MMOL/L (ref 98–107)
CO2 SERPL-SCNC: 30 MMOL/L (ref 22–29)
CREAT SERPL-MCNC: 0.74 MG/DL (ref 0.76–1.27)
DEPRECATED RDW RBC AUTO: 59.7 FL (ref 37–54)
ERYTHROCYTE [DISTWIDTH] IN BLOOD BY AUTOMATED COUNT: 16.7 % (ref 12.3–15.4)
GFR SERPL CREATININE-BSD FRML MDRD: 105 ML/MIN/1.73
GLUCOSE SERPL-MCNC: 115 MG/DL (ref 65–99)
HCT VFR BLD AUTO: 28.8 % (ref 37.5–51)
HCT VFR BLD AUTO: 30.7 % (ref 37.5–51)
HCT VFR BLD AUTO: 32.5 % (ref 37.5–51)
HGB BLD-MCNC: 8.6 G/DL (ref 13–17.7)
HGB BLD-MCNC: 9.4 G/DL (ref 13–17.7)
HGB BLD-MCNC: 9.8 G/DL (ref 13–17.7)
INR PPP: 1.61 (ref 0.85–1.16)
MAGNESIUM SERPL-MCNC: 1.7 MG/DL (ref 1.6–2.4)
MCH RBC QN AUTO: 29.6 PG (ref 26.6–33)
MCHC RBC AUTO-ENTMCNC: 30.6 G/DL (ref 31.5–35.7)
MCV RBC AUTO: 96.5 FL (ref 79–97)
PLATELET # BLD AUTO: 48 10*3/MM3 (ref 140–450)
PMV BLD AUTO: 10.1 FL (ref 6–12)
POTASSIUM SERPL-SCNC: 4.3 MMOL/L (ref 3.5–5.2)
PROTHROMBIN TIME: 18.5 SECONDS (ref 11.4–14.4)
RBC # BLD AUTO: 3.18 10*6/MM3 (ref 4.14–5.8)
SODIUM SERPL-SCNC: 135 MMOL/L (ref 136–145)
WBC # BLD AUTO: 3.76 10*3/MM3 (ref 3.4–10.8)

## 2021-04-17 PROCEDURE — 97116 GAIT TRAINING THERAPY: CPT

## 2021-04-17 PROCEDURE — 83735 ASSAY OF MAGNESIUM: CPT | Performed by: INTERNAL MEDICINE

## 2021-04-17 PROCEDURE — 25010000002 OCTREOTIDE PER 25 MCG: Performed by: INTERNAL MEDICINE

## 2021-04-17 PROCEDURE — 25010000003 MAGNESIUM SULFATE 4 GM/100ML SOLUTION: Performed by: INTERNAL MEDICINE

## 2021-04-17 PROCEDURE — 97110 THERAPEUTIC EXERCISES: CPT

## 2021-04-17 PROCEDURE — 80048 BASIC METABOLIC PNL TOTAL CA: CPT | Performed by: INTERNAL MEDICINE

## 2021-04-17 PROCEDURE — 85018 HEMOGLOBIN: CPT | Performed by: INTERNAL MEDICINE

## 2021-04-17 PROCEDURE — 94799 UNLISTED PULMONARY SVC/PX: CPT

## 2021-04-17 PROCEDURE — 85014 HEMATOCRIT: CPT | Performed by: INTERNAL MEDICINE

## 2021-04-17 PROCEDURE — 85610 PROTHROMBIN TIME: CPT | Performed by: INTERNAL MEDICINE

## 2021-04-17 PROCEDURE — 99233 SBSQ HOSP IP/OBS HIGH 50: CPT | Performed by: INTERNAL MEDICINE

## 2021-04-17 PROCEDURE — 25010000002 FUROSEMIDE PER 20 MG: Performed by: INTERNAL MEDICINE

## 2021-04-17 PROCEDURE — 97530 THERAPEUTIC ACTIVITIES: CPT

## 2021-04-17 PROCEDURE — 85027 COMPLETE CBC AUTOMATED: CPT | Performed by: INTERNAL MEDICINE

## 2021-04-17 PROCEDURE — 25010000002 CEFTAZIDIME PER 500 MG: Performed by: INTERNAL MEDICINE

## 2021-04-17 PROCEDURE — 25010000003 PHYTONADIONE 10 MG/ML SOLUTION: Performed by: INTERNAL MEDICINE

## 2021-04-17 RX ORDER — PHYTONADIONE 2 MG/ML
5 INJECTION, EMULSION INTRAMUSCULAR; INTRAVENOUS; SUBCUTANEOUS ONCE
Status: COMPLETED | OUTPATIENT
Start: 2021-04-17 | End: 2021-04-17

## 2021-04-17 RX ORDER — FUROSEMIDE 10 MG/ML
20 INJECTION INTRAMUSCULAR; INTRAVENOUS ONCE
Status: COMPLETED | OUTPATIENT
Start: 2021-04-17 | End: 2021-04-17

## 2021-04-17 RX ADMIN — CITALOPRAM 40 MG: 40 TABLET ORAL at 09:04

## 2021-04-17 RX ADMIN — IPRATROPIUM BROMIDE 0.5 MG: 0.5 SOLUTION RESPIRATORY (INHALATION) at 12:43

## 2021-04-17 RX ADMIN — CEFTAZIDIME 2 G: 2 INJECTION, POWDER, FOR SOLUTION INTRAVENOUS at 02:46

## 2021-04-17 RX ADMIN — PHYTONADIONE 5 MG: 10 INJECTION, EMULSION INTRAMUSCULAR; INTRAVENOUS; SUBCUTANEOUS at 15:16

## 2021-04-17 RX ADMIN — IPRATROPIUM BROMIDE 0.5 MG: 0.5 SOLUTION RESPIRATORY (INHALATION) at 15:59

## 2021-04-17 RX ADMIN — Medication 250 MG: at 09:05

## 2021-04-17 RX ADMIN — FUROSEMIDE 40 MG: 40 TABLET ORAL at 09:04

## 2021-04-17 RX ADMIN — TAMSULOSIN HYDROCHLORIDE 0.4 MG: 0.4 CAPSULE ORAL at 09:05

## 2021-04-17 RX ADMIN — FUROSEMIDE 20 MG: 10 INJECTION, SOLUTION INTRAMUSCULAR; INTRAVENOUS at 12:09

## 2021-04-17 RX ADMIN — FERROUS SULFATE TAB 325 MG (65 MG ELEMENTAL FE) 325 MG: 325 (65 FE) TAB at 09:04

## 2021-04-17 RX ADMIN — Medication 5 MG: at 20:16

## 2021-04-17 RX ADMIN — THERA TABS 1 TABLET: TAB at 09:04

## 2021-04-17 RX ADMIN — MAGNESIUM SULFATE HEPTAHYDRATE 4 G: 40 INJECTION, SOLUTION INTRAVENOUS at 12:09

## 2021-04-17 RX ADMIN — CARVEDILOL 12.5 MG: 12.5 TABLET, FILM COATED ORAL at 20:16

## 2021-04-17 RX ADMIN — SODIUM CHLORIDE, PRESERVATIVE FREE 10 ML: 5 INJECTION INTRAVENOUS at 20:16

## 2021-04-17 RX ADMIN — PANTOPRAZOLE SODIUM 40 MG: 40 INJECTION, POWDER, FOR SOLUTION INTRAVENOUS at 17:54

## 2021-04-17 RX ADMIN — DOCUSATE SODIUM 50MG AND SENNOSIDES 8.6MG 2 TABLET: 8.6; 5 TABLET, FILM COATED ORAL at 20:16

## 2021-04-17 RX ADMIN — BUDESONIDE AND FORMOTEROL FUMARATE DIHYDRATE 2 PUFF: 160; 4.5 AEROSOL RESPIRATORY (INHALATION) at 07:38

## 2021-04-17 RX ADMIN — LACTULOSE 15 ML: 20 SOLUTION ORAL at 15:15

## 2021-04-17 RX ADMIN — LACTULOSE 15 ML: 20 SOLUTION ORAL at 09:06

## 2021-04-17 RX ADMIN — CEFTAZIDIME 2 G: 2 INJECTION, POWDER, FOR SOLUTION INTRAVENOUS at 15:16

## 2021-04-17 RX ADMIN — IPRATROPIUM BROMIDE 0.5 MG: 0.5 SOLUTION RESPIRATORY (INHALATION) at 07:32

## 2021-04-17 RX ADMIN — CARVEDILOL 12.5 MG: 12.5 TABLET, FILM COATED ORAL at 09:05

## 2021-04-17 RX ADMIN — TERAZOSIN HYDROCHLORIDE 2 MG: 2 CAPSULE ORAL at 20:16

## 2021-04-17 RX ADMIN — SODIUM CHLORIDE, PRESERVATIVE FREE 10 ML: 5 INJECTION INTRAVENOUS at 09:06

## 2021-04-17 RX ADMIN — SODIUM CHLORIDE, PRESERVATIVE FREE 10 ML: 5 INJECTION INTRAVENOUS at 21:40

## 2021-04-17 RX ADMIN — IPRATROPIUM BROMIDE 0.5 MG: 0.5 SOLUTION RESPIRATORY (INHALATION) at 20:41

## 2021-04-17 RX ADMIN — PANTOPRAZOLE SODIUM 40 MG: 40 INJECTION, POWDER, FOR SOLUTION INTRAVENOUS at 09:02

## 2021-04-17 RX ADMIN — BUDESONIDE AND FORMOTEROL FUMARATE DIHYDRATE 2 PUFF: 160; 4.5 AEROSOL RESPIRATORY (INHALATION) at 20:41

## 2021-04-17 RX ADMIN — OCTREOTIDE ACETATE 50 MCG/HR: 500 INJECTION, SOLUTION INTRAVENOUS; SUBCUTANEOUS at 09:02

## 2021-04-17 RX ADMIN — RIFAXIMIN 550 MG: 550 TABLET ORAL at 20:16

## 2021-04-17 RX ADMIN — OCTREOTIDE ACETATE 50 MCG/HR: 500 INJECTION, SOLUTION INTRAVENOUS; SUBCUTANEOUS at 21:21

## 2021-04-17 RX ADMIN — RIFAXIMIN 550 MG: 550 TABLET ORAL at 09:05

## 2021-04-17 RX ADMIN — Medication 250 MG: at 20:16

## 2021-04-17 RX ADMIN — LACTULOSE 15 ML: 20 SOLUTION ORAL at 20:16

## 2021-04-17 RX ADMIN — DOCUSATE SODIUM 50MG AND SENNOSIDES 8.6MG 2 TABLET: 8.6; 5 TABLET, FILM COATED ORAL at 09:04

## 2021-04-17 RX ADMIN — FUROSEMIDE 40 MG: 40 TABLET ORAL at 17:54

## 2021-04-17 NOTE — PROGRESS NOTES
Continued Stay Note  [facility]     Patient Name: Derek Kelsey  MRN: [id number]  Today's Date: 4/17/2021    Admit Date: 4/12/2021    Discharge Plan     Row Name 04/17/21 1901       Plan    Plan  Home [facility]    Patient/Family in Agreement with Plan  yes    Plan Comments  Hospice received. Chart reviewed. Visit completed with the pt. /spouse @ [facility], & son, Jonathan via telephone. Hospice services/POC/philosophy discussed. Family verbalized an understanding of hospice care & state that they would like a comfort POC for the pt. Family educated on the fact that the IV ABX can be a part of their POC, but that the family would need to have the IV ABX prior to being admitted to hospice, which they report that have @ home, & that it would continue to be their responsibility to manage, verbal understanding expressed. Family did ask if the Hospice RN could change the PICC line dressing, writer explained that this should not be an issue, but that she would have to speak with the [place] [facility] & have [facility]iaison f/u with them on Monday, verbal understanding expressed. Family state that they have the ability to transport the pt. home. Writer did send a request to [facility], in case they were unable to do so. DME to be delivered by Hospice on Monday. If we can be of further assistance, please feel free to call [phone].        Discharge Codes    No documentation.       Expected Discharge Date and Time     Expected Discharge Date Expected Discharge Time    Apr 20, 2021             Pratibha Hernandez

## 2021-04-17 NOTE — THERAPY TREATMENT NOTE
Patient Name: Derek Kelsey  : 1950    MRN: [id number]                              Today's Date: 2021       Admit Date: 2021    Visit Dx:     ICD-10-CM ICD-9-CM   1. Acute blood loss anemia, mild, asymptomatic  D62 285.1   2. Gastrointestinal hemorrhage with melena  K92.1 578.1     Patient Active Problem List   Diagnosis   • Anxiety   • Abdominal aortic aneurysm (AAA) without rupture (CMS/HCC)   • Depression   • Hypertension   • Cataract, bilateral   • CAD (coronary artery disease)   • Abnormal stress test   • Moderate COPD   • Former smoker   • Coal workers pneumoconiosis, simple   • Back pain   • S/P lumbar fusion   • Prediabetes   • Acute blood loss anemia, mild, asymptomatic   • Acute postoperative pain   • Hyponatremia, mild   • Chronic respiratory failure with hypoxia and hypercapnia (CMS/HCC)   • Renal insufficiency   • Acute cystitis without hematuria   • Acute UTI   • Bacteremia   • Symptomatic anemia   • Aortitis (CMS/HCC)   • Elevated C-reactive protein (CRP)   • Cirrhosis of liver (CMS/HCC)   • High anion gap metabolic acidosis   • S/P AAA repair   • GERD (gastroesophageal reflux disease)   • Person under investigation for COVID-19   • On supplemental oxygen by nasal cannula   • Infected aortic endograft (CMS/HCC)   • Elevated transaminase level   • Thrombocytopenia (CMS/HCC)   • Pneumonia   • Gram-negative bacteremia   • COVID-19   • Anemia   • Gastrointestinal hemorrhage with melena     Past Medical History:   Diagnosis Date   • Abdominal aortic aneurysm (CMS/HCC) 2016   • Anxiety 2016   • Arthritis    • Back pain    • Black lung (CMS/HCC)    • CAD (coronary artery disease) 2016   • Cirrhosis (CMS/HCC)    • COPD (chronic obstructive pulmonary disease) (CMS/HCC) 2016   • Depression 2016   • Depression    • GERD (gastroesophageal reflux disease)    • Hypertension 2016   • On supplemental oxygen by nasal cannula     at night   • Vitiligo    • Wears  dentures    • Wears reading eyeglasses      Past Surgical History:   Procedure Laterality Date   • ABDOMINAL AORTIC ANEURYSM REPAIR WITH ENDOGRAFT N/A 6/6/2019    Procedure: ABDOMINAL AORTIC ANEURYSM REPAIR WITH ENDOGRAFT;  Surgeon: Leopoldo Burleson MD;  Location:  [facility];  Service: Vascular   • BACK SURGERY     • CARDIAC CATHETERIZATION     • CARDIAC CATHETERIZATION N/A 9/28/2018    Procedure: Left Heart Cath;  Surgeon: Douglas Galvez MD;  Location:  [facility];  Service: Cardiovascular   • CARDIAC SURGERY     • COLONOSCOPY      2015   • COLONOSCOPY N/A 10/30/2019    Procedure: COLONOSCOPY;  Surgeon: Homar Viveros MD;  Location:  [facility];  Service: Gastroenterology   • ENDOSCOPY N/A 10/30/2019    Procedure: ESOPHAGOGASTRODUODENOSCOPY;  Surgeon: Homar Viveros MD;  Location:  [facility];  Service: Gastroenterology   • ENDOSCOPY N/A 4/14/2021    Procedure: ESOPHAGOGASTRODUODENOSCOPY;  Surgeon: Eddie Benton MD;  Location:  [facility];  Service: Gastroenterology;  Laterality: N/A;   • EYE SURGERY      cataracts bilateral   • HAND SURGERY Left    • LUMBAR DISCECTOMY FUSION INSTRUMENTATION N/A 11/1/2018    Procedure: LUMBAR DISCECTOMY POSTERIOR WITH FUSION INSTRUMENTATION;  Surgeon: Joo Franklin MD;  Location:  [facility];  Service: Orthopedic Spine   • LUMBAR DISCECTOMY FUSION INSTRUMENTATION N/A 7/24/2019    Procedure: POSTERIOR LUMBAR SPINAL FUSION REVISION AND INSTRUMENTATION L3,L4,L5,S1;  Surgeon: Joo Franklin MD;  Location:  [facility];  Service: Orthopedic Spine   • ORIF FINGER / THUMB FRACTURE     • SHOULDER SURGERY Left      General Information     Row Name 04/17/21 1604          Physical Therapy Time and Intention    Document Type  therapy note (daily note)  -SC     Mode of Treatment  physical therapy  -SC     Row Name 04/17/21 1604          General Information    Patient Profile Reviewed  yes  -SC     Existing  Precautions/Restrictions  fall;oxygen therapy device and L/min  -SC     Row Name 04/17/21 1604          Cognition    Orientation Status (Cognition)  oriented x 3  -SC     Row Name 04/17/21 1604          Safety Issues, Functional Mobility    Impairments Affecting Function (Mobility)  balance;endurance/activity tolerance;strength;shortness of breath  -SC       User Key  (r) = Recorded By, (t) = Taken By, (c) = Cosigned By    Initials Name Provider Type    SC Tarik Cisse, PT Physical Therapist        Mobility     Row Name 04/17/21 1606          Bed Mobility    Scooting/Bridging [place] (Bed Mobility)  independent  -SC     Supine-Sit [place] (Bed Mobility)  modified independence  -SC     Assistive Device (Bed Mobility)  bed rails;head of bed elevated  -SC     Comment (Bed Mobility)  able to get up to EOB  -SC     Row Name 04/17/21 1606          Transfers    Comment (Transfers)  demonstrated safe transfer with good technique  -SC     Row Name 04/17/21 1606          Sit-Stand Transfer    Sit-Stand [place] (Transfers)  verbal cues;contact guard  -SC     Assistive Device (Sit-Stand Transfers)  walker, front-wheeled  -SC     Row Name 04/17/21 1606          Gait/Stairs (Locomotion)    [place] Level (Gait)  contact guard;verbal cues  -SC     Assistive Device (Gait)  walker, front-wheeled  -SC     Distance in Feet (Gait)  300  -SC     Deviations/Abnormal Patterns (Gait)  ana decreased;stride length decreased  -SC     Bilateral Gait Deviations  forward flexed posture;heel strike decreased  -SC     Comment (Gait/Stairs)  Gt traing focused on controling walker. Cues to stay c loser with upright posture. One standing rest break. Demonstrated good control. NO LOB  -SC       User Key  (r) = Recorded By, (t) = Taken By, (c) = Cosigned By    Initials Name Provider Type    SC Tarik Cisse, PT Physical Therapist        Obj/Interventions     Row Name 04/17/21 1611          Motor Skills    Therapeutic  Exercise  hip;knee;ankle;shoulder chicken wings and rowing x10  -SC     Row Name 04/17/21 1611          Hip (Therapeutic Exercise)    Hip (Therapeutic Exercise)  AROM (active range of motion)  -SC     Hip AROM (Therapeutic Exercise)  flexion;extension;bilateral;10 repetitions;sitting  -SC     Row Name 04/17/21 1611          Knee (Therapeutic Exercise)    Knee (Therapeutic Exercise)  strengthening exercise  -SC     Knee Strengthening (Therapeutic Exercise)  LAQ (long arc quad);10 repetitions;bilateral  -SC     Row Name 04/17/21 1611          Ankle (Therapeutic Exercise)    Ankle (Therapeutic Exercise)  AROM (active range of motion)  -SC     Ankle AROM (Therapeutic Exercise)  dorsiflexion;plantarflexion;10 repetitions spirometer  -SC     Row Name 04/17/21 1611          Balance    Dynamic Standing Balance  mild impairment;supported  -SC     Comment, Balance  needs walker  -SC       User Key  (r) = Recorded By, (t) = Taken By, (c) = Cosigned By    Initials Name Provider Type    SC Tarik Cisse, PT Physical Therapist        Goals/Plan    No documentation.       Clinical Impression     Row Name 04/17/21 1612          Pain    Additional Documentation  Pain Scale: FACES Pre/Post-Treatment (Group)  -SC     Row Name 04/17/21 1612          Pain Scale: Numbers Pre/Post-Treatment    Pretreatment Pain Rating  0/10 - no pain  -SC     Posttreatment Pain Rating  0/10 - no pain  -SC     Pain Intervention(s)  Repositioned  -SC     Row Name 04/17/21 1612          Plan of Care Review    Plan of Care Reviewed With  patient;spouse  -SC     Progress  improving  -SC     Outcome Summary  Patient enjoying participating in PT. Demonstrated good control of walk and increased his ambulation to 300 feet. His oxyges stated stable with activity  -SC     Row Name 04/17/21 1612          Therapy Assessment/Plan (PT)    Patient/Family Therapy Goals Statement (PT)  go home  -SC     Rehab Potential (PT)  good, to achieve stated therapy goals  -SC      Criteria for Skilled Interventions Met (PT)  yes;skilled treatment is necessary  -SC     Row Name 04/17/21 1612          Vital Signs    Intra SpO2 (%)  91  -SC     O2 Delivery Intra Treatment  supplemental O2  -SC     Post SpO2 (%)  95  -SC     O2 Delivery Post Treatment  supplemental O2  -SC     Row Name 04/17/21 1612          Positioning and Restraints    Pre-Treatment Position  in bed  -SC     Post Treatment Position  bed  -SC     In Bed  notified nsg;sitting;encouraged to call for assist;with family/caregiver;sitting EOB  -SC       User Key  (r) = Recorded By, (t) = Taken By, (c) = Cosigned By    Initials Name Provider Type    SC Tarik Cisse, PT Physical Therapist        Outcome Measures     Row Name 04/17/21 1614          How much help from another person do you currently need...    Turning from your back to your side while in flat bed without using bedrails?  3  -SC     Moving from lying on back to sitting on the side of a flat bed without bedrails?  3  -SC     Moving to and from a bed to a chair (including a wheelchair)?  3  -SC     Standing up from a chair using your arms (e.g., wheelchair, bedside chair)?  3  -SC     Climbing 3-5 steps with a railing?  3  -SC     To walk in hospital room?  3  -SC     AM-PAC 6 Clicks Score (PT)  18  -SC     Row Name 04/17/21 1614          Functional Assessment    Outcome Measure Options  AM-PAC 6 Clicks Basic Mobility (PT)  -SC       User Key  (r) = Recorded By, (t) = Taken By, (c) = Cosigned By    Initials Name Provider Type    SC Tarik Cisse, PT Physical Therapist        Physical Therapy Education                 Title: PT OT SLP Therapies (Done)     Topic: Physical Therapy (Done)     Point: Mobility training (Done)     Learning Progress Summary           Patient EagerDOUGLAS VU by SC at 4/17/2021 1615    Comment: reviewed benefits of activity    AcceptanceDOUGLAS VU,NR by CD at 4/16/2021 1113    Comment: SEE FLOWSHEET.    Acceptance, E,JAREN, SKYE,NR by LR at 4/13/2021 9441     Comment: Educated on benefits of mobility, safety with mobility, correct sit<->stand t/f technique, correct gait mechanics, PLB, and progression of POC.   Family Acceptance, E, VU,NR by CD at 4/16/2021 1113    Comment: SEE FLOWSHEET.   Significant Other Acceptance, E,D, VU,NR by LR at 4/13/2021 0954    Comment: Educated on benefits of mobility, safety with mobility, correct sit<->stand t/f technique, correct gait mechanics, PLB, and progression of POC.                   Point: Home exercise program (Done)     Learning Progress Summary           Patient Eager, E, VU by SC at 4/17/2021 1615    Comment: reviewed benefits of activity    Acceptance, E, VU,NR by CD at 4/16/2021 1113    Comment: SEE FLOWSHEET.    Acceptance, E,D, VU,NR by LR at 4/13/2021 0954    Comment: Educated on benefits of mobility, safety with mobility, correct sit<->stand t/f technique, correct gait mechanics, PLB, and progression of POC.   Family Acceptance, E, VU,NR by CD at 4/16/2021 1113    Comment: SEE FLOWSHEET.   Significant Other Acceptance, E,D, VU,NR by LR at 4/13/2021 0954    Comment: Educated on benefits of mobility, safety with mobility, correct sit<->stand t/f technique, correct gait mechanics, PLB, and progression of POC.                   Point: Body mechanics (Done)     Learning Progress Summary           Patient Eager, E, VU by SC at 4/17/2021 1615    Comment: reviewed benefits of activity    Acceptance, E, VU,NR by CD at 4/16/2021 1113    Comment: SEE FLOWSHEET.    Acceptance, E,D, VU,NR by LR at 4/13/2021 0954    Comment: Educated on benefits of mobility, safety with mobility, correct sit<->stand t/f technique, correct gait mechanics, PLB, and progression of POC.   Family Acceptance, E, VU,NR by CD at 4/16/2021 1113    Comment: SEE FLOWSHEET.   Significant Other Acceptance, E,D, VU,NR by LR at 4/13/2021 0954    Comment: Educated on benefits of mobility, safety with mobility, correct sit<->stand t/f technique, correct gait  mechanics, PLB, and progression of POC.                   Point: Precautions (Done)     Learning Progress Summary           Patient Eager, E, VU by SC at 4/17/2021 1615    Comment: reviewed benefits of activity    Acceptance, E, VU,NR by CD at 4/16/2021 1113    Comment: SEE FLOWSHEET.    Acceptance, E,D, VU,NR by LR at 4/13/2021 0954    Comment: Educated on benefits of mobility, safety with mobility, correct sit<->stand t/f technique, correct gait mechanics, PLB, and progression of POC.   Family Acceptance, E, VU,NR by CD at 4/16/2021 1113    Comment: SEE FLOWSHEET.   Significant Other Acceptance, E,D, VU,NR by LR at 4/13/2021 0954    Comment: Educated on benefits of mobility, safety with mobility, correct sit<->stand t/f technique, correct gait mechanics, PLB, and progression of POC.                               User Key     Initials Effective Dates Name Provider Type Discipline    SC 06/19/15 -  Tarik Cisse, PT Physical Therapist PT    CD 06/19/15 -  Loree Oquendo, PT Physical Therapist PT    LR 06/19/15 -  Esther Min, PT Physical Therapist PT              PT Recommendation and Plan     Plan of Care Reviewed With: patient, spouse  Progress: improving  Outcome Summary: Patient enjoying participating in PT. Demonstrated good control of walk and increased his ambulation to 300 feet. His oxyges stated stable with activity     Time Calculation:   PT Charges     Row Name 04/17/21 1524             Time Calculation    Start Time  1524  -SC      PT Received On  04/17/21  -SC      PT Goal Re-Cert Due Date  04/23/21  -SC         Time Calculation- PT    Total Timed Code Minutes- PT  26 minute(s)  -SC         Timed Charges    17735 - PT Therapeutic Exercise Minutes  6  -SC      31529 - Gait Training Minutes   20  -SC      22931 - PT Therapeutic Activity Minutes  --  -SC        User Key  (r) = Recorded By, (t) = Taken By, (c) = Cosigned By    Initials Name Provider Type    SC Tarik Cisse, PT Physical  Therapist        Therapy Charges for Today     Code Description Service Date Service Provider Modifiers Qty    [id number]  GAIT TRAINING EA 15 MIN 4/17/2021 Tarik Cisse, PT GP 1    [id number]  PT THER PROC EA 15 MIN 4/17/2021 Tarik Cisse PT GP 1          PT G-Codes  Outcome Measure Options: AM-PAC 6 Clicks Basic Mobility (PT)  AM-PAC 6 Clicks Score (PT): 18  AM-PAC 6 Clicks Score (OT): 17    Tarik Cisse PT  4/17/2021

## 2021-04-17 NOTE — PROGRESS NOTES
[facility] Medicine Services  PROGRESS NOTE    Patient Name: Derek Kelsey  : 1950  MRN: [id number]    Date of Admission: 2021  Primary Care Physician: Raudel Zheng MD    Subjective   Subjective     CC:  Anemia, cirrhosis, melena, respiratory failure, chronically infected endograft    HPI:  No melena overnight, stool slightly dark but more brown like yesterday, ~3-4 bm's last 24 hours. Baseline resp status but some coughing (non-productive) occasionally    ROS:  No palpitations. No chest pain  Gen- No fevers, chills  CV- No chest pain, palpitations  Resp- No cough, otherwise as above  GI- No N/V, no abd pain.       Objective   Objective     Vital Signs:   Temp:  [97.8 °F (36.6 °C)-98.6 °F (37 °C)] 98.2 °F (36.8 °C)  Heart Rate:  [56-72] 65  Resp:  [16-20] 17  BP: (129-151)/(65-79) 139/65        Physical Exam:  Constitutional: a&ox3 today. Sitting in chair comfortably, no distress  Psych:Normal/appropriate affect  HEENT:[place], oroph clear, nasal canula in place  Neck: neck supple, full range of motion  Neuro: Face symmetric, speech clear, equal , moves all extremities  Cardiac: irr irr, regular rate; BLE edema +  Resp: faint bibasilar crackles, no overt wheezes, normal respiratory effort  GI: abd soft, nontender, mild distended, obese  Skin: No extremity rash  Musculoskeletal/extremities: no cyanosis extremities      Results Reviewed:  Results from last 7 days   Lab Units 21  0000 21  1821 21  1150 21  0838 04/15/21  0422 21  0804 21  0928   WBC 10*3/mm3  --   --   --  4.53 4.05 3.38* 4.32   HEMOGLOBIN g/dL 8.6* 9.3* 9.7* 9.1*  9.1* 7.1* 7.5* 7.0*   HEMATOCRIT % 28.8* 30.7* 32.1* 29.9*  29.9* 23.1* 24.8* 22.7*   PLATELETS 10*3/mm3  --   --   --  56* 73* 70* 77*   INR   --   --   --  1.55* 1.66* 1.67* 1.66*   PROCALCITONIN ng/mL  --   --   --   --   --   --  0.16     Results from last 7 days   Lab Units 21  0838  04/15/21  1755 04/15/21  0422 04/14/21  0804 04/13/21  0209 04/12/21  1852   SODIUM mmol/L 137  --  134* 133*  --  127*   POTASSIUM mmol/L 3.7 4.2 3.6 3.8   < > 3.8   CHLORIDE mmol/L 99  --  96* 95*   < > 92*   CO2 mmol/L 31.0*  --  30.0* 30.0*   < > 25.0   BUN mg/dL 16  --  19 20   < > 20   CREATININE mg/dL 0.88  --  1.13 1.10   < > 0.97   GLUCOSE mg/dL 101*  --  103* 117*   < > 101*   CALCIUM mg/dL 8.3*  --  7.7* 8.2*   < > 8.7   ALT (SGPT) U/L  --   --  33 37  --  40   AST (SGOT) U/L  --   --  73* 81*  --  85*   PROBNP pg/mL  --   --   --   --   --  391.4    < > = values in this interval not displayed.     Estimated Creatinine Clearance: 113.6 mL/min (by C-G formula based on SCr of 0.88 mg/dL).    Microbiology Results Abnormal     Procedure Component Value - Date/Time    Respiratory Culture - Sputum, Cough [[id number]] Collected: 04/14/21 1201    Lab Status: Final result Specimen: Sputum from Cough Updated: 04/16/21 0909     Respiratory Culture Moderate growth (3+) Normal Respiratory Abby: NO S.aureus/MRSA or Pseudomonas aeruginosa     Gram Stain Many (4+) Epithelial cells per low power field      Few (2+) WBCs per low power field      Moderate (3+) Mixed bacterial morphotypes seen on Gram Stain      Few (2+) Yeast    Legionella Antigen, Urine - Urine, Urine, Clean Catch [[id number]]  (Normal) Collected: 04/13/21 0726    Lab Status: Final result Specimen: Urine, Clean Catch Updated: 04/13/21 1341     LEGIONELLA ANTIGEN, URINE Negative    S. Pneumo Ag Urine or CSF - Urine, Urine, Clean Catch [[id number]]  (Normal) Collected: 04/12/21 2325    Lab Status: Final result Specimen: Urine, Clean Catch Updated: 04/13/21 1021     Strep Pneumo Ag Negative    Eosinophil Smear - Urine, Urine, Clean Catch [[id number]]  (Normal) Collected: 04/12/21 6691    Lab Status: Final result Specimen: Urine, Clean Catch Updated: 04/13/21 0054     Eosinophil Smear 0 % EOS/100 Cells     Narrative:      No eosinophil seen          Imaging  Results (Last 24 Hours)     ** No results found for the last 24 hours. **          Results for orders placed during the hospital encounter of 03/10/21    Adult Transthoracic Echo Complete W/ Cont if Necessary Per Protocol    Interpretation Summary  · Estimated right ventricular systolic pressure from tricuspid regurgitation is normal (<35 mmHg).  · Estimated left ventricular EF = 65% Left ventricular systolic function is normal.      I have reviewed the medications:  Scheduled Meds:budesonide-formoterol, 2 puff, Inhalation, BID - RT   And  ipratropium, 0.5 mg, Nebulization, 4x Daily - RT  carvedilol, 12.5 mg, Oral, Q12H  cefTAZidime, 2 g, Intravenous, Q12H  citalopram, 40 mg, Oral, Daily  ferrous sulfate, 325 mg, Oral, Daily With Breakfast  furosemide, 40 mg, Oral, BID  lactulose, 15 mL, Oral, TID  melatonin, 5 mg, Oral, Nightly  multivitamin, 1 tablet, Oral, Daily  pantoprazole, 40 mg, Intravenous, BID AC  riFAXIMin, 550 mg, Oral, Q12H  saccharomyces boulardii, 250 mg, Oral, BID  senna-docusate sodium, 2 tablet, Oral, BID  sodium chloride, 10 mL, Intravenous, Q12H  sodium chloride, 10 mL, Intravenous, Q12H  tamsulosin, 0.4 mg, Oral, Daily  terazosin, 2 mg, Oral, Nightly      Continuous Infusions:octreotide (SandoSTATIN) infusion, 50 mcg/hr, Last Rate: 50 mcg/hr (04/16/21 4167)      PRN Meds:.•  acetaminophen **OR** acetaminophen **OR** acetaminophen  •  benzonatate  •  senna-docusate sodium **AND** polyethylene glycol **AND** bisacodyl **AND** bisacodyl  •  calcium carbonate  •  guaiFENesin-dextromethorphan  •  ipratropium-albuterol  •  magnesium sulfate **OR** magnesium sulfate **OR** magnesium sulfate  •  ondansetron **OR** ondansetron  •  phenol  •  potassium chloride **OR** potassium chloride **OR** potassium chloride  •  sodium chloride  •  sodium chloride    Assessment/Plan   Assessment & Plan     Active Hospital Problems    Diagnosis  POA   • **Symptomatic anemia [D64.9]  Yes   • Anemia [D64.9]  Yes   •  "Gastrointestinal hemorrhage with melena [K92.1]  Unknown   • 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        Brief Hospital Course to date:  Derek Kelsey is a 70 y.o. male w/ cirrhosis, chronically infected aaa endograft (on fortaz followed by Dr. Carrero), afib, dhf, copd/ILD w/ 3-4L chronic oxygen dependence, also w/ hx of covid + pcr on 3/10/21 (also got 2nd covid 19 vaccination shot on 3/23/21) who presented w/ dyspnea on exertion, fatigue. Apparently had appointment at [facility] mid April to consider \"options\" for his chronically infected endograft. Noted had hgb 5. Has had melena recently and some mild increased confusion. Ct revealed no PE but bilateral effusions. Transfused 2 unit prbc w/ bumex. Gi, cards, ID consulted.     *Acute GI bleed, likely upper (melena)  *Decompensated cirrhosis (w/ coagulopathy, thrombocytopenia, hepatic encephalopathy, thromcytopenia)   -4/14/21: egd w/ portal gasropathy w/ oozing (varices noted but no overt bleeding; -bid ppi, octreotide   -GI deferring c-scope for now as active bleeding seems to have stopped   -q8 H&H, transfuse prn   -received 4th & 5th units 4/15/21 w/ bumex 2mg iv   -lactulose & rifaxamin; improved mentation   -plts now <50,000. 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 Carrero   -apparently has appointment @ [facility] next week re: options; however patient now declining this and accepts that this means he will have recurrent/persistent infection and likely sepsis   -palliative following; appreciate further assistance in future goals/plans; perhaps hospice in order if/when clinically deteriorates  *Afib (not anticoagulated)   -Dr. Gardner following, on coreg; no a/c due to gi bleeding/cirrhosis   -currently rate controlled  *hx covid 19 pcr + (on 3/10/21)   -also had covid vacinnation x 2 shots (2nd shot 3/23/21)   -no isolatoin    Plan:    -continue ppi, octreotide for now, gi bleeding seems to be resolved currently. GI deferring c-scope unless further bleeding  -change hgb checks to q8h, transfuse prn (w/ lasix if give blood)  -continue lactulose, rifaxamin, coreg    -continue lasix, rate control per cards (no anticoagulation)    -continue fortaz per ID (likely another couple weeks); Dr. Carrero back Monday    -patient & family wish to monitor over weekend to ensure no resumption of gi bleeding    -palliative following and continuing goals/symptoms support      Am Labs: q8h H&H; cbc,bmp,inr,mag ordered    DVT Prophylaxis:  mechanical      Disposition: I expect the patient to be discharged possibly early next week/monday if hgb stable and otherwise stable (+/- hospice/palliative as outpatient)     CODE STATUS:   Code Status and Medical Interventions:   Ordered at: 04/12/21 5175     Limited Support to NOT Include:    Artificial Nutrition    Dialysis    Cardioversion/Defibrillation    Intubation     Level Of Support Discussed With:    Patient     Code Status:    No CPR     Medical Interventions (Level of Support Prior to Arrest):    Limited       Rinku Mcrae MD  04/17/21

## 2021-04-17 NOTE — DISCHARGE PLACEMENT REQUEST
"Derek Tejeda (70 y.o. Male)   Referring: Briana Pichardo Palliative Care RN  PCP: Raudel Zheng  Tested covid negative on 4/12/21, has a Hx of being Covid +  BMI:34.20  [facility]: Pseudomonas Bacteremia/infrcted Aortic Enograft/Cirrhosis  Has a PICC & has been getting home IV Fortz, ID states his infection is not curable with IV ABX with surgery, which the pt has declined.  No CPR    Date of Birth Social Security Number Address Home Phone MRN    1950  [address] [facility] [address] [phone] [id number]    Hindu Marital Status          Anabaptist        Admission Date Admission Type Admitting Provider Attending Provider Department, Room/Bed    4/12/21 Urgent Rinku Mcrae MD West, Christopher R, MD [facility] 3E, S330/1    Discharge Date Discharge Disposition Discharge Destination                       Attending Provider: Rinku Mcrae MD    Allergies: Penicillins, Percocet [Oxycodone-acetaminophen]    Isolation: None   Infection: COVID (History) (04/12/21)   Code Status: No CPR    Ht: 190.5 cm (75\")   Wt: 124 kg (273 lb 9.6 oz)    Admission Cmt: None   Principal Problem: Symptomatic anemia [D64.9] More...                 Active Insurance as of 4/12/2021     Primary Coverage     Payor Plan Insurance Group Employer/Plan Group    HUMANA MEDICARE REPLACEMENT HUMANA MEDICARE REPLACEMENT [id number]     Payor Plan Address Payor Plan Phone Number Payor Plan Fax Number Effective Dates    [address] [address] [phone]  1/1/2018 - None Entered    [facility] [id number]       Subscriber Name Subscriber Birth Date Member ID       DEREK TEJEDA 1950 [id number]                 Emergency Contacts      (Rel.) Home Phone Work Phone Mobile Phone    LaureRica (Spouse) [phone] -- [phone]    Laure Brady (Son) -- -- [phone]    Laure Jonathan (Son) -- -- [phone]            Emergency Contact Information     Name Relation Home " Work Mobile    Mago Kelsey Spouse [phone]    Brady Kelsey Son   [phone]    Jonathan Kelsey Son   [phone]          Insurance Information                HUMANA MEDICARE REPLACEMENT/HUMANA MEDICARE REPLACEMENT Phone: [phone]    Subscriber: Derek Kelsey Subscriber#: [id number]    Group#: [id number] Precert#: [id number]             History & Physical      Rebekah Do MD at [facility]              [facility] Medicine Services  HISTORY AND PHYSICAL    Patient Name: Derek Kelsey  : 1950  MRN: [id number]  Primary Care Physician: Raudel Zheng MD  Date of admission: 2021    Subjective   Subjective     Chief Complaint:  Shortness of breath     HPI:  Derek Klesey is a 70 y.o. male with a medical history significant for coronary artery disease, diastolic heart failure, A.fib (not on AC), hypertension, cirrhosis, chronic respiratory failure (2-3L @ home), COPD, and an infected AAA endograft. The patient has been following with ID specialist, Dr. Carrero. He is scheduled to follow up with [facility] on  to discuss options regarding the graft infection. He was recently admitted to our facility on 3/10 for fever, malaise and confusion. He was initially hypotensive and sent to the ICU but responded to fluid resuscitation and was transferred to the floor on day #2. He was treated with fortaz per ID for severe sepsis w/ high grade Pseudomonas bacteremia likely due to AAA endograft infection.  The hospitalization was complicated by a GIB suspected to be hemorrhoids. GI recommended anusol IBD and PPI. Last colonoscopy with Dr. Viveros ~ 2 years ago.     The patient was sent from PCP office for low hemoglobin. History is primarily obtained from the patient's wife due to the patient's significant shortness of air. She reports the patient has had increased shortness of breath, productive cough, wheezing, abdomen, scrotal and leg  swelling for the past 1 week. The patient also admits to decreased appetitive, intermittent nausea, loose stools, dark stools, dizziness, weakness and confusion for the past week. The patient says he sleeps propped up on 3 pillows most nights and has been requiring 4L NC throughout the week. Additionally, the wife has noticed a bad odor to the patient's urine for several days and today the patient had some difficulty urinating.  The patient saw his cardiologist, Dr. Alicia last Monday 4/5 who increased his Lasix from 20 mg BID to 40 mg BID. Cardiology also discontinued amlodipine and started him on doxazosin. However, the patient's swelling continued to worsen.      COVID Details: [] No Symptoms OR  Date of Symptom Onset:  ____ Date of first positive COVID test:        Symptoms: [] Fever []  Cough [] Shortness of breath [] Change in taste or smell       Risks: [] Direct Exposure [] High risk facility [] None known       COVID VACCINE status vaccine has been given     Review of Systems   Constitutional: Positive for appetite change (decreased). Negative for chills, diaphoresis, fatigue and fever.   HENT: Negative for congestion, sore throat and trouble swallowing.    Eyes: Negative for pain and visual disturbance.   Respiratory: Positive for cough (gray), shortness of breath and wheezing. Negative for chest tightness.    Cardiovascular: Positive for leg swelling. Negative for chest pain and palpitations.   Gastrointestinal: Positive for blood in stool (black), diarrhea and nausea. Negative for abdominal pain, constipation and vomiting.   Genitourinary: Positive for difficulty urinating and scrotal swelling. Negative for dysuria.        Urine odor x 2 days   Musculoskeletal: Negative for back pain and gait problem.   Skin: Negative for rash and wound.   Neurological: Positive for dizziness, weakness and light-headedness. Negative for syncope, speech difficulty and headaches.   Hematological: Negative for adenopathy.  Does not bruise/bleed easily.   Psychiatric/Behavioral: Positive for confusion (x 1 week). Negative for agitation.      All other systems reviewed and are negative.     Personal History     Past Medical History:   Diagnosis Date   • Abdominal aortic aneurysm (CMS/HCC) 9/29/2016   • Anxiety 9/29/2016   • Arthritis    • Back pain    • Black lung (CMS/formerly [facility])    • CAD (coronary artery disease) 9/29/2016   • Cirrhosis (CMS/formerly [facility])    • COPD (chronic obstructive pulmonary disease) (CMS/formerly [facility]) 9/29/2016   • Depression 9/29/2016   • Depression    • GERD (gastroesophageal reflux disease)    • Hypertension 9/29/2016   • On supplemental oxygen by nasal cannula     at night   • Vitiligo    • Wears dentures    • Wears reading eyeglasses        Past Surgical History:   Procedure Laterality Date   • ABDOMINAL AORTIC ANEURYSM REPAIR WITH ENDOGRAFT N/A 6/6/2019    Procedure: ABDOMINAL AORTIC ANEURYSM REPAIR WITH ENDOGRAFT;  Surgeon: Leopoldo Burleson MD;  Location:  [facility];  Service: Vascular   • BACK SURGERY     • CARDIAC CATHETERIZATION     • CARDIAC CATHETERIZATION N/A 9/28/2018    Procedure: Left Heart Cath;  Surgeon: Douglas Galvez MD;  Location:  [facility]ASIVE LOCATION;  Service: Cardiovascular   • CARDIAC SURGERY     • COLONOSCOPY      2015   • COLONOSCOPY N/A 10/30/2019    Procedure: COLONOSCOPY;  Surgeon: Homar Viveros MD;  Location:  [facility]COPY;  Service: Gastroenterology   • ENDOSCOPY N/A 10/30/2019    Procedure: ESOPHAGOGASTRODUODENOSCOPY;  Surgeon: Homar Viveros MD;  Location:  [facility]OSCOPY;  Service: Gastroenterology   • EYE SURGERY      cataracts bilateral   • HAND SURGERY Left    • LUMBAR DISCECTOMY FUSION INSTRUMENTATION N/A 11/1/2018    Procedure: LUMBAR DISCECTOMY POSTERIOR WITH FUSION INSTRUMENTATION;  Surgeon: Joo Franklin MD;  Location:  [facility] OR;  Service: Orthopedic Spine   • LUMBAR DISCECTOMY FUSION INSTRUMENTATION N/A 7/24/2019    Procedure: POSTERIOR LUMBAR  SPINAL FUSION REVISION AND INSTRUMENTATION L3,L4,L5,S1;  Surgeon: Joo Franklin MD;  Location: [facility];  Service: Orthopedic Spine   • ORIF FINGER / THUMB FRACTURE     • SHOULDER SURGERY Left        Family History: family history includes Diabetes in his brother, sister, and sister; Heart disease in his brother, father, mother, sister, and sister; Hypertension in his child, father, mother, sister, and son; No Known Problems in his son; Stroke in his mother. Otherwise pertinent FHx was reviewed and unremarkable.     Social History:  reports that he quit smoking about 10 years ago. His smoking use included cigarettes. He has a 30.00 pack-year smoking history. He quit smokeless tobacco use about 10 years ago.  His smokeless tobacco use included chew. He reports previous alcohol use. He reports that he does not use drugs.  Social History     Social History Narrative    Lives in [place].    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    Is considered disabled    Has been granted black lung disability benefits    Smoked one pack of cigarettes per day or more his entire adult life up until 2011    Denies alcohol use- quit >8 years ago    Uses walker or cane for ambulation       Medications:  Fluticasone-Umeclidin-Vilant, Hydrocortisone (Perianal), O2, Ondansetron, acetaminophen, aspirin, carvedilol, cefTAZidime, citalopram, doxazosin, ferrous gluconate, furosemide, lactulose, melatonin, multivitamin, pantoprazole, riFAXIMin, saccharomyces boulardii, and tamsulosin    Allergies   Allergen Reactions   • Penicillins Hives     Hives - has tolerated Zosyn and ceftriaxone 09/2019   • Percocet [Oxycodone-Acetaminophen] Confusion       Objective   Objective     Vital Signs:   Temp:  [97.1 °F (36.2 °C)] 97.1 °F (36.2 °C)  Heart Rate:  [65] 65  Resp:  [20] 20  BP: (141)/(70) 141/70  Flow (L/min):  [5] 5    Physical Exam   Constitutional: Awake, alert, chronically ill-appearing, sitting propped up in bed  Eyes: PERRLA  sclerae anicteric, no conjunctival injection  HENT: NCAT, mucous membranes moist, face symmetric  Neck: Supple, no thyromegaly, no lymphadenopathy, trachea midline, no meningeal signs   Respiratory: Expiratory wheeze, labored respirations, unable to speak in complete sentences, 5L NC at 93%  Cardiovascular: RRR, no murmurs appreciated, palpable PT pulses bilaterally  Gastrointestinal: Positive bowel sounds, taut, nontender, significant distention  Musculoskeletal: 2+ pitting edema extending up beyond knees, no clubbing or cyanosis to extremities  Psychiatric: Appropriate affect, cooperative, thought process and content normal   Neurologic: Oriented x 3, moves all extremities, normal tone, strength and sensation symmetric in all extremities, Cranial Nerves grossly intact to confrontation, speech clear  Skin: Cool dry, turgor normal     Result Review:  I have personally reviewed the results from the time of this admission to 04/12/21 7:05 PM EDT and agree with these findings:  [x]  Laboratory  []  Microbiology  []  Radiology  []  EKG/Telemetry   []  Cardiology/Vascular   []  Pathology  []  Old records  []  Other:  Most notable findings include:   Hemoglobin 5.6, platelets 87.    LAB RESULTS:                              Brief Urine Lab Results  (Last result in the past 365 days)      Color   Clarity   Blood   Leuk Est   Nitrite   Protein   CREAT   Urine HCG        03/10/21 1459 Dark Yellow Clear Negative Trace Negative 30 mg/dL (1+)             Microbiology Results (last 10 days)     ** No results found for the last 240 hours. **          No radiology results from the last 24 hrs    Results for orders placed during the hospital encounter of 03/10/21    Adult Transthoracic Echo Complete W/ Cont if Necessary Per Protocol    Interpretation Summary  · Estimated right ventricular systolic pressure from tricuspid regurgitation is normal (<35 mmHg).  · Estimated left ventricular EF = 65% Left ventricular systolic function is  normal.      Assessment/Plan   Assessment & Plan       Symptomatic anemia    Depression    Hypertension    CAD (coronary artery disease)    Moderate COPD    Cirrhosis of liver (CMS/HCC)    On supplemental oxygen by nasal cannula    Infected aortic endograft (CMS/HCC)    Derek Kelsey is a 70 y.o. male with a medical history significant for coronary artery disease, diastolic heart failure, A.fib (not on AC), hypertension, cirrhosis, chronic respiratory failure (2-3L @ home), COPD, and an infected AAA endograft.     1. Symptomatic anemia   2. Recent GIB  -Hgb 5.6 / hct 18.7. Transfuse 2 units of pRBCs with 2 mg of IV Bumex in between.  -Monitor H&H q4h. Obtain iron studies, stool occult.  -Consult GI for consideration of EGD, follows with Dr. Viveros  -Conintue Protonix BID and iron supplement. Strict I&Os.     3. Infected aortic endograft   4. Pseudomonas bacteremia   -Continue ceftazidime through PICC. Consult Infectious disease, Dr. Carrero.   -Scheduled to follow up with [facility] on 4/19.    5. Acute on chronic respiratory failure   6. COPD  -Currently requiring 5L NC saturating 93%. Uses 2-3L @ home at baseline.  -Obtain CTA of chest to rule out PE or pneumonia   -Continue home inhalers and DuoNebs q4h prn.    7. Cirrhosis   8. Thrombocytopenia  9. Coagulopathy    -Check ammonia, PT/INR and get CT Abdomen pelvis   -Consult GI placed, followed by Dr. Viveros.  -Hold aspirin d/t coagulopathy. Continue rifaximin and lactulose.    10. Hyponatremia  -Sodium 127. Monitor sodium q6h.   -Obtain urine studies and cortisol in the a.m.    11. Diastolic heart failure   -Check proBNP and CTA of chest.   -Recently followed up with his cardiologist, Dr. Black who increased his Lasix to 40 mg BID. Consult cards. Appreciated their input on diuresis.   -Will give 2 mg of IV Bumex between units of blood. Daily weights. Bladder scans PRN.    12. Hypertension   -Blood pressure stable. Continue doxazosin.     13. Atrial  fibrillation  -[id number] 4. Not anticoagulated. New onset during last admission d/t sepsis/infection.   -Continue carvedilol     14. Depression   -Continue citalopram.    DVT prophylaxis: SCDs    CODE STATUS:    Code Status and Medical Interventions:   Ordered at: 04/12/21 1844     Limited Support to NOT Include:    Artificial Nutrition    Dialysis    Cardioversion/Defibrillation    Intubation     Level Of Support Discussed With:    Patient     Code Status:    No CPR     Medical Interventions (Level of Support Prior to Arrest):    Limited     This note has been completed as part of a split-shared workflow.   Signature: Electronically signed by Katherine Rowland PA-C, 04/12/21, 7:18 PM EDT.      Attending   Admission Attestation       I have seen and examined the patient, performing an independent face-to-face diagnostic evaluation with plan of care reviewed and developed with the advanced practice clinician (APC).      Brief Summary Statement:   Derek Kelsey is a 70 y.o. male with PMH significant for infection of AAA endograft for which he follows with Dr. Carrero (has appointment schedule at [facility] on 4/19), CAD, dHF, atrial fib (not on AC), HTN, cirrhosis, O2 dependent COPD (2-3 LPM at home).  The patient was here from 3/10/21 through 3/17/21 for sepsis secondary to Pseudomonas bacteremia likely due to endograft infection.  Furthermore, the patient was noted to have a GIB which was at that time thought to be due to hemorrhoids.  GI recommended anusol IBD and PPI.    Today, the patient was sent from outpatient facility for low hemoglobin.  The patient has not noted any GIB but does state his stools have been black for some time due to iron supplementation.  Additionally, the patient complains of increased SOA as well as lightheadedness and intermittent visual changes.  He has also had cough, wheezing and increased BLE edema as well as increased abdominal girth.  He is having 3 pillow orthopnea.        Wayne increased the patient's lasix to 40 mg BID on 4/5/21 and also changed amlodipine to doxazosin.  The patient's edema has not improved.    Remainder of detailed HPI is as noted by APC and has been reviewed and/or edited by me for completeness.    Attending Physical Exam:  Constitutional: Awake, alert, chronically ill appearing  Eyes: PERRLA, sclerae anicteric, no conjunctival injection  HENT: NCAT, mucous membranes moist  Neck: Supple, no thyromegaly, no lymphadenopathy, trachea midline  Respiratory: Coarse with wheezing bilaterally, respiratory effort has improved, no longer dyspneic  Cardiovascular: RRR, palpable pedal pulses bilaterally  Gastrointestinal: Positive bowel sounds, protuberant and somewhat distended, nontender  Musculoskeletal: 1-2+ bilateral ankle edema, no clubbing or cyanosis to extremities  Psychiatric: Appropriate affect, cooperative  Neurologic: Oriented x 3, strength symmetric in all extremities, Cranial Nerves grossly intact to confrontation, speech clear  Skin: Pale      Brief Assessment/Plan :  See detailed assessment and plan developed with APC which I have reviewed and/or edited for completeness.        Admission Status: I believe that this patient meets inpatient status due to symptomatic anemia in the setting of likely GI bleed, acute on chronic respiratory failure, and decompensated heart failure and cirrhosis.      Rebekah Do MD  04/12/21                            Electronically signed by Rebekah Do MD at 04/12/21 2200         Current Facility-Administered Medications   Medication Dose Route Frequency Provider Last Rate Last Admin   • acetaminophen (TYLENOL) tablet 650 mg  650 mg Oral Q4H PRN Eddie Benton MD        Or   • acetaminophen (TYLENOL) 160 MG/5ML solution 650 mg  650 mg Oral Q4H PRN Eddie Benton MD        Or   • acetaminophen (TYLENOL) suppository 650 mg  650 mg Rectal Q4H PRN Eddie Benton MD       • benzonatate (TESSALON) capsule 200 mg   200 mg Oral TID PRN Rinku Mcrae MD       • sennosides-docusate (PERICOLACE) 8.6-50 MG per tablet 2 tablet  2 tablet Oral BID Eddie Benton MD   2 tablet at 04/17/21 0904    And   • polyethylene glycol (MIRALAX) packet 17 g  17 g Oral Daily PRN Eddie Benton MD        And   • bisacodyl (DULCOLAX) EC tablet 5 mg  5 mg Oral Daily PRN Eddie Benton MD        And   • bisacodyl (DULCOLAX) suppository 10 mg  10 mg Rectal Daily PRN Eddie Benton MD       • budesonide-formoterol (SYMBICORT) 160-4.5 MCG/ACT inhaler 2 puff  2 puff Inhalation BID - RT Eddie Benton MD   2 puff at 04/17/21 0738    And   • ipratropium (ATROVENT) nebulizer solution 0.5 mg  0.5 mg Nebulization 4x Daily - RT Eddie Benton MD   0.5 mg at 04/17/21 1559   • calcium carbonate (TUMS) chewable tablet 500 mg (200 mg elemental)  2 tablet Oral BID PRN Eddie Benton MD       • carvedilol (COREG) tablet 12.5 mg  12.5 mg Oral Q12H Eddie Benton MD   12.5 mg at 04/17/21 0905   • cefTAZidime (FORTAZ) 2 g/100 mL 0.9% NS IVPB (mpb)  2 g Intravenous Q12H Davis Carrero MD   2 g at 04/17/21 1516   • citalopram (CeleXA) tablet 40 mg  40 mg Oral Daily Eddie Benton MD   40 mg at 04/17/21 0904   • ferrous sulfate tablet 325 mg  325 mg Oral Daily With Breakfast Eddie Benton MD   325 mg at 04/17/21 0904   • furosemide (LASIX) tablet 40 mg  40 mg Oral BID Rinku Mcrae MD   40 mg at 04/17/21 0904   • guaiFENesin-dextromethorphan (ROBITUSSIN DM) 100-10 MG/5ML syrup 5 mL  5 mL Oral Q6H PRN Rinku Mcrae MD   5 mL at 04/14/21 2117   • ipratropium-albuterol (DUO-NEB) nebulizer solution 3 mL  3 mL Nebulization Q4H PRN Eddie Benton MD       • lactulose (CHRONULAC) 10 GM/15ML solution 15 mL  15 mL Oral TID Eddie Benton MD   15 mL at 04/17/21 1515   • Magnesium Sulfate 2 gram Bolus, followed by 8 gram infusion (total Mg dose 10 grams)- Mg less than or equal to 1mg/dL  2 g Intravenous PRN Eddie Benton MD         Or   • Magnesium Sulfate 2 gram / 50mL Infusion (GIVE X 3 BAGS TO EQUAL 6GM TOTAL DOSE) - Mg 1.1 - 1.5 mg/dl  2 g Intravenous PRN Eddie Benton MD        Or   • Magnesium Sulfate 4 gram infusion- Mg 1.6-1.9 mg/dL  4 g Intravenous PRN Eddie Benton MD 25 mL/hr at 04/17/21 1209 4 g at 04/17/21 1209   • melatonin tablet 5 mg  5 mg Oral Nightly Eddie Benton MD   5 mg at 04/16/21 2029   • multivitamin (THERAGRAN) tablet 1 tablet  1 tablet Oral Daily Eddie Benton MD   1 tablet at 04/17/21 0904   • octreotide (sandoSTATIN) 500 mcg in sodium chloride 0.9 % 100 mL (5 mcg/mL) infusion  50 mcg/hr Intravenous Continuous Eddie Benton MD 10 mL/hr at 04/17/21 0902 50 mcg/hr at 04/17/21 0902   • ondansetron (ZOFRAN) tablet 4 mg  4 mg Oral Q6H PRN Eddie Benton MD        Or   • ondansetron (ZOFRAN) injection 4 mg  4 mg Intravenous Q6H PRN Eddie Benton MD       • pantoprazole (PROTONIX) injection 40 mg  40 mg Intravenous BID AC Eddie Benton MD   40 mg at 04/17/21 0902   • phenol (CHLORASEPTIC) 1.4 % liquid 1 spray  1 spray Mouth/Throat Q2H PRN Eddie Benton MD   1 spray at 04/14/21 0313   • potassium chloride (MICRO-K) CR capsule 40 mEq  40 mEq Oral PRN Eddie Benton MD   40 mEq at 04/15/21 1210    Or   • potassium chloride (KLOR-CON) packet 40 mEq  40 mEq Oral PRN Eddie Benton MD        Or   • potassium chloride 10 mEq in 100 mL IVPB  10 mEq Intravenous Q1H PRN Eddie Benton MD       • riFAXIMin (XIFAXAN) tablet 550 mg  550 mg Oral Q12H Eddie Benton MD   550 mg at 04/17/21 0905   • saccharomyces boulardii (FLORASTOR) capsule 250 mg  250 mg Oral BID Eddie Benton MD   250 mg at 04/17/21 0905   • sodium chloride 0.9 % flush 10 mL  10 mL Intravenous Q12H Eddie Benton MD   10 mL at 04/16/21 2029   • sodium chloride 0.9 % flush 10 mL  10 mL Intravenous PRN Eddie Benton MD       • sodium chloride 0.9 % flush 10 mL  10 mL Intravenous Q12H Eddie Benton MD   10 mL at 04/17/21  906   • sodium chloride 0.9 % flush 10 mL  10 mL Intravenous PRN Eddie Benton MD   10 mL at 21 1122   • tamsulosin (FLOMAX) 24 hr capsule 0.4 mg  0.4 mg Oral Daily Eddie Benton MD   0.4 mg at 21 0905   • terazosin (HYTRIN) capsule 2 mg  2 mg Oral Nightly Eddie Benton MD   2 mg at 21        Physician Progress Notes (last 72 hours) (Notes from 21 1626 through 21 1626)      Rinku Mcrae MD at 21 0818              [facility] Medicine Services  PROGRESS NOTE    Patient Name: Derek Kelsey  : 1950  MRN: [id number]    Date of Admission: 2021  Primary Care Physician: Raudel Zheng MD    Subjective   Subjective     CC:  Anemia, cirrhosis, melena, respiratory failure, chronically infected endograft    HPI:  No melena overnight, stool slightly dark but more brown like yesterday, ~3-4 bm's last 24 hours. Baseline resp status but some coughing (non-productive) occasionally    ROS:  No palpitations. No chest pain  Gen- No fevers, chills  CV- No chest pain, palpitations  Resp- No cough, otherwise as above  GI- No N/V, no abd pain.       Objective   Objective     Vital Signs:   Temp:  [97.8 °F (36.6 °C)-98.6 °F (37 °C)] 98.2 °F (36.8 °C)  Heart Rate:  [56-72] 65  Resp:  [16-20] 17  BP: (129-151)/(65-79) 139/65        Physical Exam:  Constitutional: a&ox3 today. Sitting in chair comfortably, no distress  Psych:Normal/appropriate affect  HEENT:Ncat, oroph clear, nasal canula in place  Neck: neck supple, full range of motion  Neuro: Face symmetric, speech clear, equal , moves all extremities  Cardiac: irr irr, regular rate; BLE edema +  Resp: faint bibasilar crackles, no overt wheezes, normal respiratory effort  GI: abd soft, nontender, mild distended, obese  Skin: No extremity rash  Musculoskeletal/extremities: no cyanosis extremities      Results Reviewed:  Results from last 7 days   Lab Units 21  0000  04/16/21  1821 04/16/21  1150 04/16/21  0838 04/15/21  0422 04/14/21  0804 04/13/21  0928   WBC 10*3/mm3  --   --   --  4.53 4.05 3.38* 4.32   HEMOGLOBIN g/dL 8.6* 9.3* 9.7* 9.1*  9.1* 7.1* 7.5* 7.0*   HEMATOCRIT % 28.8* 30.7* 32.1* 29.9*  29.9* 23.1* 24.8* 22.7*   PLATELETS 10*3/mm3  --   --   --  56* 73* 70* 77*   INR   --   --   --  1.55* 1.66* 1.67* 1.66*   PROCALCITONIN ng/mL  --   --   --   --   --   --  0.16     Results from last 7 days   Lab Units 04/16/21  0838 04/15/21  1755 04/15/21  0422 04/14/21  0804 04/13/21  0209 04/12/21  1852   SODIUM mmol/L 137  --  134* 133*  --  127*   POTASSIUM mmol/L 3.7 4.2 3.6 3.8   < > 3.8   CHLORIDE mmol/L 99  --  96* 95*   < > 92*   CO2 mmol/L 31.0*  --  30.0* 30.0*   < > 25.0   BUN mg/dL 16  --  19 20   < > 20   CREATININE mg/dL 0.88  --  1.13 1.10   < > 0.97   GLUCOSE mg/dL 101*  --  103* 117*   < > 101*   CALCIUM mg/dL 8.3*  --  7.7* 8.2*   < > 8.7   ALT (SGPT) U/L  --   --  33 37  --  40   AST (SGOT) U/L  --   --  73* 81*  --  85*   PROBNP pg/mL  --   --   --   --   --  391.4    < > = values in this interval not displayed.     Estimated Creatinine Clearance: 113.6 mL/min (by C-G formula based on SCr of 0.88 mg/dL).    Microbiology Results Abnormal     Procedure Component Value - Date/Time    Respiratory Culture - Sputum, Cough [[id number]] Collected: 04/14/21 1201    Lab Status: Final result Specimen: Sputum from Cough Updated: 04/16/21 0909     Respiratory Culture Moderate growth (3+) Normal Respiratory Abby: NO S.aureus/MRSA or Pseudomonas aeruginosa     Gram Stain Many (4+) Epithelial cells per low power field      Few (2+) WBCs per low power field      Moderate (3+) Mixed bacterial morphotypes seen on Gram Stain      Few (2+) Yeast    Legionella Antigen, Urine - Urine, Urine, Clean Catch [[id number]]  (Normal) Collected: 04/13/21 0726    Lab Status: Final result Specimen: Urine, Clean Catch Updated: 04/13/21 1341     LEGIONELLA ANTIGEN, URINE Negative    S.  Pneumo Ag Urine or CSF - Urine, Urine, Clean Catch [[id number]]  (Normal) Collected: 04/12/21 2325    Lab Status: Final result Specimen: Urine, Clean Catch Updated: 04/13/21 1021     Strep Pneumo Ag Negative    Eosinophil Smear - Urine, Urine, Clean Catch [[id number]]  (Normal) Collected: 04/12/21 2325    Lab Status: Final result Specimen: Urine, Clean Catch Updated: 04/13/21 0054     Eosinophil Smear 0 % EOS/100 Cells     Narrative:      No eosinophil seen          Imaging Results (Last 24 Hours)     ** No results found for the last 24 hours. **          Results for orders placed during the hospital encounter of 03/10/21    Adult Transthoracic Echo Complete W/ Cont if Necessary Per Protocol    Interpretation Summary  · Estimated right ventricular systolic pressure from tricuspid regurgitation is normal (<35 mmHg).  · Estimated left ventricular EF = 65% Left ventricular systolic function is normal.      I have reviewed the medications:  Scheduled Meds:budesonide-formoterol, 2 puff, Inhalation, BID - RT   And  ipratropium, 0.5 mg, Nebulization, 4x Daily - RT  carvedilol, 12.5 mg, Oral, Q12H  cefTAZidime, 2 g, Intravenous, Q12H  citalopram, 40 mg, Oral, Daily  ferrous sulfate, 325 mg, Oral, Daily With Breakfast  furosemide, 40 mg, Oral, BID  lactulose, 15 mL, Oral, TID  melatonin, 5 mg, Oral, Nightly  multivitamin, 1 tablet, Oral, Daily  pantoprazole, 40 mg, Intravenous, BID AC  riFAXIMin, 550 mg, Oral, Q12H  saccharomyces boulardii, 250 mg, Oral, BID  senna-docusate sodium, 2 tablet, Oral, BID  sodium chloride, 10 mL, Intravenous, Q12H  sodium chloride, 10 mL, Intravenous, Q12H  tamsulosin, 0.4 mg, Oral, Daily  terazosin, 2 mg, Oral, Nightly      Continuous Infusions:octreotide (SandoSTATIN) infusion, 50 mcg/hr, Last Rate: 50 mcg/hr (04/16/21 2153)      PRN Meds:.•  acetaminophen **OR** acetaminophen **OR** acetaminophen  •  benzonatate  •  senna-docusate sodium **AND** polyethylene glycol **AND** bisacodyl **AND**  "bisacodyl  •  calcium carbonate  •  guaiFENesin-dextromethorphan  •  ipratropium-albuterol  •  magnesium sulfate **OR** magnesium sulfate **OR** magnesium sulfate  •  ondansetron **OR** ondansetron  •  phenol  •  potassium chloride **OR** potassium chloride **OR** potassium chloride  •  sodium chloride  •  sodium chloride    Assessment/Plan   Assessment & Plan     Active Hospital Problems    Diagnosis  POA   • **Symptomatic anemia [D64.9]  Yes   • Anemia [D64.9]  Yes   • Gastrointestinal hemorrhage with melena [K92.1]  Unknown   • 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        Brief Hospital Course to date:  Derek Kelsey is a 70 y.o. male w/ cirrhosis, chronically infected aaa endograft (on fortaz followed by Dr. Carrero), afib, dhf, copd/ILD w/ 3-4L chronic oxygen dependence, also w/ hx of covid + pcr on 3/10/21 (also got 2nd covid 19 vaccination shot on 3/23/21) who presented w/ dyspnea on exertion, fatigue. Apparently had appointment at [facility] mid April to consider \"options\" for his chronically infected endograft. Noted had hgb 5. Has had melena recently and some mild increased confusion. Ct revealed no PE but bilateral effusions. Transfused 2 unit prbc w/ bumex. Gi, cards, ID consulted.     *Acute GI bleed, likely upper (melena)  *Decompensated cirrhosis (w/ coagulopathy, thrombocytopenia, hepatic encephalopathy, thromcytopenia)   -4/14/21: egd w/ portal gasropathy w/ oozing (varices noted but no overt bleeding; -bid ppi, octreotide   -GI deferring c-scope for now as active bleeding seems to have stopped   -q8 H&H, transfuse prn   -received 4th & 5th units 4/15/21 w/ bumex 2mg " "iv   -lactulose & rifaxamin; improved mentation   -plts now <50,000. 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 Carrero   -apparently has appointment @ [facility] next week re: options; however patient now declining this and accepts that this means he will have recurrent/persistent infection and likely sepsis   -palliative following; appreciate further assistance in future goals/plans; perhaps hospice in order if/when clinically deteriorates  *Afib (not anticoagulated)   -Dr. Gardner following, on coreg; no a/c due to gi bleeding/cirrhosis   -currently rate controlled  *hx covid 19 pcr + (on 3/10/21)   -also had covid vacinnation x 2 shots (2nd shot 3/23/21)   -no isolatoin    Plan:    -continue ppi, octreotide for now, gi bleeding seems to be resolved currently. GI deferring c-scope unless further bleeding  -change hgb checks to q8h, transfuse prn (w/ lasix if give blood)  -continue lactulose, rifaxamin, coreg    -continue lasix, rate control per cards (no anticoagulation)    -continue fortaz per ID (likely another couple weeks); Dr. Carrero back Monday    -patient & family wish to monitor over weekend to ensure no resumption of gi bleeding    -palliative following and continuing goals/symptoms support      Am Labs: q8h H&H; cbc,bmp,inr,mag ordered    DVT Prophylaxis:  mechanical      Disposition: I expect the patient to be discharged possibly early next week/monday if hgb stable and otherwise stable (+/- hospice/palliative as outpatient)     CODE STATUS:   Code Status and Medical Interventions: " "  Ordered at: 04/12/21 1844     Limited Support to NOT Include:    Artificial Nutrition    Dialysis    Cardioversion/Defibrillation    Intubation     Level Of Support Discussed With:    Patient     Code Status:    No CPR     Medical Interventions (Level of Support Prior to Arrest):    Limited       Rinku Mcrae MD  04/17/21                Electronically signed by Rinku Mcrae MD at 04/17/21 1204     Eddie Benton MD at 04/16/21 1644          GI Daily Progress Note  Subjective     Jose Rafael Tejeda is a 70 y.o. male who was admitted with Symptomatic anemia.   Feels better this afternoon.  States he is sleepy.  Stools are now brown to green.His blood thinners have been discontinued  Son in room  Chief Complaint:  weakness    Objective     /66 (BP Location: Left arm, Patient Position: Lying)   Pulse 67   Temp 98.6 °F (37 °C) (Oral)   Resp 18   Ht 190.5 cm (75\")   Wt 124 kg (272 lb 11.2 oz)   SpO2 94%   BMI 34.09 kg/m²     Intake/Output last 3 shifts:  I/O last 3 completed shifts:  In: 1790.8 [P.O.:995; Blood:695.8; IV Piggyback:100]  Out: 4510 [Urine:4510]  Intake/Output this shift:  I/O this shift:  In: 240 [P.O.:240]  Out: -       Physical Exam  Wt Readings from Last 3 Encounters:   04/16/21 124 kg (272 lb 11.2 oz)   03/29/21 114 kg (252 lb)   03/17/21 106 kg (233 lb 4.8 oz)   ,body mass index is 34.09 kg/m².,@FLOWAMB(6)@,@FLOWAMB(5)@,@FLOWAMB(8)@   CONSTITUTIONAL:lying in bed  Resp Mild rhonchi  Respiration effort normal  CV RRR; no M/R/G. No lower extremity edema  GI Abd soft, NT, ND, normal active bowel sounds.    Psych:     DATA:Results for JOSE RAFAEL TEJEDA (MRN [id number]) as of 4/16/2021 16:35   Ref. Range 4/15/2021 00:12 4/15/2021 04:22 4/15/2021 17:55 4/15/2021 23:51 4/16/2021 08:38 4/16/2021 08:38 4/16/2021 11:50   Hemoglobin Latest Ref Range: 13.0 - 17.7 g/dL 7.0 (L) 7.1 (L) 9.1 (L) 8.8 (L) 9.1 (L) 9.1 (L) 9.7 (L)       Assessment/Plan     1. Symptomatic anemia  2. Cirrhosis of " liver  3. Infected aortic endograft  4. Portal HTN gastropathy with oozing        Hg stable off blood thinners.  Have advised against colonscopy at this time unless he has further bleeding due to risk of sedation. He and son are in agreement    Has known oozing from portal HTN gastropathy  Would continue Coreg to help lower portal pressure        Symptomatic anemia    Depression    Hypertension    CAD (coronary artery disease)    Moderate COPD    Cirrhosis of liver (CMS/HCC)    On supplemental oxygen by nasal cannula    Infected aortic endograft (CMS/HCC)    Anemia    Gastrointestinal hemorrhage with melena       LOS: 3 days     Eddie Benton MD  21  16:44 EDT    Electronically signed by Eddie Benton MD at 21 1651     [place] City[place]inku MD at 21 1632              [facility] Medicine Services  PROGRESS NOTE    Patient Name: Derek Kelsey  : 1950  MRN: [id number]    Date of Admission: 2021  Primary Care Physician: Raudel Zheng MD    Subjective   Subjective     CC:  Anemia, cirrhosis, melena, respiratory failure, chronically infected endograft    HPI:  stoo was more brown, less dark today. Respiratory status baseline. No abd pain. No dyspnea  ROS:  No palpitations. No chest pain  Gen- No fevers, chills  CV- No chest pain, palpitations  Resp- No cough, otherwise as above  GI- No N/V, no abd pain.       Objective   Objective     Vital Signs:   Temp:  [97.8 °F (36.6 °C)-99 °F (37.2 °C)] 98.6 °F (37 °C)  Heart Rate:  [56-79] 67  Resp:  [16-20] 18  BP: (126-145)/(62-71) 134/66        Physical Exam:  Constitutional: a&ox3 today. Sitting in chair comfortably, no distress  Psych:Normal/appropriate affect  HEENT:Ncat, oroph clear, nasal canula in place  Neck: neck supple, full range of motion  Neuro: Face symmetric, speech clear, equal , moves all extremities  Cardiac: irr irr, regular rate; BLE edema  Resp: faint bibasilar crackles, no overt  wheezes, normal respiratory effort  GI: abd soft, nontender, mild distended, obese  Skin: No extremity rash  Musculoskeletal/extremities: no cyanosis extremities      Results Reviewed:  Results from last 7 days   Lab Units 04/16/21  1150 04/16/21  0838 04/15/21  2351 04/15/21  0422 04/14/21  0804 04/13/21  0928   WBC 10*3/mm3  --  4.53  --  4.05 3.38* 4.32   HEMOGLOBIN g/dL 9.7* 9.1*  9.1* 8.8* 7.1* 7.5* 7.0*   HEMATOCRIT % 32.1* 29.9*  29.9* 28.7* 23.1* 24.8* 22.7*   PLATELETS 10*3/mm3  --  56*  --  73* 70* 77*   INR   --  1.55*  --  1.66* 1.67* 1.66*   PROCALCITONIN ng/mL  --   --   --   --   --  0.16     Results from last 7 days   Lab Units 04/16/21  0838 04/15/21  1755 04/15/21  0422 04/14/21  0804 04/13/21  0209 04/12/21  1852   SODIUM mmol/L 137  --  134* 133*  --  127*   POTASSIUM mmol/L 3.7 4.2 3.6 3.8   < > 3.8   CHLORIDE mmol/L 99  --  96* 95*   < > 92*   CO2 mmol/L 31.0*  --  30.0* 30.0*   < > 25.0   BUN mg/dL 16  --  19 20   < > 20   CREATININE mg/dL 0.88  --  1.13 1.10   < > 0.97   GLUCOSE mg/dL 101*  --  103* 117*   < > 101*   CALCIUM mg/dL 8.3*  --  7.7* 8.2*   < > 8.7   ALT (SGPT) U/L  --   --  33 37  --  40   AST (SGOT) U/L  --   --  73* 81*  --  85*   PROBNP pg/mL  --   --   --   --   --  391.4    < > = values in this interval not displayed.     Estimated Creatinine Clearance: 113.6 mL/min (by C-G formula based on SCr of 0.88 mg/dL).    Microbiology Results Abnormal     Procedure Component Value - Date/Time    Respiratory Culture - Sputum, Cough [[id number]] Collected: 04/14/21 1201    Lab Status: Final result Specimen: Sputum from Cough Updated: 04/16/21 0909     Respiratory Culture Moderate growth (3+) Normal Respiratory Abby: NO S.aureus/MRSA or Pseudomonas aeruginosa     Gram Stain Many (4+) Epithelial cells per low power field      Few (2+) WBCs per low power field      Moderate (3+) Mixed bacterial morphotypes seen on Gram Stain      Few (2+) Yeast    Legionella Antigen, Urine - Urine,  Urine, Clean Catch [[id number]]  (Normal) Collected: 04/13/21 0726    Lab Status: Final result Specimen: Urine, Clean Catch Updated: 04/13/21 1341     LEGIONELLA ANTIGEN, URINE Negative    S. Pneumo Ag Urine or CSF - Urine, Urine, Clean Catch [[id number]]  (Normal) Collected: 04/12/21 2325    Lab Status: Final result Specimen: Urine, Clean Catch Updated: 04/13/21 1021     Strep Pneumo Ag Negative    Eosinophil Smear - Urine, Urine, Clean Catch [[id number]]  (Normal) Collected: 04/12/21 2325    Lab Status: Final result Specimen: Urine, Clean Catch Updated: 04/13/21 0054     Eosinophil Smear 0 % EOS/100 Cells     Narrative:      No eosinophil seen          Imaging Results (Last 24 Hours)     ** No results found for the last 24 hours. **          Results for orders placed during the hospital encounter of 03/10/21    Adult Transthoracic Echo Complete W/ Cont if Necessary Per Protocol    Interpretation Summary  · Estimated right ventricular systolic pressure from tricuspid regurgitation is normal (<35 mmHg).  · Estimated left ventricular EF = 65% Left ventricular systolic function is normal.      I have reviewed the medications:  Scheduled Meds:budesonide-formoterol, 2 puff, Inhalation, BID - RT   And  ipratropium, 0.5 mg, Nebulization, 4x Daily - RT  carvedilol, 12.5 mg, Oral, Q12H  cefTAZidime, 2 g, Intravenous, Q12H  citalopram, 40 mg, Oral, Daily  ferrous sulfate, 325 mg, Oral, Daily With Breakfast  furosemide, 40 mg, Oral, BID  lactulose, 15 mL, Oral, TID  melatonin, 5 mg, Oral, Nightly  multivitamin, 1 tablet, Oral, Daily  pantoprazole, 40 mg, Intravenous, BID AC  riFAXIMin, 550 mg, Oral, Q12H  saccharomyces boulardii, 250 mg, Oral, BID  senna-docusate sodium, 2 tablet, Oral, BID  sodium chloride, 10 mL, Intravenous, Q12H  sodium chloride, 10 mL, Intravenous, Q12H  tamsulosin, 0.4 mg, Oral, Daily  terazosin, 2 mg, Oral, Nightly      Continuous Infusions:octreotide (SandoSTATIN) infusion, 50 mcg/hr, Last Rate: 50  "mcg/hr (04/16/21 0937)      PRN Meds:.•  acetaminophen **OR** acetaminophen **OR** acetaminophen  •  benzonatate  •  senna-docusate sodium **AND** polyethylene glycol **AND** bisacodyl **AND** bisacodyl  •  calcium carbonate  •  guaiFENesin-dextromethorphan  •  ipratropium-albuterol  •  magnesium sulfate **OR** magnesium sulfate **OR** magnesium sulfate  •  ondansetron **OR** ondansetron  •  phenol  •  potassium chloride **OR** potassium chloride **OR** potassium chloride  •  sodium chloride  •  sodium chloride    Assessment/Plan   Assessment & Plan     Active Hospital Problems    Diagnosis  POA   • **Symptomatic anemia [D64.9]  Yes   • Anemia [D64.9]  Yes   • Gastrointestinal hemorrhage with melena [K92.1]  Unknown   • 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        Brief Hospital Course to date:  Derek Kelsey is a 70 y.o. male w/ cirrhosis, chronically infected aaa endograft (on fortaz followed by Dr. Carrero), afib, dhf, copd/ILD w/ 3-4L chronic oxygen dependence, also w/ hx of covid + pcr on 3/10/21 (also got 2nd covid 19 vaccination shot on 3/23/21) who presented w/ dyspnea on exertion, fatigue. Apparently had appointment at [facility] mid April to consider \"options\" for his chronically infected endograft. Noted had hgb 5. Has had melena recently and some mild increased confusion. Ct revealed no PE but bilateral effusions. Transfused 2 unit prbc w/ bumex. Gi, cards, ID consulted.     *Acute GI bleed, likely upper (melena)  *Decompensated cirrhosis (w/ coagulopathy, thrombocytopenia, hepatic encephalopathy)   -4/14/21: egd w/ portal gasropathy w/ oozing (varices noted but no overt bleeding; -bid " "ppi, octreotide   -GI ? planning c-scope ? 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 Carrero   -apparently has appointment @ [facility] next week re: options; however patient now declining this and accepts that this means he will have recurrent/persistent infection and likely sepsis   -palliative following; appreciate further assistance in future goals/plans; perhaps hospice in order if/when clinically deteriorates  *Afib (not anticoagulated)   -Dr. Gardner following, on coreg; no a/c due to gi bleeding/cirrhosis   -currently rate controlled  *hx covid 19 pcr + (on 3/10/21)   -also had covid vacinnation x 2 shots (2nd shot 3/23/21)   -no isolatoin    Plan:  -continue ppi, octreotide for now, gi bleeding seems to be improved (stool more brown today, hgb stable); GI deciding on whether pursue C-scope (hgb stable today); q6h H&H, transfuse prn    -continue lasix, rate control per cards (no anticoagulation)    -continue fortaz per ID (likely another couple weeks); Dr. Carrero back Monday    -long d/w with family at bedside today. Patient has opted against [facility] for explantation evaluation of aaa graft, which admittedly would be remarkably high risk, if at all possible. All agree no heroics (dnr/dni/no heroics/no surgeries). At this point would like to continue current course of action to see if can whit gi bleeding. Will continue to monitor over weekend      Labs: q6h H&H; cbc,bmp,inr,mag    DVT Prophylaxis:  mechanical      Disposition: I expect the " patient to be discharged TBD, once stable, possibly next week    CODE STATUS:   Code Status and Medical Interventions:   Ordered at: 04/12/21 1844     Limited Support to NOT Include:    Artificial Nutrition    Dialysis    Cardioversion/Defibrillation    Intubation     Level Of Support Discussed With:    Patient     Code Status:    No CPR     Medical Interventions (Level of Support Prior to Arrest):    Limited       Rinku Mcrae MD  04/16/21                Electronically signed by Rinku Mcrae MD at 04/16/21 1638     Sedrick Ayon DO at 04/16/21 1250          Palliative Care Progress Note    Date of Admission: 4/12/2021    Subjective: Patient continues to state that he is feeling weak but denies any other complaints.  Wife states that he continues to have dark bowel movements.  Current Code Status     Date Active Code Status Order ID Comments User Context       4/12/2021 1844 No CPR [id number]  Esther Drew APRN Inpatient     Advance Care Planning Activity      Questions for Current Code Status     Question Answer Comment    Code Status No CPR     Medical Interventions (Level of Support Prior to Arrest) Limited     Limited Support to NOT Include Artificial Nutrition      Dialysis      Cardioversion/Defibrillation      Intubation     Level Of Support Discussed With Patient         No current facility-administered medications on file prior to encounter.     Current Outpatient Medications on File Prior to Encounter   Medication Sig Dispense Refill   • acetaminophen (TYLENOL) 650 MG 8 hr tablet Take 650 mg by mouth Every 8 (Eight) Hours As Needed for Mild Pain .     • ASPIRIN LOW DOSE 81 MG tablet Take 1 tablet by mouth Daily. Last dose 10-21-18 (Patient taking differently: Take 81 mg by mouth Daily.)     • carvedilol (COREG) 12.5 MG tablet Take 1 tablet by mouth 2 (Two) Times a Day With Meals. 60 tablet 0   • cefTAZidime (FORTAZ) 1 g injection Infuse 2 g into a venous catheter 3 (Three) Times a Day.  100ml every 8 hours via PICC line     • citalopram (CeleXA) 20 MG tablet Take 40 mg by mouth Daily.     • doxazosin (CARDURA) 1 MG tablet Take  by mouth 2 (Two) Times a Day.     • ferrous gluconate (FERGON) 324 MG tablet Take 1 tablet by mouth Daily With Breakfast. (Patient taking differently: Take 65 mg by mouth Daily.) 30 tablet 0   • furosemide (LASIX) 20 MG tablet 40 mg 2 (two) times a day.     • Hydrocortisone, Perianal, (ANUSOL-HC) 2.5 % rectal cream Insert 1 application into the rectum 2 (Two) Times a Day.     • lactulose (CHRONULAC) 10 GM/15ML solution Take 15 mL by mouth 3 (Three) Times a Day for goal of 3 BMs daily 1892 mL 3   • melatonin 5 MG tablet tablet Take 5 mg by mouth Every Night.     • Multiple Vitamin (MULTI-VITAMIN DAILY) tablet Take 1 tablet by mouth Daily.     • O2 (OXYGEN)      • Ondansetron 4 MG film 3 (Three) Times a Day As Needed.     • pantoprazole (PROTONIX) 40 MG EC tablet Take 40 mg by mouth 2 (Two) Times a Day.     • saccharomyces boulardii (FLORASTOR) 250 MG capsule Take 250 mg by mouth 2 (Two) Times a Day.     • tamsulosin (FLOMAX) 0.4 MG capsule 24 hr capsule Take 1 capsule by mouth Daily. 15 capsule 0   • Trelegy Ellipta 100-62.5-25 MCG/INH aerosol powder  INHALE 1 PUFF ONCE DAILY 60 each 0   • Xifaxan 550 MG tablet 2 (Two) Times a Day.       octreotide (SandoSTATIN) infusion, 50 mcg/hr, Last Rate: 50 mcg/hr (04/16/21 0937)      •  acetaminophen **OR** acetaminophen **OR** acetaminophen  •  benzonatate  •  senna-docusate sodium **AND** polyethylene glycol **AND** bisacodyl **AND** bisacodyl  •  calcium carbonate  •  guaiFENesin-dextromethorphan  •  ipratropium-albuterol  •  magnesium sulfate **OR** magnesium sulfate **OR** magnesium sulfate  •  ondansetron **OR** ondansetron  •  phenol  •  potassium chloride **OR** potassium chloride **OR** potassium chloride  •  sodium chloride  •  sodium chloride    Objective: /71 (BP Location: Right arm, Patient Position: Sitting)    "Pulse 56   Temp 97.8 °F (36.6 °C) (Oral)   Resp 18   Ht 190.5 cm (75\")   Wt 124 kg (272 lb 11.2 oz)   SpO2 94%   BMI 34.09 kg/m²      Intake/Output Summary (Last 24 hours) at 4/16/2021 1250  Last data filed at 4/16/2021 0900  Gross per 24 hour   Intake 1385.83 ml   Output 3000 ml   Net -1614.17 ml     Physical Exam:      General Appearance:    Alert, cooperative, in no acute distress   Head:    Normocephalic, without obvious abnormality, atraumatic   Eyes:            Lids and lashes normal, conjunctivae and sclerae normal, no   icterus, no pallor, corneas clear, PERRLA   Ears:    Ears appear intact with no abnormalities noted   Throat:   No oral lesions, no thrush, oral mucosa moist   Neck:   No adenopathy, supple, trachea midline, no thyromegaly, no   carotid bruit, no JVD   Back:     No kyphosis present, no scoliosis present, no skin lesions,      erythema or scars, no tenderness to percussion or                   palpation,   range of motion normal   Lungs:     Clear to auscultation,respirations regular, even and                  unlabored    Heart:    Regular rhythm and normal rate, normal S1 and S2, no            murmur, no gallop, no rub, no click   Chest Wall:    No abnormalities observed   Abdomen:     Normal bowel sounds, no masses, no organomegaly, soft        non-tender, non-distended, no guarding, no rebound                tenderness   Rectal:     Deferred   Extremities:   Moves all extremities well, no edema, no cyanosis, no             redness   Pulses:   Pulses palpable and equal bilaterally   Skin:   No bleeding, bruising or rash   Lymph nodes:   No palpable adenopathy   Neurologic:   Cranial nerves 2 - 12 grossly intact, sensation intact, DTR       present and equal bilaterally     Results from last 7 days   Lab Units 04/16/21  1150 04/16/21  0838   WBC 10*3/mm3  --  4.53   HEMOGLOBIN g/dL 9.7* 9.1*  9.1*   HEMATOCRIT % 32.1* 29.9*  29.9*   PLATELETS 10*3/mm3  --  56*     Results from last 7 " days   Lab Units 04/16/21  0838 04/15/21  0422   SODIUM mmol/L 137 134*   POTASSIUM mmol/L 3.7 3.6   CHLORIDE mmol/L 99 96*   CO2 mmol/L 31.0* 30.0*   BUN mg/dL 16 19   CREATININE mg/dL 0.88 1.13   CALCIUM mg/dL 8.3* 7.7*   BILIRUBIN mg/dL  --  1.1   ALK PHOS U/L  --  113   ALT (SGPT) U/L  --  33   AST (SGOT) U/L  --  73*   GLUCOSE mg/dL 101* 103*       Impression: Anemia  Debility  Cirrhosis  Infected aortic graft  Plan: Long discussion with the patient, patient's wife, and patient's 2 kids.  We discussed continue current plan of care versus a more hospice plan of care.  Did also discuss that regardless of which route we choose the patient will continue to decline as his underlying issues are irreversible and tend to be progressive diseases.  For now to continue the current plan of care for the time being..        Sedrick Ayon DO  04/16/21  12:50 EDT        Electronically signed by Sedrick Ayon DO at 04/16/21 1251     Christiano Tyson PA at 04/16/21 0948                                                         [facility]       LOS: 3 days   Patient Care Team:  Raudel Zheng MD as PCP - General  Joo Franklin MD as Consulting Physician (Orthopedic Surgery)  Leopoldo Burleson MD as Surgeon (Cardiothoracic Surgery)  Douglas Galvez MD as Consulting Physician (Cardiology)  Davis Ford MD as Consulting Physician (Urology)        Subjective       Patient Denies:  Cp, sob, palpitations.      Vital Signs  Temp:  [98 °F (36.7 °C)-99 °F (37.2 °C)] 98.8 °F (37.1 °C)  Heart Rate:  [57-83] 61  Resp:  [16-20] 20  BP: (125-145)/(49-71) 126/67    Intake/Output Summary (Last 24 hours) at 4/16/2021 0948  Last data filed at 4/16/2021 0900  Gross per 24 hour   Intake 1385.83 ml   Output 3700 ml   Net -2314.17 ml     I/O this shift:  In: 240 [P.O.:240]  Out: -     Physical Exam:     General Appearance:    Alert, cooperative, in no acute distress       Neck:   No adenopathy, supple,  trachea midline, no thyromegaly, no JVD   Lungs:     Clear to auscultation,respirations regular, even and                unlabored    Heart:    Regular rhythm and normal rate, normal S1 and S2, no         murmur, no gallop, no rub, no click   Chest Wall:    No abnormalities observed   Abdomen:     Normal bowel sounds, no masses, no organomegaly, soft     nontender, nondistended   Extremities:   Moves all extremities well, 1+ edema, no cyanosis, no           redness   Pulses:   Pulses palpable and equal bilaterally     Results Review:     I reviewed the patient's new clinical results.      WBC WBC   Date/Time Value Ref Range Status   04/16/2021 0838 4.53 3.40 - 10.80 10*3/mm3 Final   04/15/2021 0422 4.05 3.40 - 10.80 10*3/mm3 Final   04/14/2021 0804 3.38 (L) 3.40 - 10.80 10*3/mm3 Final            HGB Hemoglobin   Date/Time Value Ref Range Status   04/16/2021 0838 9.1 (L) 13.0 - 17.7 g/dL Final   04/16/2021 0838 9.1 (L) 13.0 - 17.7 g/dL Final   04/15/2021 2351 8.8 (L) 13.0 - 17.7 g/dL Final   04/15/2021 1755 9.1 (L) 13.0 - 17.7 g/dL Final   04/15/2021 0422 7.1 (L) 13.0 - 17.7 g/dL Final   04/15/2021 0012 7.0 (L) 13.0 - 17.7 g/dL Final   04/14/2021 1753 8.0 (L) 13.0 - 17.7 g/dL Final   04/14/2021 1213 7.5 (L) 13.0 - 17.7 g/dL Final   04/14/2021 0804 7.5 (L) 13.0 - 17.7 g/dL Final   04/14/2021 0031 6.8 (C) 13.0 - 17.7 g/dL Final     Comment:     Confirmed/called   04/13/2021 1608 7.2 (L) 13.0 - 17.7 g/dL Final           HCT Hematocrit   Date/Time Value Ref Range Status   04/16/2021 0838 29.9 (L) 37.5 - 51.0 % Final   04/16/2021 0838 29.9 (L) 37.5 - 51.0 % Final   04/15/2021 2351 28.7 (L) 37.5 - 51.0 % Final   04/15/2021 1755 29.3 (L) 37.5 - 51.0 % Final   04/15/2021 0422 23.1 (L) 37.5 - 51.0 % Final   04/15/2021 0012 23.6 (L) 37.5 - 51.0 % Final   04/14/2021 1753 26.3 (L) 37.5 - 51.0 % Final   04/14/2021 1213 24.4 (L) 37.5 - 51.0 % Final   04/14/2021 0804 24.8 (L) 37.5 - 51.0 % Final   04/14/2021 0031 22.5 (L) 37.5 -  51.0 % Final   04/13/2021 1608 23.2 (L) 37.5 - 51.0 % Final            Platelets Platelets   Date/Time Value Ref Range Status   04/16/2021 0838 56 (L) 140 - 450 10*3/mm3 Final   04/15/2021 0422 73 (L) 140 - 450 10*3/mm3 Final   04/14/2021 0804 70 (L) 140 - 450 10*3/mm3 Final     Sodium  Sodium   Date/Time Value Ref Range Status   04/16/2021 0838 137 136 - 145 mmol/L Final   04/15/2021 0422 134 (L) 136 - 145 mmol/L Final   04/14/2021 0804 133 (L) 136 - 145 mmol/L Final     Potassium  Potassium   Date/Time Value Ref Range Status   04/16/2021 0838 3.7 3.5 - 5.2 mmol/L Final   04/15/2021 1755 4.2 3.5 - 5.2 mmol/L Final   04/15/2021 0422 3.6 3.5 - 5.2 mmol/L Final   04/14/2021 0804 3.8 3.5 - 5.2 mmol/L Final     Chloride  Chloride   Date/Time Value Ref Range Status   04/16/2021 0838 99 98 - 107 mmol/L Final   04/15/2021 0422 96 (L) 98 - 107 mmol/L Final   04/14/2021 0804 95 (L) 98 - 107 mmol/L Final     BicarbonateNo results found for: PLASMABICARB    BUN BUN   Date/Time Value Ref Range Status   04/16/2021 0838 16 8 - 23 mg/dL Final   04/15/2021 0422 19 8 - 23 mg/dL Final   04/14/2021 0804 20 8 - 23 mg/dL Final      Creatinine Creatinine   Date/Time Value Ref Range Status   04/16/2021 0838 0.88 0.76 - 1.27 mg/dL Final   04/15/2021 0422 1.13 0.76 - 1.27 mg/dL Final   04/14/2021 0804 1.10 0.76 - 1.27 mg/dL Final      Calcium Calcium   Date/Time Value Ref Range Status   04/16/2021 0838 8.3 (L) 8.6 - 10.5 mg/dL Final   04/15/2021 0422 7.7 (L) 8.6 - 10.5 mg/dL Final   04/14/2021 0804 8.2 (L) 8.6 - 10.5 mg/dL Final      Mag @RESULFAST(MG:3)@        PT/INR:       Lab Results   Component Value Date    PROTIME 18.0 (H) 04/16/2021    PROTIME 19.0 (H) 04/15/2021    PROTIME 19.0 (H) 04/14/2021    PROTIME 19.0 (H) 04/13/2021    PROTIME 19.7 (H) 04/12/2021    PROTIME 18.1 (H) 03/17/2021    PROTIME 20.6 (H) 03/12/2021    INR 1.55 (H) 04/16/2021    INR 1.66 (H) 04/15/2021    INR 1.67 (H) 04/14/2021    INR 1.66 (H) 04/13/2021    INR 1.75  (H) 04/12/2021    INR 1.53 (H) 03/17/2021    INR 1.80 (H) 03/12/2021      Troponin I:  No results found for: TROPONINI   Lab Results   Component Value Date    CKTOTAL 362 (H) 07/26/2019    TROPONINT <0.010 03/10/2021       budesonide-formoterol, 2 puff, Inhalation, BID - RT   And  ipratropium, 0.5 mg, Nebulization, 4x Daily - RT  carvedilol, 12.5 mg, Oral, Q12H  cefTAZidime 2 g in 100 mL NS MBP, 2 g, Intravenous, Q12H  citalopram, 40 mg, Oral, Daily  ferrous sulfate, 325 mg, Oral, Daily With Breakfast  furosemide, 40 mg, Oral, BID  lactulose, 15 mL, Oral, TID  melatonin, 5 mg, Oral, Nightly  multivitamin, 1 tablet, Oral, Daily  pantoprazole, 40 mg, Intravenous, BID AC  riFAXIMin, 550 mg, Oral, Q12H  saccharomyces boulardii, 250 mg, Oral, BID  senna-docusate sodium, 2 tablet, Oral, BID  sodium chloride, 10 mL, Intravenous, Q12H  sodium chloride, 10 mL, Intravenous, Q12H  tamsulosin, 0.4 mg, Oral, Daily  terazosin, 2 mg, Oral, Nightly      octreotide (SandoSTATIN) infusion, 50 mcg/hr, Last Rate: 50 mcg/hr (04/16/21 0937)        Assessment/Plan     Patient Active Problem List   Diagnosis Code   • Anxiety F41.9   • Abdominal aortic aneurysm (AAA) without rupture (CMS/HCC) I71.4   • Depression F32.9   • Hypertension I10   • Cataract, bilateral H26.9   • CAD (coronary artery disease) I25.10   • Abnormal stress test R94.39   • Moderate COPD J44.9   • Former smoker Z87.891   • Coal workers pneumoconiosis, simple J60   • Back pain M54.9   • S/P lumbar fusion Z98.1   • Prediabetes R73.03   • Acute blood loss anemia, mild, asymptomatic D62   • Acute postoperative pain G89.18   • Hyponatremia, mild E87.1   • Chronic respiratory failure with hypoxia and hypercapnia (CMS/HCC) J96.11, J96.12   • Renal insufficiency N28.9   • Acute cystitis without hematuria N30.00   • Acute UTI N39.0   • Bacteremia R78.81   • Symptomatic anemia D64.9   • 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   • Elevated C-reactive protein (CRP) R79.82   • Cirrhosis of liver  (CMS/[facility]) K74.60   • High anion gap metabolic acidosis E87.2   • S/P AAA repair Z98.890, Z86.79   • GERD (gastroesophageal reflux disease) K21.9   • Person under investigation for COVID-19 Z20.822   • On supplemental oxygen by nasal cannula Z78.9   • 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   • Elevated transaminase level R74.01   • Thrombocytopenia (CMS/[facility]) D69.6   • Pneumonia J18.9   • Gram-negative bacteremia R78.81   • COVID-19 U07.1   • Anemia D64.9   • Gastrointestinal hemorrhage with melena K92.1       Normal EF  Paroxysmal atrial fibrillation  Currently in NSR  Infected AAA endograft      Continue diuresis  Palliative care and family to have discussion today      MD Christiano Levy PA  04/16/21  09:48 EDT          Electronically signed by Christiano Tyson PA at 04/16/21 1224     Chris Black MD at 04/16/21 0946                                                         [facility]       LOS: 3 days   Patient Care Team:  Raudel Zheng MD as PCP - General  FranklinJoo platt MD as Consulting Physician (Orthopedic Surgery)  Leopoldo Burleson MD as Surgeon (Cardiothoracic Surgery)  Douglas Galvez MD as Consulting Physician (Cardiology)  Davis Ford MD as Consulting Physician (Urology)        Subjective       Patient Denies:  Cp, sob, palpitations.      Vital Signs  Temp:  [98 °F (36.7 °C)-99 °F (37.2 °C)] 98.8 °F (37.1 °C)  Heart Rate:  [57-83] 61  Resp:  [16-20] 20  BP: (123-145)/(49-71) 126/67    Intake/Output Summary (Last 24 hours) at 4/16/2021 0946  Last data filed at 4/16/2021 0900  Gross per 24 hour   Intake 1385.83 ml   Output 3700 ml   Net -2314.17 ml     I/O this shift:  In: 240 [P.O.:240]  Out: -     Physical Exam:     General Appearance:    Alert, cooperative, in no acute distress       Neck:   No adenopathy, supple, trachea midline, no thyromegaly, no JVD   Lungs:     Clear to auscultation,respirations regular, even and                 unlabored    Heart:    Regular rhythm and normal rate, normal S1 and S2, no         murmur, no gallop, no rub, no click   Chest Wall:    No abnormalities observed   Abdomen:     Normal bowel sounds, no masses, no organomegaly, soft     nontender, nondistended   Extremities:   Moves all extremities well, 2+ edema, no cyanosis, no           redness   Pulses:   Pulses palpable and equal bilaterally     Results Review:     I reviewed the patient's new clinical results.      WBC WBC   Date/Time Value Ref Range Status   04/16/2021 0838 4.53 3.40 - 10.80 10*3/mm3 Final   04/15/2021 0422 4.05 3.40 - 10.80 10*3/mm3 Final   04/14/2021 0804 3.38 (L) 3.40 - 10.80 10*3/mm3 Final            HGB Hemoglobin   Date/Time Value Ref Range Status   04/16/2021 0838 9.1 (L) 13.0 - 17.7 g/dL Final   04/16/2021 0838 9.1 (L) 13.0 - 17.7 g/dL Final   04/15/2021 2351 8.8 (L) 13.0 - 17.7 g/dL Final   04/15/2021 1755 9.1 (L) 13.0 - 17.7 g/dL Final   04/15/2021 0422 7.1 (L) 13.0 - 17.7 g/dL Final   04/15/2021 0012 7.0 (L) 13.0 - 17.7 g/dL Final   04/14/2021 1753 8.0 (L) 13.0 - 17.7 g/dL Final   04/14/2021 1213 7.5 (L) 13.0 - 17.7 g/dL Final   04/14/2021 0804 7.5 (L) 13.0 - 17.7 g/dL Final   04/14/2021 0031 6.8 (C) 13.0 - 17.7 g/dL Final     Comment:     Confirmed/called   04/13/2021 1608 7.2 (L) 13.0 - 17.7 g/dL Final           HCT Hematocrit   Date/Time Value Ref Range Status   04/16/2021 0838 29.9 (L) 37.5 - 51.0 % Final   04/16/2021 0838 29.9 (L) 37.5 - 51.0 % Final   04/15/2021 2351 28.7 (L) 37.5 - 51.0 % Final   04/15/2021 1755 29.3 (L) 37.5 - 51.0 % Final   04/15/2021 0422 23.1 (L) 37.5 - 51.0 % Final   04/15/2021 0012 23.6 (L) 37.5 - 51.0 % Final   04/14/2021 1753 26.3 (L) 37.5 - 51.0 % Final   04/14/2021 1213 24.4 (L) 37.5 - 51.0 % Final   04/14/2021 0804 24.8 (L) 37.5 - 51.0 % Final   04/14/2021 0031 22.5 (L) 37.5 - 51.0 % Final   04/13/2021 1608 23.2 (L) 37.5 - 51.0 % Final            Platelets Platelets   Date/Time Value Ref Range  Status   04/16/2021 0838 56 (L) 140 - 450 10*3/mm3 Final   04/15/2021 0422 73 (L) 140 - 450 10*3/mm3 Final   04/14/2021 0804 70 (L) 140 - 450 10*3/mm3 Final     Sodium  Sodium   Date/Time Value Ref Range Status   04/16/2021 0838 137 136 - 145 mmol/L Final   04/15/2021 0422 134 (L) 136 - 145 mmol/L Final   04/14/2021 0804 133 (L) 136 - 145 mmol/L Final     Potassium  Potassium   Date/Time Value Ref Range Status   04/16/2021 0838 3.7 3.5 - 5.2 mmol/L Final   04/15/2021 1755 4.2 3.5 - 5.2 mmol/L Final   04/15/2021 0422 3.6 3.5 - 5.2 mmol/L Final   04/14/2021 0804 3.8 3.5 - 5.2 mmol/L Final     Chloride  Chloride   Date/Time Value Ref Range Status   04/16/2021 0838 99 98 - 107 mmol/L Final   04/15/2021 0422 96 (L) 98 - 107 mmol/L Final   04/14/2021 0804 95 (L) 98 - 107 mmol/L Final     BicarbonateNo results found for: PLASMABICARB    BUN BUN   Date/Time Value Ref Range Status   04/16/2021 0838 16 8 - 23 mg/dL Final   04/15/2021 0422 19 8 - 23 mg/dL Final   04/14/2021 0804 20 8 - 23 mg/dL Final      Creatinine Creatinine   Date/Time Value Ref Range Status   04/16/2021 0838 0.88 0.76 - 1.27 mg/dL Final   04/15/2021 0422 1.13 0.76 - 1.27 mg/dL Final   04/14/2021 0804 1.10 0.76 - 1.27 mg/dL Final      Calcium Calcium   Date/Time Value Ref Range Status   04/16/2021 0838 8.3 (L) 8.6 - 10.5 mg/dL Final   04/15/2021 0422 7.7 (L) 8.6 - 10.5 mg/dL Final   04/14/2021 0804 8.2 (L) 8.6 - 10.5 mg/dL Final      Mag @RESULFAST(MG:3)@        PT/INR:       Lab Results   Component Value Date    PROTIME 18.0 (H) 04/16/2021    PROTIME 19.0 (H) 04/15/2021    PROTIME 19.0 (H) 04/14/2021    PROTIME 19.0 (H) 04/13/2021    PROTIME 19.7 (H) 04/12/2021    PROTIME 18.1 (H) 03/17/2021    PROTIME 20.6 (H) 03/12/2021    INR 1.55 (H) 04/16/2021    INR 1.66 (H) 04/15/2021    INR 1.67 (H) 04/14/2021    INR 1.66 (H) 04/13/2021    INR 1.75 (H) 04/12/2021    INR 1.53 (H) 03/17/2021    INR 1.80 (H) 03/12/2021      Troponin I:  No results found for: TROPONINI    Lab Results   Component Value Date    CKTOTAL 362 (H) 07/26/2019    TROPONINT <0.010 03/10/2021       budesonide-formoterol, 2 puff, Inhalation, BID - RT   And  ipratropium, 0.5 mg, Nebulization, 4x Daily - RT  carvedilol, 12.5 mg, Oral, Q12H  cefTAZidime 2 g in 100 mL NS MBP, 2 g, Intravenous, Q12H  citalopram, 40 mg, Oral, Daily  ferrous sulfate, 325 mg, Oral, Daily With Breakfast  furosemide, 40 mg, Oral, BID  lactulose, 15 mL, Oral, TID  melatonin, 5 mg, Oral, Nightly  multivitamin, 1 tablet, Oral, Daily  pantoprazole, 40 mg, Intravenous, BID AC  riFAXIMin, 550 mg, Oral, Q12H  saccharomyces boulardii, 250 mg, Oral, BID  senna-docusate sodium, 2 tablet, Oral, BID  sodium chloride, 10 mL, Intravenous, Q12H  sodium chloride, 10 mL, Intravenous, Q12H  tamsulosin, 0.4 mg, Oral, Daily  terazosin, 2 mg, Oral, Nightly      octreotide (SandoSTATIN) infusion, 50 mcg/hr, Last Rate: 50 mcg/hr (04/16/21 0937)        Assessment/Plan     Patient Active Problem List   Diagnosis Code   • Anxiety F41.9   • Abdominal aortic aneurysm (AAA) without rupture (CMS/HCC) I71.4   • Depression F32.9   • Hypertension I10   • Cataract, bilateral H26.9   • CAD (coronary artery disease) I25.10   • Abnormal stress test R94.39   • Moderate COPD J44.9   • Former smoker Z87.891   • Coal workers pneumoconiosis, simple J60   • Back pain M54.9   • S/P lumbar fusion Z98.1   • Prediabetes R73.03   • Acute blood loss anemia, mild, asymptomatic D62   • Acute postoperative pain G89.18   • Hyponatremia, mild E87.1   • Chronic respiratory failure with hypoxia and hypercapnia (CMS/HCC) J96.11, J96.12   • Renal insufficiency N28.9   • Acute cystitis without hematuria N30.00   • Acute UTI N39.0   • Bacteremia R78.81   • Symptomatic anemia D64.9   • 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   • Elevated C-reactive protein (CRP) R79.82   • Cirrhosis of liver (CMS/HCC) K74.60   • High anion gap metabolic acidosis E87.2   • S/P AAA repair Z98.890, Z86.79   • GERD  (gastroesophageal reflux disease) K21.9   • Person under investigation for COVID-19 Z20.822   • On supplemental oxygen by nasal cannula Z78.9   • 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  Patient has decided against going to the [facility]  Defer chronic anticoagulation  Continue rate and rhythm control as much as possible      Chris Black MD  21  09:46 EDT          Electronically signed by Chris Black MD at 21 0955     Casa GrandeRinku MD at 04/15/21 1616              [facility] Medicine Services  PROGRESS NOTE    Patient Name: Derek Kelsey  : 1950  MRN: [id number]    Date of Admission: 2021  Primary Care Physician: Raudel Zheng MD    Subjective   Subjective     CC:  Anemia, cirrhosis, melena, respiratory failure, chronically infected endograft    HPI:  Dark bm's persist. Received blood x 1 unit early this morning w/ lasix and good urination. Respiratory status improved overall. No pain. + fatigue  ROS:  No palpitations. No chest pain  Gen- No fevers, chills  CV- No chest pain, palpitations  Resp- No cough, otherwise as above  GI- No N/V, no abd pain, dark stools as above      Objective   Objective     Vital Signs:   Temp:  [97.9 °F (36.6 °C)-98.7 °F (37.1 °C)] 98.5 °F (36.9 °C)  Heart Rate:  [57-83] 83  Resp:  [16-20] 18  BP: (104-141)/(49-99) 134/49        Physical Exam:  Constitutional:Alert, oriented to person, place, time. Answers most questions appropriately. Elderly & frail but currently nontoxic appearing. No distress  Psych:Normal/appropriate  affect  HEENT:Ncat, oroph clear, nasal canula in place  Neck: neck supple, full range of motion  Neuro: Face symmetric, speech clear, equal , moves all extremities  Cardiac: irr irr, regular rate; 2+ BLE edema  Resp: Ctab, normal effort  GI: abd soft, nontender, mild distended, obese  Skin: No extremity rash  Musculoskeletal/extremities: no cyanosis extremities          Results Reviewed:  Results from last 7 days   Lab Units 04/15/21  0422 04/15/21  0012 04/14/21  1753 04/14/21  0804 04/13/21  0928   WBC 10*3/mm3 4.05  --   --  3.38* 4.32   HEMOGLOBIN g/dL 7.1* 7.0* 8.0* 7.5* 7.0*   HEMATOCRIT % 23.1* 23.6* 26.3* 24.8* 22.7*   PLATELETS 10*3/mm3 73*  --   --  70* 77*   INR  1.66*  --   --  1.67* 1.66*   PROCALCITONIN ng/mL  --   --   --   --  0.16     Results from last 7 days   Lab Units 04/15/21  0422 04/14/21  0804 04/13/21  0928 04/12/21  1852   SODIUM mmol/L 134* 133* 128* 127*   POTASSIUM mmol/L 3.6 3.8 3.6 3.8   CHLORIDE mmol/L 96* 95* 94* 92*   CO2 mmol/L 30.0* 30.0* 24.0 25.0   BUN mg/dL 19 20 20 20   CREATININE mg/dL 1.13 1.10 0.94 0.97   GLUCOSE mg/dL 103* 117* 106* 101*   CALCIUM mg/dL 7.7* 8.2* 8.6 8.7   ALT (SGPT) U/L 33 37  --  40   AST (SGOT) U/L 73* 81*  --  85*   PROBNP pg/mL  --   --   --  391.4     Estimated Creatinine Clearance: 88.5 mL/min (by C-G formula based on SCr of 1.13 mg/dL).    Microbiology Results Abnormal     Procedure Component Value - Date/Time    Respiratory Culture - Sputum, Cough [[id number]] Collected: 04/14/21 1201    Lab Status: Preliminary result Specimen: Sputum from Cough Updated: 04/15/21 1041     Respiratory Culture Moderate growth (3+) Normal Respiratory Abby: NO S.aureus/MRSA or Pseudomonas aeruginosa     Gram Stain Many (4+) Epithelial cells per low power field      Few (2+) WBCs per low power field      Moderate (3+) Mixed bacterial morphotypes seen on Gram Stain      Few (2+) Yeast    Legionella Antigen, Urine - Urine, Urine, Clean Catch [[id number]]  (Normal)  Collected: 04/13/21 0726    Lab Status: Final result Specimen: Urine, Clean Catch Updated: 04/13/21 1341     LEGIONELLA ANTIGEN, URINE Negative    S. Pneumo Ag Urine or CSF - Urine, Urine, Clean Catch [[id number]]  (Normal) Collected: 04/12/21 2325    Lab Status: Final result Specimen: Urine, Clean Catch Updated: 04/13/21 1021     Strep Pneumo Ag Negative    Eosinophil Smear - Urine, Urine, Clean Catch [[id number]]  (Normal) Collected: 04/12/21 2325    Lab Status: Final result Specimen: Urine, Clean Catch Updated: 04/13/21 0054     Eosinophil Smear 0 % EOS/100 Cells     Narrative:      No eosinophil seen          Imaging Results (Last 24 Hours)     ** No results found for the last 24 hours. **          Results for orders placed during the hospital encounter of 03/10/21    Adult Transthoracic Echo Complete W/ Cont if Necessary Per Protocol    Interpretation Summary  · Estimated right ventricular systolic pressure from tricuspid regurgitation is normal (<35 mmHg).  · Estimated left ventricular EF = 65% Left ventricular systolic function is normal.      I have reviewed the medications:  Scheduled Meds:budesonide-formoterol, 2 puff, Inhalation, BID - RT   And  ipratropium, 0.5 mg, Nebulization, 4x Daily - RT  carvedilol, 12.5 mg, Oral, Q12H  [START ON 4/16/2021] cefTAZidime 2 g in 100 mL NS MBP, 2 g, Intravenous, Q12H  citalopram, 40 mg, Oral, Daily  ferrous sulfate, 325 mg, Oral, Daily With Breakfast  furosemide, 40 mg, Oral, BID  lactulose, 15 mL, Oral, TID  melatonin, 5 mg, Oral, Nightly  multivitamin, 1 tablet, Oral, Daily  pantoprazole, 40 mg, Intravenous, BID AC  phytonadione (VITAMIN K) IVPB, 10 mg, Intravenous, Once  riFAXIMin, 550 mg, Oral, Q12H  saccharomyces boulardii, 250 mg, Oral, BID  senna-docusate sodium, 2 tablet, Oral, BID  sodium chloride, 10 mL, Intravenous, Q12H  sodium chloride, 10 mL, Intravenous, Q12H  tamsulosin, 0.4 mg, Oral, Daily  terazosin, 2 mg, Oral, Nightly      Continuous  "Infusions:octreotide (SandoSTATIN) infusion, 50 mcg/hr, Last Rate: 50 mcg/hr (04/15/21 1025)      PRN Meds:.•  acetaminophen **OR** acetaminophen **OR** acetaminophen  •  benzonatate  •  senna-docusate sodium **AND** polyethylene glycol **AND** bisacodyl **AND** bisacodyl  •  calcium carbonate  •  guaiFENesin-dextromethorphan  •  ipratropium-albuterol  •  magnesium sulfate **OR** magnesium sulfate **OR** magnesium sulfate  •  ondansetron **OR** ondansetron  •  phenol  •  potassium chloride **OR** potassium chloride **OR** potassium chloride  •  sodium chloride  •  sodium chloride    Assessment/Plan   Assessment & Plan     Active Hospital Problems    Diagnosis  POA   • **Symptomatic anemia [D64.9]  Yes   • Anemia [D64.9]  Yes   • Gastrointestinal hemorrhage with melena [K92.1]  Unknown   • 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        Brief Hospital Course to date:  Derek Kelsey is a 70 y.o. male w/ cirrhosis, chronically infected aaa endograft (on fortaz followed by Dr. Carrero), afib, dhf, copd/ILD w/ 3-4L chronic oxygen dependence, also w/ hx of covid + pcr on 3/10/21 (also got 2nd covid 19 vaccination shot on 3/23/21) who presented w/ dyspnea on exertion, fatigue. Apparently had appointment at [facility] mid April to consider \"options\" for his chronically infected endograft. Noted had hgb 5. Has had melena recently and some mild increased confusion. Ct revealed no PE but bilateral effusions. Transfused 2 unit prbc w/ bumex. Gi, cards, ID consulted.     *Acute GI bleed, likely upper (melena)  *Decompensated cirrhosis (w/ coagulopathy, thrombocytopenia, hepatic encephalopathy)   -4/14/21: egd w/ " "portal gasropathy w/ oozing (varices noted but no overt bleeding; -bid ppi, octreotide   -GI planning c-scope tentatively 4/16/21   -q6h H&H, transfuse prn   -received 4th & 5th units 4/15/21 w/ bumex 2mg iv   -lactulose & rifaxamin; improved mentation  *Acute on chronic hypoxic resp failure (chronic 3L)  *Hx \"black lung\"/fibrotic lung dz  *A/C DHF/Volume overload  *hx COPD   -currently on 4Lnc (recently 3Lnc, chronically 2Lnc as outpatient)   -continue lasix 40mg po bid w/ prn iv diuresis   -echo w/ preserved EF; cards following  *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 Carrero   -apparently has appointment @ [facility] next week re: options; however patient now declining this and accepts that this means he will have recurrent/persistent infection and likely sepsis   -palliative following; appreciate further assistance in future goals/plans; perhaps hospice in order if/when clinically deteriorates  *Afib (not anticoagulated)   -Dr. Gardner following, on coreg; no a/c due to gi bleeding/cirrhosis   -currently rate controlled  *hx covid 19 pcr + (on 3/10/21)   -also had covid vacinnation x 2 shots (2nd shot 3/23/21)   -no isolatoin    Plan:  -2 unit prbc today (4th & 5th units) w/ bumex 2mg today  -continue ppi & octreotide for now  -q6h H&H, transfuse prn  -gi planning tentatively for c-scope tomorrow since still bleeding. egd yesterday w/ gastric oozing but no overt cause for ongoing melena.   -continue fortaz per ID.   -There is no cure for this aaa graft infection without surgical excision (which would be very high risk given comorbidities) and patient not interested in going to his appointment at [facility] for evlauation for this. Consequently, palliative following and assisting in goals/limits (already dnr/dni, no heroics). Patient ok w/ c-scope to see if can stop ongoing bleeding, but he does understand his poor prognosis overall and will revisit " tomorrow. Meeting w/ palliative & myself tentatively planned tomorrow at 10:30. Hospice may be appropriate. 45 min spent on care, >50% counseling w/ patient, wife, son at bedside. I also spoke w/ gi and palliative care    Labs: q6h H&H; cbc,bmp,inr,mag in a.m.    DVT Prophylaxis:  mechanical      Disposition: I expect the patient to be discharged TBD, once stable    CODE STATUS:   Code Status and Medical Interventions:   Ordered at: 04/12/21 1844     Limited Support to NOT Include:    Artificial Nutrition    Dialysis    Cardioversion/Defibrillation    Intubation     Level Of Support Discussed With:    Patient     Code Status:    No CPR     Medical Interventions (Level of Support Prior to Arrest):    Limited       Rinku Mcrae MD  04/15/21                Electronically signed by Rinku Mcrae MD at 04/15/21 1624     Davis Carrero MD at 04/15/21 1529          INFECTIOUS DISEASE CONSULT/INITIAL HOSPITAL VISIT    Derek Kelsey  1950  [id number]    Date of Consult: 4/15/2021    Admission Date: 4/12/2021      Requesting Provider: Tammi Lomax MD  Evaluating Physician: Davis Carrero MD    Reason for Consultation: anemia, pseudomonas infection    History of present illness:    Patient is a 70 y.o. male  with a medical history significant for coronary artery disease, diastolic heart failure, A.fib (not on AC), hypertension, cirrhosis, chronic respiratory failure (2-3L @ home), COPD, and an infected AAA endograft.  Recent positive blood cultures for Cipro and Levaquin resistant Pseudomonas aeruginosa led to treatment with IV ceftazidime as an outpatient.    Patient has had worsening anemia fatigue and shortness of breath during telemedicine visit yesterday.    Patient has a hemoglobin now below 7 which I recommended patient be direct admitted to the hospital.    Because the endograft infection cannot be cured with IV antibiotics patient is referred to [facility] which has an  appointment for patient this Monday.    Patient has had worsening swelling of face arms and legs and has recently had increased dose of his Lasix.      Patient denies fevers or chills;    Reports feeling very tired.    reports that he does not want endograft removal  at this time    4/14/21; no events overnight; afebrile, no fever, rash, sore throat  4/15/21; patient made DNR, family meeting tomorrow; no fever, rash, sore throat  Past Medical History:   Diagnosis Date   • Abdominal aortic aneurysm (CMS/HCC) 9/29/2016   • Anxiety 9/29/2016   • Arthritis    • Back pain    • Black lung (CMS/[facility])    • CAD (coronary artery disease) 9/29/2016   • Cirrhosis (CMS/[facility])    • COPD (chronic obstructive pulmonary disease) (CMS/[facility]) 9/29/2016   • Depression 9/29/2016   • Depression    • GERD (gastroesophageal reflux disease)    • Hypertension 9/29/2016   • On supplemental oxygen by nasal cannula     at night   • Vitiligo    • Wears dentures    • Wears reading eyeglasses        Past Surgical History:   Procedure Laterality Date   • ABDOMINAL AORTIC ANEURYSM REPAIR WITH ENDOGRAFT N/A 6/6/2019    Procedure: ABDOMINAL AORTIC ANEURYSM REPAIR WITH ENDOGRAFT;  Surgeon: Leopoldo Burleson MD;  Location: [facility] HYBRID OR 15;  Service: Vascular   • BACK SURGERY     • CARDIAC CATHETERIZATION     • CARDIAC CATHETERIZATION N/A 9/28/2018    Procedure: Left Heart Cath;  Surgeon: Douglas Galvez MD;  Location: [facility] CATH INVASIVE LOCATION;  Service: Cardiovascular   • CARDIAC SURGERY     • COLONOSCOPY      2015   • COLONOSCOPY N/A 10/30/2019    Procedure: COLONOSCOPY;  Surgeon: Homar Viveros MD;  Location:  [facility];  Service: Gastroenterology   • ENDOSCOPY N/A 10/30/2019    Procedure: ESOPHAGOGASTRODUODENOSCOPY;  Surgeon: Homar Viveros MD;  Location:  [facility];  Service: Gastroenterology   • ENDOSCOPY N/A 4/14/2021    Procedure: ESOPHAGOGASTRODUODENOSCOPY;  Surgeon: Eddie Benton MD;  Location:  [facility]  ENDOSCOPY;  Service: Gastroenterology;  Laterality: N/A;   • EYE SURGERY      cataracts bilateral   • HAND SURGERY Left    • LUMBAR DISCECTOMY FUSION INSTRUMENTATION N/A 11/1/2018    Procedure: LUMBAR DISCECTOMY POSTERIOR WITH FUSION INSTRUMENTATION;  Surgeon: Joo Franklin MD;  Location: [facility] OR;  Service: Orthopedic Spine   • LUMBAR DISCECTOMY FUSION INSTRUMENTATION N/A 7/24/2019    Procedure: POSTERIOR LUMBAR SPINAL FUSION REVISION AND INSTRUMENTATION L3,L4,L5,S1;  Surgeon: Joo Franklin MD;  Location: [facility] OR;  Service: Orthopedic Spine   • ORIF FINGER / THUMB FRACTURE     • SHOULDER SURGERY Left        Family History   Problem Relation Age of Onset   • Stroke Mother    • Hypertension Mother    • Heart disease Mother    • Heart disease Father    • Hypertension Father    • Diabetes Sister    • Hypertension Sister    • Heart disease Sister    • Diabetes Brother    • Heart disease Brother    • Hypertension Child    • Hypertension Son    • No Known Problems Son    • Diabetes Sister    • Heart disease Sister        Social History     Socioeconomic History   • Marital status:      Spouse name: Not on file   • Number of children: 2   • Years of education: Not on file   • Highest education level: Not on file   Tobacco Use   • Smoking status: Former Smoker     Packs/day: 1.00     Years: 30.00     Pack years: 30.00     Types: Cigarettes     Quit date: 1/1/2011     Years since quitting: 10.2   • Smokeless tobacco: Former User     Types: Chew     Quit date: 4/7/2011   Substance and Sexual Activity   • Alcohol use: Not Currently   • Drug use: No   • Sexual activity: Defer     Comment:  and lives with wife       Allergies   Allergen Reactions   • Penicillins Hives     Hives - has tolerated Zosyn and ceftriaxone 09/2019   • Percocet [Oxycodone-Acetaminophen] Confusion         Medication:    Current Facility-Administered Medications:   •  acetaminophen (TYLENOL) tablet 650 mg, 650 mg, Oral, Q4H PRN  **OR** acetaminophen (TYLENOL) 160 MG/5ML solution 650 mg, 650 mg, Oral, Q4H PRN **OR** acetaminophen (TYLENOL) suppository 650 mg, 650 mg, Rectal, Q4H PRN, Eddie Benton MD  •  benzonatate (TESSALON) capsule 200 mg, 200 mg, Oral, TID PRN, Rinku Mcrae MD  •  sennosides-docusate (PERICOLACE) 8.6-50 MG per tablet 2 tablet, 2 tablet, Oral, BID, 2 tablet at 04/15/21 0811 **AND** polyethylene glycol (MIRALAX) packet 17 g, 17 g, Oral, Daily PRN **AND** bisacodyl (DULCOLAX) EC tablet 5 mg, 5 mg, Oral, Daily PRN **AND** bisacodyl (DULCOLAX) suppository 10 mg, 10 mg, Rectal, Daily PRN, Eddie Benton MD  •  budesonide-formoterol (SYMBICORT) 160-4.5 MCG/ACT inhaler 2 puff, 2 puff, Inhalation, BID - RT, 2 puff at 04/15/21 1000 **AND** ipratropium (ATROVENT) nebulizer solution 0.5 mg, 0.5 mg, Nebulization, 4x Daily - RT, Eddie Benton MD, 0.5 mg at 04/15/21 1347  •  calcium carbonate (TUMS) chewable tablet 500 mg (200 mg elemental), 2 tablet, Oral, BID PRN, Eddie Benton MD  •  carvedilol (COREG) tablet 12.5 mg, 12.5 mg, Oral, Q12H, Eddie Benton MD, 12.5 mg at 04/15/21 0811  •  cefTAZidime 2 g in 100 mL NS MBP, 2 g, Intravenous, Q8H, Eddie Benton MD, Last Rate: 200 mL/hr at 04/15/21 1352, 2 g at 04/15/21 1352  •  citalopram (CeleXA) tablet 40 mg, 40 mg, Oral, Daily, Eddie Benton MD, 40 mg at 04/15/21 0811  •  ferrous sulfate tablet 325 mg, 325 mg, Oral, Daily With Breakfast, Eddie Benton MD, 325 mg at 04/15/21 0810  •  furosemide (LASIX) tablet 40 mg, 40 mg, Oral, BID, Rinku Mcrae MD, 40 mg at 04/15/21 0810  •  guaiFENesin-dextromethorphan (ROBITUSSIN DM) 100-10 MG/5ML syrup 5 mL, 5 mL, Oral, Q6H PRN, Rinku Mcrae MD, 5 mL at 04/14/21 2117  •  ipratropium-albuterol (DUO-NEB) nebulizer solution 3 mL, 3 mL, Nebulization, Q4H PRN, Eddie Benton MD  •  lactated ringers infusion, 9 mL/hr, Intravenous, Continuous, Eddie Benton MD, Last Rate: 9 mL/hr at 04/14/21 0951,  Restarted at 04/14/21 1011  •  lactulose (CHRONULAC) 10 GM/15ML solution 15 mL, 15 mL, Oral, TID, Eddie Benton MD, 15 mL at 04/15/21 0810  •  Magnesium Sulfate 2 gram Bolus, followed by 8 gram infusion (total Mg dose 10 grams)- Mg less than or equal to 1mg/dL, 2 g, Intravenous, PRN **OR** Magnesium Sulfate 2 gram / 50mL Infusion (GIVE X 3 BAGS TO EQUAL 6GM TOTAL DOSE) - Mg 1.1 - 1.5 mg/dl, 2 g, Intravenous, PRN **OR** Magnesium Sulfate 4 gram infusion- Mg 1.6-1.9 mg/dL, 4 g, Intravenous, PRN, Eddie Benton MD, Last Rate: 25 mL/hr at 04/14/21 1132, 4 g at 04/14/21 1132  •  melatonin tablet 5 mg, 5 mg, Oral, Nightly, Eddie Benton MD, 5 mg at 04/14/21 2118  •  multivitamin (THERAGRAN) tablet 1 tablet, 1 tablet, Oral, Daily, Eddie Benton MD, 1 tablet at 04/15/21 0811  •  octreotide (sandoSTATIN) 500 mcg in sodium chloride 0.9 % 100 mL (5 mcg/mL) infusion, 50 mcg/hr, Intravenous, Continuous, Eddie Benton MD, Last Rate: 10 mL/hr at 04/15/21 1025, 50 mcg/hr at 04/15/21 1025  •  ondansetron (ZOFRAN) tablet 4 mg, 4 mg, Oral, Q6H PRN **OR** ondansetron (ZOFRAN) injection 4 mg, 4 mg, Intravenous, Q6H PRN, Eddie Benton MD  •  pantoprazole (PROTONIX) injection 40 mg, 40 mg, Intravenous, BID AC, Eddie Benton MD, 40 mg at 04/15/21 0631  •  phenol (CHLORASEPTIC) 1.4 % liquid 1 spray, 1 spray, Mouth/Throat, Q2H PRN, Eddie Benton MD, 1 spray at 04/14/21 0313  •  potassium chloride (MICRO-K) CR capsule 40 mEq, 40 mEq, Oral, PRN, 40 mEq at 04/15/21 1210 **OR** potassium chloride (KLOR-CON) packet 40 mEq, 40 mEq, Oral, PRN **OR** potassium chloride 10 mEq in 100 mL IVPB, 10 mEq, Intravenous, Q1H PRN, Eddie Benton MD  •  riFAXIMin (XIFAXAN) tablet 550 mg, 550 mg, Oral, Q12H, Eddie Benton MD, 550 mg at 04/15/21 0810  •  saccharomyces boulardii (FLORASTOR) capsule 250 mg, 250 mg, Oral, BID, Eddie Benton MD, 250 mg at 04/15/21 0811  •  sodium chloride 0.9 % flush 10 mL, 10 mL, Intravenous, Q12H,  Eddie Benton MD, 10 mL at 21 2118  •  sodium chloride 0.9 % flush 10 mL, 10 mL, Intravenous, PRN, Eddie Benton MD  •  sodium chloride 0.9 % flush 10 mL, 10 mL, Intravenous, Q12H, Eddie Benton MD, 10 mL at 04/15/21 0810  •  sodium chloride 0.9 % flush 10 mL, 10 mL, Intravenous, PRN, Eddie Benton MD, 10 mL at 21 1122  •  tamsulosin (FLOMAX) 24 hr capsule 0.4 mg, 0.4 mg, Oral, Daily, Eddie Benton MD, 0.4 mg at 04/15/21 0811  •  terazosin (HYTRIN) capsule 2 mg, 2 mg, Oral, Nightly, Eddie Benton MD, 2 mg at 21    Antibiotics:  Anti-Infectives (From admission, onward)    Ordered     Dose/Rate Route Frequency Start Stop    21 1853  cefTAZidime 2 g in 100 mL NS MBP     Irina Chan, PharmD reviewed the order on 21 7729.   Ordering Provider: Eddie Benton MD    2 g  200 mL/hr over 30 Minutes Intravenous Every 8 Hours Scheduled 21 184  riFAXIMin (XIFAXAN) tablet 550 mg     Irina Chan, PharmD reviewed the order on 21 9476.   Ordering Provider: Eddie Benton MD    550 mg Oral Every 12 Hours Scheduled 21 2100 21            Review of Systems:  See hpi    Physical Exam:   Vital Signs  Temp (24hrs), Av.1 °F (36.7 °C), Min:97.8 °F (36.6 °C), Max:98.7 °F (37.1 °C)    Temp  Min: 97.8 °F (36.6 °C)  Max: 98.7 °F (37.1 °C)  BP  Min: 104/56  Max: 141/65  Pulse  Min: 57  Max: 65  Resp  Min: 16  Max: 20  SpO2  Min: 89 %  Max: 95 %    GENERAL: Awake and alert, in no acute distress.   HEENT: Normocephalic, atraumatic.  PERRL. EOMI. No conjunctival injection.     HEART: RRR; No murmur  LUNGS: Clear to auscultation bilaterally  ABDOMEN: nontender  EXT: 2+ pedal edema    MSK:  No joint deromvity  SKIN: no rash  NEURO: Oriented to PPT.  PSYCHIATRIC: Normal insight and judgement. Cooperative with PE    Laboratory Data    Results from last 7 days   Lab Units 04/15/21  0422 04/15/21  0012 21  175  04/14/21  0804 04/13/21  0928   WBC 10*3/mm3 4.05  --   --  3.38* 4.32   HEMOGLOBIN g/dL 7.1* 7.0* 8.0* 7.5* 7.0*   HEMATOCRIT % 23.1* 23.6* 26.3* 24.8* 22.7*   PLATELETS 10*3/mm3 73*  --   --  70* 77*     Results from last 7 days   Lab Units 04/15/21  0422   SODIUM mmol/L 134*   POTASSIUM mmol/L 3.6   CHLORIDE mmol/L 96*   CO2 mmol/L 30.0*   BUN mg/dL 19   CREATININE mg/dL 1.13   GLUCOSE mg/dL 103*   CALCIUM mg/dL 7.7*     Results from last 7 days   Lab Units 04/15/21  0422   ALK PHOS U/L 113   BILIRUBIN mg/dL 1.1   ALT (SGPT) U/L 33   AST (SGOT) U/L 73*                         Estimated Creatinine Clearance: 88.5 mL/min (by C-G formula based on SCr of 1.13 mg/dL).      Microbiology:  No results found for: ACANTHNAEG, AFBCX, BPERTUSSISCX, BLOODCX  No results found for: BCIDPCR, CXREFLEX, CSFCX, CULTURETIS  No results found for: CULTURES, HSVCX, URCX  No results found for: EYECULTURE, GCCX, HSVCULTURE, LABHSV  No results found for: LEGIONELLA, MRSACX, MUMPSCX, MYCOPLASCX  No results found for: NOCARDIACX, STOOLCX  No results found for: THROATCX, UNSTIMCULT, URINECX, CULTURE, VZVCULTUR  No results found for: VIRALCULTU, WOUNDCX        Radiology:  Imaging Results (Last 72 Hours)     Procedure Component Value Units Date/Time    CT Angiogram Chest With & Without Contrast [[id number]] Collected: 04/13/21 0217     Updated: 04/13/21 0219    Narrative:      CT CHEST WITH CONTRAST, PE PROTOCOL, 4/13/2021    HISTORY:  70-year-old male recently discharged from the hospital after treatment for AAA endograft infection with sepsis, Pseudomonas bacteremia. GI bleed. History of hepatic cirrhosis, chronic respiratory failure and diastolic heart failure. He is admitted to the  hospital today after low hemoglobin was noted by his primary provider. Symptoms include shortness of breath, leg swelling, black stools,    TECHNIQUE:  CT examination of the chest with IV contrast. CTA MIP multiplanar pulmonary artery images were reformatted  with 3-D postprocessing. Radiation dose reduction techniques included automated exposure control. Radiation audit for CT and nuclear cardiology  exams in the last 12 months: 8.    COMPARISON:  *  CTA chest, 3/10/2021.    FINDINGS:  No pulmonary embolism is demonstrated. Thoracic aorta is normal in caliber with no aneurysm or dissection. Heart size is normal, and there is no pericardial effusion.    Multifocal airspace infiltrates are scattered throughout the central portions of both lungs, greatest in the left upper lobe, mostly in a central peribronchovascular distribution. This was not present on the prior study. Multifocal pneumonitis is  suspected. In addition, the patient has developed a new small to moderate right pleural effusion and tiny left pleural effusion, and there is consolidation and atelectasis of the dependent right posterior lung base. Trachea and central bronchi are  patent.    Lymph node enlargement is seen throughout the mediastinum, both pulmonary viet and in the axillary regions where there is bilateral axillary edema. This is nonspecific but may represent reactive adenopathy or lymphatic engorgement. Neoplastic adenopathy  is unlikely given the time course.      Impression:      1.  No evidence of pulmonary embolism or other acute vascular abnormality within the chest.  2.  Multifocal groundglass and more dense airspace infiltrates in both lungs as detailed above. Consider atypical pneumonitis. Imaging features can be seen with COVID-19 pneumonia, although are nonspecific and can can occur with a variety of infectious  and noninfectious processes.  3.  Small to moderate right pleural effusion and tiny left pleural effusion with right posterior lung base consolidation and atelectasis, new since prior.  4.  Adenopathy within the mediastinum, pulmonary viet and axillary regions. Axillary soft tissue edema. Soft tissue edema is also present throughout the abdominal mesentery and body wall. Lymph  node enlargement due to lymphatic engorgement is likely.    Signer Name: Jim Yousif MD   Signed: 4/13/2021 2:17 AM   Workstation Name: [email or web address]    [facility]    CT Abdomen Pelvis Without Contrast [[id number]] Collected: 04/13/21 0210     Updated: 04/13/21 0212    Narrative:      CT ABDOMEN AND PELVIS, NONCONTRAST, 4/13/2021    HISTORY:  70-year-old male recently discharged from the hospital after treatment for AAA endograft infection with sepsis, Pseudomonas bacteremia. GI bleed. History of hepatic cirrhosis, chronic respiratory failure and diastolic heart failure. He is admitted to the  hospital today after low hemoglobin was noted by his primary provider. Symptoms include shortness of breath, leg swelling, black stools,    TECHNIQUE:  CT imaging of the abdomen and pelvis ordered without oral or IV contrast. Radiation dose reduction techniques included automated exposure control. Radiation audit for CT and nuclear cardiology exams in the last 12 months: 8.    COMPARISON:  *  CT abdomen and pelvis, 3/10/2021.    ABDOMEN FINDINGS:  Morphologic changes of hepatic cirrhosis with moderate hepatosplenomegaly. Stable left lobe liver cyst. No gallbladder distention or visible bile duct dilatation. Pancreas is unremarkable. Soft tissue edema throughout the abdominal mesentery and body  wall and small volume ascites within the abdomen and pelvis. Small bilateral pleural effusions, larger on the right.    Bifurcated aortoiliac stent graft with continued ill-defined thickening of the wall of the native aneurysm and mild periaortic adenopathy. Vascular assessment is limited without contrast administration. Both kidneys are negative with no evidence of  urinary obstruction or nephrolithiasis.    Small bowel and colon are normal in caliber and appearance, as imaged. No gastric distention or distal esophageal dilatation.    PELVIS FINDINGS:  Urinary bladder, prostate and rectum are within  normal limits.    Postoperative changes of previous lower lumbar spine fusion with instrumentation.      Impression:      1.  No new or acute abnormality within the abdomen or pelvis when compared with prior study.  2.  Advanced hepatic cirrhosis with hepatosplenomegaly, small volume ascites and extensive soft tissue edema throughout the abdominal mesentery and body wall.  3.  Bifurcated aortoiliac stent graft with continued ill-defined wall thickening of the native aneurysm and mild periaortic adenopathy. Noncontrast study.    Signer Name: Jim Yousif MD   Signed: 4/13/2021 2:10 AM   [email or web address]    [facility]            Impression:   Acute GI bleed  Anemia  Cirrhosis  Chronic respiratory failure  Pseudomonas bacteremia with presumed infected endograft  Atrial fibrillation  History of Covid 3/10/2021    Estimated Creatinine Clearance: 88.5 mL/min (by C-G formula based on SCr of 1.13 mg/dL).        PLAN/RECOMMENDATIONS:   Thank you for asking us to see Derek Kelsey, I recommend the following:  Extremely complicated case;    Cirrhosis probably contributing to GI bleeding;     Endovascular graft a secondary problem but large amounts of ivf with abx are contributing to volume overload.      Infection is NOT suppressable or curable without surgery; patient doesn't want surgery because of operative mortality risk.    Poor overall prognosis    Comfort measures may be patient's preference    I am off until Monday ;call partners as needed    Continue iv abx per order unless family/patient chose hospice then ok to stop    Decrease ceftazidime frequency to 2 g iv q12h       Davis Carrero MD  4/15/2021  15:29 EDT                    Electronically signed by Davis Carrero MD at 04/15/21 1534     Sedrick Ayon DO at 04/15/21 1034          Palliative Care Progress Note    Date of Admission: 4/12/2021    Subjective: Patient continues to state that he is  feeling weak but denies any other complaints.  Wife states that he continues to have dark bowel movements.  Current Code Status     Date Active Code Status Order ID Comments User Context       4/12/2021 1844 No CPR [id number]  Esther Drew APRN Inpatient     Advance Care Planning Activity      Questions for Current Code Status     Question Answer Comment    Code Status No CPR     Medical Interventions (Level of Support Prior to Arrest) Limited     Limited Support to NOT Include Artificial Nutrition      Dialysis      Cardioversion/Defibrillation      Intubation     Level Of Support Discussed With Patient         No current facility-administered medications on file prior to encounter.     Current Outpatient Medications on File Prior to Encounter   Medication Sig Dispense Refill   • acetaminophen (TYLENOL) 650 MG 8 hr tablet Take 650 mg by mouth Every 8 (Eight) Hours As Needed for Mild Pain .     • ASPIRIN LOW DOSE 81 MG tablet Take 1 tablet by mouth Daily. Last dose 10-21-18 (Patient taking differently: Take 81 mg by mouth Daily.)     • carvedilol (COREG) 12.5 MG tablet Take 1 tablet by mouth 2 (Two) Times a Day With Meals. 60 tablet 0   • cefTAZidime (FORTAZ) 1 g injection Infuse 2 g into a venous catheter 3 (Three) Times a Day. 100ml every 8 hours via PICC line     • citalopram (CeleXA) 20 MG tablet Take 40 mg by mouth Daily.     • doxazosin (CARDURA) 1 MG tablet Take  by mouth 2 (Two) Times a Day.     • ferrous gluconate (FERGON) 324 MG tablet Take 1 tablet by mouth Daily With Breakfast. (Patient taking differently: Take 65 mg by mouth Daily.) 30 tablet 0   • furosemide (LASIX) 20 MG tablet 40 mg 2 (two) times a day.     • Hydrocortisone, Perianal, (ANUSOL-HC) 2.5 % rectal cream Insert 1 application into the rectum 2 (Two) Times a Day.     • lactulose (CHRONULAC) 10 GM/15ML solution Take 15 mL by mouth 3 (Three) Times a Day for goal of 3 BMs daily 1892 mL 3   • melatonin 5 MG tablet tablet Take 5 mg by mouth  "Every Night.     • Multiple Vitamin (MULTI-VITAMIN DAILY) tablet Take 1 tablet by mouth Daily.     • O2 (OXYGEN)      • Ondansetron 4 MG film 3 (Three) Times a Day As Needed.     • pantoprazole (PROTONIX) 40 MG EC tablet Take 40 mg by mouth 2 (Two) Times a Day.     • saccharomyces boulardii (FLORASTOR) 250 MG capsule Take 250 mg by mouth 2 (Two) Times a Day.     • tamsulosin (FLOMAX) 0.4 MG capsule 24 hr capsule Take 1 capsule by mouth Daily. 15 capsule 0   • Trelegy Ellipta 100-62.5-25 MCG/INH aerosol powder  INHALE 1 PUFF ONCE DAILY 60 each 0   • Xifaxan 550 MG tablet 2 (Two) Times a Day.       lactated ringers, 9 mL/hr, Last Rate: 9 mL/hr (04/14/21 0951)  octreotide (SandoSTATIN) infusion, 50 mcg/hr, Last Rate: 50 mcg/hr (04/15/21 1025)      •  acetaminophen **OR** acetaminophen **OR** acetaminophen  •  benzonatate  •  senna-docusate sodium **AND** polyethylene glycol **AND** bisacodyl **AND** bisacodyl  •  calcium carbonate  •  guaiFENesin-dextromethorphan  •  ipratropium-albuterol  •  magnesium sulfate **OR** magnesium sulfate **OR** magnesium sulfate  •  ondansetron **OR** ondansetron  •  phenol  •  potassium chloride **OR** potassium chloride **OR** potassium chloride  •  sodium chloride  •  sodium chloride    Objective: /68   Pulse 59   Temp 98 °F (36.7 °C) (Oral)   Resp 16   Ht 190.5 cm (75\")   Wt 124 kg (274 lb 3.2 oz)   SpO2 92%   BMI 34.27 kg/m²      Intake/Output Summary (Last 24 hours) at 4/15/2021 1034  Last data filed at 4/15/2021 0900  Gross per 24 hour   Intake 1003 ml   Output 3110 ml   Net -2107 ml     Physical Exam:      General Appearance:    Alert, cooperative, in no acute distress   Head:    Normocephalic, without obvious abnormality, atraumatic   Eyes:            Lids and lashes normal, conjunctivae and sclerae normal, no   icterus, no pallor, corneas clear, PERRLA   Ears:    Ears appear intact with no abnormalities noted   Throat:   No oral lesions, no thrush, oral mucosa moist "   Neck:   No adenopathy, supple, trachea midline, no thyromegaly, no   carotid bruit, no JVD   Back:     No kyphosis present, no scoliosis present, no skin lesions,      erythema or scars, no tenderness to percussion or                   palpation,   range of motion normal   Lungs:     Clear to auscultation,respirations regular, even and                  unlabored    Heart:    Regular rhythm and normal rate, normal S1 and S2, no            murmur, no gallop, no rub, no click   Chest Wall:    No abnormalities observed   Abdomen:     Normal bowel sounds, no masses, no organomegaly, soft        non-tender, non-distended, no guarding, no rebound                tenderness   Rectal:     Deferred   Extremities:   Moves all extremities well, no edema, no cyanosis, no             redness   Pulses:   Pulses palpable and equal bilaterally   Skin:   No bleeding, bruising or rash   Lymph nodes:   No palpable adenopathy   Neurologic:   Cranial nerves 2 - 12 grossly intact, sensation intact, DTR       present and equal bilaterally     Results from last 7 days   Lab Units 04/15/21  0422   WBC 10*3/mm3 4.05   HEMOGLOBIN g/dL 7.1*   HEMATOCRIT % 23.1*   PLATELETS 10*3/mm3 73*     Results from last 7 days   Lab Units 04/15/21  0422   SODIUM mmol/L 134*   POTASSIUM mmol/L 3.6   CHLORIDE mmol/L 96*   CO2 mmol/L 30.0*   BUN mg/dL 19   CREATININE mg/dL 1.13   CALCIUM mg/dL 7.7*   BILIRUBIN mg/dL 1.1   ALK PHOS U/L 113   ALT (SGPT) U/L 33   AST (SGOT) U/L 73*   GLUCOSE mg/dL 103*       Impression: Anemia  Debility  Cirrhosis  Infected aortic graft  Plan: Talk to the patient as well the patient's wife is at bedside.  We discussed continued current plan of care versus a comfort/hospice plan of care.  Wife would like to have a family meeting tomorrow with the patient's 2 children as well.  Did discuss with the hospitalist.  We will plan on meeting the family around 1030.        Sedrick Ayon DO  04/15/21  10:34 EDT        Electronically  signed by Sedrick Ayon DO at 04/15/21 1036     Chris Black MD at 04/15/21 0834                                                         [facility]       LOS: 2 days   Patient Care Team:  Raudel Zheng MD as PCP - General  Joo Franklin MD as Consulting Physician (Orthopedic Surgery)  Leopoldo Burleson MD as Surgeon (Cardiothoracic Surgery)  Douglas Galvez MD as Consulting Physician (Cardiology)  Davis Ford MD as Consulting Physician (Urology)        Subjective       Patient Denies:  Cp, sob, palpitations.      Vital Signs  Temp:  [97.8 °F (36.6 °C)-98.5 °F (36.9 °C)] 97.9 °F (36.6 °C)  Heart Rate:  [57-66] 60  Resp:  [12-22] 20  BP: (104-153)/(56-91) 137/66    Intake/Output Summary (Last 24 hours) at 4/15/2021 0835  Last data filed at 4/15/2021 0200  Gross per 24 hour   Intake 733 ml   Output 3260 ml   Net -2527 ml     No intake/output data recorded.    Physical Exam:     General Appearance:    Alert, cooperative, in no acute distress       Neck:   No adenopathy, supple, trachea midline, no thyromegaly, no JVD   Lungs:     Clear to auscultation,respirations regular, even and                unlabored    Heart:    Regular rhythm and normal rate, normal S1 and S2, no         murmur, no gallop, no rub, no click   Chest Wall:    No abnormalities observed   Abdomen:     Normal bowel sounds, no masses, no organomegaly, soft     nontender, nondistended   Extremities:   Moves all extremities well, 2+ edema, no cyanosis, no           redness   Pulses:   Pulses palpable and equal bilaterally     Results Review:     I reviewed the patient's new clinical results.      WBC WBC   Date/Time Value Ref Range Status   04/15/2021 0422 4.05 3.40 - 10.80 10*3/mm3 Final   04/14/2021 0804 3.38 (L) 3.40 - 10.80 10*3/mm3 Final   04/13/2021 0928 4.32 3.40 - 10.80 10*3/mm3 Final   04/12/2021 1852 4.88 3.40 - 10.80 10*3/mm3 Final            HGB Hemoglobin   Date/Time Value Ref Range Status    04/15/2021 0422 7.1 (L) 13.0 - 17.7 g/dL Final   04/15/2021 0012 7.0 (L) 13.0 - 17.7 g/dL Final   04/14/2021 1753 8.0 (L) 13.0 - 17.7 g/dL Final   04/14/2021 1213 7.5 (L) 13.0 - 17.7 g/dL Final   04/14/2021 0804 7.5 (L) 13.0 - 17.7 g/dL Final   04/14/2021 0031 6.8 (C) 13.0 - 17.7 g/dL Final     Comment:     Confirmed/called   04/13/2021 1608 7.2 (L) 13.0 - 17.7 g/dL Final   04/13/2021 0928 7.0 (L) 13.0 - 17.7 g/dL Final   04/13/2021 0029 5.9 (C) 13.0 - 17.7 g/dL Final   04/12/2021 1852 5.6 (C) 13.0 - 17.7 g/dL Final           HCT Hematocrit   Date/Time Value Ref Range Status   04/15/2021 0422 23.1 (L) 37.5 - 51.0 % Final   04/15/2021 0012 23.6 (L) 37.5 - 51.0 % Final   04/14/2021 1753 26.3 (L) 37.5 - 51.0 % Final   04/14/2021 1213 24.4 (L) 37.5 - 51.0 % Final   04/14/2021 0804 24.8 (L) 37.5 - 51.0 % Final   04/14/2021 0031 22.5 (L) 37.5 - 51.0 % Final   04/13/2021 1608 23.2 (L) 37.5 - 51.0 % Final   04/13/2021 0928 22.7 (L) 37.5 - 51.0 % Final   04/13/2021 0029 19.1 (C) 37.5 - 51.0 % Final   04/12/2021 1852 17.9 (C) 37.5 - 51.0 % Final     Comment:     **Verified by repeat analysis**   04/12/2021 1852 18.7 (C) 37.5 - 51.0 % Final            Platelets Platelets   Date/Time Value Ref Range Status   04/15/2021 0422 73 (L) 140 - 450 10*3/mm3 Final   04/14/2021 0804 70 (L) 140 - 450 10*3/mm3 Final   04/13/2021 0928 77 (L) 140 - 450 10*3/mm3 Final   04/12/2021 1852 87 (L) 140 - 450 10*3/mm3 Final     Sodium  Sodium   Date/Time Value Ref Range Status   04/15/2021 0422 134 (L) 136 - 145 mmol/L Final   04/14/2021 0804 133 (L) 136 - 145 mmol/L Final   04/13/2021 0928 128 (L) 136 - 145 mmol/L Final   04/13/2021 0209 127 (L) 136 - 145 mmol/L Final   04/12/2021 1852 127 (L) 136 - 145 mmol/L Final     Potassium  Potassium   Date/Time Value Ref Range Status   04/15/2021 0422 3.6 3.5 - 5.2 mmol/L Final   04/14/2021 0804 3.8 3.5 - 5.2 mmol/L Final   04/13/2021 0928 3.6 3.5 - 5.2 mmol/L Final   04/12/2021 1852 3.8 3.5 - 5.2 mmol/L  Final     Chloride  Chloride   Date/Time Value Ref Range Status   04/15/2021 0422 96 (L) 98 - 107 mmol/L Final   04/14/2021 0804 95 (L) 98 - 107 mmol/L Final   04/13/2021 0928 94 (L) 98 - 107 mmol/L Final   04/12/2021 1852 92 (L) 98 - 107 mmol/L Final     BicarbonateNo results found for: PLASMABICARB    BUN BUN   Date/Time Value Ref Range Status   04/15/2021 0422 19 8 - 23 mg/dL Final   04/14/2021 0804 20 8 - 23 mg/dL Final   04/13/2021 0928 20 8 - 23 mg/dL Final   04/12/2021 1852 20 8 - 23 mg/dL Final      Creatinine Creatinine   Date/Time Value Ref Range Status   04/15/2021 0422 1.13 0.76 - 1.27 mg/dL Final   04/14/2021 0804 1.10 0.76 - 1.27 mg/dL Final   04/13/2021 0928 0.94 0.76 - 1.27 mg/dL Final   04/12/2021 1852 0.97 0.76 - 1.27 mg/dL Final      Calcium Calcium   Date/Time Value Ref Range Status   04/15/2021 0422 7.7 (L) 8.6 - 10.5 mg/dL Final   04/14/2021 0804 8.2 (L) 8.6 - 10.5 mg/dL Final   04/13/2021 0928 8.6 8.6 - 10.5 mg/dL Final   04/12/2021 1852 8.7 8.6 - 10.5 mg/dL Final      Mag @RESULFAST(MG:3)@        PT/INR:       Lab Results   Component Value Date    PROTIME 19.0 (H) 04/15/2021    PROTIME 19.0 (H) 04/14/2021    PROTIME 19.0 (H) 04/13/2021    PROTIME 19.7 (H) 04/12/2021    PROTIME 18.1 (H) 03/17/2021    PROTIME 20.6 (H) 03/12/2021    PROTIME 22.0 (H) 03/11/2021    INR 1.66 (H) 04/15/2021    INR 1.67 (H) 04/14/2021    INR 1.66 (H) 04/13/2021    INR 1.75 (H) 04/12/2021    INR 1.53 (H) 03/17/2021    INR 1.80 (H) 03/12/2021    INR 1.96 (H) 03/11/2021      Troponin I:  No results found for: TROPONINI   Lab Results   Component Value Date    CKTOTAL 362 (H) 07/26/2019    TROPONINT <0.010 03/10/2021       budesonide-formoterol, 2 puff, Inhalation, BID - RT   And  ipratropium, 0.5 mg, Nebulization, 4x Daily - RT  carvedilol, 12.5 mg, Oral, Q12H  cefTAZidime 2 g in 100 mL NS MBP, 2 g, Intravenous, Q8H  citalopram, 40 mg, Oral, Daily  ferrous sulfate, 325 mg, Oral, Daily With Breakfast  furosemide, 40 mg,  Oral, BID  lactulose, 15 mL, Oral, TID  melatonin, 5 mg, Oral, Nightly  multivitamin, 1 tablet, Oral, Daily  pantoprazole, 40 mg, Intravenous, BID AC  riFAXIMin, 550 mg, Oral, Q12H  saccharomyces boulardii, 250 mg, Oral, BID  senna-docusate sodium, 2 tablet, Oral, BID  sodium chloride, 10 mL, Intravenous, Q12H  sodium chloride, 10 mL, Intravenous, Q12H  tamsulosin, 0.4 mg, Oral, Daily  terazosin, 2 mg, Oral, Nightly      lactated ringers, 9 mL/hr, Last Rate: 9 mL/hr (04/14/21 0951)  octreotide (SandoSTATIN) infusion, 50 mcg/hr, Last Rate: 50 mcg/hr (04/14/21 1132)        Assessment/Plan     Patient Active Problem List   Diagnosis Code   • Anxiety F41.9   • Abdominal aortic aneurysm (AAA) without rupture (CMS/HCC) I71.4   • Depression F32.9   • Hypertension I10   • Cataract, bilateral H26.9   • CAD (coronary artery disease) I25.10   • Abnormal stress test R94.39   • Moderate COPD J44.9   • Former smoker Z87.891   • Coal workers pneumoconiosis, simple J60   • Back pain M54.9   • S/P lumbar fusion Z98.1   • Prediabetes R73.03   • Acute blood loss anemia, mild, asymptomatic D62   • Acute postoperative pain G89.18   • Hyponatremia, mild E87.1   • Chronic respiratory failure with hypoxia and hypercapnia (CMS/HCC) J96.11, J96.12   • Renal insufficiency N28.9   • Acute cystitis without hematuria N30.00   • Acute UTI N39.0   • Bacteremia R78.81   • Symptomatic anemia D64.9   • 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   • Elevated C-reactive protein (CRP) R79.82   • Cirrhosis of liver (CMS/HCC) K74.60   • High anion gap metabolic acidosis E87.2   • S/P AAA repair Z98.890, Z86.79   • GERD (gastroesophageal reflux disease) K21.9   • Person under investigation for COVID-19 Z20.822   • On supplemental oxygen by nasal cannula Z78.9   • 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   • Elevated transaminase level R74.01   • Thrombocytopenia (CMS/HCC) D69.6   • Pneumonia J18.9   • Gram-negative bacteremia R78.81   • COVID-19 U07.1   • Anemia D64.9    • Gastrointestinal hemorrhage with melena K92.1       Normal EF  Paroxysmal atrial fibrillation  Currently in NSR  Infected AAA endograft      Continue diuresis  Colonoscopy per GI      MD Christiano Levy PA  04/15/21  08:35 EDT          Electronically signed by Chris Black MD at 04/15/21 0928       Consult Notes (last 72 hours) (Notes from 04/14/21 1626 through 04/17/21 1626)    No notes of this type exist for this encounter.

## 2021-04-18 LAB
ALBUMIN SERPL-MCNC: 2.7 G/DL (ref 3.5–5.2)
ALBUMIN/GLOB SERPL: 0.7 G/DL
ALP SERPL-CCNC: 114 U/L (ref 39–117)
ALT SERPL W P-5'-P-CCNC: 34 U/L (ref 1–41)
ANION GAP SERPL CALCULATED.3IONS-SCNC: 6 MMOL/L (ref 5–15)
AST SERPL-CCNC: 77 U/L (ref 1–40)
BILIRUB SERPL-MCNC: 1.2 MG/DL (ref 0–1.2)
BUN SERPL-MCNC: 11 MG/DL (ref 8–23)
BUN/CREAT SERPL: 14.7 (ref 7–25)
CALCIUM SPEC-SCNC: 8.2 MG/DL (ref 8.6–10.5)
CHLORIDE SERPL-SCNC: 95 MMOL/L (ref 98–107)
CO2 SERPL-SCNC: 32 MMOL/L (ref 22–29)
CREAT SERPL-MCNC: 0.75 MG/DL (ref 0.76–1.27)
DEPRECATED RDW RBC AUTO: 58.5 FL (ref 37–54)
ERYTHROCYTE [DISTWIDTH] IN BLOOD BY AUTOMATED COUNT: 16.2 % (ref 12.3–15.4)
GFR SERPL CREATININE-BSD FRML MDRD: 104 ML/MIN/1.73
GLOBULIN UR ELPH-MCNC: 4 GM/DL
GLUCOSE BLDC GLUCOMTR-MCNC: 106 MG/DL (ref 70–130)
GLUCOSE SERPL-MCNC: 93 MG/DL (ref 65–99)
HCT VFR BLD AUTO: 29.6 % (ref 37.5–51)
HCT VFR BLD AUTO: 30.1 % (ref 37.5–51)
HCT VFR BLD AUTO: 32.1 % (ref 37.5–51)
HGB BLD-MCNC: 8.9 G/DL (ref 13–17.7)
HGB BLD-MCNC: 9 G/DL (ref 13–17.7)
HGB BLD-MCNC: 9.5 G/DL (ref 13–17.7)
INR PPP: 1.61 (ref 0.85–1.16)
MAGNESIUM SERPL-MCNC: 1.9 MG/DL (ref 1.6–2.4)
MCH RBC QN AUTO: 29.8 PG (ref 26.6–33)
MCHC RBC AUTO-ENTMCNC: 30.4 G/DL (ref 31.5–35.7)
MCV RBC AUTO: 98 FL (ref 79–97)
PLATELET # BLD AUTO: 53 10*3/MM3 (ref 140–450)
PMV BLD AUTO: 10.8 FL (ref 6–12)
POTASSIUM SERPL-SCNC: 3.7 MMOL/L (ref 3.5–5.2)
PROT SERPL-MCNC: 6.7 G/DL (ref 6–8.5)
PROTHROMBIN TIME: 18.6 SECONDS (ref 11.4–14.4)
RBC # BLD AUTO: 3.02 10*6/MM3 (ref 4.14–5.8)
SODIUM SERPL-SCNC: 133 MMOL/L (ref 136–145)
WBC # BLD AUTO: 3.78 10*3/MM3 (ref 3.4–10.8)

## 2021-04-18 PROCEDURE — 85610 PROTHROMBIN TIME: CPT | Performed by: INTERNAL MEDICINE

## 2021-04-18 PROCEDURE — 25010000002 FUROSEMIDE PER 20 MG: Performed by: INTERNAL MEDICINE

## 2021-04-18 PROCEDURE — 99233 SBSQ HOSP IP/OBS HIGH 50: CPT | Performed by: INTERNAL MEDICINE

## 2021-04-18 PROCEDURE — 83735 ASSAY OF MAGNESIUM: CPT | Performed by: INTERNAL MEDICINE

## 2021-04-18 PROCEDURE — 85014 HEMATOCRIT: CPT | Performed by: INTERNAL MEDICINE

## 2021-04-18 PROCEDURE — 82962 GLUCOSE BLOOD TEST: CPT

## 2021-04-18 PROCEDURE — 25010000002 CEFTAZIDIME PER 500 MG: Performed by: INTERNAL MEDICINE

## 2021-04-18 PROCEDURE — 85027 COMPLETE CBC AUTOMATED: CPT | Performed by: INTERNAL MEDICINE

## 2021-04-18 PROCEDURE — 85018 HEMOGLOBIN: CPT | Performed by: INTERNAL MEDICINE

## 2021-04-18 PROCEDURE — 94799 UNLISTED PULMONARY SVC/PX: CPT

## 2021-04-18 PROCEDURE — 25010000002 OCTREOTIDE PER 25 MCG: Performed by: INTERNAL MEDICINE

## 2021-04-18 PROCEDURE — 80053 COMPREHEN METABOLIC PANEL: CPT | Performed by: INTERNAL MEDICINE

## 2021-04-18 RX ORDER — FUROSEMIDE 10 MG/ML
40 INJECTION INTRAMUSCULAR; INTRAVENOUS ONCE
Status: COMPLETED | OUTPATIENT
Start: 2021-04-18 | End: 2021-04-18

## 2021-04-18 RX ADMIN — Medication 5 MG: at 21:07

## 2021-04-18 RX ADMIN — SODIUM CHLORIDE, PRESERVATIVE FREE 10 ML: 5 INJECTION INTRAVENOUS at 21:08

## 2021-04-18 RX ADMIN — CEFTAZIDIME 2 G: 2 INJECTION, POWDER, FOR SOLUTION INTRAVENOUS at 13:47

## 2021-04-18 RX ADMIN — SODIUM CHLORIDE, PRESERVATIVE FREE 10 ML: 5 INJECTION INTRAVENOUS at 08:14

## 2021-04-18 RX ADMIN — SODIUM CHLORIDE, PRESERVATIVE FREE 10 ML: 5 INJECTION INTRAVENOUS at 21:07

## 2021-04-18 RX ADMIN — RIFAXIMIN 550 MG: 550 TABLET ORAL at 21:08

## 2021-04-18 RX ADMIN — FUROSEMIDE 40 MG: 10 INJECTION INTRAMUSCULAR; INTRAVENOUS at 10:51

## 2021-04-18 RX ADMIN — Medication 250 MG: at 21:07

## 2021-04-18 RX ADMIN — RIFAXIMIN 550 MG: 550 TABLET ORAL at 08:12

## 2021-04-18 RX ADMIN — PANTOPRAZOLE SODIUM 40 MG: 40 INJECTION, POWDER, FOR SOLUTION INTRAVENOUS at 08:12

## 2021-04-18 RX ADMIN — FUROSEMIDE 40 MG: 40 TABLET ORAL at 17:06

## 2021-04-18 RX ADMIN — THERA TABS 1 TABLET: TAB at 08:12

## 2021-04-18 RX ADMIN — BUDESONIDE AND FORMOTEROL FUMARATE DIHYDRATE 2 PUFF: 160; 4.5 AEROSOL RESPIRATORY (INHALATION) at 19:23

## 2021-04-18 RX ADMIN — TAMSULOSIN HYDROCHLORIDE 0.4 MG: 0.4 CAPSULE ORAL at 08:12

## 2021-04-18 RX ADMIN — IPRATROPIUM BROMIDE 0.5 MG: 0.5 SOLUTION RESPIRATORY (INHALATION) at 07:30

## 2021-04-18 RX ADMIN — IPRATROPIUM BROMIDE 0.5 MG: 0.5 SOLUTION RESPIRATORY (INHALATION) at 19:23

## 2021-04-18 RX ADMIN — LACTULOSE 15 ML: 20 SOLUTION ORAL at 08:11

## 2021-04-18 RX ADMIN — IPRATROPIUM BROMIDE 0.5 MG: 0.5 SOLUTION RESPIRATORY (INHALATION) at 16:19

## 2021-04-18 RX ADMIN — CARVEDILOL 12.5 MG: 12.5 TABLET, FILM COATED ORAL at 08:12

## 2021-04-18 RX ADMIN — TERAZOSIN HYDROCHLORIDE 2 MG: 2 CAPSULE ORAL at 21:07

## 2021-04-18 RX ADMIN — DOCUSATE SODIUM 50MG AND SENNOSIDES 8.6MG 2 TABLET: 8.6; 5 TABLET, FILM COATED ORAL at 08:12

## 2021-04-18 RX ADMIN — PANTOPRAZOLE SODIUM 40 MG: 40 INJECTION, POWDER, FOR SOLUTION INTRAVENOUS at 17:06

## 2021-04-18 RX ADMIN — DOCUSATE SODIUM 50MG AND SENNOSIDES 8.6MG 2 TABLET: 8.6; 5 TABLET, FILM COATED ORAL at 21:07

## 2021-04-18 RX ADMIN — FUROSEMIDE 40 MG: 40 TABLET ORAL at 08:12

## 2021-04-18 RX ADMIN — OCTREOTIDE ACETATE 50 MCG/HR: 500 INJECTION, SOLUTION INTRAVENOUS; SUBCUTANEOUS at 09:27

## 2021-04-18 RX ADMIN — BUDESONIDE AND FORMOTEROL FUMARATE DIHYDRATE 2 PUFF: 160; 4.5 AEROSOL RESPIRATORY (INHALATION) at 07:30

## 2021-04-18 RX ADMIN — FERROUS SULFATE TAB 325 MG (65 MG ELEMENTAL FE) 325 MG: 325 (65 FE) TAB at 08:12

## 2021-04-18 RX ADMIN — Medication 250 MG: at 08:11

## 2021-04-18 RX ADMIN — LACTULOSE 15 ML: 20 SOLUTION ORAL at 15:28

## 2021-04-18 RX ADMIN — IPRATROPIUM BROMIDE 0.5 MG: 0.5 SOLUTION RESPIRATORY (INHALATION) at 11:40

## 2021-04-18 RX ADMIN — CITALOPRAM 40 MG: 40 TABLET ORAL at 08:11

## 2021-04-18 RX ADMIN — CEFTAZIDIME 2 G: 2 INJECTION, POWDER, FOR SOLUTION INTRAVENOUS at 02:49

## 2021-04-18 RX ADMIN — CARVEDILOL 12.5 MG: 12.5 TABLET, FILM COATED ORAL at 21:08

## 2021-04-18 NOTE — PROGRESS NOTES
Patient and family have decided to de-escalate care and pursue comfort care/hospice care measures.  Hemoglobin has been stable with no further reports of gastrointestinal bleeding.  At this time, gastroenterology will sign off on patient.  Please feel free to contact us for any further questions or concerns.

## 2021-04-18 NOTE — PROGRESS NOTES
[facility] Medicine Services  PROGRESS NOTE    Patient Name: Derek Kelsey  : 1950  MRN: [id number]    Date of Admission: 2021  Primary Care Physician: Raudel Zheng MD    Subjective   Subjective     CC:  Anemia, cirrhosis, melena, respiratory failure, chronically infected endograft    HPI:  No melena, bm's brown. Fatigued. No pain. On 4Lnc. Now wishing to pursue comfort care    ROS:  No palpitations. No chest pain  Gen- No fevers, chills  CV- No chest pain, palpitations  Resp- No cough, otherwise as above  GI- No N/V, no abd pain.       Objective   Objective     Vital Signs:   Temp:  [97.8 °F (36.6 °C)-98.5 °F (36.9 °C)] 97.9 °F (36.6 °C)  Heart Rate:  [56-74] 67  Resp:  [16-20] 18  BP: (127-154)/(58-75) 148/75        Physical Exam:  Constitutional: a&ox3 today. Lying in bed initially sleeping but woke up easily. Pleasant  HEENT:[place], oroph clear, nasal canula in place  Neck: neck supple, full range of motion  Neuro: Face symmetric, speech clear, equal , moves all extremities  Cardiac: irr irr, regular rate; BLE edema +  Resp: faint bibasilar crackles, no overt wheezes, normal respiratory effort  GI: abd soft, nontender, mild distended, obese  Skin: No extremity rash  Musculoskeletal/extremities: no cyanosis extremities      Results Reviewed:  Results from last 7 days   Lab Units 21  0705 21  2353 21  1625 21  0851 21  0838 21  1608 21  0928   WBC 10*3/mm3 3.78  --   --  3.76 4.53   < > 4.32   HEMOGLOBIN g/dL 9.0* 8.9* 9.8* 9.4* 9.1*  9.1*  --  7.0*   HEMATOCRIT % 29.6* 30.1* 32.5* 30.7* 29.9*  29.9*  --  22.7*   PLATELETS 10*3/mm3 53*  --   --  48* 56*   < > 77*   INR  1.61*  --   --  1.61* 1.55*   < > 1.66*   PROCALCITONIN ng/mL  --   --   --   --   --   --  0.16    < > = values in this interval not displayed.     Results from last 7 days   Lab Units 21  0705 21  0851 21  0838 04/15/21  0422  04/14/21  0804 04/13/21  0209 04/12/21  1852   SODIUM mmol/L 133* 135* 137 134* 133*  --  127*   POTASSIUM mmol/L 3.7 4.3 3.7 3.6 3.8   < > 3.8   CHLORIDE mmol/L 95* 99 99 96* 95*   < > 92*   CO2 mmol/L 32.0* 30.0* 31.0* 30.0* 30.0*   < > 25.0   BUN mg/dL 11 13 16 19 20   < > 20   CREATININE mg/dL 0.75* 0.74* 0.88 1.13 1.10   < > 0.97   GLUCOSE mg/dL 93 115* 101* 103* 117*   < > 101*   CALCIUM mg/dL 8.2* 8.2* 8.3* 7.7* 8.2*   < > 8.7   ALT (SGPT) U/L 34  --   --  33 37  --  40   AST (SGOT) U/L 77*  --   --  73* 81*  --  85*   PROBNP pg/mL  --   --   --   --   --   --  391.4    < > = values in this interval not displayed.     Estimated Creatinine Clearance: 124.9 mL/min (A) (by C-G formula based on SCr of 0.75 mg/dL (L)).    Microbiology Results Abnormal     Procedure Component Value - Date/Time    Respiratory Culture - Sputum, Cough [[id number]] Collected: 04/14/21 1201    Lab Status: Final result Specimen: Sputum from Cough Updated: 04/16/21 0909     Respiratory Culture Moderate growth (3+) Normal Respiratory Abby: NO S.aureus/MRSA or Pseudomonas aeruginosa     Gram Stain Many (4+) Epithelial cells per low power field      Few (2+) WBCs per low power field      Moderate (3+) Mixed bacterial morphotypes seen on Gram Stain      Few (2+) Yeast    Legionella Antigen, Urine - Urine, Urine, Clean Catch [[id number]]  (Normal) Collected: 04/13/21 0726    Lab Status: Final result Specimen: Urine, Clean Catch Updated: 04/13/21 1341     LEGIONELLA ANTIGEN, URINE Negative    S. Pneumo Ag Urine or CSF - Urine, Urine, Clean Catch [[id number]]  (Normal) Collected: 04/12/21 2325    Lab Status: Final result Specimen: Urine, Clean Catch Updated: 04/13/21 1021     Strep Pneumo Ag Negative    Eosinophil Smear - Urine, Urine, Clean Catch [[id number]]  (Normal) Collected: 04/12/21 2325    Lab Status: Final result Specimen: Urine, Clean Catch Updated: 04/13/21 0054     Eosinophil Smear 0 % EOS/100 Cells     Narrative:      No eosinophil  seen          Imaging Results (Last 24 Hours)     ** No results found for the last 24 hours. **          Results for orders placed during the hospital encounter of 03/10/21    Adult Transthoracic Echo Complete W/ Cont if Necessary Per Protocol    Interpretation Summary  · Estimated right ventricular systolic pressure from tricuspid regurgitation is normal (<35 mmHg).  · Estimated left ventricular EF = 65% Left ventricular systolic function is normal.      I have reviewed the medications:  Scheduled Meds:budesonide-formoterol, 2 puff, Inhalation, BID - RT   And  ipratropium, 0.5 mg, Nebulization, 4x Daily - RT  carvedilol, 12.5 mg, Oral, Q12H  cefTAZidime, 2 g, Intravenous, Q12H  citalopram, 40 mg, Oral, Daily  ferrous sulfate, 325 mg, Oral, Daily With Breakfast  furosemide, 40 mg, Intravenous, Once  furosemide, 40 mg, Oral, BID  lactulose, 15 mL, Oral, TID  melatonin, 5 mg, Oral, Nightly  multivitamin, 1 tablet, Oral, Daily  pantoprazole, 40 mg, Intravenous, BID AC  riFAXIMin, 550 mg, Oral, Q12H  saccharomyces boulardii, 250 mg, Oral, BID  senna-docusate sodium, 2 tablet, Oral, BID  sodium chloride, 10 mL, Intravenous, Q12H  sodium chloride, 10 mL, Intravenous, Q12H  tamsulosin, 0.4 mg, Oral, Daily  terazosin, 2 mg, Oral, Nightly      Continuous Infusions:   PRN Meds:.•  acetaminophen **OR** acetaminophen **OR** acetaminophen  •  benzonatate  •  senna-docusate sodium **AND** polyethylene glycol **AND** bisacodyl **AND** bisacodyl  •  calcium carbonate  •  guaiFENesin-dextromethorphan  •  ipratropium-albuterol  •  magnesium sulfate **OR** magnesium sulfate **OR** magnesium sulfate  •  ondansetron **OR** ondansetron  •  phenol  •  potassium chloride **OR** potassium chloride **OR** potassium chloride  •  sodium chloride  •  sodium chloride    Assessment/Plan   Assessment & Plan     Active Hospital Problems    Diagnosis  POA   • **Symptomatic anemia [D64.9]  Yes   • Anemia [D64.9]  Yes   • Gastrointestinal hemorrhage  "with melena [K92.1]  Unknown   • 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        Brief Hospital Course to date:  Derek Kelsey is a 70 y.o. male w/ cirrhosis, chronically infected aaa endograft (on fortaz followed by Dr. Carrero), afib, dhf, copd/ILD w/ 3-4L chronic oxygen dependence, also w/ hx of covid + pcr on 3/10/21 (also got 2nd covid 19 vaccination shot on 3/23/21) who presented w/ dyspnea on exertion, fatigue. Apparently had appointment at [facility] mid April to consider \"options\" for his chronically infected endograft. Noted had hgb 5. Has had melena recently and some mild increased confusion. Ct revealed no PE but bilateral effusions. Transfused 2 unit prbc w/ bumex. Gi, cards, ID consulted.     *Acute GI bleed, likely upper (melena)  *Decompensated cirrhosis (w/ coagulopathy, thrombocytopenia, hepatic encephalopathy, thromcytopenia)   -4/14/21: egd w/ portal gasropathy w/ oozing (varices noted but no overt bleeding)   -continue bid ppi (change to po at d/c)   -d/c octreotide  As patient pursing comfort measures and is causing extra volume accumulation   -initially planned on c-scope but was aborted because bleeding stopped and hgb stable after 5 total units (now brown stools per 4/18/21 exam). 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 Carrero   -apparently has appointment @ [facility] next week re: options; however patient now declining this and accepts that this means he will have recurrent/persistent infection and likely sepsis   -palliative following; appreciate further assistance in future goals/plans; perhaps hospice in order if/when clinically deteriorates  *Afib (not anticoagulated)   -Dr. Gardner following, on coreg; no a/c due to gi bleeding/cirrhosis   -currently rate controlled  *hx covid 19 pcr + (on 3/10/21)   -also had covid vacinnation x 2 shots (2nd shot 3/23/21)   -no isolatoin    Plan:  -noted the events of yesterday afternoon and the discussions w/ palliative and hospice consultation. I had a long discussion w/ patient, family at bedside this morning. He wishes to go home with hospice, and from what I gather this is being set up tentatively tomorrow. Patient now states that in the event of further gi bleeding or other clinical deterioration he would not want aggressive medical care (no blood, c-scope, etc) as its \"not doing any good\". Patient only wishes to pursue comfort/symptom treatment at this point. As such, stopping octreotide & stopping lab draws.   -Will give extra dose lasix 40mg iv now.   -defer decision on further iv fortaz to palliative/hospice/ID (as I'm not sure if this is \"doable\" w/ hospice.  -tentatively planning d/c home w/ hospice tomorrow if reasonably stable  -continue coreg, po iron, bid lasix, lactulose. Change to po protonix at discharge    -I spent 35 min total on care, >50% at bedside counseling/discussion w/ patient and family  resumption of gi bleeding        DVT Prophylaxis:  mechanical      Disposition: I expect the patient to be discharged home w/ hospice tomorrow 4/19 if clinically stable, final decision/plans for antibiotics per hospice & Dr. Carrero   "   CODE STATUS:   Code Status and Medical Interventions:   Ordered at: 04/18/21 1032     Code Status:    No CPR     Medical Interventions (Level of Support Prior to Arrest):    Comfort Measures       Rinku Mcrae MD  04/18/21

## 2021-04-19 ENCOUNTER — READMISSION MANAGEMENT (OUTPATIENT)
Dept: CALL CENTER | Facility: HOSPITAL | Age: 71
End: 2021-04-19

## 2021-04-19 VITALS
BODY MASS INDEX: 33.76 KG/M2 | TEMPERATURE: 98 F | HEIGHT: 75 IN | DIASTOLIC BLOOD PRESSURE: 77 MMHG | OXYGEN SATURATION: 92 % | WEIGHT: 271.5 LBS | RESPIRATION RATE: 18 BRPM | HEART RATE: 79 BPM | SYSTOLIC BLOOD PRESSURE: 138 MMHG

## 2021-04-19 PROCEDURE — 94799 UNLISTED PULMONARY SVC/PX: CPT

## 2021-04-19 PROCEDURE — 99239 HOSP IP/OBS DSCHRG MGMT >30: CPT | Performed by: NURSE PRACTITIONER

## 2021-04-19 PROCEDURE — 25010000002 CEFTAZIDIME PER 500 MG: Performed by: INTERNAL MEDICINE

## 2021-04-19 RX ORDER — IPRATROPIUM BROMIDE AND ALBUTEROL SULFATE 2.5; .5 MG/3ML; MG/3ML
3 SOLUTION RESPIRATORY (INHALATION) EVERY 4 HOURS PRN
Qty: 360 ML | Refills: 0 | Status: SHIPPED | OUTPATIENT
Start: 2021-04-19

## 2021-04-19 RX ADMIN — Medication 250 MG: at 08:15

## 2021-04-19 RX ADMIN — SODIUM CHLORIDE, PRESERVATIVE FREE 10 ML: 5 INJECTION INTRAVENOUS at 08:16

## 2021-04-19 RX ADMIN — CARVEDILOL 12.5 MG: 12.5 TABLET, FILM COATED ORAL at 08:16

## 2021-04-19 RX ADMIN — FERROUS SULFATE TAB 325 MG (65 MG ELEMENTAL FE) 325 MG: 325 (65 FE) TAB at 08:15

## 2021-04-19 RX ADMIN — RIFAXIMIN 550 MG: 550 TABLET ORAL at 08:15

## 2021-04-19 RX ADMIN — CITALOPRAM 40 MG: 40 TABLET ORAL at 08:15

## 2021-04-19 RX ADMIN — FUROSEMIDE 40 MG: 40 TABLET ORAL at 08:15

## 2021-04-19 RX ADMIN — PANTOPRAZOLE SODIUM 40 MG: 40 INJECTION, POWDER, FOR SOLUTION INTRAVENOUS at 06:38

## 2021-04-19 RX ADMIN — IPRATROPIUM BROMIDE 0.5 MG: 0.5 SOLUTION RESPIRATORY (INHALATION) at 10:44

## 2021-04-19 RX ADMIN — IPRATROPIUM BROMIDE 0.5 MG: 0.5 SOLUTION RESPIRATORY (INHALATION) at 06:50

## 2021-04-19 RX ADMIN — THERA TABS 1 TABLET: TAB at 08:15

## 2021-04-19 RX ADMIN — CEFTAZIDIME 2 G: 2 INJECTION, POWDER, FOR SOLUTION INTRAVENOUS at 02:01

## 2021-04-19 RX ADMIN — BUDESONIDE AND FORMOTEROL FUMARATE DIHYDRATE 2 PUFF: 160; 4.5 AEROSOL RESPIRATORY (INHALATION) at 06:50

## 2021-04-19 RX ADMIN — DOCUSATE SODIUM 50MG AND SENNOSIDES 8.6MG 2 TABLET: 8.6; 5 TABLET, FILM COATED ORAL at 08:15

## 2021-04-19 RX ADMIN — TAMSULOSIN HYDROCHLORIDE 0.4 MG: 0.4 CAPSULE ORAL at 08:15

## 2021-04-19 RX ADMIN — SODIUM CHLORIDE, PRESERVATIVE FREE 10 ML: 5 INJECTION INTRAVENOUS at 08:15

## 2021-04-19 NOTE — PLAN OF CARE
Problem: Adult Inpatient Plan of Care  Goal: Plan of Care Review  Recent Flowsheet Documentation  Taken 4/13/2021 0954 by Esther Min, PT  Progress: improving  Plan of Care Reviewed With:   patient   spouse  Outcome Summary: Patient ambulated 25 feet with RW and step through gait pattern, limited by fatigue and SOA. CGA for all mobility. Demonstrates B LE weakness and significant deconditioning. Recommend SNF at d/c. Will continue to progress as able.   Goal Outcome Evaluation:  Plan of Care Reviewed With: patient, spouse  Progress: improving  Outcome Summary: Patient ambulated 25 feet with RW and step through gait pattern, limited by fatigue and SOA. CGA for all mobility. Demonstrates B LE weakness and significant deconditioning. Recommend SNF at d/c. Will continue to progress as able.  
  Problem: Adult Inpatient Plan of Care  Goal: Plan of Care Review  Recent Flowsheet Documentation  Taken 4/13/2021 1057 by Bartolome Dietrich OT  Progress: improving  Plan of Care Reviewed With:   patient   spouse  Outcome Summary: Initial OT evaluation completed, limitted activity d/t low H&H and pulmonary status. Pt presents w/ decreased balance, strength, and activity tolerance warranting skilled OT services to promote return to PLOF. Pt was SBA for bed mobility, dependent for LB dressing, Edis for STS transfer at , extra time and effort for all activities w/ O2 sats remaining > 88% on 5L NC. Pt and spouse provided w/ AE for ADL completion, educated on use by samantha, discussed EC and pacing strategies, continued education/training required. Based on today's performance, recommend d/c to IP rehab.     
  Problem: Adult Inpatient Plan of Care  Goal: Plan of Care Review  Recent Flowsheet Documentation  Taken 4/14/2021 1526 by Sadia Corrales OT  Plan of Care Reviewed With:   patient   spouse  Outcome Summary: Pt is motivated to work with OT and regain occupational independence. Set-up/SBA UB bathing, Min A UB dressing, Max A LB dressing. Pt up in room with CGA using RW. CGA BSC tx. Brief review for EC with ADLs. OT will follow IP to advance POC as able.     
  Problem: Suicide Risk  Goal: Absence of Self-Harm  Intervention: Promote Psychosocial Wellbeing  Recent Flowsheet Documentation  Taken 4/16/2021 1215 by Amee George, RN  Supportive Measures:   active listening utilized   verbalization of feelings encouraged   self-care encouraged   self-reflection promoted  Family/Support System Care:   self-care encouraged   support provided   presence promoted   involvement promoted   family care conference arranged   Goal Outcome Evaluation:  Plan of Care Reviewed With: patient, spouse, family  Progress: no change  Outcome Summary: Pt is resting. Pt having frequent bms after colonoscopy prep. Pt using abd muscles to breathe when asleep. Wife and dtr in law at bedside. support to them. Dr Ayon met with 2 sons and wife this am and plans to continue current plan of care with iv abx.  Family aware pt will become septic again.Team Meeting 1300 Sedrick Ayon DO, Rosalee Mota LCSW, Rena Arshad APRN, Amee George RN CHPN, Blanca Whitaker RN CHPN, Briana Pichardo RN CHPN  
Goal Outcome Evaluation:      1 unit PRBC given this shift, pt.tolerated well. Remains on 4L/min O2. NPO after midnight for planned EGD today. Spouse at bedside, very supportive and involved in pt.care.will continue to monitor.            
Goal Outcome Evaluation:     Progress: improving  Outcome Summary: SOA WITH ACTIVITY BUT IMPROVED GAIT  DISTANCE  FEET WITH R WALKER AND CGA  WITH STABLE O2 SATS. PT REQUIRED MIN ASSIST FOR SUPINE TO SIT AND CGA FOR STS. GOOD EFFORT.RECOMMEND D/C HOME WITH FAMILY 24/7 AND HHPT.   
Goal Outcome Evaluation:  Plan of Care Reviewed With: patient  Progress: no change  Disoriented to time, orientation comes and goes. Accelerated junctional w/ occasional PACs. 5 L NC. No c/o pain. Infused 2 Units of PRBCs. Pt. breath sounds still remain clear & diminished and Pt. experienced no s/s of reaction. Niranjan is 19, Pt. able to change positions independently when reminded. Pt. Able to rest well throughout shift. Bed alarm active.   
Goal Outcome Evaluation:  Plan of Care Reviewed With: patient, spouse  Progress: improving  Outcome Summary: Patient enjoying participating in PT. Demonstrated good control of walk and increased his ambulation to 300 feet. His oxyges stated stable with activity  
Goal Outcome Evaluation:  Plan of Care Reviewed With: patient, spouse  Progress: improving  Outcome Summary: Pt discharging home today with hospice, wife will transport by car. Family to notify local hospice when pt arrives at home.    Problem: Palliative Care  Goal: Enhanced Quality of Life  Outcome: Adequate for Care Transition  Intervention: Maximize Comfort  Flowsheets (Taken 4/19/2021 1130)  Pain Management Interventions: (dyspnea management reviewed with pt and spouse)   pain management plan reviewed with patient/caregiver   other (see comments)  Intervention: Optimize Function  Flowsheets (Taken 4/19/2021 1130)  Sleep/Rest Enhancement: consistent schedule promoted  Intervention: Promote Advance Care Planning  Flowsheets (Taken 4/19/2021 1130)  Life Transition/Adjustment: (dishcarge home with hospice today) other (see comments)  Intervention: Optimize Psychosocial Wellbeing  Flowsheets (Taken 4/19/2021 1130)  Supportive Measures:   decision-making supported   positive reinforcement provided     
Goal Outcome Evaluation:  Plan of Care Reviewed With: patient, spouse  Progress: improving  Outcome Summary: Pt is feeling better and slept well. Pt is breathing better after multiple blood transfusions. Plans for colonoscopy. Continue goals discussions. Support to pt and wife.    Intervention: Promote Psychosocial Wellbeing  Recent Flowsheet Documentation  Taken 4/14/2021 1210 by Amee George, RN  Supportive Measures:   active listening utilized   verbalization of feelings encouraged   goal setting facilitated  Family/Support System Care:   self-care encouraged   support provided   presence promoted  Palliative Team meeting 1300: Sedrick Ayon DO, Rena Arshad APRN, Amee George RN, CHPN, Rosalee STRINGERW, Adrián Ivey MDIV,  Chaplain Su, Blanca Whitaker RN CHPN       
Goal Outcome Evaluation:  Plan of Care Reviewed With: patient, spouse  Progress: improving  Outcome Summary: VSS. AOx4, pt sleeping between care today. No reports of pain. Pt mainly in bed today and walked with physical therapy in evening. Magnesium replaced per protocol. Hospice consulted to give family information, family wishing to pursue more comfort measures. Sinus rhythm to sinus josse on the monitor. Remains on 4L nasal cannula, no shortness of air noted.  
Goal Outcome Evaluation:  Plan of Care Reviewed With: patient, spouse  Progress: no change  Outcome Summary: Disoriented to time, Acc junctional, 5L NC, no c/o pain, waiting for orders and labs, Niranjan 19, bed alarm active, will continue to monitor  
Goal Outcome Evaluation:  Plan of Care Reviewed With: patient, spouse  Progress: no change  Outcome Summary: New palliative consult. Pt is sitting up in the chair, some mild dyspnea at rest/movement. Pt planning for EGD. Pt complains of abd tightness related to ascites. Pt may have paracentesis while in hospital. Pt is currently receiving clear liquids. Pt has been declining with weakness.and dyspnea. 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 Wife Mago at the bedside. SPoke to unit manager and she is ok with pt's wife staying the night. Encouraged self care for wife and getting enough rest. Wife discussed hospice.  Pt/wife understand he has an infection that will likely not clear and cirrhosis.  Continue palliative support and goals discussion.      Intervent ion: Promote Psychosocial Wellbeing  Recent Flowsheet Documentation  Taken 4/13/2021 1210 by Amee George, RN  Supportive Measures:  • active listening utilized  • decision-making supported  • verbalization of feelings encouraged  • problem-solving facilitated  Family/Support System Care:  • involvement promoted  • presence promoted  • self-care encouraged  • support providedPalliative Team meeting 1300: Sedrick Ayon DO, Rena HOWELL, Amee George RN, CHPN, Rosalee Mota LCSW, Antolin Hernandez MSW     
Goal Outcome Evaluation:  Plan of Care Reviewed With: patient, spouse  Progress: no change  Outcome Summary: Orientation varies with each nursing round/intermittent visual hallucinations, NSR with PACs, 4L hum NC, using ICS up to 750, no c/o pain, Mag and K+ replaced, H & H q6hrs - last levels had improved at 7.2 and 23.2, on clear liquid diet, plan is NPO after midnight for EGD tomorrow, Ocreotide gtt @ 10ml/hr, new IV placed, good UOP with diuretics/1500ml fluid restriction placed, Niranjan 20, Falls score 26/bed alarm active, will continue to monitor  
Goal Outcome Evaluation:  Plan of Care Reviewed With: patient, spouse  Progress: no change  Outcome Summary: Pt. A & O x 4. NSR. 4 L NC. Pt. able to rest well throughout night. No c/o pain. Pt. uses abdominal muscles to breathe when asleep, however, does not use abdominal muscles when awake. Wife at bedside, has permission to stay overnight. Previously planned colonoscopy has been advised against per MD, family still in understanding of risks and in agreement. No further complaints at this time.   
Goal Outcome Evaluation:  Plan of Care Reviewed With: patient, spouse, son  Progress: improving  Outcome Summary: OT engaged pt in adl retraining with him requiring mod assist for toielting and donning socks.  He completed bed mob with setup.  He is improving with mob and dyn standing balance with no lob with manual resistance.  He stood times 4-5 minutes before requiring a break.  Overall, pt making good progress toward goals.  
Goal Outcome Evaluation:  Plan of Care Reviewed With: patient, spouse, son  Progress: improving  Outcome Summary: Pt denied any discomfort today; wife and son at bedside, scheduled family conference to include wife, both sons tomorrow morning.    1300 Palliative Team Conference: XIANG Pichardo RN, PN; SHELBY Ayon DO; TALON Rosado, RD, CHJANEL; CAROLINA Mota, [facility], [facility]; HESHAM Greco MDiv; FRANCY Whitaker RN, DAPHNE; CAROLINA Ivey, [facility]; HESHAM Greco MDiv; CAITLIN Palmer RN; ANGELA Dee, APRN    Problem: Palliative Care  Goal: Enhanced Quality of Life  Outcome: Ongoing, Progressing  Intervention: Maximize Comfort  Flowsheets (Taken 4/15/2021 1425)  Pain Management Interventions: (denied any discomfort) other (see comments)  Intervention: Optimize Function  Flowsheets (Taken 4/15/2021 1425)  Fatigue Management: frequent rest breaks encouraged  Sleep/Rest Enhancement:   natural light exposure provided   consistent schedule promoted  Intervention: Optimize Psychosocial Wellbeing  Flowsheets (Taken 4/15/2021 1425)  Family/Support System Care:   caregiver stress acknowledged   self-care encouraged   support provided     
Goal Outcome Evaluation:  Plan of Care Reviewed With: patient, spouse, son  Progress: no change  Outcome Summary: Pt reported no pain; smiling, interactive, still does tire easily. Documentation of desaturation while sleeping, son asked about CPAP; discussed benefit/burden in context of GOC, pt and family agreeable to no NIPPV, and plan to go home with hospice. Hospice consult placed, Liaison notified.    Palliative Team Conference: XIANG Pichardo RN, CHPN; LYNDA Patel    Problem: Palliative Care  Goal: Enhanced Quality of Life  Outcome: Ongoing, Progressing  Intervention: Maximize Comfort  Flowsheets (Taken 4/17/2021 1423)  Pain Management Interventions: (dyspnea management interventions d/w pt and family) other (see comments)  Intervention: Optimize Function  Flowsheets (Taken 4/17/2021 1423)  Fatigue Management: frequent rest breaks encouraged  Sleep/Rest Enhancement:   consistent schedule promoted   natural light exposure provided  Intervention: Promote Advance Care Planning  Flowsheets (Taken 4/17/2021 1423)  Life Transition/Adjustment: (Hospice consult placed after discussion with pt, wife, son) other (see comments)  Intervention: Optimize Psychosocial Wellbeing  Flowsheets (Taken 4/17/2021 1423)  Supportive Measures: positive reinforcement provided  Grieving Process Facilitation:   reaction to loss explored   family/significant other support facilitated  Family/Support System Care:   caregiver stress acknowledged   involvement promoted   self-care encouraged   support provided     
Home

## 2021-04-19 NOTE — DISCHARGE SUMMARY
"    [facility] Medicine Services  DISCHARGE SUMMARY    Patient Name: Derek Kelsey  : 1950  MRN: [id number]    Date of Admission: 2021  5:43 PM  Date of Discharge:  2021  Primary Care Physician: Raudel Zheng MD    Consults     Date and Time Order Name Status Description    2021  8:54 AM Inpatient Palliative Care MD Consult Completed     2021 12:34 AM Inpatient Cardiology Consult Completed     2021 12:34 AM Inpatient Gastroenterology Consult Completed     2021  6:44 PM Inpatient Infectious Diseases Consult Completed     3/16/2021 10:59 AM Inpatient Gastroenterology Consult Completed     3/11/2021 12:34 AM Inpatient Infectious Diseases Consult Completed           Hospital Course     Presenting Problem:   Symptomatic anemia [D64.9]  Anemia [D64.9]    Active Hospital Problems    Diagnosis  POA   • **Symptomatic anemia [D64.9]  Yes   • Anemia [D64.9]  Yes   • Gastrointestinal hemorrhage with melena [K92.1]  Unknown   • 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          Hospital Course:  Derek Kelsey is a 70 y.o. male  w/ cirrhosis, chronically infected aaa endograft (on  followed by Dr. Carrero), afib, dhf, copd/ILD w/ 3-4L chronic oxygen dependence, also w/ hx of covid + pcr on 3/10/21 (also got 2nd covid 19 vaccination shot on 3/23/21) who presented w/ dyspnea on exertion, fatigue. Apparently had appointment at [facility] mid April to consider \"options\" for his chronically infected endograft. Noted had hgb 5. Has had melena recently and some mild increased confusion. Ct revealed no PE but bilateral effusions. Transfused 2 unit " "prbc w/ bumex. Gi, cards, ID consulted.      *Acute GI bleed, likely upper (melena)  *Decompensated cirrhosis (w/ coagulopathy, thrombocytopenia, hepatic encephalopathy, thromcytopenia)              -4/14/21: egd w/ portal gasropathy w/ oozing (varices noted but no overt bleeding)              -continue bid ppi (change to po at d/c)              -d/c octreotide  As patient pursing comfort measures and is causing extra volume accumulation              -initially planned on c-scope but was aborted because bleeding stopped and hgb stable after 5 total units (now brown stools per 4/18/21 exam). 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 Carrero              -apparently has appointment @ [facility] next week re: options; however patient now declining this and accepts that this means he will have recurrent/persistent infection and likely sepsis  -Per ID note since patient has chosen hospice ok to stop antibiotics. I talked with wife and patient and they are both in agreement to stop antibiotics at this time  -- will keep picc line in place per hospice request and they can remove if not needed   -hospice to deliver home equipment today and wife with transprot home               -palliative following; hospice has seen patient and they have chosen to go home with hospice    *Afib (not anticoagulated)              -Dr. Black following, on coreg; no a/c due to gi bleeding/cirrhosis         "      -currently rate controlled    *hx covid 19 pcr + (on 3/10/21)              -also had covid vacinnation x 2 shots (2nd shot 3/23/21)              -no isolatoin        Patient has remained clinically stable and will be discharged home with hospice today.       Discharge Follow Up Recommendations for outpatient labs/diagnostics:   follow up with pcp as needed  Follow up with Dr. Carrero as needed     Day of Discharge     HPI:   Patient is resting in bed in NAD with wife at bedside. He states he slept well.. tolerating diet. Denies any pain. Plan is home with hospice today.     Review of Systems  Gen- No fevers, chills  CV- No chest pain, palpitations  Resp- No cough, dyspnea  GI- No N/V/D, abd pain        Vital Signs:   Temp:  [98.2 °F (36.8 °C)-99.1 °F (37.3 °C)] 99.1 °F (37.3 °C)  Heart Rate:  [58-97] 97  Resp:  [16-18] 18  BP: (120-152)/(58-80) 132/68     Physical Exam:  Constitutional: No acute distress, awake, alert  HENT: NCAT, mucous membranes moist  Respiratory: decreased in haris bases, respiratory effort normal 4L 93%  Cardiovascular: irregular , no murmurs, rubs, or gallops  Gastrointestinal: Positive bowel sounds, soft, nontender, nondistended  Musculoskeletal: mod bilateral LE edema   Psychiatric: Appropriate affect, cooperative  Neurologic: Oriented x 3, strength symmetric in all extremities, Cranial Nerves grossly intact to confrontation, speech clear  Skin: No rashes, pale       Pertinent  and/or Most Recent Results     LAB RESULTS:      Lab 04/18/21  0833 04/18/21  0705 04/17/21  2353 04/17/21  1625 04/17/21  0851 04/16/21  0838 04/15/21  0422 04/14/21  0804 04/13/21  0928 04/12/21  2045 04/12/21  1852   WBC  --  3.78  --   --  3.76 4.53 4.05 3.38* 4.32  --  4.88   HEMOGLOBIN 9.5* 9.0* 8.9* 9.8* 9.4* 9.1*  9.1* 7.1* 7.5* 7.0*   < > 5.6*   HEMATOCRIT 32.1* 29.6* 30.1* 32.5* 30.7* 29.9*  29.9* 23.1* 24.8* 22.7*   < > 18.7*  17.9*   PLATELETS  --  53*  --   --  48* 56* 73* 70* 77*  --  87*   NEUTROS  ABS  --   --   --   --   --   --   --   --   --   --  3.59   IMMATURE GRANS (ABS)  --   --   --   --   --   --   --   --   --   --  0.04   LYMPHS ABS  --   --   --   --   --   --   --   --   --   --  0.54*   MONOS ABS  --   --   --   --   --   --   --   --   --   --  0.62   EOS ABS  --   --   --   --   --   --   --   --   --   --  0.08   MCV  --  98.0*  --   --  96.5 98.7* 98.7* 98.8* 97.0  --  100.5*   PROCALCITONIN  --   --   --   --   --   --   --   --  0.16  --   --    PROTIME  --  18.6*  --   --  18.5* 18.0* 19.0* 19.0* 19.0*  --   --     < > = values in this interval not displayed.         Lab 04/18/21  0705 04/17/21  0851 04/16/21  0838 04/15/21  1755 04/15/21  0422 04/14/21  0804 04/13/21  0928 04/12/21  1852   SODIUM 133* 135* 137  --  134* 133* 128* 127*   POTASSIUM 3.7 4.3 3.7 4.2 3.6 3.8 3.6 3.8   CHLORIDE 95* 99 99  --  96* 95* 94* 92*   CO2 32.0* 30.0* 31.0*  --  30.0* 30.0* 24.0 25.0   ANION GAP 6.0 6.0 7.0  --  8.0 8.0 10.0 10.0   BUN 11 13 16  --  19 20 20 20   CREATININE 0.75* 0.74* 0.88  --  1.13 1.10 0.94 0.97   GLUCOSE 93 115* 101*  --  103* 117* 106* 101*   CALCIUM 8.2* 8.2* 8.3*  --  7.7* 8.2* 8.6 8.7   MAGNESIUM 1.9 1.7  --   --  2.1 1.8 1.6  --    HEMOGLOBIN A1C  --   --   --   --   --   --   --  4.50*         Lab 04/18/21  0705 04/15/21  0422 04/14/21  0804 04/12/21 1852   TOTAL PROTEIN 6.7 6.5 7.2 7.1   ALBUMIN 2.70* 2.70* 3.10* 2.90*   GLOBULIN 4.0 3.8 4.1 4.2   ALT (SGPT) 34 33 37 40   AST (SGOT) 77* 73* 81* 85*   BILIRUBIN 1.2 1.1 1.8* 1.1   ALK PHOS 114 113 124* 123*         Lab 04/18/21  0705 04/17/21  0851 04/16/21  0838 04/15/21  0422 04/14/21  0804 04/12/21 1852   PROBNP  --   --   --   --   --  391.4   PROTIME 18.6* 18.5* 18.0* 19.0* 19.0*  --    INR 1.61* 1.61* 1.55* 1.66* 1.67*  --              Lab 04/12/21 1852   IRON 45*   IRON SATURATION 11*   TIBC 428   TRANSFERRIN 287   FERRITIN 133.10   VITAMIN B 12 >2,000*   ABO TYPING O   RH TYPING Positive   ANTIBODY SCREEN Negative          Brief Urine Lab Results  (Last result in the past 365 days)      Color   Clarity   Blood   Leuk Est   Nitrite   Protein   CREAT   Urine HCG        04/12/21 2326 Yellow Clear Negative Negative Negative Trace             Microbiology Results (last 10 days)     Procedure Component Value - Date/Time    Respiratory Culture - Sputum, Cough [[id number]] Collected: 04/14/21 1201    Lab Status: Final result Specimen: Sputum from Cough Updated: 04/16/21 0909     Respiratory Culture Moderate growth (3+) Normal Respiratory Abby: NO S.aureus/MRSA or Pseudomonas aeruginosa     Gram Stain Many (4+) Epithelial cells per low power field      Few (2+) WBCs per low power field      Moderate (3+) Mixed bacterial morphotypes seen on Gram Stain      Few (2+) Yeast    Legionella Antigen, Urine - Urine, Urine, Clean Catch [[id number]]  (Normal) Collected: 04/13/21 0726    Lab Status: Final result Specimen: Urine, Clean Catch Updated: 04/13/21 1341     LEGIONELLA ANTIGEN, URINE Negative    Eosinophil Smear - Urine, Urine, Clean Catch [[id number]]  (Normal) Collected: 04/12/21 2325    Lab Status: Final result Specimen: Urine, Clean Catch Updated: 04/13/21 0054     Eosinophil Smear 0 % EOS/100 Cells     Narrative:      No eosinophil seen    S. Pneumo Ag Urine or CSF - Urine, Urine, Clean Catch [[id number]]  (Normal) Collected: 04/12/21 2325    Lab Status: Final result Specimen: Urine, Clean Catch Updated: 04/13/21 1021     Strep Pneumo Ag Negative          CT Abdomen Pelvis Without Contrast    Result Date: 4/13/2021  CT ABDOMEN AND PELVIS, NONCONTRAST, 4/13/2021 HISTORY: 70-year-old male recently discharged from the hospital after treatment for AAA endograft infection with sepsis, Pseudomonas bacteremia. GI bleed. History of hepatic cirrhosis, chronic respiratory failure and diastolic heart failure. He is admitted to the hospital today after low hemoglobin was noted by his primary provider. Symptoms include shortness of breath, leg  swelling, black stools, TECHNIQUE: CT imaging of the abdomen and pelvis ordered without oral or IV contrast. Radiation dose reduction techniques included automated exposure control. Radiation audit for CT and nuclear cardiology exams in the last 12 months: 8. COMPARISON: *  CT abdomen and pelvis, 3/10/2021. ABDOMEN FINDINGS: Morphologic changes of hepatic cirrhosis with moderate hepatosplenomegaly. Stable left lobe liver cyst. No gallbladder distention or visible bile duct dilatation. Pancreas is unremarkable. Soft tissue edema throughout the abdominal mesentery and body wall and small volume ascites within the abdomen and pelvis. Small bilateral pleural effusions, larger on the right. Bifurcated aortoiliac stent graft with continued ill-defined thickening of the wall of the native aneurysm and mild periaortic adenopathy. Vascular assessment is limited without contrast administration. Both kidneys are negative with no evidence of urinary obstruction or nephrolithiasis. Small bowel and colon are normal in caliber and appearance, as imaged. No gastric distention or distal esophageal dilatation. PELVIS FINDINGS: Urinary bladder, prostate and rectum are within normal limits. Postoperative changes of previous lower lumbar spine fusion with instrumentation.     1.  No new or acute abnormality within the abdomen or pelvis when compared with prior study. 2.  Advanced hepatic cirrhosis with hepatosplenomegaly, small volume ascites and extensive soft tissue edema throughout the abdominal mesentery and body wall. 3.  Bifurcated aortoiliac stent graft with continued ill-defined wall thickening of the native aneurysm and mild periaortic adenopathy. Noncontrast study. Signer Name: Jim Yousif MD  Signed: 4/13/2021 2:10 AM  Workstation Name: [email or web address]  [facility]    CT Angiogram Chest With & Without Contrast    Result Date: 4/13/2021  CT CHEST WITH CONTRAST, PE PROTOCOL, 4/13/2021 HISTORY:  70-year-old male recently discharged from the hospital after treatment for AAA endograft infection with sepsis, Pseudomonas bacteremia. GI bleed. History of hepatic cirrhosis, chronic respiratory failure and diastolic heart failure. He is admitted to the hospital today after low hemoglobin was noted by his primary provider. Symptoms include shortness of breath, leg swelling, black stools, TECHNIQUE: CT examination of the chest with IV contrast. CTA MIP multiplanar pulmonary artery images were reformatted with 3-D postprocessing. Radiation dose reduction techniques included automated exposure control. Radiation audit for CT and nuclear cardiology exams in the last 12 months: 8. COMPARISON: *  CTA chest, 3/10/2021. FINDINGS: No pulmonary embolism is demonstrated. Thoracic aorta is normal in caliber with no aneurysm or dissection. Heart size is normal, and there is no pericardial effusion. Multifocal airspace infiltrates are scattered throughout the central portions of both lungs, greatest in the left upper lobe, mostly in a central peribronchovascular distribution. This was not present on the prior study. Multifocal pneumonitis is suspected. In addition, the patient has developed a new small to moderate right pleural effusion and tiny left pleural effusion, and there is consolidation and atelectasis of the dependent right posterior lung base. Trachea and central bronchi are patent. Lymph node enlargement is seen throughout the mediastinum, both pulmonary viet and in the axillary regions where there is bilateral axillary edema. This is nonspecific but may represent reactive adenopathy or lymphatic engorgement. Neoplastic adenopathy is unlikely given the time course.     1.  No evidence of pulmonary embolism or other acute vascular abnormality within the chest. 2.  Multifocal groundglass and more dense airspace infiltrates in both lungs as detailed above. Consider atypical pneumonitis. Imaging features can be seen with  COVID-19 pneumonia, although are nonspecific and can can occur with a variety of infectious and noninfectious processes. 3.  Small to moderate right pleural effusion and tiny left pleural effusion with right posterior lung base consolidation and atelectasis, new since prior. 4.  Adenopathy within the mediastinum, pulmonary viet and axillary regions. Axillary soft tissue edema. Soft tissue edema is also present throughout the abdominal mesentery and body wall. Lymph node enlargement due to lymphatic engorgement is likely. Signer Name: Jim Yousif MD  Signed: 4/13/2021 2:17 AM  Workstation Name: [id number]  [facility]      Results for orders placed during the hospital encounter of 09/10/20    Duplex Venous Lower Extremity - Right CAR    Interpretation Summary  · Normal right lower extremity venous duplex scan.      Results for orders placed during the hospital encounter of 09/10/20    Duplex Venous Lower Extremity - Right CAR    Interpretation Summary  · Normal right lower extremity venous duplex scan.      Results for orders placed during the hospital encounter of 03/10/21    Adult Transthoracic Echo Complete W/ Cont if Necessary Per Protocol    Interpretation Summary  · Estimated right ventricular systolic pressure from tricuspid regurgitation is normal (<35 mmHg).  · Estimated left ventricular EF = 65% Left ventricular systolic function is normal.      Plan for Follow-up of Pending Labs/Results:     Discharge Details        Discharge Medications      New Medications      Instructions Start Date   ipratropium-albuterol 0.5-2.5 mg/3 ml nebulizer  Commonly known as: DUO-NEB   3 mL, Nebulization, Every 4 Hours PRN         Changes to Medications      Instructions Start Date   ferrous gluconate 324 MG tablet  Commonly known as: FERGON  What changed:   · how much to take  · when to take this   324 mg, Oral, Daily With Breakfast         Continue These Medications      Instructions Start  Date   acetaminophen 650 MG 8 hr tablet  Commonly known as: TYLENOL   650 mg, Oral, Every 8 Hours PRN      carvedilol 12.5 MG tablet  Commonly known as: COREG   12.5 mg, Oral, 2 Times Daily With Meals      citalopram 20 MG tablet  Commonly known as: CeleXA   40 mg, Oral, Daily      doxazosin 1 MG tablet  Commonly known as: CARDURA   Oral, 2 Times Daily      furosemide 20 MG tablet  Commonly known as: LASIX   40 mg, 2 times daily      lactulose 10 GM/15ML solution  Commonly known as: CHRONULAC   Take 15 mL by mouth 3 (Three) Times a Day for goal of 3 BMs daily      melatonin 5 MG tablet tablet   5 mg, Oral, Nightly      Multi-Vitamin Daily tablet tablet  Generic drug: multivitamin   1 tablet, Oral, Daily      O2  Commonly known as: OXYGEN   No dose, route, or frequency recorded.      Ondansetron 4 MG film   3 Times Daily PRN      pantoprazole 40 MG EC tablet  Commonly known as: PROTONIX   40 mg, Oral, 2 Times Daily      saccharomyces boulardii 250 MG capsule  Commonly known as: FLORASTOR   250 mg, Oral, 2 Times Daily      tamsulosin 0.4 MG capsule 24 hr capsule  Commonly known as: FLOMAX   0.4 mg, Oral, Daily      Trelegy Ellipta 100-62.5-25 MCG/INH inhaler  Generic drug: Fluticasone-Umeclidin-Vilant   INHALE 1 PUFF ONCE DAILY      Xifaxan 550 MG tablet  Generic drug: riFAXIMin   2 Times Daily         Stop These Medications    Aspirin Low Dose 81 MG tablet  Generic drug: aspirin     cefTAZidime 1 g injection  Commonly known as: FORTAZ     Hydrocortisone (Perianal) 2.5 % rectal cream  Commonly known as: ANUSOL-HC            Allergies   Allergen Reactions   • Penicillins Hives     Hives - has tolerated Zosyn and ceftriaxone 09/2019; ceftazadime 4/2021   • Percocet [Oxycodone-Acetaminophen] Confusion         Discharge Disposition:  Home or Self Care    Diet:  Hospital:  Diet Order   Procedures   • Diet Regular; Low Sodium, Low Fiber / Low Residue       Activity:  Activity Instructions     Activity as Tolerated             Restrictions or Other Recommendations:         CODE STATUS:    Code Status and Medical Interventions:   Ordered at: 04/18/21 1032     Code Status:    No CPR     Medical Interventions (Level of Support Prior to Arrest):    Comfort Measures       Future Appointments   Date Time Provider Department Center   5/10/2021  1:15 PM Leopoldo Burleson MD [facility] [place] None   5/26/2021 12:45 PM RAD TECH [facility] [place]   5/26/2021  1:00 PM [facility] [place], P[facility] LAB 3 [facility]   5/26/2021  1:30 PM Yolie Bates APRN [facility]       Additional Instructions for the Follow-ups that You Need to Schedule     Discharge Follow-up with PCP   As directed       Currently Documented PCP:    Raudel Zheng MD    PCP Phone Number:    [phone]     Follow Up Details: follow up with pcp as needed         Discharge Follow-up with Specified Provider: follow up with Dr. Carrero as needed   As directed      To: follow up with Dr. Carrero as needed                     LYNDA Bloom  04/19/21      Time Spent on Discharge:  I spent  35 minutes on this discharge activity which included: face-to-face encounter with the patient, reviewing the data in the system, coordination of the care with the nursing staff as well as consultants, documentation, and entering orders.

## 2021-04-19 NOTE — OUTREACH NOTE
Prep Survey      Responses   Scientology facility patient discharged from?  [facility]   Is LACE score < 7 ?  No   Emergency Room discharge w/ pulse ox?  No   Eligibility  Not Eligible   What are the reasons patient is not eligible?  Hospice/Pallative Care   Does the patient have one of the following disease processes/diagnoses(primary or secondary)?  Other   Prep survey completed?  Yes          Ivy Evans RN

## 2021-04-19 NOTE — PROGRESS NOTES
Continued Stay Note  [facility]     Patient Name: Derek Kelsey  MRN: [id number]  Today's Date: 4/19/2021    Admit Date: 4/12/2021    Discharge Plan     Row Name 04/19/21 1129       Plan    Plan  Home with [facility]    Final Discharge Disposition Code  50 - home with hospice    Final Note  Visit made to pt, pt's wife present Plan remains for pt to discharge home today with hospice, wife has hospice number to call when pt arrives home. Pt has opted to stop the IV antibiotics and have the PICC line discontinued prior to discharge. Wife stated has a portable oxygen concentrator for the car. Home equipment to delivered to the home today by hospice. Please call [phone] if can be of further assistance.    Row Name 04/19/21 1058       Plan    Final Discharge Disposition Code  50 - home with hospice        Discharge Codes    No documentation.       Expected Discharge Date and Time     Expected Discharge Date Expected Discharge Time    Apr 19, 2021             Blanca Whitaker RN

## 2021-07-26 NOTE — PLAN OF CARE
Goal Outcome Evaluation:         Pt is alert and oriented X4. VSS. No c/o pain or discomfort.continue to have diarrhea.     Siliq Counseling:  I discussed with the patient the risks of Siliq including but not limited to new or worsening depression, suicidal thoughts and behavior, immunosuppression, malignancy, posterior leukoencephalopathy syndrome, and serious infections.  The patient understands that monitoring is required including a PPD at baseline and must alert us or the primary physician if symptoms of infection or other concerning signs are noted. There is also a special program designed to monitor depression which is required with Siliq.

## 2024-12-19 NOTE — THERAPY TREATMENT NOTE
Acute Care - Physical Therapy Treatment Note  [facility]     Patient Name: Derek Kelsey  : 1950  MRN: [id number]  Today's Date: 11/3/2018  Onset of Illness/Injury or Date of Surgery: 18  Date of Referral to PT: 18  Referring Physician: Dr. Franklin    Admit Date: 2018    Visit Dx:    ICD-10-CM ICD-9-CM   1. Impaired functional mobility, balance, gait, and endurance Z74.09 V49.89   2. S/P lumbar fusion Z98.1 V45.4   3. Impaired mobility and ADLs Z74.09 799.89     Patient Active Problem List   Diagnosis   • Anxiety   • Abdominal aortic aneurysm (CMS/HCC)   • Depression   • Hypertension   • Cataract, bilateral   • CAD (coronary artery disease)   • Abdominal aortic aneurysm (AAA) without rupture (CMS/HCC)   • Abnormal stress test   • Moderate COPD   • Former smoker   • Coal workers pneumoconiosis, simple   • Back pain   • S/P lumbar fusion   • Prediabetes   • Acute blood loss anemia, mild, asymptomatic   • Acute postoperative pain   • Hyponatremia, mild       Therapy Treatment          Rehabilitation Treatment Summary     Row Name 18             Treatment Time/Intention    Discipline physical therapist  -ES      Document Type therapy note (daily note)  -ES      Subjective Information complains of;pain  -ES      Mode of Treatment physical therapy  -ES      Patient/Family Observations Pt sitting UIC, hemovac, wife present  -ES      Patient Effort excellent  -ES      Existing Precautions/Restrictions fall;spinal;other (see comments)   hemovac  -ES      Recorded by [ES] Maggie Ness, PT 18 1304      Row Name 18 0930             Vital Signs    Pre Systolic BP Rehab --   VSS throughout, RN consent to PT tx  -ES      Pre Patient Position Sitting  -ES      Intra Patient Position Standing  -ES      Post Patient Position Sitting  -ES      Recorded by [ES] Maggie Ness, PT 18 1304      Row Name 18 0930             Cognitive Assessment/Intervention- PT/OT     Anesthesia Post-op Note    Patient: Leila Worley  Procedure(s) Performed: COLONOSCOPY  Anesthesia type: MAC    Vitals Value Taken Time   Temp 36.5 °C (97.7 °F) 12/19/24 1023   Pulse 60 12/19/24 1023   Resp 22 12/19/24 1023   SpO2 97 % 12/19/24 1023   /73 12/19/24 1023         Patient Location: Phase II  Post-op Vital Signs:stable  Level of Consciousness: awake and alert  Respiratory Status: spontaneous ventilation  Cardiovascular stable  Hydration: euvolemic  Pain Management: adequately controlled  Handoff: Handoff to receiving clinician was performed and questions were answered  Vomiting: none  Nausea: None  Airway Patency:patent  Post-op Assessment: no complications and patient tolerated procedure well      No notable events documented.                       Affect/Mental Status (Cognitive) WNL  -ES      Orientation Status (Cognition) oriented x 4  -ES      Follows Commands (Cognition) WNL  -ES      Cognitive Function (Cognitive) WNL  -ES      Personal Safety Interventions fall prevention program maintained;gait belt;muscle strengthening facilitated;nonskid shoes/slippers when out of bed  -ES      Recorded by [ES] Maggie Ness, PT 11/03/18 1304      Row Name 11/03/18 0930             Safety Issues, Functional Mobility    Impairments Affecting Function (Mobility) pain;strength  -ES      Recorded by [ES] Maggie Ness, PT 11/03/18 1304      Row Name 11/03/18 0930             Bed Mobility Assessment/Treatment    Comment (Bed Mobility) Pt UIC  -ES      Recorded by [ES] Maggie Ness, PT 11/03/18 1304      Row Name 11/03/18 0930             Transfer Assessment/Treatment    Transfer Assessment/Treatment sit-stand transfer;stand-sit transfer  -ES      Recorded by [ES] Maggie Ness, PT 11/03/18 1304      Row Name 11/03/18 0930             Sit-Stand Transfer    Sit-Stand [place] (Transfers) contact guard  -ES      Assistive Device (Sit-Stand Transfers) walker, front-wheeled  -ES      Recorded by [ES] Maggie Ness, PT 11/03/18 1304      Row Name 11/03/18 0930             Stand-Sit Transfer    Stand-Sit [place] (Transfers) contact guard  -ES      Assistive Device (Stand-Sit Transfers) walker, front-wheeled  -ES      Recorded by [ES] Maggie Ness, PT 11/03/18 1304      Row Name 11/03/18 0930             Gait/Stairs Assessment/Training    02689 - Gait Training Minutes  20  -ES      [place] Level (Gait) contact guard  -ES      Assistive Device (Gait) walker, front-wheeled  -ES      Distance in Feet (Gait) 450  -ES      Pattern (Gait) step-through  -ES      Deviations/Abnormal Patterns (Gait) bilateral deviations;ana decreased;stride length decreased  -ES      Bilateral Gait Deviations heel strike decreased;weight shift ability decreased  -ES       [place] Level (Stairs) minimum assist (75% patient effort)  -ES      Assistive Device (Stairs) other (see comments)   1 HHA x 2 trials, standard cane x 1 trial  -ES      Number of Steps (Stairs) 5  -ES      Ascending Technique (Stairs) step-over-step  -ES      Descending Technique (Stairs) step-over-step  -ES      Stairs, Safety Issues sequencing ability decreased;weight-shifting ability decreased  -ES      Stairs, Impairments strength decreased;pain  -ES      Comment (Gait/Stairs) Pt able to walk up/downstairs with 1 HHA.  Educated pt and spouse on ascending with stronger leg and descending with weaker leg.  Pt performing stairs with reciprocal pattern.  Educated on use of standard cane or hand held assist to perform stairs safely to allow pt to get into home safely.  -ES      Recorded by [ES] Maggie Ness, PT 11/03/18 1304      Row Name 11/03/18 0930             Therapeutic Exercise    46407 - PT Therapeutic Exercise Minutes 10  -ES      Recorded by [ES] Maggie Ness, PT 11/03/18 1304      Row Name 11/03/18 0930             Therapeutic Exercise    Lower Extremity (Therapeutic Exercise) gluteal sets;quad sets, bilateral;heel slides, bilateral  -ES      Lower Extremity Range of Motion (Therapeutic Exercise) hip internal/external rotation, bilateral;ankle dorsiflexion/plantar flexion, bilateral  -ES      Core Strength (Therapeutic Exercise) abdominal bracing   BKFO  -ES      Position (Therapeutic Exercise) supine  -ES      Sets/Reps (Therapeutic Exercise) 10x each  -ES      Comment (Therapeutic Exercise) cues for technique, reviewed written HEP and spinal precautions  -ES      Recorded by [ES] Maggie Ness, PT 11/03/18 1304      Row Name 11/03/18 0930             Positioning and Restraints    Pre-Treatment Position sitting in chair/recliner  -ES      Post Treatment Position chair  -ES      In Chair notified nsg;reclined;call light within reach;encouraged to call for assist;with family/caregiver  -ES       Recorded by [ES] Maggie Ness, PT 11/03/18 1304      Row Name 11/03/18 0930             Pain Scale: Numbers Pre/Post-Treatment    Pain Scale: Numbers, Pretreatment 5/10  -ES      Pain Scale: Numbers, Post-Treatment 2/10  -ES      Pain Location - Orientation lower  -ES      Pain Location back  -ES      Pain Intervention(s) Repositioned;Ambulation/increased activity  -ES      Recorded by [ES] Maggie Ness, PT 11/03/18 1304      Row Name                Wound 11/01/18 1029 Other (See comments) back incision    Wound - Properties Group Date first assessed: 11/01/18 [TK] Time first assessed: 1029 [TK] Side: Other (See comments) [TK] Location: back [TK] Type: incision [TK] Recorded by:  [TK] Tish Lincoln RN 11/01/18 1029      User Key  (r) = Recorded By, (t) = Taken By, (c) = Cosigned By    Initials Name Effective Dates Discipline    TK Tish Lincoln, RN 06/16/16 -  Nurse    Maggie Sanders, PT 11/13/17 -  PT          Wound 11/01/18 1029 Other (See comments) back incision (Active)   Dressing Appearance intact;dry;dried drainage 11/3/2018  8:00 AM   Drainage Amount scant 11/3/2018  8:00 AM   Dressing Care, Wound gauze, dry 11/3/2018  8:00 AM             Physical Therapy Education     Title: PT OT SLP Therapies (Done)     Topic: Physical Therapy (Done)     Point: Mobility training (Done)    Learning Progress Summary     Learner Status Readiness Method Response Comment Documented by    Patient Done Acceptance DOUGLAS,JAREN,H SKYE Reviewed spinal precautions, safety with mobility, stair sequencing and assistive device use with stairs, HEP  11/03/18 1304     Done Acceptance JAREN COLENR Reviewed back precautions, HEP, gait mechanics, stairs sequencing.  11/02/18 1348     Done Acceptance JAREN COLE NR Edu re: spinal precautions, gait mechanics, benefits of mobility, log roll  11/01/18 1556    Family Done Acceptance JAREN COLE,H SKYE Reviewed spinal precautions, safety with mobility, stair sequencing and assistive device use with stairs,  HEP ES 11/03/18 1304     Done Acceptance ED SKYENR Edu re: spinal precautions, gait mechanics, benefits of mobility, log roll  11/01/18 1556    Significant Other Done Acceptance ED SKYENR Reviewed back precautions, HEP, gait mechanics, stairs sequencing.  11/02/18 1348     Done Acceptance ED SKYE,NR Edu re: spinal precautions, gait mechanics, benefits of mobility, log roll  11/01/18 1556          Point: Home exercise program (Done)    Learning Progress Summary     Learner Status Readiness Method Response Comment Documented by    Patient Done Acceptance E,D,H VU Reviewed spinal precautions, safety with mobility, stair sequencing and assistive device use with stairs, HEP ES 11/03/18 1304     Done Acceptance EJARENNR Reviewed back precautions, HEP, gait mechanics, stairs sequencing.  11/02/18 1348    Family Done Acceptance E,D,H SKYE Reviewed spinal precautions, safety with mobility, stair sequencing and assistive device use with stairs, HEP ES 11/03/18 1304    Significant Other Done Acceptance ED SKYE,NR Reviewed back precautions, HEP, gait mechanics, stairs sequencing.  11/02/18 1348          Point: Body mechanics (Done)    Learning Progress Summary     Learner Status Readiness Method Response Comment Documented by    Patient Done Acceptance E,D,H SKYE Reviewed spinal precautions, safety with mobility, stair sequencing and assistive device use with stairs, HEP ES 11/03/18 1304     Done Acceptance E,D SKYE,NR Reviewed back precautions, HEP, gait mechanics, stairs sequencing.  11/02/18 1348     Done Acceptance DOUGLASD SKYENR Edu re: spinal precautions, gait mechanics, benefits of mobility, log roll  11/01/18 1556    Family Done Acceptance E,D,H VU Reviewed spinal precautions, safety with mobility, stair sequencing and assistive device use with stairs, HEP ES 11/03/18 1304     Done Acceptance ED SKYENR Edu re: spinal precautions, gait mechanics, benefits of mobility, log roll  11/01/18 1556    Significant Other Done  Acceptance JAREN COLE,NR Reviewed back precautions, HEP, gait mechanics, stairs sequencing.  11/02/18 1348     Done Acceptance JAREN COLE,NR Edu re: spinal precautions, gait mechanics, benefits of mobility, log roll  11/01/18 1556          Point: Precautions (Done)    Learning Progress Summary     Learner Status Readiness Method Response Comment Documented by    Patient Done Acceptance E,D,H VU Reviewed spinal precautions, safety with mobility, stair sequencing and assistive device use with stairs, HEP ES 11/03/18 1304     Done Acceptance EJAREN,NR Reviewed back precautions, HEP, gait mechanics, stairs sequencing.  11/02/18 1348     Done Acceptance ED SKYE,NR Edu re: spinal precautions, gait mechanics, benefits of mobility, log roll  11/01/18 1556    Family Done Acceptance E,D,H SKYE Reviewed spinal precautions, safety with mobility, stair sequencing and assistive device use with stairs, HEP ES 11/03/18 1304     Done Acceptance JAREN COLE,NR Edu re: spinal precautions, gait mechanics, benefits of mobility, log roll  11/01/18 1556    Significant Other Done Acceptance JAREN COLE,NR Reviewed back precautions, HEP, gait mechanics, stairs sequencing.  11/02/18 1348     Done Acceptance EJAREN,NR Edu re: spinal precautions, gait mechanics, benefits of mobility, log roll  11/01/18 1556                      User Key     Initials Effective Dates Name Provider Type Discipline     04/03/18 -  Marino Sorto, PT Physical Therapist PT     11/13/17 -  Maggie Ness, PT Physical Therapist PT                    PT Recommendation and Plan     Plan of Care Reviewed With: patient, spouse  Progress: improving  Outcome Summary: Pt able to ambulate 450 feet using RW with CGA.  Pt demonstrated ability to perform stairs with 1 HHA and pt and spouse educated on stair sequencing and safety with stairs.  Reviewed spinal precautions and written HEP.          Outcome Measures     Row Name 11/03/18 0930 11/02/18 1348 11/02/18 1300       How much  help from another person do you currently need...    Turning from your back to your side while in flat bed without using bedrails? 3  -ES 3  -MJ  --    Moving from lying on back to sitting on the side of a flat bed without bedrails? 3  -ES 3  -MJ  --    Moving to and from a bed to a chair (including a wheelchair)? 3  -ES 3  -MJ  --    Standing up from a chair using your arms (e.g., wheelchair, bedside chair)? 3  -ES 3  -MJ  --    Climbing 3-5 steps with a railing? 3  -ES 3  -MJ  --    To walk in hospital room? 3  -ES 3  -MJ  --    AM-PAC 6 Clicks Score 18  -ES 18  -MJ  --       How much help from another is currently needed...    Putting on and taking off regular lower body clothing?  --  -- 3  -AR    Bathing (including washing, rinsing, and drying)  --  -- 3  -AR    Toileting (which includes using toilet bed pan or urinal)  --  -- 3  -AR    Putting on and taking off regular upper body clothing  --  -- 4  -AR    Taking care of personal grooming (such as brushing teeth)  --  -- 4  -AR    Eating meals  --  -- 4  -AR    Score  --  -- 21  -AR       Functional Assessment    Outcome Measure Options AM-PAC 6 Clicks Basic Mobility (PT)  -ES AM-PAC 6 Clicks Basic Mobility (PT)  -MJ AM-[facility] 6 Clicks Daily Activity (OT)  -AR    Row Name 11/01/18 0955             How much help from another person do you currently need...    Turning from your back to your side while in flat bed without using bedrails? 3  -MJ      Moving from lying on back to sitting on the side of a flat bed without bedrails? 3  -MJ      Moving to and from a bed to a chair (including a wheelchair)? 3  -MJ      Standing up from a chair using your arms (e.g., wheelchair, bedside chair)? 3  -MJ      Climbing 3-5 steps with a railing? 2  -MJ      To walk in hospital room? 3  -MJ      AM-PAC 6 Clicks Score 17  -MJ         Functional Assessment    Outcome Measure Options AM-PAC 6 Clicks Basic Mobility (PT)  -MJ        User Key  (r) = Recorded By, (t) = Taken By, (c) =  Cosigned By    Initials Name Provider Type    Jumana Gallagher, OT Occupational Therapist    Marino Castro, PT Physical Therapist    ES Maggie Ness, PT Physical Therapist           Time Calculation:         PT Charges     Row Name 11/03/18 0930             Time Calculation    Start Time 0930  -ES      PT Received On 11/03/18  -ES      PT Goal Re-Cert Due Date 11/11/18  -ES         Timed Charges    65229 - PT Therapeutic Exercise Minutes 10  -ES      54549 - Gait Training Minutes  20  -ES        User Key  (r) = Recorded By, (t) = Taken By, (c) = Cosigned By    Initials Name Provider Type    ES Maggie Ness, PT Physical Therapist        Therapy Suggested Charges     Code   Minutes Charges    20069 (CPT®) Hc Pt Neuromusc Re Education Ea 15 Min      59219 (CPT®) Hc Pt Ther Proc Ea 15 Min 10 1    38837 (CPT®) Hc Gait Training Ea 15 Min 20 1    16438 (CPT®) Hc Pt Therapeutic Act Ea 15 Min      26456 (CPT®) Hc Pt Manual Therapy Ea 15 Min      64395 (CPT®) Hc Pt Iontophoresis Ea 15 Min      74136 (CPT®) Hc Pt Elec Stim Ea-Per 15 Min      12265 (CPT®) Hc Pt Ultrasound Ea 15 Min      90706 (CPT®) Hc Pt Self Care/Mgmt/Train Ea 15 Min      97636 (CPT®) Hc Pt Prosthetic (S) Train Initial Encounter, Each 15 Min      83921 (CPT®) Hc Pt Orthotic(S)/Prosthetic(S) Encounter, Each 15 Min      12501 (CPT®) Hc Orthotic(S) Mgmt/Train Initial Encounter, Each 15min      Total  30 2        Therapy Charges for Today     Code Description Service Date Service Provider Modifiers Qty    18058458576 HC PT THER PROC EA 15 MIN 11/3/2018 Maggie Ness, PT GP 1    [id number] HC GAIT TRAINING EA 15 MIN 11/3/2018 Maggie Ness, PT GP 1          PT G-Codes  Outcome Measure Options: AM-PAC 6 Clicks Basic Mobility (PT)  AM-PAC 6 Clicks Score: 18  Score: 21    Maggie Ness PT  11/3/2018

## (undated) DEVICE — GLV SURG SENSICARE W/ALOE PF LF 9 STRL

## (undated) DEVICE — SINGLE-USE BIOPSY FORCEPS: Brand: RADIAL JAW 4

## (undated) DEVICE — PK SPINE ORTHO 10

## (undated) DEVICE — SUT SILK 2/0 TIES 18IN A185H

## (undated) DEVICE — 3M™ IOBAN™ 2 ANTIMICROBIAL INCISE DRAPE 6651EZ: Brand: IOBAN™ 2

## (undated) DEVICE — DRP C/ARMOR

## (undated) DEVICE — CLTH CLENS READYCLEANSE PERI CARE PK/5

## (undated) DEVICE — SNAP KOVER: Brand: UNBRANDED

## (undated) DEVICE — NDL HYPO ECLPS SFTY 22G 1 1/2IN

## (undated) DEVICE — SYR LL 10ML LF

## (undated) DEVICE — SAFESECURE,SECUREMENT,FOLEY CATH,STERILE: Brand: MEDLINE

## (undated) DEVICE — INTRO SHEATH DRYSEAL FLEX 12F4TO4.7MM 33CM

## (undated) DEVICE — SUCTION CANISTER, 2500CC, RIGID: Brand: DEROYAL

## (undated) DEVICE — CANNULA,ADULT,SOFT-TOUCH,7'TUBE,UC: Brand: PENDING

## (undated) DEVICE — 450 ML BOTTLE OF 0.05% CHLORHEXIDINE GLUCONATE IN 99.95% STERILE WATER FOR IRRIGATION, USP AND APPLICATOR.: Brand: IRRISEPT ANTIMICROBIAL WOUND LAVAGE

## (undated) DEVICE — SYR LUERLOK 30CC

## (undated) DEVICE — GLIDEX™ COATED HYDROPHILIC GUIDEWIRE: Brand: MAGIC TORQUE™

## (undated) DEVICE — SYR LUERLOK 5CC

## (undated) DEVICE — FIAPC® PROBE W/ FILTER 3000 A OD 2.3MM/6.9FR; L 3M/9.8FT: Brand: ERBE

## (undated) DEVICE — ELECTRD BLD EXT EDGE 1P COAT 4IN STRL

## (undated) DEVICE — SPNG GZ WOVN 4X4IN 12PLY 10/BX STRL

## (undated) DEVICE — Device: Brand: DEFENDO AIR/WATER/SUCTION AND BIOPSY VALVE

## (undated) DEVICE — SI AVANTI+ 7F STD W/GW  NO OBT: Brand: AVANTI

## (undated) DEVICE — TB SXN FRAZIER 12F STRL

## (undated) DEVICE — CATH DIAG EXPO M/ PK 5F FL4/FR4 PIG

## (undated) DEVICE — DRSNG TELFA PAD NONADH STR 1S 3X8IN

## (undated) DEVICE — TBG INJ CONTRL EXCITE RA 1200PSI 48IN

## (undated) DEVICE — ANTIBACTERIAL UNDYED BRAIDED (POLYGLACTIN 910), SYNTHETIC ABSORBABLE SUTURE: Brand: COATED VICRYL

## (undated) DEVICE — BOWL UTIL STRL 32OZ

## (undated) DEVICE — SPNG VERSALON 4X4 4PLY NONSTRL LF BG/200

## (undated) DEVICE — INTRO SHEATH DRYSEAL FLEX 16F 5.3TO6.1MM 33CM

## (undated) DEVICE — CANN OPN VIPER DISP STRL

## (undated) DEVICE — ADHS LIQ MASTISOL 2/3ML

## (undated) DEVICE — SOL IRR H2O BTL 1000ML STRL

## (undated) DEVICE — CONTN GRAD MEAS TRIANG 32OZ BLK

## (undated) DEVICE — THE MILL DISPOSABLE - MEDIUM

## (undated) DEVICE — DECANT BG O JET

## (undated) DEVICE — CATH DIAG EXPO .045 FL3  5F 100CM

## (undated) DEVICE — GOWN,REINF,POLY,ECL,PP SLV,3XL,XLONG: Brand: MEDLINE

## (undated) DEVICE — DISPOSABLE BIPOLAR FORCEPS 7 3/4" (19.7CM) SCOVILLE BAYONET, INSULATED, 1.5MM TIP AND 12 FT. (3.6M) CABLE: Brand: KIRWAN

## (undated) DEVICE — GOWN,REINF,POLY,ECL,PP SLV,XL: Brand: MEDLINE

## (undated) DEVICE — TOTAL TRAY, 16FR 10ML SIL FOLEY, URN: Brand: MEDLINE

## (undated) DEVICE — SPETZLER/CLAW TIP, UNIVERSAL

## (undated) DEVICE — SYR LUERLOK 50ML

## (undated) DEVICE — LUBE GEL ENDOGLIDE 1.1OZ

## (undated) DEVICE — ANGIOGRAPHIC CATHETER: Brand: IMAGER™ II

## (undated) DEVICE — ENCORE® LATEX MICRO SIZE 7, STERILE LATEX POWDER-FREE SURGICAL GLOVE: Brand: ENCORE

## (undated) DEVICE — ADAPT LUER STUB INTRAMEDIC PE/200 17G

## (undated) DEVICE — SUT SILK 3/0 TIES 18IN A184H

## (undated) DEVICE — SPNG GZ STRL 2S 4X4 12PLY

## (undated) DEVICE — TP MICROFM 3IN LF

## (undated) DEVICE — DIFFUSER: Brand: CORE, MAESTRO

## (undated) DEVICE — 3M™ STERI-STRIP™ REINFORCED ADHESIVE SKIN CLOSURES, R1547, 1/2 IN X 4 IN (12 MM X 100 MM), 6 STRIPS/ENVELOPE: Brand: 3M™ STERI-STRIP™

## (undated) DEVICE — 3M™ WARMING BLANKET, UPPER BODY, 10 PER CASE, 42268: Brand: BAIR HUGGER™

## (undated) DEVICE — NDL PERC 1PRT THNWALL W/BASEPLT 18G 7CM

## (undated) DEVICE — GAUZE,SPONGE,4"X4",16PLY,XRAY,STRL,LF: Brand: MEDLINE

## (undated) DEVICE — CVR HNDL LT SURG ACCSSRY BLU STRL

## (undated) DEVICE — 2963 MEDIPORE SOFT CLOTH TAPE 3 IN X 10 YD 12 RLS/CS: Brand: 3M™ MEDIPORE™

## (undated) DEVICE — CMT BONE CONFIDENCE/PLS DS KT 11CC
Type: IMPLANTABLE DEVICE | Site: SPINE LUMBAR | Status: NON-FUNCTIONAL
Removed: 2019-07-24

## (undated) DEVICE — 3.0MM PRECISION NEURO (MATCH HEAD)

## (undated) DEVICE — DRAPE,REIN 53X77,STERILE: Brand: MEDLINE

## (undated) DEVICE — LUER-LOK 360°: Brand: CONNECTA, LUER-LOK

## (undated) DEVICE — SKIN AFFIX SURG ADHESIVE 72/CS 0.55ML: Brand: MEDLINE

## (undated) DEVICE — DISPOSABLE TUBING SET AND EXTENDER FILTER TUBING

## (undated) DEVICE — BIOPSY SHIELD-ORANGE: Brand: RADPAD

## (undated) DEVICE — BANDAGE,GAUZE,BULKEE II,4.5"X4.1YD,STRL: Brand: MEDLINE

## (undated) DEVICE — GW INQWIRE FC PTFE STD J/1.5 .035 260

## (undated) DEVICE — MODEL AT P65, P/N 701554-001KIT CONTENTS: HAND CONTROLLER, 3-WAY HIGH-PRESSURE STOPCOCK WITH ROTATING END AND PREMIUM HIGH-PRESSURE TUBING: Brand: ANGIOTOUCH® KIT

## (undated) DEVICE — MODEL BT2000 P/N 700287-012KIT CONTENTS: MANIFOLD WITH SALINE AND CONTRAST PORTS, SALINE TUBING WITH SPIKE AND HAND SYRINGE, TRANSDUCER: Brand: BT2000 AUTOMATED MANIFOLD KIT

## (undated) DEVICE — PERCLOSE PROGLIDE™ SUTURE-MEDIATED CLOSURE SYSTEM: Brand: PERCLOSE PROGLIDE™

## (undated) DEVICE — MEDI-VAC YANKAUER SUCTION HANDLE: Brand: CARDINAL HEALTH

## (undated) DEVICE — INTRO SHEATH PRELUDE IDEAL SPRNG COIL 021 6F 23X80CM

## (undated) DEVICE — BALN OCCL MOLDING .035 10TO37MM 4X90CM

## (undated) DEVICE — TUBING, SUCTION, 1/4" X 10', STRAIGHT: Brand: MEDLINE

## (undated) DEVICE — INTENDED USE FOR SURGICAL MARKING ON INTACT SKIN, ALSO PROVIDES A PERMANENT METHOD OF IDENTIFYING OBJECTS IN THE OPERATING ROOM: Brand: WRITESITE® REGULAR TIP SKIN MARKER

## (undated) DEVICE — HYBRID CO2 TUBING/CAP SET FOR OLYMPUS® SCOPES & CO2 SOURCE: Brand: ERBE

## (undated) DEVICE — PK CATH CARD 10

## (undated) DEVICE — JACKSON TABLE POSITIONER KIT: Brand: MEDLINE INDUSTRIES, INC.

## (undated) DEVICE — ERBE NESSY®PLATE 170 SPLIT; 168CM²; CABLE 3M: Brand: ERBE

## (undated) DEVICE — KT DRN EVAC WND PVC PCH WTROC RND 10F400

## (undated) DEVICE — PAD ARMBRD SURG CONVOL 7.5X20X2IN

## (undated) DEVICE — SNAR POLYP SENSATION MICRO OVL 13 240X40

## (undated) DEVICE — Device

## (undated) DEVICE — PROXIMATE RH ROTATING HEAD SKIN STAPLERS (35 WIDE) CONTAINS 35 STAINLESS STEEL STAPLES: Brand: PROXIMATE

## (undated) DEVICE — KT ORCA ORCAPOD DISP STRL

## (undated) DEVICE — DEV COMP RAD PRELUDESYNC 24CM

## (undated) DEVICE — PK VASC 10

## (undated) DEVICE — GOWN,PREVENTION PLUS,XXLARGE,STERILE: Brand: MEDLINE

## (undated) DEVICE — OIL CARTRIDGE: Brand: CORE, MAESTRO

## (undated) DEVICE — SONIC CONTROL SERRATED AGGRESSIVE KNIFE

## (undated) DEVICE — KT BIOGUARD SXN VLV AIR/H20 4PC DISP

## (undated) DEVICE — INTRO ACCSR BLNT TP

## (undated) DEVICE — THE BITE BLOCK MAXI, LATEX FREE STRAP IS USED TO PROTECT THE ENDOSCOPE INSERTION TUBE FROM BEING BITTEN BY THE PATIENT.

## (undated) DEVICE — TAPE MICROFM 2IN LF

## (undated) DEVICE — GW AMPLTZ SUPERSTIFF STR .035IN 180CM

## (undated) DEVICE — DRSNG SURG AQUACEL AG 9X15CM

## (undated) DEVICE — SUCTION CANISTER, 1000CC,SAFELINER: Brand: DEROYAL

## (undated) DEVICE — TRAP,MUCUS SPECIMEN,40CC: Brand: MEDLINE

## (undated) DEVICE — IMPLANTABLE DEVICE: Type: IMPLANTABLE DEVICE | Site: SPINE LUMBAR | Status: NON-FUNCTIONAL

## (undated) DEVICE — SUT PROLN 6/0 C1 D/A 30IN 8706H